# Patient Record
Sex: FEMALE | Race: WHITE | Employment: OTHER | URBAN - METROPOLITAN AREA
[De-identification: names, ages, dates, MRNs, and addresses within clinical notes are randomized per-mention and may not be internally consistent; named-entity substitution may affect disease eponyms.]

---

## 2017-01-01 DIAGNOSIS — K50.919 CROHN'S DISEASE WITH COMPLICATION, UNSPECIFIED GASTROINTESTINAL TRACT LOCATION (HCC): ICD-10-CM

## 2017-01-03 RX ORDER — HYOSCYAMINE SULFATE 0.125 MG
TABLET ORAL
Qty: 180 TABLET | Refills: 0 | Status: SHIPPED | OUTPATIENT
Start: 2017-01-03 | End: 2017-02-20 | Stop reason: SDUPTHER

## 2017-01-04 ENCOUNTER — HOSPITAL ENCOUNTER (OUTPATIENT)
Dept: PHYSICAL THERAPY | Age: 52
Discharge: HOME | End: 2017-01-04

## 2017-01-10 ENCOUNTER — OFFICE VISIT (OUTPATIENT)
Dept: FAMILY MEDICINE CLINIC | Age: 52
End: 2017-01-10

## 2017-01-10 VITALS
TEMPERATURE: 97.8 F | RESPIRATION RATE: 16 BRPM | HEIGHT: 63 IN | HEART RATE: 78 BPM | BODY MASS INDEX: 51.91 KG/M2 | SYSTOLIC BLOOD PRESSURE: 114 MMHG | WEIGHT: 293 LBS | DIASTOLIC BLOOD PRESSURE: 74 MMHG

## 2017-01-10 DIAGNOSIS — F60.3 BORDERLINE PERSONALITY DISORDER (HCC): ICD-10-CM

## 2017-01-10 DIAGNOSIS — E83.42 HYPOMAGNESEMIA: ICD-10-CM

## 2017-01-10 DIAGNOSIS — F33.2 SEVERE EPISODE OF RECURRENT MAJOR DEPRESSIVE DISORDER, WITHOUT PSYCHOTIC FEATURES (HCC): ICD-10-CM

## 2017-01-10 DIAGNOSIS — I10 BENIGN ESSENTIAL HTN: ICD-10-CM

## 2017-01-10 DIAGNOSIS — E11.42 TYPE 2 DIABETES MELLITUS WITH DIABETIC POLYNEUROPATHY, WITHOUT LONG-TERM CURRENT USE OF INSULIN (HCC): Primary | ICD-10-CM

## 2017-01-10 DIAGNOSIS — K50.919 CROHN'S DISEASE WITH COMPLICATION, UNSPECIFIED GASTROINTESTINAL TRACT LOCATION (HCC): ICD-10-CM

## 2017-01-10 DIAGNOSIS — E11.42 TYPE 2 DIABETES MELLITUS WITH DIABETIC POLYNEUROPATHY, WITHOUT LONG-TERM CURRENT USE OF INSULIN (HCC): ICD-10-CM

## 2017-01-10 LAB
ALBUMIN SERPL-MCNC: 4.1 G/DL (ref 3.9–4.9)
ALP BLD-CCNC: 92 U/L (ref 40–130)
ALT SERPL-CCNC: 8 U/L (ref 0–33)
ANION GAP SERPL CALCULATED.3IONS-SCNC: 14 MEQ/L (ref 7–13)
AST SERPL-CCNC: 13 U/L (ref 0–35)
BILIRUB SERPL-MCNC: 0.3 MG/DL (ref 0–1.2)
BUN BLDV-MCNC: 21 MG/DL (ref 6–20)
CALCIUM SERPL-MCNC: 8.7 MG/DL (ref 8.6–10.2)
CHLORIDE BLD-SCNC: 96 MEQ/L (ref 98–107)
CHOLESTEROL, TOTAL: 238 MG/DL (ref 0–199)
CO2: 29 MEQ/L (ref 22–29)
CREAT SERPL-MCNC: 0.72 MG/DL (ref 0.5–0.9)
GFR AFRICAN AMERICAN: >60
GFR NON-AFRICAN AMERICAN: >60
GLOBULIN: 2.7 G/DL (ref 2.3–3.5)
GLUCOSE BLD-MCNC: 107 MG/DL (ref 74–109)
HBA1C MFR BLD: 6.4 % (ref 4.8–5.9)
HCT VFR BLD CALC: 35.4 % (ref 37–47)
HDLC SERPL-MCNC: 73 MG/DL (ref 40–59)
HEMOGLOBIN: 11.4 G/DL (ref 12–16)
LDL CHOLESTEROL CALCULATED: 136 MG/DL (ref 0–129)
MAGNESIUM: 1.4 MG/DL (ref 1.7–2.3)
MCH RBC QN AUTO: 25.6 PG (ref 27–31.3)
MCHC RBC AUTO-ENTMCNC: 32.2 % (ref 33–37)
MCV RBC AUTO: 79.4 FL (ref 82–100)
PDW BLD-RTO: 16.1 % (ref 11.5–14.5)
PLATELET # BLD: 312 K/UL (ref 130–400)
POTASSIUM SERPL-SCNC: 4.3 MEQ/L (ref 3.5–5.1)
RBC # BLD: 4.46 M/UL (ref 4.2–5.4)
SODIUM BLD-SCNC: 139 MEQ/L (ref 132–144)
T4 FREE: 1.13 NG/DL (ref 0.93–1.7)
TOTAL PROTEIN: 6.8 G/DL (ref 6.4–8.1)
TRIGL SERPL-MCNC: 143 MG/DL (ref 0–200)
TSH SERPL DL<=0.05 MIU/L-ACNC: 2.57 UIU/ML (ref 0.27–4.2)
WBC # BLD: 9 K/UL (ref 4.8–10.8)

## 2017-01-10 PROCEDURE — 99214 OFFICE O/P EST MOD 30 MIN: CPT | Performed by: FAMILY MEDICINE

## 2017-01-10 RX ORDER — AMITRIPTYLINE HYDROCHLORIDE 25 MG/1
TABLET, FILM COATED ORAL
Status: ON HOLD | COMMUNITY
Start: 2016-10-17 | End: 2017-04-01 | Stop reason: HOSPADM

## 2017-01-10 RX ORDER — TRAZODONE HYDROCHLORIDE 150 MG/1
TABLET ORAL
Status: ON HOLD | COMMUNITY
Start: 2016-12-13 | End: 2017-04-01 | Stop reason: HOSPADM

## 2017-01-10 RX ORDER — TIZANIDINE 4 MG/1
TABLET ORAL
COMMUNITY
Start: 2016-12-14 | End: 2017-03-28

## 2017-01-10 RX ORDER — DULOXETIN HYDROCHLORIDE 30 MG/1
90 CAPSULE, DELAYED RELEASE ORAL DAILY
Status: ON HOLD | COMMUNITY
Start: 2016-12-13 | End: 2017-04-01 | Stop reason: HOSPADM

## 2017-01-11 ENCOUNTER — HOSPITAL ENCOUNTER (OUTPATIENT)
Dept: PHYSICAL THERAPY | Age: 52
Setting detail: THERAPIES SERIES
Discharge: HOME | End: 2017-01-11
Payer: COMMERCIAL

## 2017-01-11 PROCEDURE — 97113 AQUATIC THERAPY/EXERCISES: CPT

## 2017-01-13 ENCOUNTER — HOSPITAL ENCOUNTER (OUTPATIENT)
Dept: PHYSICAL THERAPY | Age: 52
Setting detail: THERAPIES SERIES
Discharge: HOME | End: 2017-01-13
Payer: COMMERCIAL

## 2017-01-13 PROCEDURE — 97113 AQUATIC THERAPY/EXERCISES: CPT

## 2017-01-13 ASSESSMENT — PAIN SCALES - GENERAL: PAINLEVEL_OUTOF10: 6

## 2017-01-17 ENCOUNTER — HOSPITAL ENCOUNTER (OUTPATIENT)
Dept: GENERAL RADIOLOGY | Age: 52
Discharge: HOME | End: 2017-01-17
Payer: COMMERCIAL

## 2017-01-17 DIAGNOSIS — M25.261: ICD-10-CM

## 2017-01-17 PROCEDURE — 73562 X-RAY EXAM OF KNEE 3: CPT

## 2017-01-18 ENCOUNTER — HOSPITAL ENCOUNTER (OUTPATIENT)
Dept: PHYSICAL THERAPY | Age: 52
Setting detail: THERAPIES SERIES
Discharge: HOME | End: 2017-01-18
Payer: COMMERCIAL

## 2017-01-18 DIAGNOSIS — J45.30 MILD PERSISTENT ASTHMA WITHOUT COMPLICATION: Primary | ICD-10-CM

## 2017-01-18 PROCEDURE — 97113 AQUATIC THERAPY/EXERCISES: CPT

## 2017-01-18 RX ORDER — FLUTICASONE FUROATE AND VILANTEROL 100; 25 UG/1; UG/1
1 POWDER RESPIRATORY (INHALATION) DAILY
Qty: 1 EACH | Refills: 3 | Status: SHIPPED | OUTPATIENT
Start: 2017-01-18 | End: 2017-05-12 | Stop reason: SDUPTHER

## 2017-01-18 RX ORDER — MOMETASONE FUROATE 1 MG/G
CREAM TOPICAL
Qty: 50 G | Refills: 3 | Status: SHIPPED | OUTPATIENT
Start: 2017-01-18 | End: 2017-08-24 | Stop reason: SDUPTHER

## 2017-01-18 ASSESSMENT — PAIN SCALES - GENERAL: PAINLEVEL_OUTOF10: 6

## 2017-01-20 ENCOUNTER — HOSPITAL ENCOUNTER (OUTPATIENT)
Dept: PHYSICAL THERAPY | Age: 52
Setting detail: THERAPIES SERIES
Discharge: HOME | End: 2017-01-20
Payer: COMMERCIAL

## 2017-01-20 PROCEDURE — 97113 AQUATIC THERAPY/EXERCISES: CPT

## 2017-01-25 ENCOUNTER — HOSPITAL ENCOUNTER (OUTPATIENT)
Dept: PHYSICAL THERAPY | Age: 52
Setting detail: THERAPIES SERIES
Discharge: HOME | End: 2017-01-25
Payer: COMMERCIAL

## 2017-01-25 PROCEDURE — 97113 AQUATIC THERAPY/EXERCISES: CPT

## 2017-01-25 ASSESSMENT — PAIN DESCRIPTION - LOCATION: LOCATION: BACK;KNEE;LEG

## 2017-01-25 ASSESSMENT — PAIN DESCRIPTION - DESCRIPTORS: DESCRIPTORS: ACHING

## 2017-01-25 ASSESSMENT — PAIN SCALES - GENERAL: PAINLEVEL_OUTOF10: 9

## 2017-01-25 ASSESSMENT — PAIN DESCRIPTION - ORIENTATION: ORIENTATION: RIGHT

## 2017-01-27 ENCOUNTER — HOSPITAL ENCOUNTER (OUTPATIENT)
Dept: PHYSICAL THERAPY | Age: 52
Setting detail: THERAPIES SERIES
Discharge: HOME | End: 2017-01-27
Payer: COMMERCIAL

## 2017-01-30 ENCOUNTER — HOSPITAL ENCOUNTER (OUTPATIENT)
Dept: PHYSICAL THERAPY | Age: 52
Setting detail: THERAPIES SERIES
Discharge: HOME | End: 2017-01-30
Payer: COMMERCIAL

## 2017-01-30 PROCEDURE — 97113 AQUATIC THERAPY/EXERCISES: CPT

## 2017-01-30 ASSESSMENT — PAIN DESCRIPTION - ORIENTATION: ORIENTATION: RIGHT;LEFT

## 2017-01-30 ASSESSMENT — PAIN DESCRIPTION - DESCRIPTORS: DESCRIPTORS: ACHING

## 2017-01-30 ASSESSMENT — PAIN DESCRIPTION - LOCATION: LOCATION: BACK;KNEE;FOOT

## 2017-01-30 ASSESSMENT — PAIN DESCRIPTION - PAIN TYPE: TYPE: CHRONIC PAIN

## 2017-01-30 ASSESSMENT — PAIN SCALES - GENERAL: PAINLEVEL_OUTOF10: 5

## 2017-02-01 ENCOUNTER — HOSPITAL ENCOUNTER (OUTPATIENT)
Dept: PHYSICAL THERAPY | Age: 52
Setting detail: THERAPIES SERIES
Discharge: HOME | End: 2017-02-01
Payer: COMMERCIAL

## 2017-02-01 PROCEDURE — 97113 AQUATIC THERAPY/EXERCISES: CPT

## 2017-02-01 ASSESSMENT — PAIN DESCRIPTION - DESCRIPTORS: DESCRIPTORS: ACHING

## 2017-02-01 ASSESSMENT — PAIN DESCRIPTION - LOCATION: LOCATION: KNEE;BACK

## 2017-02-01 ASSESSMENT — PAIN SCALES - GENERAL: PAINLEVEL_OUTOF10: 4

## 2017-02-01 ASSESSMENT — PAIN DESCRIPTION - PAIN TYPE: TYPE: CHRONIC PAIN

## 2017-02-01 ASSESSMENT — PAIN DESCRIPTION - ORIENTATION: ORIENTATION: RIGHT;LEFT;LOWER

## 2017-02-08 ENCOUNTER — HOSPITAL ENCOUNTER (OUTPATIENT)
Dept: PHYSICAL THERAPY | Age: 52
Setting detail: THERAPIES SERIES
Discharge: HOME | End: 2017-02-08
Payer: COMMERCIAL

## 2017-02-13 RX ORDER — PIOGLITAZONEHYDROCHLORIDE 30 MG/1
TABLET ORAL
Qty: 30 TABLET | Refills: 5 | Status: SHIPPED | OUTPATIENT
Start: 2017-02-13 | End: 2017-08-10 | Stop reason: SDUPTHER

## 2017-02-16 ENCOUNTER — HOSPITAL ENCOUNTER (OUTPATIENT)
Dept: PHYSICAL THERAPY | Age: 52
Setting detail: THERAPIES SERIES
Discharge: HOME | End: 2017-02-16
Payer: COMMERCIAL

## 2017-02-20 DIAGNOSIS — K50.919 CROHN'S DISEASE WITH COMPLICATION, UNSPECIFIED GASTROINTESTINAL TRACT LOCATION (HCC): ICD-10-CM

## 2017-02-20 RX ORDER — ONDANSETRON HYDROCHLORIDE 8 MG/1
TABLET, FILM COATED ORAL
Qty: 40 TABLET | Refills: 2 | Status: ON HOLD | OUTPATIENT
Start: 2017-02-20 | End: 2017-04-01 | Stop reason: HOSPADM

## 2017-02-20 RX ORDER — HYOSCYAMINE SULFATE 0.125 MG
TABLET ORAL
Qty: 180 TABLET | Refills: 0 | Status: ON HOLD | OUTPATIENT
Start: 2017-02-20 | End: 2017-04-01 | Stop reason: HOSPADM

## 2017-02-23 ENCOUNTER — APPOINTMENT (OUTPATIENT)
Dept: PHYSICAL THERAPY | Age: 52
End: 2017-02-23
Payer: COMMERCIAL

## 2017-03-28 ENCOUNTER — HOSPITAL ENCOUNTER (INPATIENT)
Age: 52
LOS: 4 days | Discharge: HOME | DRG: 751 | End: 2017-04-01
Attending: PSYCHIATRY & NEUROLOGY | Admitting: PSYCHIATRY & NEUROLOGY
Payer: COMMERCIAL

## 2017-03-28 DIAGNOSIS — F32.2 MAJOR DEPRESS DIS, SEVERE: Primary | ICD-10-CM

## 2017-03-28 LAB
ALBUMIN SERPL-MCNC: 4.2 G/DL (ref 3.9–4.9)
ALP BLD-CCNC: 114 U/L (ref 40–130)
ALT SERPL-CCNC: 11 U/L (ref 0–33)
AMPHETAMINE SCREEN, URINE: NORMAL
ANION GAP SERPL CALCULATED.3IONS-SCNC: 12 MEQ/L (ref 7–13)
AST SERPL-CCNC: 16 U/L (ref 0–35)
BACTERIA: ABNORMAL /HPF
BARBITURATE SCREEN URINE: NORMAL
BASOPHILS ABSOLUTE: 0.1 K/UL (ref 0–0.2)
BASOPHILS RELATIVE PERCENT: 1.1 %
BENZODIAZEPINE SCREEN, URINE: NORMAL
BILIRUB SERPL-MCNC: 0.4 MG/DL (ref 0–1.2)
BILIRUBIN URINE: NEGATIVE
BLOOD, URINE: NEGATIVE
BUN BLDV-MCNC: 13 MG/DL (ref 6–20)
CALCIUM SERPL-MCNC: 9.9 MG/DL (ref 8.6–10.2)
CANNABINOID SCREEN URINE: NORMAL
CHLORIDE BLD-SCNC: 97 MEQ/L (ref 98–107)
CLARITY: CLEAR
CO2: 27 MEQ/L (ref 22–29)
COCAINE METABOLITE SCREEN URINE: NORMAL
COLOR: YELLOW
CREAT SERPL-MCNC: 0.7 MG/DL (ref 0.5–0.9)
EOSINOPHILS ABSOLUTE: 0.1 K/UL (ref 0–0.7)
EOSINOPHILS RELATIVE PERCENT: 1.3 %
EPITHELIAL CELLS, UA: ABNORMAL /HPF
ETHANOL PERCENT: NORMAL G/DL
ETHANOL: <10 MG/DL (ref 0–0.08)
GFR AFRICAN AMERICAN: >60
GFR NON-AFRICAN AMERICAN: >60
GLOBULIN: 3.1 G/DL (ref 2.3–3.5)
GLUCOSE BLD-MCNC: 115 MG/DL (ref 74–109)
GLUCOSE URINE: NEGATIVE MG/DL
HCT VFR BLD CALC: 37 % (ref 37–47)
HEMOGLOBIN: 12 G/DL (ref 12–16)
KETONES, URINE: NEGATIVE MG/DL
LEUKOCYTE ESTERASE, URINE: ABNORMAL
LYMPHOCYTES ABSOLUTE: 1.7 K/UL (ref 1–4.8)
LYMPHOCYTES RELATIVE PERCENT: 15.3 %
Lab: NORMAL
MCH RBC QN AUTO: 24.8 PG (ref 27–31.3)
MCHC RBC AUTO-ENTMCNC: 32.4 % (ref 33–37)
MCV RBC AUTO: 76.5 FL (ref 82–100)
MONOCYTES ABSOLUTE: 1.1 K/UL (ref 0.2–0.8)
MONOCYTES RELATIVE PERCENT: 9.9 %
NEUTROPHILS ABSOLUTE: 8 K/UL (ref 1.4–6.5)
NEUTROPHILS RELATIVE PERCENT: 72.4 %
NITRITE, URINE: NEGATIVE
OPIATE SCREEN URINE: NORMAL
PDW BLD-RTO: 16.5 % (ref 11.5–14.5)
PH UA: 6 (ref 5–9)
PHENCYCLIDINE SCREEN URINE: NORMAL
PLATELET # BLD: 393 K/UL (ref 130–400)
POTASSIUM SERPL-SCNC: 4.2 MEQ/L (ref 3.5–5.1)
PROTEIN UA: NEGATIVE MG/DL
RBC # BLD: 4.84 M/UL (ref 4.2–5.4)
RBC UA: ABNORMAL /HPF (ref 0–2)
SODIUM BLD-SCNC: 136 MEQ/L (ref 132–144)
SPECIFIC GRAVITY UA: 1.01 (ref 1–1.03)
TOTAL CK: 85 U/L (ref 0–170)
TOTAL PROTEIN: 7.3 G/DL (ref 6.4–8.1)
TSH SERPL DL<=0.05 MIU/L-ACNC: 2.09 UIU/ML (ref 0.27–4.2)
UROBILINOGEN, URINE: 0.2 E.U./DL
WBC # BLD: 11.1 K/UL (ref 4.8–10.8)
WBC UA: ABNORMAL /HPF (ref 0–5)

## 2017-03-28 PROCEDURE — 80307 DRUG TEST PRSMV CHEM ANLYZR: CPT

## 2017-03-28 PROCEDURE — 81001 URINALYSIS AUTO W/SCOPE: CPT

## 2017-03-28 PROCEDURE — 99285 EMERGENCY DEPT VISIT HI MDM: CPT

## 2017-03-28 PROCEDURE — 80053 COMPREHEN METABOLIC PANEL: CPT

## 2017-03-28 PROCEDURE — 85025 COMPLETE CBC W/AUTO DIFF WBC: CPT

## 2017-03-28 PROCEDURE — G0480 DRUG TEST DEF 1-7 CLASSES: HCPCS

## 2017-03-28 PROCEDURE — 6370000000 HC RX 637 (ALT 250 FOR IP): Performed by: EMERGENCY MEDICINE

## 2017-03-28 PROCEDURE — 96372 THER/PROPH/DIAG INJ SC/IM: CPT

## 2017-03-28 PROCEDURE — 1240000000 HC EMOTIONAL WELLNESS R&B

## 2017-03-28 PROCEDURE — 82550 ASSAY OF CK (CPK): CPT

## 2017-03-28 PROCEDURE — 6360000002 HC RX W HCPCS: Performed by: EMERGENCY MEDICINE

## 2017-03-28 PROCEDURE — 84443 ASSAY THYROID STIM HORMONE: CPT

## 2017-03-28 PROCEDURE — 36415 COLL VENOUS BLD VENIPUNCTURE: CPT

## 2017-03-28 RX ORDER — DULOXETIN HYDROCHLORIDE 60 MG/1
60 CAPSULE, DELAYED RELEASE ORAL DAILY
Status: DISCONTINUED | OUTPATIENT
Start: 2017-03-29 | End: 2017-03-31

## 2017-03-28 RX ORDER — FLUTICASONE FUROATE AND VILANTEROL 100; 25 UG/1; UG/1
1 POWDER RESPIRATORY (INHALATION) DAILY
Status: DISCONTINUED | OUTPATIENT
Start: 2017-03-29 | End: 2017-03-29 | Stop reason: CLARIF

## 2017-03-28 RX ORDER — TRIAMTERENE AND HYDROCHLOROTHIAZIDE 75; 50 MG/1; MG/1
1 TABLET ORAL DAILY
Status: DISCONTINUED | OUTPATIENT
Start: 2017-03-29 | End: 2017-04-01 | Stop reason: HOSPADM

## 2017-03-28 RX ORDER — ALBUTEROL SULFATE 90 UG/1
2 AEROSOL, METERED RESPIRATORY (INHALATION) EVERY 6 HOURS PRN
Status: DISCONTINUED | OUTPATIENT
Start: 2017-03-28 | End: 2017-04-01 | Stop reason: HOSPADM

## 2017-03-28 RX ORDER — MONTELUKAST SODIUM 10 MG/1
10 TABLET ORAL NIGHTLY
Status: DISCONTINUED | OUTPATIENT
Start: 2017-03-28 | End: 2017-04-01 | Stop reason: HOSPADM

## 2017-03-28 RX ORDER — ACETAMINOPHEN 325 MG/1
650 TABLET ORAL EVERY 4 HOURS PRN
Status: DISCONTINUED | OUTPATIENT
Start: 2017-03-28 | End: 2017-04-01 | Stop reason: HOSPADM

## 2017-03-28 RX ORDER — SUMATRIPTAN 50 MG/1
50 TABLET, FILM COATED ORAL ONCE
Status: DISCONTINUED | OUTPATIENT
Start: 2017-03-28 | End: 2017-04-01 | Stop reason: HOSPADM

## 2017-03-28 RX ORDER — IPRATROPIUM BROMIDE AND ALBUTEROL SULFATE 2.5; .5 MG/3ML; MG/3ML
1 SOLUTION RESPIRATORY (INHALATION) EVERY 4 HOURS PRN
Status: DISCONTINUED | OUTPATIENT
Start: 2017-03-28 | End: 2017-04-01 | Stop reason: HOSPADM

## 2017-03-28 RX ORDER — HALOPERIDOL 5 MG/ML
10 INJECTION INTRAMUSCULAR ONCE
Status: COMPLETED | OUTPATIENT
Start: 2017-03-28 | End: 2017-03-28

## 2017-03-28 RX ORDER — PIOGLITAZONEHYDROCHLORIDE 30 MG/1
30 TABLET ORAL DAILY
Status: DISCONTINUED | OUTPATIENT
Start: 2017-03-29 | End: 2017-04-01 | Stop reason: HOSPADM

## 2017-03-28 RX ORDER — DICYCLOMINE HYDROCHLORIDE 10 MG/1
10 CAPSULE ORAL 4 TIMES DAILY
Status: DISCONTINUED | OUTPATIENT
Start: 2017-03-28 | End: 2017-04-01 | Stop reason: HOSPADM

## 2017-03-28 RX ORDER — HALOPERIDOL 5 MG
5 TABLET ORAL EVERY 6 HOURS PRN
Status: DISCONTINUED | OUTPATIENT
Start: 2017-03-28 | End: 2017-04-01 | Stop reason: HOSPADM

## 2017-03-28 RX ORDER — ONDANSETRON 4 MG/1
8 TABLET, FILM COATED ORAL EVERY 8 HOURS PRN
Status: DISCONTINUED | OUTPATIENT
Start: 2017-03-28 | End: 2017-04-01 | Stop reason: HOSPADM

## 2017-03-28 RX ORDER — HYDROXYZINE HYDROCHLORIDE 50 MG/ML
50 INJECTION, SOLUTION INTRAMUSCULAR EVERY 6 HOURS PRN
Status: DISCONTINUED | OUTPATIENT
Start: 2017-03-28 | End: 2017-04-01 | Stop reason: HOSPADM

## 2017-03-28 RX ORDER — HYDROXYZINE PAMOATE 50 MG/1
50 CAPSULE ORAL EVERY 6 HOURS PRN
Status: DISCONTINUED | OUTPATIENT
Start: 2017-03-28 | End: 2017-04-01 | Stop reason: HOSPADM

## 2017-03-28 RX ORDER — SIMETHICONE 80 MG
80 TABLET,CHEWABLE ORAL 4 TIMES DAILY PRN
Status: DISCONTINUED | OUTPATIENT
Start: 2017-03-28 | End: 2017-04-01 | Stop reason: HOSPADM

## 2017-03-28 RX ORDER — ALBUTEROL SULFATE 90 UG/1
2 AEROSOL, METERED RESPIRATORY (INHALATION) EVERY 6 HOURS PRN
COMMUNITY
End: 2017-05-10

## 2017-03-28 RX ORDER — HALOPERIDOL 5 MG/ML
5 INJECTION INTRAMUSCULAR EVERY 6 HOURS PRN
Status: DISCONTINUED | OUTPATIENT
Start: 2017-03-28 | End: 2017-04-01 | Stop reason: HOSPADM

## 2017-03-28 RX ORDER — HYOSCYAMINE SULFATE 0.125 MG
125 TABLET ORAL EVERY 4 HOURS PRN
Status: DISCONTINUED | OUTPATIENT
Start: 2017-03-28 | End: 2017-03-29

## 2017-03-28 RX ORDER — MELOXICAM 7.5 MG/1
7.5 TABLET ORAL 2 TIMES DAILY PRN
Status: ON HOLD | COMMUNITY
End: 2017-04-01 | Stop reason: HOSPADM

## 2017-03-28 RX ORDER — POTASSIUM CHLORIDE 750 MG/1
10 CAPSULE, EXTENDED RELEASE ORAL DAILY
Status: DISCONTINUED | OUTPATIENT
Start: 2017-03-29 | End: 2017-04-01 | Stop reason: HOSPADM

## 2017-03-28 RX ORDER — PANTOPRAZOLE SODIUM 40 MG/1
40 TABLET, DELAYED RELEASE ORAL
Status: DISCONTINUED | OUTPATIENT
Start: 2017-03-29 | End: 2017-04-01 | Stop reason: HOSPADM

## 2017-03-28 RX ORDER — ACETAMINOPHEN 500 MG
1000 TABLET ORAL ONCE
Status: COMPLETED | OUTPATIENT
Start: 2017-03-28 | End: 2017-04-01

## 2017-03-28 RX ORDER — SUMATRIPTAN 50 MG/1
50 TABLET, FILM COATED ORAL ONCE
Status: COMPLETED | OUTPATIENT
Start: 2017-03-28 | End: 2017-03-28

## 2017-03-28 RX ORDER — BACLOFEN 20 MG/1
20 TABLET ORAL 3 TIMES DAILY
Status: ON HOLD | COMMUNITY
End: 2017-04-01 | Stop reason: HOSPADM

## 2017-03-28 RX ADMIN — HALOPERIDOL 10 MG: 5 INJECTION INTRAMUSCULAR at 19:59

## 2017-03-28 RX ADMIN — SUMATRIPTAN SUCCINATE 50 MG: 50 TABLET ORAL at 19:59

## 2017-03-28 ASSESSMENT — PAIN DESCRIPTION - PROGRESSION: CLINICAL_PROGRESSION: GRADUALLY WORSENING

## 2017-03-28 ASSESSMENT — ENCOUNTER SYMPTOMS
COUGH: 0
SORE THROAT: 0
COLOR CHANGE: 0
EYE DISCHARGE: 0
ABDOMINAL DISTENTION: 0
CHOKING: 0
RHINORRHEA: 0
CONSTIPATION: 0
SHORTNESS OF BREATH: 0
ABDOMINAL PAIN: 0

## 2017-03-28 ASSESSMENT — SLEEP AND FATIGUE QUESTIONNAIRES
DIFFICULTY STAYING ASLEEP: YES
RESTFUL SLEEP: NO
DIFFICULTY FALLING ASLEEP: YES
DO YOU HAVE DIFFICULTY SLEEPING: YES
DIFFICULTY ARISING: NO
DO YOU USE A SLEEP AID: YES

## 2017-03-28 ASSESSMENT — PAIN DESCRIPTION - FREQUENCY: FREQUENCY: CONTINUOUS

## 2017-03-28 ASSESSMENT — PAIN DESCRIPTION - LOCATION: LOCATION: HIP

## 2017-03-28 ASSESSMENT — PAIN DESCRIPTION - ORIENTATION: ORIENTATION: RIGHT

## 2017-03-28 ASSESSMENT — PAIN DESCRIPTION - ONSET: ONSET: AWAKENED FROM SLEEP

## 2017-03-28 ASSESSMENT — PAIN SCALES - GENERAL
PAINLEVEL_OUTOF10: 10
PAINLEVEL_OUTOF10: 10

## 2017-03-28 ASSESSMENT — PAIN DESCRIPTION - DESCRIPTORS: DESCRIPTORS: RADIATING;CONSTANT

## 2017-03-28 ASSESSMENT — PATIENT HEALTH QUESTIONNAIRE - PHQ9: SUM OF ALL RESPONSES TO PHQ QUESTIONS 1-9: 27

## 2017-03-29 LAB
GLUCOSE BLD-MCNC: 133 MG/DL (ref 60–115)
PERFORMED ON: ABNORMAL

## 2017-03-29 PROCEDURE — 6370000000 HC RX 637 (ALT 250 FOR IP): Performed by: NURSE PRACTITIONER

## 2017-03-29 PROCEDURE — 6370000000 HC RX 637 (ALT 250 FOR IP): Performed by: PSYCHIATRY & NEUROLOGY

## 2017-03-29 PROCEDURE — 87086 URINE CULTURE/COLONY COUNT: CPT

## 2017-03-29 PROCEDURE — 1240000000 HC EMOTIONAL WELLNESS R&B

## 2017-03-29 PROCEDURE — 99222 1ST HOSP IP/OBS MODERATE 55: CPT | Performed by: PSYCHIATRY & NEUROLOGY

## 2017-03-29 PROCEDURE — 94664 DEMO&/EVAL PT USE INHALER: CPT

## 2017-03-29 PROCEDURE — 6360000002 HC RX W HCPCS: Performed by: PSYCHIATRY & NEUROLOGY

## 2017-03-29 RX ORDER — LOPERAMIDE HYDROCHLORIDE 2 MG/1
2 CAPSULE ORAL 4 TIMES DAILY PRN
Status: DISCONTINUED | OUTPATIENT
Start: 2017-03-29 | End: 2017-04-01 | Stop reason: HOSPADM

## 2017-03-29 RX ORDER — NITROFURANTOIN 25; 75 MG/1; MG/1
100 CAPSULE ORAL EVERY 12 HOURS SCHEDULED
Status: DISCONTINUED | OUTPATIENT
Start: 2017-03-29 | End: 2017-04-01 | Stop reason: HOSPADM

## 2017-03-29 RX ADMIN — DICYCLOMINE HYDROCHLORIDE 10 MG: 10 CAPSULE ORAL at 16:51

## 2017-03-29 RX ADMIN — DICYCLOMINE HYDROCHLORIDE 10 MG: 10 CAPSULE ORAL at 21:13

## 2017-03-29 RX ADMIN — NITROFURANTOIN (MONOHYDRATE/MACROCRYSTALS) 100 MG: 75; 25 CAPSULE ORAL at 13:35

## 2017-03-29 RX ADMIN — DICYCLOMINE HYDROCHLORIDE 10 MG: 10 CAPSULE ORAL at 09:19

## 2017-03-29 RX ADMIN — MONTELUKAST SODIUM 10 MG: 10 TABLET, FILM COATED ORAL at 21:13

## 2017-03-29 RX ADMIN — TRIAMTERENE AND HYDROCHLOROTHIAZIDE 1 TABLET: 75; 50 TABLET ORAL at 09:19

## 2017-03-29 RX ADMIN — DICYCLOMINE HYDROCHLORIDE 10 MG: 10 CAPSULE ORAL at 13:17

## 2017-03-29 RX ADMIN — POTASSIUM CHLORIDE 10 MEQ: 750 CAPSULE, EXTENDED RELEASE ORAL at 09:19

## 2017-03-29 RX ADMIN — ACETAMINOPHEN 650 MG: 325 TABLET ORAL at 06:12

## 2017-03-29 RX ADMIN — HYOSCYAMINE SULFATE 125 MCG: 0.12 TABLET ORAL; SUBLINGUAL at 13:35

## 2017-03-29 RX ADMIN — PANTOPRAZOLE SODIUM 40 MG: 40 TABLET, DELAYED RELEASE ORAL at 16:51

## 2017-03-29 RX ADMIN — ONDANSETRON HYDROCHLORIDE 8 MG: 4 TABLET, FILM COATED ORAL at 18:05

## 2017-03-29 RX ADMIN — LOPERAMIDE HYDROCHLORIDE 2 MG: 2 CAPSULE ORAL at 15:02

## 2017-03-29 RX ADMIN — METFORMIN HYDROCHLORIDE 500 MG: 500 TABLET ORAL at 09:19

## 2017-03-29 RX ADMIN — PIOGLITAZONE 30 MG: 30 TABLET ORAL at 09:19

## 2017-03-29 RX ADMIN — DULOXETINE HYDROCHLORIDE 60 MG: 60 CAPSULE, DELAYED RELEASE ORAL at 09:19

## 2017-03-29 RX ADMIN — PANTOPRAZOLE SODIUM 40 MG: 40 TABLET, DELAYED RELEASE ORAL at 06:08

## 2017-03-29 ASSESSMENT — LIFESTYLE VARIABLES: HISTORY_ALCOHOL_USE: NO

## 2017-03-29 ASSESSMENT — PAIN SCALES - GENERAL: PAINLEVEL_OUTOF10: 5

## 2017-03-30 ENCOUNTER — APPOINTMENT (OUTPATIENT)
Dept: GENERAL RADIOLOGY | Age: 52
DRG: 751 | End: 2017-03-30
Payer: COMMERCIAL

## 2017-03-30 ENCOUNTER — APPOINTMENT (OUTPATIENT)
Dept: CT IMAGING | Age: 52
DRG: 751 | End: 2017-03-30
Payer: COMMERCIAL

## 2017-03-30 PROBLEM — E66.9 OBESITY (BMI 35.0-39.9 WITHOUT COMORBIDITY): Chronic | Status: ACTIVE | Noted: 2017-03-30

## 2017-03-30 PROBLEM — M79.10 MUSCULAR PAIN: Status: ACTIVE | Noted: 2017-03-30

## 2017-03-30 PROBLEM — M25.551 HIP PAIN, RIGHT: Status: ACTIVE | Noted: 2017-03-30

## 2017-03-30 PROBLEM — M25.561 PAIN IN RIGHT KNEE: Chronic | Status: ACTIVE | Noted: 2017-03-30

## 2017-03-30 PROCEDURE — 6370000000 HC RX 637 (ALT 250 FOR IP): Performed by: PSYCHIATRY & NEUROLOGY

## 2017-03-30 PROCEDURE — 73502 X-RAY EXAM HIP UNI 2-3 VIEWS: CPT

## 2017-03-30 PROCEDURE — 73700 CT LOWER EXTREMITY W/O DYE: CPT

## 2017-03-30 PROCEDURE — 6370000000 HC RX 637 (ALT 250 FOR IP): Performed by: ANESTHESIOLOGY

## 2017-03-30 PROCEDURE — 99232 SBSQ HOSP IP/OBS MODERATE 35: CPT | Performed by: PSYCHIATRY & NEUROLOGY

## 2017-03-30 PROCEDURE — 6370000000 HC RX 637 (ALT 250 FOR IP): Performed by: NURSE PRACTITIONER

## 2017-03-30 PROCEDURE — 6360000002 HC RX W HCPCS: Performed by: PSYCHIATRY & NEUROLOGY

## 2017-03-30 PROCEDURE — 1240000000 HC EMOTIONAL WELLNESS R&B

## 2017-03-30 PROCEDURE — 94640 AIRWAY INHALATION TREATMENT: CPT

## 2017-03-30 RX ORDER — PREGABALIN 75 MG/1
150 CAPSULE ORAL 2 TIMES DAILY
Status: DISCONTINUED | OUTPATIENT
Start: 2017-03-30 | End: 2017-04-01 | Stop reason: HOSPADM

## 2017-03-30 RX ORDER — LANOLIN ALCOHOL/MO/W.PET/CERES
3 CREAM (GRAM) TOPICAL NIGHTLY
Status: DISCONTINUED | OUTPATIENT
Start: 2017-03-30 | End: 2017-04-01 | Stop reason: HOSPADM

## 2017-03-30 RX ORDER — NAPROXEN 500 MG/1
500 TABLET ORAL 2 TIMES DAILY WITH MEALS
Status: DISCONTINUED | OUTPATIENT
Start: 2017-03-30 | End: 2017-04-01 | Stop reason: HOSPADM

## 2017-03-30 RX ORDER — METHOCARBAMOL 500 MG/1
500 TABLET, FILM COATED ORAL 3 TIMES DAILY PRN
Status: DISCONTINUED | OUTPATIENT
Start: 2017-03-30 | End: 2017-04-01 | Stop reason: HOSPADM

## 2017-03-30 RX ORDER — LIDOCAINE 50 MG/G
2 PATCH TOPICAL DAILY
Status: DISCONTINUED | OUTPATIENT
Start: 2017-03-30 | End: 2017-04-01 | Stop reason: HOSPADM

## 2017-03-30 RX ADMIN — PREGABALIN 150 MG: 75 CAPSULE ORAL at 20:45

## 2017-03-30 RX ADMIN — METFORMIN HYDROCHLORIDE 500 MG: 500 TABLET ORAL at 16:31

## 2017-03-30 RX ADMIN — PANTOPRAZOLE SODIUM 40 MG: 40 TABLET, DELAYED RELEASE ORAL at 16:31

## 2017-03-30 RX ADMIN — TRIAMTERENE AND HYDROCHLOROTHIAZIDE 1 TABLET: 75; 50 TABLET ORAL at 08:27

## 2017-03-30 RX ADMIN — NITROFURANTOIN (MONOHYDRATE/MACROCRYSTALS) 100 MG: 75; 25 CAPSULE ORAL at 08:27

## 2017-03-30 RX ADMIN — NITROFURANTOIN (MONOHYDRATE/MACROCRYSTALS) 100 MG: 75; 25 CAPSULE ORAL at 20:45

## 2017-03-30 RX ADMIN — NAPROXEN 500 MG: 500 TABLET ORAL at 16:31

## 2017-03-30 RX ADMIN — ONDANSETRON HYDROCHLORIDE 8 MG: 4 TABLET, FILM COATED ORAL at 02:45

## 2017-03-30 RX ADMIN — DULOXETINE HYDROCHLORIDE 60 MG: 60 CAPSULE, DELAYED RELEASE ORAL at 08:27

## 2017-03-30 RX ADMIN — MOMETASONE FUROATE AND FORMOTEROL FUMARATE DIHYDRATE 2 PUFF: 200; 5 AEROSOL RESPIRATORY (INHALATION) at 19:32

## 2017-03-30 RX ADMIN — DICYCLOMINE HYDROCHLORIDE 10 MG: 10 CAPSULE ORAL at 08:27

## 2017-03-30 RX ADMIN — METFORMIN HYDROCHLORIDE 500 MG: 500 TABLET ORAL at 08:27

## 2017-03-30 RX ADMIN — ONDANSETRON HYDROCHLORIDE 8 MG: 4 TABLET, FILM COATED ORAL at 13:15

## 2017-03-30 RX ADMIN — DICYCLOMINE HYDROCHLORIDE 10 MG: 10 CAPSULE ORAL at 20:46

## 2017-03-30 RX ADMIN — MELATONIN TAB 3 MG 3 MG: 3 TAB at 20:46

## 2017-03-30 RX ADMIN — PIOGLITAZONE 30 MG: 30 TABLET ORAL at 08:27

## 2017-03-30 RX ADMIN — DICYCLOMINE HYDROCHLORIDE 10 MG: 10 CAPSULE ORAL at 16:31

## 2017-03-30 RX ADMIN — DICYCLOMINE HYDROCHLORIDE 10 MG: 10 CAPSULE ORAL at 13:15

## 2017-03-30 RX ADMIN — PREGABALIN 150 MG: 75 CAPSULE ORAL at 15:53

## 2017-03-30 RX ADMIN — LOPERAMIDE HYDROCHLORIDE 2 MG: 2 CAPSULE ORAL at 16:31

## 2017-03-30 RX ADMIN — POTASSIUM CHLORIDE 10 MEQ: 750 CAPSULE, EXTENDED RELEASE ORAL at 08:27

## 2017-03-30 RX ADMIN — ACETAMINOPHEN 650 MG: 325 TABLET ORAL at 02:45

## 2017-03-30 RX ADMIN — PANTOPRAZOLE SODIUM 40 MG: 40 TABLET, DELAYED RELEASE ORAL at 06:33

## 2017-03-30 RX ADMIN — MONTELUKAST SODIUM 10 MG: 10 TABLET, FILM COATED ORAL at 20:45

## 2017-03-30 ASSESSMENT — PAIN SCALES - GENERAL
PAINLEVEL_OUTOF10: 8
PAINLEVEL_OUTOF10: 6

## 2017-03-31 LAB — URINE CULTURE, ROUTINE: NORMAL

## 2017-03-31 PROCEDURE — 6370000000 HC RX 637 (ALT 250 FOR IP): Performed by: NURSE PRACTITIONER

## 2017-03-31 PROCEDURE — 6370000000 HC RX 637 (ALT 250 FOR IP): Performed by: PSYCHIATRY & NEUROLOGY

## 2017-03-31 PROCEDURE — 94640 AIRWAY INHALATION TREATMENT: CPT

## 2017-03-31 PROCEDURE — 90833 PSYTX W PT W E/M 30 MIN: CPT | Performed by: PSYCHIATRY & NEUROLOGY

## 2017-03-31 PROCEDURE — 1240000000 HC EMOTIONAL WELLNESS R&B

## 2017-03-31 PROCEDURE — 99232 SBSQ HOSP IP/OBS MODERATE 35: CPT | Performed by: PSYCHIATRY & NEUROLOGY

## 2017-03-31 PROCEDURE — 6370000000 HC RX 637 (ALT 250 FOR IP): Performed by: ANESTHESIOLOGY

## 2017-03-31 RX ADMIN — MOMETASONE FUROATE AND FORMOTEROL FUMARATE DIHYDRATE 2 PUFF: 200; 5 AEROSOL RESPIRATORY (INHALATION) at 20:52

## 2017-03-31 RX ADMIN — MONTELUKAST SODIUM 10 MG: 10 TABLET, FILM COATED ORAL at 20:29

## 2017-03-31 RX ADMIN — METFORMIN HYDROCHLORIDE 500 MG: 500 TABLET ORAL at 08:27

## 2017-03-31 RX ADMIN — TRIAMTERENE AND HYDROCHLOROTHIAZIDE 1 TABLET: 75; 50 TABLET ORAL at 08:28

## 2017-03-31 RX ADMIN — DICYCLOMINE HYDROCHLORIDE 10 MG: 10 CAPSULE ORAL at 16:00

## 2017-03-31 RX ADMIN — NITROFURANTOIN (MONOHYDRATE/MACROCRYSTALS) 100 MG: 75; 25 CAPSULE ORAL at 20:29

## 2017-03-31 RX ADMIN — MELATONIN TAB 3 MG 3 MG: 3 TAB at 20:29

## 2017-03-31 RX ADMIN — PREGABALIN 150 MG: 75 CAPSULE ORAL at 20:28

## 2017-03-31 RX ADMIN — DULOXETINE HYDROCHLORIDE 60 MG: 60 CAPSULE, DELAYED RELEASE ORAL at 08:27

## 2017-03-31 RX ADMIN — MOMETASONE FUROATE AND FORMOTEROL FUMARATE DIHYDRATE 2 PUFF: 200; 5 AEROSOL RESPIRATORY (INHALATION) at 08:09

## 2017-03-31 RX ADMIN — NAPROXEN 500 MG: 500 TABLET ORAL at 08:27

## 2017-03-31 RX ADMIN — DICYCLOMINE HYDROCHLORIDE 10 MG: 10 CAPSULE ORAL at 08:28

## 2017-03-31 RX ADMIN — DICYCLOMINE HYDROCHLORIDE 10 MG: 10 CAPSULE ORAL at 20:29

## 2017-03-31 RX ADMIN — PREGABALIN 150 MG: 75 CAPSULE ORAL at 08:27

## 2017-03-31 RX ADMIN — NITROFURANTOIN (MONOHYDRATE/MACROCRYSTALS) 100 MG: 75; 25 CAPSULE ORAL at 08:27

## 2017-03-31 RX ADMIN — NAPROXEN 500 MG: 500 TABLET ORAL at 17:20

## 2017-03-31 RX ADMIN — METFORMIN HYDROCHLORIDE 500 MG: 500 TABLET ORAL at 17:20

## 2017-03-31 RX ADMIN — PIOGLITAZONE 30 MG: 30 TABLET ORAL at 08:27

## 2017-03-31 RX ADMIN — POTASSIUM CHLORIDE 10 MEQ: 750 CAPSULE, EXTENDED RELEASE ORAL at 08:27

## 2017-03-31 RX ADMIN — PANTOPRAZOLE SODIUM 40 MG: 40 TABLET, DELAYED RELEASE ORAL at 16:00

## 2017-03-31 RX ADMIN — PANTOPRAZOLE SODIUM 40 MG: 40 TABLET, DELAYED RELEASE ORAL at 06:35

## 2017-03-31 RX ADMIN — DICYCLOMINE HYDROCHLORIDE 10 MG: 10 CAPSULE ORAL at 13:04

## 2017-03-31 ASSESSMENT — PAIN SCALES - GENERAL
PAINLEVEL_OUTOF10: 2
PAINLEVEL_OUTOF10: 4

## 2017-04-01 VITALS
HEIGHT: 62 IN | RESPIRATION RATE: 20 BRPM | SYSTOLIC BLOOD PRESSURE: 131 MMHG | WEIGHT: 293 LBS | OXYGEN SATURATION: 95 % | TEMPERATURE: 98 F | HEART RATE: 80 BPM | BODY MASS INDEX: 53.92 KG/M2 | DIASTOLIC BLOOD PRESSURE: 81 MMHG

## 2017-04-01 PROCEDURE — 94640 AIRWAY INHALATION TREATMENT: CPT

## 2017-04-01 PROCEDURE — 6370000000 HC RX 637 (ALT 250 FOR IP): Performed by: PSYCHIATRY & NEUROLOGY

## 2017-04-01 PROCEDURE — 6370000000 HC RX 637 (ALT 250 FOR IP): Performed by: NURSE PRACTITIONER

## 2017-04-01 PROCEDURE — 6370000000 HC RX 637 (ALT 250 FOR IP): Performed by: ANESTHESIOLOGY

## 2017-04-01 PROCEDURE — 6370000000 HC RX 637 (ALT 250 FOR IP): Performed by: EMERGENCY MEDICINE

## 2017-04-01 RX ORDER — NAPROXEN 500 MG/1
500 TABLET ORAL 2 TIMES DAILY WITH MEALS
Qty: 14 TABLET | Refills: 0 | Status: SHIPPED | OUTPATIENT
Start: 2017-04-01 | End: 2017-05-10

## 2017-04-01 RX ORDER — LANOLIN ALCOHOL/MO/W.PET/CERES
3 CREAM (GRAM) TOPICAL NIGHTLY
Qty: 14 TABLET | Refills: 0 | Status: SHIPPED | OUTPATIENT
Start: 2017-04-01 | End: 2017-05-10

## 2017-04-01 RX ORDER — NITROFURANTOIN 25; 75 MG/1; MG/1
100 CAPSULE ORAL EVERY 12 HOURS SCHEDULED
Qty: 10 CAPSULE | Refills: 0 | Status: SHIPPED | OUTPATIENT
Start: 2017-04-01 | End: 2017-04-06

## 2017-04-01 RX ORDER — ALBUTEROL SULFATE 90 UG/1
2 AEROSOL, METERED RESPIRATORY (INHALATION) EVERY 6 HOURS PRN
Qty: 1 INHALER | Refills: 3 | Status: SHIPPED | OUTPATIENT
Start: 2017-04-01 | End: 2017-08-25 | Stop reason: SDUPTHER

## 2017-04-01 RX ORDER — DULOXETIN HYDROCHLORIDE 30 MG/1
90 CAPSULE, DELAYED RELEASE ORAL DAILY
Qty: 42 CAPSULE | Refills: 0 | Status: SHIPPED | OUTPATIENT
Start: 2017-04-01 | End: 2017-05-10 | Stop reason: DRUGHIGH

## 2017-04-01 RX ADMIN — NAPROXEN 500 MG: 500 TABLET ORAL at 08:18

## 2017-04-01 RX ADMIN — ACETAMINOPHEN 1000 MG: 500 TABLET ORAL at 06:57

## 2017-04-01 RX ADMIN — PREGABALIN 150 MG: 75 CAPSULE ORAL at 08:18

## 2017-04-01 RX ADMIN — TRIAMTERENE AND HYDROCHLOROTHIAZIDE 1 TABLET: 75; 50 TABLET ORAL at 08:18

## 2017-04-01 RX ADMIN — METFORMIN HYDROCHLORIDE 500 MG: 500 TABLET ORAL at 08:17

## 2017-04-01 RX ADMIN — PIOGLITAZONE 30 MG: 30 TABLET ORAL at 08:18

## 2017-04-01 RX ADMIN — DULOXETINE HYDROCHLORIDE 90 MG: 60 CAPSULE, DELAYED RELEASE ORAL at 08:17

## 2017-04-01 RX ADMIN — DICYCLOMINE HYDROCHLORIDE 10 MG: 10 CAPSULE ORAL at 08:18

## 2017-04-01 RX ADMIN — PANTOPRAZOLE SODIUM 40 MG: 40 TABLET, DELAYED RELEASE ORAL at 06:33

## 2017-04-01 RX ADMIN — POTASSIUM CHLORIDE 10 MEQ: 750 CAPSULE, EXTENDED RELEASE ORAL at 08:17

## 2017-04-01 RX ADMIN — MOMETASONE FUROATE AND FORMOTEROL FUMARATE DIHYDRATE 2 PUFF: 200; 5 AEROSOL RESPIRATORY (INHALATION) at 08:22

## 2017-04-01 RX ADMIN — NITROFURANTOIN (MONOHYDRATE/MACROCRYSTALS) 100 MG: 75; 25 CAPSULE ORAL at 08:17

## 2017-04-01 ASSESSMENT — PAIN SCALES - GENERAL
PAINLEVEL_OUTOF10: 2
PAINLEVEL_OUTOF10: 6

## 2017-04-03 DIAGNOSIS — I10 BENIGN ESSENTIAL HTN: ICD-10-CM

## 2017-04-03 DIAGNOSIS — K50.919 CROHN'S DISEASE WITH COMPLICATION, UNSPECIFIED GASTROINTESTINAL TRACT LOCATION (HCC): ICD-10-CM

## 2017-04-03 DIAGNOSIS — E11.42 TYPE 2 DIABETES MELLITUS WITH DIABETIC POLYNEUROPATHY, WITHOUT LONG-TERM CURRENT USE OF INSULIN (HCC): ICD-10-CM

## 2017-04-03 DIAGNOSIS — J45.20 MILD INTERMITTENT ASTHMA WITHOUT COMPLICATION: ICD-10-CM

## 2017-04-04 RX ORDER — ZOLMITRIPTAN 2.5 MG/1
TABLET, FILM COATED ORAL
Qty: 9 TABLET | Refills: 2 | Status: SHIPPED | OUTPATIENT
Start: 2017-04-04 | End: 2017-05-10 | Stop reason: SDUPTHER

## 2017-04-04 RX ORDER — POTASSIUM CHLORIDE 750 MG/1
CAPSULE, EXTENDED RELEASE ORAL
Qty: 30 CAPSULE | Refills: 5 | Status: SHIPPED | OUTPATIENT
Start: 2017-04-04 | End: 2017-10-09 | Stop reason: SDUPTHER

## 2017-04-04 RX ORDER — HYOSCYAMINE SULFATE 0.125 MG
TABLET ORAL
Qty: 180 TABLET | Refills: 1 | Status: SHIPPED | OUTPATIENT
Start: 2017-04-04 | End: 2017-08-24 | Stop reason: SDUPTHER

## 2017-04-04 RX ORDER — IPRATROPIUM BROMIDE AND ALBUTEROL SULFATE 2.5; .5 MG/3ML; MG/3ML
SOLUTION RESPIRATORY (INHALATION)
Qty: 360 ML | Refills: 1 | Status: SHIPPED | OUTPATIENT
Start: 2017-04-04 | End: 2017-12-26 | Stop reason: SDUPTHER

## 2017-04-04 RX ORDER — TRIAMTERENE AND HYDROCHLOROTHIAZIDE 75; 50 MG/1; MG/1
TABLET ORAL
Qty: 30 TABLET | Refills: 5 | Status: SHIPPED | OUTPATIENT
Start: 2017-04-04 | End: 2017-10-09 | Stop reason: SDUPTHER

## 2017-04-04 RX ORDER — MONTELUKAST SODIUM 10 MG/1
TABLET ORAL
Qty: 30 TABLET | Refills: 5 | Status: SHIPPED | OUTPATIENT
Start: 2017-04-04 | End: 2017-10-09 | Stop reason: SDUPTHER

## 2017-04-11 ENCOUNTER — CLINICAL DOCUMENTATION (OUTPATIENT)
Dept: PHYSICAL THERAPY | Age: 52
End: 2017-04-11

## 2017-04-17 ENCOUNTER — TELEPHONE (OUTPATIENT)
Dept: FAMILY MEDICINE CLINIC | Age: 52
End: 2017-04-17

## 2017-04-17 DIAGNOSIS — R20.0 HAND NUMBNESS: Primary | ICD-10-CM

## 2017-04-17 DIAGNOSIS — R20.0 NUMBNESS IN FEET: ICD-10-CM

## 2017-05-02 ENCOUNTER — CLINICAL DOCUMENTATION (OUTPATIENT)
Dept: PHYSICAL THERAPY | Age: 52
End: 2017-05-02

## 2017-05-03 ENCOUNTER — HOSPITAL ENCOUNTER (OUTPATIENT)
Dept: NEUROLOGY | Age: 52
Discharge: HOME | End: 2017-05-03
Payer: COMMERCIAL

## 2017-05-03 DIAGNOSIS — E78.2 MIXED HYPERLIPIDEMIA: ICD-10-CM

## 2017-05-03 DIAGNOSIS — I10 BENIGN ESSENTIAL HTN: ICD-10-CM

## 2017-05-03 DIAGNOSIS — D64.9 ANEMIA, UNSPECIFIED TYPE: ICD-10-CM

## 2017-05-03 DIAGNOSIS — E11.42 TYPE 2 DIABETES MELLITUS WITH DIABETIC POLYNEUROPATHY, WITHOUT LONG-TERM CURRENT USE OF INSULIN (HCC): Primary | ICD-10-CM

## 2017-05-03 DIAGNOSIS — E11.42 TYPE 2 DIABETES MELLITUS WITH DIABETIC POLYNEUROPATHY, WITHOUT LONG-TERM CURRENT USE OF INSULIN (HCC): ICD-10-CM

## 2017-05-03 LAB
ALBUMIN SERPL-MCNC: 4 G/DL (ref 3.9–4.9)
ALP BLD-CCNC: 110 U/L (ref 40–130)
ALT SERPL-CCNC: 11 U/L (ref 0–33)
ANION GAP SERPL CALCULATED.3IONS-SCNC: 13 MEQ/L (ref 7–13)
AST SERPL-CCNC: 12 U/L (ref 0–35)
BASOPHILS ABSOLUTE: 0.1 K/UL (ref 0–0.2)
BASOPHILS RELATIVE PERCENT: 0.5 %
BILIRUB SERPL-MCNC: 0.3 MG/DL (ref 0–1.2)
BUN BLDV-MCNC: 16 MG/DL (ref 6–20)
CALCIUM SERPL-MCNC: 8.8 MG/DL (ref 8.6–10.2)
CHLORIDE BLD-SCNC: 96 MEQ/L (ref 98–107)
CHOLESTEROL, TOTAL: 229 MG/DL (ref 0–199)
CO2: 28 MEQ/L (ref 22–29)
CREAT SERPL-MCNC: 0.89 MG/DL (ref 0.5–0.9)
EOSINOPHILS ABSOLUTE: 0.1 K/UL (ref 0–0.7)
EOSINOPHILS RELATIVE PERCENT: 0.8 %
GFR AFRICAN AMERICAN: >60
GFR NON-AFRICAN AMERICAN: >60
GLOBULIN: 2.8 G/DL (ref 2.3–3.5)
GLUCOSE BLD-MCNC: 118 MG/DL (ref 74–109)
HBA1C MFR BLD: 5.9 % (ref 4.8–5.9)
HCT VFR BLD CALC: 37.9 % (ref 37–47)
HDLC SERPL-MCNC: 69 MG/DL (ref 40–59)
HEMOGLOBIN: 12.2 G/DL (ref 12–16)
LDL CHOLESTEROL CALCULATED: 125 MG/DL (ref 0–129)
LYMPHOCYTES ABSOLUTE: 2.2 K/UL (ref 1–4.8)
LYMPHOCYTES RELATIVE PERCENT: 15.8 %
MCH RBC QN AUTO: 25.3 PG (ref 27–31.3)
MCHC RBC AUTO-ENTMCNC: 32.1 % (ref 33–37)
MCV RBC AUTO: 78.8 FL (ref 82–100)
MONOCYTES ABSOLUTE: 1.2 K/UL (ref 0.2–0.8)
MONOCYTES RELATIVE PERCENT: 8.7 %
NEUTROPHILS ABSOLUTE: 10.3 K/UL (ref 1.4–6.5)
NEUTROPHILS RELATIVE PERCENT: 74.2 %
PDW BLD-RTO: 16.7 % (ref 11.5–14.5)
PLATELET # BLD: 381 K/UL (ref 130–400)
PLATELET SLIDE REVIEW: ABNORMAL
POTASSIUM SERPL-SCNC: 4.6 MEQ/L (ref 3.5–5.1)
RBC # BLD: 4.8 M/UL (ref 4.2–5.4)
RBC # BLD: NORMAL 10*6/UL
SODIUM BLD-SCNC: 137 MEQ/L (ref 132–144)
TOTAL PROTEIN: 6.8 G/DL (ref 6.4–8.1)
TOXIC GRANULATION: ABNORMAL
TRIGL SERPL-MCNC: 173 MG/DL (ref 0–200)
WBC # BLD: 13.9 K/UL (ref 4.8–10.8)

## 2017-05-03 PROCEDURE — 95886 MUSC TEST DONE W/N TEST COMP: CPT

## 2017-05-03 PROCEDURE — 95912 NRV CNDJ TEST 11-12 STUDIES: CPT

## 2017-05-10 ENCOUNTER — OFFICE VISIT (OUTPATIENT)
Dept: FAMILY MEDICINE CLINIC | Age: 52
End: 2017-05-10

## 2017-05-10 VITALS — HEIGHT: 63 IN | HEART RATE: 78 BPM | SYSTOLIC BLOOD PRESSURE: 136 MMHG | DIASTOLIC BLOOD PRESSURE: 64 MMHG

## 2017-05-10 DIAGNOSIS — I10 BENIGN ESSENTIAL HTN: ICD-10-CM

## 2017-05-10 DIAGNOSIS — K50.919 CROHN'S DISEASE WITH COMPLICATION, UNSPECIFIED GASTROINTESTINAL TRACT LOCATION (HCC): ICD-10-CM

## 2017-05-10 DIAGNOSIS — E78.2 MIXED HYPERLIPIDEMIA: ICD-10-CM

## 2017-05-10 DIAGNOSIS — E11.42 TYPE 2 DIABETES MELLITUS WITH DIABETIC POLYNEUROPATHY, WITHOUT LONG-TERM CURRENT USE OF INSULIN (HCC): ICD-10-CM

## 2017-05-10 DIAGNOSIS — F60.3 BORDERLINE PERSONALITY DISORDER (HCC): ICD-10-CM

## 2017-05-10 DIAGNOSIS — E11.42 TYPE 2 DIABETES MELLITUS WITH DIABETIC POLYNEUROPATHY, WITHOUT LONG-TERM CURRENT USE OF INSULIN (HCC): Primary | ICD-10-CM

## 2017-05-10 DIAGNOSIS — R30.0 DYSURIA: ICD-10-CM

## 2017-05-10 DIAGNOSIS — F33.2 SEVERE EPISODE OF RECURRENT MAJOR DEPRESSIVE DISORDER, WITHOUT PSYCHOTIC FEATURES (HCC): ICD-10-CM

## 2017-05-10 DIAGNOSIS — Z12.31 VISIT FOR SCREENING MAMMOGRAM: ICD-10-CM

## 2017-05-10 LAB
BILIRUBIN, POC: NORMAL
BLOOD URINE, POC: NORMAL
CLARITY, POC: NORMAL
COLOR, POC: NORMAL
CREATININE URINE: 85.7 MG/DL
GLUCOSE URINE, POC: NORMAL
KETONES, POC: NORMAL
LEUKOCYTE EST, POC: NORMAL
MICROALBUMIN UR-MCNC: 3.7 MG/DL
MICROALBUMIN/CREAT UR-RTO: 43.2 MG/G (ref 0–30)
NITRITE, POC: NORMAL
PH, POC: 6
PROTEIN, POC: NORMAL
SPECIFIC GRAVITY, POC: 1.02
UROBILINOGEN, POC: NORMAL

## 2017-05-10 PROCEDURE — 99214 OFFICE O/P EST MOD 30 MIN: CPT | Performed by: FAMILY MEDICINE

## 2017-05-10 PROCEDURE — 81003 URINALYSIS AUTO W/O SCOPE: CPT | Performed by: FAMILY MEDICINE

## 2017-05-10 RX ORDER — ONDANSETRON HYDROCHLORIDE 8 MG/1
TABLET, FILM COATED ORAL
COMMUNITY
Start: 2017-04-03 | End: 2017-06-27 | Stop reason: SDUPTHER

## 2017-05-10 RX ORDER — DULOXETIN HYDROCHLORIDE 30 MG/1
90 CAPSULE, DELAYED RELEASE ORAL DAILY
Status: ON HOLD | COMMUNITY
End: 2018-03-15

## 2017-05-10 RX ORDER — BACLOFEN 20 MG/1
20 TABLET ORAL 4 TIMES DAILY
COMMUNITY
Start: 2017-04-01 | End: 2021-12-15

## 2017-05-10 RX ORDER — ZOLMITRIPTAN 5 MG/1
TABLET, FILM COATED ORAL
Qty: 9 TABLET | Refills: 3 | Status: SHIPPED | OUTPATIENT
Start: 2017-05-10 | End: 2017-09-28 | Stop reason: SDUPTHER

## 2017-05-12 DIAGNOSIS — J45.30 MILD PERSISTENT ASTHMA WITHOUT COMPLICATION: ICD-10-CM

## 2017-05-12 RX ORDER — FLUTICASONE FUROATE AND VILANTEROL TRIFENATATE 100; 25 UG/1; UG/1
POWDER RESPIRATORY (INHALATION)
Qty: 1 EACH | Refills: 3 | Status: SHIPPED | OUTPATIENT
Start: 2017-05-12 | End: 2017-09-09 | Stop reason: SDUPTHER

## 2017-06-06 ENCOUNTER — OFFICE VISIT (OUTPATIENT)
Dept: PULMONOLOGY | Age: 52
End: 2017-06-06

## 2017-06-06 VITALS
BODY MASS INDEX: 51.91 KG/M2 | WEIGHT: 293 LBS | RESPIRATION RATE: 16 BRPM | SYSTOLIC BLOOD PRESSURE: 134 MMHG | TEMPERATURE: 96.5 F | HEART RATE: 84 BPM | HEIGHT: 63 IN | OXYGEN SATURATION: 99 % | DIASTOLIC BLOOD PRESSURE: 82 MMHG

## 2017-06-06 DIAGNOSIS — J45.40 MODERATE PERSISTENT ASTHMA WITHOUT COMPLICATION: Primary | ICD-10-CM

## 2017-06-06 DIAGNOSIS — K21.9 GASTROESOPHAGEAL REFLUX DISEASE, ESOPHAGITIS PRESENCE NOT SPECIFIED: ICD-10-CM

## 2017-06-06 DIAGNOSIS — G47.33 OBSTRUCTIVE SLEEP APNEA SYNDROME: ICD-10-CM

## 2017-06-06 PROCEDURE — 99214 OFFICE O/P EST MOD 30 MIN: CPT | Performed by: INTERNAL MEDICINE

## 2017-06-06 ASSESSMENT — ENCOUNTER SYMPTOMS
DIARRHEA: 0
SORE THROAT: 0
CHEST TIGHTNESS: 0
WHEEZING: 0
SHORTNESS OF BREATH: 0
ABDOMINAL PAIN: 0
NAUSEA: 0
RHINORRHEA: 0
COUGH: 0
SINUS PRESSURE: 0
VOMITING: 0

## 2017-06-18 DIAGNOSIS — K50.919 CROHN'S DISEASE WITH COMPLICATION, UNSPECIFIED GASTROINTESTINAL TRACT LOCATION (HCC): ICD-10-CM

## 2017-06-19 RX ORDER — ONDANSETRON HYDROCHLORIDE 8 MG/1
TABLET, FILM COATED ORAL
Qty: 40 TABLET | Refills: 1 | Status: SHIPPED | OUTPATIENT
Start: 2017-06-19 | End: 2017-08-24 | Stop reason: SDUPTHER

## 2017-06-19 RX ORDER — CLOBETASOL PROPIONATE 0.5 MG/G
CREAM TOPICAL
Qty: 60 G | Refills: 2 | Status: ON HOLD | OUTPATIENT
Start: 2017-06-19 | End: 2017-11-07

## 2017-06-22 ENCOUNTER — HOSPITAL ENCOUNTER (OUTPATIENT)
Dept: SLEEP CENTER | Age: 52
Discharge: HOME | End: 2017-06-22
Payer: COMMERCIAL

## 2017-06-22 PROCEDURE — 95810 POLYSOM 6/> YRS 4/> PARAM: CPT

## 2017-06-27 ENCOUNTER — OFFICE VISIT (OUTPATIENT)
Dept: FAMILY MEDICINE CLINIC | Age: 52
End: 2017-06-27

## 2017-06-27 VITALS
WEIGHT: 293 LBS | RESPIRATION RATE: 14 BRPM | DIASTOLIC BLOOD PRESSURE: 74 MMHG | HEART RATE: 74 BPM | SYSTOLIC BLOOD PRESSURE: 122 MMHG | BODY MASS INDEX: 51.91 KG/M2 | TEMPERATURE: 98.4 F | HEIGHT: 63 IN

## 2017-06-27 DIAGNOSIS — E11.42 TYPE 2 DIABETES MELLITUS WITH DIABETIC POLYNEUROPATHY, WITHOUT LONG-TERM CURRENT USE OF INSULIN (HCC): Primary | ICD-10-CM

## 2017-06-27 DIAGNOSIS — J45.30 MILD PERSISTENT ASTHMA WITHOUT COMPLICATION: ICD-10-CM

## 2017-06-27 DIAGNOSIS — F33.2 SEVERE EPISODE OF RECURRENT MAJOR DEPRESSIVE DISORDER, WITHOUT PSYCHOTIC FEATURES (HCC): ICD-10-CM

## 2017-06-27 DIAGNOSIS — F60.3 BORDERLINE PERSONALITY DISORDER (HCC): ICD-10-CM

## 2017-06-27 DIAGNOSIS — B37.0 THRUSH: ICD-10-CM

## 2017-06-27 DIAGNOSIS — I10 BENIGN ESSENTIAL HTN: ICD-10-CM

## 2017-06-27 DIAGNOSIS — E78.2 MIXED HYPERLIPIDEMIA: ICD-10-CM

## 2017-06-27 DIAGNOSIS — K50.919 CROHN'S DISEASE WITH COMPLICATION, UNSPECIFIED GASTROINTESTINAL TRACT LOCATION (HCC): ICD-10-CM

## 2017-06-27 PROCEDURE — 99214 OFFICE O/P EST MOD 30 MIN: CPT | Performed by: FAMILY MEDICINE

## 2017-06-27 RX ORDER — BLOOD-GLUCOSE METER
KIT MISCELLANEOUS
Refills: 1 | COMMUNITY
Start: 2017-06-19 | End: 2018-05-24 | Stop reason: SDUPTHER

## 2017-06-27 RX ORDER — PENTAZOCINE HYDROCHLORIDE AND NALOXONE HYDROCHLORIDE 50; .5 MG/1; MG/1
TABLET ORAL
Refills: 0 | COMMUNITY
Start: 2017-06-08 | End: 2018-05-03

## 2017-06-27 RX ORDER — LANOLIN ALCOHOL/MO/W.PET/CERES
CREAM (GRAM) TOPICAL
Refills: 1 | COMMUNITY
Start: 2017-06-19 | End: 2018-09-05 | Stop reason: ALTCHOICE

## 2017-06-27 ASSESSMENT — PATIENT HEALTH QUESTIONNAIRE - PHQ9
2. FEELING DOWN, DEPRESSED OR HOPELESS: 0
SUM OF ALL RESPONSES TO PHQ QUESTIONS 1-9: 0
1. LITTLE INTEREST OR PLEASURE IN DOING THINGS: 0
SUM OF ALL RESPONSES TO PHQ9 QUESTIONS 1 & 2: 0

## 2017-06-29 LAB
ORGANISM: ABNORMAL
WOUND/ABSCESS: ABNORMAL
WOUND/ABSCESS: ABNORMAL

## 2017-07-11 DIAGNOSIS — K50.919 CROHN'S DISEASE WITH COMPLICATION, UNSPECIFIED GASTROINTESTINAL TRACT LOCATION (HCC): ICD-10-CM

## 2017-07-11 RX ORDER — OMEPRAZOLE 40 MG/1
CAPSULE, DELAYED RELEASE ORAL
Qty: 60 CAPSULE | Refills: 4 | Status: SHIPPED | OUTPATIENT
Start: 2017-07-11 | End: 2018-01-19 | Stop reason: SDUPTHER

## 2017-07-27 ENCOUNTER — HOSPITAL ENCOUNTER (OUTPATIENT)
Dept: PREADMISSION TESTING | Age: 52
Discharge: HOME | End: 2017-07-27
Payer: COMMERCIAL

## 2017-07-27 VITALS
BODY MASS INDEX: 53.92 KG/M2 | DIASTOLIC BLOOD PRESSURE: 61 MMHG | TEMPERATURE: 96 F | WEIGHT: 293 LBS | SYSTOLIC BLOOD PRESSURE: 121 MMHG | HEIGHT: 62 IN | HEART RATE: 76 BPM | OXYGEN SATURATION: 94 % | RESPIRATION RATE: 16 BRPM

## 2017-07-27 DIAGNOSIS — E11.42 TYPE 2 DIABETES MELLITUS WITH DIABETIC POLYNEUROPATHY, WITHOUT LONG-TERM CURRENT USE OF INSULIN (HCC): Primary | ICD-10-CM

## 2017-07-27 DIAGNOSIS — S83.209A MENISCUS TEAR: ICD-10-CM

## 2017-07-27 DIAGNOSIS — J45.30 MILD PERSISTENT ASTHMA WITHOUT COMPLICATION: ICD-10-CM

## 2017-07-27 LAB
ANION GAP SERPL CALCULATED.3IONS-SCNC: 15 MEQ/L (ref 7–13)
BUN BLDV-MCNC: 14 MG/DL (ref 6–20)
CALCIUM SERPL-MCNC: 8.7 MG/DL (ref 8.6–10.2)
CHLORIDE BLD-SCNC: 97 MEQ/L (ref 98–107)
CO2: 28 MEQ/L (ref 22–29)
CREAT SERPL-MCNC: 0.68 MG/DL (ref 0.5–0.9)
GFR AFRICAN AMERICAN: >60
GFR NON-AFRICAN AMERICAN: >60
GLUCOSE BLD-MCNC: 101 MG/DL (ref 74–109)
HCT VFR BLD CALC: 34.8 % (ref 37–47)
HEMOGLOBIN: 10.9 G/DL (ref 12–16)
MCH RBC QN AUTO: 24.6 PG (ref 27–31.3)
MCHC RBC AUTO-ENTMCNC: 31.5 % (ref 33–37)
MCV RBC AUTO: 78.1 FL (ref 82–100)
PDW BLD-RTO: 16.8 % (ref 11.5–14.5)
PLATELET # BLD: 306 K/UL (ref 130–400)
POTASSIUM SERPL-SCNC: 4.2 MEQ/L (ref 3.5–5.1)
RBC # BLD: 4.45 M/UL (ref 4.2–5.4)
SODIUM BLD-SCNC: 140 MEQ/L (ref 132–144)
WBC # BLD: 9.5 K/UL (ref 4.8–10.8)

## 2017-07-27 PROCEDURE — 80048 BASIC METABOLIC PNL TOTAL CA: CPT

## 2017-07-27 PROCEDURE — 85027 COMPLETE CBC AUTOMATED: CPT

## 2017-07-27 PROCEDURE — 93005 ELECTROCARDIOGRAM TRACING: CPT

## 2017-07-27 RX ORDER — SODIUM CHLORIDE 0.9 % (FLUSH) 0.9 %
10 SYRINGE (ML) INJECTION EVERY 12 HOURS SCHEDULED
Status: CANCELLED | OUTPATIENT
Start: 2017-07-27

## 2017-07-27 RX ORDER — LIDOCAINE HYDROCHLORIDE 10 MG/ML
1 INJECTION, SOLUTION EPIDURAL; INFILTRATION; INTRACAUDAL; PERINEURAL
Status: CANCELLED | OUTPATIENT
Start: 2017-07-27 | End: 2017-07-27

## 2017-07-27 RX ORDER — SODIUM CHLORIDE 0.9 % (FLUSH) 0.9 %
10 SYRINGE (ML) INJECTION PRN
Status: CANCELLED | OUTPATIENT
Start: 2017-07-27

## 2017-07-27 RX ORDER — SODIUM CHLORIDE, SODIUM LACTATE, POTASSIUM CHLORIDE, CALCIUM CHLORIDE 600; 310; 30; 20 MG/100ML; MG/100ML; MG/100ML; MG/100ML
INJECTION, SOLUTION INTRAVENOUS CONTINUOUS
Status: CANCELLED | OUTPATIENT
Start: 2017-07-27

## 2017-07-27 ASSESSMENT — ENCOUNTER SYMPTOMS
RESPIRATORY NEGATIVE: 1
STRIDOR: 0
DIARRHEA: 0
BACK PAIN: 0
CONSTIPATION: 0
EYES NEGATIVE: 1
HEARTBURN: 0
GASTROINTESTINAL NEGATIVE: 1
COUGH: 0
NAUSEA: 0
SORE THROAT: 0
SHORTNESS OF BREATH: 0
WHEEZING: 0

## 2017-07-28 LAB
EKG ATRIAL RATE: 72 BPM
EKG P AXIS: 35 DEGREES
EKG P-R INTERVAL: 146 MS
EKG Q-T INTERVAL: 412 MS
EKG QRS DURATION: 82 MS
EKG QTC CALCULATION (BAZETT): 451 MS
EKG R AXIS: 44 DEGREES
EKG T AXIS: 30 DEGREES
EKG VENTRICULAR RATE: 72 BPM

## 2017-07-28 RX ORDER — ALBUTEROL SULFATE 90 UG/1
2 AEROSOL, METERED RESPIRATORY (INHALATION) EVERY 6 HOURS PRN
Qty: 1 INHALER | Refills: 3 | Status: SHIPPED | OUTPATIENT
Start: 2017-07-28 | End: 2017-08-25 | Stop reason: SDUPTHER

## 2017-07-28 RX ORDER — BLOOD-GLUCOSE METER
KIT MISCELLANEOUS
Qty: 50 EACH | Refills: 3 | Status: SHIPPED | OUTPATIENT
Start: 2017-07-28 | End: 2018-02-22 | Stop reason: SDUPTHER

## 2017-07-31 ENCOUNTER — OFFICE VISIT (OUTPATIENT)
Dept: INTERVENTIONAL RADIOLOGY/VASCULAR | Age: 52
End: 2017-07-31

## 2017-07-31 DIAGNOSIS — I83.90 VARICOSE VEIN OF LEG: ICD-10-CM

## 2017-07-31 DIAGNOSIS — R60.0 BILATERAL EDEMA OF LOWER EXTREMITY: ICD-10-CM

## 2017-07-31 PROCEDURE — 99204 OFFICE O/P NEW MOD 45 MIN: CPT | Performed by: PHYSICIAN ASSISTANT

## 2017-07-31 ASSESSMENT — ENCOUNTER SYMPTOMS
BACK PAIN: 0
DOUBLE VISION: 0
DIARRHEA: 0
COUGH: 0
ABDOMINAL PAIN: 0
SHORTNESS OF BREATH: 0
BLURRED VISION: 0
VOMITING: 0
NAUSEA: 0

## 2017-08-02 ENCOUNTER — ANESTHESIA EVENT (OUTPATIENT)
Dept: OPERATING ROOM | Age: 52
End: 2017-08-02
Payer: COMMERCIAL

## 2017-08-03 ENCOUNTER — HOSPITAL ENCOUNTER (OUTPATIENT)
Age: 52
Setting detail: OUTPATIENT SURGERY
Discharge: HOME | End: 2017-08-03
Attending: ORTHOPAEDIC SURGERY | Admitting: ORTHOPAEDIC SURGERY
Payer: COMMERCIAL

## 2017-08-03 ENCOUNTER — PREP FOR PROCEDURE (OUTPATIENT)
Dept: ORTHOPEDIC SURGERY | Age: 52
End: 2017-08-03

## 2017-08-03 ENCOUNTER — ANESTHESIA (OUTPATIENT)
Dept: OPERATING ROOM | Age: 52
End: 2017-08-03
Payer: COMMERCIAL

## 2017-08-03 VITALS — OXYGEN SATURATION: 100 % | SYSTOLIC BLOOD PRESSURE: 112 MMHG | DIASTOLIC BLOOD PRESSURE: 66 MMHG | TEMPERATURE: 95.5 F

## 2017-08-03 VITALS
RESPIRATION RATE: 18 BRPM | WEIGHT: 293 LBS | SYSTOLIC BLOOD PRESSURE: 139 MMHG | HEART RATE: 71 BPM | TEMPERATURE: 97.3 F | OXYGEN SATURATION: 99 % | HEIGHT: 62 IN | DIASTOLIC BLOOD PRESSURE: 76 MMHG | BODY MASS INDEX: 53.92 KG/M2

## 2017-08-03 LAB
GLUCOSE BLD-MCNC: 128 MG/DL (ref 60–115)
GLUCOSE BLD-MCNC: 135 MG/DL (ref 60–115)
PERFORMED ON: ABNORMAL
PERFORMED ON: ABNORMAL

## 2017-08-03 PROCEDURE — 7100000001 HC PACU RECOVERY - ADDTL 15 MIN: Performed by: ORTHOPAEDIC SURGERY

## 2017-08-03 PROCEDURE — 6360000002 HC RX W HCPCS: Performed by: NURSE ANESTHETIST, CERTIFIED REGISTERED

## 2017-08-03 PROCEDURE — 3600000004 HC SURGERY LEVEL 4 BASE: Performed by: ORTHOPAEDIC SURGERY

## 2017-08-03 PROCEDURE — 2500000003 HC RX 250 WO HCPCS: Performed by: NURSE ANESTHETIST, CERTIFIED REGISTERED

## 2017-08-03 PROCEDURE — 7100000000 HC PACU RECOVERY - FIRST 15 MIN: Performed by: ORTHOPAEDIC SURGERY

## 2017-08-03 PROCEDURE — 7100000011 HC PHASE II RECOVERY - ADDTL 15 MIN: Performed by: ORTHOPAEDIC SURGERY

## 2017-08-03 PROCEDURE — 2580000003 HC RX 258: Performed by: NURSE ANESTHETIST, CERTIFIED REGISTERED

## 2017-08-03 PROCEDURE — 2580000003 HC RX 258: Performed by: STUDENT IN AN ORGANIZED HEALTH CARE EDUCATION/TRAINING PROGRAM

## 2017-08-03 PROCEDURE — 2500000003 HC RX 250 WO HCPCS: Performed by: STUDENT IN AN ORGANIZED HEALTH CARE EDUCATION/TRAINING PROGRAM

## 2017-08-03 PROCEDURE — 3700000000 HC ANESTHESIA ATTENDED CARE: Performed by: ORTHOPAEDIC SURGERY

## 2017-08-03 PROCEDURE — 2580000003 HC RX 258: Performed by: ORTHOPAEDIC SURGERY

## 2017-08-03 PROCEDURE — 6360000002 HC RX W HCPCS: Performed by: ORTHOPAEDIC SURGERY

## 2017-08-03 PROCEDURE — 7100000010 HC PHASE II RECOVERY - FIRST 15 MIN: Performed by: ORTHOPAEDIC SURGERY

## 2017-08-03 PROCEDURE — 6370000000 HC RX 637 (ALT 250 FOR IP): Performed by: STUDENT IN AN ORGANIZED HEALTH CARE EDUCATION/TRAINING PROGRAM

## 2017-08-03 PROCEDURE — 3600000014 HC SURGERY LEVEL 4 ADDTL 15MIN: Performed by: ORTHOPAEDIC SURGERY

## 2017-08-03 PROCEDURE — 2500000003 HC RX 250 WO HCPCS: Performed by: ORTHOPAEDIC SURGERY

## 2017-08-03 PROCEDURE — 2720000010 HC SURG SUPPLY STERILE: Performed by: ORTHOPAEDIC SURGERY

## 2017-08-03 PROCEDURE — 3700000001 HC ADD 15 MINUTES (ANESTHESIA): Performed by: ORTHOPAEDIC SURGERY

## 2017-08-03 PROCEDURE — 6370000000 HC RX 637 (ALT 250 FOR IP): Performed by: ORTHOPAEDIC SURGERY

## 2017-08-03 RX ORDER — SODIUM CHLORIDE 0.9 % (FLUSH) 0.9 %
10 SYRINGE (ML) INJECTION EVERY 12 HOURS SCHEDULED
Status: DISCONTINUED | OUTPATIENT
Start: 2017-08-03 | End: 2017-08-03 | Stop reason: HOSPADM

## 2017-08-03 RX ORDER — SODIUM CHLORIDE 0.9 % (FLUSH) 0.9 %
10 SYRINGE (ML) INJECTION EVERY 12 HOURS SCHEDULED
Status: CANCELLED | OUTPATIENT
Start: 2017-08-03

## 2017-08-03 RX ORDER — LABETALOL HYDROCHLORIDE 5 MG/ML
INJECTION, SOLUTION INTRAVENOUS PRN
Status: DISCONTINUED | OUTPATIENT
Start: 2017-08-03 | End: 2017-08-03 | Stop reason: SDUPTHER

## 2017-08-03 RX ORDER — ONDANSETRON 2 MG/ML
4 INJECTION INTRAMUSCULAR; INTRAVENOUS
Status: DISCONTINUED | OUTPATIENT
Start: 2017-08-03 | End: 2017-08-03 | Stop reason: HOSPADM

## 2017-08-03 RX ORDER — SODIUM CHLORIDE 0.9 % (FLUSH) 0.9 %
10 SYRINGE (ML) INJECTION PRN
Status: DISCONTINUED | OUTPATIENT
Start: 2017-08-03 | End: 2017-08-03 | Stop reason: HOSPADM

## 2017-08-03 RX ORDER — GINSENG 100 MG
CAPSULE ORAL PRN
Status: DISCONTINUED | OUTPATIENT
Start: 2017-08-03 | End: 2017-08-03 | Stop reason: HOSPADM

## 2017-08-03 RX ORDER — ONDANSETRON 2 MG/ML
INJECTION INTRAMUSCULAR; INTRAVENOUS PRN
Status: DISCONTINUED | OUTPATIENT
Start: 2017-08-03 | End: 2017-08-03 | Stop reason: SDUPTHER

## 2017-08-03 RX ORDER — ONDANSETRON 2 MG/ML
4 INJECTION INTRAMUSCULAR; INTRAVENOUS EVERY 6 HOURS PRN
Status: DISCONTINUED | OUTPATIENT
Start: 2017-08-03 | End: 2017-08-03 | Stop reason: HOSPADM

## 2017-08-03 RX ORDER — LIDOCAINE HYDROCHLORIDE 20 MG/ML
INJECTION, SOLUTION INFILTRATION; PERINEURAL PRN
Status: DISCONTINUED | OUTPATIENT
Start: 2017-08-03 | End: 2017-08-03 | Stop reason: SDUPTHER

## 2017-08-03 RX ORDER — MIDAZOLAM HYDROCHLORIDE 1 MG/ML
INJECTION INTRAMUSCULAR; INTRAVENOUS PRN
Status: DISCONTINUED | OUTPATIENT
Start: 2017-08-03 | End: 2017-08-03 | Stop reason: SDUPTHER

## 2017-08-03 RX ORDER — GLYCOPYRROLATE 0.2 MG/ML
INJECTION INTRAMUSCULAR; INTRAVENOUS PRN
Status: DISCONTINUED | OUTPATIENT
Start: 2017-08-03 | End: 2017-08-03 | Stop reason: SDUPTHER

## 2017-08-03 RX ORDER — MORPHINE SULFATE 2 MG/ML
2 INJECTION, SOLUTION INTRAMUSCULAR; INTRAVENOUS
Status: DISCONTINUED | OUTPATIENT
Start: 2017-08-03 | End: 2017-08-03 | Stop reason: HOSPADM

## 2017-08-03 RX ORDER — SODIUM CHLORIDE 0.9 % (FLUSH) 0.9 %
10 SYRINGE (ML) INJECTION PRN
Status: CANCELLED | OUTPATIENT
Start: 2017-08-03

## 2017-08-03 RX ORDER — KETOROLAC TROMETHAMINE 30 MG/ML
INJECTION, SOLUTION INTRAMUSCULAR; INTRAVENOUS PRN
Status: DISCONTINUED | OUTPATIENT
Start: 2017-08-03 | End: 2017-08-03 | Stop reason: SDUPTHER

## 2017-08-03 RX ORDER — MORPHINE SULFATE 2 MG/ML
2 INJECTION, SOLUTION INTRAMUSCULAR; INTRAVENOUS
Status: CANCELLED | OUTPATIENT
Start: 2017-08-03

## 2017-08-03 RX ORDER — BUPIVACAINE HYDROCHLORIDE 5 MG/ML
INJECTION, SOLUTION EPIDURAL; INTRACAUDAL PRN
Status: DISCONTINUED | OUTPATIENT
Start: 2017-08-03 | End: 2017-08-03 | Stop reason: HOSPADM

## 2017-08-03 RX ORDER — ACETAMINOPHEN 325 MG/1
650 TABLET ORAL EVERY 4 HOURS PRN
Status: DISCONTINUED | OUTPATIENT
Start: 2017-08-03 | End: 2017-08-03 | Stop reason: HOSPADM

## 2017-08-03 RX ORDER — OXYCODONE HYDROCHLORIDE AND ACETAMINOPHEN 5; 325 MG/1; MG/1
1 TABLET ORAL EVERY 4 HOURS PRN
Status: CANCELLED | OUTPATIENT
Start: 2017-08-03

## 2017-08-03 RX ORDER — ONDANSETRON 2 MG/ML
4 INJECTION INTRAMUSCULAR; INTRAVENOUS EVERY 6 HOURS PRN
Status: CANCELLED | OUTPATIENT
Start: 2017-08-03

## 2017-08-03 RX ORDER — HYDROCODONE BITARTRATE AND ACETAMINOPHEN 5; 325 MG/1; MG/1
1 TABLET ORAL PRN
Status: COMPLETED | OUTPATIENT
Start: 2017-08-03 | End: 2017-08-03

## 2017-08-03 RX ORDER — LIDOCAINE HYDROCHLORIDE 10 MG/ML
1 INJECTION, SOLUTION EPIDURAL; INFILTRATION; INTRACAUDAL; PERINEURAL
Status: COMPLETED | OUTPATIENT
Start: 2017-08-03 | End: 2017-08-03

## 2017-08-03 RX ORDER — DEXAMETHASONE SODIUM PHOSPHATE 10 MG/ML
INJECTION INTRAMUSCULAR; INTRAVENOUS PRN
Status: DISCONTINUED | OUTPATIENT
Start: 2017-08-03 | End: 2017-08-03 | Stop reason: SDUPTHER

## 2017-08-03 RX ORDER — OXYCODONE HYDROCHLORIDE AND ACETAMINOPHEN 5; 325 MG/1; MG/1
2 TABLET ORAL EVERY 4 HOURS PRN
Status: CANCELLED | OUTPATIENT
Start: 2017-08-03

## 2017-08-03 RX ORDER — SODIUM CHLORIDE, SODIUM LACTATE, POTASSIUM CHLORIDE, CALCIUM CHLORIDE 600; 310; 30; 20 MG/100ML; MG/100ML; MG/100ML; MG/100ML
INJECTION, SOLUTION INTRAVENOUS CONTINUOUS PRN
Status: DISCONTINUED | OUTPATIENT
Start: 2017-08-03 | End: 2017-08-03 | Stop reason: SDUPTHER

## 2017-08-03 RX ORDER — OXYCODONE HYDROCHLORIDE AND ACETAMINOPHEN 5; 325 MG/1; MG/1
2 TABLET ORAL EVERY 4 HOURS PRN
Status: DISCONTINUED | OUTPATIENT
Start: 2017-08-03 | End: 2017-08-03 | Stop reason: HOSPADM

## 2017-08-03 RX ORDER — MAGNESIUM HYDROXIDE 1200 MG/15ML
LIQUID ORAL CONTINUOUS PRN
Status: DISCONTINUED | OUTPATIENT
Start: 2017-08-03 | End: 2017-08-03 | Stop reason: HOSPADM

## 2017-08-03 RX ORDER — PROPOFOL 10 MG/ML
INJECTION, EMULSION INTRAVENOUS PRN
Status: DISCONTINUED | OUTPATIENT
Start: 2017-08-03 | End: 2017-08-03 | Stop reason: SDUPTHER

## 2017-08-03 RX ORDER — FENTANYL CITRATE 50 UG/ML
50 INJECTION, SOLUTION INTRAMUSCULAR; INTRAVENOUS EVERY 10 MIN PRN
Status: DISCONTINUED | OUTPATIENT
Start: 2017-08-03 | End: 2017-08-03 | Stop reason: HOSPADM

## 2017-08-03 RX ORDER — SODIUM CHLORIDE, SODIUM LACTATE, POTASSIUM CHLORIDE, CALCIUM CHLORIDE 600; 310; 30; 20 MG/100ML; MG/100ML; MG/100ML; MG/100ML
INJECTION, SOLUTION INTRAVENOUS CONTINUOUS
Status: DISCONTINUED | OUTPATIENT
Start: 2017-08-03 | End: 2017-08-03 | Stop reason: HOSPADM

## 2017-08-03 RX ORDER — HYDROCODONE BITARTRATE AND ACETAMINOPHEN 5; 325 MG/1; MG/1
2 TABLET ORAL PRN
Status: COMPLETED | OUTPATIENT
Start: 2017-08-03 | End: 2017-08-03

## 2017-08-03 RX ORDER — MORPHINE SULFATE 4 MG/ML
4 INJECTION, SOLUTION INTRAMUSCULAR; INTRAVENOUS
Status: DISCONTINUED | OUTPATIENT
Start: 2017-08-03 | End: 2017-08-03 | Stop reason: HOSPADM

## 2017-08-03 RX ORDER — FENTANYL CITRATE 50 UG/ML
INJECTION, SOLUTION INTRAMUSCULAR; INTRAVENOUS PRN
Status: DISCONTINUED | OUTPATIENT
Start: 2017-08-03 | End: 2017-08-03 | Stop reason: SDUPTHER

## 2017-08-03 RX ORDER — MORPHINE SULFATE 2 MG/ML
4 INJECTION, SOLUTION INTRAMUSCULAR; INTRAVENOUS
Status: CANCELLED | OUTPATIENT
Start: 2017-08-03

## 2017-08-03 RX ORDER — MEPERIDINE HYDROCHLORIDE 25 MG/ML
12.5 INJECTION INTRAMUSCULAR; INTRAVENOUS; SUBCUTANEOUS EVERY 5 MIN PRN
Status: DISCONTINUED | OUTPATIENT
Start: 2017-08-03 | End: 2017-08-03 | Stop reason: HOSPADM

## 2017-08-03 RX ORDER — OXYCODONE HYDROCHLORIDE AND ACETAMINOPHEN 5; 325 MG/1; MG/1
1 TABLET ORAL EVERY 4 HOURS PRN
Status: DISCONTINUED | OUTPATIENT
Start: 2017-08-03 | End: 2017-08-03 | Stop reason: HOSPADM

## 2017-08-03 RX ORDER — METOCLOPRAMIDE HYDROCHLORIDE 5 MG/ML
10 INJECTION INTRAMUSCULAR; INTRAVENOUS
Status: DISCONTINUED | OUTPATIENT
Start: 2017-08-03 | End: 2017-08-03 | Stop reason: HOSPADM

## 2017-08-03 RX ORDER — DIPHENHYDRAMINE HYDROCHLORIDE 50 MG/ML
12.5 INJECTION INTRAMUSCULAR; INTRAVENOUS
Status: DISCONTINUED | OUTPATIENT
Start: 2017-08-03 | End: 2017-08-03 | Stop reason: HOSPADM

## 2017-08-03 RX ORDER — ACETAMINOPHEN 325 MG/1
650 TABLET ORAL EVERY 4 HOURS PRN
Status: CANCELLED | OUTPATIENT
Start: 2017-08-03

## 2017-08-03 RX ADMIN — SODIUM CHLORIDE, POTASSIUM CHLORIDE, SODIUM LACTATE AND CALCIUM CHLORIDE: 600; 310; 30; 20 INJECTION, SOLUTION INTRAVENOUS at 11:36

## 2017-08-03 RX ADMIN — LABETALOL HYDROCHLORIDE 5 MG: 5 INJECTION, SOLUTION INTRAVENOUS at 11:26

## 2017-08-03 RX ADMIN — ONDANSETRON 4 MG: 2 INJECTION, SOLUTION INTRAMUSCULAR; INTRAVENOUS at 11:27

## 2017-08-03 RX ADMIN — LIDOCAINE HYDROCHLORIDE 50 MG: 20 INJECTION, SOLUTION INFILTRATION; PERINEURAL at 11:40

## 2017-08-03 RX ADMIN — KETOROLAC TROMETHAMINE 15 MG: 30 INJECTION, SOLUTION INTRAMUSCULAR; INTRAVENOUS at 11:06

## 2017-08-03 RX ADMIN — MIDAZOLAM HYDROCHLORIDE 2 MG: 1 INJECTION, SOLUTION INTRAMUSCULAR; INTRAVENOUS at 10:50

## 2017-08-03 RX ADMIN — HYDROCODONE BITARTRATE AND ACETAMINOPHEN 1 TABLET: 5; 325 TABLET ORAL at 12:55

## 2017-08-03 RX ADMIN — DEXAMETHASONE SODIUM PHOSPHATE 4 MG: 10 INJECTION INTRAMUSCULAR; INTRAVENOUS at 11:06

## 2017-08-03 RX ADMIN — PROPOFOL 200 MG: 10 INJECTION, EMULSION INTRAVENOUS at 10:59

## 2017-08-03 RX ADMIN — GLYCOPYRROLATE 0.1 MG: 0.2 INJECTION INTRAMUSCULAR; INTRAVENOUS at 11:06

## 2017-08-03 RX ADMIN — SODIUM CHLORIDE, POTASSIUM CHLORIDE, SODIUM LACTATE AND CALCIUM CHLORIDE 1000 ML: 600; 310; 30; 20 INJECTION, SOLUTION INTRAVENOUS at 10:00

## 2017-08-03 RX ADMIN — LIDOCAINE HYDROCHLORIDE 1 ML: 10 INJECTION, SOLUTION EPIDURAL; INFILTRATION; INTRACAUDAL; PERINEURAL at 10:00

## 2017-08-03 RX ADMIN — VANCOMYCIN HYDROCHLORIDE 1000 MG: 1 INJECTION, POWDER, LYOPHILIZED, FOR SOLUTION INTRAVENOUS at 10:09

## 2017-08-03 RX ADMIN — LABETALOL HYDROCHLORIDE 5 MG: 5 INJECTION, SOLUTION INTRAVENOUS at 11:44

## 2017-08-03 RX ADMIN — LIDOCAINE HYDROCHLORIDE 100 MG: 20 INJECTION, SOLUTION INFILTRATION; PERINEURAL at 10:59

## 2017-08-03 RX ADMIN — DEXAMETHASONE SODIUM PHOSPHATE 4 MG: 10 INJECTION INTRAMUSCULAR; INTRAVENOUS at 10:59

## 2017-08-03 RX ADMIN — FENTANYL CITRATE 50 MCG: 50 INJECTION, SOLUTION INTRAMUSCULAR; INTRAVENOUS at 10:59

## 2017-08-03 RX ADMIN — SODIUM CHLORIDE, POTASSIUM CHLORIDE, SODIUM LACTATE AND CALCIUM CHLORIDE: 600; 310; 30; 20 INJECTION, SOLUTION INTRAVENOUS at 10:12

## 2017-08-03 ASSESSMENT — PAIN - FUNCTIONAL ASSESSMENT: PAIN_FUNCTIONAL_ASSESSMENT: 0-10

## 2017-08-03 ASSESSMENT — PAIN SCALES - GENERAL
PAINLEVEL_OUTOF10: 5
PAINLEVEL_OUTOF10: 2
PAINLEVEL_OUTOF10: 0
PAINLEVEL_OUTOF10: 5
PAINLEVEL_OUTOF10: 5

## 2017-08-03 ASSESSMENT — PAIN DESCRIPTION - PAIN TYPE: TYPE: SURGICAL PAIN

## 2017-08-03 ASSESSMENT — PAIN DESCRIPTION - DESCRIPTORS: DESCRIPTORS: ACHING

## 2017-08-11 ENCOUNTER — PROCEDURE VISIT (OUTPATIENT)
Dept: FAMILY MEDICINE CLINIC | Age: 52
End: 2017-08-11

## 2017-08-11 VITALS
DIASTOLIC BLOOD PRESSURE: 74 MMHG | RESPIRATION RATE: 14 BRPM | WEIGHT: 293 LBS | BODY MASS INDEX: 51.91 KG/M2 | HEART RATE: 78 BPM | HEIGHT: 63 IN | TEMPERATURE: 98.2 F | SYSTOLIC BLOOD PRESSURE: 124 MMHG

## 2017-08-11 DIAGNOSIS — Z12.11 SCREENING FOR COLON CANCER: ICD-10-CM

## 2017-08-11 DIAGNOSIS — R20.8 OTHER DISTURBANCES OF SKIN SENSATION: ICD-10-CM

## 2017-08-11 DIAGNOSIS — F60.3 BORDERLINE PERSONALITY DISORDER (HCC): ICD-10-CM

## 2017-08-11 DIAGNOSIS — F33.2 SEVERE EPISODE OF RECURRENT MAJOR DEPRESSIVE DISORDER, WITHOUT PSYCHOTIC FEATURES (HCC): ICD-10-CM

## 2017-08-11 DIAGNOSIS — I10 BENIGN ESSENTIAL HTN: ICD-10-CM

## 2017-08-11 DIAGNOSIS — E11.42 TYPE 2 DIABETES MELLITUS WITH DIABETIC POLYNEUROPATHY, WITHOUT LONG-TERM CURRENT USE OF INSULIN (HCC): ICD-10-CM

## 2017-08-11 DIAGNOSIS — E78.2 MIXED HYPERLIPIDEMIA: ICD-10-CM

## 2017-08-11 DIAGNOSIS — D22.9 ATYPICAL MOLE: Primary | ICD-10-CM

## 2017-08-11 DIAGNOSIS — D22.9 ATYPICAL MOLE: ICD-10-CM

## 2017-08-11 DIAGNOSIS — K21.9 GASTROESOPHAGEAL REFLUX DISEASE, ESOPHAGITIS PRESENCE NOT SPECIFIED: ICD-10-CM

## 2017-08-11 RX ORDER — BUSPIRONE HYDROCHLORIDE 15 MG/1
TABLET ORAL
Refills: 0 | Status: ON HOLD | COMMUNITY
Start: 2017-07-21 | End: 2017-11-07

## 2017-08-11 RX ORDER — ARIPIPRAZOLE 10 MG/1
TABLET ORAL
Refills: 0 | Status: ON HOLD | COMMUNITY
Start: 2017-07-21 | End: 2017-11-07

## 2017-08-11 RX ORDER — ERGOCALCIFEROL 1.25 MG/1
CAPSULE ORAL
Refills: 3 | COMMUNITY
Start: 2017-07-22 | End: 2019-02-11 | Stop reason: ALTCHOICE

## 2017-08-11 RX ORDER — OXYCODONE HYDROCHLORIDE AND ACETAMINOPHEN 5; 325 MG/1; MG/1
TABLET ORAL
Refills: 0 | COMMUNITY
Start: 2017-08-03 | End: 2017-08-25 | Stop reason: CLARIF

## 2017-08-11 RX ORDER — PIOGLITAZONEHYDROCHLORIDE 30 MG/1
TABLET ORAL
Qty: 30 TABLET | Refills: 4 | Status: SHIPPED | OUTPATIENT
Start: 2017-08-11 | End: 2018-01-31 | Stop reason: SDUPTHER

## 2017-08-24 DIAGNOSIS — K50.919 CROHN'S DISEASE WITH COMPLICATION, UNSPECIFIED GASTROINTESTINAL TRACT LOCATION (HCC): ICD-10-CM

## 2017-08-25 ENCOUNTER — OFFICE VISIT (OUTPATIENT)
Dept: FAMILY MEDICINE CLINIC | Age: 52
End: 2017-08-25

## 2017-08-25 VITALS
DIASTOLIC BLOOD PRESSURE: 62 MMHG | TEMPERATURE: 97.2 F | SYSTOLIC BLOOD PRESSURE: 106 MMHG | WEIGHT: 293 LBS | HEIGHT: 63 IN | HEART RATE: 80 BPM | RESPIRATION RATE: 16 BRPM | BODY MASS INDEX: 51.91 KG/M2

## 2017-08-25 DIAGNOSIS — D22.9 ATYPICAL MOLE: Primary | ICD-10-CM

## 2017-08-25 DIAGNOSIS — Z48.02 VISIT FOR SUTURE REMOVAL: ICD-10-CM

## 2017-08-25 PROCEDURE — 99213 OFFICE O/P EST LOW 20 MIN: CPT | Performed by: FAMILY MEDICINE

## 2017-08-25 RX ORDER — ONDANSETRON HYDROCHLORIDE 8 MG/1
TABLET, FILM COATED ORAL
Qty: 40 TABLET | Refills: 0 | Status: SHIPPED | OUTPATIENT
Start: 2017-08-25 | End: 2017-09-28 | Stop reason: SDUPTHER

## 2017-08-25 RX ORDER — MOMETASONE FUROATE 1 MG/G
CREAM TOPICAL
Qty: 45 G | Refills: 2 | Status: SHIPPED | OUTPATIENT
Start: 2017-08-25 | End: 2017-12-26 | Stop reason: SDUPTHER

## 2017-08-25 RX ORDER — PRAZOSIN HYDROCHLORIDE 1 MG/1
CAPSULE ORAL
Refills: 1 | COMMUNITY
Start: 2017-08-21 | End: 2019-02-11

## 2017-08-25 RX ORDER — HYOSCYAMINE SULFATE 0.125 MG
TABLET ORAL
Qty: 180 TABLET | Refills: 0 | Status: SHIPPED | OUTPATIENT
Start: 2017-08-25 | End: 2017-12-19 | Stop reason: SDUPTHER

## 2017-08-29 ENCOUNTER — HOSPITAL ENCOUNTER (OUTPATIENT)
Dept: NEUROLOGY | Age: 52
Discharge: HOME | End: 2017-08-29
Payer: COMMERCIAL

## 2017-08-29 PROCEDURE — 95886 MUSC TEST DONE W/N TEST COMP: CPT

## 2017-08-29 PROCEDURE — 95911 NRV CNDJ TEST 9-10 STUDIES: CPT

## 2017-09-09 DIAGNOSIS — J45.30 MILD PERSISTENT ASTHMA WITHOUT COMPLICATION: ICD-10-CM

## 2017-09-11 RX ORDER — FLUTICASONE FUROATE AND VILANTEROL TRIFENATATE 100; 25 UG/1; UG/1
POWDER RESPIRATORY (INHALATION)
Qty: 60 EACH | Refills: 2 | Status: SHIPPED | OUTPATIENT
Start: 2017-09-11 | End: 2017-12-08 | Stop reason: SDUPTHER

## 2017-09-20 LAB — PATHOLOGY REPORT: NORMAL

## 2017-09-28 DIAGNOSIS — K50.919 CROHN'S DISEASE WITH COMPLICATION, UNSPECIFIED GASTROINTESTINAL TRACT LOCATION (HCC): ICD-10-CM

## 2017-09-29 RX ORDER — ONDANSETRON HYDROCHLORIDE 8 MG/1
TABLET, FILM COATED ORAL
Qty: 40 TABLET | Refills: 0 | Status: SHIPPED | OUTPATIENT
Start: 2017-09-29 | End: 2017-11-26 | Stop reason: SDUPTHER

## 2017-09-29 RX ORDER — ZOLMITRIPTAN 5 MG/1
TABLET, FILM COATED ORAL
Qty: 9 TABLET | Refills: 2 | Status: SHIPPED | OUTPATIENT
Start: 2017-09-29 | End: 2018-01-31 | Stop reason: SDUPTHER

## 2017-10-09 ENCOUNTER — OFFICE VISIT (OUTPATIENT)
Dept: PULMONOLOGY | Age: 52
End: 2017-10-09

## 2017-10-09 VITALS
WEIGHT: 293 LBS | TEMPERATURE: 98.6 F | HEART RATE: 62 BPM | SYSTOLIC BLOOD PRESSURE: 108 MMHG | HEIGHT: 63 IN | OXYGEN SATURATION: 98 % | RESPIRATION RATE: 20 BRPM | BODY MASS INDEX: 51.91 KG/M2 | DIASTOLIC BLOOD PRESSURE: 64 MMHG

## 2017-10-09 DIAGNOSIS — I10 BENIGN ESSENTIAL HTN: ICD-10-CM

## 2017-10-09 DIAGNOSIS — K21.9 GASTROESOPHAGEAL REFLUX DISEASE, ESOPHAGITIS PRESENCE NOT SPECIFIED: ICD-10-CM

## 2017-10-09 DIAGNOSIS — J45.40 MODERATE PERSISTENT ASTHMA WITHOUT COMPLICATION: Primary | ICD-10-CM

## 2017-10-09 DIAGNOSIS — E11.42 TYPE 2 DIABETES MELLITUS WITH DIABETIC POLYNEUROPATHY, WITHOUT LONG-TERM CURRENT USE OF INSULIN (HCC): ICD-10-CM

## 2017-10-09 DIAGNOSIS — J45.20 MILD INTERMITTENT ASTHMA WITHOUT COMPLICATION: ICD-10-CM

## 2017-10-09 PROCEDURE — 99214 OFFICE O/P EST MOD 30 MIN: CPT | Performed by: INTERNAL MEDICINE

## 2017-10-09 RX ORDER — POTASSIUM CHLORIDE 750 MG/1
CAPSULE, EXTENDED RELEASE ORAL
Qty: 30 CAPSULE | Refills: 4 | Status: SHIPPED | OUTPATIENT
Start: 2017-10-09 | End: 2018-03-23 | Stop reason: SDUPTHER

## 2017-10-09 RX ORDER — TRIAMTERENE AND HYDROCHLOROTHIAZIDE 75; 50 MG/1; MG/1
TABLET ORAL
Qty: 30 TABLET | Refills: 4 | Status: SHIPPED | OUTPATIENT
Start: 2017-10-09 | End: 2018-03-23 | Stop reason: SDUPTHER

## 2017-10-09 RX ORDER — MONTELUKAST SODIUM 10 MG/1
TABLET ORAL
Qty: 30 TABLET | Refills: 4 | Status: SHIPPED | OUTPATIENT
Start: 2017-10-09 | End: 2018-03-23 | Stop reason: SDUPTHER

## 2017-10-09 ASSESSMENT — ENCOUNTER SYMPTOMS
RHINORRHEA: 0
SHORTNESS OF BREATH: 0
SINUS PRESSURE: 0
NAUSEA: 0
ABDOMINAL PAIN: 0
WHEEZING: 0
CHEST TIGHTNESS: 0
DIARRHEA: 0
VOMITING: 0
SORE THROAT: 0
COUGH: 0

## 2017-10-09 NOTE — PROGRESS NOTES
Subjective:     Marcel Ocampo is a 47 y.o. female who complains today of:     Chief Complaint   Patient presents with    Asthma    Sleep Apnea       HPI  Patient is here for a follow-up visit regarding asthma and suspected sleep apnea. Since the last visit patient reports no significant respiratory problems or changes. She had a sleep study done which showed minimal obstructive disease with no significant apneic events. Patient states that recently she developed a reaction to BuSpar which is causing tremors, difficulty concentrating, and some breathing difficulties but it seems to be improving after she quit taking it 4 days ago. Allergies:  Latex; Penicillins; Shellfish-derived products; Adhesive tape; Statins; Buspirone; and Sulfa antibiotics  Past Medical History:   Diagnosis Date    Asthma     Benign essential HTN     meds > 5 yrs / denies TIA or stroke    Borderline personality disorder     2016 ?suggested by me-meets many criteria    Crohn's disease ([address]     Depression     GERD (gastroesophageal reflux disease)     Gout     Headache     migraines    Irritable bowel syndrome     Mixed hyperlipidemia 5/10/2017    Neuropathy ([address]     Obesity (BMI 35.0-39.9 without comorbidity) 3/30/2017    Type 2 diabetes mellitus ([address]     meds > 5yrs     Vaginitis      Past Surgical History:   Procedure Laterality Date    BREAST CYST EXCISION Right 1994    exc mass benign    CHOLECYSTECTOMY  2009    COLONOSCOPY  2015    negative    HERNIA REPAIR  2015    ventral / mesh    LEG TENDON SURGERY Right 2007    leg.  ankle and foot    OVARIAN CYST REMOVAL Left 1994    with mass and both fallopian tube    OVARY REMOVAL  12/2015    [facility] / mass left oary & tube    IN KNEE SCOPE,DIAGNOSTIC Right 8/3/2017    RIGHT KNEE ARTHROSCOPIC PARTIAL MEDIAL MENISCECTOMY KNEE performed by Ruthie Olson MD at [address] [facility]     Family History   Problem Relation Age of Onset  Emphysema Mother     Cancer Maternal Grandfather     Diabetes Paternal Grandmother     Emphysema Paternal Grandfather     Heart Disease Sister     Stroke Sister     Diabetes Sister      Social History     Social History    Marital status: Single     Spouse name: N/A    Number of children: N/A    Years of education: N/A     Occupational History    Not on file. Social History Main Topics    Smoking status: Never Smoker    Smokeless tobacco: Never Used    Alcohol use No    Drug use: No    Sexual activity: Not Currently     Other Topics Concern    Not on file     Social History Narrative    No narrative on file         Review of Systems   Constitutional: Negative for appetite change, chills, diaphoresis, fatigue and fever. HENT: Negative for congestion, nosebleeds, postnasal drip, rhinorrhea, sinus pressure, sneezing and sore throat. Eyes: Negative for visual disturbance. Respiratory: Negative for cough, chest tightness, shortness of breath and wheezing. Cardiovascular: Negative for chest pain, palpitations and leg swelling. Gastrointestinal: Negative for abdominal pain, diarrhea, nausea and vomiting. Genitourinary: Negative for dysuria and urgency. Musculoskeletal: Negative for arthralgias, joint swelling and myalgias. Skin: Negative for rash. Allergic/Immunologic: Positive for environmental allergies. Neurological: Negative for tremors, weakness, light-headedness and headaches. Psychiatric/Behavioral: Negative for behavioral problems and sleep disturbance. Objective:     Vitals:    10/09/17 1548   BP: 108/64   Site: Right Arm   Position: Sitting   Cuff Size: Medium Adult   Pulse: 62   Resp: 20   Temp: 98.6 °F (37 °C)   TempSrc: Tympanic   SpO2: 98%   Weight: 294 lb (133.4 kg)   Height: 5' 2.5\" (1.588 m)         Physical Exam   Constitutional: She is oriented to person, place, and time. She appears well-developed and well-nourished.    HENT:   Head: Normocephalic

## 2017-11-02 DIAGNOSIS — E78.2 MIXED HYPERLIPIDEMIA: ICD-10-CM

## 2017-11-02 DIAGNOSIS — E11.42 TYPE 2 DIABETES MELLITUS WITH DIABETIC POLYNEUROPATHY, WITHOUT LONG-TERM CURRENT USE OF INSULIN (HCC): Primary | ICD-10-CM

## 2017-11-02 DIAGNOSIS — I10 BENIGN ESSENTIAL HTN: ICD-10-CM

## 2017-11-03 DIAGNOSIS — E11.42 TYPE 2 DIABETES MELLITUS WITH DIABETIC POLYNEUROPATHY, WITHOUT LONG-TERM CURRENT USE OF INSULIN (HCC): ICD-10-CM

## 2017-11-03 DIAGNOSIS — I10 BENIGN ESSENTIAL HTN: ICD-10-CM

## 2017-11-03 DIAGNOSIS — E78.2 MIXED HYPERLIPIDEMIA: ICD-10-CM

## 2017-11-03 LAB
ALBUMIN SERPL-MCNC: 4.2 G/DL (ref 3.9–4.9)
ALP BLD-CCNC: 107 U/L (ref 40–130)
ALT SERPL-CCNC: 6 U/L (ref 0–33)
ANION GAP SERPL CALCULATED.3IONS-SCNC: 14 MEQ/L (ref 7–13)
AST SERPL-CCNC: 12 U/L (ref 0–35)
BILIRUB SERPL-MCNC: 0.3 MG/DL (ref 0–1.2)
BUN BLDV-MCNC: 18 MG/DL (ref 6–20)
CALCIUM SERPL-MCNC: 9.1 MG/DL (ref 8.6–10.2)
CHLORIDE BLD-SCNC: 99 MEQ/L (ref 98–107)
CHOLESTEROL, TOTAL: 218 MG/DL (ref 0–199)
CO2: 28 MEQ/L (ref 22–29)
CREAT SERPL-MCNC: 0.98 MG/DL (ref 0.5–0.9)
GFR AFRICAN AMERICAN: >60
GFR NON-AFRICAN AMERICAN: 59.2
GLOBULIN: 2.7 G/DL (ref 2.3–3.5)
GLUCOSE BLD-MCNC: 138 MG/DL (ref 74–109)
HBA1C MFR BLD: 6.1 % (ref 4.8–5.9)
HDLC SERPL-MCNC: 66 MG/DL (ref 40–59)
LDL CHOLESTEROL CALCULATED: 124 MG/DL (ref 0–129)
POTASSIUM SERPL-SCNC: 4.4 MEQ/L (ref 3.5–5.1)
SODIUM BLD-SCNC: 141 MEQ/L (ref 132–144)
TOTAL PROTEIN: 6.9 G/DL (ref 6.4–8.1)
TRIGL SERPL-MCNC: 138 MG/DL (ref 0–200)

## 2017-11-06 ENCOUNTER — ANESTHESIA EVENT (OUTPATIENT)
Dept: ENDOSCOPY | Age: 52
End: 2017-11-06
Payer: COMMERCIAL

## 2017-11-07 ENCOUNTER — HOSPITAL ENCOUNTER (OUTPATIENT)
Age: 52
Setting detail: OUTPATIENT SURGERY
Discharge: HOME | End: 2017-11-07
Attending: INTERNAL MEDICINE | Admitting: INTERNAL MEDICINE
Payer: COMMERCIAL

## 2017-11-07 ENCOUNTER — ANESTHESIA (OUTPATIENT)
Dept: ENDOSCOPY | Age: 52
End: 2017-11-07
Payer: COMMERCIAL

## 2017-11-07 VITALS
TEMPERATURE: 97.9 F | HEART RATE: 78 BPM | DIASTOLIC BLOOD PRESSURE: 75 MMHG | RESPIRATION RATE: 18 BRPM | WEIGHT: 293 LBS | SYSTOLIC BLOOD PRESSURE: 138 MMHG | BODY MASS INDEX: 53.92 KG/M2 | OXYGEN SATURATION: 99 % | HEIGHT: 62 IN

## 2017-11-07 VITALS
RESPIRATION RATE: 11 BRPM | DIASTOLIC BLOOD PRESSURE: 59 MMHG | SYSTOLIC BLOOD PRESSURE: 132 MMHG | OXYGEN SATURATION: 100 %

## 2017-11-07 PROCEDURE — 88305 TISSUE EXAM BY PATHOLOGIST: CPT

## 2017-11-07 PROCEDURE — 3700000000 HC ANESTHESIA ATTENDED CARE: Performed by: INTERNAL MEDICINE

## 2017-11-07 PROCEDURE — 2580000003 HC RX 258: Performed by: INTERNAL MEDICINE

## 2017-11-07 PROCEDURE — 3700000001 HC ADD 15 MINUTES (ANESTHESIA): Performed by: INTERNAL MEDICINE

## 2017-11-07 PROCEDURE — 6360000002 HC RX W HCPCS: Performed by: NURSE ANESTHETIST, CERTIFIED REGISTERED

## 2017-11-07 PROCEDURE — 45380 COLONOSCOPY AND BIOPSY: CPT | Performed by: INTERNAL MEDICINE

## 2017-11-07 PROCEDURE — 7100000010 HC PHASE II RECOVERY - FIRST 15 MIN: Performed by: INTERNAL MEDICINE

## 2017-11-07 PROCEDURE — 3609027000 HC COLONOSCOPY: Performed by: INTERNAL MEDICINE

## 2017-11-07 RX ORDER — ONDANSETRON 2 MG/ML
4 INJECTION INTRAMUSCULAR; INTRAVENOUS
Status: DISCONTINUED | OUTPATIENT
Start: 2017-11-07 | End: 2017-11-07 | Stop reason: HOSPADM

## 2017-11-07 RX ORDER — SODIUM CHLORIDE 9 MG/ML
INJECTION, SOLUTION INTRAVENOUS CONTINUOUS
Status: DISCONTINUED | OUTPATIENT
Start: 2017-11-07 | End: 2017-11-07 | Stop reason: HOSPADM

## 2017-11-07 RX ORDER — PROPOFOL 10 MG/ML
INJECTION, EMULSION INTRAVENOUS PRN
Status: DISCONTINUED | OUTPATIENT
Start: 2017-11-07 | End: 2017-11-07 | Stop reason: SDUPTHER

## 2017-11-07 RX ADMIN — PROPOFOL 20 MG: 10 INJECTION, EMULSION INTRAVENOUS at 11:31

## 2017-11-07 RX ADMIN — PROPOFOL 20 MG: 10 INJECTION, EMULSION INTRAVENOUS at 11:26

## 2017-11-07 RX ADMIN — SODIUM CHLORIDE: 900 INJECTION, SOLUTION INTRAVENOUS at 11:03

## 2017-11-07 RX ADMIN — PROPOFOL 20 MG: 10 INJECTION, EMULSION INTRAVENOUS at 11:18

## 2017-11-07 RX ADMIN — PROPOFOL 20 MG: 10 INJECTION, EMULSION INTRAVENOUS at 11:20

## 2017-11-07 RX ADMIN — PROPOFOL 10 MG: 10 INJECTION, EMULSION INTRAVENOUS at 11:28

## 2017-11-07 RX ADMIN — PROPOFOL 20 MG: 10 INJECTION, EMULSION INTRAVENOUS at 11:24

## 2017-11-07 RX ADMIN — PROPOFOL 20 MG: 10 INJECTION, EMULSION INTRAVENOUS at 11:22

## 2017-11-07 RX ADMIN — PROPOFOL 50 MG: 10 INJECTION, EMULSION INTRAVENOUS at 11:14

## 2017-11-07 RX ADMIN — PROPOFOL 20 MG: 10 INJECTION, EMULSION INTRAVENOUS at 11:29

## 2017-11-07 RX ADMIN — PROPOFOL 30 MG: 10 INJECTION, EMULSION INTRAVENOUS at 11:15

## 2017-11-07 RX ADMIN — PROPOFOL 50 MG: 10 INJECTION, EMULSION INTRAVENOUS at 11:16

## 2017-11-07 RX ADMIN — PROPOFOL 20 MG: 10 INJECTION, EMULSION INTRAVENOUS at 11:17

## 2017-11-07 ASSESSMENT — PAIN - FUNCTIONAL ASSESSMENT: PAIN_FUNCTIONAL_ASSESSMENT: 0-10

## 2017-11-07 NOTE — ANESTHESIA PRE PROCEDURE
Department of Anesthesiology  Preprocedure Note       Name:  Guy Coleman   Age:  47 y.o.  :  1965                                          MRN:  [id number]         Date:  2017      Surgeon: Rustam Syed):  Kristina Ramos MD    Procedure: Procedure(s):  COLONOSCOPY    Medications prior to admission:   Prior to Admission medications    Medication Sig Start Date End Date Taking? Authorizing Provider   triamterene-hydrochlorothiazide (MAXZIDE) 75-50 MG per tablet TAKE ONE TABLET BY MOUTH EVERY DAY 10/9/17   John Lockhart MD   potassium chloride (MICRO-K) 10 MEQ extended release capsule TAKE ONE CAPSULE BY MOUTH EVERY DAY 10/9/17   John Lockhart MD   montelukast (SINGULAIR) 10 MG tablet take one tablet by mouth nightly 10/9/17   John Lockhart MD   metFORMIN (GLUCOPHAGE) 500 MG tablet take one tablet by mouth twice daily with meals 10/9/17   John Lockhart MD   ondansetron (ZOFRAN) 8 MG tablet TAKE ONE TABLET BY MOUTH EVERY 8 HOURS AS NEEDED for nausea or vomiting 17   John Lockhart MD   ZOLMitriptan (ZOMIG) 5 MG tablet take 1 tablet by mouth as needed for migraine 17   John Lockhart MD   BREO ELLIPTA 100-25 MCG/INH AEPB inhaler INHALE 1 PUFF INTO THE LUNGS DAILY.  17   John Lockhart MD   mometasone (ELOCON) 0.1 % cream APPLY TOPICALLY DAILY  17   John Lockhart MD   hyoscyamine (ANASPAZ;LEVSIN) 125 MCG tablet take one tablet by mouth every 4 hours as needed for cramping 17   John Lockhart MD   prazosin (MINIPRESS) 1 MG capsule TAKE ONE CAPSULE BY MOUTH AT BEDTIME 17   Historical Provider, MD   pioglitazone (ACTOS) 30 MG tablet TAKE ONE TABLET BY MOUTH EVERY DAY 17   John Lockhart MD   ARIPiprazole (ABILIFY) 10 MG tablet TAKE ONE TABLET BY MOUTH IN THE MORNING 17   Historical Provider, MD   busPIRone (BUSPAR) 15 MG tablet TAKE ONE TABLET BY MOUTH TWO TIMES A DAY AS DIRECTED 17   Historical Provider, MD   vitamin tablet 4    metFORMIN (GLUCOPHAGE) 500 MG tablet take one tablet by mouth twice daily with meals 60 tablet 4    ondansetron (ZOFRAN) 8 MG tablet TAKE ONE TABLET BY MOUTH EVERY 8 HOURS AS NEEDED for nausea or vomiting 40 tablet 0    ZOLMitriptan (ZOMIG) 5 MG tablet take 1 tablet by mouth as needed for migraine 9 tablet 2    BREO ELLIPTA 100-25 MCG/INH AEPB inhaler INHALE 1 PUFF INTO THE LUNGS DAILY. 60 each 2    mometasone (ELOCON) 0.1 % cream APPLY TOPICALLY DAILY  45 g 2    hyoscyamine (ANASPAZ;LEVSIN) 125 MCG tablet take one tablet by mouth every 4 hours as needed for cramping 180 tablet 0    prazosin (MINIPRESS) 1 MG capsule TAKE ONE CAPSULE BY MOUTH AT BEDTIME  1    pioglitazone (ACTOS) 30 MG tablet TAKE ONE TABLET BY MOUTH EVERY DAY 30 tablet 4    ARIPiprazole (ABILIFY) 10 MG tablet TAKE ONE TABLET BY MOUTH IN THE MORNING  0    busPIRone (BUSPAR) 15 MG tablet TAKE ONE TABLET BY MOUTH TWO TIMES A DAY AS DIRECTED  0    vitamin D (ERGOCALCIFEROL) 38108 units CAPS capsule take 1 capsule by mouth once a week  3    FREESTYLE LITE strip TEST THREE TIMES DAILY  50 each 3    PROAIR  (90 Base) MCG/ACT inhaler INHALE 2 PUFFS BY MOUTH INTO THE LUNGS 4 TIMES DAILY 8.5 g 2    omeprazole (PRILOSEC) 40 MG delayed release capsule TAKE ONE CAPSULE BY MOUTH TWO TIMES A DAY 60 capsule 4    FREESTYLE LITE strip TEST THREE TIMES DAILY  1    melatonin 3 MG TABS tablet TAKE ONE TABLET BY MOUTH AT BEDTIME  1    pentazocine-naloxone (TALWIN NX) 50-0.5 MG per tablet TAKE ONE TABLET BY MOUTH EVERY DAY AS NEEDED FOR PAIN  0    clobetasol (TEMOVATE) 0.05 % cream apply topically twice daily.  60 g 2    baclofen (LIORESAL) 20 MG tablet       DULoxetine (CYMBALTA) 30 MG extended release capsule Take 90 mg by mouth daily      ipratropium-albuterol (DUONEB) 0.5-2.5 (3) MG/3ML SOLN nebulizer solution INHALE THE CONTENTS OF 1 VIAL VIA NEBULIZER EVERY 4 HOURS  360 mL 1    LYRICA 150 MG capsule 150 mg 3 times daily Indications: last filled 3/5/17 no refills       dicyclomine (BENTYL) 10 MG capsule TAKE ONE CAPSULE BY MOUTH FOUR TIMES A DAY BEFORE MEALS AND NIGHTLY 120 capsule 5       Allergies:     Allergies   Allergen Reactions    Latex Hives    Penicillins Swelling and Rash    Shellfish-Derived Products Anaphylaxis    Adhesive Tape Swelling     If left on too long    Statins      Swelling      Buspirone     Sulfa Antibiotics Nausea And Vomiting       Problem List:    Patient Active Problem List   Diagnosis Code    Benign essential HTN I10    Asthma J45.909    Type 2 diabetes mellitus with diabetic polyneuropathy ([facility] E11.42    Crohn's disease ([facility] K50.90    GERD (gastroesophageal reflux disease) K21.9    Headache R51    Severe episode of recurrent major depressive disorder, without psychotic features ([address] F33.2    Borderline personality disorder F60.3    Pain in right knee M25.561    Hip pain, right M25.551    Obesity (BMI 35.0-39.9 without comorbidity) E66.9    Muscular pain M79.1    Mixed hyperlipidemia E78.2    Tear of meniscus of knee S83.209A    Bilateral edema of lower extremity R60.0    Varicose vein of leg I83.90       Past Medical History:        Diagnosis Date    Asthma     Benign essential HTN     meds > 5 yrs / denies TIA or stroke    Borderline personality disorder     2016 ?suggested by me-meets many criteria    Crohn's disease ([facility]     Depression     GERD (gastroesophageal reflux disease)     Gout     Headache     migraines    Irritable bowel syndrome     Mixed hyperlipidemia 5/10/2017    Neuropathy (HCC)     Obesity (BMI 35.0-39.9 without comorbidity) 3/30/2017    Type 2 diabetes mellitus (HCC)     meds > 5yrs     Vaginitis        Past Surgical History:        Procedure Laterality Date    BREAST CYST EXCISION Right 1994    exc mass benign    CHOLECYSTECTOMY  2009    COLONOSCOPY  2015    negative    HERNIA REPAIR  2015    ventral / mesh    LEG TENDON SURGERY Right 2007    leg. ankle and foot    OVARIAN CYST REMOVAL Left 1994    with mass and both fallopian tube    OVARY REMOVAL  12/2015    [facility] / mass left oary & tube    MI KNEE SCOPE,DIAGNOSTIC Right 8/3/2017    RIGHT KNEE ARTHROSCOPIC PARTIAL MEDIAL MENISCECTOMY KNEE performed by Jorge Alberto Mg MD at [address] [facility]       Social History:    Social History   Substance Use Topics    Smoking status: Never Smoker    Smokeless tobacco: Never Used    Alcohol use No                                Counseling given: Not Answered      Vital Signs (Current): There were no vitals filed for this visit. BP Readings from Last 3 Encounters:   10/09/17 108/64   08/25/17 106/62   08/11/17 124/74       NPO Status:                                                                                 BMI:   Wt Readings from Last 3 Encounters:   10/09/17 294 lb (133.4 kg)   08/25/17 293 lb (132.9 kg)   08/11/17 293 lb (132.9 kg)     There is no height or weight on file to calculate BMI.    CBC:   Lab Results   Component Value Date    WBC 9.5 07/27/2017    RBC 4.45 07/27/2017    HGB 10.9 07/27/2017    HCT 34.8 07/27/2017    MCV 78.1 07/27/2017    RDW 16.8 07/27/2017     07/27/2017       CMP:   Lab Results   Component Value Date     11/03/2017    K 4.4 11/03/2017    CL 99 11/03/2017    CO2 28 11/03/2017    BUN 18 11/03/2017    CREATININE 0.98 11/03/2017    GFRAA >60.0 11/03/2017    LABGLOM 59.2 11/03/2017    GLUCOSE 138 11/03/2017    PROT 6.9 11/03/2017    CALCIUM 9.1 11/03/2017    BILITOT 0.3 11/03/2017    ALKPHOS 107 11/03/2017    AST 12 11/03/2017    ALT 6 11/03/2017       POC Tests: No results for input(s): POCGLU, POCNA, POCK, POCCL, POCBUN, POCHEMO, POCHCT in the last 72 hours.     Coags: No results found for: PROTIME, INR, APTT    HCG (If Applicable):   Lab Results   Component Value Date    PREGTESTUR Negative 10/24/2016        ABGs: No results found for: PHART, PO2ART, SJS8XNI, TJH3GFY, BEART, W5GFIDPM     Type & Screen (If Applicable):  No results found for: LABABO, [address]    Anesthesia Evaluation  Patient summary reviewed and Nursing notes reviewed  Airway: Mallampati: III  TM distance: >3 FB   Neck ROM: full  Mouth opening: > = 3 FB Dental: normal exam         Pulmonary:normal exam    (+) asthma:                            Cardiovascular:    (+) hypertension: no interval change,                   Neuro/Psych:   (+) headaches:, psychiatric history:            GI/Hepatic/Renal:   (+) GERD: well controlled,           Endo/Other: negative ROS   (+) Type II DM, no interval change, ,                  Abdominal:   (+) obese,         Vascular:                                    Anesthesia Plan      MAC     ASA 3       Induction: intravenous. Anesthetic plan and risks discussed with patient. Plan discussed with CRNA.                   Bhavna Sahni CRNA   11/7/2017

## 2017-11-07 NOTE — ANESTHESIA POSTPROCEDURE EVALUATION
Department of Anesthesiology  Postprocedure Note    Patient: Ole Velasco  MRN: [id number]  YOB: 1965  Date of evaluation: 11/7/2017  Time:  11:35 AM     Procedure Summary     Date:  11/07/17 Room / Location:  [place] [address] 01 / [facility]    Anesthesia Start:  1103 Anesthesia Stop:      Procedure:  COLONOSCOPY (N/A ) Diagnosis:  ( - Screening )    Surgeon:  Cori Whatley MD Responsible Provider:  Herve Perry CRNA    Anesthesia Type:  MAC ASA Status:  3          Anesthesia Type: MAC    Tricia Phase I:      Tricia Phase II:      Last vitals: Reviewed and per EMR flowsheets.        Anesthesia Post Evaluation    Patient location during evaluation: PACU  Patient participation: complete - patient cannot participate  Level of consciousness: sleepy but conscious  Pain score: 0  Airway patency: patent  Nausea & Vomiting: no nausea and no vomiting  Complications: no  Cardiovascular status: hemodynamically stable  Respiratory status: acceptable  Hydration status: euvolemic

## 2017-11-10 ENCOUNTER — HOSPITAL ENCOUNTER (OUTPATIENT)
Dept: WOMENS IMAGING | Age: 52
Discharge: HOME | End: 2017-11-10
Payer: COMMERCIAL

## 2017-11-10 ENCOUNTER — OFFICE VISIT (OUTPATIENT)
Dept: FAMILY MEDICINE CLINIC | Age: 52
End: 2017-11-10

## 2017-11-10 ENCOUNTER — APPOINTMENT (OUTPATIENT)
Dept: CT IMAGING | Age: 52
End: 2017-11-10
Payer: COMMERCIAL

## 2017-11-10 VITALS
TEMPERATURE: 97.4 F | HEIGHT: 63 IN | DIASTOLIC BLOOD PRESSURE: 68 MMHG | RESPIRATION RATE: 18 BRPM | BODY MASS INDEX: 51.91 KG/M2 | WEIGHT: 293 LBS | SYSTOLIC BLOOD PRESSURE: 116 MMHG | OXYGEN SATURATION: 97 % | HEART RATE: 94 BPM

## 2017-11-10 DIAGNOSIS — G25.0 BENIGN ESSENTIAL TREMOR: ICD-10-CM

## 2017-11-10 DIAGNOSIS — E78.2 MIXED HYPERLIPIDEMIA: ICD-10-CM

## 2017-11-10 DIAGNOSIS — Z12.31 VISIT FOR SCREENING MAMMOGRAM: ICD-10-CM

## 2017-11-10 DIAGNOSIS — F60.3 BORDERLINE PERSONALITY DISORDER (HCC): ICD-10-CM

## 2017-11-10 DIAGNOSIS — M25.473 ANKLE EDEMA: ICD-10-CM

## 2017-11-10 DIAGNOSIS — Z23 NEED FOR INFLUENZA VACCINATION: ICD-10-CM

## 2017-11-10 DIAGNOSIS — F33.2 SEVERE EPISODE OF RECURRENT MAJOR DEPRESSIVE DISORDER, WITHOUT PSYCHOTIC FEATURES (HCC): ICD-10-CM

## 2017-11-10 DIAGNOSIS — M10.9 ACUTE GOUT, UNSPECIFIED CAUSE, UNSPECIFIED SITE: ICD-10-CM

## 2017-11-10 DIAGNOSIS — K50.919 CROHN'S DISEASE WITH COMPLICATION, UNSPECIFIED GASTROINTESTINAL TRACT LOCATION (HCC): ICD-10-CM

## 2017-11-10 DIAGNOSIS — I10 BENIGN ESSENTIAL HTN: ICD-10-CM

## 2017-11-10 DIAGNOSIS — E11.42 TYPE 2 DIABETES MELLITUS WITH DIABETIC POLYNEUROPATHY, WITHOUT LONG-TERM CURRENT USE OF INSULIN (HCC): Primary | ICD-10-CM

## 2017-11-10 LAB — URIC ACID, SERUM: 9.4 MG/DL (ref 2.4–5.7)

## 2017-11-10 PROCEDURE — G0202 SCR MAMMO BI INCL CAD: HCPCS

## 2017-11-10 PROCEDURE — 3044F HG A1C LEVEL LT 7.0%: CPT | Performed by: FAMILY MEDICINE

## 2017-11-10 PROCEDURE — G8484 FLU IMMUNIZE NO ADMIN: HCPCS | Performed by: FAMILY MEDICINE

## 2017-11-10 PROCEDURE — 90688 IIV4 VACCINE SPLT 0.5 ML IM: CPT | Performed by: FAMILY MEDICINE

## 2017-11-10 PROCEDURE — 3014F SCREEN MAMMO DOC REV: CPT | Performed by: FAMILY MEDICINE

## 2017-11-10 PROCEDURE — G8427 DOCREV CUR MEDS BY ELIG CLIN: HCPCS | Performed by: FAMILY MEDICINE

## 2017-11-10 PROCEDURE — G8417 CALC BMI ABV UP PARAM F/U: HCPCS | Performed by: FAMILY MEDICINE

## 2017-11-10 PROCEDURE — 90471 IMMUNIZATION ADMIN: CPT | Performed by: FAMILY MEDICINE

## 2017-11-10 PROCEDURE — 3017F COLORECTAL CA SCREEN DOC REV: CPT | Performed by: FAMILY MEDICINE

## 2017-11-10 PROCEDURE — 1036F TOBACCO NON-USER: CPT | Performed by: FAMILY MEDICINE

## 2017-11-10 PROCEDURE — 99214 OFFICE O/P EST MOD 30 MIN: CPT | Performed by: FAMILY MEDICINE

## 2017-11-10 RX ORDER — CARBIDOPA AND LEVODOPA 25; 100 MG/1; MG/1
TABLET, EXTENDED RELEASE ORAL
Status: ON HOLD | COMMUNITY
Start: 2017-10-24 | End: 2018-03-15

## 2017-11-10 RX ORDER — DOXYCYCLINE HYCLATE 100 MG
100 TABLET ORAL 2 TIMES DAILY
Qty: 20 TABLET | Refills: 0 | Status: SHIPPED | OUTPATIENT
Start: 2017-11-10 | End: 2017-11-20

## 2017-11-10 RX ORDER — COLCHICINE 0.6 MG/1
0.6 TABLET ORAL 3 TIMES DAILY PRN
Qty: 30 TABLET | Refills: 1 | Status: SHIPPED | OUTPATIENT
Start: 2017-11-10 | End: 2018-01-19 | Stop reason: SDUPTHER

## 2017-11-10 RX ORDER — MELOXICAM 7.5 MG/1
TABLET ORAL
Refills: 3 | COMMUNITY
Start: 2017-10-18 | End: 2021-12-15

## 2017-11-10 RX ORDER — DULOXETIN HYDROCHLORIDE 60 MG/1
CAPSULE, DELAYED RELEASE ORAL
Refills: 1 | COMMUNITY
Start: 2017-10-23

## 2017-11-10 RX ORDER — HYDROXYZINE PAMOATE 50 MG/1
CAPSULE ORAL
Refills: 1 | Status: ON HOLD | COMMUNITY
Start: 2017-10-23 | End: 2018-03-15

## 2017-11-10 NOTE — PROGRESS NOTES
Topics    Smoking status: Never Smoker    Smokeless tobacco: Never Used    Alcohol use No    Drug use: No    Sexual activity: Not Currently     Other Topics Concern    Not on file     Social History Narrative    No narrative on file     Family History   Problem Relation Age of Onset    Emphysema Mother     Cancer Maternal Grandfather     Diabetes Paternal Grandmother     Emphysema Paternal Grandfather     Heart Disease Sister     Stroke Sister     Diabetes Sister      Allergies   Allergen Reactions    Latex Hives    Penicillins Swelling and Rash    Shellfish-Derived Products Anaphylaxis    Abilify [Aripiprazole]     Adhesive Tape Swelling     If left on too long    Statins      Swelling      Buspirone     Sulfa Antibiotics Nausea And Vomiting     Current Outpatient Prescriptions   Medication Sig Dispense Refill    carbidopa-levodopa (SINEMET CR)  MG per extended release tablet       DULoxetine (CYMBALTA) 60 MG extended release capsule TAKE ONE CAPSULE BY MOUTH TWICE DAILY AS DIRECTED IN THE MORNING AND AFTERNOON  1    hydrOXYzine (VISTARIL) 50 MG capsule TAKE ONE CAPSULE BY MOUTH TWO TIMES A DAY AS DIRECTED AS NEEDED FOR ANXIETY AND PANIC ATTACKS  1    meloxicam (MOBIC) 7.5 MG tablet TAKE ONE TABLET BY MOUTH TWO TIMES A DAY WITH MEALS TAKE ONLY WITH FOOD AS NEEDED  3    triamterene-hydrochlorothiazide (MAXZIDE) 75-50 MG per tablet TAKE ONE TABLET BY MOUTH EVERY DAY 30 tablet 4    potassium chloride (MICRO-K) 10 MEQ extended release capsule TAKE ONE CAPSULE BY MOUTH EVERY DAY 30 capsule 4    montelukast (SINGULAIR) 10 MG tablet take one tablet by mouth nightly 30 tablet 4    metFORMIN (GLUCOPHAGE) 500 MG tablet take one tablet by mouth twice daily with meals 60 tablet 4    ondansetron (ZOFRAN) 8 MG tablet TAKE ONE TABLET BY MOUTH EVERY 8 HOURS AS NEEDED for nausea or vomiting 40 tablet 0    ZOLMitriptan (ZOMIG) 5 MG tablet take 1 tablet by mouth as needed for migraine 9 tablet 2 Cholesterol, Total 11/03/2017 218* 0 - 199 mg/dL Final    Triglycerides 11/03/2017 138  0 - 200 mg/dL Final    HDL 11/03/2017 66* 40 - 59 mg/dL Final    Comment: ATP III HDL Cholesterol Classification is high. Expected Values:    Males:    >55 = No Risk            35-55 = Moderate Risk            <35 = High Risk    Females:  >65 = No Risk            45-65 = Moderate Risk            <45 = High Risk    NCEP Guidelines: Third Report May 2001  >59 = negative risk factor for CHD  <40 = major risk factor for CHD      LDL Calculated 11/03/2017 124  0 - 129 mg/dL Final    Sodium 11/03/2017 141  132 - 144 mEq/L Final    Potassium 11/03/2017 4.4  3.5 - 5.1 mEq/L Final    Chloride 11/03/2017 99  98 - 107 mEq/L Final    CO2 11/03/2017 28  22 - 29 mEq/L Final    Anion Gap 11/03/2017 14* 7 - 13 mEq/L Final    Glucose 11/03/2017 138* 74 - 109 mg/dL Final    BUN 11/03/2017 18  6 - 20 mg/dL Final    CREATININE 11/03/2017 0.98* 0.50 - 0.90 mg/dL Final    GFR Non- 11/03/2017 59.2* >60 Final    Comment: >60 mL/min/1.73m2 EGFR, calc. for ages 25 and older using the  MDRD formula (not corrected for weight), is valid for stable  renal function.  GFR  11/03/2017 >60.0  >60 Final    Comment: >60 mL/min/1.73m2 EGFR, calc. for ages 25 and older using the  MDRD formula (not corrected for weight), is valid for stable  renal function.       Calcium 11/03/2017 9.1  8.6 - 10.2 mg/dL Final    Total Protein 11/03/2017 6.9  6.4 - 8.1 g/dL Final    Alb 11/03/2017 4.2  3.9 - 4.9 g/dL Final    Total Bilirubin 11/03/2017 0.3  0.0 - 1.2 mg/dL Final    Alkaline Phosphatase 11/03/2017 107  40 - 130 U/L Final    ALT 11/03/2017 6  0 - 33 U/L Final    AST 11/03/2017 12  0 - 35 U/L Final    Globulin 11/03/2017 2.7  2.3 - 3.5 g/dL Final   Procedure visit on 08/11/2017   Component Date Value Ref Range Status    Pathology Report 09/20/2017 See Path   Final     Health Maintenance   Topic Date Due    Hepatitis C screen  1965    HIV screen  03/17/1979    Diabetic retinal exam  02/01/2017    DTaP/Tdap/Td vaccine (1 - Tdap) 11/10/2017 (Originally 3/17/1983)    Pneumococcal med risk (1 of 1 - PPSV23) 08/11/2018 (Originally 3/17/1983)    Cervical cancer screen  03/01/2018    Breast cancer screen  03/02/2018    Diabetic microalbuminuria test  05/10/2018    Diabetic foot exam  08/11/2018    Diabetic hemoglobin A1C test  11/03/2018    Lipid screen  11/03/2018    Colon cancer screen colonoscopy  11/07/2027    Flu vaccine  Completed       No results found for this visit on 11/10/17. Objective    Vitals:    11/10/17 1240   BP: 116/68   Pulse: 94   Resp: 18   Temp: 97.4 °F (36.3 °C)   TempSrc: Tympanic   SpO2: 97%   Weight: 297 lb (134.7 kg)   Height: 5' 2.5\" (1.588 m)       PHYSICAL EXAMINATION:        GENERAL:    The patient appears well nourished and well-developed,     Normal affect. Not appearing significantly anxious or depressed. No acute respiratory distress. Alert and oriented times 3. Skin:     No skin rashes. No concerning moles observed. Gait:    Normal gait. No ataxia. HEENT:  Normocephalic, atraumatic. Throat:  Pharynx is clear, no erythema/ edema or exudates   Ears:    TMs normal bilaterally. Canals and ears normal   Eyes:  Extraocular eye motions intact and pain free. Pupils reactive/equal    Sclerae and conjunctivae clear    NECK: No masses or adenopathy palpable. No carotid bruits heard. No asymmetry visible. No thyromegaly. RESPIRATORY:   Clear/ Equal breath sounds /No acute respiratory distress. Sl exp wheezes,NO rales, or rhonchi. No percussive abnormalities    HEART: Regular rhythm without murmur, rub or gallop. ABDOMEN:  Obese Soft, non tender. No masses, guarding or rebound. Normo active bowel sounds. EXTREMITIES:     No cyanosis or clubbing.     2+ dorsalis pedis pulses bilaterally    Pedal edema 2+ptiiting to lower ankle and 5x3cm ankle erythema    Mild head tremor noted      Assessment & Plan   1. Type 2 diabetes mellitus with diabetic polyneuropathy, without long-term current use of insulin ([address]     2. Benign essential HTN     3. Mixed hyperlipidemia     4. Severe episode of recurrent major depressive disorder, without psychotic features ([address]     5. Borderline personality disorder     6. Crohn's disease with complication, unspecified gastrointestinal tract location ([address]     7. Need for influenza vaccination  INFLUENZA, QUADV, 3 YRS AND OLDER, IM, MDV, 0.5ML (Anselmo Coffin)   8. Ankle edema  Uric Acid    US DUP LOWER EXTREMITY RIGHT STEPHAN    XR ANKLE RIGHT (MIN 3 VIEWS)    Procalcitonin   9. Acute gout, unspecified cause, unspecified site  Procalcitonin   10. Benign essential tremor       Orders Placed This Encounter   Procedures    INFLUENZA, QUADV, 3 YRS AND OLDER, IM, MDV, 0.5ML (FLUZONE QUADV)     No orders of the defined types were placed in this encounter. There are no discontinued medications. No Follow-up on file. Add colchicine tid x1-2 d  Add doxy The patient was reminded that an antibiotic has been prescribed the predicted course is improvement to cure with no persistent issues. Take antibiotics as directed. If any problems occur, an appointment should be made or ER visit if severe. Because of the risk with ANY antibiotic of C. Difficile colitis if persistent diarrhea or abdominal pain or any concerning symptoms, we should be notified. To reduce this risk, a probiotic pill, yogurt or other preparations containing active cultures should be ingested daily -particularly while on the antibiotic. If any persistent symptoms of illness, follow up appointment should be made in a timely fashion with a physician.   Needs ortho f/u for hardware in ankle    Panda Mills MD

## 2017-11-13 LAB — PROCALCITONIN: 0.15 NG/ML

## 2017-11-16 LAB
PROLACTIN: 6.6 NG/ML
T4 TOTAL: 9.6 UG/DL (ref 4.5–11.7)
TOTAL CK: 56 U/L (ref 0–170)
TSH SERPL DL<=0.05 MIU/L-ACNC: 3.4 UIU/ML (ref 0.27–4.2)

## 2017-11-26 DIAGNOSIS — K50.919 CROHN'S DISEASE WITH COMPLICATION, UNSPECIFIED GASTROINTESTINAL TRACT LOCATION (HCC): ICD-10-CM

## 2017-11-27 ENCOUNTER — OFFICE VISIT (OUTPATIENT)
Dept: FAMILY MEDICINE CLINIC | Age: 52
End: 2017-11-27

## 2017-11-27 ENCOUNTER — HOSPITAL ENCOUNTER (OUTPATIENT)
Dept: PREADMISSION TESTING | Age: 52
Discharge: HOME | End: 2017-11-27
Payer: COMMERCIAL

## 2017-11-27 VITALS
RESPIRATION RATE: 16 BRPM | DIASTOLIC BLOOD PRESSURE: 64 MMHG | TEMPERATURE: 97.9 F | HEIGHT: 63 IN | BODY MASS INDEX: 52.45 KG/M2 | SYSTOLIC BLOOD PRESSURE: 118 MMHG | HEART RATE: 76 BPM

## 2017-11-27 VITALS
DIASTOLIC BLOOD PRESSURE: 63 MMHG | OXYGEN SATURATION: 98 % | RESPIRATION RATE: 16 BRPM | SYSTOLIC BLOOD PRESSURE: 115 MMHG | WEIGHT: 291.4 LBS | TEMPERATURE: 96.6 F | HEART RATE: 91 BPM | HEIGHT: 62 IN | BODY MASS INDEX: 53.62 KG/M2

## 2017-11-27 DIAGNOSIS — F33.2 SEVERE EPISODE OF RECURRENT MAJOR DEPRESSIVE DISORDER, WITHOUT PSYCHOTIC FEATURES (HCC): ICD-10-CM

## 2017-11-27 DIAGNOSIS — I10 BENIGN ESSENTIAL HTN: ICD-10-CM

## 2017-11-27 DIAGNOSIS — G56.01 CARPAL TUNNEL SYNDROME OF RIGHT WRIST: ICD-10-CM

## 2017-11-27 DIAGNOSIS — E11.42 TYPE 2 DIABETES MELLITUS WITH DIABETIC POLYNEUROPATHY, WITHOUT LONG-TERM CURRENT USE OF INSULIN (HCC): Primary | ICD-10-CM

## 2017-11-27 DIAGNOSIS — N28.9 RENAL INSUFFICIENCY: ICD-10-CM

## 2017-11-27 DIAGNOSIS — K50.919 CROHN'S DISEASE WITH COMPLICATION, UNSPECIFIED GASTROINTESTINAL TRACT LOCATION (HCC): ICD-10-CM

## 2017-11-27 DIAGNOSIS — F60.3 BORDERLINE PERSONALITY DISORDER (HCC): ICD-10-CM

## 2017-11-27 DIAGNOSIS — E78.2 MIXED HYPERLIPIDEMIA: ICD-10-CM

## 2017-11-27 DIAGNOSIS — D64.9 ANEMIA, UNSPECIFIED TYPE: ICD-10-CM

## 2017-11-27 LAB
ANION GAP SERPL CALCULATED.3IONS-SCNC: 15 MEQ/L (ref 7–13)
BUN BLDV-MCNC: 21 MG/DL (ref 6–20)
CALCIUM SERPL-MCNC: 9.2 MG/DL (ref 8.6–10.2)
CHLORIDE BLD-SCNC: 100 MEQ/L (ref 98–107)
CO2: 27 MEQ/L (ref 22–29)
CREAT SERPL-MCNC: 0.96 MG/DL (ref 0.5–0.9)
GFR AFRICAN AMERICAN: >60
GFR NON-AFRICAN AMERICAN: >60
GLUCOSE BLD-MCNC: 142 MG/DL (ref 74–109)
HCT VFR BLD CALC: 35.6 % (ref 37–47)
HEMOGLOBIN: 11.5 G/DL (ref 12–16)
MCH RBC QN AUTO: 26.3 PG (ref 27–31.3)
MCHC RBC AUTO-ENTMCNC: 32.3 % (ref 33–37)
MCV RBC AUTO: 81.6 FL (ref 82–100)
PDW BLD-RTO: 17.4 % (ref 11.5–14.5)
PLATELET # BLD: 349 K/UL (ref 130–400)
POTASSIUM SERPL-SCNC: 4.2 MEQ/L (ref 3.5–5.1)
RBC # BLD: 4.36 M/UL (ref 4.2–5.4)
SODIUM BLD-SCNC: 142 MEQ/L (ref 132–144)
WBC # BLD: 8.9 K/UL (ref 4.8–10.8)

## 2017-11-27 PROCEDURE — 1036F TOBACCO NON-USER: CPT | Performed by: FAMILY MEDICINE

## 2017-11-27 PROCEDURE — 3014F SCREEN MAMMO DOC REV: CPT | Performed by: FAMILY MEDICINE

## 2017-11-27 PROCEDURE — G8427 DOCREV CUR MEDS BY ELIG CLIN: HCPCS | Performed by: FAMILY MEDICINE

## 2017-11-27 PROCEDURE — 3017F COLORECTAL CA SCREEN DOC REV: CPT | Performed by: FAMILY MEDICINE

## 2017-11-27 PROCEDURE — G8417 CALC BMI ABV UP PARAM F/U: HCPCS | Performed by: FAMILY MEDICINE

## 2017-11-27 PROCEDURE — 3044F HG A1C LEVEL LT 7.0%: CPT | Performed by: FAMILY MEDICINE

## 2017-11-27 PROCEDURE — 99213 OFFICE O/P EST LOW 20 MIN: CPT | Performed by: FAMILY MEDICINE

## 2017-11-27 PROCEDURE — G8484 FLU IMMUNIZE NO ADMIN: HCPCS | Performed by: FAMILY MEDICINE

## 2017-11-27 PROCEDURE — 85027 COMPLETE CBC AUTOMATED: CPT

## 2017-11-27 PROCEDURE — 80048 BASIC METABOLIC PNL TOTAL CA: CPT

## 2017-11-27 RX ORDER — VANCOMYCIN HYDROCHLORIDE 1 G/200ML
1000 INJECTION, SOLUTION INTRAVENOUS ONCE
Status: CANCELLED | OUTPATIENT
Start: 2017-11-30

## 2017-11-27 RX ORDER — ALLOPURINOL 100 MG/1
100 TABLET ORAL DAILY
Qty: 30 TABLET | Refills: 5 | Status: SHIPPED | OUTPATIENT
Start: 2017-11-27 | End: 2018-06-09 | Stop reason: SDUPTHER

## 2017-11-27 RX ORDER — LIDOCAINE HYDROCHLORIDE 10 MG/ML
1 INJECTION, SOLUTION EPIDURAL; INFILTRATION; INTRACAUDAL; PERINEURAL
Status: CANCELLED | OUTPATIENT
Start: 2017-11-27 | End: 2017-11-27

## 2017-11-27 RX ORDER — SODIUM CHLORIDE, SODIUM LACTATE, POTASSIUM CHLORIDE, CALCIUM CHLORIDE 600; 310; 30; 20 MG/100ML; MG/100ML; MG/100ML; MG/100ML
INJECTION, SOLUTION INTRAVENOUS CONTINUOUS
Status: CANCELLED | OUTPATIENT
Start: 2017-11-27

## 2017-11-27 RX ORDER — ONDANSETRON HYDROCHLORIDE 8 MG/1
TABLET, FILM COATED ORAL
Qty: 40 TABLET | Refills: 3 | Status: SHIPPED | OUTPATIENT
Start: 2017-11-27 | End: 2018-04-06 | Stop reason: SDUPTHER

## 2017-11-27 RX ORDER — SODIUM CHLORIDE 0.9 % (FLUSH) 0.9 %
10 SYRINGE (ML) INJECTION EVERY 12 HOURS SCHEDULED
Status: CANCELLED | OUTPATIENT
Start: 2017-11-27

## 2017-11-27 RX ORDER — SODIUM CHLORIDE 0.9 % (FLUSH) 0.9 %
10 SYRINGE (ML) INJECTION PRN
Status: CANCELLED | OUTPATIENT
Start: 2017-11-27

## 2017-11-27 ASSESSMENT — ENCOUNTER SYMPTOMS
SORE THROAT: 0
ABDOMINAL PAIN: 0
VOMITING: 0
DIARRHEA: 0
RESPIRATORY NEGATIVE: 1
CONSTIPATION: 0
EYE PAIN: 0
DOUBLE VISION: 0
HEARTBURN: 0
COUGH: 0
GASTROINTESTINAL NEGATIVE: 1
NAUSEA: 0
BLURRED VISION: 0
BACK PAIN: 0
SHORTNESS OF BREATH: 0
WHEEZING: 0

## 2017-11-27 NOTE — H&P
Jaun Chin is an 47 y.o.  female. Patient c/o right hand pain, weakness, numbness and tingling for a few years. She states she can not hold her toothbrush any longer and decided to seek treatment. An EMG  showed R wrist CTS and was told she has some nerve damage. Dr. Bravo Stinson recommended surgery. Past Medical History:   Diagnosis Date    Asthma     Benign essential HTN     meds > 5 yrs / denies TIA or stroke    Borderline personality disorder     2016 ?suggested by me-meets many criteria    Carpal tunnel syndrome of right wrist     Crohn's disease ([address]     Depression     GERD (gastroesophageal reflux disease)     Gout     Headache     migraines    Irritable bowel syndrome     Mixed hyperlipidemia 5/10/2017    Neuropathy ([address]     Obesity (BMI 35.0-39.9 without comorbidity) 3/30/2017    PONV (postoperative nausea and vomiting)     nausea    Tremor of unknown origin     Type 2 diabetes mellitus ([address]     meds > 5yrs     Ulcerative colitis ([address] 11/2017    Vaginitis        Allergies: Allergies   Allergen Reactions    Latex Hives    Penicillins Swelling and Rash    Shellfish-Derived Products Anaphylaxis    Abilify [Aripiprazole]      shaking    Adhesive Tape Swelling     If left on too long    Statins      Swelling      Buspirone      shaking    Sulfa Antibiotics Nausea And Vomiting       Active Problems:    * No active hospital problems. *    Blood pressure 115/63, pulse 91, temperature 96.6 °F (35.9 °C), temperature source Temporal, resp. rate 16, height 5' 2\" (1.575 m), weight 291 lb 6.4 oz (132.2 kg), SpO2 98 %, not currently breastfeeding. Review of Systems   Constitutional: Negative. Negative for chills and fever. HENT: Negative. Negative for congestion, ear pain, hearing loss and sore throat. Eyes: Negative for blurred vision, double vision and pain. Reading glasses   Respiratory: Negative. Negative for cough, shortness of breath and wheezing. Cardiovascular: Negative. Negative for chest pain, palpitations and leg swelling. Gastrointestinal: Negative. Negative for abdominal pain, constipation, diarrhea, heartburn, nausea and vomiting. Genitourinary: Negative. Negative for dysuria, frequency and urgency. Musculoskeletal: Negative. Negative for back pain, joint pain and neck pain. Skin: Negative for itching and rash. Resolved R ankle cellulitis    Neurological: Positive for tremors (head tremor, upper extremities bilaterally x few months, possible SE of Buspar) and sensory change (right hand numbness, dropping things, pain). Negative for dizziness, tingling, speech change and focal weakness. Endo/Heme/Allergies: Positive for environmental allergies. Does not bruise/bleed easily. Psychiatric/Behavioral: Positive for depression. Negative for suicidal ideas. The patient is nervous/anxious and has insomnia. On medication        Physical Exam   Constitutional: She is oriented to person, place, and time. She appears well-developed and well-nourished. No distress. HENT:   Head: Normocephalic. Right Ear: Tympanic membrane and ear canal normal.   Left Ear: Tympanic membrane and ear canal normal.   Nose: Nose normal.   Mouth/Throat: Uvula is midline, oropharynx is clear and moist and mucous membranes are normal.   Eyes: Conjunctivae and EOM are normal. Pupils are equal, round, and reactive to light. Right eye exhibits no discharge. Left eye exhibits no discharge. Neck: Normal range of motion. Neck supple. Carotid bruit is not present. Cardiovascular: Normal rate, regular rhythm, normal heart sounds and intact distal pulses. No murmur heard. Pulmonary/Chest: Effort normal and breath sounds normal. No respiratory distress. She has no wheezes. She has no rales. Abdominal: Soft. Bowel sounds are normal. She exhibits no distension. There is no tenderness.    obese   Genitourinary:   Genitourinary Comments: Deferred Musculoskeletal: Normal range of motion. She exhibits no edema. Right wrist: She exhibits tenderness. Anterior Medial wrist tenderness   Neurological: She is alert and oriented to person, place, and time. She has normal strength. She displays tremor (fine head tremor). No cranial nerve deficit or sensory deficit. GCS eye subscore is 4. GCS verbal subscore is 5. GCS motor subscore is 6. Slight decrease in sensation right finger tips   Skin: Skin is warm and dry. No rash noted. She is not diaphoretic. No erythema. Psychiatric: Judgment and thought content normal. Her affect is blunt.  Cognition and memory are normal.       Assessment:  H/O R CTS    Plan:  Scheduled for R CTR    Berta Harris CNP  11/27/2017

## 2017-11-27 NOTE — PROGRESS NOTES
Subjective  Felisha Godwin, 47 y.o. female presents today with:  Chief Complaint   Patient presents with    Follow-up     with right ankle pain/swelling       So pt -based on labs likely had gout AND cellulitis      Past Medical History:   Diagnosis Date    Asthma     Benign essential HTN     meds > 5 yrs / denies TIA or stroke    Borderline personality disorder     2016 ?suggested by me-meets many criteria    Carpal tunnel syndrome of right wrist     Crohn's disease ([address]     Depression     GERD (gastroesophageal reflux disease)     Gout     Headache     migraines    Irritable bowel syndrome     Mixed hyperlipidemia 5/10/2017    Neuropathy ([address]     Obesity (BMI 35.0-39.9 without comorbidity) 3/30/2017    PONV (postoperative nausea and vomiting)     nausea    Tremor of unknown origin     Type 2 diabetes mellitus ([address]     meds > 5yrs     Ulcerative colitis ([address] 11/2017    Vaginitis      Past Surgical History:   Procedure Laterality Date    BREAST CYST EXCISION Right 1994    exc mass benign    CHOLECYSTECTOMY  2009    COLONOSCOPY  2015    negative    ENDOSCOPY, COLON, DIAGNOSTIC      HERNIA REPAIR  2015    ventral / mesh    LEG TENDON SURGERY Right 2007    leg. ankle and foot    OVARIAN CYST REMOVAL Left 1994    with mass and both fallopian tube    OVARY REMOVAL  12/2015    [facility] / mass left oary & tube    HI COLON CA SCRN NOT HI RSK IND N/A 11/7/2017    COLONOSCOPY performed by MANOJ Dan on bx    HI KNEE SCOPE,DIAGNOSTIC Right 8/3/2017    RIGHT KNEE ARTHROSCOPIC PARTIAL MEDIAL MENISCECTOMY KNEE performed by Josué Jha MD at [address] [facility]     Social History     Social History    Marital status: Single     Spouse name: N/A    Number of children: N/A    Years of education: N/A     Occupational History    Not on file.      Social History Main Topics    Smoking status: Never Smoker    Smokeless tobacco: Never Used    Alcohol use No    Drug use: No    Sexual activity: Not Currently     Other Topics Concern    Not on file     Social History Narrative    No narrative on file     Family History   Problem Relation Age of Onset    Emphysema Mother     Cancer Maternal Grandfather     Diabetes Paternal Grandmother     Emphysema Paternal Grandfather     Heart Disease Sister     Stroke Sister     Diabetes Sister      Allergies   Allergen Reactions    Latex Hives    Penicillins Swelling and Rash    Shellfish-Derived Products Anaphylaxis    Abilify [Aripiprazole]      shaking    Adhesive Tape Swelling     If left on too long    Statins      Swelling      Buspirone      shaking    Sulfa Antibiotics Nausea And Vomiting     Current Outpatient Prescriptions   Medication Sig Dispense Refill    ondansetron (ZOFRAN) 8 MG tablet TAKE ONE TABLET BY MOUTH EVERY 8 HOURS AS NEEDED nausea or vomiting 40 tablet 3    allopurinol (ZYLOPRIM) 100 MG tablet Take 1 tablet by mouth daily 30 tablet 5    carbidopa-levodopa (SINEMET CR)  MG per extended release tablet       DULoxetine (CYMBALTA) 60 MG extended release capsule TAKE ONE CAPSULE BY MOUTH TWICE DAILY AS DIRECTED IN THE MORNING AND AFTERNOON  1    hydrOXYzine (VISTARIL) 50 MG capsule TAKE ONE CAPSULE BY MOUTH TWO TIMES A DAY AS DIRECTED AS NEEDED FOR ANXIETY AND PANIC ATTACKS  1    meloxicam (MOBIC) 7.5 MG tablet TAKE ONE TABLET BY MOUTH TWO TIMES A DAY WITH MEALS TAKE ONLY WITH FOOD AS NEEDED  3    colchicine (COLCRYS) 0.6 MG tablet Take 1 tablet by mouth 3 times daily as needed for Pain 30 tablet 1    triamterene-hydrochlorothiazide (MAXZIDE) 75-50 MG per tablet TAKE ONE TABLET BY MOUTH EVERY DAY 30 tablet 4    potassium chloride (MICRO-K) 10 MEQ extended release capsule TAKE ONE CAPSULE BY MOUTH EVERY DAY 30 capsule 4    montelukast (SINGULAIR) 10 MG tablet take one tablet by mouth nightly 30 tablet 4    metFORMIN (GLUCOPHAGE) 500 MG tablet take one tablet by mouth twice daily with meals 60 tablet 4    ZOLMitriptan (ZOMIG) 5 MG tablet take 1 tablet by mouth as needed for migraine 9 tablet 2    BREO ELLIPTA 100-25 MCG/INH AEPB inhaler INHALE 1 PUFF INTO THE LUNGS DAILY. 60 each 2    mometasone (ELOCON) 0.1 % cream APPLY TOPICALLY DAILY  45 g 2    hyoscyamine (ANASPAZ;LEVSIN) 125 MCG tablet take one tablet by mouth every 4 hours as needed for cramping 180 tablet 0    prazosin (MINIPRESS) 1 MG capsule TAKE ONE CAPSULE BY MOUTH AT BEDTIME  1    pioglitazone (ACTOS) 30 MG tablet TAKE ONE TABLET BY MOUTH EVERY DAY 30 tablet 4    vitamin D (ERGOCALCIFEROL) 84503 units CAPS capsule take 1 capsule by mouth once a week  3    FREESTYLE LITE strip TEST THREE TIMES DAILY  50 each 3    omeprazole (PRILOSEC) 40 MG delayed release capsule TAKE ONE CAPSULE BY MOUTH TWO TIMES A DAY 60 capsule 4    FREESTYLE LITE strip TEST THREE TIMES DAILY  1    melatonin 3 MG TABS tablet TAKE ONE TABLET BY MOUTH AT BEDTIME  1    pentazocine-naloxone (TALWIN NX) 50-0.5 MG per tablet TAKE ONE TABLET BY MOUTH EVERY DAY AS NEEDED FOR PAIN  0    baclofen (LIORESAL) 20 MG tablet       DULoxetine (CYMBALTA) 30 MG extended release capsule Take 90 mg by mouth daily      ipratropium-albuterol (DUONEB) 0.5-2.5 (3) MG/3ML SOLN nebulizer solution INHALE THE CONTENTS OF 1 VIAL VIA NEBULIZER EVERY 4 HOURS  360 mL 1    LYRICA 150 MG capsule 150 mg 3 times daily Indications: last filled 3/5/17 no refills       dicyclomine (BENTYL) 10 MG capsule TAKE ONE CAPSULE BY MOUTH FOUR TIMES A DAY BEFORE MEALS AND NIGHTLY 120 capsule 5     No current facility-administered medications for this visit. The patient denies any history of      seizures,             heart attack or KNOWN CAD        or stroke. No chest pain, shortness of breath, paroxysmal nocturnal dyspnea. No nausea, vomiting, diarrhea, hematochezia or melena. No paresthesias or headaches. No dysuria, frequency or hematuria.       Last negative risk factor for CHD  <40 = major risk factor for CHD      LDL Calculated 11/03/2017 124  0 - 129 mg/dL Final    Sodium 11/03/2017 141  132 - 144 mEq/L Final    Potassium 11/03/2017 4.4  3.5 - 5.1 mEq/L Final    Chloride 11/03/2017 99  98 - 107 mEq/L Final    CO2 11/03/2017 28  22 - 29 mEq/L Final    Anion Gap 11/03/2017 14* 7 - 13 mEq/L Final    Glucose 11/03/2017 138* 74 - 109 mg/dL Final    BUN 11/03/2017 18  6 - 20 mg/dL Final    CREATININE 11/03/2017 0.98* 0.50 - 0.90 mg/dL Final    GFR Non- 11/03/2017 59.2* >60 Final    Comment: >60 mL/min/1.73m2 EGFR, calc. for ages 25 and older using the  MDRD formula (not corrected for weight), is valid for stable  renal function.  GFR  11/03/2017 >60.0  >60 Final    Comment: >60 mL/min/1.73m2 EGFR, calc. for ages 25 and older using the  MDRD formula (not corrected for weight), is valid for stable  renal function.       Calcium 11/03/2017 9.1  8.6 - 10.2 mg/dL Final    Total Protein 11/03/2017 6.9  6.4 - 8.1 g/dL Final    Alb 11/03/2017 4.2  3.9 - 4.9 g/dL Final    Total Bilirubin 11/03/2017 0.3  0.0 - 1.2 mg/dL Final    Alkaline Phosphatase 11/03/2017 107  40 - 130 U/L Final    ALT 11/03/2017 6  0 - 33 U/L Final    AST 11/03/2017 12  0 - 35 U/L Final    Globulin 11/03/2017 2.7  2.3 - 3.5 g/dL Final     Health Maintenance   Topic Date Due    Hepatitis C screen  1965    HIV screen  03/17/1979    Diabetic retinal exam  02/01/2017    DTaP/Tdap/Td vaccine (1 - Tdap) 05/27/2018 (Originally 3/17/1983)    Pneumococcal med risk (1 of 1 - PPSV23) 08/11/2018 (Originally 3/17/1983)    Cervical cancer screen  03/01/2018    Diabetic microalbuminuria test  05/10/2018    Diabetic foot exam  08/11/2018    Diabetic hemoglobin A1C test  11/03/2018    Lipid screen  11/03/2018    Breast cancer screen  11/10/2019    Colon cancer screen colonoscopy  11/07/2027    Flu vaccine  Completed       No results found for allopurinol (ZYLOPRIM) 100 MG tablet     Sig: Take 1 tablet by mouth daily     Dispense:  30 tablet     Refill:  5     There are no discontinued medications. No Follow-up on file. Add allopurinol Discussed risks, benefits, and alternatives of this medicine and advised to call and discontinue if concerning side effects. Labs cmp cbc and uriic acid along w hba1c in 3mo  [facility] is advised to follow up ASAP if condition deteriorates or problems arise and if no information on test results to patient in the next 1 month they are advised to call us.      Morris Sumner MD

## 2017-11-28 ENCOUNTER — OFFICE VISIT (OUTPATIENT)
Dept: GASTROENTEROLOGY | Age: 52
End: 2017-11-28

## 2017-11-28 VITALS
HEART RATE: 86 BPM | OXYGEN SATURATION: 98 % | HEIGHT: 62 IN | BODY MASS INDEX: 53.92 KG/M2 | SYSTOLIC BLOOD PRESSURE: 126 MMHG | WEIGHT: 293 LBS | DIASTOLIC BLOOD PRESSURE: 66 MMHG

## 2017-11-28 DIAGNOSIS — K51.90 ULCERATIVE COLITIS WITHOUT COMPLICATIONS, UNSPECIFIED LOCATION (HCC): Primary | ICD-10-CM

## 2017-11-28 PROCEDURE — 3017F COLORECTAL CA SCREEN DOC REV: CPT | Performed by: PHYSICIAN ASSISTANT

## 2017-11-28 PROCEDURE — 1036F TOBACCO NON-USER: CPT | Performed by: PHYSICIAN ASSISTANT

## 2017-11-28 PROCEDURE — 99213 OFFICE O/P EST LOW 20 MIN: CPT | Performed by: PHYSICIAN ASSISTANT

## 2017-11-28 PROCEDURE — G8417 CALC BMI ABV UP PARAM F/U: HCPCS | Performed by: PHYSICIAN ASSISTANT

## 2017-11-28 PROCEDURE — 3014F SCREEN MAMMO DOC REV: CPT | Performed by: PHYSICIAN ASSISTANT

## 2017-11-28 PROCEDURE — G8484 FLU IMMUNIZE NO ADMIN: HCPCS | Performed by: PHYSICIAN ASSISTANT

## 2017-11-28 PROCEDURE — G8427 DOCREV CUR MEDS BY ELIG CLIN: HCPCS | Performed by: PHYSICIAN ASSISTANT

## 2017-11-28 ASSESSMENT — ENCOUNTER SYMPTOMS
VOMITING: 0
WHEEZING: 0
RECTAL PAIN: 0
NAUSEA: 0
COLOR CHANGE: 0
CHEST TIGHTNESS: 0
CONSTIPATION: 0
COUGH: 0
ABDOMINAL PAIN: 0
SHORTNESS OF BREATH: 0
ANAL BLEEDING: 0
DIARRHEA: 1
ABDOMINAL DISTENTION: 0
APNEA: 0
BLOOD IN STOOL: 0

## 2017-11-28 NOTE — PROGRESS NOTES
Subjective:     Jcarlos Guzman is a 47 y.o. female who presents 11/28/2017 with:    Chief Complaint   Patient presents with    Results     bioposy results       Patient presents for follow-up and discussion regarding colon biopsy results. Recently underwent colonoscopy with Dr. Tyler Julio as she has documented history of Crohn's disease which revealed friability and contact bleeding in area of cecum which was biopsied as well as sigmoid diverticulosis. Pathology report displayed FOCAL CRYPTITIS AND ACTIVE CHRONIC INFLAMMATION OF LAMINA PROPRIA WITH ULCERATION, LYMPHOID CELL AGGREGATE, CONSISTENT WITH INFLAMMATORY BOWEL DISEASE, MOST LIKELY CHRONIC ULCERATIVE COLITIS. Patient was not previously treated for UC as this was not previously diagnosed, instead treated for Crohn's IBD. Discussed at length medical therapy with mesalamine to treat current symptoms and prevent future flairs but patient wishes not to start new med regimen at this time. Does admit to some abd pain and cramping with consistent diarrhea but no fecal mucous or hematochezia/clots noted.          Patient Active Problem List   Diagnosis    Benign essential HTN    Asthma    Type 2 diabetes mellitus with diabetic polyneuropathy ([address]    Crohn's disease ([address]    GERD (gastroesophageal reflux disease)    Headache    Severe episode of recurrent major depressive disorder, without psychotic features ([address]    Borderline personality disorder    Pain in right knee    Hip pain, right    Obesity (BMI 35.0-39.9 without comorbidity)    Muscular pain    Mixed hyperlipidemia    Tear of meniscus of knee    Bilateral edema of lower extremity    Varicose vein of leg    History of Crohn's disease    Carpal tunnel syndrome of right wrist     Past Medical History:   Diagnosis Date    Asthma     Benign essential HTN     meds > 5 yrs / denies TIA or stroke    Borderline personality disorder     2016 ?suggested by me-meets many criteria    Carpal tunnel Shellfish-derived products; Abilify [aripiprazole]; Adhesive tape; Statins; Buspirone; and Sulfa antibiotics  Current Outpatient Prescriptions   Medication Sig Dispense Refill    ondansetron (ZOFRAN) 8 MG tablet TAKE ONE TABLET BY MOUTH EVERY 8 HOURS AS NEEDED nausea or vomiting 40 tablet 3    allopurinol (ZYLOPRIM) 100 MG tablet Take 1 tablet by mouth daily 30 tablet 5    carbidopa-levodopa (SINEMET CR)  MG per extended release tablet       DULoxetine (CYMBALTA) 60 MG extended release capsule TAKE ONE CAPSULE BY MOUTH TWICE DAILY AS DIRECTED IN THE MORNING AND AFTERNOON  1    hydrOXYzine (VISTARIL) 50 MG capsule TAKE ONE CAPSULE BY MOUTH TWO TIMES A DAY AS DIRECTED AS NEEDED FOR ANXIETY AND PANIC ATTACKS  1    meloxicam (MOBIC) 7.5 MG tablet TAKE ONE TABLET BY MOUTH TWO TIMES A DAY WITH MEALS TAKE ONLY WITH FOOD AS NEEDED  3    colchicine (COLCRYS) 0.6 MG tablet Take 1 tablet by mouth 3 times daily as needed for Pain 30 tablet 1    triamterene-hydrochlorothiazide (MAXZIDE) 75-50 MG per tablet TAKE ONE TABLET BY MOUTH EVERY DAY 30 tablet 4    potassium chloride (MICRO-K) 10 MEQ extended release capsule TAKE ONE CAPSULE BY MOUTH EVERY DAY 30 capsule 4    montelukast (SINGULAIR) 10 MG tablet take one tablet by mouth nightly 30 tablet 4    metFORMIN (GLUCOPHAGE) 500 MG tablet take one tablet by mouth twice daily with meals 60 tablet 4    ZOLMitriptan (ZOMIG) 5 MG tablet take 1 tablet by mouth as needed for migraine 9 tablet 2    BREO ELLIPTA 100-25 MCG/INH AEPB inhaler INHALE 1 PUFF INTO THE LUNGS DAILY.  60 each 2    mometasone (ELOCON) 0.1 % cream APPLY TOPICALLY DAILY  45 g 2    hyoscyamine (ANASPAZ;LEVSIN) 125 MCG tablet take one tablet by mouth every 4 hours as needed for cramping 180 tablet 0    prazosin (MINIPRESS) 1 MG capsule TAKE ONE CAPSULE BY MOUTH AT BEDTIME  1    pioglitazone (ACTOS) 30 MG tablet TAKE ONE TABLET BY MOUTH EVERY DAY 30 tablet 4    vitamin D (ERGOCALCIFEROL) 40962 units CAPS capsule take 1 capsule by mouth once a week  3    FREESTYLE LITE strip TEST THREE TIMES DAILY  50 each 3    omeprazole (PRILOSEC) 40 MG delayed release capsule TAKE ONE CAPSULE BY MOUTH TWO TIMES A DAY 60 capsule 4    FREESTYLE LITE strip TEST THREE TIMES DAILY  1    melatonin 3 MG TABS tablet TAKE ONE TABLET BY MOUTH AT BEDTIME  1    pentazocine-naloxone (TALWIN NX) 50-0.5 MG per tablet TAKE ONE TABLET BY MOUTH EVERY DAY AS NEEDED FOR PAIN  0    baclofen (LIORESAL) 20 MG tablet       DULoxetine (CYMBALTA) 30 MG extended release capsule Take 90 mg by mouth daily      ipratropium-albuterol (DUONEB) 0.5-2.5 (3) MG/3ML SOLN nebulizer solution INHALE THE CONTENTS OF 1 VIAL VIA NEBULIZER EVERY 4 HOURS  360 mL 1    LYRICA 150 MG capsule 150 mg 3 times daily Indications: last filled 3/5/17 no refills       dicyclomine (BENTYL) 10 MG capsule TAKE ONE CAPSULE BY MOUTH FOUR TIMES A DAY BEFORE MEALS AND NIGHTLY 120 capsule 5     No current facility-administered medications for this visit. Review of Systems   Constitutional: Negative for chills, diaphoresis, fatigue and unexpected weight change. Respiratory: Negative for apnea, cough, chest tightness, shortness of breath and wheezing. Cardiovascular: Negative for chest pain, palpitations and leg swelling. Gastrointestinal: Positive for diarrhea. Negative for abdominal distention, abdominal pain, anal bleeding, blood in stool, constipation, nausea, rectal pain and vomiting. Skin: Negative for color change, pallor, rash and wound. Neurological: Positive for tremors. Negative for dizziness, seizures, syncope, weakness, light-headedness, numbness and headaches. Hematological: Negative for adenopathy. Does not bruise/bleed easily. Psychiatric/Behavioral: Negative for agitation, confusion and dysphoric mood. The patient is not nervous/anxious. All other systems reviewed and are negative.       Objective:     Vitals:  /66   Pulse

## 2017-11-28 NOTE — PROGRESS NOTES
Jaden Keita is a 47 y.o. female patient. No diagnosis found. Past Medical History:   Diagnosis Date    Asthma     Benign essential HTN     meds > 5 yrs / denies TIA or stroke    Borderline personality disorder      ?suggested by me-meets many criteria    Carpal tunnel syndrome of right wrist     Crohn's disease ([address]     Depression     GERD (gastroesophageal reflux disease)     Gout     Headache     migraines    Irritable bowel syndrome     Mixed hyperlipidemia 5/10/2017    Neuropathy (HCC)     Obesity (BMI 35.0-39.9 without comorbidity) 3/30/2017    PONV (postoperative nausea and vomiting)     nausea    Tremor of unknown origin     Type 2 diabetes mellitus ([address]     meds > 5yrs     Ulcerative colitis ([address] 2017    Vaginitis      Past Surgical History Pertinent Negatives:   Procedure Date Noted    APPENDECTOMY 2016    BRAIN SURGERY 2016    CARDIAC VALVE REPLACEMENT 2016     SECTION 2016    COLON SURGERY 2016    CORONARY ARTERY BYPASS GRAFT 2016    COSMETIC SURGERY 2016    EYE SURGERY 2016    FRACTURE SURGERY 2016    HYSTERECTOMY 2016    JOINT REPLACEMENT 2016    SMALL INTESTINE SURGERY 2016    SPINE SURGERY 2016    TUBAL LIGATION 2016    UPPER GASTROINTESTINAL ENDOSCOPY 2016     Scheduled Meds:  Continuous Infusions:  PRN Meds:    Allergies   Allergen Reactions    Latex Hives    Penicillins Swelling and Rash    Shellfish-Derived Products Anaphylaxis    Abilify [Aripiprazole]      shaking    Adhesive Tape Swelling     If left on too long    Statins      Swelling      Buspirone      shaking    Sulfa Antibiotics Nausea And Vomiting     Active Problems:    * No active hospital problems.  *    Blood pressure 126/66, pulse 86, height 5' 2\" (1.575 m), weight 294 lb (133.4 kg), last menstrual period 1993, SpO2 98 %, not currently breastfeeding.     Subjective  Objective  Assessment & Plan    Conor Almarazma  11/28/2017

## 2017-11-30 ENCOUNTER — HOSPITAL ENCOUNTER (OUTPATIENT)
Age: 52
Setting detail: OUTPATIENT SURGERY
Discharge: HOME | End: 2017-11-30
Attending: ORTHOPAEDIC SURGERY | Admitting: ORTHOPAEDIC SURGERY
Payer: COMMERCIAL

## 2017-11-30 ENCOUNTER — ANESTHESIA (OUTPATIENT)
Dept: OPERATING ROOM | Age: 52
End: 2017-11-30
Payer: COMMERCIAL

## 2017-11-30 ENCOUNTER — ANESTHESIA EVENT (OUTPATIENT)
Dept: OPERATING ROOM | Age: 52
End: 2017-11-30
Payer: COMMERCIAL

## 2017-11-30 VITALS
DIASTOLIC BLOOD PRESSURE: 72 MMHG | SYSTOLIC BLOOD PRESSURE: 141 MMHG | HEART RATE: 65 BPM | RESPIRATION RATE: 16 BRPM | OXYGEN SATURATION: 96 % | TEMPERATURE: 97.5 F

## 2017-11-30 VITALS — DIASTOLIC BLOOD PRESSURE: 73 MMHG | SYSTOLIC BLOOD PRESSURE: 135 MMHG | OXYGEN SATURATION: 100 %

## 2017-11-30 LAB
GLUCOSE BLD-MCNC: 128 MG/DL (ref 60–115)
PERFORMED ON: ABNORMAL

## 2017-11-30 PROCEDURE — 6370000000 HC RX 637 (ALT 250 FOR IP): Performed by: ORTHOPAEDIC SURGERY

## 2017-11-30 PROCEDURE — 2580000003 HC RX 258: Performed by: ORTHOPAEDIC SURGERY

## 2017-11-30 PROCEDURE — 2500000003 HC RX 250 WO HCPCS: Performed by: ANESTHESIOLOGY

## 2017-11-30 PROCEDURE — 7100000011 HC PHASE II RECOVERY - ADDTL 15 MIN: Performed by: ORTHOPAEDIC SURGERY

## 2017-11-30 PROCEDURE — 6360000002 HC RX W HCPCS: Performed by: NURSE ANESTHETIST, CERTIFIED REGISTERED

## 2017-11-30 PROCEDURE — 2580000003 HC RX 258: Performed by: NURSE ANESTHETIST, CERTIFIED REGISTERED

## 2017-11-30 PROCEDURE — 2500000003 HC RX 250 WO HCPCS: Performed by: ORTHOPAEDIC SURGERY

## 2017-11-30 PROCEDURE — 3600000013 HC SURGERY LEVEL 3 ADDTL 15MIN: Performed by: ORTHOPAEDIC SURGERY

## 2017-11-30 PROCEDURE — 6360000002 HC RX W HCPCS: Performed by: ORTHOPAEDIC SURGERY

## 2017-11-30 PROCEDURE — 7100000000 HC PACU RECOVERY - FIRST 15 MIN: Performed by: ORTHOPAEDIC SURGERY

## 2017-11-30 PROCEDURE — 7100000010 HC PHASE II RECOVERY - FIRST 15 MIN: Performed by: ORTHOPAEDIC SURGERY

## 2017-11-30 PROCEDURE — 3700000000 HC ANESTHESIA ATTENDED CARE: Performed by: ORTHOPAEDIC SURGERY

## 2017-11-30 PROCEDURE — 2580000003 HC RX 258: Performed by: ANESTHESIOLOGY

## 2017-11-30 PROCEDURE — 3700000001 HC ADD 15 MINUTES (ANESTHESIA): Performed by: ORTHOPAEDIC SURGERY

## 2017-11-30 PROCEDURE — 7100000001 HC PACU RECOVERY - ADDTL 15 MIN: Performed by: ORTHOPAEDIC SURGERY

## 2017-11-30 PROCEDURE — 3600000003 HC SURGERY LEVEL 3 BASE: Performed by: ORTHOPAEDIC SURGERY

## 2017-11-30 PROCEDURE — 2500000003 HC RX 250 WO HCPCS: Performed by: NURSE ANESTHETIST, CERTIFIED REGISTERED

## 2017-11-30 RX ORDER — OXYCODONE HYDROCHLORIDE AND ACETAMINOPHEN 5; 325 MG/1; MG/1
1 TABLET ORAL EVERY 4 HOURS PRN
Status: DISCONTINUED | OUTPATIENT
Start: 2017-11-30 | End: 2017-11-30 | Stop reason: HOSPADM

## 2017-11-30 RX ORDER — SODIUM CHLORIDE 0.9 % (FLUSH) 0.9 %
10 SYRINGE (ML) INJECTION EVERY 12 HOURS SCHEDULED
Status: DISCONTINUED | OUTPATIENT
Start: 2017-11-30 | End: 2017-11-30 | Stop reason: HOSPADM

## 2017-11-30 RX ORDER — SODIUM CHLORIDE 9 MG/ML
50 INJECTION, SOLUTION INTRAVENOUS CONTINUOUS
Status: DISCONTINUED | OUTPATIENT
Start: 2017-11-30 | End: 2017-11-30 | Stop reason: HOSPADM

## 2017-11-30 RX ORDER — SODIUM CHLORIDE, SODIUM LACTATE, POTASSIUM CHLORIDE, CALCIUM CHLORIDE 600; 310; 30; 20 MG/100ML; MG/100ML; MG/100ML; MG/100ML
INJECTION, SOLUTION INTRAVENOUS CONTINUOUS
Status: DISCONTINUED | OUTPATIENT
Start: 2017-11-30 | End: 2017-11-30 | Stop reason: HOSPADM

## 2017-11-30 RX ORDER — ONDANSETRON 2 MG/ML
4 INJECTION INTRAMUSCULAR; INTRAVENOUS EVERY 6 HOURS PRN
Status: DISCONTINUED | OUTPATIENT
Start: 2017-11-30 | End: 2017-11-30 | Stop reason: HOSPADM

## 2017-11-30 RX ORDER — DIPHENHYDRAMINE HYDROCHLORIDE 50 MG/ML
12.5 INJECTION INTRAMUSCULAR; INTRAVENOUS
Status: DISCONTINUED | OUTPATIENT
Start: 2017-11-30 | End: 2017-11-30 | Stop reason: HOSPADM

## 2017-11-30 RX ORDER — HYDROCODONE BITARTRATE AND ACETAMINOPHEN 5; 325 MG/1; MG/1
2 TABLET ORAL PRN
Status: DISCONTINUED | OUTPATIENT
Start: 2017-11-30 | End: 2017-11-30 | Stop reason: HOSPADM

## 2017-11-30 RX ORDER — BUPIVACAINE HYDROCHLORIDE 5 MG/ML
INJECTION, SOLUTION EPIDURAL; INTRACAUDAL PRN
Status: DISCONTINUED | OUTPATIENT
Start: 2017-11-30 | End: 2017-11-30 | Stop reason: HOSPADM

## 2017-11-30 RX ORDER — LIDOCAINE HYDROCHLORIDE 10 MG/ML
1 INJECTION, SOLUTION EPIDURAL; INFILTRATION; INTRACAUDAL; PERINEURAL
Status: COMPLETED | OUTPATIENT
Start: 2017-11-30 | End: 2017-11-30

## 2017-11-30 RX ORDER — MORPHINE SULFATE 4 MG/ML
4 INJECTION, SOLUTION INTRAMUSCULAR; INTRAVENOUS
Status: DISCONTINUED | OUTPATIENT
Start: 2017-11-30 | End: 2017-11-30 | Stop reason: HOSPADM

## 2017-11-30 RX ORDER — ONDANSETRON 2 MG/ML
4 INJECTION INTRAMUSCULAR; INTRAVENOUS
Status: DISCONTINUED | OUTPATIENT
Start: 2017-11-30 | End: 2017-11-30 | Stop reason: HOSPADM

## 2017-11-30 RX ORDER — MORPHINE SULFATE 2 MG/ML
2 INJECTION, SOLUTION INTRAMUSCULAR; INTRAVENOUS
Status: DISCONTINUED | OUTPATIENT
Start: 2017-11-30 | End: 2017-11-30 | Stop reason: HOSPADM

## 2017-11-30 RX ORDER — MAGNESIUM HYDROXIDE 1200 MG/15ML
LIQUID ORAL CONTINUOUS PRN
Status: DISCONTINUED | OUTPATIENT
Start: 2017-11-30 | End: 2017-11-30 | Stop reason: HOSPADM

## 2017-11-30 RX ORDER — OXYCODONE HYDROCHLORIDE AND ACETAMINOPHEN 5; 325 MG/1; MG/1
2 TABLET ORAL EVERY 4 HOURS PRN
Status: DISCONTINUED | OUTPATIENT
Start: 2017-11-30 | End: 2017-11-30 | Stop reason: HOSPADM

## 2017-11-30 RX ORDER — SODIUM CHLORIDE, SODIUM LACTATE, POTASSIUM CHLORIDE, CALCIUM CHLORIDE 600; 310; 30; 20 MG/100ML; MG/100ML; MG/100ML; MG/100ML
INJECTION, SOLUTION INTRAVENOUS CONTINUOUS PRN
Status: DISCONTINUED | OUTPATIENT
Start: 2017-11-30 | End: 2017-11-30 | Stop reason: SDUPTHER

## 2017-11-30 RX ORDER — ACETAMINOPHEN 325 MG/1
650 TABLET ORAL EVERY 4 HOURS PRN
Status: DISCONTINUED | OUTPATIENT
Start: 2017-11-30 | End: 2017-11-30 | Stop reason: HOSPADM

## 2017-11-30 RX ORDER — SODIUM CHLORIDE 0.9 % (FLUSH) 0.9 %
10 SYRINGE (ML) INJECTION PRN
Status: DISCONTINUED | OUTPATIENT
Start: 2017-11-30 | End: 2017-11-30 | Stop reason: HOSPADM

## 2017-11-30 RX ORDER — VANCOMYCIN HYDROCHLORIDE 1 G/200ML
1000 INJECTION, SOLUTION INTRAVENOUS ONCE
Status: COMPLETED | OUTPATIENT
Start: 2017-11-30 | End: 2017-11-30

## 2017-11-30 RX ORDER — LIDOCAINE HYDROCHLORIDE 20 MG/ML
INJECTION, SOLUTION EPIDURAL; INFILTRATION; INTRACAUDAL; PERINEURAL PRN
Status: DISCONTINUED | OUTPATIENT
Start: 2017-11-30 | End: 2017-11-30 | Stop reason: SDUPTHER

## 2017-11-30 RX ORDER — GINSENG 100 MG
CAPSULE ORAL PRN
Status: DISCONTINUED | OUTPATIENT
Start: 2017-11-30 | End: 2017-11-30 | Stop reason: HOSPADM

## 2017-11-30 RX ORDER — LIDOCAINE HYDROCHLORIDE 10 MG/ML
1 INJECTION, SOLUTION EPIDURAL; INFILTRATION; INTRACAUDAL; PERINEURAL
Status: DISCONTINUED | OUTPATIENT
Start: 2017-11-30 | End: 2017-11-30 | Stop reason: HOSPADM

## 2017-11-30 RX ORDER — MEPERIDINE HYDROCHLORIDE 25 MG/ML
12.5 INJECTION INTRAMUSCULAR; INTRAVENOUS; SUBCUTANEOUS EVERY 5 MIN PRN
Status: DISCONTINUED | OUTPATIENT
Start: 2017-11-30 | End: 2017-11-30 | Stop reason: HOSPADM

## 2017-11-30 RX ORDER — SODIUM CHLORIDE 0.9 % (FLUSH) 0.9 %
SYRINGE (ML) INJECTION
Status: DISCONTINUED
Start: 2017-11-30 | End: 2017-11-30 | Stop reason: HOSPADM

## 2017-11-30 RX ORDER — MIDAZOLAM HYDROCHLORIDE 1 MG/ML
INJECTION INTRAMUSCULAR; INTRAVENOUS PRN
Status: DISCONTINUED | OUTPATIENT
Start: 2017-11-30 | End: 2017-11-30 | Stop reason: SDUPTHER

## 2017-11-30 RX ORDER — METOCLOPRAMIDE HYDROCHLORIDE 5 MG/ML
10 INJECTION INTRAMUSCULAR; INTRAVENOUS
Status: DISCONTINUED | OUTPATIENT
Start: 2017-11-30 | End: 2017-11-30 | Stop reason: HOSPADM

## 2017-11-30 RX ORDER — HYDROCODONE BITARTRATE AND ACETAMINOPHEN 5; 325 MG/1; MG/1
1 TABLET ORAL PRN
Status: DISCONTINUED | OUTPATIENT
Start: 2017-11-30 | End: 2017-11-30 | Stop reason: HOSPADM

## 2017-11-30 RX ORDER — FENTANYL CITRATE 50 UG/ML
50 INJECTION, SOLUTION INTRAMUSCULAR; INTRAVENOUS EVERY 10 MIN PRN
Status: DISCONTINUED | OUTPATIENT
Start: 2017-11-30 | End: 2017-11-30 | Stop reason: HOSPADM

## 2017-11-30 RX ORDER — FENTANYL CITRATE 50 UG/ML
INJECTION, SOLUTION INTRAMUSCULAR; INTRAVENOUS PRN
Status: DISCONTINUED | OUTPATIENT
Start: 2017-11-30 | End: 2017-11-30 | Stop reason: SDUPTHER

## 2017-11-30 RX ORDER — PROPOFOL 10 MG/ML
INJECTION, EMULSION INTRAVENOUS CONTINUOUS PRN
Status: DISCONTINUED | OUTPATIENT
Start: 2017-11-30 | End: 2017-11-30 | Stop reason: SDUPTHER

## 2017-11-30 RX ADMIN — LIDOCAINE HYDROCHLORIDE 50 ML: 20 INJECTION, SOLUTION EPIDURAL; INFILTRATION; INTRACAUDAL; PERINEURAL at 07:31

## 2017-11-30 RX ADMIN — SODIUM CHLORIDE, POTASSIUM CHLORIDE, SODIUM LACTATE AND CALCIUM CHLORIDE: 600; 310; 30; 20 INJECTION, SOLUTION INTRAVENOUS at 07:13

## 2017-11-30 RX ADMIN — VANCOMYCIN HYDROCHLORIDE 1000 MG: 1 INJECTION, SOLUTION INTRAVENOUS at 06:55

## 2017-11-30 RX ADMIN — SODIUM CHLORIDE, POTASSIUM CHLORIDE, SODIUM LACTATE AND CALCIUM CHLORIDE: 600; 310; 30; 20 INJECTION, SOLUTION INTRAVENOUS at 06:55

## 2017-11-30 RX ADMIN — MIDAZOLAM HYDROCHLORIDE 2 MG: 1 INJECTION, SOLUTION INTRAMUSCULAR; INTRAVENOUS at 07:25

## 2017-11-30 RX ADMIN — FENTANYL CITRATE 50 MCG: 50 INJECTION, SOLUTION INTRAMUSCULAR; INTRAVENOUS at 07:31

## 2017-11-30 RX ADMIN — PROPOFOL 100 MCG/KG/MIN: 10 INJECTION, EMULSION INTRAVENOUS at 07:31

## 2017-11-30 RX ADMIN — FENTANYL CITRATE 50 MCG: 50 INJECTION, SOLUTION INTRAMUSCULAR; INTRAVENOUS at 07:42

## 2017-11-30 RX ADMIN — LIDOCAINE HYDROCHLORIDE 0.1 ML: 10 INJECTION, SOLUTION EPIDURAL; INFILTRATION; INTRACAUDAL; PERINEURAL at 06:54

## 2017-11-30 ASSESSMENT — PULMONARY FUNCTION TESTS
PIF_VALUE: 1

## 2017-11-30 ASSESSMENT — PAIN SCALES - GENERAL
PAINLEVEL_OUTOF10: 0
PAINLEVEL_OUTOF10: 0

## 2017-11-30 ASSESSMENT — PAIN - FUNCTIONAL ASSESSMENT: PAIN_FUNCTIONAL_ASSESSMENT: 0-10

## 2017-11-30 NOTE — ANESTHESIA PRE PROCEDURE
Department of Anesthesiology  Preprocedure Note       Name:  Dain Gonzalez   Age:  47 y.o.  :  1965                                          MRN:  [id number]         Date:  2017      Surgeon: Sunita Eid): Youlanda Dakin, MD    Procedure: Procedure(s):  RIGHT WRIST  CARPAL TUNNEL RELEASE, LATEX ALLERGY    Medications prior to admission:   Prior to Admission medications    Medication Sig Start Date End Date Taking? Authorizing Provider   ondansetron (ZOFRAN) 8 MG tablet TAKE ONE TABLET BY MOUTH EVERY 8 HOURS AS NEEDED nausea or vomiting 17  Yes Sd Sanchez MD   carbidopa-levodopa (SINEMET CR)  MG per extended release tablet  10/24/17  Yes Historical Provider, MD   DULoxetine (CYMBALTA) 60 MG extended release capsule TAKE ONE CAPSULE BY MOUTH TWICE DAILY AS DIRECTED IN THE MORNING AND AFTERNOON 10/23/17  Yes Historical Provider, MD   hydrOXYzine (VISTARIL) 50 MG capsule TAKE ONE CAPSULE BY MOUTH TWO TIMES A DAY AS DIRECTED AS NEEDED FOR ANXIETY AND PANIC ATTACKS 10/23/17  Yes Historical Provider, MD   colchicine (COLCRYS) 0.6 MG tablet Take 1 tablet by mouth 3 times daily as needed for Pain 11/10/17  Yes Sd Sanchez MD   triamterene-hydrochlorothiazide (MAXZIDE) 75-50 MG per tablet TAKE ONE TABLET BY MOUTH EVERY DAY 10/9/17  Yes Sd Sanchez MD   potassium chloride (MICRO-K) 10 MEQ extended release capsule TAKE ONE CAPSULE BY MOUTH EVERY DAY 10/9/17  Yes Sd Sanchez MD   montelukast (SINGULAIR) 10 MG tablet take one tablet by mouth nightly 10/9/17  Yes Sd Sanchez MD   metFORMIN (GLUCOPHAGE) 500 MG tablet take one tablet by mouth twice daily with meals 10/9/17  Yes Sd Sanchez MD   BREO ELLIPTA 100-25 MCG/INH AEPB inhaler INHALE 1 PUFF INTO THE LUNGS DAILY.  17  Yes Sd Sanchez MD   mometasone (ELOCON) 0.1 % cream APPLY TOPICALLY DAILY  17  Yes Sd Sanchez MD   hyoscyamine (ANASPAZ;LEVSIN) 125 MCG tablet take one MD Arun       Current medications:    Current Facility-Administered Medications   Medication Dose Route Frequency Provider Last Rate Last Dose    lactated ringers infusion   Intravenous Continuous Shabana Alexandra MD        lidocaine PF 1 % injection 1 mL  1 mL Intradermal Once PRN Shabana Alexandra MD        sodium chloride flush 0.9 % injection 10 mL  10 mL Intravenous 2 times per day Shabana Alexandra MD        sodium chloride flush 0.9 % injection 10 mL  10 mL Intravenous PRN Shabana Alexandra MD        vancomycin (VANCOCIN) 1000 mg in dextrose 5% 200 mL IVPB  1,000 mg Intravenous Once Shabana Alexandra MD        sodium chloride flush 0.9 % injection             fentaNYL (SUBLIMAZE) injection 50 mcg  50 mcg Intravenous Q10 Min PRN Keisha Jorge MD        HYDROmorphone (DILAUDID) injection 0.5 mg  0.5 mg Intravenous Q10 Min PRN Keisha Jorge MD        HYDROcodone-acetaminophen [facility]) 5-325 MG per tablet 1 tablet  1 tablet Oral PRN Keisha Jorge MD        Or    HYDROcodone-acetaminophen [facility]) 5-325 MG per tablet 2 tablet  2 tablet Oral PRN Keisha Jorge MD        diphenhydrAMINE (BENADRYL) injection 12.5 mg  12.5 mg Intravenous Once PRN Keisha Jorge MD        ondansetron [facility]) injection 4 mg  4 mg Intravenous Once PRN Keisha Jorge MD        metoclopramide [facility]) injection 10 mg  10 mg Intravenous Once PRN Keisha Jorge MD        meperidine (DEMEROL) injection 12.5 mg  12.5 mg Intravenous Q5 Min PRN Keisha Jorge MD        lactated ringers infusion   Intravenous Continuous Keisha Jorge MD        sodium chloride flush 0.9 % injection 10 mL  10 mL Intravenous 2 times per day Keisha Jorge MD        sodium chloride flush 0.9 % injection 10 mL  10 mL Intravenous PRN Keisha Jorge MD        lidocaine PF 1 % injection 1 mL  1 mL Intradermal Once PRN Keisha Jorge MD mass benign    CHOLECYSTECTOMY  2009    COLONOSCOPY  2015    negative    ENDOSCOPY, COLON, DIAGNOSTIC      HERNIA REPAIR  2015    ventral / mesh    LEG TENDON SURGERY Right 2007    leg.  ankle and foot    OVARIAN CYST REMOVAL Left 1994    with mass and both fallopian tube    OVARY REMOVAL  12/2015    [facility] / mass left oary & tube    OH COLON CA SCRN NOT HI RSK IND N/A 11/7/2017    COLONOSCOPY performed by MANOJ Cerda on bx    OH KNEE SCOPE,DIAGNOSTIC Right 8/3/2017    RIGHT KNEE ARTHROSCOPIC PARTIAL MEDIAL MENISCECTOMY KNEE performed by Ronnell Sanchez MD at [address]       Social History:    Social History   Substance Use Topics    Smoking status: Never Smoker    Smokeless tobacco: Never Used    Alcohol use No                                Counseling given: Not Answered      Vital Signs (Current):   Vitals:    11/30/17 0631   BP: (!) 144/85   Pulse: 75   Resp: 16   Temp: 97.8 °F (36.6 °C)   TempSrc: Temporal   SpO2: 97%                                              BP Readings from Last 3 Encounters:   11/30/17 (!) 144/85   11/28/17 126/66   11/27/17 118/64       NPO Status: Time of last liquid consumption: 2100                        Time of last solid consumption: 2100                        Date of last liquid consumption: 11/29/17                        Date of last solid food consumption: 11/29/17    BMI:   Wt Readings from Last 3 Encounters:   11/28/17 294 lb (133.4 kg)   11/27/17 291 lb 6.4 oz (132.2 kg)   11/10/17 297 lb (134.7 kg)     There is no height or weight on file to calculate BMI.    CBC:   Lab Results   Component Value Date    WBC 8.9 11/27/2017    RBC 4.36 11/27/2017    HGB 11.5 11/27/2017    HCT 35.6 11/27/2017    MCV 81.6 11/27/2017    RDW 17.4 11/27/2017     11/27/2017       CMP:   Lab Results   Component Value Date     11/27/2017    K 4.2 11/27/2017     11/27/2017    CO2 27 11/27/2017    BUN 21 11/27/2017

## 2017-12-01 ENCOUNTER — HOSPITAL ENCOUNTER (OUTPATIENT)
Dept: ULTRASOUND IMAGING | Age: 52
Discharge: HOME | End: 2017-12-01
Payer: COMMERCIAL

## 2017-12-01 DIAGNOSIS — M25.473 ANKLE EDEMA: ICD-10-CM

## 2017-12-01 PROCEDURE — 93971 EXTREMITY STUDY: CPT

## 2017-12-08 DIAGNOSIS — J45.30 MILD PERSISTENT ASTHMA WITHOUT COMPLICATION: ICD-10-CM

## 2017-12-08 RX ORDER — FLUTICASONE FUROATE AND VILANTEROL TRIFENATATE 100; 25 UG/1; UG/1
POWDER RESPIRATORY (INHALATION)
Qty: 1 EACH | Refills: 3 | Status: SHIPPED | OUTPATIENT
Start: 2017-12-08 | End: 2018-04-11 | Stop reason: ALTCHOICE

## 2017-12-19 DIAGNOSIS — K50.919 CROHN'S DISEASE WITH COMPLICATION, UNSPECIFIED GASTROINTESTINAL TRACT LOCATION (HCC): ICD-10-CM

## 2017-12-19 RX ORDER — HYOSCYAMINE SULFATE 0.125 MG
TABLET ORAL
Qty: 180 TABLET | Refills: 0 | Status: SHIPPED | OUTPATIENT
Start: 2017-12-19 | End: 2018-01-19 | Stop reason: SDUPTHER

## 2017-12-26 RX ORDER — IPRATROPIUM BROMIDE AND ALBUTEROL SULFATE 2.5; .5 MG/3ML; MG/3ML
SOLUTION RESPIRATORY (INHALATION)
Qty: 360 ML | Refills: 1 | Status: SHIPPED | OUTPATIENT
Start: 2017-12-26 | End: 2018-04-26 | Stop reason: SDUPTHER

## 2017-12-26 RX ORDER — MOMETASONE FUROATE 1 MG/G
CREAM TOPICAL
Qty: 45 G | Refills: 1 | Status: SHIPPED | OUTPATIENT
Start: 2017-12-26 | End: 2018-02-11 | Stop reason: SDUPTHER

## 2018-01-19 DIAGNOSIS — K50.919 CROHN'S DISEASE WITH COMPLICATION, UNSPECIFIED GASTROINTESTINAL TRACT LOCATION (HCC): ICD-10-CM

## 2018-01-19 RX ORDER — COLCHICINE 0.6 MG/1
TABLET ORAL
Qty: 30 TABLET | Refills: 0 | Status: SHIPPED | OUTPATIENT
Start: 2018-01-19 | End: 2018-02-22 | Stop reason: SDUPTHER

## 2018-01-19 RX ORDER — OMEPRAZOLE 40 MG/1
CAPSULE, DELAYED RELEASE ORAL
Qty: 60 CAPSULE | Refills: 3 | Status: SHIPPED | OUTPATIENT
Start: 2018-01-19 | End: 2018-05-18 | Stop reason: SDUPTHER

## 2018-01-19 RX ORDER — HYOSCYAMINE SULFATE 0.125 MG
TABLET ORAL
Qty: 180 TABLET | Refills: 0 | Status: SHIPPED | OUTPATIENT
Start: 2018-01-19 | End: 2018-03-02 | Stop reason: SDUPTHER

## 2018-01-25 ENCOUNTER — HOSPITAL ENCOUNTER (OUTPATIENT)
Dept: GENERAL RADIOLOGY | Age: 53
Discharge: HOME | End: 2018-01-25
Payer: COMMERCIAL

## 2018-01-25 ENCOUNTER — TELEPHONE (OUTPATIENT)
Dept: FAMILY MEDICINE CLINIC | Age: 53
End: 2018-01-25

## 2018-01-25 DIAGNOSIS — M50.30 DDD (DEGENERATIVE DISC DISEASE), CERVICAL: ICD-10-CM

## 2018-01-25 PROCEDURE — 72114 X-RAY EXAM L-S SPINE BENDING: CPT

## 2018-01-31 RX ORDER — ZOLMITRIPTAN 5 MG/1
TABLET, FILM COATED ORAL
Qty: 9 TABLET | Refills: 1 | Status: SHIPPED | OUTPATIENT
Start: 2018-01-31 | End: 2018-04-06 | Stop reason: SDUPTHER

## 2018-01-31 RX ORDER — PIOGLITAZONEHYDROCHLORIDE 30 MG/1
TABLET ORAL
Qty: 30 TABLET | Refills: 3 | Status: SHIPPED | OUTPATIENT
Start: 2018-01-31 | End: 2018-02-13 | Stop reason: SDUPTHER

## 2018-02-01 ENCOUNTER — HOSPITAL ENCOUNTER (OUTPATIENT)
Dept: PHYSICAL THERAPY | Age: 53
Setting detail: THERAPIES SERIES
Discharge: HOME | End: 2018-02-01
Payer: COMMERCIAL

## 2018-02-01 PROCEDURE — 97163 PT EVAL HIGH COMPLEX 45 MIN: CPT

## 2018-02-01 PROCEDURE — 97110 THERAPEUTIC EXERCISES: CPT

## 2018-02-01 ASSESSMENT — PAIN DESCRIPTION - ORIENTATION: ORIENTATION: LOWER

## 2018-02-01 ASSESSMENT — PAIN SCALES - GENERAL: PAINLEVEL_OUTOF10: 8

## 2018-02-01 ASSESSMENT — PAIN DESCRIPTION - FREQUENCY: FREQUENCY: CONTINUOUS

## 2018-02-01 ASSESSMENT — PAIN DESCRIPTION - PAIN TYPE: TYPE: CHRONIC PAIN

## 2018-02-01 ASSESSMENT — PAIN DESCRIPTION - DESCRIPTORS: DESCRIPTORS: NUMBNESS;TINGLING;THROBBING

## 2018-02-01 ASSESSMENT — PAIN DESCRIPTION - LOCATION: LOCATION: BACK;BUTTOCKS

## 2018-02-01 NOTE — PROGRESS NOTES
[address]  PHYSICAL THERAPY EVALUATION    Date: 2018  Patient Name: Chloe Alvarez       MRN: [id number]   Account: [de-identified]   : 1965  (47 y.o.)   Gender: female   Referring Practitioner: Gabe Padron                  Diagnosis: Lx DDD ; Treatment Diagnosis: LBP; B LE Radicular Sxs; Impaired strength                Past Medical History:  has a past medical history of Asthma; Benign essential HTN; Borderline personality disorder; Carpal tunnel syndrome of right wrist; Crohn's disease ([address] Depression; GERD (gastroesophageal reflux disease); Gout; Headache; Irritable bowel syndrome; Mixed hyperlipidemia; Neuropathy ([address] Obesity (BMI 35.0-39.9 without comorbidity); PONV (postoperative nausea and vomiting); Tremor of unknown origin; Type 2 diabetes mellitus ([address] Ulcerative colitis ([address] and Vaginitis. Past Surgical History:   has a past surgical history that includes Cholecystectomy (); Leg Tendon Surgery (Right, ); Tonsillectomy and adenoidectomy (); ovarian cyst removal (Left, ); Colonoscopy (); Ovary removal (2015); Breast cyst excision (Right, ); hernia repair (); knee scope,diagnostic (Right, 8/3/2017); colon ca scrn not hi rsk ind (N/A, 2017); Endoscopy, colon, diagnostic; and revise median n/carpal tunnel surg (Right, 2017). Vital Signs  Patient Currently in Pain: Yes   Pain Screening  Patient Currently in Pain: Yes  Pain Assessment  Pain Assessment: 0-10  Pain Level: 8  Pain Type: Chronic pain  Pain Location: Back;Buttocks  Pain Orientation: Lower  Pain Radiating Towards: currently into R buttocks to  post mid thigh  Pain Descriptors: Numbness;Tingling; Throbbing  Pain Frequency: Continuous                Lives With: Family (sister)  Type of Home: House  Home Layout: One level  Home Access: Stairs to enter with rails  Entrance Stairs - Number of Steps: 5  Entrance Stairs - Rails: Left  Bathroom Shower/Tub: Explain:       [place] Fall Risk Assessment  Risk Factor Scale  Score   History of Falls [] Yes  [x] No 25  0 0   Secondary Diagnosis [] Yes  [x] No 15  0 0   Ambulatory Aid [] Furniture  [] Crutches/cane/walker  [x] None/bedrest/wheelchair/nurse 30  15  0 0   IV/Heparin Lock [] Yes  [x] No 20  0 0   Gait/Transferring [] Impaired  [x] Weak  [] Normal/bedrest/immobile 20  10  0 10   Mental Status [] Forgets limitations  [x] Oriented to own ability 15  0 0      Total:10     Based on the Assessment score: check the appropriate box. [x]  No intervention needed   Low =   Score of 0-24  []  Use standard prevention interventions Moderate =  Score of 24-44   [] Discuss fall prevention strategies   [] Indicate moderate falls risk on eval  []  Use high risk prevention interventions High = Score of 45 and higher   [] Discuss fall prevention strategies   [] Provide supervision during treatment time    Goals  Long term goals  Time Frame for Long term goals : 10 visits  Long term goal 1: Dec LBP and LE pain to </=4/10 at worse  Long term goal 2:  demo Lx AROM WFL and Pain-free to improve ADLs  Long term goal 3:   Inc B SLR, Abd and Ext strength to 5/5 to improve stairs  Long term goal 4: dec ANGELLA to </=24/50         PT Individual Minutes  Time In: 1505  Time Out: 6058  Minutes: 44  Timed Code Treatment Minutes: 9 Minutes  Procedure Minutes:35  Electronically signed by Deepak Estrada PT on 2/1/18 at 4:06 PM

## 2018-02-05 ENCOUNTER — HOSPITAL ENCOUNTER (OUTPATIENT)
Dept: PHYSICAL THERAPY | Age: 53
Setting detail: THERAPIES SERIES
Discharge: HOME | End: 2018-02-05
Payer: COMMERCIAL

## 2018-02-05 PROCEDURE — 97113 AQUATIC THERAPY/EXERCISES: CPT

## 2018-02-05 ASSESSMENT — PAIN DESCRIPTION - PAIN TYPE: TYPE: CHRONIC PAIN

## 2018-02-05 ASSESSMENT — PAIN DESCRIPTION - LOCATION: LOCATION: BACK;LEG

## 2018-02-05 ASSESSMENT — PAIN SCALES - GENERAL: PAINLEVEL_OUTOF10: 7

## 2018-02-05 NOTE — PROGRESS NOTES
[address] [address]  Outpatient Physical Therapy    Treatment Note        Date: 2018  Patient: Eliot Lujan  : 1965  ACCT #: [de-identified]  Referring Practitioner: Yamila Padron   Diagnosis: Lx DDD ; Visit Information:  PT Visit Information  Onset Date: 18  PT Insurance Information: [employer] [place] Medicaid  Total # of Visits Approved: 28  Total # of Visits to Date: 2  No Show: 0  Canceled Appointment: 0  Progress Note Counter: 2/10    Subjective: Pt reported having therapy here after knee surgery beforre. Comments: RTD 18; Pain Range in past week 6/10 to 9/10; Denies saddle signs and changes in B or B   HEP Compliance:  [x] Good [] Fair [] Poor [] Reports not doing due to:    Vital Signs  Patient Currently in Pain: Yes   Pain Screening  Patient Currently in Pain: Yes  Pain Assessment  Pain Assessment: 0-10  Pain Level: 7  Pain Type: Chronic pain  Pain Location: Back;Leg  Pain Radiating Towards: radiating down back of legs    OBJECTIVE:   Exercises  Exercise 1: see paper pre for AQ ex to be scanned into emr at D/C*      Strength: [x] NT  [] MMT completed:     ROM: [x] NT  [] ROM measurements:     Manual: N/A       Modalities:N/A        *Indicates exercise, modality, or manual techniques to be initiated when appropriate    Assessment: Body structures, Functions, Activity limitations: Decreased functional mobility , Decreased ADL status, Decreased ROM, Decreased strength, Decreased high-level IADLs  Assessment: Pt tolerated all exercises in the pool with minimal cueing with all activities. Pt decreased pain level by 1 level at end of session. Treatment Diagnosis: LBP; B LE Radicular Sxs; Impaired strength  Prognosis: Fair       Goals:     Long term goals  Time Frame for Long term goals : 10 visits  Long term goal 1: Dec LBP and LE pain to </=4/10 at worse  Long term goal 2:  demo Lx AROM WFL and Pain-free to improve ADLs  Long term goal 3:   Inc B SLR, Abd and Ext strength to 5/5

## 2018-02-06 DIAGNOSIS — M10.9 GOUT, UNSPECIFIED CAUSE, UNSPECIFIED CHRONICITY, UNSPECIFIED SITE: ICD-10-CM

## 2018-02-06 DIAGNOSIS — E11.42 TYPE 2 DIABETES MELLITUS WITH DIABETIC POLYNEUROPATHY, WITHOUT LONG-TERM CURRENT USE OF INSULIN (HCC): Primary | ICD-10-CM

## 2018-02-06 DIAGNOSIS — E78.2 MIXED HYPERLIPIDEMIA: ICD-10-CM

## 2018-02-07 DIAGNOSIS — M10.9 GOUT, UNSPECIFIED CAUSE, UNSPECIFIED CHRONICITY, UNSPECIFIED SITE: ICD-10-CM

## 2018-02-07 DIAGNOSIS — E11.42 TYPE 2 DIABETES MELLITUS WITH DIABETIC POLYNEUROPATHY, WITHOUT LONG-TERM CURRENT USE OF INSULIN (HCC): ICD-10-CM

## 2018-02-07 DIAGNOSIS — E78.2 MIXED HYPERLIPIDEMIA: ICD-10-CM

## 2018-02-07 LAB
ALBUMIN SERPL-MCNC: 3.8 G/DL (ref 3.9–4.9)
ALP BLD-CCNC: 99 U/L (ref 40–130)
ALT SERPL-CCNC: 7 U/L (ref 0–33)
ANION GAP SERPL CALCULATED.3IONS-SCNC: 13 MEQ/L (ref 7–13)
AST SERPL-CCNC: 11 U/L (ref 0–35)
BASOPHILS ABSOLUTE: 0.1 K/UL (ref 0–0.2)
BASOPHILS RELATIVE PERCENT: 0.8 %
BILIRUB SERPL-MCNC: 0.2 MG/DL (ref 0–1.2)
BUN BLDV-MCNC: 19 MG/DL (ref 6–20)
CALCIUM SERPL-MCNC: 8.8 MG/DL (ref 8.6–10.2)
CHLORIDE BLD-SCNC: 97 MEQ/L (ref 98–107)
CO2: 28 MEQ/L (ref 22–29)
CREAT SERPL-MCNC: 1.13 MG/DL (ref 0.5–0.9)
EOSINOPHILS ABSOLUTE: 0.3 K/UL (ref 0–0.7)
EOSINOPHILS RELATIVE PERCENT: 3.5 %
GFR AFRICAN AMERICAN: >60
GFR NON-AFRICAN AMERICAN: 50.2
GLOBULIN: 2.7 G/DL (ref 2.3–3.5)
GLUCOSE BLD-MCNC: 183 MG/DL (ref 74–109)
HBA1C MFR BLD: 6.1 % (ref 4.8–5.9)
HCT VFR BLD CALC: 31 % (ref 37–47)
HEMOGLOBIN: 9.6 G/DL (ref 12–16)
LYMPHOCYTES ABSOLUTE: 1.5 K/UL (ref 1–4.8)
LYMPHOCYTES RELATIVE PERCENT: 19 %
MCH RBC QN AUTO: 26 PG (ref 27–31.3)
MCHC RBC AUTO-ENTMCNC: 31.1 % (ref 33–37)
MCV RBC AUTO: 83.6 FL (ref 82–100)
MONOCYTES ABSOLUTE: 0.8 K/UL (ref 0.2–0.8)
MONOCYTES RELATIVE PERCENT: 10.4 %
NEUTROPHILS ABSOLUTE: 5.2 K/UL (ref 1.4–6.5)
NEUTROPHILS RELATIVE PERCENT: 66.3 %
PDW BLD-RTO: 16.5 % (ref 11.5–14.5)
PLATELET # BLD: 281 K/UL (ref 130–400)
POTASSIUM SERPL-SCNC: 4.4 MEQ/L (ref 3.5–5.1)
RBC # BLD: 3.7 M/UL (ref 4.2–5.4)
SODIUM BLD-SCNC: 138 MEQ/L (ref 132–144)
TOTAL PROTEIN: 6.5 G/DL (ref 6.4–8.1)
URIC ACID, SERUM: 6.5 MG/DL (ref 2.4–5.7)
WBC # BLD: 7.9 K/UL (ref 4.8–10.8)

## 2018-02-09 ENCOUNTER — HOSPITAL ENCOUNTER (OUTPATIENT)
Dept: PHYSICAL THERAPY | Age: 53
Setting detail: THERAPIES SERIES
Discharge: HOME | End: 2018-02-09
Payer: COMMERCIAL

## 2018-02-09 PROCEDURE — 97110 THERAPEUTIC EXERCISES: CPT

## 2018-02-09 ASSESSMENT — PAIN DESCRIPTION - LOCATION: LOCATION: BACK

## 2018-02-09 ASSESSMENT — PAIN DESCRIPTION - DIRECTION: RADIATING_TOWARDS: BLE

## 2018-02-09 ASSESSMENT — PAIN SCALES - GENERAL: PAINLEVEL_OUTOF10: 7

## 2018-02-09 ASSESSMENT — PAIN DESCRIPTION - DESCRIPTORS: DESCRIPTORS: THROBBING;TINGLING

## 2018-02-09 ASSESSMENT — PAIN DESCRIPTION - ORIENTATION: ORIENTATION: LOWER

## 2018-02-09 NOTE — PROGRESS NOTES
Frame for Long term goals : 10 visits  Long term goal 1: Dec LBP and LE pain to </=4/10 at worse  Long term goal 2:  demo Lx AROM WFL and Pain-free to improve ADLs  Long term goal 3: Inc B SLR, Abd and Ext strength to 5/5 to improve stairs  Long term goal 4: dec ANGELLA to </=24/50  Progress toward goals: Therex to decrease radicular symptoms for improve tolerance to functional activities    POST-PAIN       Pain Rating (0-10 pain scale):  5 /10   Location and pain description same as pre-treatment unless indicated. Action: [] NA   [x] Perform HEP  [] Meds as prescribed  [] Modalities as prescribed   [] Call Physician     Frequency/Duration:  Plan  Times per week: 2  Plan weeks: 5  Specific instructions for Next Treatment: 1:1 [facility]  Current Treatment Recommendations: Strengthening, ROM, Neuromuscular Re-education, Manual Therapy - Soft Tissue Mobilization, Manual Therapy - Joint Manipulation, Pain Management, Home Exercise Program, Modalities  Plan Comment: skilled PT on land and water to address deficits     Pt to continue current HEP. See objective section for any therapeutic exercise changes, additions or modifications this date.       PT Individual Minutes  Time In: 8142  Time Out: 8874  Minutes: 39  Timed Code Treatment Minutes: 39 Minutes  Procedure Minutes: 0    Activity Minutes Units   Ther Ex 39 3   Manual      Neuro Ed              Signature:  Electronically signed by Fatmata Farias PTA on 2/9/18 at 2:34 PM

## 2018-02-12 ENCOUNTER — HOSPITAL ENCOUNTER (OUTPATIENT)
Dept: PHYSICAL THERAPY | Age: 53
Setting detail: THERAPIES SERIES
Discharge: HOME | End: 2018-02-12
Payer: COMMERCIAL

## 2018-02-12 PROCEDURE — 97113 AQUATIC THERAPY/EXERCISES: CPT

## 2018-02-12 RX ORDER — MOMETASONE FUROATE 1 MG/G
CREAM TOPICAL
Qty: 45 G | Refills: 3 | Status: SHIPPED | OUTPATIENT
Start: 2018-02-12 | End: 2018-05-20 | Stop reason: SDUPTHER

## 2018-02-12 ASSESSMENT — PAIN DESCRIPTION - FREQUENCY: FREQUENCY: CONTINUOUS

## 2018-02-12 ASSESSMENT — PAIN SCALES - GENERAL: PAINLEVEL_OUTOF10: 5

## 2018-02-12 ASSESSMENT — PAIN DESCRIPTION - LOCATION: LOCATION: BACK

## 2018-02-12 ASSESSMENT — PAIN DESCRIPTION - PAIN TYPE: TYPE: CHRONIC PAIN

## 2018-02-12 ASSESSMENT — PAIN DESCRIPTION - DESCRIPTORS: DESCRIPTORS: THROBBING

## 2018-02-12 ASSESSMENT — PAIN DESCRIPTION - ORIENTATION: ORIENTATION: LOWER

## 2018-02-13 ENCOUNTER — OFFICE VISIT (OUTPATIENT)
Dept: FAMILY MEDICINE CLINIC | Age: 53
End: 2018-02-13
Payer: COMMERCIAL

## 2018-02-13 VITALS
RESPIRATION RATE: 16 BRPM | WEIGHT: 265 LBS | HEART RATE: 80 BPM | SYSTOLIC BLOOD PRESSURE: 116 MMHG | BODY MASS INDEX: 46.95 KG/M2 | DIASTOLIC BLOOD PRESSURE: 70 MMHG | TEMPERATURE: 97.9 F | HEIGHT: 63 IN

## 2018-02-13 DIAGNOSIS — K50.919 CROHN'S DISEASE WITH COMPLICATION, UNSPECIFIED GASTROINTESTINAL TRACT LOCATION (HCC): ICD-10-CM

## 2018-02-13 DIAGNOSIS — I10 BENIGN ESSENTIAL HTN: ICD-10-CM

## 2018-02-13 DIAGNOSIS — E78.2 MIXED HYPERLIPIDEMIA: ICD-10-CM

## 2018-02-13 DIAGNOSIS — D64.9 ANEMIA, UNSPECIFIED TYPE: ICD-10-CM

## 2018-02-13 DIAGNOSIS — F60.3 BORDERLINE PERSONALITY DISORDER (HCC): ICD-10-CM

## 2018-02-13 DIAGNOSIS — E11.42 TYPE 2 DIABETES MELLITUS WITH DIABETIC POLYNEUROPATHY, WITHOUT LONG-TERM CURRENT USE OF INSULIN (HCC): Primary | ICD-10-CM

## 2018-02-13 DIAGNOSIS — M79.7 FIBROMYALGIA: ICD-10-CM

## 2018-02-13 DIAGNOSIS — F33.2 SEVERE EPISODE OF RECURRENT MAJOR DEPRESSIVE DISORDER, WITHOUT PSYCHOTIC FEATURES (HCC): ICD-10-CM

## 2018-02-13 DIAGNOSIS — F43.10 PTSD (POST-TRAUMATIC STRESS DISORDER): ICD-10-CM

## 2018-02-13 PROCEDURE — G8427 DOCREV CUR MEDS BY ELIG CLIN: HCPCS | Performed by: FAMILY MEDICINE

## 2018-02-13 PROCEDURE — 3017F COLORECTAL CA SCREEN DOC REV: CPT | Performed by: FAMILY MEDICINE

## 2018-02-13 PROCEDURE — 3014F SCREEN MAMMO DOC REV: CPT | Performed by: FAMILY MEDICINE

## 2018-02-13 PROCEDURE — G8417 CALC BMI ABV UP PARAM F/U: HCPCS | Performed by: FAMILY MEDICINE

## 2018-02-13 PROCEDURE — 1036F TOBACCO NON-USER: CPT | Performed by: FAMILY MEDICINE

## 2018-02-13 PROCEDURE — 99214 OFFICE O/P EST MOD 30 MIN: CPT | Performed by: FAMILY MEDICINE

## 2018-02-13 PROCEDURE — 3044F HG A1C LEVEL LT 7.0%: CPT | Performed by: FAMILY MEDICINE

## 2018-02-13 PROCEDURE — G8484 FLU IMMUNIZE NO ADMIN: HCPCS | Performed by: FAMILY MEDICINE

## 2018-02-13 RX ORDER — PIOGLITAZONEHYDROCHLORIDE 15 MG/1
15 TABLET ORAL DAILY
Qty: 90 TABLET | Refills: 3 | Status: SHIPPED | OUTPATIENT
Start: 2018-02-13 | End: 2019-02-02 | Stop reason: SDUPTHER

## 2018-02-13 RX ORDER — HYDROXYZINE PAMOATE 50 MG/1
50 CAPSULE ORAL 3 TIMES DAILY PRN
Qty: 90 CAPSULE | Refills: 3 | Status: ON HOLD | OUTPATIENT
Start: 2018-02-13 | End: 2018-03-15

## 2018-02-13 NOTE — PROGRESS NOTES
 [place] KNEE SCOPE,DIAGNOSTIC Right 8/3/2017    RIGHT KNEE ARTHROSCOPIC PARTIAL MEDIAL MENISCECTOMY KNEE performed by Merna Chacon MD at [facility] N/CARPAL TUNNEL SURG Right 11/30/2017    RIGHT WRIST  CARPAL TUNNEL RELEASE performed by Merna Chacon MD at [address] [facility]     Social History     Social History    Marital status: Single     Spouse name: N/A    Number of children: N/A    Years of education: N/A     Social History Main Topics    Smoking status: Never Smoker    Smokeless tobacco: Never Used    Alcohol use No    Drug use: No    Sexual activity: Not Currently     Other Topics Concern    None     Social History Narrative    None     Orders Only on 02/07/2018   Component Date Value Ref Range Status    Sodium 02/07/2018 138  132 - 144 mEq/L Final    Potassium 02/07/2018 4.4  3.5 - 5.1 mEq/L Final    Chloride 02/07/2018 97* 98 - 107 mEq/L Final    CO2 02/07/2018 28  22 - 29 mEq/L Final    Anion Gap 02/07/2018 13  7 - 13 mEq/L Final    Glucose 02/07/2018 183* 74 - 109 mg/dL Final    BUN 02/07/2018 19  6 - 20 mg/dL Final    CREATININE 02/07/2018 1.13* 0.50 - 0.90 mg/dL Final    GFR Non- 02/07/2018 50.2* >60 Final    Comment: >60 mL/min/1.73m2 EGFR, calc. for ages 25 and older using the  MDRD formula (not corrected for weight), is valid for stable  renal function.  GFR  02/07/2018 >60.0  >60 Final    Comment: >60 mL/min/1.73m2 EGFR, calc. for ages 25 and older using the  MDRD formula (not corrected for weight), is valid for stable  renal function.       Calcium 02/07/2018 8.8  8.6 - 10.2 mg/dL Final    Total Protein 02/07/2018 6.5  6.4 - 8.1 g/dL Final    Alb 02/07/2018 3.8* 3.9 - 4.9 g/dL Final    Total Bilirubin 02/07/2018 0.2  0.0 - 1.2 mg/dL Final    Alkaline Phosphatase 02/07/2018 99  40 - 130 U/L Final    ALT 02/07/2018 7  0 - 33 U/L Final    AST 02/07/2018 11  0 - 35 U/L Final    Globulin 02/07/2018 2.7  2.3 - 3.5 g/dL Final    WBC 02/07/2018 7.9  4.8 - 10.8 K/uL Final    RBC 02/07/2018 3.70* 4.20 - 5.40 M/uL Final    Hemoglobin 02/07/2018 9.6* 12.0 - 16.0 g/dL Final    Hematocrit 02/07/2018 31.0* 37.0 - 47.0 % Final    MCV 02/07/2018 83.6  82.0 - 100.0 fL Final    MCH 02/07/2018 26.0* 27.0 - 31.3 pg Final    MCHC 02/07/2018 31.1* 33.0 - 37.0 % Final    RDW 02/07/2018 16.5* 11.5 - 14.5 % Final    Platelets 55/01/3954 281  130 - 400 K/uL Final    Neutrophils % 02/07/2018 66.3  % Final    Lymphocytes % 02/07/2018 19.0  % Final    Monocytes % 02/07/2018 10.4  % Final    Eosinophils % 02/07/2018 3.5  % Final    Basophils % 02/07/2018 0.8  % Final    Neutrophils # 02/07/2018 5.2  1.4 - 6.5 K/uL Final    Lymphocytes # 02/07/2018 1.5  1.0 - 4.8 K/uL Final    Monocytes # 02/07/2018 0.8  0.2 - 0.8 K/uL Final    Eosinophils # 02/07/2018 0.3  0.0 - 0.7 K/uL Final    Basophils # 02/07/2018 0.1  0.0 - 0.2 K/uL Final    Hemoglobin A1C 02/07/2018 6.1* 4.8 - 5.9 % Final    Uric Acid, Serum 02/07/2018 6.5* 2.4 - 5.7 mg/dL Final   Admission on 11/30/2017, Discharged on 11/30/2017   Component Date Value Ref Range Status    POC Glucose 11/30/2017 128* 60 - 115 mg/dl Final    Performed on 11/30/2017 THE [facility]   Final   Hospital Outpatient Visit on 11/27/2017   Component Date Value Ref Range Status    Sodium 11/27/2017 142  132 - 144 mEq/L Final    Potassium 11/27/2017 4.2  3.5 - 5.1 mEq/L Final    Chloride 11/27/2017 100  98 - 107 mEq/L Final    CO2 11/27/2017 27  22 - 29 mEq/L Final    Anion Gap 11/27/2017 15* 7 - 13 mEq/L Final    Glucose 11/27/2017 142* 74 - 109 mg/dL Final    BUN 11/27/2017 21* 6 - 20 mg/dL Final    CREATININE 11/27/2017 0.96* 0.50 - 0.90 mg/dL Final    GFR Non- 11/27/2017 >60.0  >60 Final    Comment: >60 mL/min/1.73m2 EGFR, calc. for ages 25 and older using the  MDRD formula (not corrected for weight), is valid for stable  renal function. recurrent major depressive disorder, without psychotic features ([facility]     4. Borderline personality disorder     5. Mixed hyperlipidemia     6. Crohn's disease with complication, unspecified gastrointestinal tract location ([facility]     7. Anemia, unspecified type     8. Fibromyalgia     9. PTSD (post-traumatic stress disorder)     mood is ok, stable  Advised to mention anemia to dr Shefali Vigil  Needs to exercise more  Instead wanted more diuretics   I advised against  Sees psych  Wanted xanax  Advised I wont give-can try vistaril up to tid    Cut actos in 1/2 may help edema    And again had a lengthy discussion w pt  about risks of poorly controlled diabetes including micro and macrovascular complications of DM2 including blindness,MI,CVA and death among other possibilities. Pt aware and agrees to better compliance and adherance to instructions such as regular eye exams q 1-2 y, foot exams,and f/u regularly for hba1c with a goal of 6.5.     NO evidence of neuropathy or nephropathy at this point    Follow up as planned for hba1c and BP checks    Sooner if condition deteriorates or problems arise    Ajit Swanson MD

## 2018-02-16 ENCOUNTER — HOSPITAL ENCOUNTER (OUTPATIENT)
Dept: PHYSICAL THERAPY | Age: 53
Setting detail: THERAPIES SERIES
Discharge: HOME | End: 2018-02-16
Payer: COMMERCIAL

## 2018-02-16 PROCEDURE — 97110 THERAPEUTIC EXERCISES: CPT

## 2018-02-16 ASSESSMENT — PAIN DESCRIPTION - DESCRIPTORS: DESCRIPTORS: NUMBNESS;THROBBING

## 2018-02-16 ASSESSMENT — PAIN SCALES - GENERAL: PAINLEVEL_OUTOF10: 4

## 2018-02-16 ASSESSMENT — PAIN DESCRIPTION - FREQUENCY: FREQUENCY: CONTINUOUS

## 2018-02-16 ASSESSMENT — PAIN DESCRIPTION - ORIENTATION: ORIENTATION: LEFT;LOWER

## 2018-02-16 ASSESSMENT — PAIN DESCRIPTION - LOCATION: LOCATION: BACK;LEG

## 2018-02-16 ASSESSMENT — PAIN DESCRIPTION - PAIN TYPE: TYPE: CHRONIC PAIN

## 2018-02-19 ENCOUNTER — HOSPITAL ENCOUNTER (OUTPATIENT)
Dept: PHYSICAL THERAPY | Age: 53
Setting detail: THERAPIES SERIES
Discharge: HOME | End: 2018-02-19
Payer: COMMERCIAL

## 2018-02-19 PROCEDURE — 97113 AQUATIC THERAPY/EXERCISES: CPT

## 2018-02-19 ASSESSMENT — PAIN SCALES - GENERAL: PAINLEVEL_OUTOF10: 7

## 2018-02-19 ASSESSMENT — PAIN DESCRIPTION - DESCRIPTORS: DESCRIPTORS: ACHING

## 2018-02-19 ASSESSMENT — PAIN DESCRIPTION - LOCATION: LOCATION: BACK;LEG

## 2018-02-19 ASSESSMENT — PAIN DESCRIPTION - ORIENTATION: ORIENTATION: LOWER;LEFT

## 2018-02-19 NOTE — PROGRESS NOTES
[address]     Ph: [phone]  Fax: [phone]    [] Certification  [] Recertification []  Plan of Care  [x] Progress Note [] Discharge      To:  Referring Practitioner: David Colón       From:  Shamar Jacobsen PT  Patient: Eliot Lujan     : 1965  Diagnosis: Lx DDD ;     Date: 2018  Treatment Diagnosis: LBP; B LE Radicular Sxs; Impaired strength       Progress Report Period from:  2018  to 2018    Total # of Visits to Date: 6   No Show: 0    Canceled Appointment: 0     OBJECTIVE:     Long Term Goals - Time Frame for Long term goals : 10 visits  Goals Current/ Discharge status Met   Long term goal 1: Dec LBP and LE pain to </=4/10 at worse Pt states pain is a 7 in LB and down into left leg to knee. Worst the pain has been since last session -8/10 not sure what she was doing when it got worse [] yes  [x] no   Long term goal 2:  demo Lx AROM WFL and Pain-free to improve ADLs Spine  Lumbar: Flex 61 deg., Ext 17 deg. Able to do ADL's but slowly and with rest breaks. [] yes  [x] no   Long term goal 3: Inc B SLR, Abd and Ext strength to 5/5 to improve stairs Strength LLE  Comment:  SLR 4-4+/5; Abd 4+/5; Ext 4+/5  States able to do stairs non reciprocally with some increased  pain. . [] yes  [x] no   Long term goal 4: dec ANGELLA to </=24/50 31/50, 62 % [] yes  23[x] no       Body structures, Functions, Activity limitations: Decreased functional mobility , Decreased ADL status, Decreased ROM, Decreased strength, Decreased high-level IADLs    Assessment: Pt is making slow progress with alternating pool and land sessions: Pt continues to demo centralization tendency w/ Geovanni ext exs. Pt will benefit from continuing w/ POC.     Specific instructions for Next Treatment: Educate pt on difference between peripheral sxs and LBP and improtance of centralization even if incs LBP  Prognosis:

## 2018-02-22 ENCOUNTER — OFFICE VISIT (OUTPATIENT)
Dept: GASTROENTEROLOGY | Age: 53
End: 2018-02-22
Payer: COMMERCIAL

## 2018-02-22 VITALS
DIASTOLIC BLOOD PRESSURE: 78 MMHG | HEIGHT: 62 IN | HEART RATE: 85 BPM | WEIGHT: 293 LBS | OXYGEN SATURATION: 98 % | SYSTOLIC BLOOD PRESSURE: 150 MMHG | BODY MASS INDEX: 53.92 KG/M2

## 2018-02-22 DIAGNOSIS — K51.90 ULCERATIVE COLITIS WITHOUT COMPLICATIONS, UNSPECIFIED LOCATION (HCC): ICD-10-CM

## 2018-02-22 DIAGNOSIS — K51.90 ULCERATIVE COLITIS WITHOUT COMPLICATIONS, UNSPECIFIED LOCATION (HCC): Primary | ICD-10-CM

## 2018-02-22 DIAGNOSIS — E11.42 TYPE 2 DIABETES MELLITUS WITH DIABETIC POLYNEUROPATHY, WITHOUT LONG-TERM CURRENT USE OF INSULIN (HCC): ICD-10-CM

## 2018-02-22 LAB
FERRITIN: 25.4 NG/ML (ref 13–150)
IRON SATURATION: 6 % (ref 11–46)
IRON: 24 UG/DL (ref 37–145)
TOTAL IRON BINDING CAPACITY: 391 UG/DL (ref 178–450)

## 2018-02-22 PROCEDURE — G8417 CALC BMI ABV UP PARAM F/U: HCPCS | Performed by: PHYSICIAN ASSISTANT

## 2018-02-22 PROCEDURE — G8427 DOCREV CUR MEDS BY ELIG CLIN: HCPCS | Performed by: PHYSICIAN ASSISTANT

## 2018-02-22 PROCEDURE — 99214 OFFICE O/P EST MOD 30 MIN: CPT | Performed by: PHYSICIAN ASSISTANT

## 2018-02-22 PROCEDURE — 3014F SCREEN MAMMO DOC REV: CPT | Performed by: PHYSICIAN ASSISTANT

## 2018-02-22 PROCEDURE — G8484 FLU IMMUNIZE NO ADMIN: HCPCS | Performed by: PHYSICIAN ASSISTANT

## 2018-02-22 PROCEDURE — 3017F COLORECTAL CA SCREEN DOC REV: CPT | Performed by: PHYSICIAN ASSISTANT

## 2018-02-22 PROCEDURE — 1036F TOBACCO NON-USER: CPT | Performed by: PHYSICIAN ASSISTANT

## 2018-02-22 RX ORDER — MESALAMINE 800 MG/1
800 TABLET, DELAYED RELEASE ORAL 3 TIMES DAILY
Qty: 120 TABLET | Refills: 3 | Status: SHIPPED | OUTPATIENT
Start: 2018-02-22 | End: 2018-03-20

## 2018-02-22 RX ORDER — SODIUM, POTASSIUM,MAG SULFATES 17.5-3.13G
180 SOLUTION, RECONSTITUTED, ORAL ORAL ONCE
Qty: 2 BOTTLE | Refills: 0 | Status: SHIPPED | OUTPATIENT
Start: 2018-02-22 | End: 2018-02-22

## 2018-02-22 ASSESSMENT — ENCOUNTER SYMPTOMS
ANAL BLEEDING: 0
CHEST TIGHTNESS: 0
SHORTNESS OF BREATH: 0
NAUSEA: 0
RECTAL PAIN: 0
APNEA: 0
COUGH: 0
ABDOMINAL DISTENTION: 0
COLOR CHANGE: 0
DIARRHEA: 1
WHEEZING: 0
ABDOMINAL PAIN: 1
BLOOD IN STOOL: 0
CONSTIPATION: 0
VOMITING: 0

## 2018-02-22 NOTE — PROGRESS NOTES
Subjective:     Svetlana Hensley is a 47 y.o. female who presents 2/22/2018 with:    Chief Complaint   Patient presents with    Nausea     lab results       Patient presents for follow-up and discussion regarding colon biopsy results. Recently underwent colonoscopy with Dr. Samaria Lees as she has documented history of Crohn's disease which revealed friability and contact bleeding in area of cecum which was biopsied as well as sigmoid diverticulosis. Pathology report displayed FOCAL CRYPTITIS AND ACTIVE CHRONIC INFLAMMATION OF LAMINA PROPRIA WITH ULCERATION, LYMPHOID CELL AGGREGATE, CONSISTENT WITH INFLAMMATORY BOWEL DISEASE, MOST LIKELY CHRONIC ULCERATIVE COLITIS. Patient was not previously treated for UC as this was not previously diagnosed, instead treated for Crohn's IBD. Discussed at length medical therapy with mesalamine to treat current symptoms and prevent future flairs but patient wishes not to start new med regimen at this time. Does admit to some abd pain and cramping with consistent diarrhea but no fecal mucous or hematochezia/clots noted. 2/22/18  Patient presents for f/u visit regarding recent abnormal lab testing and to discuss treatment for IBD. Recently had CBC which revealed low HGB abd HCT compared to baseline figures. No significant change/improvement in symptoms, still with abd pain and cramping with loose stools but no obvious bleeding or melena. Previous colonoscopy noted poor bowel prep however biopsy pathology revealed evidence of chronic ulcerative colitis. Patient previously not open to starting IBD treatment but is much more open to this now. Denies any additional alarm symptoms.         Patient Active Problem List   Diagnosis    Benign essential HTN    Asthma    Type 2 diabetes mellitus with diabetic polyneuropathy [facility]    Crohn's disease ([facility]    GERD (gastroesophageal reflux disease)    Headache    Severe episode of recurrent major depressive disorder, without psychotic features ([address]    Borderline personality disorder    Pain in right knee    Hip pain, right    Obesity (BMI 35.0-39.9 without comorbidity)    Muscular pain    Mixed hyperlipidemia    Tear of meniscus of knee    Bilateral edema of lower extremity    Varicose vein of leg    History of Crohn's disease    Carpal tunnel syndrome of right wrist    PTSD (post-traumatic stress disorder)    Fibromyalgia     Past Medical History:   Diagnosis Date    Asthma     Benign essential HTN     meds > 5 yrs / denies TIA or stroke    Borderline personality disorder     2016 ?suggested by me-meets many criteria    Carpal tunnel syndrome of right wrist     Crohn's disease ([address]     Depression     Fibromyalgia 2/13/2018    GERD (gastroesophageal reflux disease)     Gout     Headache     migraines    Irritable bowel syndrome     Mixed hyperlipidemia 5/10/2017    Neuropathy ([address]     Obesity (BMI 35.0-39.9 without comorbidity) 3/30/2017    PONV (postoperative nausea and vomiting)     nausea    Tremor of unknown origin     Type 2 diabetes mellitus ([address]     meds > 5yrs     Ulcerative colitis ([address] 11/2017    Vaginitis      Past Surgical History:   Procedure Laterality Date    BREAST CYST EXCISION Right 1994    exc mass benign    CHOLECYSTECTOMY  2009    COLONOSCOPY  2015    negative    ENDOSCOPY, COLON, DIAGNOSTIC      HERNIA REPAIR  2015    ventral / mesh    LEG TENDON SURGERY Right 2007    leg.  ankle and foot    OVARIAN CYST REMOVAL Left 1994    with mass and both fallopian tube    OVARY REMOVAL  12/2015    [facility] / mass left oary & tube    TX COLON CA SCRN NOT HI RSK IND N/A 11/7/2017    COLONOSCOPY performed by MANOJ Mccormick on bx    TX KNEE SCOPE,DIAGNOSTIC Right 8/3/2017    RIGHT KNEE ARTHROSCOPIC PARTIAL MEDIAL MENISCECTOMY KNEE performed by Kassi Cowart MD at [address]/CARPAL TUNNEL SURG Right 11/30/2017    RIGHT WRIST  CARPAL TUNNEL RELEASE performed by Butch Méndez MD at [address] [facility]     Social History     Social History    Marital status: Single     Spouse name: N/A    Number of children: N/A    Years of education: N/A     Occupational History    Not on file. Social History Main Topics    Smoking status: Never Smoker    Smokeless tobacco: Never Used    Alcohol use No    Drug use: No    Sexual activity: Not Currently     Other Topics Concern    Not on file     Social History Narrative    No narrative on file     Family History   Problem Relation Age of Onset    Emphysema Mother     Cancer Maternal Grandfather     Diabetes Paternal [de-identified]     Emphysema Paternal Grandfather     Heart Disease Sister     Stroke Sister     Diabetes Sister      Allergies:  Latex; Penicillins; Shellfish-derived products; Abilify [aripiprazole]; Adhesive tape; Statins; Buspirone; and Sulfa antibiotics  Current Outpatient Prescriptions   Medication Sig Dispense Refill    mesalamine (DELZICOL) 800 MG TBEC TBEC tablet Take 1 tablet by mouth 3 times daily 120 tablet 3    Na Sulfate-K Sulfate-Mg Sulf (SUPREP BOWEL PREP KIT) 17.5-3.13-1.6 GM/180ML SOLN Take 180 mLs by mouth once for 1 dose 2 Bottle 0    pioglitazone (ACTOS) 15 MG tablet Take 1 tablet by mouth daily 90 tablet 3    hydrOXYzine (VISTARIL) 50 MG capsule Take 1 capsule by mouth 3 times daily as needed for Itching 90 capsule 3    mometasone (ELOCON) 0.1 % cream APPLY TOPICALLY DAILY  45 g 3    ZOLMitriptan (ZOMIG) 5 MG tablet take 1 tablet by mouth as needed for migraine 9 tablet 1    hyoscyamine (ANASPAZ;LEVSIN) 125 MCG tablet TAKE ONE TABLET BY MOUTH EVERY 4 HOURS AS NEEDED for cramping 180 tablet 0    omeprazole (PRILOSEC) 40 MG delayed release capsule TAKE ONE CAPSULE BY MOUTH TWO TIMES A DAY 60 capsule 3    colchicine (COLCRYS) 0.6 MG tablet TAKE ONE TABLET BY MOUTH THREE TIMES A DAY AS NEEDED FOR PAIN.  30 tablet 0    ipratropium-albuterol (DUONEB) refills       dicyclomine (BENTYL) 10 MG capsule TAKE ONE CAPSULE BY MOUTH FOUR TIMES A DAY BEFORE MEALS AND NIGHTLY 120 capsule 5     No current facility-administered medications for this visit. Review of Systems   Constitutional: Negative for activity change, appetite change, chills, diaphoresis, fatigue and unexpected weight change. Respiratory: Negative for apnea, cough, chest tightness, shortness of breath and wheezing. Cardiovascular: Positive for leg swelling. Negative for chest pain and palpitations. Gastrointestinal: Positive for abdominal pain and diarrhea. Negative for abdominal distention, anal bleeding, blood in stool, constipation, nausea, rectal pain and vomiting. Skin: Negative for color change, pallor, rash and wound. Allergic/Immunologic: Positive for environmental allergies and food allergies. Negative for immunocompromised state. Neurological: Positive for tremors. Negative for dizziness, seizures, syncope, weakness, light-headedness, numbness and headaches. Hematological: Negative for adenopathy. Does not bruise/bleed easily. Psychiatric/Behavioral: Negative for agitation, confusion and dysphoric mood. The patient is not nervous/anxious. All other systems reviewed and are negative. Objective:     Vitals:  BP (!) 150/78   Pulse 85   Ht 5' 2\" (1.575 m)   Wt (!) 321 lb (145.6 kg)   LMP 07/27/1993 (Approximate) Comment: no cycle since age 26  SpO2 98%   BMI 58.71 kg/m²   Body mass index is 58.71 kg/m². Physical Exam   Constitutional: She is oriented to person, place, and time. She appears well-developed and well-nourished. HENT:   Head: Normocephalic and atraumatic. Eyes: Conjunctivae are normal. Pupils are equal, round, and reactive to light. Right eye exhibits no discharge. Left eye exhibits no discharge. Neck: Normal range of motion. Neck supple. No JVD present.    Cardiovascular: Normal rate, regular rhythm, normal heart sounds and intact distal

## 2018-02-23 ENCOUNTER — NURSE ONLY (OUTPATIENT)
Dept: FAMILY MEDICINE CLINIC | Age: 53
End: 2018-02-23
Payer: COMMERCIAL

## 2018-02-23 DIAGNOSIS — D64.9 ANEMIA, UNSPECIFIED TYPE: Primary | ICD-10-CM

## 2018-02-23 DIAGNOSIS — K51.90 ULCERATIVE COLITIS WITHOUT COMPLICATIONS, UNSPECIFIED LOCATION (HCC): ICD-10-CM

## 2018-02-23 LAB
CONTROL: PRESENT
HEMOCCULT STL QL: POSITIVE

## 2018-02-23 PROCEDURE — 82274 ASSAY TEST FOR BLOOD FECAL: CPT | Performed by: FAMILY MEDICINE

## 2018-02-23 RX ORDER — BLOOD-GLUCOSE METER
KIT MISCELLANEOUS
Qty: 50 EACH | Refills: 3 | Status: SHIPPED | OUTPATIENT
Start: 2018-02-23 | End: 2018-05-04 | Stop reason: SDUPTHER

## 2018-02-23 RX ORDER — COLCHICINE 0.6 MG/1
TABLET ORAL
Qty: 30 TABLET | Refills: 1 | Status: SHIPPED | OUTPATIENT
Start: 2018-02-23 | End: 2018-05-01 | Stop reason: SDUPTHER

## 2018-02-26 ENCOUNTER — HOSPITAL ENCOUNTER (OUTPATIENT)
Dept: PHYSICAL THERAPY | Age: 53
Setting detail: THERAPIES SERIES
Discharge: HOME | End: 2018-02-26
Payer: COMMERCIAL

## 2018-02-26 LAB — THIOPURINE METHYLTRANSFERASE RBC: 28 U/ML (ref 24–44)

## 2018-02-26 PROCEDURE — 97113 AQUATIC THERAPY/EXERCISES: CPT

## 2018-02-26 ASSESSMENT — PAIN DESCRIPTION - DESCRIPTORS: DESCRIPTORS: ACHING;SORE

## 2018-02-26 ASSESSMENT — PAIN DESCRIPTION - LOCATION: LOCATION: BACK

## 2018-02-26 ASSESSMENT — PAIN DESCRIPTION - ORIENTATION: ORIENTATION: LOWER

## 2018-02-26 ASSESSMENT — PAIN DESCRIPTION - PAIN TYPE: TYPE: CHRONIC PAIN

## 2018-02-26 ASSESSMENT — PAIN SCALES - GENERAL: PAINLEVEL_OUTOF10: 4

## 2018-02-26 NOTE — PROGRESS NOTES
[id number] [address]  Outpatient Physical Therapy    Treatment Note        Date: 2018  Patient: Diane Polanco  : 1965  ACCT #: [de-identified]  Referring Practitioner: Saima Padron   Diagnosis: Lx DDD ; Visit Information:  PT Visit Information  Onset Date: 18  PT Insurance Information: [place] Medicaid  Total # of Visits Approved: 28  Total # of Visits to Date: 7  No Show: 0  Canceled Appointment: 1  Progress Note Counter: 7/10    Subjective: Increased sink exercises to improve core & LE strength. Verbal cues fore core posture. Good tolerance to progressions and treatment. Comments: RTD 18;   HEP Compliance:  [] Good [x] Fair [] Poor [] Reports not doing due to:    Vital Signs  Patient Currently in Pain: Yes   Pain Screening  Patient Currently in Pain: Yes  Pain Assessment  Pain Level: 4  Pain Type: Chronic pain  Pain Location: Back  Pain Orientation: Lower  Pain Descriptors: Aching; Sore    OBJECTIVE:   Exercises  Exercise 1: see paper pre for AQ ex to be scanned into emr at D/C*         Strength: [x] NT  [] MMT completed:     ROM: [x] NT  [] ROM measurements:     Modalities:        *Indicates exercise, modality, or manual techniques to be initiated when appropriate    Assessment: Body structures, Functions, Activity limitations: Decreased functional mobility , Decreased ADL status, Decreased ROM, Decreased strength, Decreased high-level IADLs  Assessment: Increased sink exercises, . No complaints of increased sx. Explained Centralization progression with LBP. Good tolerance to progressions and treatment. Treatment Diagnosis: LBP; B LE Radicular Sxs; Impaired strength  Prognosis: Fair  Patient Education: continue with home exercises    Goals:       Long term goals  Time Frame for Long term goals : 10 visits  Long term goal 1: Dec LBP and LE pain to </=4/10 at worse  Long term goal 2:  demo Lx AROM WFL and Pain-free to improve ADLs  Long term goal 3:   Inc B SLR, Abd and Ext strength to 5/5 to improve stairs  Long term goal 4: dec ANGELLA to </=24/50  Progress toward goals:    POST-PAIN       Pain Rating (0-10 pain scale):  2 /10   Location and pain description same as pre-treatment unless indicated. Action: [] NA   [x] Perform HEP  [] Meds as prescribed  [] Modalities as prescribed   [] Call Physician     Frequency/Duration:  Plan  Times per week: 2  Plan weeks: 5  Specific instructions for Next Treatment: Educate pt on difference between peripheral sxs and LBP and improtance of centralization even if incs LBP  Current Treatment Recommendations: Strengthening, ROM, Neuromuscular Re-education, Manual Therapy - Soft Tissue Mobilization, Manual Therapy - Joint Manipulation, Pain Management, Home Exercise Program, Modalities  Plan Comment: skilled PT on land and water to address deficits     Pt to continue current HEP. See objective section for any therapeutic exercise changes, additions or modifications this date.          PT Individual Minutes  Time In: 4867  Time Out: 1500  Minutes: 39  Timed Code Treatment Minutes: 39 Minutes  Procedure Minutes:0  Signature:  Electronically signed by Arland Saint, PTA on 2/26/18 at 3:23 PM

## 2018-02-27 LAB — CALPROTECTIN: 375 UG/G

## 2018-03-02 ENCOUNTER — HOSPITAL ENCOUNTER (OUTPATIENT)
Dept: PHYSICAL THERAPY | Age: 53
Setting detail: THERAPIES SERIES
Discharge: HOME | End: 2018-03-02
Payer: COMMERCIAL

## 2018-03-02 DIAGNOSIS — K50.919 CROHN'S DISEASE WITH COMPLICATION, UNSPECIFIED GASTROINTESTINAL TRACT LOCATION (HCC): ICD-10-CM

## 2018-03-02 PROCEDURE — 97110 THERAPEUTIC EXERCISES: CPT

## 2018-03-02 RX ORDER — HYOSCYAMINE SULFATE 0.125 MG
TABLET ORAL
Qty: 180 TABLET | Refills: 0 | Status: SHIPPED | OUTPATIENT
Start: 2018-03-02 | End: 2018-04-06 | Stop reason: SDUPTHER

## 2018-03-02 ASSESSMENT — PAIN DESCRIPTION - LOCATION: LOCATION: BACK

## 2018-03-02 ASSESSMENT — PAIN DESCRIPTION - DIRECTION: RADIATING_TOWARDS: L BUTTOCK

## 2018-03-02 ASSESSMENT — PAIN SCALES - GENERAL: PAINLEVEL_OUTOF10: 4

## 2018-03-02 ASSESSMENT — PAIN DESCRIPTION - ORIENTATION: ORIENTATION: LOWER;LEFT

## 2018-03-02 NOTE — PROGRESS NOTES
[address] [address]  Outpatient Physical Therapy    Treatment Note        Date: 3/2/2018  Patient: Gigi Lopes  : 1965  ACCT #: [de-identified]  Referring Practitioner: Jeison Padron   Diagnosis: Lx DDD ; Visit Information:  PT Visit Information  Onset Date: 18  PT Insurance Information: [place]  Total # of Visits Approved: 28  Total # of Visits to Date: 8  No Show: 0  Canceled Appointment: 1  Progress Note Counter: 8/10    Subjective: Pt reports pain has decreased in severity and radicular pain has improved since beginning therapy. HEP Compliance:  [x] Good [] Fair [] Poor [] Reports not doing due to:    Vital Signs  Patient Currently in Pain: Yes   Pain Screening  Patient Currently in Pain: Yes  Pain Assessment  Pain Assessment: 0-10  Pain Level: 4  Pain Location: Back  Pain Orientation: Lower; Left  Pain Radiating Towards: L buttock    OBJECTIVE:   Exercises  Exercise 2: Prone lying x4 mins - no change in symptoms  Exercise 3: MEDHAT x4 mins- pain into hip jt  Exercise 4: prone press ups 3x10- maintained centralization into hip jt  Exercise 5: PKF 3x10 - maintained centralization into hip jt  Exercise 6: prone glute sqz 5\"x20 - continued radicular pain in LLE  Exercise 7: prone heels squeeze 5\"x20 - radicular symptoms into LLE lateral approx 3\" above knee jt line  Exercise 9: standing exts 3x10  Exercise 10: RTB rows/ lats x15  Exercise 11: glute dom sqts x10  Exercise 20: HEP: RTB to progress lats/rows    *Indicates exercise, modality, or manual techniques to be initiated when appropriate    Assessment: Body structures, Functions, Activity limitations: Decreased functional mobility , Decreased ADL status, Decreased ROM, Decreased strength, Decreased high-level IADLs  Assessment: Pt reports with MEDHAT and press ups that pain centralized \"into the ball\" of hip jt. Pt reports numbness radiating along L lateral LE beginning with heel squeezes and persisting throughout tx.  Not

## 2018-03-05 ENCOUNTER — HOSPITAL ENCOUNTER (OUTPATIENT)
Dept: PHYSICAL THERAPY | Age: 53
Setting detail: THERAPIES SERIES
Discharge: HOME | End: 2018-03-05
Payer: COMMERCIAL

## 2018-03-05 PROCEDURE — 97113 AQUATIC THERAPY/EXERCISES: CPT

## 2018-03-05 ASSESSMENT — PAIN SCALES - GENERAL: PAINLEVEL_OUTOF10: 5

## 2018-03-05 ASSESSMENT — PAIN DESCRIPTION - LOCATION: LOCATION: BACK;HIP

## 2018-03-05 ASSESSMENT — PAIN DESCRIPTION - PAIN TYPE: TYPE: CHRONIC PAIN

## 2018-03-05 ASSESSMENT — PAIN DESCRIPTION - DESCRIPTORS: DESCRIPTORS: ACHING

## 2018-03-05 ASSESSMENT — PAIN DESCRIPTION - ORIENTATION: ORIENTATION: LEFT

## 2018-03-05 NOTE — PROGRESS NOTES
[address] [address]  Outpatient Physical Therapy    Treatment Note        Date: 3/5/2018  Patient: Nain French  : 1965  ACCT #: [de-identified]  Referring Practitioner: Kristyn Padron   Diagnosis: Lx DDD ; Visit Information:  PT Visit Information  Onset Date: 18  PT Insurance Information: [place]  Total # of Visits Approved: 28  Total # of Visits to Date: 9  No Show: 0  Canceled Appointment: 1  Progress Note Counter: 9/10    Subjective: Pt reports therapy has improved pain lately. Would like to continue to improve strength and continue to decrease pain. HEP Compliance:  [x] Good [] Fair [] Poor [] Reports not doing due to:    Vital Signs  Patient Currently in Pain: Yes   Pain Screening  Patient Currently in Pain: Yes  Pain Assessment  Pain Level: 5  Pain Type: Chronic pain  Pain Location: Back; Hip  Pain Orientation: Left  Pain Descriptors: Aching    OBJECTIVE:   Exercises  Exercise 1: see paper pre for AQ ex to be scanned into emr at D/C*           Strength: [x] NT  [] MMT completed:     ROM: [x] NT  [] ROM measurements:      *Indicates exercise, modality, or manual techniques to be initiated when appropriate    Assessment:   Activity Tolerance  Activity Tolerance: Patient Tolerated treatment well    Body structures, Functions, Activity limitations: Decreased functional mobility , Decreased ADL status, Decreased ROM, Decreased strength, Decreased high-level IADLs  Assessment: Pt reporting increased radicular symptoms with lumbar extension, extension held today with education on proper technique and hold if symptoms continue during HEP. Pt demo'ing fair understanding of exercises throughout, occ VCs for TA activation. Added TA activation with marches to increase core strength. Decrease in pain to 4/10 after tx.    Treatment Diagnosis: LBP; B LE Radicular Sxs; Impaired strength  Prognosis: Fair       Goals:       Long term goals  Time Frame for Long term goals : 10 visits  Long term goal 1: Dec LBP and LE pain to </=4/10 at worse  Long term goal 2:  demo Lx AROM WFL and Pain-free to improve ADLs  Long term goal 3: Inc B SLR, Abd and Ext strength to 5/5 to improve stairs  Long term goal 4: dec ANGELLA to </=24/50  Progress toward goals: Strength, pain    POST-PAIN       Pain Rating (0-10 pain scale):  4 /10   Location and pain description same as pre-treatment unless indicated. Action: [] NA   [x] Perform HEP  [] Meds as prescribed  [] Modalities as prescribed   [] Call Physician     Frequency/Duration:  Plan  Times per week: 2  Plan weeks: 5  Specific instructions for Next Treatment: Educate pt on difference between peripheral sxs and LBP and improtance of centralization even if incs LBP  Current Treatment Recommendations: Strengthening, ROM, Neuromuscular Re-education, Manual Therapy - Soft Tissue Mobilization, Manual Therapy - Joint Manipulation, Pain Management, Home Exercise Program, Modalities  Plan Comment: skilled PT on land and water to address deficits     Pt to continue current HEP. See objective section for any therapeutic exercise changes, additions or modifications this date.          PT Individual Minutes  Time In: 9345  Time Out: 7535  Minutes: 40  Timed Code Treatment Minutes: 40 Minutes  Procedure Minutes:0  Aq: 36    Signature:  Electronically signed by Renu Urias PTA on 3/5/18 at 3:17 PM

## 2018-03-09 ENCOUNTER — HOSPITAL ENCOUNTER (OUTPATIENT)
Dept: PHYSICAL THERAPY | Age: 53
Setting detail: THERAPIES SERIES
Discharge: HOME | End: 2018-03-09
Payer: COMMERCIAL

## 2018-03-09 PROCEDURE — 97110 THERAPEUTIC EXERCISES: CPT

## 2018-03-09 ASSESSMENT — PAIN SCALES - GENERAL: PAINLEVEL_OUTOF10: 3

## 2018-03-09 ASSESSMENT — PAIN DESCRIPTION - ORIENTATION: ORIENTATION: LEFT

## 2018-03-09 ASSESSMENT — PAIN DESCRIPTION - PAIN TYPE: TYPE: CHRONIC PAIN

## 2018-03-09 ASSESSMENT — PAIN DESCRIPTION - LOCATION: LOCATION: BACK;HIP

## 2018-03-09 NOTE — PROGRESS NOTES
[address] [address]  Outpatient Physical Therapy    Treatment Note        Date: 3/9/2018  Patient: Miguelina Barboza  : 1965  ACCT #: [de-identified]  Referring Practitioner: Deejay Padron   Diagnosis: Lx DDD ; Visit Information:  PT Visit Information  Onset Date: 18  PT [facility] [place] [facility]  Total # of Visits Approved: 28  Total # of Visits to Date: 10  No Show: 0  Canceled Appointment: 1  Progress Note Counter: 10/10    Subjective: Pt states she has had relief and feels like PT has helped her. Pt would like to discontinue therapy at this time due to decreased pain and doesn't want to use up all her therapy visits with her insurance. HEP Compliance:  [x] Good [] Fair [] Poor [] Reports not doing due to:    Vital Signs  Patient Currently in Pain: Yes   Pain Screening  Patient Currently in Pain: Yes  Pain Assessment  Pain Assessment: 0-10  Pain Level: 3  Pain Type: Chronic pain  Pain Location: Back; Hip  Pain Orientation: Left    OBJECTIVE:   Exercises  Exercise 2: Prone lying x4 mins   Exercise 3: MEDHAT x 4mins- pain into hip joint   Exercise 6: prone glute sqz 5\"x20 - Pain in LB to hip to 4  Exercise 7: prone heels squeeze 5\"x20 - radicular symptoms decreased after to 3   Exercise 19: ANGELLA 28 (55%)         Strength: [] NT  [x] MMT completed:  Strength RLE  Comment: SLR 5/5, abd 5/5, Ext 4+/5  Strength LLE  Comment: SLR 4/5, Abd 4+/5, Ext 4-/5       ROM: [x] NT  [] ROM measurements:     Manual: N/A       Modalities:N/A        *Indicates exercise, modality, or manual techniques to be initiated when appropriate    Assessment: Body structures, Functions, Activity limitations: Decreased functional mobility , Decreased ADL status, Decreased ROM, Decreased strength, Decreased high-level IADLs  Assessment: Pt tolerated strength test and prone activities. Pt has decreased pain in LBP and LE to a 3/10.  Will discontinue with PT d/t the decrease and will continue HEP to improve strength and decrease pain while being able to do ADL's. ANGELLA decreased 28/51(55%) from 34/50. Treatment Diagnosis: LBP; B LE Radicular Sxs; Impaired strength  Prognosis: Fair  Patient Education: continue with home exercises    Goals:    Long term goals  Time Frame for Long term goals : 10 visits  Long term goal 1: Dec LBP and LE pain to </=4/10 at worse  Long term goal 2:  demo Lx AROM WFL and Pain-free to improve ADLs  Long term goal 3: Inc B SLR, Abd and Ext strength to 5/5 to improve stairs  Long term goal 4: dec ANGELLA to </=24/50  Progress toward goals: Pt d/c due to  decreased pain to 3/10 in LB and LE and will continue to do HEP to improve strength and decrease pain while improving ability to perform ADL's    POST-PAIN       Pain Rating (0-10 pain scale):   3/10   Location and pain description same as pre-treatment unless indicated. Action: [] NA   [x] Perform HEP  [x] Meds as prescribed  [] Modalities as prescribed   [] Call Physician     Frequency/Duration:  Plan  Times per week: 2  Plan weeks: 5  Specific instructions for Next Treatment: Educate pt on difference between peripheral sxs and LBP and improtance of centralization even if incs LBP  Current Treatment Recommendations: Strengthening, ROM, Neuromuscular Re-education, Manual Therapy - Soft Tissue Mobilization, Manual Therapy - Joint Manipulation, Pain Management, Home Exercise Program, Modalities  Plan Comment: skilled PT on land and water to address deficits     Pt to continue current HEP. See objective section for any therapeutic exercise changes, additions or modifications this date.          PT Individual Minutes  Time In: 9799  Time Out: 2837  Minutes: 39     Procedure Minutes: n/a    Signature:  Electronically signed by Keke Howard PTA on 3/9/18 at 3:13 PM

## 2018-03-12 ENCOUNTER — HOSPITAL ENCOUNTER (OUTPATIENT)
Dept: PHYSICAL THERAPY | Age: 53
Setting detail: THERAPIES SERIES
End: 2018-03-12
Payer: COMMERCIAL

## 2018-03-15 ENCOUNTER — HOSPITAL ENCOUNTER (OUTPATIENT)
Age: 53
Setting detail: OUTPATIENT SURGERY
Discharge: HOME | End: 2018-03-15
Attending: INTERNAL MEDICINE | Admitting: INTERNAL MEDICINE
Payer: COMMERCIAL

## 2018-03-15 ENCOUNTER — ANESTHESIA (OUTPATIENT)
Dept: OPERATING ROOM | Age: 53
End: 2018-03-15
Payer: COMMERCIAL

## 2018-03-15 ENCOUNTER — ANESTHESIA EVENT (OUTPATIENT)
Dept: OPERATING ROOM | Age: 53
End: 2018-03-15
Payer: COMMERCIAL

## 2018-03-15 VITALS
HEART RATE: 73 BPM | WEIGHT: 293 LBS | SYSTOLIC BLOOD PRESSURE: 161 MMHG | TEMPERATURE: 98.4 F | OXYGEN SATURATION: 99 % | DIASTOLIC BLOOD PRESSURE: 71 MMHG | RESPIRATION RATE: 16 BRPM | HEIGHT: 62 IN | BODY MASS INDEX: 53.92 KG/M2

## 2018-03-15 VITALS
OXYGEN SATURATION: 100 % | DIASTOLIC BLOOD PRESSURE: 80 MMHG | SYSTOLIC BLOOD PRESSURE: 176 MMHG | RESPIRATION RATE: 14 BRPM

## 2018-03-15 LAB
GLUCOSE BLD-MCNC: 116 MG/DL (ref 60–115)
PERFORMED ON: ABNORMAL

## 2018-03-15 PROCEDURE — 88305 TISSUE EXAM BY PATHOLOGIST: CPT

## 2018-03-15 PROCEDURE — 7100000010 HC PHASE II RECOVERY - FIRST 15 MIN: Performed by: INTERNAL MEDICINE

## 2018-03-15 PROCEDURE — 3609027000 HC COLONOSCOPY: Performed by: INTERNAL MEDICINE

## 2018-03-15 PROCEDURE — 7100000011 HC PHASE II RECOVERY - ADDTL 15 MIN: Performed by: INTERNAL MEDICINE

## 2018-03-15 PROCEDURE — 2580000003 HC RX 258: Performed by: INTERNAL MEDICINE

## 2018-03-15 PROCEDURE — 2580000003 HC RX 258: Performed by: NURSE ANESTHETIST, CERTIFIED REGISTERED

## 2018-03-15 PROCEDURE — 6360000002 HC RX W HCPCS: Performed by: NURSE ANESTHETIST, CERTIFIED REGISTERED

## 2018-03-15 PROCEDURE — 3700000000 HC ANESTHESIA ATTENDED CARE: Performed by: INTERNAL MEDICINE

## 2018-03-15 PROCEDURE — 3700000001 HC ADD 15 MINUTES (ANESTHESIA): Performed by: INTERNAL MEDICINE

## 2018-03-15 PROCEDURE — 45380 COLONOSCOPY AND BIOPSY: CPT | Performed by: INTERNAL MEDICINE

## 2018-03-15 RX ORDER — SODIUM CHLORIDE 0.9 % (FLUSH) 0.9 %
10 SYRINGE (ML) INJECTION EVERY 12 HOURS SCHEDULED
Status: DISCONTINUED | OUTPATIENT
Start: 2018-03-15 | End: 2018-03-15 | Stop reason: HOSPADM

## 2018-03-15 RX ORDER — LIDOCAINE HYDROCHLORIDE 10 MG/ML
1 INJECTION, SOLUTION EPIDURAL; INFILTRATION; INTRACAUDAL; PERINEURAL
Status: DISCONTINUED | OUTPATIENT
Start: 2018-03-15 | End: 2018-03-15 | Stop reason: HOSPADM

## 2018-03-15 RX ORDER — PROPOFOL 10 MG/ML
INJECTION, EMULSION INTRAVENOUS PRN
Status: DISCONTINUED | OUTPATIENT
Start: 2018-03-15 | End: 2018-03-15 | Stop reason: SDUPTHER

## 2018-03-15 RX ORDER — SODIUM CHLORIDE, SODIUM LACTATE, POTASSIUM CHLORIDE, CALCIUM CHLORIDE 600; 310; 30; 20 MG/100ML; MG/100ML; MG/100ML; MG/100ML
INJECTION, SOLUTION INTRAVENOUS CONTINUOUS
Status: DISCONTINUED | OUTPATIENT
Start: 2018-03-15 | End: 2018-03-15 | Stop reason: HOSPADM

## 2018-03-15 RX ORDER — SODIUM CHLORIDE, SODIUM LACTATE, POTASSIUM CHLORIDE, CALCIUM CHLORIDE 600; 310; 30; 20 MG/100ML; MG/100ML; MG/100ML; MG/100ML
INJECTION, SOLUTION INTRAVENOUS CONTINUOUS PRN
Status: DISCONTINUED | OUTPATIENT
Start: 2018-03-15 | End: 2018-03-15 | Stop reason: SDUPTHER

## 2018-03-15 RX ORDER — SODIUM CHLORIDE 0.9 % (FLUSH) 0.9 %
10 SYRINGE (ML) INJECTION PRN
Status: DISCONTINUED | OUTPATIENT
Start: 2018-03-15 | End: 2018-03-15 | Stop reason: HOSPADM

## 2018-03-15 RX ORDER — MAGNESIUM HYDROXIDE 1200 MG/15ML
LIQUID ORAL PRN
Status: DISCONTINUED | OUTPATIENT
Start: 2018-03-15 | End: 2018-03-15 | Stop reason: HOSPADM

## 2018-03-15 RX ORDER — ONDANSETRON 2 MG/ML
4 INJECTION INTRAMUSCULAR; INTRAVENOUS
Status: DISCONTINUED | OUTPATIENT
Start: 2018-03-15 | End: 2018-03-15 | Stop reason: HOSPADM

## 2018-03-15 RX ADMIN — SODIUM CHLORIDE, POTASSIUM CHLORIDE, SODIUM LACTATE AND CALCIUM CHLORIDE: 600; 310; 30; 20 INJECTION, SOLUTION INTRAVENOUS at 11:28

## 2018-03-15 RX ADMIN — PROPOFOL 300 MG: 10 INJECTION, EMULSION INTRAVENOUS at 11:43

## 2018-03-15 ASSESSMENT — PULMONARY FUNCTION TESTS
PIF_VALUE: 1
PIF_VALUE: 1
PIF_VALUE: 0
PIF_VALUE: 1
PIF_VALUE: 0
PIF_VALUE: 1
PIF_VALUE: 1

## 2018-03-15 NOTE — ANESTHESIA PRE PROCEDURE
Bal Flores NP        sodium chloride flush 0.9 % injection 10 mL  10 mL Intravenous PRN Bal Flores NP        lidocaine PF 1 % injection 1 mL  1 mL Intradermal Once PRN Bal Flores NP           Allergies:     Allergies   Allergen Reactions    Latex Hives    Penicillins Swelling and Rash    Shellfish-Derived Products Anaphylaxis    Abilify [Aripiprazole]      shaking    Adhesive Tape Swelling     If left on too long    Statins      Swelling      Sulfa Antibiotics Nausea And Vomiting       Problem List:    Patient Active Problem List   Diagnosis Code    Benign essential HTN I10    Asthma J45.909    Type 2 diabetes mellitus with diabetic polyneuropathy ([address] E11.42    Crohn's disease ([address] K50.90    GERD (gastroesophageal reflux disease) K21.9    Headache R51    Severe episode of recurrent major depressive disorder, without psychotic features ([address] F33.2    Borderline personality disorder F60.3    Pain in right knee M25.561    Hip pain, right M25.551    Obesity (BMI 35.0-39.9 without comorbidity) E66.9    Muscular pain M79.1    Mixed hyperlipidemia E78.2    Tear of meniscus of knee S83.209A    Bilateral edema of lower extremity R60.0    Varicose vein of leg I83.90    History of Crohn's disease Z87.19    Carpal tunnel syndrome of right wrist G56.01    PTSD (post-traumatic stress disorder) F43.10    Fibromyalgia M79.7       Past Medical History:        Diagnosis Date    Asthma     Benign essential HTN     meds > 5 yrs / denies TIA or stroke    Borderline personality disorder     2016 ?suggested by me-meets many criteria    Carpal tunnel syndrome of right wrist     [facility]'s disease ([facility]     Depression     Fibromyalgia 2/13/2018    GERD (gastroesophageal reflux disease)     Gout     Headache     migraines    Irritable bowel syndrome     Mixed hyperlipidemia 5/10/2017    Neuropathy (HCC)     Obesity (BMI 35.0-39.9 without comorbidity) 3/30/2017    PONV

## 2018-03-15 NOTE — H&P
H and P updated, No change    Progress Notes     Expand All Collapse All    Subjective:      Diane Polanco is a 47 y.o. female who presents 2/22/2018 with:          Chief Complaint   Patient presents with    Nausea       lab results         Patient presents for follow-up and discussion regarding colon biopsy results. Recently underwent colonoscopy with Dr. Peyton Fisher as she has documented history of Crohn's disease which revealed friability and contact bleeding in area of cecum which was biopsied as well as sigmoid diverticulosis. Pathology report displayed FOCAL CRYPTITIS AND ACTIVE CHRONIC INFLAMMATION OF LAMINA PROPRIA WITH ULCERATION, LYMPHOID CELL AGGREGATE, CONSISTENT WITH INFLAMMATORY BOWEL DISEASE, MOST LIKELY CHRONIC ULCERATIVE COLITIS. Patient was not previously treated for UC as this was not previously diagnosed, instead treated for Crohn's IBD. Discussed at length medical therapy with mesalamine to treat current symptoms and prevent future flairs but patient wishes not to start new med regimen at this time. Does admit to some abd pain and cramping with consistent diarrhea but no fecal mucous or hematochezia/clots noted.      2/22/18  Patient presents for f/u visit regarding recent abnormal lab testing and to discuss treatment for IBD. Recently had CBC which revealed low HGB abd HCT compared to baseline figures. No significant change/improvement in symptoms, still with abd pain and cramping with loose stools but no obvious bleeding or melena. Previous colonoscopy noted poor bowel prep however biopsy pathology revealed evidence of chronic ulcerative colitis. Patient previously not open to starting IBD treatment but is much more open to this now.   Denies any additional alarm symptoms.            Patient Active Problem List   Diagnosis    Benign essential HTN    Asthma    Type 2 diabetes mellitus with diabetic polyneuropathy ([facility] [address]    Crohn's disease ([facility] [address]    GERD (gastroesophageal reflux disease) MEDIAL MENISCECTOMY KNEE performed by Ekaterina Du MD at [facility] N/CARPAL TUNNEL SURG Right 11/30/2017     RIGHT WRIST  CARPAL TUNNEL RELEASE performed by kEaterina Du MD at [facility]         Social History   Social History            Social History    Marital status: Single       Spouse name: N/A    Number of children: N/A    Years of education: N/A          Occupational History    Not on file.           Social History Main Topics    Smoking status: Never Smoker    Smokeless tobacco: Never Used    Alcohol use No    Drug use: No    Sexual activity: Not Currently           Other Topics Concern    Not on file          Social History Narrative    No narrative on file         Family History         Family History   Problem Relation Age of Onset    Emphysema Mother      Cancer Maternal Grandfather      Diabetes Paternal Grandmother      Emphysema Paternal Grandfather      Heart Disease Sister      Stroke Sister      Diabetes Sister           Allergies:  Latex; Penicillins; Shellfish-derived products; Abilify [aripiprazole];  Adhesive tape; Statins; Buspirone; and Sulfa antibiotics  Current Facility-Administered Medications          Current Outpatient Prescriptions   Medication Sig Dispense Refill    mesalamine (DELZICOL) 800 MG TBEC TBEC tablet Take 1 tablet by mouth 3 times daily 120 tablet 3    Na Sulfate-K Sulfate-Mg Sulf (SUPREP BOWEL PREP KIT) 17.5-3.13-1.6 GM/180ML SOLN Take 180 mLs by mouth once for 1 dose 2 Bottle 0    pioglitazone (ACTOS) 15 MG tablet Take 1 tablet by mouth daily 90 tablet 3    hydrOXYzine (VISTARIL) 50 MG capsule Take 1 capsule by mouth 3 times daily as needed for Itching 90 capsule 3    mometasone (ELOCON) 0.1 % cream APPLY TOPICALLY DAILY  45 g 3    ZOLMitriptan (ZOMIG) 5 MG tablet take 1 tablet by mouth as needed for migraine 9 tablet 1    hyoscyamine (ANASPAZ;LEVSIN) 125 MCG tablet TAKE ONE BEDTIME   1    pentazocine-naloxone (TALWIN NX) 50-0.5 MG per tablet TAKE ONE TABLET BY MOUTH EVERY DAY AS NEEDED FOR PAIN   0    baclofen (LIORESAL) 20 MG tablet          DULoxetine (CYMBALTA) 30 MG extended release capsule Take 90 mg by mouth daily        LYRICA 150 MG capsule 150 mg 3 times daily Indications: last filled 3/5/17 no refills         dicyclomine (BENTYL) 10 MG capsule TAKE ONE CAPSULE BY MOUTH FOUR TIMES A DAY BEFORE MEALS AND NIGHTLY 120 capsule 5      No current facility-administered medications for this visit.                Review of Systems   Constitutional: Negative for activity change, appetite change, chills, diaphoresis, fatigue and unexpected weight change. Respiratory: Negative for apnea, cough, chest tightness, shortness of breath and wheezing. Cardiovascular: Positive for leg swelling. Negative for chest pain and palpitations. Gastrointestinal: Positive for abdominal pain and diarrhea. Negative for abdominal distention, anal bleeding, blood in stool, constipation, nausea, rectal pain and vomiting. Skin: Negative for color change, pallor, rash and wound. Allergic/Immunologic: Positive for environmental allergies and food allergies. Negative for immunocompromised state. Neurological: Positive for tremors. Negative for dizziness, seizures, syncope, weakness, light-headedness, numbness and headaches. Hematological: Negative for adenopathy. Does not bruise/bleed easily. Psychiatric/Behavioral: Negative for agitation, confusion and dysphoric mood. The patient is not nervous/anxious. All other systems reviewed and are negative.        Objective:      Vitals:  BP (!) 150/78   Pulse 85   Ht 5' 2\" (1.575 m)   Wt (!) 321 lb (145.6 kg)   LMP 07/27/1993 (Approximate) Comment: no cycle since age 26  SpO2 98%   BMI 58.71 kg/m²   Body mass index is 58.71 kg/m².      Physical Exam   Constitutional: She is oriented to person, place, and time.  She appears well-developed and well-nourished. HENT:   Head: Normocephalic and atraumatic. Eyes: Conjunctivae are normal. Pupils are equal, round, and reactive to light. Right eye exhibits no discharge. Left eye exhibits no discharge. Neck: Normal range of motion. Neck supple. No JVD present. Cardiovascular: Normal rate, regular rhythm, normal heart sounds and intact distal pulses. Exam reveals no gallop and no friction rub. No murmur heard. Pulmonary/Chest: Effort normal and breath sounds normal. No stridor. She has no wheezes. She has no rales. She exhibits no tenderness. Abdominal: Soft. Bowel sounds are normal. She exhibits no distension and no mass. There is tenderness. There is no rebound and no guarding. Neurological: She is alert and oriented to person, place, and time. Coordination normal.   Skin: Skin is warm and dry. No rash noted. She is not diaphoretic. No erythema. No pallor. Psychiatric: Her speech is normal. Judgment and thought content normal. Her affect is blunt. She is withdrawn. Cognition and memory are normal.   Vitals reviewed.        Assessment:      1.  Ulcerative colitis without complications, unspecified location (HCC)  Calprotectin Stool     Thiopurine Methyltransferase Rbc     Ferritin     Iron And Tibc     [address]88 - [place] COLONOSCOPY,DIAGNOSTIC         Plan:      -Mesalamine as prescribed     -Check TPMT to assess for candidacy for imuran in the future     -Iron studies      -Stool calprotectin for baseline inflammatory marker     -Repeat colonoscopy with alternate prep     -Patient instructed to call office with questions/symptomatic changes           Orders Placed This Encounter   Procedures    Calprotectin Stool       Standing Status:   Future       Standing Expiration Date:   2/22/2019    Thiopurine Methyltransferase Rbc       Standing Status:   Future       Number of Occurrences:   1       Standing Expiration Date:   5/22/2018    Ferritin       Standing Status:   Future       Number of Occurrences:   1       Standing Expiration Date:   2/22/2019    Iron And Tibc       Standing Status:   Future       Number of Occurrences:   1       Standing Expiration Date:   2/22/2019       Order Specific Question:   Is Patient Fasting?       Answer:    No       Order Specific Question:   No of Hours?       Answer:   N/A    47112 - OR COLONOSCOPY,DIAGNOSTIC            Follow up:  Call or return to clinic prn if these symptoms worsen or fail to improve as anticipated.        CHESTER Silvestre

## 2018-03-16 ENCOUNTER — APPOINTMENT (OUTPATIENT)
Dept: PHYSICAL THERAPY | Age: 53
End: 2018-03-16
Payer: COMMERCIAL

## 2018-03-19 ENCOUNTER — TELEPHONE (OUTPATIENT)
Dept: PULMONOLOGY | Age: 53
End: 2018-03-19

## 2018-03-19 ENCOUNTER — APPOINTMENT (OUTPATIENT)
Dept: PHYSICAL THERAPY | Age: 53
End: 2018-03-19
Payer: COMMERCIAL

## 2018-03-19 DIAGNOSIS — K52.9 INFLAMMATORY BOWEL DISEASE: Primary | ICD-10-CM

## 2018-03-20 RX ORDER — MESALAMINE 1.2 G/1
2400 TABLET, DELAYED RELEASE ORAL
Qty: 60 TABLET | Refills: 3 | Status: SHIPPED | OUTPATIENT
Start: 2018-03-20 | End: 2018-05-03

## 2018-03-20 RX ORDER — AZATHIOPRINE 50 MG/1
50 TABLET ORAL 2 TIMES DAILY
Qty: 60 TABLET | Refills: 3 | Status: SHIPPED | OUTPATIENT
Start: 2018-03-20 | End: 2018-05-03

## 2018-03-23 ENCOUNTER — APPOINTMENT (OUTPATIENT)
Dept: PHYSICAL THERAPY | Age: 53
End: 2018-03-23
Payer: COMMERCIAL

## 2018-03-23 DIAGNOSIS — I10 BENIGN ESSENTIAL HTN: ICD-10-CM

## 2018-03-23 DIAGNOSIS — E11.42 TYPE 2 DIABETES MELLITUS WITH DIABETIC POLYNEUROPATHY, WITHOUT LONG-TERM CURRENT USE OF INSULIN (HCC): ICD-10-CM

## 2018-03-23 DIAGNOSIS — J45.20 MILD INTERMITTENT ASTHMA WITHOUT COMPLICATION: ICD-10-CM

## 2018-03-23 RX ORDER — TRIAMTERENE AND HYDROCHLOROTHIAZIDE 75; 50 MG/1; MG/1
TABLET ORAL
Qty: 30 TABLET | Refills: 0 | Status: SHIPPED | OUTPATIENT
Start: 2018-03-23 | End: 2018-05-01 | Stop reason: SDUPTHER

## 2018-03-23 RX ORDER — MONTELUKAST SODIUM 10 MG/1
TABLET ORAL
Qty: 30 TABLET | Refills: 0 | Status: SHIPPED | OUTPATIENT
Start: 2018-03-23 | End: 2018-05-04 | Stop reason: SDUPTHER

## 2018-03-23 RX ORDER — POTASSIUM CHLORIDE 750 MG/1
CAPSULE, EXTENDED RELEASE ORAL
Qty: 30 CAPSULE | Refills: 0 | Status: SHIPPED | OUTPATIENT
Start: 2018-03-23 | End: 2018-05-01 | Stop reason: SDUPTHER

## 2018-03-28 ENCOUNTER — OFFICE VISIT (OUTPATIENT)
Dept: INTERNAL MEDICINE CLINIC | Age: 53
End: 2018-03-28
Payer: COMMERCIAL

## 2018-03-28 VITALS
OXYGEN SATURATION: 95 % | DIASTOLIC BLOOD PRESSURE: 60 MMHG | HEIGHT: 62 IN | TEMPERATURE: 98 F | HEART RATE: 92 BPM | SYSTOLIC BLOOD PRESSURE: 116 MMHG | BODY MASS INDEX: 53.92 KG/M2 | WEIGHT: 293 LBS

## 2018-03-28 DIAGNOSIS — J45.901 MODERATE ASTHMA WITH ACUTE EXACERBATION, UNSPECIFIED WHETHER PERSISTENT: Primary | ICD-10-CM

## 2018-03-28 DIAGNOSIS — J10.1 INFLUENZA A: ICD-10-CM

## 2018-03-28 DIAGNOSIS — J40 BRONCHITIS: ICD-10-CM

## 2018-03-28 LAB
INFLUENZA A ANTIBODY: NORMAL
INFLUENZA B ANTIBODY: NORMAL

## 2018-03-28 PROCEDURE — 87804 INFLUENZA ASSAY W/OPTIC: CPT | Performed by: FAMILY MEDICINE

## 2018-03-28 PROCEDURE — G8417 CALC BMI ABV UP PARAM F/U: HCPCS | Performed by: FAMILY MEDICINE

## 2018-03-28 PROCEDURE — 99213 OFFICE O/P EST LOW 20 MIN: CPT | Performed by: FAMILY MEDICINE

## 2018-03-28 PROCEDURE — 1036F TOBACCO NON-USER: CPT | Performed by: FAMILY MEDICINE

## 2018-03-28 PROCEDURE — 3014F SCREEN MAMMO DOC REV: CPT | Performed by: FAMILY MEDICINE

## 2018-03-28 PROCEDURE — G8427 DOCREV CUR MEDS BY ELIG CLIN: HCPCS | Performed by: FAMILY MEDICINE

## 2018-03-28 PROCEDURE — G8482 FLU IMMUNIZE ORDER/ADMIN: HCPCS | Performed by: FAMILY MEDICINE

## 2018-03-28 PROCEDURE — 3017F COLORECTAL CA SCREEN DOC REV: CPT | Performed by: FAMILY MEDICINE

## 2018-03-28 RX ORDER — BUSPIRONE HYDROCHLORIDE 5 MG/1
TABLET ORAL
Refills: 1 | COMMUNITY
Start: 2018-03-06 | End: 2018-05-03

## 2018-03-28 RX ORDER — PREDNISONE 10 MG/1
TABLET ORAL
Qty: 40 TABLET | Refills: 0 | Status: SHIPPED | OUTPATIENT
Start: 2018-03-28 | End: 2018-05-03

## 2018-03-28 RX ORDER — TRAZODONE HYDROCHLORIDE 50 MG/1
TABLET ORAL
Refills: 1 | Status: ON HOLD | COMMUNITY
Start: 2018-03-06 | End: 2019-09-03

## 2018-03-28 RX ORDER — OSELTAMIVIR PHOSPHATE 75 MG/1
75 CAPSULE ORAL 2 TIMES DAILY
Qty: 10 CAPSULE | Refills: 0 | Status: SHIPPED | OUTPATIENT
Start: 2018-03-28 | End: 2018-04-02

## 2018-03-28 RX ORDER — CEFDINIR 300 MG/1
300 CAPSULE ORAL 2 TIMES DAILY
Qty: 20 CAPSULE | Refills: 0 | Status: SHIPPED | OUTPATIENT
Start: 2018-03-28 | End: 2018-04-07

## 2018-03-28 ASSESSMENT — ENCOUNTER SYMPTOMS
CHEST TIGHTNESS: 0
GASTROINTESTINAL NEGATIVE: 1
COUGH: 1
RHINORRHEA: 1
WHEEZING: 1
EYES NEGATIVE: 1
SHORTNESS OF BREATH: 1

## 2018-03-28 NOTE — PROGRESS NOTES
MOUTH TWO TIMES A DAY 60 capsule 3    ipratropium-albuterol (DUONEB) 0.5-2.5 (3) MG/3ML SOLN nebulizer solution inhale one vial via nebulizer every 4 hours 360 mL 1    BREO ELLIPTA 100-25 MCG/INH AEPB inhaler INHALE ONE PUFF BY MOUTH EVERY DAY 1 each 3    ondansetron (ZOFRAN) 8 MG tablet TAKE ONE TABLET BY MOUTH EVERY 8 HOURS AS NEEDED nausea or vomiting 40 tablet 3    allopurinol (ZYLOPRIM) 100 MG tablet Take 1 tablet by mouth daily 30 tablet 5    DULoxetine (CYMBALTA) 60 MG extended release capsule TAKE ONE CAPSULE BY MOUTH TWICE DAILY AS DIRECTED IN THE MORNING AND AFTERNOON  1    meloxicam (MOBIC) 7.5 MG tablet TAKE ONE TABLET BY MOUTH TWO TIMES A DAY WITH MEALS TAKE ONLY WITH FOOD AS NEEDED  3    prazosin (MINIPRESS) 1 MG capsule TAKE ONE CAPSULE BY MOUTH AT BEDTIME  1    vitamin D (ERGOCALCIFEROL) 44138 units CAPS capsule take 1 capsule by mouth once a week  3    FREESTYLE LITE strip TEST THREE TIMES DAILY  1    melatonin 3 MG TABS tablet TAKE ONE TABLET BY MOUTH AT BEDTIME  1    pentazocine-naloxone (TALWIN NX) 50-0.5 MG per tablet TAKE ONE TABLET BY MOUTH EVERY DAY AS NEEDED FOR PAIN  0    baclofen (LIORESAL) 20 MG tablet       LYRICA 150 MG capsule 150 mg 3 times daily Indications: last filled 3/5/17 no refills       dicyclomine (BENTYL) 10 MG capsule TAKE ONE CAPSULE BY MOUTH FOUR TIMES A DAY BEFORE MEALS AND NIGHTLY 120 capsule 5     No current facility-administered medications for this visit.       Current Outpatient Prescriptions on File Prior to Visit   Medication Sig Dispense Refill    metFORMIN (GLUCOPHAGE) 500 MG tablet TAKE ONE TABLET BY MOUTH TWICE A DAY WITH MEALS 60 tablet 0    montelukast (SINGULAIR) 10 MG tablet take 1 tablet by mouth nightly 30 tablet 0    potassium chloride (MICRO-K) 10 MEQ extended release capsule TAKE ONE CAPSULE BY MOUTH EVERY DAY 30 capsule 0    triamterene-hydrochlorothiazide (MAXZIDE) 75-50 MG per tablet TAKE ONE TABLET BY MOUTH EVERY DAY 30 tablet 0 Psychiatric/Behavioral: Negative. Physical Exam  Vitals:    03/28/18 1128   BP: 116/60   Pulse: 92   Temp: 98 °F (36.7 °C)   TempSrc: Oral   SpO2: 95%   Weight: (!) 305 lb (138.3 kg)   Height: 5' 2\" (1.575 m)       Physical Exam   Constitutional: She appears well-developed and well-nourished. HENT:   Right Ear: External ear normal.   Left Ear: External ear normal.   Eyes: Conjunctivae are normal. Pupils are equal, round, and reactive to light. Neck: Normal range of motion. Neck supple. No thyromegaly present. Cardiovascular: Normal rate, regular rhythm and normal heart sounds. No murmur heard. Pulmonary/Chest: Effort normal. She has wheezes. Abdominal: Soft. Bowel sounds are normal. She exhibits no distension. There is no tenderness. Musculoskeletal: Normal range of motion. She exhibits no edema or tenderness. Lymphadenopathy:     She has no cervical adenopathy. Neurological: She is alert. No cranial nerve deficit. Coordination normal.       Assessment  1. Moderate asthma with acute exacerbation, unspecified whether persistent  POCT Influenza A/B    XR CHEST STANDARD (2 VW)   2. Bronchitis  POCT Influenza A/B    XR CHEST STANDARD (2 VW)   3.  Influenza A       Problem List     Asthma - Primary    Relevant Medications    albuterol (PROVENTIL) nebulizer solution 2.5 mg (Completed)    BREO ELLIPTA 100-25 MCG/INH AEPB inhaler    ipratropium-albuterol (DUONEB) 0.5-2.5 (3) MG/3ML SOLN nebulizer solution    montelukast (SINGULAIR) 10 MG tablet    VENTOLIN  (90 Base) MCG/ACT inhaler    Other Relevant Orders    POCT Influenza A/B (Completed)    XR CHEST STANDARD (2 VW)          Plan  Orders Placed This Encounter   Procedures    XR CHEST STANDARD (2 VW)     Standing Status:   Future     Standing Expiration Date:   3/28/2019    POCT Influenza A/B     Orders Placed This Encounter   Medications    cefdinir (OMNICEF) 300 MG capsule     Sig: Take 1 capsule by mouth 2 times daily for 10 days

## 2018-03-29 ENCOUNTER — TELEPHONE (OUTPATIENT)
Dept: PULMONOLOGY | Age: 53
End: 2018-03-29

## 2018-04-06 DIAGNOSIS — K50.919 CROHN'S DISEASE WITH COMPLICATION, UNSPECIFIED GASTROINTESTINAL TRACT LOCATION (HCC): ICD-10-CM

## 2018-04-06 RX ORDER — ZOLMITRIPTAN 5 MG/1
TABLET, FILM COATED ORAL
Qty: 9 TABLET | Refills: 3 | Status: SHIPPED | OUTPATIENT
Start: 2018-04-06 | End: 2018-07-09 | Stop reason: SDUPTHER

## 2018-04-06 RX ORDER — ONDANSETRON HYDROCHLORIDE 8 MG/1
TABLET, FILM COATED ORAL
Qty: 40 TABLET | Refills: 2 | Status: SHIPPED | OUTPATIENT
Start: 2018-04-06 | End: 2018-05-27 | Stop reason: SDUPTHER

## 2018-04-06 RX ORDER — HYOSCYAMINE SULFATE 0.125 MG
TABLET ORAL
Qty: 180 TABLET | Refills: 1 | Status: SHIPPED | OUTPATIENT
Start: 2018-04-06 | End: 2018-05-28 | Stop reason: SDUPTHER

## 2018-04-11 ENCOUNTER — OFFICE VISIT (OUTPATIENT)
Dept: PULMONOLOGY | Age: 53
End: 2018-04-11
Payer: COMMERCIAL

## 2018-04-11 VITALS
RESPIRATION RATE: 18 BRPM | SYSTOLIC BLOOD PRESSURE: 138 MMHG | TEMPERATURE: 97.2 F | HEIGHT: 62 IN | OXYGEN SATURATION: 97 % | HEART RATE: 92 BPM | DIASTOLIC BLOOD PRESSURE: 82 MMHG | WEIGHT: 293 LBS | BODY MASS INDEX: 53.92 KG/M2

## 2018-04-11 DIAGNOSIS — K21.9 GASTROESOPHAGEAL REFLUX DISEASE, ESOPHAGITIS PRESENCE NOT SPECIFIED: ICD-10-CM

## 2018-04-11 DIAGNOSIS — J45.40 MODERATE PERSISTENT ASTHMA WITHOUT COMPLICATION: Primary | ICD-10-CM

## 2018-04-11 PROCEDURE — 1036F TOBACCO NON-USER: CPT | Performed by: INTERNAL MEDICINE

## 2018-04-11 PROCEDURE — 3017F COLORECTAL CA SCREEN DOC REV: CPT | Performed by: INTERNAL MEDICINE

## 2018-04-11 PROCEDURE — G8417 CALC BMI ABV UP PARAM F/U: HCPCS | Performed by: INTERNAL MEDICINE

## 2018-04-11 PROCEDURE — 99214 OFFICE O/P EST MOD 30 MIN: CPT | Performed by: INTERNAL MEDICINE

## 2018-04-11 PROCEDURE — 3014F SCREEN MAMMO DOC REV: CPT | Performed by: INTERNAL MEDICINE

## 2018-04-11 PROCEDURE — G8427 DOCREV CUR MEDS BY ELIG CLIN: HCPCS | Performed by: INTERNAL MEDICINE

## 2018-04-11 ASSESSMENT — ENCOUNTER SYMPTOMS
NAUSEA: 0
SINUS PRESSURE: 0
WHEEZING: 0
DIARRHEA: 0
CHEST TIGHTNESS: 0
COUGH: 0
SHORTNESS OF BREATH: 0
VOMITING: 0
RHINORRHEA: 0
SORE THROAT: 0
ABDOMINAL PAIN: 0

## 2018-04-26 ENCOUNTER — HOSPITAL ENCOUNTER (OUTPATIENT)
Dept: CT IMAGING | Age: 53
Discharge: HOME | End: 2018-04-28
Payer: COMMERCIAL

## 2018-04-26 VITALS
BODY MASS INDEX: 53.92 KG/M2 | HEIGHT: 62 IN | HEART RATE: 84 BPM | RESPIRATION RATE: 16 BRPM | DIASTOLIC BLOOD PRESSURE: 79 MMHG | SYSTOLIC BLOOD PRESSURE: 124 MMHG | WEIGHT: 293 LBS

## 2018-04-26 DIAGNOSIS — K52.9 INFLAMMATORY BOWEL DISEASE: ICD-10-CM

## 2018-04-26 PROCEDURE — 74176 CT ABD & PELVIS W/O CONTRAST: CPT

## 2018-04-26 PROCEDURE — 2500000003 HC RX 250 WO HCPCS: Performed by: INTERNAL MEDICINE

## 2018-04-26 RX ADMIN — BARIUM SULFATE 450 ML: 1 SUSPENSION ORAL at 12:28

## 2018-04-26 RX ADMIN — BARIUM SULFATE 450 ML: 1 SUSPENSION ORAL at 12:27

## 2018-04-26 RX ADMIN — BARIUM SULFATE 450 ML: 1 SUSPENSION ORAL at 12:29

## 2018-04-27 RX ORDER — IPRATROPIUM BROMIDE AND ALBUTEROL SULFATE 2.5; .5 MG/3ML; MG/3ML
SOLUTION RESPIRATORY (INHALATION)
Qty: 360 ML | Refills: 1 | Status: SHIPPED | OUTPATIENT
Start: 2018-04-27 | End: 2018-06-08 | Stop reason: SDUPTHER

## 2018-05-01 DIAGNOSIS — E11.42 TYPE 2 DIABETES MELLITUS WITH DIABETIC POLYNEUROPATHY, WITHOUT LONG-TERM CURRENT USE OF INSULIN (HCC): ICD-10-CM

## 2018-05-01 DIAGNOSIS — I10 BENIGN ESSENTIAL HTN: ICD-10-CM

## 2018-05-01 RX ORDER — COLCHICINE 0.6 MG/1
TABLET ORAL
Qty: 30 TABLET | Refills: 3 | Status: SHIPPED | OUTPATIENT
Start: 2018-05-01 | End: 2018-08-08 | Stop reason: SDUPTHER

## 2018-05-01 RX ORDER — TRIAMTERENE AND HYDROCHLOROTHIAZIDE 75; 50 MG/1; MG/1
TABLET ORAL
Qty: 30 TABLET | Refills: 5 | Status: SHIPPED | OUTPATIENT
Start: 2018-05-01 | End: 2018-09-05 | Stop reason: ALTCHOICE

## 2018-05-01 RX ORDER — POTASSIUM CHLORIDE 750 MG/1
CAPSULE, EXTENDED RELEASE ORAL
Qty: 30 CAPSULE | Refills: 5 | Status: SHIPPED | OUTPATIENT
Start: 2018-05-01 | End: 2018-09-05 | Stop reason: ALTCHOICE

## 2018-05-03 ENCOUNTER — HOSPITAL ENCOUNTER (OUTPATIENT)
Dept: PREADMISSION TESTING | Age: 53
Discharge: HOME | End: 2018-05-07
Payer: COMMERCIAL

## 2018-05-03 VITALS
HEART RATE: 95 BPM | WEIGHT: 293 LBS | OXYGEN SATURATION: 96 % | HEIGHT: 62 IN | SYSTOLIC BLOOD PRESSURE: 136 MMHG | TEMPERATURE: 97.2 F | DIASTOLIC BLOOD PRESSURE: 71 MMHG | BODY MASS INDEX: 53.92 KG/M2 | RESPIRATION RATE: 16 BRPM

## 2018-05-03 DIAGNOSIS — R25.1 OCCASIONAL TREMORS: Chronic | ICD-10-CM

## 2018-05-03 DIAGNOSIS — G56.02 CARPAL TUNNEL SYNDROME, LEFT: ICD-10-CM

## 2018-05-03 DIAGNOSIS — J10.1 INFLUENZA A: Chronic | ICD-10-CM

## 2018-05-03 LAB
ANION GAP SERPL CALCULATED.3IONS-SCNC: 16 MEQ/L (ref 7–13)
BUN BLDV-MCNC: 21 MG/DL (ref 6–20)
CALCIUM SERPL-MCNC: 8.3 MG/DL (ref 8.6–10.2)
CHLORIDE BLD-SCNC: 101 MEQ/L (ref 98–107)
CO2: 25 MEQ/L (ref 22–29)
CREAT SERPL-MCNC: 1.29 MG/DL (ref 0.5–0.9)
EKG ATRIAL RATE: 89 BPM
EKG P AXIS: 36 DEGREES
EKG P-R INTERVAL: 152 MS
EKG Q-T INTERVAL: 376 MS
EKG QRS DURATION: 78 MS
EKG QTC CALCULATION (BAZETT): 457 MS
EKG R AXIS: 55 DEGREES
EKG T AXIS: 48 DEGREES
EKG VENTRICULAR RATE: 89 BPM
GFR AFRICAN AMERICAN: 52.1
GFR NON-AFRICAN AMERICAN: 43
GLUCOSE BLD-MCNC: 145 MG/DL (ref 74–109)
HCT VFR BLD CALC: 29.8 % (ref 37–47)
HEMOGLOBIN: 9.8 G/DL (ref 12–16)
MCH RBC QN AUTO: 26.3 PG (ref 27–31.3)
MCHC RBC AUTO-ENTMCNC: 32.8 % (ref 33–37)
MCV RBC AUTO: 80.2 FL (ref 82–100)
PDW BLD-RTO: 18.4 % (ref 11.5–14.5)
PLATELET # BLD: 299 K/UL (ref 130–400)
POTASSIUM SERPL-SCNC: 4.2 MEQ/L (ref 3.5–5.1)
RBC # BLD: 3.72 M/UL (ref 4.2–5.4)
SODIUM BLD-SCNC: 142 MEQ/L (ref 132–144)
WBC # BLD: 6.9 K/UL (ref 4.8–10.8)

## 2018-05-03 PROCEDURE — 93005 ELECTROCARDIOGRAM TRACING: CPT

## 2018-05-03 PROCEDURE — 80048 BASIC METABOLIC PNL TOTAL CA: CPT

## 2018-05-03 PROCEDURE — 85027 COMPLETE CBC AUTOMATED: CPT

## 2018-05-03 RX ORDER — SODIUM CHLORIDE 0.9 % (FLUSH) 0.9 %
10 SYRINGE (ML) INJECTION PRN
Status: CANCELLED | OUTPATIENT
Start: 2018-05-03

## 2018-05-03 RX ORDER — LIDOCAINE HYDROCHLORIDE 10 MG/ML
1 INJECTION, SOLUTION EPIDURAL; INFILTRATION; INTRACAUDAL; PERINEURAL
Status: CANCELLED | OUTPATIENT
Start: 2018-05-03 | End: 2018-05-03

## 2018-05-03 RX ORDER — SODIUM CHLORIDE 0.9 % (FLUSH) 0.9 %
10 SYRINGE (ML) INJECTION EVERY 12 HOURS SCHEDULED
Status: CANCELLED | OUTPATIENT
Start: 2018-05-03

## 2018-05-03 RX ORDER — SODIUM CHLORIDE, SODIUM LACTATE, POTASSIUM CHLORIDE, CALCIUM CHLORIDE 600; 310; 30; 20 MG/100ML; MG/100ML; MG/100ML; MG/100ML
INJECTION, SOLUTION INTRAVENOUS CONTINUOUS
Status: CANCELLED | OUTPATIENT
Start: 2018-05-03

## 2018-05-03 ASSESSMENT — ENCOUNTER SYMPTOMS
SHORTNESS OF BREATH: 0
BLURRED VISION: 0
ABDOMINAL PAIN: 1
WHEEZING: 0
DOUBLE VISION: 0
CONSTIPATION: 0
NAUSEA: 0
DIARRHEA: 0
COUGH: 0
SORE THROAT: 0
EYES NEGATIVE: 1
STRIDOR: 0
BACK PAIN: 0
HEARTBURN: 0

## 2018-05-04 DIAGNOSIS — J45.20 MILD INTERMITTENT ASTHMA WITHOUT COMPLICATION: ICD-10-CM

## 2018-05-04 DIAGNOSIS — E11.42 TYPE 2 DIABETES MELLITUS WITH DIABETIC POLYNEUROPATHY, WITHOUT LONG-TERM CURRENT USE OF INSULIN (HCC): ICD-10-CM

## 2018-05-04 PROCEDURE — 93010 ELECTROCARDIOGRAM REPORT: CPT | Performed by: INTERNAL MEDICINE

## 2018-05-04 RX ORDER — BLOOD-GLUCOSE METER
KIT MISCELLANEOUS
Qty: 50 EACH | Refills: 5 | Status: SHIPPED | OUTPATIENT
Start: 2018-05-04 | End: 2018-08-14 | Stop reason: SDUPTHER

## 2018-05-05 RX ORDER — MONTELUKAST SODIUM 10 MG/1
TABLET ORAL
Qty: 30 TABLET | Refills: 5 | Status: SHIPPED | OUTPATIENT
Start: 2018-05-05 | End: 2018-10-26 | Stop reason: SDUPTHER

## 2018-05-08 DIAGNOSIS — I10 BENIGN ESSENTIAL HTN: Primary | ICD-10-CM

## 2018-05-08 DIAGNOSIS — E11.42 TYPE 2 DIABETES MELLITUS WITH DIABETIC POLYNEUROPATHY, WITHOUT LONG-TERM CURRENT USE OF INSULIN (HCC): ICD-10-CM

## 2018-05-08 DIAGNOSIS — D64.9 ANEMIA, UNSPECIFIED TYPE: ICD-10-CM

## 2018-05-09 DIAGNOSIS — E11.42 TYPE 2 DIABETES MELLITUS WITH DIABETIC POLYNEUROPATHY, WITHOUT LONG-TERM CURRENT USE OF INSULIN (HCC): ICD-10-CM

## 2018-05-09 DIAGNOSIS — I10 BENIGN ESSENTIAL HTN: ICD-10-CM

## 2018-05-09 DIAGNOSIS — D64.9 ANEMIA, UNSPECIFIED TYPE: ICD-10-CM

## 2018-05-09 LAB
ALBUMIN SERPL-MCNC: 4.2 G/DL (ref 3.9–4.9)
ALP BLD-CCNC: 107 U/L (ref 40–130)
ALT SERPL-CCNC: 11 U/L (ref 0–33)
ANION GAP SERPL CALCULATED.3IONS-SCNC: 17 MEQ/L (ref 7–13)
AST SERPL-CCNC: 17 U/L (ref 0–35)
BASOPHILS ABSOLUTE: 0.1 K/UL (ref 0–0.2)
BASOPHILS RELATIVE PERCENT: 0.9 %
BILIRUB SERPL-MCNC: <0.2 MG/DL (ref 0–1.2)
BUN BLDV-MCNC: 17 MG/DL (ref 6–20)
CALCIUM SERPL-MCNC: 8.9 MG/DL (ref 8.6–10.2)
CHLORIDE BLD-SCNC: 101 MEQ/L (ref 98–107)
CO2: 26 MEQ/L (ref 22–29)
CREAT SERPL-MCNC: 0.95 MG/DL (ref 0.5–0.9)
EOSINOPHILS ABSOLUTE: 0.1 K/UL (ref 0–0.7)
EOSINOPHILS RELATIVE PERCENT: 1.2 %
GFR AFRICAN AMERICAN: >60
GFR NON-AFRICAN AMERICAN: >60
GLOBULIN: 2.3 G/DL (ref 2.3–3.5)
GLUCOSE BLD-MCNC: 135 MG/DL (ref 74–109)
HBA1C MFR BLD: 6.7 % (ref 4.8–5.9)
HCT VFR BLD CALC: 29.5 % (ref 37–47)
HEMOGLOBIN: 9.6 G/DL (ref 12–16)
LYMPHOCYTES ABSOLUTE: 1.3 K/UL (ref 1–4.8)
LYMPHOCYTES RELATIVE PERCENT: 16.2 %
MCH RBC QN AUTO: 26.3 PG (ref 27–31.3)
MCHC RBC AUTO-ENTMCNC: 32.6 % (ref 33–37)
MCV RBC AUTO: 80.8 FL (ref 82–100)
MONOCYTES ABSOLUTE: 1 K/UL (ref 0.2–0.8)
MONOCYTES RELATIVE PERCENT: 12.6 %
NEUTROPHILS ABSOLUTE: 5.5 K/UL (ref 1.4–6.5)
NEUTROPHILS RELATIVE PERCENT: 69.1 %
PDW BLD-RTO: 18.5 % (ref 11.5–14.5)
PLATELET # BLD: 293 K/UL (ref 130–400)
POTASSIUM SERPL-SCNC: 4.1 MEQ/L (ref 3.5–5.1)
RBC # BLD: 3.65 M/UL (ref 4.2–5.4)
SODIUM BLD-SCNC: 144 MEQ/L (ref 132–144)
TOTAL PROTEIN: 6.5 G/DL (ref 6.4–8.1)
WBC # BLD: 8 K/UL (ref 4.8–10.8)

## 2018-05-10 ENCOUNTER — HOSPITAL ENCOUNTER (OUTPATIENT)
Age: 53
Setting detail: OUTPATIENT SURGERY
Discharge: HOME | End: 2018-05-10
Attending: ORTHOPAEDIC SURGERY | Admitting: ORTHOPAEDIC SURGERY
Payer: COMMERCIAL

## 2018-05-10 ENCOUNTER — ANESTHESIA (OUTPATIENT)
Dept: OPERATING ROOM | Age: 53
End: 2018-05-10
Payer: COMMERCIAL

## 2018-05-10 ENCOUNTER — ANESTHESIA EVENT (OUTPATIENT)
Dept: OPERATING ROOM | Age: 53
End: 2018-05-10
Payer: COMMERCIAL

## 2018-05-10 VITALS
OXYGEN SATURATION: 94 % | RESPIRATION RATE: 16 BRPM | WEIGHT: 293 LBS | HEIGHT: 62 IN | DIASTOLIC BLOOD PRESSURE: 77 MMHG | BODY MASS INDEX: 53.92 KG/M2 | SYSTOLIC BLOOD PRESSURE: 151 MMHG | TEMPERATURE: 97.2 F | HEART RATE: 77 BPM

## 2018-05-10 VITALS — OXYGEN SATURATION: 99 % | DIASTOLIC BLOOD PRESSURE: 81 MMHG | TEMPERATURE: 97.7 F | SYSTOLIC BLOOD PRESSURE: 151 MMHG

## 2018-05-10 DIAGNOSIS — G56.01 CARPAL TUNNEL SYNDROME OF RIGHT WRIST: Primary | ICD-10-CM

## 2018-05-10 LAB
GLUCOSE BLD-MCNC: 137 MG/DL (ref 60–115)
GLUCOSE BLD-MCNC: 153 MG/DL (ref 60–115)
PERFORMED ON: ABNORMAL
PERFORMED ON: ABNORMAL

## 2018-05-10 PROCEDURE — 3700000001 HC ADD 15 MINUTES (ANESTHESIA): Performed by: ORTHOPAEDIC SURGERY

## 2018-05-10 PROCEDURE — 3700000000 HC ANESTHESIA ATTENDED CARE: Performed by: ORTHOPAEDIC SURGERY

## 2018-05-10 PROCEDURE — 3600000003 HC SURGERY LEVEL 3 BASE: Performed by: ORTHOPAEDIC SURGERY

## 2018-05-10 PROCEDURE — 2580000003 HC RX 258: Performed by: ORTHOPAEDIC SURGERY

## 2018-05-10 PROCEDURE — 7100000000 HC PACU RECOVERY - FIRST 15 MIN: Performed by: ORTHOPAEDIC SURGERY

## 2018-05-10 PROCEDURE — 6360000002 HC RX W HCPCS: Performed by: ORTHOPAEDIC SURGERY

## 2018-05-10 PROCEDURE — 3600000013 HC SURGERY LEVEL 3 ADDTL 15MIN: Performed by: ORTHOPAEDIC SURGERY

## 2018-05-10 PROCEDURE — 2500000003 HC RX 250 WO HCPCS: Performed by: ORTHOPAEDIC SURGERY

## 2018-05-10 PROCEDURE — 7100000001 HC PACU RECOVERY - ADDTL 15 MIN: Performed by: ORTHOPAEDIC SURGERY

## 2018-05-10 PROCEDURE — 2500000003 HC RX 250 WO HCPCS: Performed by: NURSE ANESTHETIST, CERTIFIED REGISTERED

## 2018-05-10 PROCEDURE — 7100000010 HC PHASE II RECOVERY - FIRST 15 MIN: Performed by: ORTHOPAEDIC SURGERY

## 2018-05-10 PROCEDURE — 2580000003 HC RX 258: Performed by: NURSE PRACTITIONER

## 2018-05-10 PROCEDURE — 6360000002 HC RX W HCPCS: Performed by: NURSE ANESTHETIST, CERTIFIED REGISTERED

## 2018-05-10 PROCEDURE — 7100000011 HC PHASE II RECOVERY - ADDTL 15 MIN: Performed by: ORTHOPAEDIC SURGERY

## 2018-05-10 PROCEDURE — 6370000000 HC RX 637 (ALT 250 FOR IP)

## 2018-05-10 RX ORDER — OXYCODONE HYDROCHLORIDE AND ACETAMINOPHEN 5; 325 MG/1; MG/1
1 TABLET ORAL EVERY 4 HOURS PRN
Status: DISCONTINUED | OUTPATIENT
Start: 2018-05-10 | End: 2018-05-10 | Stop reason: HOSPADM

## 2018-05-10 RX ORDER — DIPHENHYDRAMINE HYDROCHLORIDE 50 MG/ML
12.5 INJECTION INTRAMUSCULAR; INTRAVENOUS
Status: DISCONTINUED | OUTPATIENT
Start: 2018-05-10 | End: 2018-05-10 | Stop reason: HOSPADM

## 2018-05-10 RX ORDER — FENTANYL CITRATE 50 UG/ML
50 INJECTION, SOLUTION INTRAMUSCULAR; INTRAVENOUS EVERY 10 MIN PRN
Status: DISCONTINUED | OUTPATIENT
Start: 2018-05-10 | End: 2018-05-10 | Stop reason: HOSPADM

## 2018-05-10 RX ORDER — SODIUM CHLORIDE 9 MG/ML
50 INJECTION, SOLUTION INTRAVENOUS CONTINUOUS
Status: DISCONTINUED | OUTPATIENT
Start: 2018-05-10 | End: 2018-05-10 | Stop reason: HOSPADM

## 2018-05-10 RX ORDER — HYDROCODONE BITARTRATE AND ACETAMINOPHEN 5; 325 MG/1; MG/1
1 TABLET ORAL PRN
Status: DISCONTINUED | OUTPATIENT
Start: 2018-05-10 | End: 2018-05-10 | Stop reason: HOSPADM

## 2018-05-10 RX ORDER — LIDOCAINE HYDROCHLORIDE 10 MG/ML
1 INJECTION, SOLUTION EPIDURAL; INFILTRATION; INTRACAUDAL; PERINEURAL
Status: DISCONTINUED | OUTPATIENT
Start: 2018-05-10 | End: 2018-05-10 | Stop reason: HOSPADM

## 2018-05-10 RX ORDER — METOCLOPRAMIDE HYDROCHLORIDE 5 MG/ML
10 INJECTION INTRAMUSCULAR; INTRAVENOUS
Status: DISCONTINUED | OUTPATIENT
Start: 2018-05-10 | End: 2018-05-10 | Stop reason: HOSPADM

## 2018-05-10 RX ORDER — PENTAZOCINE HYDROCHLORIDE AND NALOXONE HYDROCHLORIDE 50; .5 MG/1; MG/1
1 TABLET ORAL EVERY 4 HOURS PRN
Qty: 30 TABLET | Refills: 0 | Status: SHIPPED | OUTPATIENT
Start: 2018-05-10 | End: 2018-05-15

## 2018-05-10 RX ORDER — MEPERIDINE HYDROCHLORIDE 25 MG/ML
12.5 INJECTION INTRAMUSCULAR; INTRAVENOUS; SUBCUTANEOUS EVERY 5 MIN PRN
Status: DISCONTINUED | OUTPATIENT
Start: 2018-05-10 | End: 2018-05-10 | Stop reason: HOSPADM

## 2018-05-10 RX ORDER — SODIUM CHLORIDE 0.9 % (FLUSH) 0.9 %
10 SYRINGE (ML) INJECTION EVERY 12 HOURS SCHEDULED
Status: DISCONTINUED | OUTPATIENT
Start: 2018-05-10 | End: 2018-05-10 | Stop reason: HOSPADM

## 2018-05-10 RX ORDER — SODIUM CHLORIDE 0.9 % (FLUSH) 0.9 %
10 SYRINGE (ML) INJECTION PRN
Status: DISCONTINUED | OUTPATIENT
Start: 2018-05-10 | End: 2018-05-10 | Stop reason: HOSPADM

## 2018-05-10 RX ORDER — GINSENG 100 MG
CAPSULE ORAL PRN
Status: DISCONTINUED | OUTPATIENT
Start: 2018-05-10 | End: 2018-05-10 | Stop reason: HOSPADM

## 2018-05-10 RX ORDER — ONDANSETRON 2 MG/ML
4 INJECTION INTRAMUSCULAR; INTRAVENOUS EVERY 6 HOURS PRN
Status: DISCONTINUED | OUTPATIENT
Start: 2018-05-10 | End: 2018-05-10 | Stop reason: HOSPADM

## 2018-05-10 RX ORDER — LIDOCAINE HYDROCHLORIDE 5 MG/ML
INJECTION, SOLUTION INFILTRATION; INTRAVENOUS PRN
Status: DISCONTINUED | OUTPATIENT
Start: 2018-05-10 | End: 2018-05-10 | Stop reason: SDUPTHER

## 2018-05-10 RX ORDER — SODIUM CHLORIDE, SODIUM LACTATE, POTASSIUM CHLORIDE, CALCIUM CHLORIDE 600; 310; 30; 20 MG/100ML; MG/100ML; MG/100ML; MG/100ML
INJECTION, SOLUTION INTRAVENOUS CONTINUOUS
Status: DISCONTINUED | OUTPATIENT
Start: 2018-05-10 | End: 2018-05-10 | Stop reason: HOSPADM

## 2018-05-10 RX ORDER — PROPOFOL 10 MG/ML
INJECTION, EMULSION INTRAVENOUS CONTINUOUS PRN
Status: DISCONTINUED | OUTPATIENT
Start: 2018-05-10 | End: 2018-05-10 | Stop reason: SDUPTHER

## 2018-05-10 RX ORDER — ONDANSETRON 2 MG/ML
4 INJECTION INTRAMUSCULAR; INTRAVENOUS
Status: DISCONTINUED | OUTPATIENT
Start: 2018-05-10 | End: 2018-05-10 | Stop reason: HOSPADM

## 2018-05-10 RX ORDER — MORPHINE SULFATE 4 MG/ML
4 INJECTION, SOLUTION INTRAMUSCULAR; INTRAVENOUS
Status: DISCONTINUED | OUTPATIENT
Start: 2018-05-10 | End: 2018-05-10 | Stop reason: HOSPADM

## 2018-05-10 RX ORDER — SODIUM CHLORIDE, SODIUM LACTATE, POTASSIUM CHLORIDE, CALCIUM CHLORIDE 600; 310; 30; 20 MG/100ML; MG/100ML; MG/100ML; MG/100ML
INJECTION, SOLUTION INTRAVENOUS CONTINUOUS
Status: DISCONTINUED | OUTPATIENT
Start: 2018-05-10 | End: 2018-05-10 | Stop reason: SDUPTHER

## 2018-05-10 RX ORDER — BUPIVACAINE HYDROCHLORIDE 5 MG/ML
INJECTION, SOLUTION EPIDURAL; INTRACAUDAL PRN
Status: DISCONTINUED | OUTPATIENT
Start: 2018-05-10 | End: 2018-05-10 | Stop reason: HOSPADM

## 2018-05-10 RX ORDER — ACETAMINOPHEN 325 MG/1
650 TABLET ORAL EVERY 4 HOURS PRN
Status: DISCONTINUED | OUTPATIENT
Start: 2018-05-10 | End: 2018-05-10 | Stop reason: HOSPADM

## 2018-05-10 RX ORDER — MORPHINE SULFATE 2 MG/ML
2 INJECTION, SOLUTION INTRAMUSCULAR; INTRAVENOUS
Status: DISCONTINUED | OUTPATIENT
Start: 2018-05-10 | End: 2018-05-10 | Stop reason: HOSPADM

## 2018-05-10 RX ORDER — HYDROCODONE BITARTRATE AND ACETAMINOPHEN 5; 325 MG/1; MG/1
2 TABLET ORAL PRN
Status: DISCONTINUED | OUTPATIENT
Start: 2018-05-10 | End: 2018-05-10 | Stop reason: HOSPADM

## 2018-05-10 RX ORDER — OXYCODONE HYDROCHLORIDE AND ACETAMINOPHEN 5; 325 MG/1; MG/1
2 TABLET ORAL EVERY 4 HOURS PRN
Status: DISCONTINUED | OUTPATIENT
Start: 2018-05-10 | End: 2018-05-10 | Stop reason: HOSPADM

## 2018-05-10 RX ORDER — LIDOCAINE HYDROCHLORIDE 10 MG/ML
1 INJECTION, SOLUTION EPIDURAL; INFILTRATION; INTRACAUDAL; PERINEURAL
Status: DISCONTINUED | OUTPATIENT
Start: 2018-05-10 | End: 2018-05-10 | Stop reason: SDUPTHER

## 2018-05-10 RX ORDER — MAGNESIUM HYDROXIDE 1200 MG/15ML
LIQUID ORAL CONTINUOUS PRN
Status: DISCONTINUED | OUTPATIENT
Start: 2018-05-10 | End: 2018-05-10 | Stop reason: HOSPADM

## 2018-05-10 RX ADMIN — LIDOCAINE HYDROCHLORIDE 30 ML: 5 INJECTION, SOLUTION INFILTRATION at 09:39

## 2018-05-10 RX ADMIN — SODIUM CHLORIDE, POTASSIUM CHLORIDE, SODIUM LACTATE AND CALCIUM CHLORIDE: 600; 310; 30; 20 INJECTION, SOLUTION INTRAVENOUS at 09:11

## 2018-05-10 RX ADMIN — PROPOFOL 50 MCG/KG/MIN: 10 INJECTION, EMULSION INTRAVENOUS at 09:29

## 2018-05-10 RX ADMIN — VANCOMYCIN HYDROCHLORIDE 1 G: 1 INJECTION, POWDER, LYOPHILIZED, FOR SOLUTION INTRAVENOUS at 09:27

## 2018-05-10 ASSESSMENT — PULMONARY FUNCTION TESTS
PIF_VALUE: 0
PIF_VALUE: 1
PIF_VALUE: 0
PIF_VALUE: 1
PIF_VALUE: 0

## 2018-05-16 ENCOUNTER — OFFICE VISIT (OUTPATIENT)
Dept: FAMILY MEDICINE CLINIC | Age: 53
End: 2018-05-16
Payer: COMMERCIAL

## 2018-05-16 VITALS
BODY MASS INDEX: 51.91 KG/M2 | RESPIRATION RATE: 18 BRPM | TEMPERATURE: 98.1 F | WEIGHT: 293 LBS | DIASTOLIC BLOOD PRESSURE: 62 MMHG | HEART RATE: 96 BPM | SYSTOLIC BLOOD PRESSURE: 118 MMHG | HEIGHT: 63 IN | OXYGEN SATURATION: 96 %

## 2018-05-16 DIAGNOSIS — F60.3 BORDERLINE PERSONALITY DISORDER (HCC): ICD-10-CM

## 2018-05-16 DIAGNOSIS — D64.9 ANEMIA, UNSPECIFIED TYPE: ICD-10-CM

## 2018-05-16 DIAGNOSIS — I10 BENIGN ESSENTIAL HTN: ICD-10-CM

## 2018-05-16 DIAGNOSIS — E11.42 TYPE 2 DIABETES MELLITUS WITH DIABETIC POLYNEUROPATHY, WITHOUT LONG-TERM CURRENT USE OF INSULIN (HCC): Primary | ICD-10-CM

## 2018-05-16 DIAGNOSIS — R60.0 LEG EDEMA: ICD-10-CM

## 2018-05-16 DIAGNOSIS — F33.2 SEVERE EPISODE OF RECURRENT MAJOR DEPRESSIVE DISORDER, WITHOUT PSYCHOTIC FEATURES (HCC): ICD-10-CM

## 2018-05-16 DIAGNOSIS — K50.919 CROHN'S DISEASE WITH COMPLICATION, UNSPECIFIED GASTROINTESTINAL TRACT LOCATION (HCC): ICD-10-CM

## 2018-05-16 DIAGNOSIS — M79.7 FIBROMYALGIA: ICD-10-CM

## 2018-05-16 DIAGNOSIS — E78.2 MIXED HYPERLIPIDEMIA: ICD-10-CM

## 2018-05-16 PROBLEM — J10.1 INFLUENZA A: Chronic | Status: RESOLVED | Noted: 2018-05-03 | Resolved: 2018-05-16

## 2018-05-16 PROCEDURE — G8427 DOCREV CUR MEDS BY ELIG CLIN: HCPCS | Performed by: FAMILY MEDICINE

## 2018-05-16 PROCEDURE — G8417 CALC BMI ABV UP PARAM F/U: HCPCS | Performed by: FAMILY MEDICINE

## 2018-05-16 PROCEDURE — 3044F HG A1C LEVEL LT 7.0%: CPT | Performed by: FAMILY MEDICINE

## 2018-05-16 PROCEDURE — 3017F COLORECTAL CA SCREEN DOC REV: CPT | Performed by: FAMILY MEDICINE

## 2018-05-16 PROCEDURE — 99214 OFFICE O/P EST MOD 30 MIN: CPT | Performed by: FAMILY MEDICINE

## 2018-05-16 PROCEDURE — 1036F TOBACCO NON-USER: CPT | Performed by: FAMILY MEDICINE

## 2018-05-16 PROCEDURE — 2022F DILAT RTA XM EVC RTNOPTHY: CPT | Performed by: FAMILY MEDICINE

## 2018-05-16 RX ORDER — HYDROXYZINE PAMOATE 50 MG/1
CAPSULE ORAL
Refills: 3 | COMMUNITY
Start: 2018-05-04 | End: 2019-02-11 | Stop reason: ALTCHOICE

## 2018-05-16 ASSESSMENT — PATIENT HEALTH QUESTIONNAIRE - PHQ9
1. LITTLE INTEREST OR PLEASURE IN DOING THINGS: 0
SUM OF ALL RESPONSES TO PHQ QUESTIONS 1-9: 1
2. FEELING DOWN, DEPRESSED OR HOPELESS: 1
SUM OF ALL RESPONSES TO PHQ9 QUESTIONS 1 & 2: 1

## 2018-05-18 DIAGNOSIS — K50.919 CROHN'S DISEASE WITH COMPLICATION, UNSPECIFIED GASTROINTESTINAL TRACT LOCATION (HCC): ICD-10-CM

## 2018-05-21 RX ORDER — MOMETASONE FUROATE 1 MG/G
CREAM TOPICAL
Qty: 45 G | Refills: 2 | Status: SHIPPED | OUTPATIENT
Start: 2018-05-21 | End: 2018-11-06 | Stop reason: SDUPTHER

## 2018-05-21 RX ORDER — OMEPRAZOLE 40 MG/1
CAPSULE, DELAYED RELEASE ORAL
Qty: 60 CAPSULE | Refills: 2 | Status: SHIPPED | OUTPATIENT
Start: 2018-05-21 | End: 2018-08-13 | Stop reason: SDUPTHER

## 2018-05-22 ENCOUNTER — OFFICE VISIT (OUTPATIENT)
Dept: INTERVENTIONAL RADIOLOGY/VASCULAR | Age: 53
End: 2018-05-22
Payer: COMMERCIAL

## 2018-05-22 VITALS
HEART RATE: 82 BPM | SYSTOLIC BLOOD PRESSURE: 130 MMHG | WEIGHT: 293 LBS | BODY MASS INDEX: 58.89 KG/M2 | OXYGEN SATURATION: 96 % | DIASTOLIC BLOOD PRESSURE: 72 MMHG | RESPIRATION RATE: 14 BRPM

## 2018-05-22 DIAGNOSIS — R63.5 RAPID WEIGHT GAIN: ICD-10-CM

## 2018-05-22 DIAGNOSIS — R60.1 GENERALIZED EDEMA: ICD-10-CM

## 2018-05-22 DIAGNOSIS — R60.0 BILATERAL EDEMA OF LOWER EXTREMITY: Primary | ICD-10-CM

## 2018-05-22 PROCEDURE — 3017F COLORECTAL CA SCREEN DOC REV: CPT | Performed by: NURSE PRACTITIONER

## 2018-05-22 PROCEDURE — 99244 OFF/OP CNSLTJ NEW/EST MOD 40: CPT | Performed by: NURSE PRACTITIONER

## 2018-05-22 PROCEDURE — G8427 DOCREV CUR MEDS BY ELIG CLIN: HCPCS | Performed by: NURSE PRACTITIONER

## 2018-05-22 PROCEDURE — G8417 CALC BMI ABV UP PARAM F/U: HCPCS | Performed by: NURSE PRACTITIONER

## 2018-05-22 ASSESSMENT — ENCOUNTER SYMPTOMS
DOUBLE VISION: 0
VOMITING: 0
ABDOMINAL PAIN: 0
COUGH: 0
WHEEZING: 1
CONSTIPATION: 0
SHORTNESS OF BREATH: 0
EYE PAIN: 0
SORE THROAT: 0
BLURRED VISION: 0
DIARRHEA: 1
SINUS PAIN: 0
BACK PAIN: 0
HEARTBURN: 0
NAUSEA: 0

## 2018-05-24 ENCOUNTER — OFFICE VISIT (OUTPATIENT)
Dept: CARDIOLOGY CLINIC | Age: 53
End: 2018-05-24
Payer: COMMERCIAL

## 2018-05-24 VITALS
SYSTOLIC BLOOD PRESSURE: 130 MMHG | OXYGEN SATURATION: 98 % | DIASTOLIC BLOOD PRESSURE: 84 MMHG | TEMPERATURE: 97.2 F | BODY MASS INDEX: 51.91 KG/M2 | HEART RATE: 79 BPM | WEIGHT: 293 LBS | HEIGHT: 63 IN | RESPIRATION RATE: 22 BRPM

## 2018-05-24 DIAGNOSIS — E78.2 MIXED HYPERLIPIDEMIA: ICD-10-CM

## 2018-05-24 DIAGNOSIS — R60.0 BILATERAL LEG EDEMA: ICD-10-CM

## 2018-05-24 DIAGNOSIS — Z76.89 ENCOUNTER TO ESTABLISH CARE: Primary | ICD-10-CM

## 2018-05-24 DIAGNOSIS — E66.9 OBESITY (BMI 35.0-39.9 WITHOUT COMORBIDITY): Chronic | ICD-10-CM

## 2018-05-24 DIAGNOSIS — I10 BENIGN ESSENTIAL HTN: ICD-10-CM

## 2018-05-24 PROCEDURE — 99204 OFFICE O/P NEW MOD 45 MIN: CPT | Performed by: INTERNAL MEDICINE

## 2018-05-24 PROCEDURE — G8417 CALC BMI ABV UP PARAM F/U: HCPCS | Performed by: INTERNAL MEDICINE

## 2018-05-24 PROCEDURE — 93000 ELECTROCARDIOGRAM COMPLETE: CPT | Performed by: INTERNAL MEDICINE

## 2018-05-24 PROCEDURE — G8427 DOCREV CUR MEDS BY ELIG CLIN: HCPCS | Performed by: INTERNAL MEDICINE

## 2018-05-24 PROCEDURE — 1036F TOBACCO NON-USER: CPT | Performed by: INTERNAL MEDICINE

## 2018-05-24 PROCEDURE — 3017F COLORECTAL CA SCREEN DOC REV: CPT | Performed by: INTERNAL MEDICINE

## 2018-05-24 RX ORDER — FUROSEMIDE 40 MG/1
40 TABLET ORAL 2 TIMES DAILY
Qty: 60 TABLET | Refills: 3 | Status: SHIPPED | OUTPATIENT
Start: 2018-05-24 | End: 2018-06-28 | Stop reason: SDUPTHER

## 2018-05-27 DIAGNOSIS — K50.919 CROHN'S DISEASE WITH COMPLICATION, UNSPECIFIED GASTROINTESTINAL TRACT LOCATION (HCC): ICD-10-CM

## 2018-05-28 DIAGNOSIS — K50.919 CROHN'S DISEASE WITH COMPLICATION, UNSPECIFIED GASTROINTESTINAL TRACT LOCATION (HCC): ICD-10-CM

## 2018-05-29 RX ORDER — HYOSCYAMINE SULFATE 0.125 MG
TABLET ORAL
Qty: 180 TABLET | Refills: 0 | Status: SHIPPED | OUTPATIENT
Start: 2018-05-29 | End: 2018-11-06 | Stop reason: SDUPTHER

## 2018-05-29 RX ORDER — ONDANSETRON HYDROCHLORIDE 8 MG/1
TABLET, FILM COATED ORAL
Qty: 40 TABLET | Refills: 1 | Status: SHIPPED | OUTPATIENT
Start: 2018-05-29 | End: 2018-09-07 | Stop reason: SDUPTHER

## 2018-06-04 ENCOUNTER — HOSPITAL ENCOUNTER (EMERGENCY)
Age: 53
Discharge: HOME | End: 2018-06-04
Attending: EMERGENCY MEDICINE
Payer: COMMERCIAL

## 2018-06-04 ENCOUNTER — TELEPHONE (OUTPATIENT)
Dept: CARDIOLOGY CLINIC | Age: 53
End: 2018-06-04

## 2018-06-04 VITALS
OXYGEN SATURATION: 98 % | RESPIRATION RATE: 18 BRPM | HEIGHT: 62 IN | BODY MASS INDEX: 53.92 KG/M2 | TEMPERATURE: 98.6 F | SYSTOLIC BLOOD PRESSURE: 160 MMHG | HEART RATE: 78 BPM | DIASTOLIC BLOOD PRESSURE: 74 MMHG | WEIGHT: 293 LBS

## 2018-06-04 DIAGNOSIS — E83.42 HYPOMAGNESEMIA: Primary | ICD-10-CM

## 2018-06-04 LAB
ALBUMIN SERPL-MCNC: 4.2 G/DL (ref 3.9–4.9)
ALP BLD-CCNC: 107 U/L (ref 40–130)
ALT SERPL-CCNC: 6 U/L (ref 0–33)
ANION GAP SERPL CALCULATED.3IONS-SCNC: 14 MEQ/L (ref 7–13)
AST SERPL-CCNC: 19 U/L (ref 0–35)
BASOPHILS ABSOLUTE: 0.1 K/UL (ref 0–0.2)
BASOPHILS RELATIVE PERCENT: 1.2 %
BILIRUB SERPL-MCNC: 0.3 MG/DL (ref 0–1.2)
BUN BLDV-MCNC: 16 MG/DL (ref 6–20)
CALCIUM SERPL-MCNC: 6.9 MG/DL (ref 8.6–10.2)
CHLORIDE BLD-SCNC: 96 MEQ/L (ref 98–107)
CO2: 30 MEQ/L (ref 22–29)
CREAT SERPL-MCNC: 1.09 MG/DL (ref 0.5–0.9)
EKG ATRIAL RATE: 78 BPM
EKG P AXIS: 30 DEGREES
EKG P-R INTERVAL: 146 MS
EKG Q-T INTERVAL: 406 MS
EKG QRS DURATION: 80 MS
EKG QTC CALCULATION (BAZETT): 462 MS
EKG R AXIS: 49 DEGREES
EKG T AXIS: 28 DEGREES
EKG VENTRICULAR RATE: 78 BPM
EOSINOPHILS ABSOLUTE: 0.1 K/UL (ref 0–0.7)
EOSINOPHILS RELATIVE PERCENT: 1.6 %
GFR AFRICAN AMERICAN: >60
GFR NON-AFRICAN AMERICAN: 52.3
GLOBULIN: 2.8 G/DL (ref 2.3–3.5)
GLUCOSE BLD-MCNC: 162 MG/DL (ref 74–109)
HCT VFR BLD CALC: 31.5 % (ref 37–47)
HEMOGLOBIN: 10.2 G/DL (ref 12–16)
LYMPHOCYTES ABSOLUTE: 1.3 K/UL (ref 1–4.8)
LYMPHOCYTES RELATIVE PERCENT: 14.6 %
MAGNESIUM: 0.7 MG/DL (ref 1.7–2.3)
MCH RBC QN AUTO: 25.8 PG (ref 27–31.3)
MCHC RBC AUTO-ENTMCNC: 32.3 % (ref 33–37)
MCV RBC AUTO: 79.6 FL (ref 82–100)
MONOCYTES ABSOLUTE: 0.9 K/UL (ref 0.2–0.8)
MONOCYTES RELATIVE PERCENT: 10.4 %
NEUTROPHILS ABSOLUTE: 6.4 K/UL (ref 1.4–6.5)
NEUTROPHILS RELATIVE PERCENT: 72.2 %
PDW BLD-RTO: 18.3 % (ref 11.5–14.5)
PLATELET # BLD: 250 K/UL (ref 130–400)
POTASSIUM SERPL-SCNC: 4 MEQ/L (ref 3.5–5.1)
RBC # BLD: 3.95 M/UL (ref 4.2–5.4)
SODIUM BLD-SCNC: 140 MEQ/L (ref 132–144)
TOTAL PROTEIN: 7 G/DL (ref 6.4–8.1)
WBC # BLD: 8.8 K/UL (ref 4.8–10.8)

## 2018-06-04 PROCEDURE — 85025 COMPLETE CBC W/AUTO DIFF WBC: CPT

## 2018-06-04 PROCEDURE — 96365 THER/PROPH/DIAG IV INF INIT: CPT

## 2018-06-04 PROCEDURE — 36415 COLL VENOUS BLD VENIPUNCTURE: CPT

## 2018-06-04 PROCEDURE — 96366 THER/PROPH/DIAG IV INF ADDON: CPT

## 2018-06-04 PROCEDURE — 93005 ELECTROCARDIOGRAM TRACING: CPT

## 2018-06-04 PROCEDURE — 80053 COMPREHEN METABOLIC PANEL: CPT

## 2018-06-04 PROCEDURE — 6360000002 HC RX W HCPCS: Performed by: EMERGENCY MEDICINE

## 2018-06-04 PROCEDURE — 83735 ASSAY OF MAGNESIUM: CPT

## 2018-06-04 PROCEDURE — 2580000003 HC RX 258: Performed by: EMERGENCY MEDICINE

## 2018-06-04 PROCEDURE — 99284 EMERGENCY DEPT VISIT MOD MDM: CPT

## 2018-06-04 RX ORDER — MAGNESIUM SULFATE IN WATER 40 MG/ML
2 INJECTION, SOLUTION INTRAVENOUS ONCE
Status: DISCONTINUED | OUTPATIENT
Start: 2018-06-04 | End: 2018-06-04

## 2018-06-04 RX ORDER — POTASSIUM CHLORIDE 20 MEQ/1
20 TABLET, EXTENDED RELEASE ORAL 2 TIMES DAILY
Qty: 60 TABLET | Refills: 2 | Status: SHIPPED | OUTPATIENT
Start: 2018-06-04 | End: 2018-08-16 | Stop reason: SDUPTHER

## 2018-06-04 RX ADMIN — MAGNESIUM SULFATE HEPTAHYDRATE 6 G: 500 INJECTION, SOLUTION INTRAMUSCULAR; INTRAVENOUS at 15:56

## 2018-06-04 ASSESSMENT — PAIN DESCRIPTION - LOCATION: LOCATION: HAND;LEG

## 2018-06-04 ASSESSMENT — ENCOUNTER SYMPTOMS
ABDOMINAL PAIN: 0
VOMITING: 0
SHORTNESS OF BREATH: 0
NAUSEA: 0
SORE THROAT: 0
EYE PAIN: 0
CHEST TIGHTNESS: 0

## 2018-06-04 ASSESSMENT — PAIN DESCRIPTION - DESCRIPTORS: DESCRIPTORS: CRAMPING

## 2018-06-04 ASSESSMENT — PAIN DESCRIPTION - PROGRESSION: CLINICAL_PROGRESSION: GRADUALLY WORSENING

## 2018-06-04 ASSESSMENT — PAIN DESCRIPTION - FREQUENCY: FREQUENCY: CONTINUOUS

## 2018-06-04 ASSESSMENT — PAIN DESCRIPTION - ONSET: ONSET: ON-GOING

## 2018-06-04 ASSESSMENT — PAIN DESCRIPTION - ORIENTATION: ORIENTATION: RIGHT;LEFT

## 2018-06-04 ASSESSMENT — PAIN SCALES - GENERAL: PAINLEVEL_OUTOF10: 10

## 2018-06-04 ASSESSMENT — PAIN DESCRIPTION - PAIN TYPE: TYPE: ACUTE PAIN

## 2018-06-06 ENCOUNTER — HOSPITAL ENCOUNTER (OUTPATIENT)
Dept: INFUSION THERAPY | Age: 53
Setting detail: INFUSION SERIES
Discharge: HOME | End: 2018-06-06
Payer: COMMERCIAL

## 2018-06-06 DIAGNOSIS — D64.9 ANEMIA, UNSPECIFIED TYPE: ICD-10-CM

## 2018-06-06 PROCEDURE — 6360000002 HC RX W HCPCS: Performed by: INTERNAL MEDICINE

## 2018-06-06 PROCEDURE — 2580000003 HC RX 258

## 2018-06-06 PROCEDURE — 96365 THER/PROPH/DIAG IV INF INIT: CPT

## 2018-06-06 PROCEDURE — 2580000003 HC RX 258: Performed by: INTERNAL MEDICINE

## 2018-06-06 RX ORDER — SODIUM CHLORIDE 0.9 % (FLUSH) 0.9 %
10 SYRINGE (ML) INJECTION PRN
Status: DISCONTINUED | OUTPATIENT
Start: 2018-06-06 | End: 2018-06-07 | Stop reason: HOSPADM

## 2018-06-06 RX ORDER — SODIUM CHLORIDE 0.9 % (FLUSH) 0.9 %
10 SYRINGE (ML) INJECTION PRN
Status: CANCELLED
Start: 2018-06-06

## 2018-06-06 RX ADMIN — IRON SUCROSE 200 MG: 20 INJECTION, SOLUTION INTRAVENOUS at 12:35

## 2018-06-06 RX ADMIN — Medication 10 ML: at 13:40

## 2018-06-06 RX ADMIN — Medication 10 ML: at 12:26

## 2018-06-06 NOTE — PROGRESS NOTES
Pt to [facility] for Venofer. Series of 5 doses, today first lifetime dose. Explained to pt the need to wait one hour after infusion for observation. lexicomp info given. Consents signed.

## 2018-06-06 NOTE — PROGRESS NOTES
Infusion completed an hour ago   No signs of reaction  Left unit walking   All equipment used in the care for this patient has been cleaned.

## 2018-06-07 ENCOUNTER — HOSPITAL ENCOUNTER (OUTPATIENT)
Dept: NON INVASIVE DIAGNOSTICS | Age: 53
Discharge: HOME | End: 2018-06-07
Payer: COMMERCIAL

## 2018-06-07 DIAGNOSIS — I10 BENIGN ESSENTIAL HTN: ICD-10-CM

## 2018-06-07 DIAGNOSIS — R60.0 BILATERAL LEG EDEMA: ICD-10-CM

## 2018-06-07 LAB
LV EF: 65 %
LVEF MODALITY: NORMAL

## 2018-06-07 PROCEDURE — 93306 TTE W/DOPPLER COMPLETE: CPT

## 2018-06-08 PROCEDURE — 93010 ELECTROCARDIOGRAM REPORT: CPT | Performed by: INTERNAL MEDICINE

## 2018-06-08 RX ORDER — IPRATROPIUM BROMIDE AND ALBUTEROL SULFATE 2.5; .5 MG/3ML; MG/3ML
SOLUTION RESPIRATORY (INHALATION)
Qty: 360 ML | Refills: 0 | Status: SHIPPED | OUTPATIENT
Start: 2018-06-08 | End: 2019-12-05 | Stop reason: SDUPTHER

## 2018-06-11 RX ORDER — ALLOPURINOL 100 MG/1
TABLET ORAL
Qty: 30 TABLET | Refills: 0 | Status: SHIPPED | OUTPATIENT
Start: 2018-06-11 | End: 2018-07-07 | Stop reason: SDUPTHER

## 2018-06-13 ENCOUNTER — HOSPITAL ENCOUNTER (OUTPATIENT)
Dept: INFUSION THERAPY | Age: 53
Setting detail: INFUSION SERIES
Discharge: HOME | End: 2018-06-13
Payer: COMMERCIAL

## 2018-06-13 VITALS
TEMPERATURE: 98.2 F | HEART RATE: 95 BPM | DIASTOLIC BLOOD PRESSURE: 56 MMHG | SYSTOLIC BLOOD PRESSURE: 147 MMHG | RESPIRATION RATE: 18 BRPM

## 2018-06-13 DIAGNOSIS — D64.9 ANEMIA, UNSPECIFIED TYPE: ICD-10-CM

## 2018-06-13 PROCEDURE — 96365 THER/PROPH/DIAG IV INF INIT: CPT

## 2018-06-13 PROCEDURE — 2580000003 HC RX 258

## 2018-06-13 PROCEDURE — 6360000002 HC RX W HCPCS: Performed by: INTERNAL MEDICINE

## 2018-06-13 PROCEDURE — 2580000003 HC RX 258: Performed by: INTERNAL MEDICINE

## 2018-06-13 RX ORDER — SODIUM CHLORIDE 0.9 % (FLUSH) 0.9 %
10 SYRINGE (ML) INJECTION PRN
Status: CANCELLED
Start: 2018-06-13

## 2018-06-13 RX ORDER — SODIUM CHLORIDE 0.9 % (FLUSH) 0.9 %
10 SYRINGE (ML) INJECTION PRN
Status: DISCONTINUED | OUTPATIENT
Start: 2018-06-13 | End: 2018-06-14 | Stop reason: HOSPADM

## 2018-06-13 RX ADMIN — IRON SUCROSE 200 MG: 20 INJECTION, SOLUTION INTRAVENOUS at 10:50

## 2018-06-13 RX ADMIN — Medication 10 ML: at 12:01

## 2018-06-13 RX ADMIN — Medication 10 ML: at 10:25

## 2018-06-13 NOTE — FLOWSHEET NOTE
Infusion complete. Tolerated well. Patient ambulated off of the unit. All equipment used in the care for this patient has been cleaned.

## 2018-06-20 ENCOUNTER — HOSPITAL ENCOUNTER (OUTPATIENT)
Dept: INFUSION THERAPY | Age: 53
Setting detail: INFUSION SERIES
Discharge: HOME | End: 2018-06-20
Payer: COMMERCIAL

## 2018-06-20 VITALS
DIASTOLIC BLOOD PRESSURE: 59 MMHG | HEART RATE: 84 BPM | SYSTOLIC BLOOD PRESSURE: 131 MMHG | RESPIRATION RATE: 18 BRPM | TEMPERATURE: 98.2 F

## 2018-06-20 DIAGNOSIS — D64.9 ANEMIA, UNSPECIFIED TYPE: ICD-10-CM

## 2018-06-20 PROCEDURE — 6360000002 HC RX W HCPCS: Performed by: INTERNAL MEDICINE

## 2018-06-20 PROCEDURE — 2580000003 HC RX 258

## 2018-06-20 PROCEDURE — 96365 THER/PROPH/DIAG IV INF INIT: CPT

## 2018-06-20 PROCEDURE — 2580000003 HC RX 258: Performed by: INTERNAL MEDICINE

## 2018-06-20 RX ORDER — SODIUM CHLORIDE 0.9 % (FLUSH) 0.9 %
10 SYRINGE (ML) INJECTION PRN
Status: CANCELLED
Start: 2018-06-20

## 2018-06-20 RX ORDER — SODIUM CHLORIDE 0.9 % (FLUSH) 0.9 %
10 SYRINGE (ML) INJECTION PRN
Status: DISCONTINUED | OUTPATIENT
Start: 2018-06-20 | End: 2018-06-21 | Stop reason: HOSPADM

## 2018-06-20 RX ADMIN — Medication 10 ML: at 10:26

## 2018-06-20 RX ADMIN — IRON SUCROSE 200 MG: 20 INJECTION, SOLUTION INTRAVENOUS at 10:27

## 2018-06-20 RX ADMIN — Medication 10 ML: at 11:34

## 2018-06-20 NOTE — PROGRESS NOTES
Pt to [facility] for venofer. Stated had side effects after went home last week. ..had headache and \"didn't feel right\".

## 2018-06-20 NOTE — PROGRESS NOTES
Infusion complete and tolerated well  Left unit walking  All equipment used in the care for this patient has been cleaned.

## 2018-06-22 ENCOUNTER — OFFICE VISIT (OUTPATIENT)
Dept: GASTROENTEROLOGY | Age: 53
End: 2018-06-22
Payer: COMMERCIAL

## 2018-06-22 VITALS
BODY MASS INDEX: 53.92 KG/M2 | HEIGHT: 62 IN | HEART RATE: 81 BPM | TEMPERATURE: 98.7 F | WEIGHT: 293 LBS | OXYGEN SATURATION: 96 %

## 2018-06-22 DIAGNOSIS — K52.9 INFLAMMATORY BOWEL DISEASE: ICD-10-CM

## 2018-06-22 PROCEDURE — 3017F COLORECTAL CA SCREEN DOC REV: CPT | Performed by: INTERNAL MEDICINE

## 2018-06-22 PROCEDURE — G8417 CALC BMI ABV UP PARAM F/U: HCPCS | Performed by: INTERNAL MEDICINE

## 2018-06-22 PROCEDURE — 99214 OFFICE O/P EST MOD 30 MIN: CPT | Performed by: INTERNAL MEDICINE

## 2018-06-22 PROCEDURE — 1036F TOBACCO NON-USER: CPT | Performed by: INTERNAL MEDICINE

## 2018-06-22 PROCEDURE — G8427 DOCREV CUR MEDS BY ELIG CLIN: HCPCS | Performed by: INTERNAL MEDICINE

## 2018-06-22 RX ORDER — AZATHIOPRINE 50 MG/1
50 TABLET ORAL 2 TIMES DAILY
Qty: 60 TABLET | Refills: 3 | Status: SHIPPED | OUTPATIENT
Start: 2018-06-22 | End: 2019-02-11

## 2018-06-22 RX ORDER — MESALAMINE 1.2 G/1
2400 TABLET, DELAYED RELEASE ORAL
Qty: 60 TABLET | Refills: 3 | Status: SHIPPED | OUTPATIENT
Start: 2018-06-22 | End: 2019-02-11 | Stop reason: ALTCHOICE

## 2018-06-27 ENCOUNTER — HOSPITAL ENCOUNTER (OUTPATIENT)
Dept: INFUSION THERAPY | Age: 53
Setting detail: INFUSION SERIES
Discharge: HOME | End: 2018-06-27
Payer: COMMERCIAL

## 2018-06-27 VITALS
HEART RATE: 79 BPM | DIASTOLIC BLOOD PRESSURE: 64 MMHG | TEMPERATURE: 98.6 F | SYSTOLIC BLOOD PRESSURE: 127 MMHG | RESPIRATION RATE: 18 BRPM

## 2018-06-27 DIAGNOSIS — D64.9 ANEMIA, UNSPECIFIED TYPE: ICD-10-CM

## 2018-06-27 PROCEDURE — 96365 THER/PROPH/DIAG IV INF INIT: CPT

## 2018-06-27 PROCEDURE — 6360000002 HC RX W HCPCS: Performed by: INTERNAL MEDICINE

## 2018-06-27 PROCEDURE — 2580000003 HC RX 258: Performed by: INTERNAL MEDICINE

## 2018-06-27 PROCEDURE — 2580000003 HC RX 258

## 2018-06-27 RX ORDER — SODIUM CHLORIDE 0.9 % (FLUSH) 0.9 %
10 SYRINGE (ML) INJECTION PRN
Status: CANCELLED
Start: 2018-06-27

## 2018-06-27 RX ORDER — SODIUM CHLORIDE 0.9 % (FLUSH) 0.9 %
10 SYRINGE (ML) INJECTION PRN
Status: DISCONTINUED | OUTPATIENT
Start: 2018-06-27 | End: 2018-06-28 | Stop reason: HOSPADM

## 2018-06-27 RX ADMIN — Medication 10 ML: at 11:07

## 2018-06-27 RX ADMIN — IRON SUCROSE 200 MG: 20 INJECTION, SOLUTION INTRAVENOUS at 11:06

## 2018-06-27 RX ADMIN — Medication 10 ML: at 12:06

## 2018-06-28 ENCOUNTER — OFFICE VISIT (OUTPATIENT)
Dept: CARDIOLOGY CLINIC | Age: 53
End: 2018-06-28
Payer: COMMERCIAL

## 2018-06-28 VITALS
SYSTOLIC BLOOD PRESSURE: 130 MMHG | OXYGEN SATURATION: 96 % | DIASTOLIC BLOOD PRESSURE: 60 MMHG | HEART RATE: 91 BPM | WEIGHT: 293 LBS | BODY MASS INDEX: 53.92 KG/M2 | HEIGHT: 62 IN

## 2018-06-28 DIAGNOSIS — I10 BENIGN ESSENTIAL HTN: ICD-10-CM

## 2018-06-28 DIAGNOSIS — E78.2 MIXED HYPERLIPIDEMIA: Primary | ICD-10-CM

## 2018-06-28 PROCEDURE — 3017F COLORECTAL CA SCREEN DOC REV: CPT | Performed by: INTERNAL MEDICINE

## 2018-06-28 PROCEDURE — 99213 OFFICE O/P EST LOW 20 MIN: CPT | Performed by: INTERNAL MEDICINE

## 2018-06-28 PROCEDURE — G8417 CALC BMI ABV UP PARAM F/U: HCPCS | Performed by: INTERNAL MEDICINE

## 2018-06-28 PROCEDURE — G8427 DOCREV CUR MEDS BY ELIG CLIN: HCPCS | Performed by: INTERNAL MEDICINE

## 2018-06-28 PROCEDURE — 1036F TOBACCO NON-USER: CPT | Performed by: INTERNAL MEDICINE

## 2018-06-28 RX ORDER — FUROSEMIDE 40 MG/1
40 TABLET ORAL 2 TIMES DAILY
Qty: 60 TABLET | Refills: 3 | Status: SHIPPED | OUTPATIENT
Start: 2018-06-28 | End: 2018-12-27 | Stop reason: SDUPTHER

## 2018-07-05 ENCOUNTER — HOSPITAL ENCOUNTER (OUTPATIENT)
Dept: INFUSION THERAPY | Age: 53
Setting detail: INFUSION SERIES
Discharge: HOME | End: 2018-07-05
Payer: COMMERCIAL

## 2018-07-05 VITALS
TEMPERATURE: 97.3 F | HEART RATE: 73 BPM | DIASTOLIC BLOOD PRESSURE: 72 MMHG | RESPIRATION RATE: 18 BRPM | SYSTOLIC BLOOD PRESSURE: 119 MMHG

## 2018-07-05 DIAGNOSIS — D64.9 ANEMIA, UNSPECIFIED TYPE: ICD-10-CM

## 2018-07-05 PROCEDURE — 2580000003 HC RX 258

## 2018-07-05 PROCEDURE — 96365 THER/PROPH/DIAG IV INF INIT: CPT

## 2018-07-05 PROCEDURE — 6360000002 HC RX W HCPCS: Performed by: INTERNAL MEDICINE

## 2018-07-05 PROCEDURE — 2580000003 HC RX 258: Performed by: INTERNAL MEDICINE

## 2018-07-05 RX ORDER — SODIUM CHLORIDE 0.9 % (FLUSH) 0.9 %
10 SYRINGE (ML) INJECTION PRN
Status: DISCONTINUED | OUTPATIENT
Start: 2018-07-05 | End: 2018-07-06 | Stop reason: HOSPADM

## 2018-07-05 RX ORDER — SODIUM CHLORIDE 0.9 % (FLUSH) 0.9 %
10 SYRINGE (ML) INJECTION PRN
Status: CANCELLED
Start: 2018-07-05

## 2018-07-05 RX ADMIN — IRON SUCROSE 200 MG: 20 INJECTION, SOLUTION INTRAVENOUS at 13:24

## 2018-07-05 RX ADMIN — Medication 10 ML: at 12:48

## 2018-07-05 RX ADMIN — Medication 10 ML: at 14:33

## 2018-07-05 NOTE — PROGRESS NOTES
Pt to [facility] for iron. Didn't call. Had meeting and didn't have number with her.

## 2018-07-07 DIAGNOSIS — J45.30 MILD PERSISTENT ASTHMA WITHOUT COMPLICATION: ICD-10-CM

## 2018-07-09 RX ORDER — ALLOPURINOL 100 MG/1
TABLET ORAL
Qty: 30 TABLET | Refills: 5 | Status: SHIPPED | OUTPATIENT
Start: 2018-07-09 | End: 2019-01-09 | Stop reason: SDUPTHER

## 2018-07-09 RX ORDER — ZOLMITRIPTAN 5 MG/1
TABLET, FILM COATED ORAL
Qty: 9 TABLET | Refills: 5 | Status: SHIPPED | OUTPATIENT
Start: 2018-07-09 | End: 2019-02-11 | Stop reason: ALTCHOICE

## 2018-07-12 ENCOUNTER — TELEPHONE (OUTPATIENT)
Dept: FAMILY MEDICINE CLINIC | Age: 53
End: 2018-07-12

## 2018-07-12 NOTE — TELEPHONE ENCOUNTER
PA for Jess Arcos has been DENIED with the insurance message - 2 of the 4 following drugs would of have been tried in the last 120 days sumatrptan tablets,injectionor nasal spray,naratriptan,almotriptan. NOTE: This patience meets the requirements for the Aimovig injection and has caresource so theres NO PA needed. Please Advise if you want a different script or try Aimovig? If Aimovig all I need is  Signed script printed for it and I can process through [facility].

## 2018-08-08 RX ORDER — COLCHICINE 0.6 MG/1
TABLET ORAL
Qty: 30 TABLET | Refills: 2 | Status: SHIPPED | OUTPATIENT
Start: 2018-08-08 | End: 2019-08-12 | Stop reason: SDUPTHER

## 2018-08-09 DIAGNOSIS — D64.9 ANEMIA, UNSPECIFIED TYPE: ICD-10-CM

## 2018-08-09 DIAGNOSIS — E11.42 TYPE 2 DIABETES MELLITUS WITH DIABETIC POLYNEUROPATHY, WITHOUT LONG-TERM CURRENT USE OF INSULIN (HCC): ICD-10-CM

## 2018-08-09 DIAGNOSIS — E11.42 TYPE 2 DIABETES MELLITUS WITH DIABETIC POLYNEUROPATHY, WITHOUT LONG-TERM CURRENT USE OF INSULIN (HCC): Primary | ICD-10-CM

## 2018-08-09 DIAGNOSIS — E78.2 MIXED HYPERLIPIDEMIA: ICD-10-CM

## 2018-08-09 LAB
ALBUMIN SERPL-MCNC: 4.2 G/DL (ref 3.9–4.9)
ALP BLD-CCNC: 115 U/L (ref 40–130)
ALT SERPL-CCNC: 9 U/L (ref 0–33)
ANION GAP SERPL CALCULATED.3IONS-SCNC: 13 MEQ/L (ref 7–13)
AST SERPL-CCNC: 15 U/L (ref 0–35)
BASOPHILS ABSOLUTE: 0.1 K/UL (ref 0–0.2)
BASOPHILS RELATIVE PERCENT: 0.6 %
BILIRUB SERPL-MCNC: <0.2 MG/DL (ref 0–1.2)
BUN BLDV-MCNC: 18 MG/DL (ref 6–20)
CALCIUM SERPL-MCNC: 9.1 MG/DL (ref 8.6–10.2)
CHLORIDE BLD-SCNC: 97 MEQ/L (ref 98–107)
CHOLESTEROL, TOTAL: 231 MG/DL (ref 0–199)
CO2: 28 MEQ/L (ref 22–29)
CREAT SERPL-MCNC: 0.97 MG/DL (ref 0.5–0.9)
CREATININE URINE: 31.8 MG/DL
EOSINOPHILS ABSOLUTE: 0.1 K/UL (ref 0–0.7)
EOSINOPHILS RELATIVE PERCENT: 1.3 %
GFR AFRICAN AMERICAN: >60
GFR NON-AFRICAN AMERICAN: 59.8
GLOBULIN: 3 G/DL (ref 2.3–3.5)
GLUCOSE BLD-MCNC: 237 MG/DL (ref 74–109)
HBA1C MFR BLD: 6.5 % (ref 4.8–5.9)
HCT VFR BLD CALC: 36.4 % (ref 37–47)
HDLC SERPL-MCNC: 58 MG/DL (ref 40–59)
HEMOGLOBIN: 11.8 G/DL (ref 12–16)
LDL CHOLESTEROL CALCULATED: 139 MG/DL (ref 0–129)
LYMPHOCYTES ABSOLUTE: 1.2 K/UL (ref 1–4.8)
LYMPHOCYTES RELATIVE PERCENT: 12.4 %
MCH RBC QN AUTO: 28.3 PG (ref 27–31.3)
MCHC RBC AUTO-ENTMCNC: 32.4 % (ref 33–37)
MCV RBC AUTO: 87.1 FL (ref 82–100)
MICROALBUMIN UR-MCNC: <1.2 MG/DL
MICROALBUMIN/CREAT UR-RTO: NORMAL MG/G (ref 0–30)
MONOCYTES ABSOLUTE: 1 K/UL (ref 0.2–0.8)
MONOCYTES RELATIVE PERCENT: 9.8 %
NEUTROPHILS ABSOLUTE: 7.5 K/UL (ref 1.4–6.5)
NEUTROPHILS RELATIVE PERCENT: 75.9 %
PDW BLD-RTO: 20.1 % (ref 11.5–14.5)
PLATELET # BLD: 260 K/UL (ref 130–400)
POTASSIUM SERPL-SCNC: 4.1 MEQ/L (ref 3.5–5.1)
RBC # BLD: 4.17 M/UL (ref 4.2–5.4)
SODIUM BLD-SCNC: 138 MEQ/L (ref 132–144)
TOTAL PROTEIN: 7.2 G/DL (ref 6.4–8.1)
TRIGL SERPL-MCNC: 169 MG/DL (ref 0–200)
WBC # BLD: 9.9 K/UL (ref 4.8–10.8)

## 2018-08-13 DIAGNOSIS — K50.919 CROHN'S DISEASE WITH COMPLICATION, UNSPECIFIED GASTROINTESTINAL TRACT LOCATION (HCC): ICD-10-CM

## 2018-08-13 RX ORDER — OMEPRAZOLE 40 MG/1
CAPSULE, DELAYED RELEASE ORAL
Qty: 60 CAPSULE | Refills: 5 | Status: SHIPPED | OUTPATIENT
Start: 2018-08-13 | End: 2019-03-04 | Stop reason: SDUPTHER

## 2018-08-14 DIAGNOSIS — E11.42 TYPE 2 DIABETES MELLITUS WITH DIABETIC POLYNEUROPATHY, WITHOUT LONG-TERM CURRENT USE OF INSULIN (HCC): ICD-10-CM

## 2018-08-14 RX ORDER — BLOOD-GLUCOSE METER
KIT MISCELLANEOUS
Qty: 50 EACH | Refills: 5 | Status: SHIPPED | OUTPATIENT
Start: 2018-08-14

## 2018-08-16 RX ORDER — POTASSIUM CHLORIDE 20 MEQ/1
20 TABLET, EXTENDED RELEASE ORAL 2 TIMES DAILY
Qty: 180 TABLET | Refills: 3 | Status: SHIPPED | OUTPATIENT
Start: 2018-08-16 | End: 2019-08-12 | Stop reason: SDUPTHER

## 2018-08-19 ENCOUNTER — HOSPITAL ENCOUNTER (EMERGENCY)
Age: 53
Discharge: HOME | End: 2018-08-19
Payer: MEDICARE

## 2018-08-19 ENCOUNTER — APPOINTMENT (OUTPATIENT)
Dept: GENERAL RADIOLOGY | Age: 53
End: 2018-08-19
Payer: MEDICARE

## 2018-08-19 VITALS
BODY MASS INDEX: 53.92 KG/M2 | DIASTOLIC BLOOD PRESSURE: 66 MMHG | HEIGHT: 62 IN | TEMPERATURE: 98.5 F | OXYGEN SATURATION: 96 % | HEART RATE: 88 BPM | SYSTOLIC BLOOD PRESSURE: 125 MMHG | WEIGHT: 293 LBS | RESPIRATION RATE: 16 BRPM

## 2018-08-19 DIAGNOSIS — M25.562 ACUTE PAIN OF LEFT KNEE: Primary | ICD-10-CM

## 2018-08-19 PROCEDURE — 99283 EMERGENCY DEPT VISIT LOW MDM: CPT

## 2018-08-19 PROCEDURE — 6370000000 HC RX 637 (ALT 250 FOR IP): Performed by: EMERGENCY MEDICINE

## 2018-08-19 PROCEDURE — 73562 X-RAY EXAM OF KNEE 3: CPT

## 2018-08-19 RX ORDER — OXYCODONE HYDROCHLORIDE AND ACETAMINOPHEN 5; 325 MG/1; MG/1
1-2 TABLET ORAL EVERY 6 HOURS PRN
Qty: 20 TABLET | Refills: 0 | Status: SHIPPED | OUTPATIENT
Start: 2018-08-19 | End: 2018-08-26

## 2018-08-19 RX ORDER — OXYCODONE HYDROCHLORIDE AND ACETAMINOPHEN 5; 325 MG/1; MG/1
1 TABLET ORAL ONCE
Status: COMPLETED | OUTPATIENT
Start: 2018-08-19 | End: 2018-08-19

## 2018-08-19 RX ADMIN — OXYCODONE HYDROCHLORIDE AND ACETAMINOPHEN 1 TABLET: 5; 325 TABLET ORAL at 15:52

## 2018-08-19 ASSESSMENT — ENCOUNTER SYMPTOMS
NAUSEA: 0
EYE PAIN: 0
CHEST TIGHTNESS: 0
VOMITING: 0
SHORTNESS OF BREATH: 0
ABDOMINAL PAIN: 0
SORE THROAT: 0

## 2018-08-19 ASSESSMENT — PAIN DESCRIPTION - ORIENTATION: ORIENTATION: LEFT

## 2018-08-19 ASSESSMENT — PAIN SCALES - GENERAL
PAINLEVEL_OUTOF10: 8
PAINLEVEL_OUTOF10: 8

## 2018-08-19 ASSESSMENT — PAIN DESCRIPTION - FREQUENCY: FREQUENCY: CONTINUOUS

## 2018-08-19 ASSESSMENT — PAIN DESCRIPTION - LOCATION: LOCATION: KNEE

## 2018-08-19 ASSESSMENT — PAIN DESCRIPTION - DESCRIPTORS: DESCRIPTORS: PRESSURE;SHARP

## 2018-08-19 ASSESSMENT — PAIN DESCRIPTION - PAIN TYPE: TYPE: ACUTE PAIN

## 2018-08-19 NOTE — ED PROVIDER NOTES
or stroke    Borderline personality disorder     2016 ?suggested by me-meets many criteria    Carpal tunnel syndrome of right wrist     Cro[facility]'s disease ([facility] [address]     Depression     Fibromyalgia 2/13/2018    GERD (gastroesophageal reflux disease)     Gout     Headache     migraines    Irritable bowel syndrome     Mixed hyperlipidemia 5/10/2017    Neuropathy     Obesity (BMI 35.0-39.9 without comorbidity) 3/30/2017    PONV (postoperative nausea and vomiting)     nausea    Tremor of unknown origin     Type 2 diabetes mellitus ([address]     meds > 5yrs     Ulcerative colitis ([facility] [address] 11/2017    Vaginitis          SURGICAL HISTORY       Past Surgical History:   Procedure Laterality Date    BREAST CYST EXCISION Right 1994    exc mass benign    CHOLECYSTECTOMY  2009    COLONOSCOPY  2015    negative    ENDOSCOPY, COLON, DIAGNOSTIC      HERNIA REPAIR  2015    ventral / mesh    LEG TENDON SURGERY Right 2007    leg.  ankle and foot    OVARIAN CYST REMOVAL Left 1994    with mass and both fallopian tube    OVARY REMOVAL  12/2015    [facility] / mass left ovary & bilateral  tubes    KY COLON CA SCRN NOT HI RSK IND N/A 11/7/2017    COLONOSCOPY performed by Van Stuart, MANOJ on bx    [place] COLONOSCOPY W/BIOPSY SINGLE/MULTIPLE N/A 3/15/2018    COLONOSCOPY performed by Van Stuart MD at [facility] Right 8/3/2017    RIGHT KNEE ARTHROSCOPIC PARTIAL MEDIAL MENISCECTOMY KNEE performed by Jose A Pride MD at [address] [place] [address] N/CARPAL TUNNEL SURG Right 11/30/2017    RIGHT WRIST  CARPAL TUNNEL RELEASE performed by Jose A Pride MD at [facility] N/CARPAL TUNNEL SURG Left 5/10/2018    LEFT WRIST CARPAL TUNNEL RELEASE performed by Jose A Pride MD at [facility]       Previous Medications    ALLOPURINOL (ZYLOPRIM) 100 MG TABLET    TAKE ONE TABLET BY MOUTH EVERY DAY    AZATHIOPRINE (IMURAN) 48 MG TABLET    Take 1 tablet by mouth 2 times daily    BACLOFEN (LIORESAL) 20 MG TABLET    20 mg 3 times daily     COLCHICINE (COLCRYS) 0.6 MG TABLET    TAKE ONE TABLET BY MOUTH THREE TIMES A DAY AS NEEDED FOR PAIN    DICYCLOMINE (BENTYL) 10 MG CAPSULE    TAKE ONE CAPSULE BY MOUTH FOUR TIMES A DAY BEFORE MEALS AND NIGHTLY    DULOXETINE (CYMBALTA) 60 MG EXTENDED RELEASE CAPSULE    TAKE ONE CAPSULE BY MOUTH TWICE DAILY AS DIRECTED IN THE MORNING AND AFTERNOON    FREESTYLE LITE STRIP    use three times daily     FUROSEMIDE (LASIX) 40 MG TABLET    Take 1 tablet by mouth 2 times daily    HYDROXYZINE (VISTARIL) 50 MG CAPSULE    TAKE ONE CAPSULE BY MOUTH THREE TIMES A DAY AS NEEDED FOR ITCHING    HYOSCYAMINE (ANASPAZ;LEVSIN) 125 MCG TABLET    TAKE ONE TABLET BY MOUTH EVERY 4 HOURS AS NEEDED FOR CRAMPING    IPRATROPIUM-ALBUTEROL (DUONEB) 0.5-2.5 (3) MG/3ML SOLN NEBULIZER SOLUTION    INHALE 1 VIAL VIA NEBULIZER EVERY 4 HOURS     LYRICA 150 MG CAPSULE    150 mg 3 times daily Indications: last filled 3/5/17 no refills     MAGNESIUM OXIDE (MAGOX 400) 400 (241.3 MG) MG TABS TABLET    Take 1 tablet by mouth 3 times daily    MELATONIN 3 MG TABS TABLET    TAKE ONE TABLET BY MOUTH AT BEDTIME    MELOXICAM (MOBIC) 7.5 MG TABLET    TAKE ONE TABLET BY MOUTH TWO TIMES A DAY WITH MEALS TAKE ONLY WITH FOOD AS NEEDED    MESALAMINE (LIALDA) 1.2 G EC TABLET    Take 2 tablets by mouth daily (with breakfast)    METFORMIN (GLUCOPHAGE) 500 MG TABLET    TAKE ONE TABLET BY MOUTH TWICE A DAY WITH MEALS    MOMETASONE (ELOCON) 0.1 % CREAM    apply topically daily    MOMETASONE-FORMOTEROL (DULERA) 100-5 MCG/ACT INHALER    Inhale 2 puffs into the lungs 2 times daily    MONTELUKAST (SINGULAIR) 10 MG TABLET    TAKE ONE TABLET BY MOUTH NIGHTLY    NYSTATIN-TRIAMCINOLONE (MYCOLOG II) 926725-6.1 UNIT/GM-% CREAM    Apply topically 2 times daily.     OMEPRAZOLE (PRILOSEC) 40 MG DELAYED RELEASE CAPSULE    TAKE ONE CAPSULE BY MOUTH TWO TIMES A DAY    ONDANSETRON (ZOFRAN) 8 MG TABLET    TAKE ONE TABLET BY MOUTH EVERY 8 HOURS IF NEEDED FOR NAUSEA AND VOMITING    PIOGLITAZONE (ACTOS) 15 MG TABLET    Take 1 tablet by mouth daily    POTASSIUM CHLORIDE (KLOR-CON M) 20 MEQ EXTENDED RELEASE TABLET    Take 1 tablet by mouth 2 times daily    POTASSIUM CHLORIDE (MICRO-K) 10 MEQ EXTENDED RELEASE CAPSULE    TAKE ONE CAPSULE BY MOUTH EVERY DAY    PRAZOSIN (MINIPRESS) 1 MG CAPSULE    TAKE ONE CAPSULE BY MOUTH AT BEDTIME    PROAIR  (90 BASE) MCG/ACT INHALER    INHALE TWO PUFFS INTO THE LUNGS FOUR TIMES A DAY    TRAZODONE (DESYREL) 50 MG TABLET    TAKE 1/2 TO 1 TABLET BY MOUTH AT BEDTIME    TRIAMTERENE-HYDROCHLOROTHIAZIDE (MAXZIDE) 75-50 MG PER TABLET    TAKE ONE TABLET BY MOUTH EVERY DAY    VENTOLIN  (90 BASE) MCG/ACT INHALER    INHALE 2 PUFFS INTO THE LUNGS EVERY 6 HOURS AS NEEDED FOR WHEEZING    VITAMIN D (ERGOCALCIFEROL) 23498 UNITS CAPS CAPSULE    take 1 capsule by mouth once a week    ZOLMITRIPTAN (ZOMIG) 5 MG TABLET    TAKE ONE TABLET BY MOUTH AS NEEDED for migraine       ALLERGIES     Latex; Penicillins; Shellfish-derived products; Abilify [aripiprazole];  Adhesive tape; Statins; Buspar [buspirone]; and Sulfa antibiotics    FAMILY HISTORY       Family History   Problem Relation Age of Onset    Emphysema Mother     Cancer Maternal Grandfather     Diabetes Paternal Grandmother     Emphysema Paternal Grandfather     Heart Disease Sister     Stroke Sister     Diabetes Sister           SOCIAL HISTORY       Social History     Social History    Marital status: Single     Spouse name: N/A    Number of children: N/A    Years of education: N/A     Social History Main Topics    Smoking status: Never Smoker    Smokeless tobacco: Never Used    Alcohol use No    Drug use: No    Sexual activity: Not Currently     Other Topics Concern    None     Social History Narrative    None       SCREENINGS             PHYSICAL EXAM    (up to 7 for level 4, 8 or more for level 5) ED Triage Vitals [08/19/18 1509]   BP Temp Temp Source Pulse Resp SpO2 Height Weight   125/66 98.5 °F (36.9 °C) Oral 88 16 96 % 5' 2\" (1.575 m) (!) 304 lb (137.9 kg)       Physical Exam   Constitutional: She is oriented to person, place, and time. She appears well-developed and well-nourished. No distress. HENT:   Head: Normocephalic and atraumatic. Right Ear: External ear normal.   Left Ear: External ear normal.   Mouth/Throat: Oropharynx is clear and moist. No oropharyngeal exudate. Eyes: Pupils are equal, round, and reactive to light. Conjunctivae are normal.   Neck: Normal range of motion. Neck supple. No JVD present. No tracheal deviation present. No thyromegaly present. Cardiovascular: Normal rate and normal heart sounds. No murmur heard. Pulmonary/Chest: Effort normal and breath sounds normal. No respiratory distress. She has no wheezes. Abdominal: Soft. Bowel sounds are normal. There is no tenderness. There is no guarding. Musculoskeletal: She exhibits edema and tenderness. Left knee is swollen and extremely painful to any movement or palpation to the knee joint itself. It is difficult to tell if there is a large effusion secondary to morbid obesity. Neurological: She is alert and oriented to person, place, and time. No cranial nerve deficit. Skin: Skin is warm and dry. No rash noted. She is not diaphoretic. Psychiatric: She has a normal mood and affect.  Her behavior is normal.       DIAGNOSTIC RESULTS     EKG: All EKG's are interpreted by the Emergency Department Physician who either signs or Co-signs this chart in the absence of a cardiologist.        RADIOLOGY:   Non-plain film images such as CT, Ultrasound and MRI are read by the radiologist. Plain radiographic images are visualized and preliminarily interpreted by the emergency physician with the below findings:    Left knee x-ray negative for fracture    Interpretation per the Radiologist below, if available at the time of

## 2018-08-19 NOTE — ED TRIAGE NOTES
A & Ox4. Skin pink warm and dry. Pt points to entire anterior knee with pressure feeling under her kneecap. Pt morbidly obese. Unable to determine if edema.

## 2018-08-26 DIAGNOSIS — J45.30 MILD PERSISTENT ASTHMA WITHOUT COMPLICATION: ICD-10-CM

## 2018-09-05 ENCOUNTER — OFFICE VISIT (OUTPATIENT)
Dept: FAMILY MEDICINE CLINIC | Age: 53
End: 2018-09-05
Payer: MEDICARE

## 2018-09-05 VITALS
WEIGHT: 293 LBS | HEIGHT: 62 IN | BODY MASS INDEX: 53.92 KG/M2 | DIASTOLIC BLOOD PRESSURE: 72 MMHG | OXYGEN SATURATION: 98 % | SYSTOLIC BLOOD PRESSURE: 132 MMHG | RESPIRATION RATE: 13 BRPM | TEMPERATURE: 96.4 F | HEART RATE: 81 BPM

## 2018-09-05 DIAGNOSIS — E11.42 TYPE 2 DIABETES MELLITUS WITH DIABETIC POLYNEUROPATHY, WITHOUT LONG-TERM CURRENT USE OF INSULIN (HCC): Primary | ICD-10-CM

## 2018-09-05 DIAGNOSIS — J45.901 MODERATE ASTHMA WITH ACUTE EXACERBATION, UNSPECIFIED WHETHER PERSISTENT: ICD-10-CM

## 2018-09-05 DIAGNOSIS — Z23 NEED FOR INFLUENZA VACCINATION: ICD-10-CM

## 2018-09-05 DIAGNOSIS — D64.9 ANEMIA, UNSPECIFIED TYPE: ICD-10-CM

## 2018-09-05 DIAGNOSIS — M23.301 DERANGEMENT OF LATERAL MENISCUS OF LEFT KNEE: ICD-10-CM

## 2018-09-05 DIAGNOSIS — E78.2 MIXED HYPERLIPIDEMIA: ICD-10-CM

## 2018-09-05 DIAGNOSIS — M25.562 PAIN IN LATERAL PORTION OF LEFT KNEE: ICD-10-CM

## 2018-09-05 DIAGNOSIS — M79.7 FIBROMYALGIA: ICD-10-CM

## 2018-09-05 PROCEDURE — G0008 ADMIN INFLUENZA VIRUS VAC: HCPCS | Performed by: FAMILY MEDICINE

## 2018-09-05 PROCEDURE — G8427 DOCREV CUR MEDS BY ELIG CLIN: HCPCS | Performed by: FAMILY MEDICINE

## 2018-09-05 PROCEDURE — 1036F TOBACCO NON-USER: CPT | Performed by: FAMILY MEDICINE

## 2018-09-05 PROCEDURE — 90686 IIV4 VACC NO PRSV 0.5 ML IM: CPT | Performed by: FAMILY MEDICINE

## 2018-09-05 PROCEDURE — 3017F COLORECTAL CA SCREEN DOC REV: CPT | Performed by: FAMILY MEDICINE

## 2018-09-05 PROCEDURE — 3044F HG A1C LEVEL LT 7.0%: CPT | Performed by: FAMILY MEDICINE

## 2018-09-05 PROCEDURE — 2022F DILAT RTA XM EVC RTNOPTHY: CPT | Performed by: FAMILY MEDICINE

## 2018-09-05 PROCEDURE — 99214 OFFICE O/P EST MOD 30 MIN: CPT | Performed by: FAMILY MEDICINE

## 2018-09-05 PROCEDURE — G8417 CALC BMI ABV UP PARAM F/U: HCPCS | Performed by: FAMILY MEDICINE

## 2018-09-05 RX ORDER — ACETAMINOPHEN AND CODEINE PHOSPHATE 300; 30 MG/1; MG/1
1 TABLET ORAL EVERY 4 HOURS PRN
Qty: 18 TABLET | Refills: 0 | Status: SHIPPED | OUTPATIENT
Start: 2018-09-05 | End: 2018-09-08

## 2018-09-05 RX ORDER — LANOLIN ALCOHOL/MO/W.PET/CERES
400 CREAM (GRAM) TOPICAL 3 TIMES DAILY PRN
Refills: 0 | Status: ON HOLD | COMMUNITY
Start: 2018-06-04 | End: 2019-09-04 | Stop reason: HOSPADM

## 2018-09-05 RX ORDER — PRAZOSIN HYDROCHLORIDE 2 MG/1
2 CAPSULE ORAL DAILY
Refills: 2 | COMMUNITY
Start: 2018-06-13 | End: 2018-09-05 | Stop reason: ALTCHOICE

## 2018-09-05 RX ORDER — FOLIC ACID 1 MG/1
1 TABLET ORAL DAILY
Refills: 3 | COMMUNITY
Start: 2018-05-31 | End: 2019-02-11 | Stop reason: ALTCHOICE

## 2018-09-05 NOTE — PROGRESS NOTES
3/30/2017    PONV (postoperative nausea and vomiting)     nausea    Tremor of unknown origin     Type 2 diabetes mellitus ([facility]     meds > 5yrs     Ulcerative colitis ([facility] 11/2017    Vaginitis      Past Surgical History:   Procedure Laterality Date    BREAST CYST EXCISION Right 1994    exc mass benign    CHOLECYSTECTOMY  2009    COLONOSCOPY  2015    negative    ENDOSCOPY, COLON, DIAGNOSTIC      HERNIA REPAIR  2015    ventral / mesh    LEG TENDON SURGERY Right 2007    leg.  ankle and foot    OVARIAN CYST REMOVAL Left 1994    with mass and both fallopian tube    OVARY REMOVAL  12/2015    [facility] / mass left ovary & bilateral  tubes    WA COLON CA SCRN NOT HI RSK IND N/A 11/7/2017    COLONOSCOPY performed by MANOJ Amaya on bx    [place] COLONOSCOPY W/BIOPSY SINGLE/MULTIPLE N/A 3/15/2018    COLONOSCOPY performed by Kayley Zepeda MD at [facility] Right 8/3/2017    RIGHT KNEE ARTHROSCOPIC PARTIAL MEDIAL MENISCECTOMY KNEE performed by Maddison Keane MD at [facility] N/CARPAL TUNNEL SURG Right 11/30/2017    RIGHT WRIST  CARPAL TUNNEL RELEASE performed by Maddison Keane MD at [facility] N/CARPAL TUNNEL SURG Left 5/10/2018    LEFT WRIST CARPAL TUNNEL RELEASE performed by Maddison Keane MD at [address] [facility]     Social History     Social History    Marital status: Single     Spouse name: N/A    Number of children: N/A    Years of education: N/A     Social History Main Topics    Smoking status: Never Smoker    Smokeless tobacco: Never Used    Alcohol use No    Drug use: No    Sexual activity: Not Currently     Other Topics Concern    None     Social History Narrative    None     Orders Only on 08/30/2018   Component Date Value Ref Range Status    Sodium 08/30/2018 141  132 - 144 mEq/L Final    Potassium 08/30/2018 4.5  3.5 - 5.1 mEq/L Final    Chloride 08/30/2018 99  98 - 107 mEq/L Final ages 25 and older using the  MDRD formula (not corrected for weight), is valid for stable  renal function.  GFR  08/09/2018 >60.0  >60 Final    Comment: >60 mL/min/1.73m2 EGFR, calc. for ages 25 and older using the  MDRD formula (not corrected for weight), is valid for stable  renal function.  Calcium 08/09/2018 9.1  8.6 - 10.2 mg/dL Final    Total Protein 08/09/2018 7.2  6.4 - 8.1 g/dL Final    Alb 08/09/2018 4.2  3.9 - 4.9 g/dL Final    Total Bilirubin 08/09/2018 <0.2  0.0 - 1.2 mg/dL Final    Alkaline Phosphatase 08/09/2018 115  40 - 130 U/L Final    ALT 08/09/2018 9  0 - 33 U/L Final    AST 08/09/2018 15  0 - 35 U/L Final    Globulin 08/09/2018 3.0  2.3 - 3.5 g/dL Final    Cholesterol, Total 08/09/2018 231* 0 - 199 mg/dL Final    ATP III Cholesterol Classification is Borderline High.  Triglycerides 08/09/2018 169  0 - 200 mg/dL Final    ATP III Triglycerides Classification is Borderline High.  HDL 08/09/2018 58  40 - 59 mg/dL Final    Comment: ATP III HDL Cholesterol Classification is Desirable. Expected Values:    Males:    >55 = No Risk            35-55 = Moderate Risk            <35 = High Risk    Females:  >65 = No Risk            45-65 = Moderate Risk            <45 = High Risk    NCEP Guidelines: Third Report May 2001  >59 = negative risk factor for CHD  <40 = major risk factor for CHD      LDL Calculated 08/09/2018 139* 0 - 129 mg/dL Final    ATT III Classification is Borderline High.     WBC 08/09/2018 9.9  4.8 - 10.8 K/uL Final    RBC 08/09/2018 4.17* 4.20 - 5.40 M/uL Final    Hemoglobin 08/09/2018 11.8* 12.0 - 16.0 g/dL Final    Hematocrit 08/09/2018 36.4* 37.0 - 47.0 % Final    MCV 08/09/2018 87.1  82.0 - 100.0 fL Final    MCH 08/09/2018 28.3  27.0 - 31.3 pg Final    MCHC 08/09/2018 32.4* 33.0 - 37.0 % Final    RDW 08/09/2018 20.1* 11.5 - 14.5 % Final    Platelets 15/22/0347 260  130 - 400 K/uL Final    Neutrophils % 08/09/2018 75.9  % Final    Lymphocytes % 08/09/2018 12.4  % Final    Monocytes % 08/09/2018 9.8  % Final    Eosinophils % 08/09/2018 1.3  % Final    Basophils % 08/09/2018 0.6  % Final    Neutrophils # 08/09/2018 7.5* 1.4 - 6.5 K/uL Final    Lymphocytes # 08/09/2018 1.2  1.0 - 4.8 K/uL Final    Monocytes # 08/09/2018 1.0* 0.2 - 0.8 K/uL Final    Eosinophils # 08/09/2018 0.1  0.0 - 0.7 K/uL Final    Basophils # 08/09/2018 0.1  0.0 - 0.2 K/uL Final    Hemoglobin A1C 08/09/2018 6.5* 4.8 - 5.9 % Final    Microalbumin, Random Urine 08/09/2018 <1.20  Not Established mg/dL Final    Creatinine, Ur 08/09/2018 31.8  Not Established mg/dL Final    Microalbumin Creatinine Ratio 08/09/2018 see below  0.0 - 30.0 mg/G Final    Comment: - UR Microalbumin concentration is less than 1.2 mg/dL. - Unable to calculate Microalbumin/Creatinine Ratio without    a Microalbumin concentration.        Health Maintenance   Topic Date Due    Hepatitis C screen  1965    HIV screen  03/17/1979    Pneumococcal med risk (1 of 1 - PPSV23) 03/17/1983    Diabetic retinal exam  02/01/2017    Cervical cancer screen  03/01/2018    Diabetic foot exam  08/11/2018    DTaP/Tdap/Td vaccine (1 - Tdap) 03/05/2019 (Originally 3/17/1983)    Shingles Vaccine (1 of 2 - 2 Dose Series) 03/05/2019 (Originally 3/17/2014)    A1C test (Diabetic or Prediabetic)  08/09/2019    Diabetic microalbuminuria test  08/09/2019    Lipid screen  08/09/2019    Potassium monitoring  08/30/2019    Creatinine monitoring  08/30/2019    Breast cancer screen  11/10/2019    Colon cancer screen colonoscopy  03/15/2028    Flu vaccine  Completed         Wt Readings from Last 3 Encounters:   09/05/18 300 lb (136.1 kg)   08/19/18 (!) 304 lb (137.9 kg)   06/28/18 (!) 306 lb 12.8 oz (139.2 kg)     Temp Readings from Last 3 Encounters:   09/05/18 96.4 °F (35.8 °C) (Tympanic)   08/19/18 98.5 °F (36.9 °C) (Oral)   07/05/18 97.3 °F (36.3 °C) (Oral)     BP Readings from Last 3 Encounters: 09/05/18 132/72   08/19/18 125/66   07/05/18 119/72     Pulse Readings from Last 3 Encounters:   09/05/18 81   08/19/18 88   07/05/18 73     PHYSICAL EXAMINATION:      -----------------------------------------------------------------------------------------------------  GENERAL:  The patient appears nourished     &  Normal baseline a bit unusual affect. No acute distress. Alert and oriented times 3. No obvious skin rashes or lesions. No gait disturbance. HEENT:   Normocephalic, atraumatic. Normal speech    Extraocular eye motions intact and pain free. Pupils reactive and equally round. Conjunctivae clear    TMs normal    No erythema pharynx      NECK:  No masses or adenopathy palpable. No asymmetry visible. No thyromegaly. No bruits. RESPIRATORY:  Equal breath sounds / no acute respiratory distress. No wheezes,rales, or rhonchi        HEART:  Regular rhythm without murmur, rub or gallop. ABDOMEN:  Obese Soft, nontender. No masses, guarding or rebound. Normoactive bowel sounds. EXTREMITIES:   No edema in any extremity. No cyanosis or clubbing. 2+ dorsalis pedis pulses bilaterally    Pain w lateral pressure L knee but fairly good ROM pain and occ paresthesias lateral l thigh      FOOT EXAM:    A comprehensive foot exam was performed including monofilament testing or equivalent for sensation. Normal foot, ankle, and digit range of motion and strength. Patient was found to have intact sensation, 2+ dorsalis pedis pulses and no concerning calluses, rashes, sores or foot lesions of any kind. No obvious neuropathy. Diagnosis Orders   1. Type 2 diabetes mellitus with diabetic polyneuropathy, without long-term current use of insulin ([address]     2. Moderate asthma with acute exacerbation, unspecified whether persistent     3. Mixed hyperlipidemia     4. Fibromyalgia     5. Anemia, unspecified type     6.  Need for influenza

## 2018-09-07 DIAGNOSIS — K50.919 CROHN'S DISEASE WITH COMPLICATION, UNSPECIFIED GASTROINTESTINAL TRACT LOCATION (HCC): ICD-10-CM

## 2018-09-07 RX ORDER — ONDANSETRON HYDROCHLORIDE 8 MG/1
TABLET, FILM COATED ORAL
Qty: 40 TABLET | Refills: 3 | Status: SHIPPED | OUTPATIENT
Start: 2018-09-07 | End: 2020-11-17

## 2018-09-23 ENCOUNTER — HOSPITAL ENCOUNTER (OUTPATIENT)
Dept: MRI IMAGING | Age: 53
Discharge: HOME | End: 2018-09-25
Payer: MEDICARE

## 2018-09-23 DIAGNOSIS — M17.12 PRIMARY LOCALIZED OSTEOARTHROSIS OF LEFT LOWER LEG: ICD-10-CM

## 2018-09-23 PROCEDURE — 73721 MRI JNT OF LWR EXTRE W/O DYE: CPT

## 2018-10-26 DIAGNOSIS — E11.42 TYPE 2 DIABETES MELLITUS WITH DIABETIC POLYNEUROPATHY, WITHOUT LONG-TERM CURRENT USE OF INSULIN (HCC): ICD-10-CM

## 2018-10-26 DIAGNOSIS — J45.20 MILD INTERMITTENT ASTHMA WITHOUT COMPLICATION: ICD-10-CM

## 2018-10-26 RX ORDER — MONTELUKAST SODIUM 10 MG/1
TABLET ORAL
Qty: 30 TABLET | Refills: 5 | Status: SHIPPED | OUTPATIENT
Start: 2018-10-26 | End: 2019-08-12 | Stop reason: SDUPTHER

## 2018-11-06 DIAGNOSIS — K50.919 CROHN'S DISEASE WITH COMPLICATION, UNSPECIFIED GASTROINTESTINAL TRACT LOCATION (HCC): ICD-10-CM

## 2018-11-07 RX ORDER — MOMETASONE FUROATE 1 MG/G
CREAM TOPICAL
Qty: 45 G | Refills: 1 | Status: SHIPPED | OUTPATIENT
Start: 2018-11-07 | End: 2019-04-05 | Stop reason: SDUPTHER

## 2018-11-07 RX ORDER — HYOSCYAMINE SULFATE 0.125 MG
TABLET ORAL
Qty: 164 TABLET | Refills: 0 | Status: SHIPPED | OUTPATIENT
Start: 2018-11-07 | End: 2019-12-10 | Stop reason: SDUPTHER

## 2018-11-13 PROBLEM — D50.8 OTHER IRON DEFICIENCY ANEMIAS: Status: ACTIVE | Noted: 2018-11-13

## 2018-11-14 ENCOUNTER — OFFICE VISIT (OUTPATIENT)
Dept: PULMONOLOGY | Age: 53
End: 2018-11-14
Payer: MEDICARE

## 2018-11-14 VITALS
OXYGEN SATURATION: 99 % | HEIGHT: 62 IN | DIASTOLIC BLOOD PRESSURE: 80 MMHG | HEART RATE: 83 BPM | TEMPERATURE: 97.3 F | SYSTOLIC BLOOD PRESSURE: 126 MMHG | BODY MASS INDEX: 53.92 KG/M2 | WEIGHT: 293 LBS

## 2018-11-14 DIAGNOSIS — J45.40 MODERATE PERSISTENT ASTHMA WITHOUT COMPLICATION: Primary | ICD-10-CM

## 2018-11-14 DIAGNOSIS — K21.9 GASTROESOPHAGEAL REFLUX DISEASE, ESOPHAGITIS PRESENCE NOT SPECIFIED: ICD-10-CM

## 2018-11-14 PROCEDURE — G8427 DOCREV CUR MEDS BY ELIG CLIN: HCPCS | Performed by: INTERNAL MEDICINE

## 2018-11-14 PROCEDURE — 3017F COLORECTAL CA SCREEN DOC REV: CPT | Performed by: INTERNAL MEDICINE

## 2018-11-14 PROCEDURE — 1036F TOBACCO NON-USER: CPT | Performed by: INTERNAL MEDICINE

## 2018-11-14 PROCEDURE — G8482 FLU IMMUNIZE ORDER/ADMIN: HCPCS | Performed by: INTERNAL MEDICINE

## 2018-11-14 PROCEDURE — 99213 OFFICE O/P EST LOW 20 MIN: CPT | Performed by: INTERNAL MEDICINE

## 2018-11-14 PROCEDURE — G8417 CALC BMI ABV UP PARAM F/U: HCPCS | Performed by: INTERNAL MEDICINE

## 2018-11-14 ASSESSMENT — ENCOUNTER SYMPTOMS
ABDOMINAL PAIN: 0
VOMITING: 0
CHEST TIGHTNESS: 0
SORE THROAT: 0
DIARRHEA: 0
WHEEZING: 0
COUGH: 0
RHINORRHEA: 0
NAUSEA: 0
SINUS PRESSURE: 0
SHORTNESS OF BREATH: 0

## 2018-11-14 NOTE — PROGRESS NOTES
Subjective:     Janessa Alejandra is a 46 y.o. female who complains today of:     Chief Complaint   Patient presents with    Follow-up       HPI  Patient is here for a follow-up visit regarding moderate persistent asthma. Since the last visit patient has been very well controlled with use of Dulera regularly. She has not had to use her nebulized DuoNeb since her last visit in the spring of this year. Occasionally she has been using rescue inhaler specially the last few days as she had to be outside raking leaves which triggered some asthmatic symptoms but she only used Proventil inhaler at night once and that helped control her symptoms in addition to her maintenance treatment. Patient seemed to be doing very well since her last visit in April    Allergies:  Latex; Penicillins; Shellfish-derived products; Abilify [aripiprazole];  Adhesive tape; Statins; Buspar [buspirone]; and Sulfa antibiotics  Past Medical History:   Diagnosis Date    Asthma     Benign essential HTN     meds > 5 yrs / denies TIA or stroke    Borderline personality disorder ([address]     2016 ?suggested by me-meets many criteria    Carpal tunnel syndrome of right wrist     Crohn's disease ([address]     Depression     Fibromyalgia 2/13/2018    Fibromyalgia     GERD (gastroesophageal reflux disease)     Gout     Headache     migraines    Irritable bowel syndrome     Mixed hyperlipidemia 5/10/2017    Neuropathy     Obesity (BMI 35.0-39.9 without comorbidity) 3/30/2017    PONV (postoperative nausea and vomiting)     nausea    PTSD (post-traumatic stress disorder)     Tremor of unknown origin     Type 2 diabetes mellitus ([address]     meds > 5yrs     Ulcerative colitis ([address] 11/2017    Vaginitis      Past Surgical History:   Procedure Laterality Date    BREAST CYST EXCISION Right 1994    exc mass benign    CHOLECYSTECTOMY  2009    COLONOSCOPY  2015    negative    ENDOSCOPY, COLON, DIAGNOSTIC      HERNIA REPAIR  2015    ventral / mesh

## 2018-11-19 DIAGNOSIS — D64.9 ANEMIA, UNSPECIFIED TYPE: ICD-10-CM

## 2018-11-19 DIAGNOSIS — D50.8 OTHER IRON DEFICIENCY ANEMIAS: ICD-10-CM

## 2018-11-19 LAB
FERRITIN: 207 NG/ML (ref 13–150)
IRON SATURATION: 13 % (ref 11–46)
IRON: 33 UG/DL (ref 37–145)
TOTAL IRON BINDING CAPACITY: 255 UG/DL (ref 178–450)

## 2018-11-20 ENCOUNTER — TELEPHONE (OUTPATIENT)
Dept: FAMILY MEDICINE CLINIC | Age: 53
End: 2018-11-20

## 2018-11-20 DIAGNOSIS — B37.9 YEAST INFECTION: Primary | ICD-10-CM

## 2018-11-20 RX ORDER — NYSTATIN 100000 U/G
CREAM TOPICAL
Qty: 30 G | Refills: 1 | Status: SHIPPED | OUTPATIENT
Start: 2018-11-20 | End: 2018-11-26

## 2018-11-30 ENCOUNTER — TELEPHONE (OUTPATIENT)
Dept: FAMILY MEDICINE CLINIC | Age: 53
End: 2018-11-30

## 2018-12-27 ENCOUNTER — OFFICE VISIT (OUTPATIENT)
Dept: CARDIOLOGY CLINIC | Age: 53
End: 2018-12-27
Payer: MEDICARE

## 2018-12-27 VITALS
SYSTOLIC BLOOD PRESSURE: 122 MMHG | WEIGHT: 293 LBS | HEIGHT: 62 IN | BODY MASS INDEX: 53.92 KG/M2 | DIASTOLIC BLOOD PRESSURE: 64 MMHG | OXYGEN SATURATION: 98 % | RESPIRATION RATE: 18 BRPM | HEART RATE: 92 BPM

## 2018-12-27 DIAGNOSIS — R06.02 SOB (SHORTNESS OF BREATH): ICD-10-CM

## 2018-12-27 DIAGNOSIS — E66.01 MORBID OBESITY WITH BMI OF 50.0-59.9, ADULT (HCC): ICD-10-CM

## 2018-12-27 DIAGNOSIS — R60.0 BILATERAL EDEMA OF LOWER EXTREMITY: ICD-10-CM

## 2018-12-27 DIAGNOSIS — E78.2 MIXED HYPERLIPIDEMIA: ICD-10-CM

## 2018-12-27 DIAGNOSIS — I10 BENIGN ESSENTIAL HTN: Primary | ICD-10-CM

## 2018-12-27 DIAGNOSIS — R94.31 ABNORMAL EKG: ICD-10-CM

## 2018-12-27 PROCEDURE — G8417 CALC BMI ABV UP PARAM F/U: HCPCS | Performed by: INTERNAL MEDICINE

## 2018-12-27 PROCEDURE — 3017F COLORECTAL CA SCREEN DOC REV: CPT | Performed by: INTERNAL MEDICINE

## 2018-12-27 PROCEDURE — 99214 OFFICE O/P EST MOD 30 MIN: CPT | Performed by: INTERNAL MEDICINE

## 2018-12-27 PROCEDURE — G8427 DOCREV CUR MEDS BY ELIG CLIN: HCPCS | Performed by: INTERNAL MEDICINE

## 2018-12-27 PROCEDURE — 1036F TOBACCO NON-USER: CPT | Performed by: INTERNAL MEDICINE

## 2018-12-27 PROCEDURE — 93000 ELECTROCARDIOGRAM COMPLETE: CPT | Performed by: INTERNAL MEDICINE

## 2018-12-27 PROCEDURE — G8482 FLU IMMUNIZE ORDER/ADMIN: HCPCS | Performed by: INTERNAL MEDICINE

## 2018-12-27 RX ORDER — FUROSEMIDE 40 MG/1
40 TABLET ORAL 2 TIMES DAILY
Qty: 60 TABLET | Refills: 6 | Status: SHIPPED | OUTPATIENT
Start: 2018-12-27 | End: 2019-08-12 | Stop reason: SDUPTHER

## 2018-12-27 NOTE — PROGRESS NOTES
ONE CAPSULE BY MOUTH FOUR TIMES A DAY BEFORE MEALS AND NIGHTLY 120 capsule 5     No current facility-administered medications for this visit. Latex; Penicillins; Shellfish-derived products; Abilify [aripiprazole]; Adhesive tape; Statins; Buspar [buspirone]; and Sulfa antibiotics    Review of Systems:  General ROS: negative  Psychological ROS: negative  Hematological and Lymphatic ROS: No history of blood clots or bleeding disorder. Respiratory ROS: no cough, shortness of breath, or wheezing  Cardiovascular ROS: positive for - edema  Gastrointestinal ROS: negative  Genito-Urinary ROS: no dysuria, trouble voiding, or hematuria  Musculoskeletal ROS: negative  Neurological ROS: no TIA or stroke symptoms  Dermatological ROS: negative    VITALS:  Blood pressure 122/64, pulse 92, resp. rate 18, height 5' 2\" (1.575 m), weight (!) 301 lb (136.5 kg), last menstrual period 07/27/1993, SpO2 98 %, not currently breastfeeding. Body mass index is 55.05 kg/m². Physical Examination:  General appearance - alert, well appearing, and in no distress  Mental status - alert, oriented to person, place, and time  Neck - Neck is supple, no JVD or carotid bruits. No thyromegaly or adenopathy. Chest - clear to auscultation, no wheezes, rales or rhonchi, symmetric air entry  Heart - normal rate, regular rhythm, normal S1, S2, no murmurs, rubs, clicks or gallops  Abdomen - soft, nontender, nondistended, no masses or organomegaly  Neurological - alert, oriented, normal speech, no focal findings or movement disorder noted  Extremities - peripheral pulses normal, + 2b/l  pedal edema, no clubbing or cyanosis  Skin - normal coloration and turgor, no rashes, no suspicious skin lesions noted      EKG: normal sinus rhythm, nonspecific ST and T waves changes. Low voltage. Orders Placed This Encounter   Procedures    EKG 12 lead       ASSESSMENT:     Diagnosis Orders   1. Benign essential HTN  EKG 12 lead   2.  Bilateral edema of lower extremity     3. Mixed hyperlipidemia     4. Leg edema. 5.      Morbid obesity. PLAN:     Patient will need to continue to follow up with you for their general medical care     As always, aggressive risk factor modification is strongly recommended. We should adhere to the JNC VIII guidelines for HTN management and the NCEP ATP III guidelines for LDL-C management. Cardiac diet is always recommended with low fat, cholesterol, calories and sodium. Continue medications at current doses. Consider holter monitor if palpitations get more frequent. Lower lasix to 40mg daily and takes BID if needed. Plan for stress test given risk factors. Check EKG    Patient was advised and encouraged to check blood pressure at home or at a pharmacy, maintain a logbook, and also call us back if blood pressure are above the target ranges or if it is low. Patient clearly understands and agrees to the instructions. We will need to continue to monitor muscle and liver enzymes, BUN, CR, and electrolytes. Details of medical condition explained and patient was warned about adverse consequences of uncontrolled medical conditions and possible side effects of prescribed medications.

## 2019-01-03 ENCOUNTER — TELEPHONE (OUTPATIENT)
Dept: FAMILY MEDICINE CLINIC | Age: 54
End: 2019-01-03

## 2019-01-03 DIAGNOSIS — E11.42 TYPE 2 DIABETES MELLITUS WITH DIABETIC POLYNEUROPATHY, WITHOUT LONG-TERM CURRENT USE OF INSULIN (HCC): Primary | ICD-10-CM

## 2019-01-04 ENCOUNTER — HOSPITAL ENCOUNTER (OUTPATIENT)
Dept: NUCLEAR MEDICINE | Age: 54
Discharge: HOME | End: 2019-01-06
Payer: MEDICARE

## 2019-01-04 ENCOUNTER — HOSPITAL ENCOUNTER (OUTPATIENT)
Dept: NON INVASIVE DIAGNOSTICS | Age: 54
Discharge: HOME | End: 2019-01-04

## 2019-01-04 VITALS — HEART RATE: 91 BPM | SYSTOLIC BLOOD PRESSURE: 128 MMHG | DIASTOLIC BLOOD PRESSURE: 78 MMHG

## 2019-01-04 DIAGNOSIS — R94.31 ABNORMAL EKG: ICD-10-CM

## 2019-01-04 DIAGNOSIS — R06.02 SOB (SHORTNESS OF BREATH): ICD-10-CM

## 2019-01-04 DIAGNOSIS — I10 BENIGN ESSENTIAL HTN: ICD-10-CM

## 2019-01-04 PROCEDURE — A9502 TC99M TETROFOSMIN: HCPCS | Performed by: FAMILY MEDICINE

## 2019-01-04 PROCEDURE — 93017 CV STRESS TEST TRACING ONLY: CPT

## 2019-01-04 PROCEDURE — 2580000003 HC RX 258: Performed by: FAMILY MEDICINE

## 2019-01-04 PROCEDURE — 3430000000 HC RX DIAGNOSTIC RADIOPHARMACEUTICAL: Performed by: FAMILY MEDICINE

## 2019-01-04 PROCEDURE — 6360000004 HC RX CONTRAST MEDICATION: Performed by: FAMILY MEDICINE

## 2019-01-04 PROCEDURE — 78452 HT MUSCLE IMAGE SPECT MULT: CPT

## 2019-01-04 RX ORDER — LANCETS 30 GAUGE
EACH MISCELLANEOUS
Qty: 100 EACH | Refills: 3 | Status: SHIPPED | OUTPATIENT
Start: 2019-01-04 | End: 2019-08-12 | Stop reason: SDUPTHER

## 2019-01-04 RX ORDER — SODIUM CHLORIDE 0.9 % (FLUSH) 0.9 %
10 SYRINGE (ML) INJECTION PRN
Status: DISCONTINUED | OUTPATIENT
Start: 2019-01-04 | End: 2019-01-07 | Stop reason: HOSPADM

## 2019-01-04 RX ORDER — GLUCOSAMINE HCL/CHONDROITIN SU 500-400 MG
CAPSULE ORAL
Qty: 100 STRIP | Refills: 3 | Status: SHIPPED | OUTPATIENT
Start: 2019-01-04

## 2019-01-04 RX ADMIN — Medication 10 ML: at 09:32

## 2019-01-04 RX ADMIN — TETROFOSMIN 33.7 MILLICURIE: 0.23 INJECTION, POWDER, LYOPHILIZED, FOR SOLUTION INTRAVENOUS at 09:33

## 2019-01-04 RX ADMIN — Medication 10 ML: at 09:34

## 2019-01-04 RX ADMIN — REGADENOSON 0.4 MG: 0.08 INJECTION, SOLUTION INTRAVENOUS at 09:31

## 2019-01-07 PROCEDURE — A9502 TC99M TETROFOSMIN: HCPCS | Performed by: INTERNAL MEDICINE

## 2019-01-07 PROCEDURE — 3430000000 HC RX DIAGNOSTIC RADIOPHARMACEUTICAL: Performed by: INTERNAL MEDICINE

## 2019-01-07 RX ADMIN — TETROFOSMIN 31.6 MILLICURIE: 0.23 INJECTION, POWDER, LYOPHILIZED, FOR SOLUTION INTRAVENOUS at 12:09

## 2019-01-11 RX ORDER — ALLOPURINOL 100 MG/1
TABLET ORAL
Qty: 30 TABLET | Refills: 5 | Status: SHIPPED | OUTPATIENT
Start: 2019-01-11 | End: 2019-08-12 | Stop reason: SDUPTHER

## 2019-01-14 ENCOUNTER — TELEPHONE (OUTPATIENT)
Dept: FAMILY MEDICINE CLINIC | Age: 54
End: 2019-01-14

## 2019-01-16 ENCOUNTER — TELEPHONE (OUTPATIENT)
Dept: CARDIOLOGY CLINIC | Age: 54
End: 2019-01-16

## 2019-02-04 RX ORDER — PIOGLITAZONEHYDROCHLORIDE 15 MG/1
TABLET ORAL
Qty: 30 TABLET | Refills: 5 | Status: SHIPPED | OUTPATIENT
Start: 2019-02-04 | End: 2019-08-12 | Stop reason: SDUPTHER

## 2019-02-11 ENCOUNTER — OFFICE VISIT (OUTPATIENT)
Dept: INTERNAL MEDICINE CLINIC | Age: 54
End: 2019-02-11
Payer: MEDICARE

## 2019-02-11 VITALS
DIASTOLIC BLOOD PRESSURE: 76 MMHG | HEART RATE: 91 BPM | BODY MASS INDEX: 53.92 KG/M2 | WEIGHT: 293 LBS | HEIGHT: 62 IN | OXYGEN SATURATION: 98 % | SYSTOLIC BLOOD PRESSURE: 132 MMHG | TEMPERATURE: 98.1 F | RESPIRATION RATE: 14 BRPM

## 2019-02-11 DIAGNOSIS — F60.3 BORDERLINE PERSONALITY DISORDER (HCC): ICD-10-CM

## 2019-02-11 DIAGNOSIS — G89.29 CHRONIC NONINTRACTABLE HEADACHE, UNSPECIFIED HEADACHE TYPE: ICD-10-CM

## 2019-02-11 DIAGNOSIS — I10 ESSENTIAL HYPERTENSION: ICD-10-CM

## 2019-02-11 DIAGNOSIS — Z11.59 NEED FOR HEPATITIS C SCREENING TEST: ICD-10-CM

## 2019-02-11 DIAGNOSIS — E66.01 MORBID OBESITY WITH BMI OF 50.0-59.9, ADULT (HCC): ICD-10-CM

## 2019-02-11 DIAGNOSIS — E11.42 TYPE 2 DIABETES MELLITUS WITH DIABETIC POLYNEUROPATHY, WITHOUT LONG-TERM CURRENT USE OF INSULIN (HCC): Primary | ICD-10-CM

## 2019-02-11 DIAGNOSIS — K50.919 CROHN'S DISEASE WITH COMPLICATION, UNSPECIFIED GASTROINTESTINAL TRACT LOCATION (HCC): ICD-10-CM

## 2019-02-11 DIAGNOSIS — E66.01 MORBID OBESITY (HCC): ICD-10-CM

## 2019-02-11 DIAGNOSIS — F33.2 SEVERE EPISODE OF RECURRENT MAJOR DEPRESSIVE DISORDER, WITHOUT PSYCHOTIC FEATURES (HCC): ICD-10-CM

## 2019-02-11 DIAGNOSIS — Z11.4 ENCOUNTER FOR SCREENING FOR HIV: ICD-10-CM

## 2019-02-11 DIAGNOSIS — R51.9 CHRONIC NONINTRACTABLE HEADACHE, UNSPECIFIED HEADACHE TYPE: ICD-10-CM

## 2019-02-11 PROCEDURE — 2022F DILAT RTA XM EVC RTNOPTHY: CPT | Performed by: FAMILY MEDICINE

## 2019-02-11 PROCEDURE — 3017F COLORECTAL CA SCREEN DOC REV: CPT | Performed by: FAMILY MEDICINE

## 2019-02-11 PROCEDURE — 1036F TOBACCO NON-USER: CPT | Performed by: FAMILY MEDICINE

## 2019-02-11 PROCEDURE — 99214 OFFICE O/P EST MOD 30 MIN: CPT | Performed by: FAMILY MEDICINE

## 2019-02-11 PROCEDURE — G8417 CALC BMI ABV UP PARAM F/U: HCPCS | Performed by: FAMILY MEDICINE

## 2019-02-11 PROCEDURE — G8427 DOCREV CUR MEDS BY ELIG CLIN: HCPCS | Performed by: FAMILY MEDICINE

## 2019-02-11 PROCEDURE — G8482 FLU IMMUNIZE ORDER/ADMIN: HCPCS | Performed by: FAMILY MEDICINE

## 2019-02-11 PROCEDURE — 3046F HEMOGLOBIN A1C LEVEL >9.0%: CPT | Performed by: FAMILY MEDICINE

## 2019-02-11 RX ORDER — BLOOD-GLUCOSE METER
EACH MISCELLANEOUS
Refills: 0 | COMMUNITY
Start: 2019-01-04

## 2019-02-11 RX ORDER — TOPIRAMATE 100 MG/1
100 TABLET, FILM COATED ORAL
COMMUNITY
Start: 2019-02-08 | End: 2019-02-11 | Stop reason: SINTOL

## 2019-02-11 RX ORDER — PRAZOSIN HYDROCHLORIDE 2 MG/1
CAPSULE ORAL
Refills: 3 | COMMUNITY
Start: 2019-02-01

## 2019-02-11 ASSESSMENT — ENCOUNTER SYMPTOMS
RESPIRATORY NEGATIVE: 1
EYES NEGATIVE: 1
RHINORRHEA: 0
GASTROINTESTINAL NEGATIVE: 1
CHEST TIGHTNESS: 0
COUGH: 0

## 2019-03-04 DIAGNOSIS — K50.919 CROHN'S DISEASE WITH COMPLICATION, UNSPECIFIED GASTROINTESTINAL TRACT LOCATION (HCC): ICD-10-CM

## 2019-03-04 RX ORDER — OMEPRAZOLE 40 MG/1
CAPSULE, DELAYED RELEASE ORAL
Qty: 60 CAPSULE | Refills: 4 | Status: SHIPPED | OUTPATIENT
Start: 2019-03-04 | End: 2019-08-12 | Stop reason: SDUPTHER

## 2019-03-20 DIAGNOSIS — Z11.4 ENCOUNTER FOR SCREENING FOR HIV: ICD-10-CM

## 2019-03-20 DIAGNOSIS — Z11.59 NEED FOR HEPATITIS C SCREENING TEST: ICD-10-CM

## 2019-03-20 DIAGNOSIS — I10 ESSENTIAL HYPERTENSION: ICD-10-CM

## 2019-03-20 DIAGNOSIS — E11.42 TYPE 2 DIABETES MELLITUS WITH DIABETIC POLYNEUROPATHY, WITHOUT LONG-TERM CURRENT USE OF INSULIN (HCC): ICD-10-CM

## 2019-03-20 LAB
ALBUMIN SERPL-MCNC: 3.9 G/DL (ref 3.5–4.6)
ALP BLD-CCNC: 115 U/L (ref 40–130)
ALT SERPL-CCNC: 8 U/L (ref 0–33)
ANION GAP SERPL CALCULATED.3IONS-SCNC: 14 MEQ/L (ref 9–15)
AST SERPL-CCNC: 12 U/L (ref 0–35)
BASOPHILS ABSOLUTE: 0 K/UL (ref 0–0.2)
BASOPHILS RELATIVE PERCENT: 0.5 %
BILIRUB SERPL-MCNC: <0.2 MG/DL (ref 0.2–0.7)
BUN BLDV-MCNC: 16 MG/DL (ref 6–20)
CALCIUM SERPL-MCNC: 9.2 MG/DL (ref 8.5–9.9)
CHLORIDE BLD-SCNC: 99 MEQ/L (ref 95–107)
CHOLESTEROL, TOTAL: 192 MG/DL (ref 0–199)
CO2: 28 MEQ/L (ref 20–31)
CREAT SERPL-MCNC: 0.93 MG/DL (ref 0.5–0.9)
CREATININE URINE: 196.1 MG/DL
EOSINOPHILS ABSOLUTE: 0.1 K/UL (ref 0–0.7)
EOSINOPHILS RELATIVE PERCENT: 1.9 %
GFR AFRICAN AMERICAN: >60
GFR NON-AFRICAN AMERICAN: >60
GLOBULIN: 2.9 G/DL (ref 2.3–3.5)
GLUCOSE BLD-MCNC: 120 MG/DL (ref 70–99)
HBA1C MFR BLD: 6.4 % (ref 4.8–5.9)
HCT VFR BLD CALC: 34.4 % (ref 37–47)
HDLC SERPL-MCNC: 60 MG/DL (ref 40–59)
HEMOGLOBIN: 11.3 G/DL (ref 12–16)
HEPATITIS C ANTIBODY INTERPRETATION: NORMAL
LDL CHOLESTEROL CALCULATED: 101 MG/DL (ref 0–129)
LYMPHOCYTES ABSOLUTE: 1 K/UL (ref 1–4.8)
LYMPHOCYTES RELATIVE PERCENT: 13 %
MCH RBC QN AUTO: 28 PG (ref 27–31.3)
MCHC RBC AUTO-ENTMCNC: 32.9 % (ref 33–37)
MCV RBC AUTO: 85.2 FL (ref 82–100)
MICROALBUMIN UR-MCNC: <1.2 MG/DL
MICROALBUMIN/CREAT UR-RTO: NORMAL MG/G (ref 0–30)
MONOCYTES ABSOLUTE: 0.7 K/UL (ref 0.2–0.8)
MONOCYTES RELATIVE PERCENT: 8.9 %
NEUTROPHILS ABSOLUTE: 5.8 K/UL (ref 1.4–6.5)
NEUTROPHILS RELATIVE PERCENT: 75.7 %
PDW BLD-RTO: 16.1 % (ref 11.5–14.5)
PLATELET # BLD: 241 K/UL (ref 130–400)
POTASSIUM SERPL-SCNC: 4.2 MEQ/L (ref 3.4–4.9)
RBC # BLD: 4.04 M/UL (ref 4.2–5.4)
SODIUM BLD-SCNC: 141 MEQ/L (ref 135–144)
TOTAL PROTEIN: 6.8 G/DL (ref 6.3–8)
TRIGL SERPL-MCNC: 156 MG/DL (ref 0–150)
WBC # BLD: 7.6 K/UL (ref 4.8–10.8)

## 2019-03-22 LAB
HIV 1,2 COMBO ANTIGEN/ANTIBODY: NEGATIVE
HIV-1 WESTERN BLOT: ABNORMAL

## 2019-03-29 ENCOUNTER — HOSPITAL ENCOUNTER (OUTPATIENT)
Dept: NEUROLOGY | Age: 54
Discharge: HOME | End: 2019-03-29
Payer: MEDICARE

## 2019-03-29 PROCEDURE — 95813 EEG EXTND MNTR 61-119 MIN: CPT

## 2019-04-05 ENCOUNTER — OFFICE VISIT (OUTPATIENT)
Dept: INTERNAL MEDICINE CLINIC | Age: 54
End: 2019-04-05
Payer: MEDICARE

## 2019-04-05 VITALS
HEART RATE: 107 BPM | SYSTOLIC BLOOD PRESSURE: 102 MMHG | BODY MASS INDEX: 53.92 KG/M2 | DIASTOLIC BLOOD PRESSURE: 68 MMHG | WEIGHT: 293 LBS | RESPIRATION RATE: 16 BRPM | OXYGEN SATURATION: 97 % | TEMPERATURE: 97.9 F | HEIGHT: 62 IN

## 2019-04-05 DIAGNOSIS — E11.42 TYPE 2 DIABETES MELLITUS WITH DIABETIC POLYNEUROPATHY, WITHOUT LONG-TERM CURRENT USE OF INSULIN (HCC): Primary | ICD-10-CM

## 2019-04-05 DIAGNOSIS — Z23 NEED FOR VACCINATION WITH 13-POLYVALENT PNEUMOCOCCAL CONJUGATE VACCINE: ICD-10-CM

## 2019-04-05 DIAGNOSIS — R75 HIV-1 ANTIBODY ASSAY INDETERMINATE: ICD-10-CM

## 2019-04-05 PROCEDURE — 99213 OFFICE O/P EST LOW 20 MIN: CPT | Performed by: FAMILY MEDICINE

## 2019-04-05 PROCEDURE — G0009 ADMIN PNEUMOCOCCAL VACCINE: HCPCS | Performed by: FAMILY MEDICINE

## 2019-04-05 PROCEDURE — 90670 PCV13 VACCINE IM: CPT | Performed by: FAMILY MEDICINE

## 2019-04-05 PROCEDURE — 3017F COLORECTAL CA SCREEN DOC REV: CPT | Performed by: FAMILY MEDICINE

## 2019-04-05 PROCEDURE — 2022F DILAT RTA XM EVC RTNOPTHY: CPT | Performed by: FAMILY MEDICINE

## 2019-04-05 PROCEDURE — 1036F TOBACCO NON-USER: CPT | Performed by: FAMILY MEDICINE

## 2019-04-05 PROCEDURE — G8427 DOCREV CUR MEDS BY ELIG CLIN: HCPCS | Performed by: FAMILY MEDICINE

## 2019-04-05 PROCEDURE — 3044F HG A1C LEVEL LT 7.0%: CPT | Performed by: FAMILY MEDICINE

## 2019-04-05 PROCEDURE — G8417 CALC BMI ABV UP PARAM F/U: HCPCS | Performed by: FAMILY MEDICINE

## 2019-04-05 RX ORDER — MOMETASONE FUROATE 1 MG/G
CREAM TOPICAL
Qty: 45 G | Refills: 2 | Status: SHIPPED | OUTPATIENT
Start: 2019-04-05 | End: 2019-08-12 | Stop reason: SDUPTHER

## 2019-04-05 ASSESSMENT — ENCOUNTER SYMPTOMS
GASTROINTESTINAL NEGATIVE: 1
CHEST TIGHTNESS: 0
RHINORRHEA: 0
COUGH: 0
RESPIRATORY NEGATIVE: 1
EYES NEGATIVE: 1

## 2019-04-05 NOTE — PROGRESS NOTES
07/31/2017    Varicose vein of leg 07/31/2017    Tear of meniscus of knee 07/27/2017    Mixed hyperlipidemia 05/10/2017    Pain in right knee 03/30/2017    Hip pain, right 03/30/2017    Obesity (BMI 35.0-39.9 without comorbidity) 03/30/2017    Muscular pain 03/30/2017    Severe episode of recurrent major depressive disorder, without psychotic features ([facility] 10/25/2016    Borderline personality disorder ([facility] 10/25/2016    Type 2 diabetes mellitus with diabetic polyneuropathy ([facility] 02/05/2016    Benign essential HTN     Asthma     Crohn's disease ([facility]     GERD (gastroesophageal reflux disease)     Headache      Past Surgical History:   Procedure Laterality Date    BREAST CYST EXCISION Right 1994    exc mass benign    CHOLECYSTECTOMY  2009    COLONOSCOPY  2015    negative    ENDOSCOPY, COLON, DIAGNOSTIC      HERNIA REPAIR  2015    ventral / mesh    LEG TENDON SURGERY Right 2007    leg.  ankle and foot    OVARIAN CYST REMOVAL Left 1994    with mass and both fallopian tube    OVARY REMOVAL  12/2015    [facility] / mass left ovary & bilateral  tubes    PARTIAL HYSTERECTOMY      VT COLON CA SCRN NOT HI RSK IND N/A 11/7/2017    COLONOSCOPY performed by MANOJ Multani on bx    VT COLONOSCOPY W/BIOPSY SINGLE/MULTIPLE N/A 3/15/2018    COLONOSCOPY performed by Agapito Townsend MD at [facility] Right 8/3/2017    RIGHT KNEE ARTHROSCOPIC PARTIAL MEDIAL MENISCECTOMY KNEE performed by Amadou Wall MD at [facility] N/CARPAL TUNNEL SURG Right 11/30/2017    RIGHT WRIST  CARPAL TUNNEL RELEASE performed by Amadou Wall MD at [facility] N/CARPAL TUNNEL SURG Left 5/10/2018    LEFT WRIST CARPAL TUNNEL RELEASE performed by Amadou Wall MD at [address] [facility]     Family History   Problem Relation Age of Onset    Emphysema Mother     Cancer Maternal Grandfather     Diabetes Paternal Grandmother     Emphysema Paternal Grandfather     Heart Disease Sister     Stroke Sister     Diabetes Sister      Social History     Socioeconomic History    Marital status: Single     Spouse name: None    Number of children: None    Years of education: None    Highest education level: None   Occupational History    None   Social Needs    Financial resource strain: None    Food insecurity:     Worry: None     Inability: None    Transportation needs:     Medical: None     Non-medical: None   Tobacco Use    Smoking status: Never Smoker    Smokeless tobacco: Never Used   Substance and Sexual Activity    Alcohol use: No     Alcohol/week: 0.0 oz    Drug use: No    Sexual activity: Not Currently   Lifestyle    Physical activity:     Days per week: None     Minutes per session: None    Stress: None   Relationships    Social connections:     Talks on phone: None     Gets together: None     Attends Worship service: None     Active member of club or organization: None     Attends meetings of clubs or organizations: None     Relationship status: None    Intimate partner violence:     Fear of current or ex partner: None     Emotionally abused: None     Physically abused: None     Forced sexual activity: None   Other Topics Concern    None   Social History Narrative    None     Current Outpatient Medications   Medication Sig Dispense Refill    mometasone (ELOCON) 0.1 % cream APPLY TOPICALLY DAILY 45 g 2    omeprazole (PRILOSEC) 40 MG delayed release capsule TAKE ONE CAPSULE BY MOUTH TWO TIMES A DAY 60 capsule 4    Blood Glucose Monitoring Suppl (ONE TOUCH ULTRA 2) w/Device KIT TEST TWICE DAILY  0    prazosin (MINIPRESS) 2 MG capsule TAKE 1 CAPSULE BY MOUTH EVERY NIGHT FOR NIGHTMARES  3    pioglitazone (ACTOS) 15 MG tablet TAKE ONE TABLET BY MOUTH DAILY 30 tablet 5    allopurinol (ZYLOPRIM) 100 MG tablet TAKE ONE TABLET BY MOUTH DAILY 30 tablet 5    blood glucose monitor strips Test 2 times a day & as needed for symptoms of irregular blood glucose. 100 strip 3    Lancets MISC Test bid 100 each 3    furosemide (LASIX) 40 MG tablet Take 1 tablet by mouth 2 times daily 60 tablet 6    mometasone-formoterol (DULERA) 100-5 MCG/ACT inhaler Inhale 2 puffs into the lungs 2 times daily 1 Inhaler 5    hyoscyamine (ANASPAZ;LEVSIN) 125 MCG tablet TAKE ONE TABLET BY MOUTH EVERY 4 HOURS AS NEEDED for cramping 164 tablet 0    metFORMIN (GLUCOPHAGE) 500 MG tablet TAKE ONE TABLET BY MOUTH TWICE A DAY WITH MEALS 60 tablet 5    montelukast (SINGULAIR) 10 MG tablet TAKE 1 TABLET BY MOUTH NIGHTLY 30 tablet 5    ondansetron (ZOFRAN) 8 MG tablet TAKE ONE TABLET BY MOUTH EVERY 8 HOURS AS NEEDED FOR NAUSEA AND VOMITING.  40 tablet 3    nystatin-triamcinolone (MYCOLOG II) 960784-9.1 UNIT/GM-% cream APPLY TOPICALLY TWICE DAILY 45 g 2    magnesium oxide (MAG-OX) 400 (240 Mg) MG tablet Take 400 mg by mouth 3 times daily as needed  0    Melatonin 5 MG CAPS Take 5 mg by mouth daily  2    potassium chloride (KLOR-CON M) 20 MEQ extended release tablet Take 1 tablet by mouth 2 times daily 180 tablet 3    FREESTYLE LITE strip use three times daily  50 each 5    colchicine (COLCRYS) 0.6 MG tablet TAKE ONE TABLET BY MOUTH THREE TIMES A DAY AS NEEDED FOR PAIN 30 tablet 2    ipratropium-albuterol (DUONEB) 0.5-2.5 (3) MG/3ML SOLN nebulizer solution INHALE 1 VIAL VIA NEBULIZER EVERY 4 HOURS  360 mL 0    traZODone (DESYREL) 50 MG tablet TAKE 1/2 TO 1 TABLET BY MOUTH AT BEDTIME  1    VENTOLIN  (90 Base) MCG/ACT inhaler INHALE 2 PUFFS INTO THE LUNGS EVERY 6 HOURS AS NEEDED FOR WHEEZING  3    DULoxetine (CYMBALTA) 60 MG extended release capsule TAKE ONE CAPSULE BY MOUTH TWICE DAILY AS DIRECTED IN THE MORNING AND AFTERNOON  1    meloxicam (MOBIC) 7.5 MG tablet TAKE ONE TABLET BY MOUTH TWO TIMES A DAY WITH MEALS TAKE ONLY WITH FOOD AS NEEDED  3    baclofen (LIORESAL) 20 MG tablet 20 mg 4 times daily       LYRICA 150 MG capsule 150 mg 3 times daily Indications: last filled 3/5/17 no refills       dicyclomine (BENTYL) 10 MG capsule TAKE ONE CAPSULE BY MOUTH FOUR TIMES A DAY BEFORE MEALS AND NIGHTLY 120 capsule 5     No current facility-administered medications for this visit. Current Outpatient Medications on File Prior to Visit   Medication Sig Dispense Refill    omeprazole (PRILOSEC) 40 MG delayed release capsule TAKE ONE CAPSULE BY MOUTH TWO TIMES A DAY 60 capsule 4    Blood Glucose Monitoring Suppl (ONE TOUCH ULTRA 2) w/Device KIT TEST TWICE DAILY  0    prazosin (MINIPRESS) 2 MG capsule TAKE 1 CAPSULE BY MOUTH EVERY NIGHT FOR NIGHTMARES  3    pioglitazone (ACTOS) 15 MG tablet TAKE ONE TABLET BY MOUTH DAILY 30 tablet 5    allopurinol (ZYLOPRIM) 100 MG tablet TAKE ONE TABLET BY MOUTH DAILY 30 tablet 5    blood glucose monitor strips Test 2 times a day & as needed for symptoms of irregular blood glucose. 100 strip 3    Lancets MISC Test bid 100 each 3    furosemide (LASIX) 40 MG tablet Take 1 tablet by mouth 2 times daily 60 tablet 6    mometasone-formoterol (DULERA) 100-5 MCG/ACT inhaler Inhale 2 puffs into the lungs 2 times daily 1 Inhaler 5    hyoscyamine (ANASPAZ;LEVSIN) 125 MCG tablet TAKE ONE TABLET BY MOUTH EVERY 4 HOURS AS NEEDED for cramping 164 tablet 0    metFORMIN (GLUCOPHAGE) 500 MG tablet TAKE ONE TABLET BY MOUTH TWICE A DAY WITH MEALS 60 tablet 5    montelukast (SINGULAIR) 10 MG tablet TAKE 1 TABLET BY MOUTH NIGHTLY 30 tablet 5    ondansetron (ZOFRAN) 8 MG tablet TAKE ONE TABLET BY MOUTH EVERY 8 HOURS AS NEEDED FOR NAUSEA AND VOMITING.  40 tablet 3    nystatin-triamcinolone (MYCOLOG II) 725595-4.1 UNIT/GM-% cream APPLY TOPICALLY TWICE DAILY 45 g 2    magnesium oxide (MAG-OX) 400 (240 Mg) MG tablet Take 400 mg by mouth 3 times daily as needed  0    Melatonin 5 MG CAPS Take 5 mg by mouth daily  2    potassium chloride (KLOR-CON M) 20 MEQ extended release tablet Take 1 tablet by mouth 2 times daily 180 tablet 3    FREESTYLE LITE strip use three times daily  50 each 5    colchicine (COLCRYS) 0.6 MG tablet TAKE ONE TABLET BY MOUTH THREE TIMES A DAY AS NEEDED FOR PAIN 30 tablet 2    ipratropium-albuterol (DUONEB) 0.5-2.5 (3) MG/3ML SOLN nebulizer solution INHALE 1 VIAL VIA NEBULIZER EVERY 4 HOURS  360 mL 0    traZODone (DESYREL) 50 MG tablet TAKE 1/2 TO 1 TABLET BY MOUTH AT BEDTIME  1    VENTOLIN  (90 Base) MCG/ACT inhaler INHALE 2 PUFFS INTO THE LUNGS EVERY 6 HOURS AS NEEDED FOR WHEEZING  3    DULoxetine (CYMBALTA) 60 MG extended release capsule TAKE ONE CAPSULE BY MOUTH TWICE DAILY AS DIRECTED IN THE MORNING AND AFTERNOON  1    meloxicam (MOBIC) 7.5 MG tablet TAKE ONE TABLET BY MOUTH TWO TIMES A DAY WITH MEALS TAKE ONLY WITH FOOD AS NEEDED  3    baclofen (LIORESAL) 20 MG tablet 20 mg 4 times daily       LYRICA 150 MG capsule 150 mg 3 times daily Indications: last filled 3/5/17 no refills       dicyclomine (BENTYL) 10 MG capsule TAKE ONE CAPSULE BY MOUTH FOUR TIMES A DAY BEFORE MEALS AND NIGHTLY 120 capsule 5     No current facility-administered medications on file prior to visit.       Allergies   Allergen Reactions    Latex Hives    Penicillins Swelling and Rash    Shellfish-Derived Products Anaphylaxis    Abilify [Aripiprazole]      shaking    Adhesive Tape Swelling     If left on too long    Statins      Swelling      Topamax [Topiramate]     Buspar [Buspirone]      shaking    Other Nausea And Vomiting    Sulfa Antibiotics Nausea And Vomiting     Health Maintenance   Topic Date Due    Hepatitis B Vaccine (1 of 3 - Risk 3-dose series) [birth year]/1983    Shingles Vaccine (1 of 2) 03/17/2014    Diabetic retinal exam  02/01/2017    Cervical cancer screen  03/01/2018    DTaP/Tdap/Td vaccine (1 - Tdap) 04/05/2020 (Originally [birth year]/17[birth year]1983)    Pneumococcal 0-64 years Vaccine (1 of 1 - PPSV23) 05/31/2019    Breast cancer screen  11/10/2019    A1C test (Diabetic or Prediabetic)  03/20/2020    Diabetic microalbuminuria test  03/20/2020    Lipid screen  03/20/2020    Potassium monitoring  03/20/2020    Creatinine monitoring  03/20/2020    Diabetic foot exam  04/05/2020    Colon cancer screen colonoscopy  03/15/2028    Flu vaccine  Completed    Hepatitis C screen  Completed    HIV screen  Completed       Review of Systems     Review of Systems   Constitutional: Negative for activity change, appetite change, fatigue and fever. HENT: Negative for congestion and rhinorrhea. Eyes: Negative. Respiratory: Negative. Negative for cough and chest tightness. Cardiovascular: Negative. Gastrointestinal: Negative. Endocrine: Negative. Genitourinary: Negative. Musculoskeletal: Negative. Skin: Negative. Neurological: Negative for dizziness, light-headedness and numbness. Hematological: Negative. Psychiatric/Behavioral: Negative. Physical Exam  Vitals:    04/05/19 1204   BP: 102/68   Site: Right Upper Arm   Position: Sitting   Cuff Size: Large Adult   Pulse: 107   Resp: 16   Temp: 97.9 °F (36.6 °C)   TempSrc: Oral   SpO2: 97%   Weight: 295 lb 9.6 oz (134.1 kg)   Height: 5' 2\" (1.575 m)       Physical Exam   Constitutional: She is oriented to person, place, and time. She appears well-developed and well-nourished. HENT:   Right Ear: External ear normal.   Left Ear: External ear normal.   Mouth/Throat: Oropharynx is clear and moist.   Eyes: Pupils are equal, round, and reactive to light. EOM are normal.   Neck: Normal range of motion. Neck supple. No thyromegaly present. Cardiovascular: Normal rate, regular rhythm and normal heart sounds. Exam reveals no gallop and no friction rub. No murmur heard. Pulmonary/Chest: Effort normal. No respiratory distress. She has no wheezes. She has no rales. She exhibits no tenderness. Abdominal: Soft. Bowel sounds are normal. She exhibits no distension and no mass. There is no tenderness. There is no rebound and no guarding. Musculoskeletal: Normal range of motion. Lymphadenopathy:     She has no cervical adenopathy. Neurological: She is alert and oriented to person, place, and time. No cranial nerve deficit. Coordination normal.   Skin: Skin is warm and dry. Psychiatric: She has a normal mood and affect. Assessment   Diagnosis Orders   1. Type 2 diabetes mellitus with diabetic polyneuropathy, without long-term current use of insulin ([facility])   DIABETES FOOT EXAM   2. Need for vaccination with 13-polyvalent pneumococcal conjugate vaccine  Pneumococcal conjugate vaccine 13-valent   3. HIV-1 antibody assay indeterminate  HIV-1 western blot     Problem List     Type 2 diabetes mellitus with diabetic polyneuropathy ([facility]) - Primary    Relevant Medications    FREESTYLE LITE strip    metFORMIN (GLUCOPHAGE) 500 MG tablet    blood glucose monitor strips    Lancets MISC    pioglitazone (ACTOS) 15 MG tablet    Other Relevant Orders     DIABETES FOOT EXAM (Completed)          Plan  Orders Placed This Encounter   Procedures    Pneumococcal conjugate vaccine 13-valent    HIV-1 western blot     Standing Status:   Future     Standing Expiration Date:   4/4/2020     DIABETES FOOT EXAM     Orders Placed This Encounter   Medications    mometasone (ELOCON) 0.1 % cream     Sig: APPLY TOPICALLY DAILY     Dispense:  45 g     Refill:  2     No follow-ups on file.   Linda Bertrand MD

## 2019-05-08 ENCOUNTER — TELEPHONE (OUTPATIENT)
Dept: PHARMACY | Facility: CLINIC | Age: 54
End: 2019-05-08

## 2019-05-08 NOTE — LETTER
[address]  [address], [place]  Phone: [phone] [phone]           05/16/19     Dear Beulah Kirby,      We tried to reach you recently regarding your PIOGLITAZONE TAB 15MG, but were unable to reach you on the telephone. We were attempting to contact you to see if you would like to have this prescription changed from a 30-day supply to a 90-day supply to save you time and trips to the pharmacy. We would like to assist in switching your prescription to a 3-month supply, please contact us at [phone] Option #7 if you are intersted.         Sincerely,     [address]  Phone: [phone], option 7

## 2019-05-08 NOTE — TELEPHONE ENCOUNTER
CLINICAL PHARMACY: ADHERENCE REVIEW    Identified care gap per [employer]: Pioglitazone 15 mg adherence  Per records, appears 30-day supply last filled 4/14/19    [facility] : [facility]: [phone]:  Pioglitazone 15 mg last filled on 4/14/19 for a 30-day supply. Attempting to reach patient to review changing prescription from a 30-day supply to a 90-day supply. Left message asking for return call to toll free [phone] option 7.

## 2019-05-16 NOTE — TELEPHONE ENCOUNTER
**2nd attempt to contact patient**    Attempting to reach patient to review changing prescription from a 30-day supply to a 90-day supply. Left message asking for return call to toll free [phone] option 7. Letter mailed to patient.

## 2019-06-27 ENCOUNTER — OFFICE VISIT (OUTPATIENT)
Dept: CARDIOLOGY CLINIC | Age: 54
End: 2019-06-27
Payer: MEDICARE

## 2019-06-27 VITALS
WEIGHT: 289 LBS | RESPIRATION RATE: 22 BRPM | SYSTOLIC BLOOD PRESSURE: 124 MMHG | OXYGEN SATURATION: 99 % | BODY MASS INDEX: 53.18 KG/M2 | HEIGHT: 62 IN | DIASTOLIC BLOOD PRESSURE: 82 MMHG | HEART RATE: 75 BPM

## 2019-06-27 DIAGNOSIS — R60.0 BILATERAL EDEMA OF LOWER EXTREMITY: ICD-10-CM

## 2019-06-27 DIAGNOSIS — E66.9 OBESITY (BMI 35.0-39.9 WITHOUT COMORBIDITY): Chronic | ICD-10-CM

## 2019-06-27 DIAGNOSIS — E78.2 MIXED HYPERLIPIDEMIA: Primary | ICD-10-CM

## 2019-06-27 DIAGNOSIS — I10 BENIGN ESSENTIAL HTN: ICD-10-CM

## 2019-06-27 PROCEDURE — 99213 OFFICE O/P EST LOW 20 MIN: CPT | Performed by: INTERNAL MEDICINE

## 2019-06-27 PROCEDURE — 1036F TOBACCO NON-USER: CPT | Performed by: INTERNAL MEDICINE

## 2019-06-27 PROCEDURE — G8427 DOCREV CUR MEDS BY ELIG CLIN: HCPCS | Performed by: INTERNAL MEDICINE

## 2019-06-27 PROCEDURE — G8417 CALC BMI ABV UP PARAM F/U: HCPCS | Performed by: INTERNAL MEDICINE

## 2019-06-27 PROCEDURE — 3017F COLORECTAL CA SCREEN DOC REV: CPT | Performed by: INTERNAL MEDICINE

## 2019-06-27 RX ORDER — METOPROLOL SUCCINATE 50 MG/1
TABLET, EXTENDED RELEASE ORAL
Refills: 3 | COMMUNITY
Start: 2019-06-13 | End: 2019-08-12 | Stop reason: SDUPTHER

## 2019-06-27 NOTE — PROGRESS NOTES
Chief Complaint   Patient presents with    6 Month Follow-Up    Hypertension       5-24-18:Patient presents for initial medical evaluation. Patient is followed on a regular basis by Dr. Charly Cheek MD. Having b/l LE edema and was seen by dr. Jan Kaufman and states her veins were ok. No hx of cardiac disease. No hx of DVT/PE. Worse over the past few months. Does have SOB on exertion. Has been gaining weight rapidly due to LE edema. Not on any CCB or steroids. Pt denies chest pain, dyspnea,  change in exercise capacity, fatigue,  nausea, vomiting, diarrhea, constipation, motor weakness, insomnia, weight loss, syncope, dizziness, lightheadedness, palpitations, PND, orthopnea, or claudication. BP and hr are good. No LE discoloration or ulcers. No LE edema. Lipid profile is normal. No recent hospitalization. No change in meds. Hx of DM, HTN, HLP. No smoking. 6-28-18: LE edema improved with lasix 40mg BID. Has lost 23 pounds. S/p EHCO with EF of 65%, no valve abn, RVSP of 58QUXD, normal diastolic function. Pt denies chest pain, dyspnea, dyspnea on exertion, change in exercise capacity, fatigue,  nausea, vomiting, diarrhea, constipation, motor weakness, insomnia, weight loss, syncope, dizziness, lightheadedness,  PND, orthopnea, or claudication. No nitro use. BP and hr are good. CAD is stable. No LE discoloration or ulcers. +  LE edema. No CHF type symptoms. Lipid profile is normal. No recent hospitalization. No change in meds. Does have palpitations at times. BMP with CR of 1.09, GFR of 52.       12-27-18: + dyspnea, dyspnea on exertion, change in exercise capacity, fatigue. Pt denies chest pain,  nausea, vomiting, diarrhea, constipation, motor weakness, insomnia, weight loss, syncope, dizziness, lightheadedness, palpitations, PND, orthopnea, or claudication. No nitro use. BP and hr are good. CAD is stable. No LE discoloration or ulcers. No CHF type symptoms. Lipid profile is normal. No recent hospitalization. No change in meds. LE edema has improved. On Lasix. She has CKD stage III, GFR 59, cr is 0.98. No hx of stress test.     6-27-19: doing ok overall. S/p normal nuclear stress test in 1/19. LE edema is under control. Pt denies chest pain, dyspnea, dyspnea on exertion, change in exercise capacity, fatigue,  nausea, vomiting, diarrhea, constipation, motor weakness, insomnia, weight loss, syncope, dizziness, lightheadedness,  PND, orthopnea, or claudication. No nitro use. BP and hr are good. CAD is stable. No LE discoloration or ulcers. No LE edema. No CHF type symptoms. Lipid profile is normal. Occasional palpitations. Renal function is wnl now. Patient Active Problem List   Diagnosis    Benign essential HTN    Asthma    Type 2 diabetes mellitus with diabetic polyneuropathy ([address]    Crohn's disease ([address]    GERD (gastroesophageal reflux disease)    Headache    Severe episode of recurrent major depressive disorder, without psychotic features ([address]    Borderline personality disorder ([address]    Pain in right knee    Hip pain, right    Obesity (BMI 35.0-39.9 without comorbidity)    Muscular pain    Mixed hyperlipidemia    Tear of meniscus of knee    Bilateral edema of lower extremity    Varicose vein of leg    History of Crohn's disease    Carpal tunnel syndrome of right wrist    PTSD (post-traumatic stress disorder)    Fibromyalgia    Carpal tunnel syndrome, left    Occasional tremors    Absolute anemia    Other iron deficiency anemias       Past Surgical History:   Procedure Laterality Date    BREAST CYST EXCISION Right 1994    exc mass benign    CHOLECYSTECTOMY  2009    COLONOSCOPY  2015    negative    ENDOSCOPY, COLON, DIAGNOSTIC      HERNIA REPAIR  2015    ventral / mesh    LEG TENDON SURGERY Right 2007    leg.  ankle and foot    OVARIAN CYST REMOVAL Left 1994    with mass and both fallopian tube    OVARY REMOVAL  12/2015    [facility] / mass left ovary & bilateral  tubes    PARTIAL HYSTERECTOMY      NE COLON CA SCRN NOT  [address] IND N/A 11/7/2017    COLONOSCOPY performed by MANOJ Rayo on bx    NE COLONOSCOPY W/BIOPSY SINGLE/MULTIPLE N/A 3/15/2018    COLONOSCOPY performed by Lavinia Middleton [facility] Right 8/3/2017    RIGHT KNEE ARTHROSCOPIC PARTIAL MEDIAL MENISCECTOMY KNEE performed by Bertin Mauro MD at [facility] N/CARPAL TUNNEL SURG Right 11/30/2017    RIGHT WRIST  CARPAL TUNNEL RELEASE performed by Bertin Mauro MD at [facility] N/CARPAL TUNNEL SURG Left 5/10/2018    LEFT WRIST CARPAL TUNNEL RELEASE performed by Bertin Mauro MD at [address] History     Socioeconomic History    Marital status: Single     Spouse name: None    Number of children: None    Years of education: None    Highest education level: None   Occupational History    None   Social Needs    Financial resource strain: None    Food insecurity:     Worry: None     Inability: None    Transportation needs:     Medical: None     Non-medical: None   Tobacco Use    Smoking status: Never Smoker    Smokeless tobacco: Never Used   Substance and Sexual Activity    Alcohol use: No     Alcohol/week: 0.0 oz    Drug use: No    Sexual activity: Not Currently   Lifestyle    Physical activity:     Days per week: None     Minutes per session: None    Stress: None   Relationships    Social connections:     Talks on phone: None     Gets together: None     Attends [employer] service: None     Active member of club or organization: None     Attends meetings of clubs or organizations: None     Relationship status: None    Intimate partner violence:     Fear of current or ex partner: None     Emotionally abused: None     Physically abused: None     Forced sexual activity: None   Other Topics Concern    None   Social History Narrative    None       Family History   Problem Relation Age of Onset    Emphysema Mother     Cancer Maternal Grandfather     Diabetes Paternal Grandmother     Emphysema Paternal Grandfather     Heart Disease Sister     Stroke Sister     Diabetes Sister        Current Outpatient Medications   Medication Sig Dispense Refill    metoprolol succinate (TOPROL XL) 50 MG extended release tablet TAKE 1 AND 1/2 TABLETS BY MOUTH EVERY MORNING FOR 30 DAYS  3    Erenumab-aooe 140 MG/ML SOAJ Inject into the skin      mometasone (ELOCON) 0.1 % cream APPLY TOPICALLY DAILY 45 g 2    omeprazole (PRILOSEC) 40 MG delayed release capsule TAKE ONE CAPSULE BY MOUTH TWO TIMES A DAY 60 capsule 4    Blood Glucose Monitoring Suppl (ONE TOUCH ULTRA 2) w/Device KIT TEST TWICE DAILY  0    prazosin (MINIPRESS) 2 MG capsule TAKE 1 CAPSULE BY MOUTH EVERY NIGHT FOR NIGHTMARES  3    pioglitazone (ACTOS) 15 MG tablet TAKE ONE TABLET BY MOUTH DAILY 30 tablet 5    allopurinol (ZYLOPRIM) 100 MG tablet TAKE ONE TABLET BY MOUTH DAILY 30 tablet 5    blood glucose monitor strips Test 2 times a day & as needed for symptoms of irregular blood glucose. 100 strip 3    Lancets MISC Test bid 100 each 3    furosemide (LASIX) 40 MG tablet Take 1 tablet by mouth 2 times daily 60 tablet 6    mometasone-formoterol (DULERA) 100-5 MCG/ACT inhaler Inhale 2 puffs into the lungs 2 times daily 1 Inhaler 5    hyoscyamine (ANASPAZ;LEVSIN) 125 MCG tablet TAKE ONE TABLET BY MOUTH EVERY 4 HOURS AS NEEDED for cramping 164 tablet 0    metFORMIN (GLUCOPHAGE) 500 MG tablet TAKE ONE TABLET BY MOUTH TWICE A DAY WITH MEALS 60 tablet 5    montelukast (SINGULAIR) 10 MG tablet TAKE 1 TABLET BY MOUTH NIGHTLY 30 tablet 5    ondansetron (ZOFRAN) 8 MG tablet TAKE ONE TABLET BY MOUTH EVERY 8 HOURS AS NEEDED FOR NAUSEA AND VOMITING.  40 tablet 3    nystatin-triamcinolone (MYCOLOG II) 774256-8.1 UNIT/GM-% cream APPLY TOPICALLY TWICE DAILY 45 g 2    magnesium oxide (MAG-OX) 400 (240 Mg) MG tablet Take 400 mg by mouth 3 times daily as logbook, and also call us back if blood pressure are above the target ranges or if it is low. Patient clearly understands and agrees to the instructions. We will need to continue to monitor muscle and liver enzymes, BUN, CR, and electrolytes. Details of medical condition explained and patient was warned about adverse consequences of uncontrolled medical conditions and possible side effects of prescribed medications.

## 2019-08-12 ENCOUNTER — OFFICE VISIT (OUTPATIENT)
Dept: FAMILY MEDICINE CLINIC | Age: 54
End: 2019-08-12
Payer: MEDICARE

## 2019-08-12 ENCOUNTER — TELEPHONE (OUTPATIENT)
Dept: FAMILY MEDICINE CLINIC | Age: 54
End: 2019-08-12

## 2019-08-12 VITALS
SYSTOLIC BLOOD PRESSURE: 120 MMHG | DIASTOLIC BLOOD PRESSURE: 80 MMHG | OXYGEN SATURATION: 96 % | BODY MASS INDEX: 51.34 KG/M2 | WEIGHT: 279 LBS | HEART RATE: 72 BPM | HEIGHT: 62 IN | TEMPERATURE: 96.8 F

## 2019-08-12 DIAGNOSIS — K50.919 CROHN'S DISEASE WITH COMPLICATION, UNSPECIFIED GASTROINTESTINAL TRACT LOCATION (HCC): ICD-10-CM

## 2019-08-12 DIAGNOSIS — R30.0 DYSURIA: Primary | ICD-10-CM

## 2019-08-12 DIAGNOSIS — E11.42 TYPE 2 DIABETES MELLITUS WITH DIABETIC POLYNEUROPATHY, WITHOUT LONG-TERM CURRENT USE OF INSULIN (HCC): ICD-10-CM

## 2019-08-12 DIAGNOSIS — I10 BENIGN ESSENTIAL HTN: Primary | ICD-10-CM

## 2019-08-12 DIAGNOSIS — J45.20 MILD INTERMITTENT ASTHMA WITHOUT COMPLICATION: ICD-10-CM

## 2019-08-12 LAB
BILIRUBIN, POC: 0
BLOOD URINE, POC: 0
CLARITY, POC: CLEAR
COLOR, POC: YELLOW
GLUCOSE URINE, POC: 0
HBA1C MFR BLD: 6.8 %
KETONES, POC: 0
LEUKOCYTE EST, POC: 0
NITRITE, POC: 0
PH, POC: 5.5
PROTEIN, POC: 0
SPECIFIC GRAVITY, POC: 1.01
UROBILINOGEN, POC: 0

## 2019-08-12 PROCEDURE — 1036F TOBACCO NON-USER: CPT | Performed by: FAMILY MEDICINE

## 2019-08-12 PROCEDURE — 81002 URINALYSIS NONAUTO W/O SCOPE: CPT | Performed by: FAMILY MEDICINE

## 2019-08-12 PROCEDURE — 3017F COLORECTAL CA SCREEN DOC REV: CPT | Performed by: FAMILY MEDICINE

## 2019-08-12 PROCEDURE — 99213 OFFICE O/P EST LOW 20 MIN: CPT | Performed by: FAMILY MEDICINE

## 2019-08-12 PROCEDURE — 2022F DILAT RTA XM EVC RTNOPTHY: CPT | Performed by: FAMILY MEDICINE

## 2019-08-12 PROCEDURE — G8417 CALC BMI ABV UP PARAM F/U: HCPCS | Performed by: FAMILY MEDICINE

## 2019-08-12 PROCEDURE — 83036 HEMOGLOBIN GLYCOSYLATED A1C: CPT | Performed by: FAMILY MEDICINE

## 2019-08-12 PROCEDURE — G8427 DOCREV CUR MEDS BY ELIG CLIN: HCPCS | Performed by: FAMILY MEDICINE

## 2019-08-12 PROCEDURE — 3044F HG A1C LEVEL LT 7.0%: CPT | Performed by: FAMILY MEDICINE

## 2019-08-12 RX ORDER — CLINDAMYCIN HYDROCHLORIDE 300 MG/1
300 CAPSULE ORAL 3 TIMES DAILY
Qty: 30 CAPSULE | Refills: 0 | Status: SHIPPED | OUTPATIENT
Start: 2019-08-12 | End: 2019-08-22

## 2019-08-12 RX ORDER — MONTELUKAST SODIUM 10 MG/1
TABLET ORAL
Qty: 30 TABLET | Refills: 5 | Status: SHIPPED | OUTPATIENT
Start: 2019-08-12 | End: 2020-02-29

## 2019-08-12 RX ORDER — PIOGLITAZONEHYDROCHLORIDE 15 MG/1
TABLET ORAL
Qty: 30 TABLET | Refills: 5 | Status: SHIPPED | OUTPATIENT
Start: 2019-08-12 | End: 2019-11-13 | Stop reason: SDUPTHER

## 2019-08-12 RX ORDER — MOMETASONE FUROATE 1 MG/G
CREAM TOPICAL
Qty: 45 G | Refills: 2 | Status: SHIPPED | OUTPATIENT
Start: 2019-08-12 | End: 2020-01-16

## 2019-08-12 RX ORDER — METOPROLOL SUCCINATE 50 MG/1
TABLET, EXTENDED RELEASE ORAL
Qty: 135 TABLET | Refills: 3 | Status: ON HOLD | OUTPATIENT
Start: 2019-08-12 | End: 2019-09-04 | Stop reason: SDUPTHER

## 2019-08-12 RX ORDER — OMEPRAZOLE 40 MG/1
CAPSULE, DELAYED RELEASE ORAL
Qty: 60 CAPSULE | Refills: 3 | Status: SHIPPED | OUTPATIENT
Start: 2019-08-12 | End: 2019-12-17 | Stop reason: SDUPTHER

## 2019-08-12 RX ORDER — LANCETS 30 GAUGE
EACH MISCELLANEOUS
Qty: 100 EACH | Refills: 3 | Status: SHIPPED | OUTPATIENT
Start: 2019-08-12 | End: 2020-08-27 | Stop reason: SDUPTHER

## 2019-08-12 RX ORDER — RIZATRIPTAN BENZOATE 10 MG/1
10 TABLET ORAL
COMMUNITY
End: 2019-08-12 | Stop reason: SDUPTHER

## 2019-08-12 RX ORDER — RIZATRIPTAN BENZOATE 10 MG/1
10 TABLET ORAL
Qty: 30 TABLET | Refills: 0 | Status: SHIPPED | OUTPATIENT
Start: 2019-08-12 | End: 2020-04-06

## 2019-08-12 RX ORDER — FUROSEMIDE 40 MG/1
TABLET ORAL
Qty: 60 TABLET | Refills: 5 | Status: SHIPPED | OUTPATIENT
Start: 2019-08-12 | End: 2020-03-02

## 2019-08-12 RX ORDER — COLCHICINE 0.6 MG/1
TABLET ORAL
Qty: 30 TABLET | Refills: 2 | Status: SHIPPED | OUTPATIENT
Start: 2019-08-12 | End: 2021-03-15

## 2019-08-12 RX ORDER — TRAZODONE HYDROCHLORIDE 100 MG/1
100 TABLET ORAL NIGHTLY
Qty: 90 TABLET | Refills: 1 | Status: SHIPPED | OUTPATIENT
Start: 2019-08-12 | End: 2020-02-07

## 2019-08-12 RX ORDER — POTASSIUM CHLORIDE 20 MEQ/1
20 TABLET, EXTENDED RELEASE ORAL 2 TIMES DAILY
Qty: 180 TABLET | Refills: 3 | Status: SHIPPED | OUTPATIENT
Start: 2019-08-12 | End: 2020-09-08

## 2019-08-12 RX ORDER — ALLOPURINOL 100 MG/1
TABLET ORAL
Qty: 30 TABLET | Refills: 5 | Status: SHIPPED | OUTPATIENT
Start: 2019-08-12 | End: 2020-02-29

## 2019-08-12 ASSESSMENT — ENCOUNTER SYMPTOMS
CHEST TIGHTNESS: 0
EYES NEGATIVE: 1
RESPIRATORY NEGATIVE: 1
COUGH: 0
GASTROINTESTINAL NEGATIVE: 1
RHINORRHEA: 0

## 2019-08-12 NOTE — PROGRESS NOTES
tenderness. Abdominal: Soft. Bowel sounds are normal. She exhibits no distension and no mass. There is no tenderness. There is no rebound and no guarding. Musculoskeletal: Normal range of motion. Lymphadenopathy:     She has no cervical adenopathy. Neurological: She is alert and oriented to person, place, and time. No cranial nerve deficit. Coordination normal.   Skin: Skin is warm and dry. Psychiatric: She has a normal mood and affect. Assessment   Diagnosis Orders   1. Dysuria  POCT Urinalysis no Micro   2. Type 2 diabetes mellitus with diabetic polyneuropathy, without long-term current use of insulin ([facility])  metFORMIN (GLUCOPHAGE) 500 MG tablet    Lancets MISC    POCT glycosylated hemoglobin (Hb A1C)   3.  Mild intermittent asthma without complication  montelukast (SINGULAIR) 10 MG tablet     Problem List     Asthma    Relevant Medications    albuterol (PROVENTIL) nebulizer solution 2.5 mg (Completed)    VENTOLIN  (90 Base) MCG/ACT inhaler    ipratropium-albuterol (DUONEB) 0.5-2.5 (3) MG/3ML SOLN nebulizer solution    mometasone-formoterol (DULERA) 100-5 MCG/ACT inhaler    montelukast (SINGULAIR) 10 MG tablet    Type 2 diabetes mellitus with diabetic polyneuropathy ([facility])    Relevant Medications    FREESTYLE LITE strip    blood glucose monitor strips    metFORMIN (GLUCOPHAGE) 500 MG tablet    pioglitazone (ACTOS) 15 MG tablet    Lancets MISC    Other Relevant Orders    POCT glycosylated hemoglobin (Hb A1C) (Completed)          Plan  Orders Placed This Encounter   Procedures    POCT glycosylated hemoglobin (Hb A1C)    POCT Urinalysis no Micro     Orders Placed This Encounter   Medications    potassium chloride (KLOR-CON M) 20 MEQ extended release tablet     Sig: Take 1 tablet by mouth 2 times daily     Dispense:  180 tablet     Refill:  3    mometasone (ELOCON) 0.1 % cream     Sig: APPLY TOPICALLY DAILY     Dispense:  45 g     Refill:  2    colchicine (COLCRYS) 0.6 MG tablet     Sig: TAKE

## 2019-08-28 ENCOUNTER — OFFICE VISIT (OUTPATIENT)
Dept: PULMONOLOGY | Age: 54
End: 2019-08-28
Payer: MEDICARE

## 2019-08-28 VITALS
WEIGHT: 282 LBS | HEART RATE: 75 BPM | RESPIRATION RATE: 16 BRPM | HEIGHT: 62 IN | OXYGEN SATURATION: 91 % | DIASTOLIC BLOOD PRESSURE: 74 MMHG | TEMPERATURE: 96.1 F | BODY MASS INDEX: 51.89 KG/M2 | SYSTOLIC BLOOD PRESSURE: 120 MMHG

## 2019-08-28 DIAGNOSIS — K21.9 GASTROESOPHAGEAL REFLUX DISEASE, ESOPHAGITIS PRESENCE NOT SPECIFIED: ICD-10-CM

## 2019-08-28 DIAGNOSIS — D50.8 OTHER IRON DEFICIENCY ANEMIAS: ICD-10-CM

## 2019-08-28 DIAGNOSIS — J45.40 MODERATE PERSISTENT ASTHMA WITHOUT COMPLICATION: Primary | ICD-10-CM

## 2019-08-28 DIAGNOSIS — E66.9 OBESITY (BMI 35.0-39.9 WITHOUT COMORBIDITY): ICD-10-CM

## 2019-08-28 PROCEDURE — G8417 CALC BMI ABV UP PARAM F/U: HCPCS | Performed by: INTERNAL MEDICINE

## 2019-08-28 PROCEDURE — 99214 OFFICE O/P EST MOD 30 MIN: CPT | Performed by: INTERNAL MEDICINE

## 2019-08-28 PROCEDURE — 1036F TOBACCO NON-USER: CPT | Performed by: INTERNAL MEDICINE

## 2019-08-28 PROCEDURE — 3017F COLORECTAL CA SCREEN DOC REV: CPT | Performed by: INTERNAL MEDICINE

## 2019-08-28 PROCEDURE — G8427 DOCREV CUR MEDS BY ELIG CLIN: HCPCS | Performed by: INTERNAL MEDICINE

## 2019-08-28 RX ORDER — PENTAZOCINE HYDROCHLORIDE AND NALOXONE HYDROCHLORIDE 50; .5 MG/1; MG/1
TABLET ORAL
Refills: 0 | COMMUNITY
Start: 2019-08-16 | End: 2021-03-15

## 2019-08-28 ASSESSMENT — ENCOUNTER SYMPTOMS
SORE THROAT: 0
COUGH: 1
RHINORRHEA: 0
SHORTNESS OF BREATH: 0
CHEST TIGHTNESS: 0
DIARRHEA: 0
ABDOMINAL PAIN: 0
VOMITING: 0
NAUSEA: 0
WHEEZING: 0
SINUS PRESSURE: 0

## 2019-08-28 NOTE — PROGRESS NOTES
Eyes: Pupils are equal, round, and reactive to light. EOM are normal.   Neck: No JVD present. No tracheal deviation present. No thyromegaly present. Cardiovascular: Normal rate and regular rhythm. Exam reveals no gallop. No murmur heard. Pulmonary/Chest: She has no wheezes. She has no rales. She exhibits no tenderness. Abdominal: She exhibits no distension. Musculoskeletal: Normal range of motion. She exhibits no edema. Neurological: She is alert and oriented to person, place, and time. Coordination normal.   Skin: Skin is warm and dry. No rash noted. Psychiatric: She has a normal mood and affect. Assessment:      Diagnosis Orders   1. Moderate persistent asthma without complication     2. Gastroesophageal reflux disease, esophagitis presence not specified     3. Other iron deficiency anemias     4. Obesity (BMI 35.0-39.9 without comorbidity)       Discussed the use of rescue inhaler with her when she is having difficulty expectorating, also to resume maintenance treatment with increasing respiratory symptoms which usually occurs in the fall for her. Otherwise asked her to report worsening of her symptoms to me if not we will see her for follow-up in 6 months      Plan:     No orders of the defined types were placed in this encounter. Orders Placed This Encounter   Medications    mometasone-formoterol (DULERA) 100-5 MCG/ACT inhaler     Sig: Inhale 2 puffs into the lungs 2 times daily     Dispense:  1 Inhaler     Refill:  5           Return in about 6 months (around 2/28/2020) for re-evaluation.        Kim Bond MD

## 2019-09-03 ENCOUNTER — APPOINTMENT (OUTPATIENT)
Dept: GENERAL RADIOLOGY | Age: 54
End: 2019-09-03
Payer: MEDICARE

## 2019-09-03 ENCOUNTER — HOSPITAL ENCOUNTER (OUTPATIENT)
Age: 54
Setting detail: OBSERVATION
Discharge: HOME | End: 2019-09-04
Attending: INTERNAL MEDICINE | Admitting: INTERNAL MEDICINE
Payer: MEDICARE

## 2019-09-03 DIAGNOSIS — R11.0 NAUSEA: ICD-10-CM

## 2019-09-03 DIAGNOSIS — R07.9 CHEST PAIN, UNSPECIFIED TYPE: Primary | ICD-10-CM

## 2019-09-03 DIAGNOSIS — D72.829 LEUKOCYTOSIS, UNSPECIFIED TYPE: ICD-10-CM

## 2019-09-03 DIAGNOSIS — D64.9 ANEMIA, UNSPECIFIED TYPE: ICD-10-CM

## 2019-09-03 LAB
ALBUMIN SERPL-MCNC: 4 G/DL (ref 3.5–4.6)
ALP BLD-CCNC: 120 U/L (ref 40–130)
ALT SERPL-CCNC: 10 U/L (ref 0–33)
AMPHETAMINE SCREEN, URINE: NORMAL
ANION GAP SERPL CALCULATED.3IONS-SCNC: 13 MEQ/L (ref 9–15)
AST SERPL-CCNC: 14 U/L (ref 0–35)
BARBITURATE SCREEN URINE: NORMAL
BASOPHILS ABSOLUTE: 0.1 K/UL (ref 0–0.2)
BASOPHILS RELATIVE PERCENT: 0.5 %
BENZODIAZEPINE SCREEN, URINE: NORMAL
BILIRUB SERPL-MCNC: 0.3 MG/DL (ref 0.2–0.7)
BILIRUBIN URINE: NEGATIVE
BLOOD, URINE: NEGATIVE
BUN BLDV-MCNC: 19 MG/DL (ref 6–20)
CALCIUM SERPL-MCNC: 8.9 MG/DL (ref 8.5–9.9)
CANNABINOID SCREEN URINE: NORMAL
CHLORIDE BLD-SCNC: 99 MEQ/L (ref 95–107)
CLARITY: CLEAR
CO2: 27 MEQ/L (ref 20–31)
COCAINE METABOLITE SCREEN URINE: NORMAL
COLOR: YELLOW
CREAT SERPL-MCNC: 1.04 MG/DL (ref 0.5–0.9)
EOSINOPHILS ABSOLUTE: 0.1 K/UL (ref 0–0.7)
EOSINOPHILS RELATIVE PERCENT: 1.4 %
GFR AFRICAN AMERICAN: >60
GFR NON-AFRICAN AMERICAN: 54.9
GLOBULIN: 3.8 G/DL (ref 2.3–3.5)
GLUCOSE BLD-MCNC: 102 MG/DL (ref 70–99)
GLUCOSE BLD-MCNC: 271 MG/DL (ref 60–115)
GLUCOSE URINE: NEGATIVE MG/DL
HCT VFR BLD CALC: 35.5 % (ref 37–47)
HEMOGLOBIN: 11.3 G/DL (ref 12–16)
KETONES, URINE: NEGATIVE MG/DL
LEUKOCYTE ESTERASE, URINE: NEGATIVE
LYMPHOCYTES ABSOLUTE: 1 K/UL (ref 1–4.8)
LYMPHOCYTES RELATIVE PERCENT: 10.2 %
Lab: NORMAL
MAGNESIUM: 1.2 MG/DL (ref 1.7–2.4)
MCH RBC QN AUTO: 27.4 PG (ref 27–31.3)
MCHC RBC AUTO-ENTMCNC: 31.9 % (ref 33–37)
MCV RBC AUTO: 85.8 FL (ref 82–100)
MONOCYTES ABSOLUTE: 0.4 K/UL (ref 0.2–0.8)
MONOCYTES RELATIVE PERCENT: 4.5 %
NEUTROPHILS ABSOLUTE: 7.8 K/UL (ref 1.4–6.5)
NEUTROPHILS RELATIVE PERCENT: 83.4 %
NITRITE, URINE: NEGATIVE
OPIATE SCREEN URINE: NORMAL
PDW BLD-RTO: 17.7 % (ref 11.5–14.5)
PERFORMED ON: ABNORMAL
PH UA: 5.5 (ref 5–9)
PHENCYCLIDINE SCREEN URINE: NORMAL
PLATELET # BLD: 292 K/UL (ref 130–400)
POTASSIUM SERPL-SCNC: 4.5 MEQ/L (ref 3.4–4.9)
PRO-BNP: 292 PG/ML
PROTEIN UA: NEGATIVE MG/DL
RBC # BLD: 4.13 M/UL (ref 4.2–5.4)
SODIUM BLD-SCNC: 139 MEQ/L (ref 135–144)
SPECIFIC GRAVITY UA: 1.01 (ref 1–1.03)
TOTAL CK: 45 U/L (ref 0–170)
TOTAL PROTEIN: 7.8 G/DL (ref 6.3–8)
TROPONIN: <0.01 NG/ML (ref 0–0.01)
URINE REFLEX TO CULTURE: NORMAL
UROBILINOGEN, URINE: 0.2 E.U./DL
WBC # BLD: 9.4 K/UL (ref 4.8–10.8)

## 2019-09-03 PROCEDURE — G0378 HOSPITAL OBSERVATION PER HR: HCPCS

## 2019-09-03 PROCEDURE — 96375 TX/PRO/DX INJ NEW DRUG ADDON: CPT

## 2019-09-03 PROCEDURE — 6370000000 HC RX 637 (ALT 250 FOR IP): Performed by: PHYSICIAN ASSISTANT

## 2019-09-03 PROCEDURE — 85025 COMPLETE CBC W/AUTO DIFF WBC: CPT

## 2019-09-03 PROCEDURE — 6370000000 HC RX 637 (ALT 250 FOR IP): Performed by: INTERNAL MEDICINE

## 2019-09-03 PROCEDURE — 94664 DEMO&/EVAL PT USE INHALER: CPT

## 2019-09-03 PROCEDURE — 81003 URINALYSIS AUTO W/O SCOPE: CPT

## 2019-09-03 PROCEDURE — 93005 ELECTROCARDIOGRAM TRACING: CPT | Performed by: PHYSICIAN ASSISTANT

## 2019-09-03 PROCEDURE — 99285 EMERGENCY DEPT VISIT HI MDM: CPT

## 2019-09-03 PROCEDURE — 96372 THER/PROPH/DIAG INJ SC/IM: CPT

## 2019-09-03 PROCEDURE — 6360000002 HC RX W HCPCS: Performed by: PHYSICIAN ASSISTANT

## 2019-09-03 PROCEDURE — 82550 ASSAY OF CK (CPK): CPT

## 2019-09-03 PROCEDURE — 84484 ASSAY OF TROPONIN QUANT: CPT

## 2019-09-03 PROCEDURE — 6360000002 HC RX W HCPCS: Performed by: INTERNAL MEDICINE

## 2019-09-03 PROCEDURE — 80307 DRUG TEST PRSMV CHEM ANLYZR: CPT

## 2019-09-03 PROCEDURE — 83880 ASSAY OF NATRIURETIC PEPTIDE: CPT

## 2019-09-03 PROCEDURE — 80053 COMPREHEN METABOLIC PANEL: CPT

## 2019-09-03 PROCEDURE — 36415 COLL VENOUS BLD VENIPUNCTURE: CPT

## 2019-09-03 PROCEDURE — 2580000003 HC RX 258: Performed by: INTERNAL MEDICINE

## 2019-09-03 PROCEDURE — 83735 ASSAY OF MAGNESIUM: CPT

## 2019-09-03 PROCEDURE — 71045 X-RAY EXAM CHEST 1 VIEW: CPT

## 2019-09-03 PROCEDURE — 96374 THER/PROPH/DIAG INJ IV PUSH: CPT

## 2019-09-03 RX ORDER — ALLOPURINOL 100 MG/1
100 TABLET ORAL DAILY
Status: DISCONTINUED | OUTPATIENT
Start: 2019-09-03 | End: 2019-09-04 | Stop reason: HOSPADM

## 2019-09-03 RX ORDER — DICYCLOMINE HYDROCHLORIDE 10 MG/1
10 CAPSULE ORAL 4 TIMES DAILY
Status: DISCONTINUED | OUTPATIENT
Start: 2019-09-03 | End: 2019-09-04 | Stop reason: HOSPADM

## 2019-09-03 RX ORDER — PIOGLITAZONEHYDROCHLORIDE 15 MG/1
15 TABLET ORAL DAILY
Status: DISCONTINUED | OUTPATIENT
Start: 2019-09-03 | End: 2019-09-04 | Stop reason: HOSPADM

## 2019-09-03 RX ORDER — PRAZOSIN HYDROCHLORIDE 1 MG/1
2 CAPSULE ORAL NIGHTLY
Status: DISCONTINUED | OUTPATIENT
Start: 2019-09-03 | End: 2019-09-04 | Stop reason: HOSPADM

## 2019-09-03 RX ORDER — SODIUM CHLORIDE 0.9 % (FLUSH) 0.9 %
10 SYRINGE (ML) INJECTION EVERY 12 HOURS SCHEDULED
Status: DISCONTINUED | OUTPATIENT
Start: 2019-09-03 | End: 2019-09-04 | Stop reason: HOSPADM

## 2019-09-03 RX ORDER — FUROSEMIDE 40 MG/1
40 TABLET ORAL 2 TIMES DAILY
Status: DISCONTINUED | OUTPATIENT
Start: 2019-09-03 | End: 2019-09-04 | Stop reason: HOSPADM

## 2019-09-03 RX ORDER — ASPIRIN 81 MG/1
81 TABLET, CHEWABLE ORAL DAILY
Status: DISCONTINUED | OUTPATIENT
Start: 2019-09-04 | End: 2019-09-04 | Stop reason: HOSPADM

## 2019-09-03 RX ORDER — ONDANSETRON 2 MG/ML
4 INJECTION INTRAMUSCULAR; INTRAVENOUS ONCE
Status: COMPLETED | OUTPATIENT
Start: 2019-09-03 | End: 2019-09-03

## 2019-09-03 RX ORDER — ASPIRIN 81 MG/1
324 TABLET, CHEWABLE ORAL ONCE
Status: COMPLETED | OUTPATIENT
Start: 2019-09-03 | End: 2019-09-03

## 2019-09-03 RX ORDER — ONDANSETRON 2 MG/ML
4 INJECTION INTRAMUSCULAR; INTRAVENOUS EVERY 6 HOURS PRN
Status: DISCONTINUED | OUTPATIENT
Start: 2019-09-03 | End: 2019-09-04 | Stop reason: HOSPADM

## 2019-09-03 RX ORDER — SODIUM CHLORIDE 0.9 % (FLUSH) 0.9 %
10 SYRINGE (ML) INJECTION PRN
Status: DISCONTINUED | OUTPATIENT
Start: 2019-09-03 | End: 2019-09-04 | Stop reason: HOSPADM

## 2019-09-03 RX ORDER — IPRATROPIUM BROMIDE AND ALBUTEROL SULFATE 2.5; .5 MG/3ML; MG/3ML
1 SOLUTION RESPIRATORY (INHALATION) 4 TIMES DAILY
Status: DISCONTINUED | OUTPATIENT
Start: 2019-09-03 | End: 2019-09-03

## 2019-09-03 RX ORDER — PREGABALIN 150 MG/1
150 CAPSULE ORAL 3 TIMES DAILY
Status: DISCONTINUED | OUTPATIENT
Start: 2019-09-03 | End: 2019-09-04 | Stop reason: HOSPADM

## 2019-09-03 RX ORDER — ONDANSETRON 4 MG/1
4 TABLET, FILM COATED ORAL EVERY 8 HOURS PRN
Status: DISCONTINUED | OUTPATIENT
Start: 2019-09-03 | End: 2019-09-04 | Stop reason: HOSPADM

## 2019-09-03 RX ORDER — NITROGLYCERIN 0.4 MG/1
0.4 TABLET SUBLINGUAL EVERY 5 MIN PRN
Status: DISCONTINUED | OUTPATIENT
Start: 2019-09-03 | End: 2019-09-04 | Stop reason: HOSPADM

## 2019-09-03 RX ORDER — PANTOPRAZOLE SODIUM 40 MG/1
40 TABLET, DELAYED RELEASE ORAL
Status: DISCONTINUED | OUTPATIENT
Start: 2019-09-04 | End: 2019-09-04 | Stop reason: HOSPADM

## 2019-09-03 RX ORDER — IPRATROPIUM BROMIDE AND ALBUTEROL SULFATE 2.5; .5 MG/3ML; MG/3ML
1 SOLUTION RESPIRATORY (INHALATION) EVERY 4 HOURS PRN
Status: DISCONTINUED | OUTPATIENT
Start: 2019-09-03 | End: 2019-09-04 | Stop reason: HOSPADM

## 2019-09-03 RX ORDER — MELOXICAM 7.5 MG/1
7.5 TABLET ORAL DAILY
Status: DISCONTINUED | OUTPATIENT
Start: 2019-09-03 | End: 2019-09-04 | Stop reason: HOSPADM

## 2019-09-03 RX ORDER — POTASSIUM CHLORIDE 20 MEQ/1
20 TABLET, EXTENDED RELEASE ORAL 2 TIMES DAILY
Status: DISCONTINUED | OUTPATIENT
Start: 2019-09-03 | End: 2019-09-04 | Stop reason: HOSPADM

## 2019-09-03 RX ORDER — DULOXETIN HYDROCHLORIDE 60 MG/1
60 CAPSULE, DELAYED RELEASE ORAL DAILY
Status: DISCONTINUED | OUTPATIENT
Start: 2019-09-03 | End: 2019-09-04 | Stop reason: HOSPADM

## 2019-09-03 RX ORDER — COLCHICINE 0.6 MG/1
0.6 TABLET ORAL 3 TIMES DAILY PRN
Status: DISCONTINUED | OUTPATIENT
Start: 2019-09-03 | End: 2019-09-04 | Stop reason: HOSPADM

## 2019-09-03 RX ORDER — ALBUTEROL SULFATE 90 UG/1
1 AEROSOL, METERED RESPIRATORY (INHALATION) EVERY 6 HOURS PRN
Status: DISCONTINUED | OUTPATIENT
Start: 2019-09-03 | End: 2019-09-04 | Stop reason: HOSPADM

## 2019-09-03 RX ORDER — METOPROLOL SUCCINATE 50 MG/1
50 TABLET, EXTENDED RELEASE ORAL DAILY
Status: DISCONTINUED | OUTPATIENT
Start: 2019-09-03 | End: 2019-09-04 | Stop reason: HOSPADM

## 2019-09-03 RX ORDER — TRAZODONE HYDROCHLORIDE 100 MG/1
100 TABLET ORAL NIGHTLY
Status: DISCONTINUED | OUTPATIENT
Start: 2019-09-03 | End: 2019-09-04 | Stop reason: HOSPADM

## 2019-09-03 RX ADMIN — TRAZODONE HYDROCHLORIDE 100 MG: 100 TABLET ORAL at 21:14

## 2019-09-03 RX ADMIN — METFORMIN HYDROCHLORIDE 500 MG: 500 TABLET ORAL at 21:13

## 2019-09-03 RX ADMIN — ONDANSETRON 4 MG: 2 INJECTION INTRAMUSCULAR; INTRAVENOUS at 17:17

## 2019-09-03 RX ADMIN — PRAZOSIN HYDROCHLORIDE 2 MG: 1 CAPSULE ORAL at 21:13

## 2019-09-03 RX ADMIN — MAGNESIUM OXIDE TAB 400 MG (241.3 MG ELEMENTAL MG) 400 MG: 400 (241.3 MG) TAB at 21:13

## 2019-09-03 RX ADMIN — FUROSEMIDE 40 MG: 40 TABLET ORAL at 21:14

## 2019-09-03 RX ADMIN — Medication 10 ML: at 21:00

## 2019-09-03 RX ADMIN — MELOXICAM 7.5 MG: 7.5 TABLET ORAL at 21:13

## 2019-09-03 RX ADMIN — POTASSIUM CHLORIDE 20 MEQ: 20 TABLET, EXTENDED RELEASE ORAL at 21:13

## 2019-09-03 RX ADMIN — ENOXAPARIN SODIUM 40 MG: 40 INJECTION SUBCUTANEOUS at 21:16

## 2019-09-03 RX ADMIN — ASPIRIN 81 MG 324 MG: 81 TABLET ORAL at 17:17

## 2019-09-03 RX ADMIN — PREGABALIN 150 MG: 150 CAPSULE ORAL at 21:13

## 2019-09-03 RX ADMIN — Medication 10 MG: at 21:52

## 2019-09-03 RX ADMIN — DULOXETINE HYDROCHLORIDE 60 MG: 60 CAPSULE, DELAYED RELEASE ORAL at 21:14

## 2019-09-03 RX ADMIN — NYSTATIN AND TRIAMCINOLONE ACETONIDE: 100000; 1 CREAM TOPICAL at 21:15

## 2019-09-03 ASSESSMENT — ENCOUNTER SYMPTOMS
VOICE CHANGE: 0
EYE DISCHARGE: 0
APNEA: 0
PHOTOPHOBIA: 0
COLOR CHANGE: 0
ANAL BLEEDING: 0
ABDOMINAL DISTENTION: 0
VOMITING: 0
NAUSEA: 1

## 2019-09-03 ASSESSMENT — PAIN DESCRIPTION - DESCRIPTORS: DESCRIPTORS: DISCOMFORT

## 2019-09-03 ASSESSMENT — PAIN SCALES - GENERAL
PAINLEVEL_OUTOF10: 0
PAINLEVEL_OUTOF10: 6
PAINLEVEL_OUTOF10: 4

## 2019-09-03 ASSESSMENT — PAIN DESCRIPTION - PAIN TYPE: TYPE: ACUTE PAIN

## 2019-09-03 ASSESSMENT — PAIN DESCRIPTION - LOCATION: LOCATION: CHEST

## 2019-09-03 ASSESSMENT — PAIN DESCRIPTION - FREQUENCY: FREQUENCY: CONTINUOUS

## 2019-09-03 NOTE — ED PROVIDER NOTES
[facility] ED  eMERGENCY dEPARTMENT eNCOUnter      Pt Name: Viry Yan  MRN: [id number]  [place] 1965  Date of evaluation: 9/3/2019  Provider: Ulysses Cardenas Dr       Chief Complaint   Patient presents with    Chest Pain     x1.5 hours- started during iron infusion         HISTORY OF PRESENT ILLNESS   (Location/Symptom, Timing/Onset,Context/Setting, Quality, Duration, Modifying Factors, Severity)  Note limiting factors. Viry Yan is a 53 y.o. female who presents to the emergency department presents with 1-1/2 hours of intermittent chest pain left arm pain neck pain jaw pain. She states it started during an iron infusion she has had similar pains in the past but never to this intensity nor this persistent. She also notes nauseousness. She denies fever chills cough abdominal pain. Denies rectal bleeding dark tarry stools. Symptoms moderate severity nothing improves or worsens her symptoms. HPI    NursingNotes were reviewed. REVIEW OF SYSTEMS    (2-9 systems for level 4, 10 or more for level 5)     Review of Systems   Constitutional: Negative for activity change, appetite change and unexpected weight change. HENT: Negative for ear discharge, nosebleeds and voice change. Eyes: Negative for photophobia and discharge. Respiratory: Negative for apnea. Cardiovascular: Positive for chest pain. Gastrointestinal: Positive for nausea. Negative for abdominal distention, anal bleeding and vomiting. Endocrine: Negative for cold intolerance, heat intolerance and polyphagia. Genitourinary: Negative for dysuria and hematuria. Musculoskeletal: Positive for neck pain. Negative for joint swelling and neck stiffness. Skin: Negative for color change and pallor. Allergic/Immunologic: Negative for immunocompromised state. Neurological: Negative for seizures, facial asymmetry, weakness and headaches. Hematological: Does not bruise/bleed easily. Psychiatric/Behavioral: Negative for behavioral problems, self-injury and sleep disturbance. All other systems reviewed and are negative. Except as noted above the remainder of the review of systems was reviewed and negative. PAST MEDICAL HISTORY     Past Medical History:   Diagnosis Date    Anemia     Asthma     Benign essential HTN     meds > 5 yrs / denies TIA or stroke    Borderline personality disorder ([facility]     2016 ?suggested by me-meets many criteria    Carpal tunnel syndrome of right wrist     Crohn's disease ([address]     Depression     Fibromyalgia 2/13/2018    Fibromyalgia     GERD (gastroesophageal reflux disease)     Gout     Headache     migraines    Irritable bowel syndrome     Mixed hyperlipidemia 5/10/2017    Neuropathy     Obesity (BMI 35.0-39.9 without comorbidity) 3/30/2017    PONV (postoperative nausea and vomiting)     nausea    PTSD (post-traumatic stress disorder)     Tremor of unknown origin     Type 2 diabetes mellitus ([address]     meds > 5yrs     Ulcerative colitis ([facility] 11/2017    Vaginitis          SURGICALHISTORY       Past Surgical History:   Procedure Laterality Date    BREAST CYST EXCISION Right 1994    exc mass benign    CHOLECYSTECTOMY  2009    COLONOSCOPY  2015    negative    ENDOSCOPY, COLON, DIAGNOSTIC      HERNIA REPAIR  2015    ventral / mesh    LEG TENDON SURGERY Right 2007    leg.  ankle and foot    OVARIAN CYST REMOVAL Left 1994    with mass and both fallopian tube    OVARY REMOVAL  12/2015    [facility] / mass left ovary & bilateral  tubes    PARTIAL HYSTERECTOMY      [place] COLON CA SCRN NOT HI RSK IND N/A 11/7/2017    COLONOSCOPY performed by MANOJ Dominguez on bx    [place] COLONOSCOPY W/BIOPSY SINGLE/MULTIPLE N/A 3/15/2018    COLONOSCOPY performed by Shey Spence MD at [facility] Right 8/3/2017    RIGHT KNEE ARTHROSCOPIC PARTIAL MEDIAL MENISCECTOMY KNEE performed by Saurav Bauer MD at [address] [place] [address] REVISE MEDIAN N/CARPAL TUNNEL SURG Right 11/30/2017    RIGHT WRIST  CARPAL TUNNEL RELEASE performed by Darren Oviedo MD at [facility] N/CARPAL TUNNEL SURG Left 5/10/2018    LEFT WRIST CARPAL TUNNEL RELEASE performed by Darren Oviedo MD at [facility]       Previous Medications    ALLOPURINOL (ZYLOPRIM) 100 MG TABLET    TAKE ONE TABLET BY MOUTH DAILY    BACLOFEN (LIORESAL) 20 MG TABLET    20 mg 4 times daily     BLOOD GLUCOSE MONITOR STRIPS    Test 2 times a day & as needed for symptoms of irregular blood glucose.     BLOOD GLUCOSE MONITORING SUPPL (ONE TOUCH ULTRA 2) W/DEVICE KIT    TEST TWICE DAILY    COLCHICINE (COLCRYS) 0.6 MG TABLET    TAKE ONE TABLET BY MOUTH THREE TIMES A DAY AS NEEDED FOR PAIN    DICYCLOMINE (BENTYL) 10 MG CAPSULE    TAKE ONE CAPSULE BY MOUTH FOUR TIMES A DAY BEFORE MEALS AND NIGHTLY    DULOXETINE (CYMBALTA) 60 MG EXTENDED RELEASE CAPSULE    TAKE ONE CAPSULE BY MOUTH TWICE DAILY AS DIRECTED IN THE MORNING AND AFTERNOON    ERENUMAB-AOOE 140 MG/ML SOAJ    Inject into the skin    FREESTYLE LITE STRIP    use three times daily     FUROSEMIDE (LASIX) 40 MG TABLET    TAKE ONE TABLET BY MOUTH TWO TIMES A DAY    HYOSCYAMINE (ANASPAZ;LEVSIN) 125 MCG TABLET    TAKE ONE TABLET BY MOUTH EVERY 4 HOURS AS NEEDED for cramping    IPRATROPIUM-ALBUTEROL (DUONEB) 0.5-2.5 (3) MG/3ML SOLN NEBULIZER SOLUTION    INHALE 1 VIAL VIA NEBULIZER EVERY 4 HOURS     LANCETS MISC    Test bid    LYRICA 150 MG CAPSULE    150 mg 3 times daily Indications: last filled 3/5/17 no refills     MAGNESIUM OXIDE (MAG-OX) 400 (240 MG) MG TABLET    Take 400 mg by mouth 3 times daily as needed    MELATONIN 5 MG CAPS    Take 5 mg by mouth daily    MELOXICAM (MOBIC) 7.5 MG TABLET    TAKE ONE TABLET BY MOUTH TWO TIMES A DAY WITH MEALS TAKE ONLY WITH FOOD AS NEEDED    METFORMIN (GLUCOPHAGE) 500 MG TABLET    TAKE ONE TABLET BY MOUTH TWICE A DAY WITH Socioeconomic History    Marital status: Single     Spouse name: None    Number of children: None    Years of education: None    Highest education level: None   Occupational History    None   Social Needs    Financial resource strain: None    Food insecurity:     Worry: None     Inability: None    Transportation needs:     Medical: None     Non-medical: None   Tobacco Use    Smoking status: Never Smoker    Smokeless tobacco: Never Used   Substance and Sexual Activity    Alcohol use: No     Alcohol/week: 0.0 standard drinks    Drug use: No    Sexual activity: Not Currently   Lifestyle    Physical activity:     Days per week: None     Minutes per session: None    Stress: None   Relationships    Social connections:     Talks on phone: None     Gets together: None     Attends Taoism service: None     Active member of club or organization: None     Attends meetings of clubs or organizations: None     Relationship status: None    Intimate partner violence:     Fear of current or ex partner: None     Emotionally abused: None     Physically abused: None     Forced sexual activity: None   Other Topics Concern    None   Social History Narrative    None       SCREENINGS      @FLOW([id number])@      PHYSICAL EXAM    (up to 7 for level 4, 8 or more for level 5)     ED Triage Vitals [09/03/19 1602]   BP Temp Temp Source Pulse Resp SpO2 Height Weight   130/79 98.8 °F (37.1 °C) Oral 59 14 95 % 5' 2\" (1.575 m) 279 lb (126.6 kg)       Physical Exam   Constitutional: She is oriented to person, place, and time. She appears well-developed and well-nourished. No distress. HENT:   Head: Normocephalic and atraumatic. Eyes: Pupils are equal, round, and reactive to light. EOM are normal. Right eye exhibits no discharge. Left eye exhibits no discharge. Neck: Normal range of motion. Neck supple. Cardiovascular: Normal rate, regular rhythm, normal heart sounds and intact distal pulses.    Pulmonary/Chest: Effort

## 2019-09-04 VITALS
DIASTOLIC BLOOD PRESSURE: 55 MMHG | RESPIRATION RATE: 20 BRPM | SYSTOLIC BLOOD PRESSURE: 113 MMHG | OXYGEN SATURATION: 94 % | BODY MASS INDEX: 51.34 KG/M2 | HEART RATE: 70 BPM | WEIGHT: 279 LBS | TEMPERATURE: 97.5 F | HEIGHT: 62 IN

## 2019-09-04 LAB
CHOLESTEROL, TOTAL: 264 MG/DL (ref 0–199)
EKG ATRIAL RATE: 57 BPM
EKG ATRIAL RATE: 68 BPM
EKG P AXIS: 24 DEGREES
EKG P AXIS: 25 DEGREES
EKG P-R INTERVAL: 150 MS
EKG P-R INTERVAL: 160 MS
EKG Q-T INTERVAL: 420 MS
EKG Q-T INTERVAL: 436 MS
EKG QRS DURATION: 74 MS
EKG QRS DURATION: 76 MS
EKG QTC CALCULATION (BAZETT): 424 MS
EKG QTC CALCULATION (BAZETT): 446 MS
EKG R AXIS: 21 DEGREES
EKG R AXIS: 30 DEGREES
EKG T AXIS: 32 DEGREES
EKG T AXIS: 38 DEGREES
EKG VENTRICULAR RATE: 57 BPM
EKG VENTRICULAR RATE: 68 BPM
HCT VFR BLD CALC: 35.2 % (ref 37–47)
HDLC SERPL-MCNC: 65 MG/DL (ref 40–59)
HEMOGLOBIN: 11 G/DL (ref 12–16)
LDL CHOLESTEROL CALCULATED: 165 MG/DL (ref 0–129)
MAGNESIUM: 1.2 MG/DL (ref 1.7–2.4)
MCH RBC QN AUTO: 26.9 PG (ref 27–31.3)
MCHC RBC AUTO-ENTMCNC: 31.4 % (ref 33–37)
MCV RBC AUTO: 85.8 FL (ref 82–100)
PDW BLD-RTO: 17.5 % (ref 11.5–14.5)
PLATELET # BLD: 321 K/UL (ref 130–400)
RBC # BLD: 4.1 M/UL (ref 4.2–5.4)
TRIGL SERPL-MCNC: 169 MG/DL (ref 0–150)
TROPONIN: <0.01 NG/ML (ref 0–0.01)
WBC # BLD: 11.7 K/UL (ref 4.8–10.8)

## 2019-09-04 PROCEDURE — 80061 LIPID PANEL: CPT

## 2019-09-04 PROCEDURE — 93010 ELECTROCARDIOGRAM REPORT: CPT | Performed by: INTERNAL MEDICINE

## 2019-09-04 PROCEDURE — G0378 HOSPITAL OBSERVATION PER HR: HCPCS

## 2019-09-04 PROCEDURE — 83735 ASSAY OF MAGNESIUM: CPT

## 2019-09-04 PROCEDURE — 6360000002 HC RX W HCPCS: Performed by: PHYSICIAN ASSISTANT

## 2019-09-04 PROCEDURE — 6370000000 HC RX 637 (ALT 250 FOR IP): Performed by: INTERNAL MEDICINE

## 2019-09-04 PROCEDURE — 84484 ASSAY OF TROPONIN QUANT: CPT

## 2019-09-04 PROCEDURE — 93005 ELECTROCARDIOGRAM TRACING: CPT | Performed by: INTERNAL MEDICINE

## 2019-09-04 PROCEDURE — 2580000003 HC RX 258: Performed by: INTERNAL MEDICINE

## 2019-09-04 PROCEDURE — 85027 COMPLETE CBC AUTOMATED: CPT

## 2019-09-04 PROCEDURE — 96376 TX/PRO/DX INJ SAME DRUG ADON: CPT

## 2019-09-04 PROCEDURE — 96372 THER/PROPH/DIAG INJ SC/IM: CPT

## 2019-09-04 PROCEDURE — 96366 THER/PROPH/DIAG IV INF ADDON: CPT

## 2019-09-04 PROCEDURE — 96365 THER/PROPH/DIAG IV INF INIT: CPT

## 2019-09-04 PROCEDURE — 36415 COLL VENOUS BLD VENIPUNCTURE: CPT

## 2019-09-04 PROCEDURE — 6360000002 HC RX W HCPCS: Performed by: INTERNAL MEDICINE

## 2019-09-04 RX ORDER — NITROGLYCERIN 0.4 MG/1
TABLET SUBLINGUAL
Qty: 25 TABLET | Refills: 3 | Status: SHIPPED | OUTPATIENT
Start: 2019-09-04 | End: 2020-11-17

## 2019-09-04 RX ORDER — MAGNESIUM SULFATE IN WATER 40 MG/ML
2 INJECTION, SOLUTION INTRAVENOUS ONCE
Status: COMPLETED | OUTPATIENT
Start: 2019-09-04 | End: 2019-09-04

## 2019-09-04 RX ORDER — MAGNESIUM SULFATE HEPTAHYDRATE 500 MG/ML
2 INJECTION, SOLUTION INTRAMUSCULAR; INTRAVENOUS ONCE
Status: DISCONTINUED | OUTPATIENT
Start: 2019-09-04 | End: 2019-09-04

## 2019-09-04 RX ORDER — METOPROLOL SUCCINATE 50 MG/1
75 TABLET, EXTENDED RELEASE ORAL DAILY
Qty: 135 TABLET | Refills: 3 | Status: SHIPPED
Start: 2019-09-04 | End: 2019-09-13 | Stop reason: DRUGHIGH

## 2019-09-04 RX ADMIN — POTASSIUM CHLORIDE 20 MEQ: 20 TABLET, EXTENDED RELEASE ORAL at 08:34

## 2019-09-04 RX ADMIN — Medication 10 ML: at 08:35

## 2019-09-04 RX ADMIN — DICYCLOMINE HYDROCHLORIDE 10 MG: 10 CAPSULE ORAL at 08:33

## 2019-09-04 RX ADMIN — ALLOPURINOL 100 MG: 100 TABLET ORAL at 08:33

## 2019-09-04 RX ADMIN — PREGABALIN 150 MG: 150 CAPSULE ORAL at 08:34

## 2019-09-04 RX ADMIN — ENOXAPARIN SODIUM 40 MG: 40 INJECTION SUBCUTANEOUS at 08:34

## 2019-09-04 RX ADMIN — ASPIRIN 81 MG 81 MG: 81 TABLET ORAL at 08:34

## 2019-09-04 RX ADMIN — MAGNESIUM SULFATE HEPTAHYDRATE 2 G: 40 INJECTION, SOLUTION INTRAVENOUS at 09:22

## 2019-09-04 RX ADMIN — MELOXICAM 7.5 MG: 7.5 TABLET ORAL at 08:34

## 2019-09-04 RX ADMIN — PANTOPRAZOLE SODIUM 40 MG: 40 TABLET, DELAYED RELEASE ORAL at 05:52

## 2019-09-04 RX ADMIN — ONDANSETRON 4 MG: 2 INJECTION INTRAMUSCULAR; INTRAVENOUS at 04:26

## 2019-09-04 RX ADMIN — FUROSEMIDE 40 MG: 40 TABLET ORAL at 08:33

## 2019-09-04 RX ADMIN — PIOGLITAZONE 15 MG: 15 TABLET ORAL at 08:33

## 2019-09-04 RX ADMIN — METFORMIN HYDROCHLORIDE 500 MG: 500 TABLET ORAL at 08:34

## 2019-09-04 RX ADMIN — MAGNESIUM OXIDE TAB 400 MG (241.3 MG ELEMENTAL MG) 400 MG: 400 (241.3 MG) TAB at 08:34

## 2019-09-04 RX ADMIN — Medication 10 ML: at 04:26

## 2019-09-04 RX ADMIN — DULOXETINE HYDROCHLORIDE 60 MG: 60 CAPSULE, DELAYED RELEASE ORAL at 08:33

## 2019-09-04 RX ADMIN — METOPROLOL SUCCINATE 50 MG: 50 TABLET, EXTENDED RELEASE ORAL at 08:34

## 2019-09-04 ASSESSMENT — PAIN DESCRIPTION - FREQUENCY: FREQUENCY: INTERMITTENT

## 2019-09-04 ASSESSMENT — ENCOUNTER SYMPTOMS
VOICE CHANGE: 0
ANAL BLEEDING: 0
FACIAL SWELLING: 0
VOMITING: 0
CHEST TIGHTNESS: 0
WHEEZING: 0
APNEA: 0
BLOOD IN STOOL: 0
NAUSEA: 0
COLOR CHANGE: 0
ABDOMINAL DISTENTION: 0
SHORTNESS OF BREATH: 0
TROUBLE SWALLOWING: 0

## 2019-09-04 ASSESSMENT — PAIN SCALES - GENERAL
PAINLEVEL_OUTOF10: 3
PAINLEVEL_OUTOF10: 3

## 2019-09-04 ASSESSMENT — PAIN DESCRIPTION - LOCATION: LOCATION: CHEST

## 2019-09-04 ASSESSMENT — PAIN DESCRIPTION - PAIN TYPE: TYPE: ACUTE PAIN

## 2019-09-04 ASSESSMENT — PAIN DESCRIPTION - ORIENTATION: ORIENTATION: MID

## 2019-09-04 NOTE — H&P
History and Physical  Patient: Clifton Tellez  Unit/Bed:W171/W171-01  YOB: 1965  MRN: [id number]  Acct: [de-identified]   Admitting Diagnosis: Chest pain [R07.9]  Chest pain [R07.9]  Admit Date:  9/3/2019  Hospital Day: 0      Chief Complaint:   Chest pain    History of Present Illness: There is no history of disability. Was in her usual state of health until admitted with chest pain which has been going on for few days. He was he was admitted to my service.   EKG showed no acute changes     Allergies   Allergen Reactions    Latex Hives    Penicillins Swelling and Rash    Shellfish-Derived Products Anaphylaxis    Abilify [Aripiprazole]      shaking    Adhesive Tape Swelling     If left on too long    Statins      Swelling      Topamax [Topiramate]     Buspar [Buspirone]      shaking    Other Nausea And Vomiting    Sulfa Antibiotics Nausea And Vomiting       Current Facility-Administered Medications   Medication Dose Route Frequency Provider Last Rate Last Dose    sodium chloride flush 0.9 % injection 10 mL  10 mL Intravenous 2 times per day Juan David Archer MD   10 mL at 09/03/19 2100    sodium chloride flush 0.9 % injection 10 mL  10 mL Intravenous PRN Juan David Archer MD   10 mL at 09/04/19 0426    magnesium hydroxide (MILK OF MAGNESIA) 400 MG/5ML suspension 30 mL  30 mL Oral Daily PRN Juan David Archer MD        ondansetron [facility]) injection 4 mg  4 mg Intravenous Q6H PRN Juan David Archer MD   4 mg at 09/04/19 0426    aspirin chewable tablet 81 mg  81 mg Oral Daily Juan David Archer MD        enoxaparin (LOVENOX) injection 40 mg  40 mg Subcutaneous Daily Juan David Archer MD   40 mg at 09/03/19 2116    nitroGLYCERIN (NITROSTAT) SL tablet 0.4 mg  0.4 mg Sublingual Q5 Min PRN Juan David Archer MD        magnesium oxide (MAG-OX) tablet 400 mg  400 mg Oral BID Juan David Archer MD   400 mg at 09/03/19 2113    insulin lispro (HUMALOG) injection vial 0-12 Units  0-12 Units Subcutaneous TID [facility] December HENT: Negative for facial swelling, nosebleeds, trouble swallowing and voice change. Respiratory: Negative for apnea, chest tightness, shortness of breath and wheezing. Cardiovascular: Negative for chest pain, palpitations and leg swelling. Gastrointestinal: Negative for abdominal distention, anal bleeding, blood in stool, nausea and vomiting. Genitourinary: Negative for decreased urine volume and dysuria. Musculoskeletal: Negative for gait problem and myalgias. Skin: Negative for color change, pallor, rash and wound. Neurological: Negative for dizziness, syncope, facial asymmetry, weakness, light-headedness, numbness and headaches. Hematological: Does not bruise/bleed easily. Psychiatric/Behavioral: Negative for agitation, behavioral problems, confusion, hallucinations and suicidal ideas. The patient is not nervous/anxious. All other systems reviewed and are negative. Physical Examination:    BP (!) 157/57   Pulse 78   Temp 97.9 °F (36.6 °C) (Oral)   Resp 16   Ht 5' 2\" (1.575 m)   Wt 279 lb (126.6 kg)   LMP 07/27/1993 (Approximate) Comment: no cycle since age 26  SpO2 98%   BMI 51.03 kg/m²    Physical Exam   Constitutional: She is oriented to person, place, and time. She appears well-developed and well-nourished. HENT:   Head: Normocephalic and atraumatic. Right Ear: External ear normal.   Left Ear: External ear normal.   Mouth/Throat: Oropharynx is clear and moist.   Eyes: Pupils are equal, round, and reactive to light. Conjunctivae and EOM are normal.   Neck: Normal range of motion. Neck supple. Cardiovascular: Normal rate, regular rhythm, normal heart sounds and intact distal pulses. Pulmonary/Chest: Effort normal and breath sounds normal.   Abdominal: Soft. Bowel sounds are normal.   Musculoskeletal: Normal range of motion. Neurological: She is alert and oriented to person, place, and time. She has normal reflexes. Skin: Skin is warm and dry.    Psychiatric: She has a normal mood and affect. Her behavior is normal. Judgment and thought content normal.        LABS:  CBC:   Lab Results   Component Value Date    WBC 11.7 09/04/2019    RBC 4.10 09/04/2019    HGB 11.0 09/04/2019    HCT 35.2 09/04/2019    MCV 85.8 09/04/2019    MCH 26.9 09/04/2019    MCHC 31.4 09/04/2019    RDW 17.5 09/04/2019     09/04/2019     CBC with Differential:   Lab Results   Component Value Date    WBC 11.7 09/04/2019    RBC 4.10 09/04/2019    HGB 11.0 09/04/2019    HCT 35.2 09/04/2019     09/04/2019    MCV 85.8 09/04/2019    MCH 26.9 09/04/2019    MCHC 31.4 09/04/2019    RDW 17.5 09/04/2019    LYMPHOPCT 10.2 09/03/2019    MONOPCT 4.5 09/03/2019    BASOPCT 0.5 09/03/2019    MONOSABS 0.4 09/03/2019    LYMPHSABS 1.0 09/03/2019    EOSABS 0.1 09/03/2019    BASOSABS 0.1 09/03/2019     CMP:    Lab Results   Component Value Date     09/03/2019    K 4.5 09/03/2019    CL 99 09/03/2019    CO2 27 09/03/2019    BUN 19 09/03/2019    CREATININE 1.04 09/03/2019    GFRAA >60.0 09/03/2019    LABGLOM 54.9 09/03/2019    GLUCOSE 102 09/03/2019    PROT 7.8 09/03/2019    LABALBU 4.0 09/03/2019    CALCIUM 8.9 09/03/2019    BILITOT 0.3 09/03/2019    ALKPHOS 120 09/03/2019    AST 14 09/03/2019    ALT 10 09/03/2019     BMP:    Lab Results   Component Value Date     09/03/2019    K 4.5 09/03/2019    CL 99 09/03/2019    CO2 27 09/03/2019    BUN 19 09/03/2019    LABALBU 4.0 09/03/2019    CREATININE 1.04 09/03/2019    CALCIUM 8.9 09/03/2019    GFRAA >60.0 09/03/2019    LABGLOM 54.9 09/03/2019    GLUCOSE 102 09/03/2019     Magnesium:    Lab Results   Component Value Date    MG 1.2 09/04/2019     Troponin:    Lab Results   Component Value Date    TROPONINI <0.010 09/04/2019     Recent Labs     09/03/19  1600   PROBNP 292     No results for input(s): INR in the last 72 hours.     RADIOLOGY:  Xr Chest Portable    Result Date: 9/3/2019  XR CHEST PORTABLE Exam Date/Time:  9/3/2019 4:15 PM Clinical History:

## 2019-09-05 ENCOUNTER — TELEPHONE (OUTPATIENT)
Dept: FAMILY MEDICINE CLINIC | Age: 54
End: 2019-09-05

## 2019-09-10 DIAGNOSIS — D72.829 LEUKOCYTOSIS, UNSPECIFIED TYPE: ICD-10-CM

## 2019-09-10 LAB
HCT VFR BLD CALC: 37.1 % (ref 37–47)
HEMOGLOBIN: 11.7 G/DL (ref 12–16)
MCH RBC QN AUTO: 27.5 PG (ref 27–31.3)
MCHC RBC AUTO-ENTMCNC: 31.5 % (ref 33–37)
MCV RBC AUTO: 87.3 FL (ref 82–100)
PDW BLD-RTO: 18.8 % (ref 11.5–14.5)
PLATELET # BLD: 221 K/UL (ref 130–400)
RBC # BLD: 4.25 M/UL (ref 4.2–5.4)
WBC # BLD: 8.6 K/UL (ref 4.8–10.8)

## 2019-09-13 ENCOUNTER — OFFICE VISIT (OUTPATIENT)
Dept: FAMILY MEDICINE CLINIC | Age: 54
End: 2019-09-13
Payer: MEDICARE

## 2019-09-13 ENCOUNTER — OFFICE VISIT (OUTPATIENT)
Dept: CARDIOLOGY CLINIC | Age: 54
End: 2019-09-13
Payer: MEDICARE

## 2019-09-13 VITALS
RESPIRATION RATE: 16 BRPM | BODY MASS INDEX: 50.3 KG/M2 | DIASTOLIC BLOOD PRESSURE: 66 MMHG | OXYGEN SATURATION: 95 % | HEART RATE: 95 BPM | TEMPERATURE: 98.4 F | SYSTOLIC BLOOD PRESSURE: 130 MMHG | WEIGHT: 275 LBS

## 2019-09-13 VITALS
WEIGHT: 275 LBS | BODY MASS INDEX: 50.61 KG/M2 | RESPIRATION RATE: 22 BRPM | HEIGHT: 62 IN | OXYGEN SATURATION: 98 % | HEART RATE: 77 BPM | DIASTOLIC BLOOD PRESSURE: 82 MMHG | SYSTOLIC BLOOD PRESSURE: 130 MMHG

## 2019-09-13 DIAGNOSIS — E78.2 MIXED HYPERLIPIDEMIA: ICD-10-CM

## 2019-09-13 DIAGNOSIS — T78.40XD ALLERGIC REACTION, SUBSEQUENT ENCOUNTER: ICD-10-CM

## 2019-09-13 DIAGNOSIS — I10 BENIGN ESSENTIAL HTN: Primary | ICD-10-CM

## 2019-09-13 DIAGNOSIS — F33.2 SEVERE EPISODE OF RECURRENT MAJOR DEPRESSIVE DISORDER, WITHOUT PSYCHOTIC FEATURES (HCC): Primary | ICD-10-CM

## 2019-09-13 DIAGNOSIS — Z87.19 HISTORY OF CROHN'S DISEASE: ICD-10-CM

## 2019-09-13 DIAGNOSIS — R60.0 BILATERAL EDEMA OF LOWER EXTREMITY: ICD-10-CM

## 2019-09-13 DIAGNOSIS — E11.42 TYPE 2 DIABETES MELLITUS WITH DIABETIC POLYNEUROPATHY, WITHOUT LONG-TERM CURRENT USE OF INSULIN (HCC): ICD-10-CM

## 2019-09-13 DIAGNOSIS — Z09 HOSPITAL DISCHARGE FOLLOW-UP: ICD-10-CM

## 2019-09-13 PROCEDURE — 99213 OFFICE O/P EST LOW 20 MIN: CPT | Performed by: INTERNAL MEDICINE

## 2019-09-13 PROCEDURE — 1036F TOBACCO NON-USER: CPT | Performed by: INTERNAL MEDICINE

## 2019-09-13 PROCEDURE — 99495 TRANSJ CARE MGMT MOD F2F 14D: CPT | Performed by: FAMILY MEDICINE

## 2019-09-13 PROCEDURE — G8417 CALC BMI ABV UP PARAM F/U: HCPCS | Performed by: INTERNAL MEDICINE

## 2019-09-13 PROCEDURE — 1111F DSCHRG MED/CURRENT MED MERGE: CPT | Performed by: FAMILY MEDICINE

## 2019-09-13 PROCEDURE — 3017F COLORECTAL CA SCREEN DOC REV: CPT | Performed by: INTERNAL MEDICINE

## 2019-09-13 PROCEDURE — G8427 DOCREV CUR MEDS BY ELIG CLIN: HCPCS | Performed by: INTERNAL MEDICINE

## 2019-09-13 RX ORDER — METOPROLOL SUCCINATE 50 MG/1
50 TABLET, EXTENDED RELEASE ORAL DAILY
Qty: 135 TABLET | Refills: 3 | Status: SHIPPED
Start: 2019-09-13 | End: 2019-09-25 | Stop reason: DRUGHIGH

## 2019-09-13 ASSESSMENT — PATIENT HEALTH QUESTIONNAIRE - PHQ9
SUM OF ALL RESPONSES TO PHQ QUESTIONS 1-9: 2
SUM OF ALL RESPONSES TO PHQ9 QUESTIONS 1 & 2: 2
2. FEELING DOWN, DEPRESSED OR HOPELESS: 2
1. LITTLE INTEREST OR PLEASURE IN DOING THINGS: 0
SUM OF ALL RESPONSES TO PHQ QUESTIONS 1-9: 2

## 2019-09-13 ASSESSMENT — ENCOUNTER SYMPTOMS
RHINORRHEA: 0
NAUSEA: 0
BLOOD IN STOOL: 0
DIARRHEA: 0
EYES NEGATIVE: 1
RESPIRATORY NEGATIVE: 1
SHORTNESS OF BREATH: 0
CHEST TIGHTNESS: 0
VOMITING: 0
COUGH: 0
APNEA: 0
GASTROINTESTINAL NEGATIVE: 1
CHEST TIGHTNESS: 0

## 2019-09-13 NOTE — PROGRESS NOTES
[facility] CARDIOLOGY OFFICE FOLLOW-UP      Patient: Wanda Shah  YOB: 1965  MRN: [id number]    Chief Complaint:  Chief Complaint   Patient presents with    Follow-Up from [facility] ER    Chest Pain         Subjective/HPI:  9/13/19: Patient presents today for follow-up of recent overnight stay for chest pain at [address] [facility]. She is a patient Dr. Joaquin Hawk. Cardiac markers are negative. She is feeling better. She will be seen by Dr. Joaquin Hawk as outpatient. Past Medical History:   Diagnosis Date    Anemia     Asthma     Benign essential HTN     meds > 5 yrs / denies TIA or stroke    Borderline personality disorder ([address]     2016 ?suggested by me-meets many criteria    Carpal tunnel syndrome of right wrist     Crohn's disease ([address]     Depression     Fibromyalgia 2/13/2018    Fibromyalgia     GERD (gastroesophageal reflux disease)     Gout     Headache     migraines    Irritable bowel syndrome     Mixed hyperlipidemia 5/10/2017    Neuropathy     Obesity (BMI 35.0-39.9 without comorbidity) 3/30/2017    PONV (postoperative nausea and vomiting)     nausea    PTSD (post-traumatic stress disorder)     Tremor of unknown origin     Type 2 diabetes mellitus ([address]     meds > 5yrs     Ulcerative colitis ([address] 11/2017    Vaginitis        Past Surgical History:   Procedure Laterality Date    BREAST CYST EXCISION Right 1994    exc mass benign    CHOLECYSTECTOMY  2009    COLONOSCOPY  2015    negative    ENDOSCOPY, COLON, DIAGNOSTIC      HERNIA REPAIR  2015    ventral / mesh    LEG TENDON SURGERY Right 2007    leg.  ankle and foot    OVARIAN CYST REMOVAL Left 1994    with mass and both fallopian tube    OVARY REMOVAL  12/2015    [facility] / mass left ovary & bilateral  tubes    PARTIAL HYSTERECTOMY      [place] COLON CA SCRN NOT HI RSK IND N/A 11/7/2017    COLONOSCOPY performed by MANOJ Ruiz on bx    [place] COLONOSCOPY W/BIOPSY SINGLE/MULTIPLE N/A 3/15/2018 place, and time. She has normal motor skills and normal reflexes. Gait normal.   Skin: Skin is warm and dry. Patient Active Problem List   Diagnosis    Benign essential HTN    Asthma    Type 2 diabetes mellitus with diabetic polyneuropathy ([address]    Crohn's disease ([address]    GERD (gastroesophageal reflux disease)    Headache    Severe episode of recurrent major depressive disorder, without psychotic features ([address]    Borderline personality disorder ([address]    Pain in right knee    Hip pain, right    Obesity (BMI 35.0-39.9 without comorbidity)    Muscular pain    Mixed hyperlipidemia    Tear of meniscus of knee    Bilateral edema of lower extremity    Varicose vein of leg    History of Crohn's disease    Carpal tunnel syndrome of right wrist    PTSD (post-traumatic stress disorder)    Fibromyalgia    Carpal tunnel syndrome, left    Occasional tremors    Absolute anemia    Other iron deficiency anemias    Chest pain           No orders of the defined types were placed in this encounter. Orders Placed This Encounter   Medications    DISCONTD: metoprolol succinate (TOPROL XL) 50 MG extended release tablet     Sig: Take 1 tablet by mouth daily HOLD for SBP<100 or HR<60     Dispense:  135 tablet     Refill:  3    metoprolol succinate (TOPROL XL) 50 MG extended release tablet     Sig: Take 1.5 tablets by mouth daily HOLD for SBP<100 or HR<60     Dispense:  135 tablet     Refill:  3         Assessment:    1. Benign essential HTN    2. Bilateral edema of lower extremity    3. Mixed hyperlipidemia    4. Hospital discharge follow-up       Plan:   Stay on same medications. See me in 6 months. This note was partially generated using Dragon voice recognition system, and there may be some incorrect words, spellings, punctuation that were not noticed in checking the note before saving.         Electronically signed by Ignacia Rodrigues MD on 9/25/2019 at 11:23 AM

## 2019-09-20 ASSESSMENT — ENCOUNTER SYMPTOMS: COLOR CHANGE: 0

## 2019-09-25 RX ORDER — METOPROLOL SUCCINATE 50 MG/1
75 TABLET, EXTENDED RELEASE ORAL DAILY
Qty: 135 TABLET | Refills: 3 | Status: SHIPPED
Start: 2019-09-25 | End: 2019-10-01 | Stop reason: SDUPTHER

## 2019-10-01 ENCOUNTER — TELEPHONE (OUTPATIENT)
Dept: CARDIOLOGY CLINIC | Age: 54
End: 2019-10-01

## 2019-10-01 DIAGNOSIS — I10 BENIGN ESSENTIAL HTN: Primary | ICD-10-CM

## 2019-10-01 RX ORDER — METOPROLOL SUCCINATE 50 MG/1
50 TABLET, EXTENDED RELEASE ORAL DAILY
Qty: 180 TABLET | Refills: 1 | Status: SHIPPED | OUTPATIENT
Start: 2019-10-01 | End: 2019-10-02 | Stop reason: SDUPTHER

## 2019-10-02 RX ORDER — METOPROLOL SUCCINATE 50 MG/1
50 TABLET, EXTENDED RELEASE ORAL 2 TIMES DAILY
Qty: 180 TABLET | Refills: 1 | Status: SHIPPED | OUTPATIENT
Start: 2019-10-02 | End: 2020-02-05 | Stop reason: SDUPTHER

## 2019-10-08 ENCOUNTER — HOSPITAL ENCOUNTER (EMERGENCY)
Age: 54
Discharge: HOME | End: 2019-10-08
Attending: EMERGENCY MEDICINE
Payer: MEDICARE

## 2019-10-08 ENCOUNTER — APPOINTMENT (OUTPATIENT)
Dept: GENERAL RADIOLOGY | Age: 54
End: 2019-10-08
Payer: MEDICARE

## 2019-10-08 VITALS
HEIGHT: 62 IN | TEMPERATURE: 97.6 F | RESPIRATION RATE: 18 BRPM | OXYGEN SATURATION: 100 % | BODY MASS INDEX: 51.34 KG/M2 | SYSTOLIC BLOOD PRESSURE: 161 MMHG | DIASTOLIC BLOOD PRESSURE: 70 MMHG | WEIGHT: 279 LBS | HEART RATE: 52 BPM

## 2019-10-08 DIAGNOSIS — E86.0 DEHYDRATION: ICD-10-CM

## 2019-10-08 DIAGNOSIS — K52.9 GASTROENTERITIS: Primary | ICD-10-CM

## 2019-10-08 LAB
ALBUMIN SERPL-MCNC: 4.1 G/DL (ref 3.5–4.6)
ALP BLD-CCNC: 116 U/L (ref 40–130)
ALT SERPL-CCNC: 9 U/L (ref 0–33)
ANION GAP SERPL CALCULATED.3IONS-SCNC: 12 MEQ/L (ref 9–15)
ANISOCYTOSIS: ABNORMAL
AST SERPL-CCNC: 21 U/L (ref 0–35)
BACTERIA: NEGATIVE /HPF
BANDED NEUTROPHILS RELATIVE PERCENT: 6 % (ref 5–11)
BASOPHILS ABSOLUTE: 0 K/UL (ref 0–0.2)
BASOPHILS RELATIVE PERCENT: 0.4 %
BILIRUB SERPL-MCNC: 0.3 MG/DL (ref 0.2–0.7)
BILIRUBIN DIRECT: <0.2 MG/DL (ref 0–0.4)
BILIRUBIN URINE: NEGATIVE
BILIRUBIN, INDIRECT: NORMAL MG/DL (ref 0–0.6)
BLOOD, URINE: NEGATIVE
BUN BLDV-MCNC: 11 MG/DL (ref 6–20)
CALCIUM SERPL-MCNC: 9 MG/DL (ref 8.5–9.9)
CHLORIDE BLD-SCNC: 99 MEQ/L (ref 95–107)
CLARITY: CLEAR
CO2: 29 MEQ/L (ref 20–31)
COLOR: YELLOW
CREAT SERPL-MCNC: 0.85 MG/DL (ref 0.5–0.9)
EOSINOPHILS ABSOLUTE: 0 K/UL (ref 0–0.7)
EOSINOPHILS RELATIVE PERCENT: 0.3 %
EPITHELIAL CELLS, UA: NORMAL /HPF (ref 0–5)
GFR AFRICAN AMERICAN: >60
GFR NON-AFRICAN AMERICAN: >60
GLUCOSE BLD-MCNC: 142 MG/DL (ref 70–99)
GLUCOSE URINE: NEGATIVE MG/DL
HCT VFR BLD CALC: 34.6 % (ref 37–47)
HEMATOLOGY PATH CONSULT: YES
HEMOGLOBIN: 11.2 G/DL (ref 12–16)
HYALINE CASTS: NORMAL /HPF (ref 0–5)
KETONES, URINE: ABNORMAL MG/DL
LACTIC ACID: 1.1 MMOL/L (ref 0.5–2.2)
LEUKOCYTE ESTERASE, URINE: NEGATIVE
LIPASE: 5 U/L (ref 12–95)
LYMPHOCYTES ABSOLUTE: 1.3 K/UL (ref 1–4.8)
LYMPHOCYTES RELATIVE PERCENT: 8 %
MCH RBC QN AUTO: 27.9 PG (ref 27–31.3)
MCHC RBC AUTO-ENTMCNC: 32.4 % (ref 33–37)
MCV RBC AUTO: 86.1 FL (ref 82–100)
METAMYELOCYTES RELATIVE PERCENT: 11 %
MONOCYTES ABSOLUTE: 1.1 K/UL (ref 0.2–0.8)
MONOCYTES RELATIVE PERCENT: 6.7 %
MYELOCYTE PERCENT: 4 %
NEUTROPHILS ABSOLUTE: 13.5 K/UL (ref 1.4–6.5)
NEUTROPHILS RELATIVE PERCENT: 65 %
NITRITE, URINE: NEGATIVE
PDW BLD-RTO: 18.5 % (ref 11.5–14.5)
PH UA: 5.5 (ref 5–9)
PLATELET # BLD: 284 K/UL (ref 130–400)
PLATELET SLIDE REVIEW: ADEQUATE
POIKILOCYTES: ABNORMAL
POTASSIUM SERPL-SCNC: 4.4 MEQ/L (ref 3.4–4.9)
PROTEIN UA: 30 MG/DL
RBC # BLD: 4.01 M/UL (ref 4.2–5.4)
RBC UA: NORMAL /HPF (ref 0–5)
SLIDE REVIEW: ABNORMAL
SODIUM BLD-SCNC: 140 MEQ/L (ref 135–144)
SPECIFIC GRAVITY UA: 1.02 (ref 1–1.03)
STOMATOCYTES: ABNORMAL
TOTAL PROTEIN: 7.8 G/DL (ref 6.3–8)
URINE REFLEX TO CULTURE: YES
UROBILINOGEN, URINE: 0.2 E.U./DL
WBC # BLD: 15.7 K/UL (ref 4.8–10.8)
WBC UA: NORMAL /HPF (ref 0–5)

## 2019-10-08 PROCEDURE — 80076 HEPATIC FUNCTION PANEL: CPT

## 2019-10-08 PROCEDURE — 80048 BASIC METABOLIC PNL TOTAL CA: CPT

## 2019-10-08 PROCEDURE — 36415 COLL VENOUS BLD VENIPUNCTURE: CPT

## 2019-10-08 PROCEDURE — 85025 COMPLETE CBC W/AUTO DIFF WBC: CPT

## 2019-10-08 PROCEDURE — 99283 EMERGENCY DEPT VISIT LOW MDM: CPT

## 2019-10-08 PROCEDURE — 6360000002 HC RX W HCPCS: Performed by: EMERGENCY MEDICINE

## 2019-10-08 PROCEDURE — 87086 URINE CULTURE/COLONY COUNT: CPT

## 2019-10-08 PROCEDURE — 96374 THER/PROPH/DIAG INJ IV PUSH: CPT

## 2019-10-08 PROCEDURE — 81001 URINALYSIS AUTO W/SCOPE: CPT

## 2019-10-08 PROCEDURE — 96361 HYDRATE IV INFUSION ADD-ON: CPT

## 2019-10-08 PROCEDURE — 96375 TX/PRO/DX INJ NEW DRUG ADDON: CPT

## 2019-10-08 PROCEDURE — 83605 ASSAY OF LACTIC ACID: CPT

## 2019-10-08 PROCEDURE — 83690 ASSAY OF LIPASE: CPT

## 2019-10-08 PROCEDURE — 2580000003 HC RX 258: Performed by: EMERGENCY MEDICINE

## 2019-10-08 PROCEDURE — 74022 RADEX COMPL AQT ABD SERIES: CPT

## 2019-10-08 RX ORDER — PROMETHAZINE HYDROCHLORIDE 25 MG/ML
6.25 INJECTION, SOLUTION INTRAMUSCULAR; INTRAVENOUS ONCE
Status: COMPLETED | OUTPATIENT
Start: 2019-10-08 | End: 2019-10-08

## 2019-10-08 RX ORDER — ONDANSETRON 2 MG/ML
4 INJECTION INTRAMUSCULAR; INTRAVENOUS ONCE
Status: DISCONTINUED | OUTPATIENT
Start: 2019-10-08 | End: 2019-10-08 | Stop reason: HOSPADM

## 2019-10-08 RX ORDER — ONDANSETRON 2 MG/ML
4 INJECTION INTRAMUSCULAR; INTRAVENOUS ONCE
Status: COMPLETED | OUTPATIENT
Start: 2019-10-08 | End: 2019-10-08

## 2019-10-08 RX ORDER — SODIUM CHLORIDE 9 MG/ML
INJECTION, SOLUTION INTRAVENOUS
Status: DISCONTINUED
Start: 2019-10-08 | End: 2019-10-08 | Stop reason: HOSPADM

## 2019-10-08 RX ORDER — ONDANSETRON 4 MG/1
4 TABLET, ORALLY DISINTEGRATING ORAL EVERY 8 HOURS PRN
Qty: 20 TABLET | Refills: 0 | Status: SHIPPED | OUTPATIENT
Start: 2019-10-08 | End: 2019-11-13 | Stop reason: SDUPTHER

## 2019-10-08 RX ORDER — 0.9 % SODIUM CHLORIDE 0.9 %
20 INTRAVENOUS SOLUTION INTRAVENOUS ONCE
Status: COMPLETED | OUTPATIENT
Start: 2019-10-08 | End: 2019-10-08

## 2019-10-08 RX ORDER — 0.9 % SODIUM CHLORIDE 0.9 %
1000 INTRAVENOUS SOLUTION INTRAVENOUS ONCE
Status: DISCONTINUED | OUTPATIENT
Start: 2019-10-08 | End: 2019-10-08 | Stop reason: HOSPADM

## 2019-10-08 RX ADMIN — SODIUM CHLORIDE 2532 ML: 9 INJECTION, SOLUTION INTRAVENOUS at 11:28

## 2019-10-08 RX ADMIN — PROMETHAZINE HYDROCHLORIDE 6.25 MG: 25 INJECTION, SOLUTION INTRAMUSCULAR; INTRAVENOUS at 12:41

## 2019-10-08 RX ADMIN — ONDANSETRON 4 MG: 2 INJECTION INTRAMUSCULAR; INTRAVENOUS at 12:31

## 2019-10-08 ASSESSMENT — PAIN DESCRIPTION - LOCATION: LOCATION: ABDOMEN

## 2019-10-08 ASSESSMENT — ENCOUNTER SYMPTOMS
EYE DISCHARGE: 0
RESPIRATORY NEGATIVE: 1
SINUS PAIN: 0
DIARRHEA: 1
NAUSEA: 1
CHEST TIGHTNESS: 0
BLOOD IN STOOL: 0
EYE PAIN: 0
COUGH: 0
SHORTNESS OF BREATH: 0
WHEEZING: 0
EYES NEGATIVE: 1
ABDOMINAL DISTENTION: 0
VOMITING: 1
BACK PAIN: 0
ABDOMINAL PAIN: 1
SORE THROAT: 0

## 2019-10-08 ASSESSMENT — PAIN SCALES - GENERAL: PAINLEVEL_OUTOF10: 6

## 2019-10-09 LAB — HEMATOLOGY PATH CONSULT: NORMAL

## 2019-10-10 LAB — URINE CULTURE, ROUTINE: NORMAL

## 2019-11-13 ENCOUNTER — OFFICE VISIT (OUTPATIENT)
Dept: FAMILY MEDICINE CLINIC | Age: 54
End: 2019-11-13
Payer: MEDICARE

## 2019-11-13 VITALS
DIASTOLIC BLOOD PRESSURE: 60 MMHG | HEART RATE: 70 BPM | BODY MASS INDEX: 51.71 KG/M2 | SYSTOLIC BLOOD PRESSURE: 100 MMHG | WEIGHT: 281 LBS | OXYGEN SATURATION: 99 % | WEIGHT: 281 LBS | DIASTOLIC BLOOD PRESSURE: 60 MMHG | HEIGHT: 62 IN | RESPIRATION RATE: 16 BRPM | TEMPERATURE: 97 F | TEMPERATURE: 97 F | HEART RATE: 70 BPM | SYSTOLIC BLOOD PRESSURE: 100 MMHG | OXYGEN SATURATION: 99 % | BODY MASS INDEX: 51.71 KG/M2 | RESPIRATION RATE: 16 BRPM | HEIGHT: 62 IN

## 2019-11-13 DIAGNOSIS — E11.42 TYPE 2 DIABETES MELLITUS WITH DIABETIC POLYNEUROPATHY, WITHOUT LONG-TERM CURRENT USE OF INSULIN (HCC): Primary | ICD-10-CM

## 2019-11-13 DIAGNOSIS — Z00.00 ROUTINE GENERAL MEDICAL EXAMINATION AT A HEALTH CARE FACILITY: Primary | ICD-10-CM

## 2019-11-13 DIAGNOSIS — K50.919 CROHN'S DISEASE WITH COMPLICATION, UNSPECIFIED GASTROINTESTINAL TRACT LOCATION (HCC): ICD-10-CM

## 2019-11-13 DIAGNOSIS — Z12.39 SCREENING FOR BREAST CANCER: ICD-10-CM

## 2019-11-13 LAB — HBA1C MFR BLD: 6 %

## 2019-11-13 PROCEDURE — G8417 CALC BMI ABV UP PARAM F/U: HCPCS | Performed by: FAMILY MEDICINE

## 2019-11-13 PROCEDURE — G0008 ADMIN INFLUENZA VIRUS VAC: HCPCS | Performed by: FAMILY MEDICINE

## 2019-11-13 PROCEDURE — 2022F DILAT RTA XM EVC RTNOPTHY: CPT | Performed by: FAMILY MEDICINE

## 2019-11-13 PROCEDURE — 83036 HEMOGLOBIN GLYCOSYLATED A1C: CPT | Performed by: FAMILY MEDICINE

## 2019-11-13 PROCEDURE — 3017F COLORECTAL CA SCREEN DOC REV: CPT | Performed by: FAMILY MEDICINE

## 2019-11-13 PROCEDURE — G0438 PPPS, INITIAL VISIT: HCPCS | Performed by: NURSE PRACTITIONER

## 2019-11-13 PROCEDURE — 3017F COLORECTAL CA SCREEN DOC REV: CPT | Performed by: NURSE PRACTITIONER

## 2019-11-13 PROCEDURE — 99214 OFFICE O/P EST MOD 30 MIN: CPT | Performed by: FAMILY MEDICINE

## 2019-11-13 PROCEDURE — 3044F HG A1C LEVEL LT 7.0%: CPT | Performed by: FAMILY MEDICINE

## 2019-11-13 PROCEDURE — G8482 FLU IMMUNIZE ORDER/ADMIN: HCPCS | Performed by: NURSE PRACTITIONER

## 2019-11-13 PROCEDURE — G8482 FLU IMMUNIZE ORDER/ADMIN: HCPCS | Performed by: FAMILY MEDICINE

## 2019-11-13 PROCEDURE — 90688 IIV4 VACCINE SPLT 0.5 ML IM: CPT | Performed by: FAMILY MEDICINE

## 2019-11-13 PROCEDURE — 1036F TOBACCO NON-USER: CPT | Performed by: FAMILY MEDICINE

## 2019-11-13 PROCEDURE — G8427 DOCREV CUR MEDS BY ELIG CLIN: HCPCS | Performed by: FAMILY MEDICINE

## 2019-11-13 RX ORDER — QUETIAPINE FUMARATE 25 MG/1
TABLET, FILM COATED ORAL
Refills: 2 | COMMUNITY
Start: 2019-10-31 | End: 2020-11-12

## 2019-11-13 RX ORDER — DICYCLOMINE HYDROCHLORIDE 10 MG/1
CAPSULE ORAL
Qty: 120 CAPSULE | Refills: 5 | Status: SHIPPED | OUTPATIENT
Start: 2019-11-13 | End: 2020-05-19

## 2019-11-13 RX ORDER — PIOGLITAZONEHYDROCHLORIDE 15 MG/1
TABLET ORAL
Qty: 30 TABLET | Refills: 5 | Status: SHIPPED | OUTPATIENT
Start: 2019-11-13 | End: 2020-04-23

## 2019-11-13 RX ORDER — ONDANSETRON 4 MG/1
4 TABLET, ORALLY DISINTEGRATING ORAL EVERY 8 HOURS PRN
Qty: 20 TABLET | Refills: 0 | Status: SHIPPED | OUTPATIENT
Start: 2019-11-13 | End: 2020-01-21

## 2019-11-13 ASSESSMENT — LIFESTYLE VARIABLES: HOW OFTEN DO YOU HAVE A DRINK CONTAINING ALCOHOL: 0

## 2019-11-13 ASSESSMENT — ENCOUNTER SYMPTOMS
CHEST TIGHTNESS: 0
GASTROINTESTINAL NEGATIVE: 1
EYES NEGATIVE: 1
RESPIRATORY NEGATIVE: 1
COUGH: 0
RHINORRHEA: 0

## 2019-11-13 ASSESSMENT — PATIENT HEALTH QUESTIONNAIRE - PHQ9
SUM OF ALL RESPONSES TO PHQ QUESTIONS 1-9: 0
SUM OF ALL RESPONSES TO PHQ QUESTIONS 1-9: 0

## 2019-12-05 ENCOUNTER — TELEPHONE (OUTPATIENT)
Dept: PULMONOLOGY | Age: 54
End: 2019-12-05

## 2019-12-05 DIAGNOSIS — J45.40 MODERATE PERSISTENT ASTHMA WITHOUT COMPLICATION: Primary | ICD-10-CM

## 2019-12-07 RX ORDER — IPRATROPIUM BROMIDE AND ALBUTEROL SULFATE 2.5; .5 MG/3ML; MG/3ML
SOLUTION RESPIRATORY (INHALATION)
Qty: 360 ML | Refills: 5 | Status: SHIPPED | OUTPATIENT
Start: 2019-12-07 | End: 2021-04-05 | Stop reason: SDUPTHER

## 2019-12-10 DIAGNOSIS — K50.919 CROHN'S DISEASE WITH COMPLICATION, UNSPECIFIED GASTROINTESTINAL TRACT LOCATION (HCC): ICD-10-CM

## 2019-12-10 RX ORDER — HYOSCYAMINE SULFATE 0.125 MG
TABLET ORAL
Qty: 164 TABLET | Refills: 0 | Status: SHIPPED | OUTPATIENT
Start: 2019-12-10

## 2019-12-17 DIAGNOSIS — K50.919 CROHN'S DISEASE WITH COMPLICATION, UNSPECIFIED GASTROINTESTINAL TRACT LOCATION (HCC): ICD-10-CM

## 2019-12-17 RX ORDER — OMEPRAZOLE 40 MG/1
CAPSULE, DELAYED RELEASE ORAL
Qty: 180 CAPSULE | Refills: 3 | Status: SHIPPED | OUTPATIENT
Start: 2019-12-17 | End: 2021-01-06

## 2019-12-19 ENCOUNTER — OFFICE VISIT (OUTPATIENT)
Dept: CARDIOLOGY CLINIC | Age: 54
End: 2019-12-19
Payer: MEDICARE

## 2019-12-19 VITALS
OXYGEN SATURATION: 97 % | SYSTOLIC BLOOD PRESSURE: 110 MMHG | DIASTOLIC BLOOD PRESSURE: 60 MMHG | HEART RATE: 65 BPM | BODY MASS INDEX: 52.49 KG/M2 | WEIGHT: 287 LBS

## 2019-12-19 DIAGNOSIS — E78.2 MIXED HYPERLIPIDEMIA: ICD-10-CM

## 2019-12-19 DIAGNOSIS — E66.9 OBESITY (BMI 35.0-39.9 WITHOUT COMORBIDITY): Chronic | ICD-10-CM

## 2019-12-19 DIAGNOSIS — R60.0 BILATERAL EDEMA OF LOWER EXTREMITY: ICD-10-CM

## 2019-12-19 DIAGNOSIS — I10 BENIGN ESSENTIAL HTN: Primary | ICD-10-CM

## 2019-12-19 PROCEDURE — G8417 CALC BMI ABV UP PARAM F/U: HCPCS | Performed by: INTERNAL MEDICINE

## 2019-12-19 PROCEDURE — 99213 OFFICE O/P EST LOW 20 MIN: CPT | Performed by: INTERNAL MEDICINE

## 2019-12-19 PROCEDURE — 1036F TOBACCO NON-USER: CPT | Performed by: INTERNAL MEDICINE

## 2019-12-19 PROCEDURE — G8427 DOCREV CUR MEDS BY ELIG CLIN: HCPCS | Performed by: INTERNAL MEDICINE

## 2019-12-19 PROCEDURE — G8482 FLU IMMUNIZE ORDER/ADMIN: HCPCS | Performed by: INTERNAL MEDICINE

## 2019-12-19 PROCEDURE — 3017F COLORECTAL CA SCREEN DOC REV: CPT | Performed by: INTERNAL MEDICINE

## 2019-12-30 ENCOUNTER — OFFICE VISIT (OUTPATIENT)
Dept: NEUROLOGY | Age: 54
End: 2019-12-30
Payer: MEDICARE

## 2019-12-30 VITALS
SYSTOLIC BLOOD PRESSURE: 125 MMHG | HEART RATE: 78 BPM | DIASTOLIC BLOOD PRESSURE: 62 MMHG | WEIGHT: 291.4 LBS | BODY MASS INDEX: 53.3 KG/M2

## 2019-12-30 DIAGNOSIS — R25.1 TREMORS OF NERVOUS SYSTEM: ICD-10-CM

## 2019-12-30 DIAGNOSIS — G43.019 INTRACTABLE MIGRAINE WITHOUT AURA AND WITHOUT STATUS MIGRAINOSUS: ICD-10-CM

## 2019-12-30 DIAGNOSIS — G56.01 CARPAL TUNNEL SYNDROME OF RIGHT WRIST: ICD-10-CM

## 2019-12-30 DIAGNOSIS — G57.12 MERALGIA PARAESTHETICA, LEFT: ICD-10-CM

## 2019-12-30 DIAGNOSIS — M79.7 FIBROMYALGIA: Primary | ICD-10-CM

## 2019-12-30 DIAGNOSIS — G56.02 CARPAL TUNNEL SYNDROME, LEFT: ICD-10-CM

## 2019-12-30 PROCEDURE — 99215 OFFICE O/P EST HI 40 MIN: CPT | Performed by: PSYCHIATRY & NEUROLOGY

## 2019-12-30 ASSESSMENT — ENCOUNTER SYMPTOMS
PHOTOPHOBIA: 0
TROUBLE SWALLOWING: 0
SHORTNESS OF BREATH: 0
NAUSEA: 0
VOMITING: 0
CHOKING: 0
BACK PAIN: 0

## 2020-01-16 RX ORDER — MOMETASONE FUROATE 1 MG/G
CREAM TOPICAL
Qty: 45 G | Refills: 0 | Status: SHIPPED | OUTPATIENT
Start: 2020-01-16 | End: 2020-03-23

## 2020-01-21 ENCOUNTER — TELEPHONE (OUTPATIENT)
Dept: FAMILY MEDICINE CLINIC | Age: 55
End: 2020-01-21

## 2020-01-21 ENCOUNTER — OFFICE VISIT (OUTPATIENT)
Dept: FAMILY MEDICINE CLINIC | Age: 55
End: 2020-01-21
Payer: MEDICARE

## 2020-01-21 VITALS
HEIGHT: 62 IN | WEIGHT: 293 LBS | HEART RATE: 62 BPM | RESPIRATION RATE: 15 BRPM | TEMPERATURE: 98.9 F | SYSTOLIC BLOOD PRESSURE: 138 MMHG | BODY MASS INDEX: 53.92 KG/M2 | OXYGEN SATURATION: 98 % | DIASTOLIC BLOOD PRESSURE: 54 MMHG

## 2020-01-21 LAB
INFLUENZA A ANTIBODY: NORMAL
INFLUENZA B ANTIBODY: NORMAL

## 2020-01-21 PROCEDURE — G8417 CALC BMI ABV UP PARAM F/U: HCPCS | Performed by: INTERNAL MEDICINE

## 2020-01-21 PROCEDURE — G8427 DOCREV CUR MEDS BY ELIG CLIN: HCPCS | Performed by: INTERNAL MEDICINE

## 2020-01-21 PROCEDURE — 87804 INFLUENZA ASSAY W/OPTIC: CPT | Performed by: INTERNAL MEDICINE

## 2020-01-21 PROCEDURE — 99213 OFFICE O/P EST LOW 20 MIN: CPT | Performed by: INTERNAL MEDICINE

## 2020-01-21 PROCEDURE — 3017F COLORECTAL CA SCREEN DOC REV: CPT | Performed by: INTERNAL MEDICINE

## 2020-01-21 PROCEDURE — G8482 FLU IMMUNIZE ORDER/ADMIN: HCPCS | Performed by: INTERNAL MEDICINE

## 2020-01-21 PROCEDURE — 1036F TOBACCO NON-USER: CPT | Performed by: INTERNAL MEDICINE

## 2020-01-21 RX ORDER — DOXYCYCLINE HYCLATE 100 MG
100 TABLET ORAL 2 TIMES DAILY
Qty: 14 TABLET | Refills: 0 | Status: SHIPPED | OUTPATIENT
Start: 2020-01-21 | End: 2020-01-24 | Stop reason: SDUPTHER

## 2020-01-21 RX ORDER — ECHINACEA PURPUREA EXTRACT 125 MG
1 TABLET ORAL PRN
Qty: 1 BOTTLE | Refills: 0 | Status: SHIPPED | OUTPATIENT
Start: 2020-01-21 | End: 2021-03-15

## 2020-01-21 RX ORDER — LEVOFLOXACIN 750 MG/1
750 TABLET ORAL DAILY
Qty: 7 TABLET | Refills: 0 | Status: SHIPPED | OUTPATIENT
Start: 2020-01-21 | End: 2020-01-28

## 2020-01-21 RX ORDER — FLUTICASONE PROPIONATE 50 MCG
1 SPRAY, SUSPENSION (ML) NASAL DAILY
Qty: 1 BOTTLE | Refills: 0 | Status: SHIPPED | OUTPATIENT
Start: 2020-01-21 | End: 2021-03-15

## 2020-01-21 ASSESSMENT — ENCOUNTER SYMPTOMS
SHORTNESS OF BREATH: 0
COUGH: 1
ABDOMINAL PAIN: 0
BACK PAIN: 0
SINUS PRESSURE: 1
SINUS PAIN: 1
EYE PAIN: 0

## 2020-01-21 NOTE — PATIENT INSTRUCTIONS
home?  · Take an over-the-counter pain medicine, such as acetaminophen (Tylenol), ibuprofen (Advil, Motrin), or naproxen (Aleve). Read and follow all instructions on the label. · If the doctor prescribed antibiotics, take them as directed. Do not stop taking them just because you feel better. You need to take the full course of antibiotics. · Be careful when taking over-the-counter cold or flu medicines and Tylenol at the same time. Many of these medicines have acetaminophen, which is Tylenol. Read the labels to make sure that you are not taking more than the recommended dose. Too much acetaminophen (Tylenol) can be harmful. · Breathe warm, moist air from a steamy shower, a hot bath, or a sink filled with hot water. Avoid cold, dry air. Using a humidifier in your home may help. Follow the directions for cleaning the machine. · Use saline (saltwater) nasal washes to help keep your nasal passages open and wash out mucus and bacteria. You can buy saline nose drops at a grocery store or drugstore. Or you can make your own at home by adding 1 teaspoon of salt and 1 teaspoon of baking soda to 2 cups of distilled water. If you make your own, fill a bulb syringe with the solution, insert the tip into your nostril, and squeeze gently. [place] your nose. · Put a hot, wet towel or a warm gel pack on your face 3 or 4 times a day for 5 to 10 minutes each time. · Try a decongestant nasal spray like oxymetazoline (Afrin). Do not use it for more than 3 days in a row. Using it for more than 3 days can make your congestion worse. When should you call for help?   Call your doctor now or seek immediate medical care if:    · You have new or worse swelling or redness in your face or around your eyes.     · You have a new or higher fever.    Watch closely for changes in your health, and be sure to contact your doctor if:    · You have new or worse facial pain.     · The mucus from your nose becomes thicker (like pus) or has new blood in it.     · You are not getting better as expected. Where can you learn more? Go to https://[employer]web[email or web address] org and sign in to your [employer] account. Enter S378 in the [facility] box to learn more about \"Sinusitis: Care Instructions. \"     If you do not have an account, please click on the \"Sign Up Now\" link. Current as of: July 28, 2019  Content Version: 12.3  © 4085-5409 [employer]. Care instructions adapted under license by [facility] ([facility]). If you have questions about a medical condition or this instruction, always ask your healthcare professional. [address] any warranty or liability for your use of this information.

## 2020-01-21 NOTE — TELEPHONE ENCOUNTER
[facility] radiologist just called and would like provider to view the results of patients xray.

## 2020-01-21 NOTE — PROGRESS NOTES
Subjective:      Patient ID: Misbah Rodriguez is a 53 y.o. female who presents today with:  Chief Complaint   Patient presents with    Cough     productive with brown mucous, has been taking OTC cold meds, sxs last ing longer than 5 days     Nasal Congestion     yellow and brown mucous     Chest Congestion    Shortness of Breath       HPI     Here for cough going on 2.5 weeks. Mentions mostly chest congestion, got worse over the last 5 days. Also with sinus congestion and drainage for at least 2 weeks that has gotten worse. Chills, but no fever. Ears are plugged. Past Medical History:   Diagnosis Date    Anemia     Asthma     Benign essential HTN     meds > 5 yrs / denies TIA or stroke    Borderline personality disorder ([address]     2016 ?suggested by me-meets many criteria    Carpal tunnel syndrome of right wrist     Crohn's disease ([address]     Depression     Fibromyalgia 2/13/2018    Fibromyalgia     GERD (gastroesophageal reflux disease)     Gout     Headache     migraines    Irritable bowel syndrome     Mixed hyperlipidemia 5/10/2017    Neuropathy     Obesity (BMI 35.0-39.9 without comorbidity) 3/30/2017    PONV (postoperative nausea and vomiting)     nausea    PTSD (post-traumatic stress disorder)     Tremor of unknown origin     Type 2 diabetes mellitus ([address]     meds > 5yrs     Ulcerative colitis ([address] 11/2017    Vaginitis      Past Surgical History:   Procedure Laterality Date    BREAST CYST EXCISION Right 1994    exc mass benign    CHOLECYSTECTOMY  2009    COLONOSCOPY  2015    negative    ENDOSCOPY, COLON, DIAGNOSTIC      HERNIA REPAIR  2015    ventral / mesh    LEG TENDON SURGERY Right 2007    leg.  ankle and foot    OVARIAN CYST REMOVAL Left 1994    with mass and both fallopian tube    OVARY REMOVAL  12/2015    [facility] / mass left ovary & bilateral  tubes    PARTIAL HYSTERECTOMY      AL COLON CA SCRN NOT HI RSK IND N/A 11/7/2017    COLONOSCOPY performed by Martinez Mack, [facility] History Narrative    Not on file     Allergies   Allergen Reactions    Latex Hives    Penicillins Swelling and Rash    Shellfish-Derived Products Anaphylaxis    Abilify [Aripiprazole]      shaking    Adhesive Tape Swelling     If left on too long    Statins      Swelling      Topamax [Topiramate]     Buspar [Buspirone]      shaking    Other Nausea And Vomiting    Sulfa Antibiotics Nausea And Vomiting     Current Outpatient Medications on File Prior to Visit   Medication Sig Dispense Refill    mometasone (ELOCON) 0.1 % cream APPLY TOPICALLY DAILY 45 g 0    omeprazole (PRILOSEC) 40 MG delayed release capsule TAKE ONE CAPSULE BY MOUTH TWO TIMES A  capsule 3    hyoscyamine (ANASPAZ;LEVSIN) 125 MCG tablet TAKE ONE TABLET BY MOUTH EVERY 4 HOURS AS NEEDED for cramping 164 tablet 0    ipratropium-albuterol (DUONEB) 0.5-2.5 (3) MG/3ML SOLN nebulizer solution INHALE 1 VIAL VIA NEBULIZER EVERY 4 HOURS 360 mL 5    mometasone-formoterol (DULERA) 100-5 MCG/ACT inhaler Inhale 2 puffs into the lungs 2 times daily 1 Inhaler 5    pioglitazone (ACTOS) 15 MG tablet TAKE ONE TABLET BY MOUTH DAILY 30 tablet 5    dicyclomine (BENTYL) 10 MG capsule TAKE ONE CAPSULE BY MOUTH FOUR TIMES A DAY BEFORE MEALS AND NIGHTLY 120 capsule 5    QUEtiapine (SEROQUEL) 25 MG tablet TAKE 1 BY MOUTH DAILY AS NEEDED FOR ANXIETY  2    metoprolol succinate (TOPROL XL) 50 MG extended release tablet Take 1 tablet by mouth 2 times daily HOLD for SBP<100 or HR<60 180 tablet 1    nitroGLYCERIN (NITROSTAT) 0.4 MG SL tablet up to max of 3 total doses.  If no relief after 1 dose, call 911. 25 tablet 3    magnesium oxide (MAG-OX) 400 (241.3 Mg) MG TABS tablet Take 1 tablet by mouth 2 times daily 60 tablet 2    pentazocine-naloxone (TALWIN NX) 50-0.5 MG per tablet take 1 tablet by mouth twice a day as needed for pain  0    furosemide (LASIX) 40 MG tablet TAKE ONE TABLET BY MOUTH TWO TIMES A DAY 60 tablet 5    potassium chloride (KLOR-CON

## 2020-01-24 ENCOUNTER — TELEPHONE (OUTPATIENT)
Dept: FAMILY MEDICINE CLINIC | Age: 55
End: 2020-01-24

## 2020-01-24 RX ORDER — DOXYCYCLINE HYCLATE 100 MG
100 TABLET ORAL 2 TIMES DAILY
Qty: 14 TABLET | Refills: 0 | Status: SHIPPED | OUTPATIENT
Start: 2020-01-24 | End: 2020-01-31

## 2020-01-24 NOTE — TELEPHONE ENCOUNTER
She took one dose of the levequin and she has had severe diarrhea. Is there a different antibiotic you can order for her?   She uses [facility]

## 2020-02-04 ENCOUNTER — OFFICE VISIT (OUTPATIENT)
Dept: FAMILY MEDICINE CLINIC | Age: 55
End: 2020-02-04
Payer: MEDICARE

## 2020-02-04 VITALS
TEMPERATURE: 98.2 F | WEIGHT: 293 LBS | RESPIRATION RATE: 15 BRPM | DIASTOLIC BLOOD PRESSURE: 58 MMHG | HEART RATE: 68 BPM | HEIGHT: 62 IN | OXYGEN SATURATION: 96 % | BODY MASS INDEX: 53.92 KG/M2 | SYSTOLIC BLOOD PRESSURE: 116 MMHG

## 2020-02-04 LAB
INFLUENZA A ANTIBODY: NEGATIVE
INFLUENZA B ANTIBODY: NEGATIVE

## 2020-02-04 PROCEDURE — 3017F COLORECTAL CA SCREEN DOC REV: CPT | Performed by: INTERNAL MEDICINE

## 2020-02-04 PROCEDURE — G8417 CALC BMI ABV UP PARAM F/U: HCPCS | Performed by: INTERNAL MEDICINE

## 2020-02-04 PROCEDURE — G8427 DOCREV CUR MEDS BY ELIG CLIN: HCPCS | Performed by: INTERNAL MEDICINE

## 2020-02-04 PROCEDURE — 87804 INFLUENZA ASSAY W/OPTIC: CPT | Performed by: INTERNAL MEDICINE

## 2020-02-04 PROCEDURE — 1036F TOBACCO NON-USER: CPT | Performed by: INTERNAL MEDICINE

## 2020-02-04 PROCEDURE — G8482 FLU IMMUNIZE ORDER/ADMIN: HCPCS | Performed by: INTERNAL MEDICINE

## 2020-02-04 PROCEDURE — 99214 OFFICE O/P EST MOD 30 MIN: CPT | Performed by: INTERNAL MEDICINE

## 2020-02-04 RX ORDER — MOXIFLOXACIN HYDROCHLORIDE 400 MG/1
400 TABLET ORAL DAILY
Qty: 7 TABLET | Refills: 0 | Status: SHIPPED | OUTPATIENT
Start: 2020-02-04 | End: 2020-02-04

## 2020-02-04 RX ORDER — MOXIFLOXACIN HYDROCHLORIDE 400 MG/1
400 TABLET ORAL DAILY
Qty: 5 TABLET | Refills: 0 | Status: SHIPPED | OUTPATIENT
Start: 2020-02-04 | End: 2020-02-09

## 2020-02-04 ASSESSMENT — ENCOUNTER SYMPTOMS
COUGH: 1
BACK PAIN: 1
ABDOMINAL PAIN: 1
SHORTNESS OF BREATH: 0
EYE PAIN: 0

## 2020-02-04 NOTE — PROGRESS NOTES
TENDON SURGERY Right 2007    leg.  ankle and foot    OVARIAN CYST REMOVAL Left 1994    with mass and both fallopian tube    OVARY REMOVAL  12/2015    [facility] / mass left ovary & bilateral  tubes    PARTIAL HYSTERECTOMY      MT COLON CA SCRN NOT HI RSK IND N/A 11/7/2017    COLONOSCOPY performed by MANOJ Wilkes on bx    MT COLONOSCOPY W/BIOPSY SINGLE/MULTIPLE N/A 3/15/2018    COLONOSCOPY performed by Brandy Yen MD at [facility] Right 8/3/2017    RIGHT KNEE ARTHROSCOPIC PARTIAL MEDIAL MENISCECTOMY KNEE performed by Kaila Ortega MD at [address] [facility] N/CARPAL TUNNEL SURG Right 11/30/2017    RIGHT WRIST  CARPAL TUNNEL RELEASE performed by Kaila Ortega MD at [facility] N/CARPAL TUNNEL SURG Left 5/10/2018    LEFT WRIST CARPAL TUNNEL RELEASE performed by Kaila Ortega MD at [address] [facility]     Social History     Socioeconomic History    Marital status: Single     Spouse name: Not on file    Number of children: Not on file    Years of education: Not on file    Highest education level: Not on file   Occupational History    Not on file   Social Needs    Financial resource strain: Not on file    Food insecurity:     Worry: Not on file     Inability: Not on file    Transportation needs:     Medical: Not on file     Non-medical: Not on file   Tobacco Use    Smoking status: Never Smoker    Smokeless tobacco: Never Used   Substance and Sexual Activity    Alcohol use: No     Alcohol/week: 0.0 standard drinks    Drug use: No    Sexual activity: Not Currently   Lifestyle    Physical activity:     Days per week: Not on file     Minutes per session: Not on file    Stress: Not on file   Relationships    Social connections:     Talks on phone: Not on file     Gets together: Not on file     Attends Faith service: Not on file     Active member of club or organization: Not on file     Attends DAILY AS DIRECTED IN THE MORNING AND AFTERNOON  1    meloxicam (MOBIC) 7.5 MG tablet TAKE ONE TABLET BY MOUTH TWO TIMES A DAY WITH MEALS TAKE ONLY WITH FOOD AS NEEDED  3    baclofen (LIORESAL) 20 MG tablet 20 mg 4 times daily       LYRICA 150 MG capsule 150 mg 3 times daily Indications: last filled 3/5/17 no refills       colchicine (COLCRYS) 0.6 MG tablet TAKE ONE TABLET BY MOUTH THREE TIMES A DAY AS NEEDED FOR PAIN (Patient not taking: Reported on 1/21/2020) 30 tablet 2    rizatriptan (MAXALT) 10 MG tablet Take 1 tablet by mouth once as needed for Migraine May repeat in 2 hours if needed 30 tablet 0     No current facility-administered medications on file prior to visit. I have personally reviewed the ROS, PMH, PFH, and social history     Review of Systems   Constitutional: Positive for fatigue. Negative for chills and fever. HENT: Negative for congestion. Eyes: Negative for pain. Respiratory: Positive for cough. Negative for shortness of breath. Cardiovascular: Positive for chest pain. Gastrointestinal: Positive for abdominal pain. Genitourinary: Negative for hematuria. Musculoskeletal: Positive for back pain. Allergic/Immunologic: Negative for immunocompromised state. Neurological: Negative for headaches. Psychiatric/Behavioral: Negative for hallucinations. Objective:   BP (!) 116/58 (Site: Left Upper Arm, Position: Sitting, Cuff Size: Large Adult)   Pulse 68   Temp 98.2 °F (36.8 °C) (Tympanic)   Resp 15   Ht 5' 2\" (1.575 m)   Wt (!) 304 lb (137.9 kg)   LMP 07/27/1993 (Approximate) Comment: no cycle since age 26  SpO2 96%   BMI 55.60 kg/m²     Physical Exam  Constitutional:       Appearance: She is well-developed. HENT:      Head: Normocephalic. Comments: Uvula is midline and mucous membranes are normal. Posterior oropharynx without erythema. No oropharyngeal exudate, posterior oropharyngeal edema or tonsillar abscesses. Floor of mouth soft.    Eyes: c-diff with any antibiotic, this will need to be treated and can have serious and even lethal consequences if not. Follow up with pcp after   Explained can have permanent neuropathy and tendon rupture with flouroquinolones. Orders Placed This Encounter   Procedures    XR CHEST STANDARD (2 VW)     Standing Status:   Future     Standing Expiration Date:   2/4/2021     Order Specific Question:   Reason for exam:     Answer:   follow up, mentions short of breath    POCT Influenza A/B     Orders Placed This Encounter   Medications    moxifloxacin (AVELOX) 400 MG tablet     Sig: Take 1 tablet by mouth daily for 7 days     Dispense:  7 tablet     Refill:  0       No follow-ups on file. Dann Sebastian MD    If anything should change or worsen call ASAP, don't wait for next scheduled appointment.

## 2020-02-05 DIAGNOSIS — J02.9 SORE THROAT: ICD-10-CM

## 2020-02-05 RX ORDER — METOPROLOL SUCCINATE 50 MG/1
50 TABLET, EXTENDED RELEASE ORAL 2 TIMES DAILY
Qty: 180 TABLET | Refills: 2 | Status: SHIPPED | OUTPATIENT
Start: 2020-02-05 | End: 2021-03-22

## 2020-02-05 NOTE — PATIENT INSTRUCTIONS

## 2020-02-07 LAB — THROAT CULTURE: NORMAL

## 2020-02-07 RX ORDER — TRAZODONE HYDROCHLORIDE 100 MG/1
TABLET ORAL
Qty: 90 TABLET | Refills: 1 | Status: SHIPPED | OUTPATIENT
Start: 2020-02-07 | End: 2020-11-12

## 2020-02-12 ENCOUNTER — HOSPITAL ENCOUNTER (OUTPATIENT)
Dept: GENERAL RADIOLOGY | Age: 55
Discharge: HOME | End: 2020-02-14
Payer: MEDICARE

## 2020-02-12 PROCEDURE — 73502 X-RAY EXAM HIP UNI 2-3 VIEWS: CPT

## 2020-02-12 PROCEDURE — 73030 X-RAY EXAM OF SHOULDER: CPT

## 2020-02-21 ENCOUNTER — CARE COORDINATION (OUTPATIENT)
Dept: CARE COORDINATION | Age: 55
End: 2020-02-21

## 2020-02-21 NOTE — LETTER
2/21/2020    [address] [facility] [address]      Dear [email or web address],    My name is Eli Britton and I am a registered nurse who partners with David May MD to improve patients' health. David May MD believes you would benefit from working with me. As a member of your health care team, I would work with other providers involved in your care, offer education for your specific health conditions, and connect you with additional resources as needed. I will collaborate with David May MD to support you in following your treatment plan. The additional support I provide is no additional cost to you. My primary focus is to help you achieve specific goals and improve your health. We are committed to walk with you on this journey and look forward to working with you. Please call me to further discuss your healthcare needs. I am available by phone or for appointments at the office. You can reach me at [phone].     In good health,     Eli Britton RN

## 2020-02-26 ENCOUNTER — OFFICE VISIT (OUTPATIENT)
Dept: PULMONOLOGY | Age: 55
End: 2020-02-26
Payer: MEDICARE

## 2020-02-26 VITALS
HEART RATE: 65 BPM | RESPIRATION RATE: 16 BRPM | BODY MASS INDEX: 53.92 KG/M2 | OXYGEN SATURATION: 96 % | SYSTOLIC BLOOD PRESSURE: 118 MMHG | WEIGHT: 293 LBS | TEMPERATURE: 96.5 F | DIASTOLIC BLOOD PRESSURE: 66 MMHG | HEIGHT: 62 IN

## 2020-02-26 PROCEDURE — 99214 OFFICE O/P EST MOD 30 MIN: CPT | Performed by: INTERNAL MEDICINE

## 2020-02-26 PROCEDURE — G8427 DOCREV CUR MEDS BY ELIG CLIN: HCPCS | Performed by: INTERNAL MEDICINE

## 2020-02-26 PROCEDURE — G8482 FLU IMMUNIZE ORDER/ADMIN: HCPCS | Performed by: INTERNAL MEDICINE

## 2020-02-26 PROCEDURE — G8417 CALC BMI ABV UP PARAM F/U: HCPCS | Performed by: INTERNAL MEDICINE

## 2020-02-26 PROCEDURE — 3017F COLORECTAL CA SCREEN DOC REV: CPT | Performed by: INTERNAL MEDICINE

## 2020-02-26 PROCEDURE — 1036F TOBACCO NON-USER: CPT | Performed by: INTERNAL MEDICINE

## 2020-02-26 ASSESSMENT — ENCOUNTER SYMPTOMS
SINUS PRESSURE: 0
NAUSEA: 0
WHEEZING: 0
VOMITING: 0
DIARRHEA: 0
SORE THROAT: 0
ABDOMINAL PAIN: 0
SHORTNESS OF BREATH: 1
COUGH: 1
RHINORRHEA: 1
CHEST TIGHTNESS: 0

## 2020-02-26 NOTE — PROGRESS NOTES
Subjective:     Erick Osuna is a 53 y.o. female who complains today of:     Chief Complaint   Patient presents with    Follow-up     6 Month F/U for Asthma and Pneumonia       HPI  Patient is here for a follow-up visit regarding asthma. Since the last visit she had an episode of increased cough, congestion, for which she seen her family physician and had an x-ray done last month the report suggested bibasilar groundglass infiltrates suggestive of \"pneumonia\". Patient had 2 courses of antibiotic therapy for that and eventually her cough improved after about 3 weeks of treatment. She states that she was exposed to someone else who had a cold and after that she started with the symptoms. She is much better now but having some postnasal drip and sinus congestion which triggers some cough at times. Allergies:  Latex; Penicillins; Shellfish-derived products; Abilify [aripiprazole]; Adhesive tape; Statins; Topamax [topiramate]; Buspar [buspirone];  Other; and Sulfa antibiotics  Past Medical History:   Diagnosis Date    Anemia     Asthma     Benign essential HTN     meds > 5 yrs / denies TIA or stroke    Borderline personality disorder ([address]     2016 ?suggested by me-meets many criteria    Carpal tunnel syndrome of right wrist     Crohn's disease ([address]     Depression     Fibromyalgia 2/13/2018    Fibromyalgia     GERD (gastroesophageal reflux disease)     Gout     Headache     migraines    Irritable bowel syndrome     Mixed hyperlipidemia 5/10/2017    Neuropathy     Obesity (BMI 35.0-39.9 without comorbidity) 3/30/2017    PONV (postoperative nausea and vomiting)     nausea    PTSD (post-traumatic stress disorder)     Tremor of unknown origin     Type 2 diabetes mellitus ([address]     meds > 5yrs     Ulcerative colitis ([address] 11/2017    Vaginitis      Past Surgical History:   Procedure Laterality Date    BREAST CYST EXCISION Right 1994    exc mass benign    CHOLECYSTECTOMY  2009    COLONOSCOPY 2015    negative    ENDOSCOPY, COLON, DIAGNOSTIC      HERNIA REPAIR  2015    ventral / mesh    LEG TENDON SURGERY Right 2007    leg.  ankle and foot    OVARIAN CYST REMOVAL Left 1994    with mass and both fallopian tube    OVARY REMOVAL  12/2015    [facility] / mass left ovary & bilateral  tubes    PARTIAL HYSTERECTOMY      ND COLON CA SCRN NOT HI RSK IND N/A 11/7/2017    COLONOSCOPY performed by MANOJ Rodríguez on bx    ND COLONOSCOPY W/BIOPSY SINGLE/MULTIPLE N/A 3/15/2018    COLONOSCOPY performed by Fifi Feranndes MD at [address] [place] [address] Right 8/3/2017    RIGHT KNEE ARTHROSCOPIC PARTIAL MEDIAL MENISCECTOMY KNEE performed by Alisson Rowland MD at [facility] N/CARPAL TUNNEL SURG Right 11/30/2017    RIGHT WRIST  CARPAL TUNNEL RELEASE performed by Alisson Rowland MD at [address] [facility] N/CARPAL TUNNEL SURG Left 5/10/2018    LEFT WRIST CARPAL TUNNEL RELEASE performed by Alisson Rowland MD at [address] [facility]     Family History   Problem Relation Age of Onset    Emphysema Mother     Cancer Maternal Grandfather     Diabetes Paternal Grandmother     Emphysema Paternal Grandfather     Heart Disease Sister     Stroke Sister     Diabetes Sister      Social History     Socioeconomic History    Marital status: Single     Spouse name: Not on file    Number of children: Not on file    Years of education: Not on file    Highest education level: Not on file   Occupational History    Not on file   Social Needs    Financial resource strain: Not on file    Food insecurity:     Worry: Not on file     Inability: Not on file    Transportation needs:     Medical: Not on file     Non-medical: Not on file   Tobacco Use    Smoking status: Never Smoker    Smokeless tobacco: Never Used   Substance and Sexual Activity    Alcohol use: No     Alcohol/week: 0.0 standard drinks    Drug use: No    Sexual activity: Not Currently 5' 2\" (1.575 m)         Physical Exam  Constitutional:       Appearance: She is well-developed. HENT:      Head: Normocephalic and atraumatic. Eyes:      Pupils: Pupils are equal, round, and reactive to light. Neck:      Thyroid: No thyromegaly. Vascular: No JVD. Trachea: No tracheal deviation. Cardiovascular:      Rate and Rhythm: Normal rate and regular rhythm. Heart sounds: No murmur. No gallop. Pulmonary:      Breath sounds: No wheezing or rales. Comments: Diminished breath sounds otherwise clear to auscultation bilaterally  Chest:      Chest wall: No tenderness. Abdominal:      General: There is no distension. Musculoskeletal: Normal range of motion. General: Swelling present. Skin:     General: Skin is warm and dry. Findings: No rash. Neurological:      Mental Status: She is alert and oriented to person, place, and time. Coordination: Coordination normal.             Assessment:      Diagnosis Orders   1. Mild intermittent asthma without complication     2. Gastroesophageal reflux disease, esophagitis presence not specified     3. Other iron deficiency anemias     4. Obesity (BMI 35.0-39.9 without comorbidity)       Patient has evidence of mild intermittent asthma her previous PFTs showed no significant obstructive impairment. She had a negative sleep study in the past.  She has chronic anemia as well. The importance of exercise, and weight loss was discussed today at length. I reviewed her x-ray myself and to my review I am not sure she did have any pulmonary infiltrates at all but clearly from the symptoms she did have lower respiratory tract infection and she finally improved      Plan:     No orders of the defined types were placed in this encounter. No orders of the defined types were placed in this encounter. Return in about 6 months (around 8/26/2020) for re-evaluation.        Nannette Weldon MD

## 2020-02-29 NOTE — TELEPHONE ENCOUNTER
Rx requested:  Requested Prescriptions     Pending Prescriptions Disp Refills    metFORMIN (GLUCOPHAGE) 500 MG tablet [Pharmacy Med Name: metFORMIN HCl Oral Tablet 500 MG] 60 tablet 5     Sig: TAKE ONE TABLET BY MOUTH TWICE A DAY WITH MEALS    allopurinol (ZYLOPRIM) 100 MG tablet [Pharmacy Med Name: Allopurinol Oral Tablet 100 MG] 30 tablet 5     Sig: TAKE ONE TABLET BY MOUTH EVERY DAY    montelukast (SINGULAIR) 10 MG tablet [Pharmacy Med Name: Montelukast Sodium Oral Tablet 10 MG] 30 tablet 5     Sig: TAKE ONE TABLET BY MOUTH NIGHTLY       Last Office Visit:   11/13/2019      Next Visit Date:  Future Appointments   Date Time Provider Abe Nissa   5/13/2020 11:00 AM Carolina Titus  [place] [address]   8/24/2020  1:00 PM Shante Mosley MD [address]   9/17/2020 11:15 AM Jai Rudolph Ra, DO [address]   12/28/2020  1:00 PM Tay Zhu MD [facility]

## 2020-03-02 RX ORDER — ALLOPURINOL 100 MG/1
TABLET ORAL
Qty: 30 TABLET | Refills: 5 | Status: SHIPPED | OUTPATIENT
Start: 2020-03-02 | End: 2020-10-14

## 2020-03-02 RX ORDER — MONTELUKAST SODIUM 10 MG/1
TABLET ORAL
Qty: 30 TABLET | Refills: 5 | Status: SHIPPED | OUTPATIENT
Start: 2020-03-02 | End: 2020-09-21

## 2020-03-23 RX ORDER — MOMETASONE FUROATE 1 MG/G
CREAM TOPICAL
Qty: 45 G | Refills: 1 | Status: SHIPPED | OUTPATIENT
Start: 2020-03-23 | End: 2020-07-20

## 2020-03-23 NOTE — TELEPHONE ENCOUNTER
Rx requested:  Requested Prescriptions     Pending Prescriptions Disp Refills    mometasone (ELOCON) 0.1 % cream [Pharmacy Med Name: Mometasone Furoate External Cream 0.1 %] 45 g 1     Sig: APPLY TOPICALLY DAILY       Last Office Visit:   11/13/2019      Next Visit Date:  Future Appointments   Date Time Provider Abe Nissa   5/13/2020 11:00 AM Claudean Hermanns, MD [address] [place] [address]   8/24/2020  1:00 PM Melissa Cowan MD [facility]   9/17/2020 11:15 AM DO NGUYEN Britton CARDIO [facility] EMERGENCY [facility] [place]   12/28/2020  1:00 PM Santa Nugent MD [facility]

## 2020-04-06 RX ORDER — RIZATRIPTAN BENZOATE 10 MG/1
TABLET ORAL
Qty: 30 TABLET | Refills: 0 | Status: SHIPPED | OUTPATIENT
Start: 2020-04-06 | End: 2021-06-30

## 2020-04-23 RX ORDER — PIOGLITAZONEHYDROCHLORIDE 15 MG/1
TABLET ORAL
Qty: 90 TABLET | Refills: 3 | Status: SHIPPED | OUTPATIENT
Start: 2020-04-23 | End: 2021-03-15 | Stop reason: SDUPTHER

## 2020-05-05 ENCOUNTER — HOSPITAL ENCOUNTER (OUTPATIENT)
Dept: GENERAL RADIOLOGY | Age: 55
Discharge: HOME | End: 2020-05-07
Payer: MEDICARE

## 2020-05-05 ENCOUNTER — OFFICE VISIT (OUTPATIENT)
Dept: FAMILY MEDICINE CLINIC | Age: 55
End: 2020-05-05
Payer: MEDICARE

## 2020-05-05 VITALS
SYSTOLIC BLOOD PRESSURE: 108 MMHG | HEIGHT: 62 IN | BODY MASS INDEX: 53.92 KG/M2 | HEART RATE: 79 BPM | DIASTOLIC BLOOD PRESSURE: 60 MMHG | WEIGHT: 293 LBS | TEMPERATURE: 97.9 F | RESPIRATION RATE: 16 BRPM | OXYGEN SATURATION: 96 %

## 2020-05-05 PROCEDURE — 99214 OFFICE O/P EST MOD 30 MIN: CPT | Performed by: NURSE PRACTITIONER

## 2020-05-05 PROCEDURE — 1036F TOBACCO NON-USER: CPT | Performed by: NURSE PRACTITIONER

## 2020-05-05 PROCEDURE — 73630 X-RAY EXAM OF FOOT: CPT

## 2020-05-05 PROCEDURE — G8427 DOCREV CUR MEDS BY ELIG CLIN: HCPCS | Performed by: NURSE PRACTITIONER

## 2020-05-05 PROCEDURE — 3017F COLORECTAL CA SCREEN DOC REV: CPT | Performed by: NURSE PRACTITIONER

## 2020-05-05 PROCEDURE — G8417 CALC BMI ABV UP PARAM F/U: HCPCS | Performed by: NURSE PRACTITIONER

## 2020-05-05 RX ORDER — DOXYCYCLINE HYCLATE 100 MG
100 TABLET ORAL 2 TIMES DAILY
Qty: 14 TABLET | Refills: 0 | Status: SHIPPED | OUTPATIENT
Start: 2020-05-05 | End: 2021-04-02 | Stop reason: SDUPTHER

## 2020-05-05 ASSESSMENT — ENCOUNTER SYMPTOMS
VOMITING: 0
NAUSEA: 0
COLOR CHANGE: 1

## 2020-05-05 NOTE — PROGRESS NOTES
Subjective:     Patient ID: Elder Waggoner is a 63 y.o. female who presentstoday for:  Chief Complaint   Patient presents with    Foot Pain     Pt. is here with c/o left foot pain X Friday. Pt. states she dropped a 2X4 on it that was pressurized. Pt. c/o swelling and bruising. Pt. has been taking Tylenol with no relief. Foot Pain    The pain is present in the left foot. This is a new problem. The current episode started in the past 7 days. There has been a history of trauma. The problem occurs daily. The problem has been unchanged. The quality of the pain is described as aching. The pain is moderate. Associated symptoms include joint swelling and a limited range of motion. Pertinent negatives include no fever or inability to bear weight. The symptoms are aggravated by contact and standing. She has tried acetaminophen and rest (elevation) for the symptoms. The treatment provided mild relief.        Past Medical History:   Diagnosis Date    Anemia     Asthma     Benign essential HTN     meds > 5 yrs / denies TIA or stroke    Borderline personality disorder ([address]     2016 ?suggested by me-meets many criteria    Carpal tunnel syndrome of right wrist     Crohn's disease ([address]     Depression     Fibromyalgia 2/13/2018    Fibromyalgia     GERD (gastroesophageal reflux disease)     Gout     Headache     migraines    Irritable bowel syndrome     Mixed hyperlipidemia 5/10/2017    Neuropathy     Obesity (BMI 35.0-39.9 without comorbidity) 3/30/2017    PONV (postoperative nausea and vomiting)     nausea    PTSD (post-traumatic stress disorder)     Tremor of unknown origin     Type 2 diabetes mellitus ([address]     meds > 5yrs     Ulcerative colitis ([address] 11/2017    Vaginitis      Current Outpatient Medications on File Prior to Visit   Medication Sig Dispense Refill    pioglitazone (ACTOS) 15 MG tablet TAKE ONE TABLET BY MOUTH EVERY DAY 90 tablet 3    rizatriptan (MAXALT) 10 MG tablet TAKE 1 TABLET BY connections     Talks on phone: Not on file     Gets together: Not on file     Attends Latter day service: Not on file     Active member of club or organization: Not on file     Attends meetings of clubs or organizations: Not on file     Relationship status: Not on file    Intimate partner violence     Fear of current or ex partner: Not on file     Emotionally abused: Not on file     Physically abused: Not on file     Forced sexual activity: Not on file   Other Topics Concern    Not on file   Social History Narrative    Not on file     Allergies:  Latex; Penicillins; Shellfish-derived products; Abilify [aripiprazole]; Adhesive tape; Statins; Topamax [topiramate]; Buspar [buspirone]; Other; and Sulfa antibiotics    Review of Systems   Constitutional: Negative for chills, diaphoresis and fever. Gastrointestinal: Negative for nausea and vomiting. Musculoskeletal: Positive for arthralgias and joint swelling. Skin: Positive for color change and wound. Neurological: Negative for dizziness and light-headedness. Objective:    /60 (Site: Right Upper Arm, Position: Sitting, Cuff Size: Large Adult)   Pulse 79   Temp 97.9 °F (36.6 °C) (Oral)   Resp 16   Ht 5' 2\" (1.575 m)   Wt 300 lb (136.1 kg)   LMP 07/27/1993 (Approximate)   SpO2 96%   Breastfeeding No   BMI 54.87 kg/m²     Physical Exam  Constitutional:       Appearance: She is not toxic-appearing. HENT:      Head: Normocephalic and atraumatic. Right Ear: External ear normal.      Left Ear: External ear normal.      Mouth/Throat:      Pharynx: Oropharynx is clear. Eyes:      Conjunctiva/sclera: Conjunctivae normal.   Neck:      Musculoskeletal: Neck supple. Cardiovascular:      Rate and Rhythm: Normal rate. Pulmonary:      Effort: Pulmonary effort is normal. No respiratory distress. Musculoskeletal:      Left foot: Decreased range of motion. Tenderness, bony tenderness and swelling present.         Feet:    Feet:      Left foot:

## 2020-05-19 RX ORDER — DICYCLOMINE HYDROCHLORIDE 10 MG/1
CAPSULE ORAL
Qty: 120 CAPSULE | Refills: 5 | Status: SHIPPED | OUTPATIENT
Start: 2020-05-19 | End: 2021-05-13

## 2020-05-26 ENCOUNTER — VIRTUAL VISIT (OUTPATIENT)
Dept: FAMILY MEDICINE CLINIC | Age: 55
End: 2020-05-26
Payer: MEDICARE

## 2020-05-26 PROCEDURE — 99442 PR PHYS/QHP TELEPHONE EVALUATION 11-20 MIN: CPT | Performed by: NURSE PRACTITIONER

## 2020-05-26 NOTE — PROGRESS NOTES
MOUTH TWO TIMES A  capsule 3    hyoscyamine (ANASPAZ;LEVSIN) 125 MCG tablet TAKE ONE TABLET BY MOUTH EVERY 4 HOURS AS NEEDED for cramping 164 tablet 0    ipratropium-albuterol (DUONEB) 0.5-2.5 (3) MG/3ML SOLN nebulizer solution INHALE 1 VIAL VIA NEBULIZER EVERY 4 HOURS 360 mL 5    mometasone-formoterol (DULERA) 100-5 MCG/ACT inhaler Inhale 2 puffs into the lungs 2 times daily 1 Inhaler 5    QUEtiapine (SEROQUEL) 25 MG tablet TAKE 1 BY MOUTH DAILY AS NEEDED FOR ANXIETY  2    nitroGLYCERIN (NITROSTAT) 0.4 MG SL tablet up to max of 3 total doses. If no relief after 1 dose, call 911. 25 tablet 3    magnesium oxide (MAG-OX) 400 (241.3 Mg) MG TABS tablet Take 1 tablet by mouth 2 times daily 60 tablet 2    pentazocine-naloxone (TALWIN NX) 50-0.5 MG per tablet take 1 tablet by mouth twice a day as needed for pain  0    potassium chloride (KLOR-CON M) 20 MEQ extended release tablet Take 1 tablet by mouth 2 times daily 180 tablet 3    colchicine (COLCRYS) 0.6 MG tablet TAKE ONE TABLET BY MOUTH THREE TIMES A DAY AS NEEDED FOR PAIN 30 tablet 2    Lancets MISC Test bid 100 each 3    Erenumab-aooe 140 MG/ML SOAJ Inject into the skin every 30 days Indications: on the 15th of every month       Blood Glucose Monitoring Suppl (ONE TOUCH ULTRA 2) w/Device KIT TEST TWICE DAILY  0    prazosin (MINIPRESS) 2 MG capsule TAKE 1 CAPSULE BY MOUTH EVERY NIGHT FOR NIGHTMARES  3    blood glucose monitor strips Test 2 times a day & as needed for symptoms of irregular blood glucose. 100 strip 3    ondansetron (ZOFRAN) 8 MG tablet TAKE ONE TABLET BY MOUTH EVERY 8 HOURS AS NEEDED FOR NAUSEA AND VOMITING.  40 tablet 3    nystatin-triamcinolone (MYCOLOG II) 896919-3.1 UNIT/GM-% cream APPLY TOPICALLY TWICE DAILY 45 g 2    Melatonin 5 MG CAPS Take 10 mg by mouth daily   2    FREESTYLE LITE strip use three times daily  50 each 5    VENTOLIN  (90 Base) MCG/ACT inhaler INHALE 2 PUFFS INTO THE LUNGS EVERY 6 HOURS AS NEEDED Socioeconomic History    Marital status: Single     Spouse name: Not on file    Number of children: Not on file    Years of education: Not on file    Highest education level: Not on file   Occupational History    Not on file   Social Needs    Financial resource strain: Not on file    Food insecurity     Worry: Not on file     Inability: Not on file    Transportation needs     Medical: Not on file     Non-medical: Not on file   Tobacco Use    Smoking status: Never Smoker    Smokeless tobacco: Never Used   Substance and Sexual Activity    Alcohol use: No     Alcohol/week: 0.0 standard drinks    Drug use: No    Sexual activity: Not Currently   Lifestyle    Physical activity     Days per week: Not on file     Minutes per session: Not on file    Stress: Not on file   Relationships    Social connections     Talks on phone: Not on file     Gets together: Not on file     Attends Christianity service: Not on file     Active member of club or organization: Not on file     Attends meetings of clubs or organizations: Not on file     Relationship status: Not on file    Intimate partner violence     Fear of current or ex partner: Not on file     Emotionally abused: Not on file     Physically abused: Not on file     Forced sexual activity: Not on file   Other Topics Concern    Not on file   Social History Narrative    Not on file     Allergies:  Latex; Penicillins; Shellfish-derived products; Abilify [aripiprazole]; Adhesive tape; Statins; Topamax [topiramate]; Buspar [buspirone]; Other; and Sulfa antibiotics    Review of Systems   Constitutional: Negative for fever. Musculoskeletal: Negative for stiffness. Neurological: Negative for tingling and numbness. Objective:    LMP 07/27/1993 (Approximate)     Physical Exam    Assessment & Plan:       Diagnosis Orders   1.  Injury of left foot, subsequent encounter  MRI FOOT LEFT WO CONTRAST     Orders Placed This Encounter   Procedures    MRI FOOT LEFT WO

## 2020-07-20 RX ORDER — MOMETASONE FUROATE 1 MG/G
CREAM TOPICAL
Qty: 45 G | Refills: 0 | Status: SHIPPED | OUTPATIENT
Start: 2020-07-20 | End: 2020-08-27

## 2020-07-20 NOTE — TELEPHONE ENCOUNTER
Pharmacy is requesting medication refill.  Please approve or deny this request.    Rx requested:  Requested Prescriptions     Pending Prescriptions Disp Refills    mometasone (ELOCON) 0.1 % cream [Pharmacy Med Name: Mometasone Furoate External Cream 0.1 %] 45 g 0     Sig: APPLY TOPICALLY DAILY         Last Office Visit:   11/13/2019      Next Visit Date:  Future Appointments   Date Time Provider [facility] [place]   8/24/2020  1:00 PM Faiza Galloway MD [address]   9/17/2020 11:15 AM DO Davey García [place]   12/28/2020  1:00 PM Sammi Lafleur MD [facility]

## 2020-08-27 RX ORDER — MOMETASONE FUROATE 1 MG/G
CREAM TOPICAL
Qty: 45 G | Refills: 0 | Status: SHIPPED | OUTPATIENT
Start: 2020-08-27 | End: 2020-09-21

## 2020-08-27 RX ORDER — LANCETS 30 GAUGE
EACH MISCELLANEOUS
Qty: 100 EACH | Refills: 3 | Status: SHIPPED | OUTPATIENT
Start: 2020-08-27 | End: 2020-09-08 | Stop reason: SDUPTHER

## 2020-09-08 RX ORDER — LANCETS 30 GAUGE
EACH MISCELLANEOUS
Qty: 100 EACH | Refills: 3 | Status: SHIPPED | OUTPATIENT
Start: 2020-09-08

## 2020-09-08 RX ORDER — POTASSIUM CHLORIDE 20 MEQ/1
TABLET, EXTENDED RELEASE ORAL
Qty: 180 TABLET | Refills: 0 | Status: SHIPPED | OUTPATIENT
Start: 2020-09-08 | End: 2020-12-10

## 2020-09-11 ENCOUNTER — TELEPHONE (OUTPATIENT)
Dept: FAMILY MEDICINE CLINIC | Age: 55
End: 2020-09-11

## 2020-09-11 ENCOUNTER — CARE COORDINATION (OUTPATIENT)
Dept: CARE COORDINATION | Age: 55
End: 2020-09-11

## 2020-09-11 NOTE — CARE COORDINATION
Called patient to discuss enrollment in [address] [place] [address]. I left a voice mail message introducing myself and a brief description of the Care Coordination Program.   Requested a call back to discuss the program.  Call back number provided. CC Introduction letter printed and mailed to home address.

## 2020-09-11 NOTE — TELEPHONE ENCOUNTER
Called patient lvm to let her know that she needs to get a new mammogram referral when she sees Dr Angelita Webb on 9/29/2020. cp

## 2020-09-11 NOTE — LETTER
9/11/2020    [address] [facility] [address]      Dear Dagoberto Gould,    My name is Linda Cotto and I am a registered nurse who partners with Lev Edmonds MD to improve patients' health. Lev Edmonds MD believes you would benefit from working with me. As a member of your health care team, I would work with other providers involved in your care, offer education for your specific health conditions, and connect you with additional resources as needed. I will collaborate with Lev Edmonds MD to support you in following your treatment plan. The additional support I provide is no additional cost to you. My primary focus is to help you achieve specific goals and improve your health. We are committed to walk with you on this journey and look forward to working with you. Please call me to further discuss your healthcare needs. I am available by phone or for appointments at the office. You can reach me at [phone].     In good health,     Linda Cotto RN

## 2020-09-18 ENCOUNTER — CARE COORDINATION (OUTPATIENT)
Dept: CARE COORDINATION | Age: 55
End: 2020-09-18

## 2020-09-18 NOTE — CARE COORDINATION
Called patient to discuss enrollment in [address] [place] [address]. I left a voice mail message introducing myself and a brief description of the Care Coordination Program.   Requested a call back to discuss the program.  Call back number provided.

## 2020-09-21 RX ORDER — MOMETASONE FUROATE 1 MG/G
CREAM TOPICAL
Qty: 45 G | Refills: 0 | Status: SHIPPED | OUTPATIENT
Start: 2020-09-21 | End: 2020-12-01

## 2020-09-21 RX ORDER — MONTELUKAST SODIUM 10 MG/1
TABLET ORAL
Qty: 30 TABLET | Refills: 0 | Status: SHIPPED | OUTPATIENT
Start: 2020-09-21 | End: 2020-11-04

## 2020-09-28 ENCOUNTER — TELEPHONE (OUTPATIENT)
Dept: FAMILY MEDICINE CLINIC | Age: 55
End: 2020-09-28

## 2020-09-29 ENCOUNTER — CARE COORDINATION (OUTPATIENT)
Dept: CARE COORDINATION | Age: 55
End: 2020-09-29

## 2020-09-29 ENCOUNTER — VIRTUAL VISIT (OUTPATIENT)
Dept: FAMILY MEDICINE CLINIC | Age: 55
End: 2020-09-29
Payer: MEDICARE

## 2020-09-29 PROBLEM — E66.01 MORBID OBESITY (HCC): Status: ACTIVE | Noted: 2020-09-29

## 2020-09-29 PROCEDURE — 99442 PR PHYS/QHP TELEPHONE EVALUATION 11-20 MIN: CPT | Performed by: FAMILY MEDICINE

## 2020-09-29 RX ORDER — FLUCONAZOLE 100 MG/1
100 TABLET ORAL DAILY
Qty: 7 TABLET | Refills: 1 | Status: SHIPPED | OUTPATIENT
Start: 2020-09-29 | End: 2020-10-06

## 2020-09-29 RX ORDER — PIOGLITAZONEHYDROCHLORIDE 15 MG/1
TABLET ORAL
Qty: 30 TABLET | Refills: 5 | Status: SHIPPED | OUTPATIENT
Start: 2020-09-29 | End: 2020-12-28

## 2020-09-29 NOTE — LETTER
9/29/2020     [address] [address]    Dear Penny Eubanks recently attempted to contact you to discuss your healthcare needs. My name is Dylon Perez and I am a registered nurse who partners with Samir Cool MD to improve patients health. As a member of your health care team, I would work with other providers involved in your care, offer education for your specific health conditions, and connect you with additional resources as needed. I will collaborate with Khadar Dillard MD to support you in following your treatment plan. The additional support I provide is no additional cost to you. My primary focus is to help you achieve specific goals and improve your health. I look forward to working with you. Please contact me at your earliest convenience at [phone].     In good health,    Dylon Perez RN

## 2020-09-29 NOTE — PROGRESS NOTES
 Tremors of nervous system 12/30/2019    Meralgia paraesthetica, left 12/30/2019    Intractable migraine without aura and without status migrainosus 12/30/2019    Chest pain 09/03/2019    Other iron deficiency anemias 11/13/2018    Absolute anemia 06/06/2018    Carpal tunnel syndrome, left 05/03/2018    Occasional tremors 05/03/2018    PTSD (post-traumatic stress disorder) 02/13/2018    Fibromyalgia 02/13/2018    Carpal tunnel syndrome of right wrist 11/27/2017    History of Crohn's disease     Bilateral edema of lower extremity 07/31/2017    Varicose vein of leg 07/31/2017    Tear of meniscus of knee 07/27/2017    Mixed hyperlipidemia 05/10/2017    Pain in right knee 03/30/2017    Hip pain, right 03/30/2017    Obesity (BMI 35.0-39.9 without comorbidity) 03/30/2017    Muscular pain 03/30/2017    Severe episode of recurrent major depressive disorder, without psychotic features ([address] 10/25/2016    Borderline personality disorder ([address] 10/25/2016    Type 2 diabetes mellitus with diabetic polyneuropathy ([address] 02/05/2016    Benign essential HTN     Asthma     Crohn's disease ([address]     GERD (gastroesophageal reflux disease)     Headache      Past Surgical History:   Procedure Laterality Date    BREAST CYST EXCISION Right 1994    exc mass benign    CHOLECYSTECTOMY  2009    COLONOSCOPY  2015    negative    ENDOSCOPY, COLON, DIAGNOSTIC      HERNIA REPAIR  2015    ventral / mesh    LEG TENDON SURGERY Right 2007    leg.  ankle and foot    OVARIAN CYST REMOVAL Left 1994    with mass and both fallopian tube    OVARY REMOVAL  12/2015    [facility] / mass left ovary & bilateral  tubes    PARTIAL HYSTERECTOMY      [place] COLON CA SCRN NOT HI RSK IND N/A 11/7/2017    COLONOSCOPY performed by MANOJ Magana on bx    [place] COLONOSCOPY W/BIOPSY SINGLE/MULTIPLE N/A 3/15/2018    COLONOSCOPY performed by Sadia Doherty MD at [facility] Right 8/3/2017    RIGHT KNEE ARTHROSCOPIC PARTIAL MEDIAL MENISCECTOMY KNEE performed by Norm Sicard, MD at [facility] N/CARPAL TUNNEL SURG Right 11/30/2017    RIGHT WRIST  CARPAL TUNNEL RELEASE performed by Norm Sicard, MD at [facility] N/CARPAL TUNNEL SURG Left 5/10/2018    LEFT WRIST CARPAL TUNNEL RELEASE performed by Norm Sicard, MD at [address]     Family History   Problem Relation Age of Onset    Emphysema Mother     Cancer Maternal Grandfather     Diabetes Paternal Grandmother     Emphysema Paternal Grandfather     Heart Disease Sister     Stroke Sister     Diabetes Sister      Social History     Socioeconomic History    Marital status: Single     Spouse name: Not on file    Number of children: Not on file    Years of education: Not on file    Highest education level: Not on file   Occupational History    Not on file   Social Needs    Financial resource strain: Not on file    Food insecurity     Worry: Not on file     Inability: Not on file    Transportation needs     Medical: Not on file     Non-medical: Not on file   Tobacco Use    Smoking status: Never Smoker    Smokeless tobacco: Never Used   Substance and Sexual Activity    Alcohol use: No     Alcohol/week: 0.0 standard drinks    Drug use: No    Sexual activity: Not Currently   Lifestyle    Physical activity     Days per week: Not on file     Minutes per session: Not on file    Stress: Not on file   Relationships    Social connections     Talks on phone: Not on file     Gets together: Not on file     Attends Zoroastrian service: Not on file     Active member of club or organization: Not on file     Attends meetings of clubs or organizations: Not on file     Relationship status: Not on file    Intimate partner violence     Fear of current or ex partner: Not on file     Emotionally abused: Not on file     Physically abused: Not on file     Forced sexual activity: Not on file Other Topics Concern    Not on file   Social History Narrative    Not on file     Current Outpatient Medications   Medication Sig Dispense Refill    fluconazole (DIFLUCAN) 100 MG tablet Take 1 tablet by mouth daily for 7 days 7 tablet 1    pioglitazone (ACTOS) 15 MG tablet TAKE ONE TABLET BY MOUTH DAILY 30 tablet 5    montelukast (SINGULAIR) 10 MG tablet TAKE 1 TABLET BY MOUTH NIGHTLY 30 tablet 0    mometasone (ELOCON) 0.1 % cream apply topically daily 45 g 0    metFORMIN (GLUCOPHAGE) 500 MG tablet TAKE ONE TABLET BY MOUTH TWICE A DAY WITH MEALS 180 tablet 1    potassium chloride (KLOR-CON M) 20 MEQ extended release tablet TAKE ONE TABLET BY MOUTH TWO TIMES A  tablet 0    Lancets MISC Test bid 100 each 3    oxyCODONE-acetaminophen (PERCOCET) 5-325 MG per tablet TAKE ONE TABLET BY MOUTH TWO TIMES A DAY      dicyclomine (BENTYL) 10 MG capsule TAKE ONE CAPSULE BY MOUTH FOUR TIMES A DAY BEFORE MEALS AND NIGHTLY 120 capsule 5    pioglitazone (ACTOS) 15 MG tablet TAKE ONE TABLET BY MOUTH EVERY DAY 90 tablet 3    rizatriptan (MAXALT) 10 MG tablet TAKE 1 TABLET BY MOUTH ONCE AS NEEDED FOR MIGRAINES.  MAY REPEAT IN 2 HOURS IF NEEDED 30 tablet 0    furosemide (LASIX) 40 MG tablet TAKE ONE TABLET BY MOUTH TWO TIMES A DAY 60 tablet 6    allopurinol (ZYLOPRIM) 100 MG tablet TAKE ONE TABLET BY MOUTH EVERY DAY 30 tablet 5    traZODone (DESYREL) 100 MG tablet TAKE ONE TABLET BY MOUTH EVERY NIGHT 90 tablet 1    metoprolol succinate (TOPROL XL) 50 MG extended release tablet Take 1 tablet by mouth 2 times daily HOLD for SBP<100 or HR<60 180 tablet 2    fluticasone (FLONASE) 50 MCG/ACT nasal spray 1 spray by Nasal route daily 1 Bottle 0    sodium chloride (ALTAMIST SPRAY) 0.65 % nasal spray 1 spray by Nasal route as needed for Congestion 1 Bottle 0    omeprazole (PRILOSEC) 40 MG delayed release capsule TAKE ONE CAPSULE BY MOUTH TWO TIMES A  capsule 3    hyoscyamine (ANASPAZ;LEVSIN) 125 MCG tablet TAKE ONE TABLET BY MOUTH EVERY 4 HOURS AS NEEDED for cramping 164 tablet 0    ipratropium-albuterol (DUONEB) 0.5-2.5 (3) MG/3ML SOLN nebulizer solution INHALE 1 VIAL VIA NEBULIZER EVERY 4 HOURS 360 mL 5    mometasone-formoterol (DULERA) 100-5 MCG/ACT inhaler Inhale 2 puffs into the lungs 2 times daily 1 Inhaler 5    QUEtiapine (SEROQUEL) 25 MG tablet TAKE 1 BY MOUTH DAILY AS NEEDED FOR ANXIETY  2    nitroGLYCERIN (NITROSTAT) 0.4 MG SL tablet up to max of 3 total doses. If no relief after 1 dose, call 911. 25 tablet 3    magnesium oxide (MAG-OX) 400 (241.3 Mg) MG TABS tablet Take 1 tablet by mouth 2 times daily 60 tablet 2    pentazocine-naloxone (TALWIN NX) 50-0.5 MG per tablet take 1 tablet by mouth twice a day as needed for pain  0    colchicine (COLCRYS) 0.6 MG tablet TAKE ONE TABLET BY MOUTH THREE TIMES A DAY AS NEEDED FOR PAIN 30 tablet 2    Erenumab-aooe 140 MG/ML SOAJ Inject into the skin every 30 days Indications: on the 15th of every month       Blood Glucose Monitoring Suppl (ONE TOUCH ULTRA 2) w/Device KIT TEST TWICE DAILY  0    prazosin (MINIPRESS) 2 MG capsule TAKE 1 CAPSULE BY MOUTH EVERY NIGHT FOR NIGHTMARES  3    blood glucose monitor strips Test 2 times a day & as needed for symptoms of irregular blood glucose. 100 strip 3    ondansetron (ZOFRAN) 8 MG tablet TAKE ONE TABLET BY MOUTH EVERY 8 HOURS AS NEEDED FOR NAUSEA AND VOMITING.  40 tablet 3    nystatin-triamcinolone (MYCOLOG II) 130260-4.1 UNIT/GM-% cream APPLY TOPICALLY TWICE DAILY 45 g 2    Melatonin 5 MG CAPS Take 10 mg by mouth daily   2    FREESTYLE LITE strip use three times daily  50 each 5    VENTOLIN  (90 Base) MCG/ACT inhaler INHALE 2 PUFFS INTO THE LUNGS EVERY 6 HOURS AS NEEDED FOR WHEEZING  3    DULoxetine (CYMBALTA) 60 MG extended release capsule TAKE ONE CAPSULE BY MOUTH TWICE DAILY AS DIRECTED IN THE MORNING AND AFTERNOON  1    meloxicam (MOBIC) 7.5 MG tablet TAKE ONE TABLET BY MOUTH TWO TIMES A DAY WITH MEALS TAKE ONLY WITH FOOD AS NEEDED  3    baclofen (LIORESAL) 20 MG tablet 20 mg 4 times daily       LYRICA 150 MG capsule 150 mg 3 times daily Indications: last filled 3/5/17 no refills        No current facility-administered medications for this visit. Current Outpatient Medications on File Prior to Visit   Medication Sig Dispense Refill    montelukast (SINGULAIR) 10 MG tablet TAKE 1 TABLET BY MOUTH NIGHTLY 30 tablet 0    mometasone (ELOCON) 0.1 % cream apply topically daily 45 g 0    metFORMIN (GLUCOPHAGE) 500 MG tablet TAKE ONE TABLET BY MOUTH TWICE A DAY WITH MEALS 180 tablet 1    potassium chloride (KLOR-CON M) 20 MEQ extended release tablet TAKE ONE TABLET BY MOUTH TWO TIMES A  tablet 0    Lancets MISC Test bid 100 each 3    oxyCODONE-acetaminophen (PERCOCET) 5-325 MG per tablet TAKE ONE TABLET BY MOUTH TWO TIMES A DAY      dicyclomine (BENTYL) 10 MG capsule TAKE ONE CAPSULE BY MOUTH FOUR TIMES A DAY BEFORE MEALS AND NIGHTLY 120 capsule 5    pioglitazone (ACTOS) 15 MG tablet TAKE ONE TABLET BY MOUTH EVERY DAY 90 tablet 3    rizatriptan (MAXALT) 10 MG tablet TAKE 1 TABLET BY MOUTH ONCE AS NEEDED FOR MIGRAINES.  MAY REPEAT IN 2 HOURS IF NEEDED 30 tablet 0    furosemide (LASIX) 40 MG tablet TAKE ONE TABLET BY MOUTH TWO TIMES A DAY 60 tablet 6    allopurinol (ZYLOPRIM) 100 MG tablet TAKE ONE TABLET BY MOUTH EVERY DAY 30 tablet 5    traZODone (DESYREL) 100 MG tablet TAKE ONE TABLET BY MOUTH EVERY NIGHT 90 tablet 1    metoprolol succinate (TOPROL XL) 50 MG extended release tablet Take 1 tablet by mouth 2 times daily HOLD for SBP<100 or HR<60 180 tablet 2    fluticasone (FLONASE) 50 MCG/ACT nasal spray 1 spray by Nasal route daily 1 Bottle 0    sodium chloride (ALTAMIST SPRAY) 0.65 % nasal spray 1 spray by Nasal route as needed for Congestion 1 Bottle 0    omeprazole (PRILOSEC) 40 MG delayed release capsule TAKE ONE CAPSULE BY MOUTH TWO TIMES A  capsule 3    hyoscyamine (ANASPAZ;LEVSIN) 125 MCG tablet TAKE ONE TABLET BY MOUTH EVERY 4 HOURS AS NEEDED for cramping 164 tablet 0    ipratropium-albuterol (DUONEB) 0.5-2.5 (3) MG/3ML SOLN nebulizer solution INHALE 1 VIAL VIA NEBULIZER EVERY 4 HOURS 360 mL 5    mometasone-formoterol (DULERA) 100-5 MCG/ACT inhaler Inhale 2 puffs into the lungs 2 times daily 1 Inhaler 5    QUEtiapine (SEROQUEL) 25 MG tablet TAKE 1 BY MOUTH DAILY AS NEEDED FOR ANXIETY  2    nitroGLYCERIN (NITROSTAT) 0.4 MG SL tablet up to max of 3 total doses. If no relief after 1 dose, call 911. 25 tablet 3    magnesium oxide (MAG-OX) 400 (241.3 Mg) MG TABS tablet Take 1 tablet by mouth 2 times daily 60 tablet 2    pentazocine-naloxone (TALWIN NX) 50-0.5 MG per tablet take 1 tablet by mouth twice a day as needed for pain  0    colchicine (COLCRYS) 0.6 MG tablet TAKE ONE TABLET BY MOUTH THREE TIMES A DAY AS NEEDED FOR PAIN 30 tablet 2    Erenumab-aooe 140 MG/ML SOAJ Inject into the skin every 30 days Indications: on the 15th of every month       Blood Glucose Monitoring Suppl (ONE TOUCH ULTRA 2) w/Device KIT TEST TWICE DAILY  0    prazosin (MINIPRESS) 2 MG capsule TAKE 1 CAPSULE BY MOUTH EVERY NIGHT FOR NIGHTMARES  3    blood glucose monitor strips Test 2 times a day & as needed for symptoms of irregular blood glucose. 100 strip 3    ondansetron (ZOFRAN) 8 MG tablet TAKE ONE TABLET BY MOUTH EVERY 8 HOURS AS NEEDED FOR NAUSEA AND VOMITING.  40 tablet 3    nystatin-triamcinolone (MYCOLOG II) 195838-8.1 UNIT/GM-% cream APPLY TOPICALLY TWICE DAILY 45 g 2    Melatonin 5 MG CAPS Take 10 mg by mouth daily   2    FREESTYLE LITE strip use three times daily  50 each 5    VENTOLIN  (90 Base) MCG/ACT inhaler INHALE 2 PUFFS INTO THE LUNGS EVERY 6 HOURS AS NEEDED FOR WHEEZING  3    DULoxetine (CYMBALTA) 60 MG extended release capsule TAKE ONE CAPSULE BY MOUTH TWICE DAILY AS DIRECTED IN THE MORNING AND AFTERNOON  1    meloxicam (MOBIC) 7.5 MG tablet TAKE ONE TABLET BY MOUTH TWO TIMES A DAY WITH MEALS TAKE ONLY WITH FOOD AS NEEDED  3    baclofen (LIORESAL) 20 MG tablet 20 mg 4 times daily       LYRICA 150 MG capsule 150 mg 3 times daily Indications: last filled 3/5/17 no refills        No current facility-administered medications on file prior to visit. Allergies   Allergen Reactions    Latex Hives    Penicillins Swelling and Rash    Shellfish-Derived Products Anaphylaxis    Abilify [Aripiprazole]      shaking    Adhesive Tape Swelling     If left on too long    Statins      Swelling      Topamax [Topiramate]     Buspar [Buspirone]      shaking    Other Nausea And Vomiting    Sulfa Antibiotics Nausea And Vomiting     Health Maintenance   Topic Date Due    Statin Therapy  1965    DTaP/Tdap/Td vaccine (1 - Tdap) 03/17/1983    Diabetic retinal exam  02/01/2017    Cervical cancer screen  03/01/2018    Pneumococcal 0-64 years Vaccine (1 of 1 - PPSV23) 05/31/2019    Breast cancer screen  11/10/2019    Diabetic microalbuminuria test  03/20/2020    Diabetic foot exam  04/05/2020    Flu vaccine (1) 09/01/2020    Lipid screen  09/04/2020    Potassium monitoring  10/08/2020    Creatinine monitoring  10/08/2020    Hepatitis B vaccine (1 of 3 - Risk 3-dose series) 11/15/2020 (Originally 3/17/1983)    Shingles Vaccine (1 of 2) 11/15/2020 (Originally 3/17/2014)    A1C test (Diabetic or Prediabetic)  11/13/2020    Annual Wellness Visit (AWV)  11/13/2020    Colon cancer screen colonoscopy  03/15/2028    Hepatitis C screen  Completed    HIV screen  Completed    Hepatitis A vaccine  Aged Out    Hib vaccine  Aged Out    Meningococcal (ACWY) vaccine  Aged Out       Review of Systems     Review of Systems    Physical Exam  There were no vitals filed for this visit. Physical Exam    Assessment   Diagnosis Orders   1.  Type 2 diabetes mellitus with diabetic polyneuropathy, without long-term current use of insulin (HCC)  Microalbumin / Creatinine Urine Ratio    Hemoglobin A1C    Comprehensive Metabolic Panel    Lipid Panel   2. Screening for breast cancer  ALEX DIGITAL SCREEN W OR WO CAD BILATERAL   3. Benign essential HTN  Comprehensive Metabolic Panel    Lipid Panel   4. Severe episode of recurrent major depressive disorder, without psychotic features ([facility]     5. Crohn's disease with complication, unspecified gastrointestinal tract location ([facility]     6. Morbid obesity ([facility]     7.  Encounter for screening mammogram for malignant neoplasm of breast   ALEX DIGITAL SCREEN W OR WO CAD BILATERAL     Problem List     Benign essential HTN    Relevant Medications    metoprolol succinate (TOPROL XL) 50 MG extended release tablet    furosemide (LASIX) 40 MG tablet    Other Relevant Orders    Comprehensive Metabolic Panel    Lipid Panel    Type 2 diabetes mellitus with diabetic polyneuropathy (HCC) - Primary    Relevant Medications    LYRICA 150 MG capsule    baclofen (LIORESAL) 20 MG tablet    DULoxetine (CYMBALTA) 60 MG extended release capsule    FREESTYLE LITE strip    blood glucose monitor strips    QUEtiapine (SEROQUEL) 25 MG tablet    traZODone (DESYREL) 100 MG tablet    pioglitazone (ACTOS) 15 MG tablet    Lancets MISC    metFORMIN (GLUCOPHAGE) 500 MG tablet    pioglitazone (ACTOS) 15 MG tablet    Other Relevant Orders    Microalbumin / Creatinine Urine Ratio    Hemoglobin A1C    Comprehensive Metabolic Panel    Lipid Panel    Crohn's disease (HCC)    Relevant Medications    ondansetron (ZOFRAN) 8 MG tablet    hyoscyamine (ANASPAZ;LEVSIN) 125 MCG tablet    omeprazole (PRILOSEC) 40 MG delayed release capsule    dicyclomine (BENTYL) 10 MG capsule    Severe episode of recurrent major depressive disorder, without psychotic features (HCC)    Relevant Medications    DULoxetine (CYMBALTA) 60 MG extended release capsule    traZODone (DESYREL) 100 MG tablet    Morbid obesity (HCC)    Relevant Medications    pioglitazone (ACTOS) 15 MG tablet    metFORMIN (GLUCOPHAGE) 500 MG tablet    pioglitazone (ACTOS) 15 MG tablet          Plan  Orders Placed This Encounter   Procedures    ALEX DIGITAL SCREEN W OR WO CAD BILATERAL    Microalbumin / Creatinine Urine Ratio     Standing Status:   Future     Standing Expiration Date:   9/28/2021    Hemoglobin A1C     Standing Status:   Future     Standing Expiration Date:   9/28/2021    Comprehensive Metabolic Panel     Standing Status:   Future     Standing Expiration Date:   9/29/2021    Lipid Panel     Standing Status:   Future     Standing Expiration Date:   9/29/2021     Order Specific Question:   Is Patient Fasting?/# of Hours     Answer:   8     Orders Placed This Encounter   Medications    fluconazole (DIFLUCAN) 100 MG tablet     Sig: Take 1 tablet by mouth daily for 7 days     Dispense:  7 tablet     Refill:  1    pioglitazone (ACTOS) 15 MG tablet     Sig: TAKE ONE TABLET BY MOUTH DAILY     Dispense:  30 tablet     Refill:  5     No follow-ups on file.   Ace Patton MD

## 2020-09-29 NOTE — CARE COORDINATION
Third and final call to patient to discuss enrollment in [address] [place] [address]. I left a voice mail message introducing myself and a brief description of the Care Coordination Program.   Requested a call back to discuss the program.  Call back number provided. Unable to contact patient to enroll. A Care Coordination introduction follow-up letter mailed to patient's last known address.

## 2020-10-01 ENCOUNTER — VIRTUAL VISIT (OUTPATIENT)
Dept: PULMONOLOGY | Age: 55
End: 2020-10-01
Payer: MEDICARE

## 2020-10-01 PROCEDURE — G8417 CALC BMI ABV UP PARAM F/U: HCPCS | Performed by: INTERNAL MEDICINE

## 2020-10-01 PROCEDURE — 99442 PR PHYS/QHP TELEPHONE EVALUATION 11-20 MIN: CPT | Performed by: INTERNAL MEDICINE

## 2020-10-01 PROCEDURE — G8484 FLU IMMUNIZE NO ADMIN: HCPCS | Performed by: INTERNAL MEDICINE

## 2020-10-01 PROCEDURE — 1036F TOBACCO NON-USER: CPT | Performed by: INTERNAL MEDICINE

## 2020-10-01 PROCEDURE — G8428 CUR MEDS NOT DOCUMENT: HCPCS | Performed by: INTERNAL MEDICINE

## 2020-10-01 PROCEDURE — 3017F COLORECTAL CA SCREEN DOC REV: CPT | Performed by: INTERNAL MEDICINE

## 2020-10-01 RX ORDER — ALBUTEROL SULFATE 90 UG/1
2 AEROSOL, METERED RESPIRATORY (INHALATION) EVERY 6 HOURS PRN
Qty: 1 INHALER | Refills: 3 | Status: SHIPPED | OUTPATIENT
Start: 2020-10-01

## 2020-10-01 ASSESSMENT — ENCOUNTER SYMPTOMS
SHORTNESS OF BREATH: 1
DIARRHEA: 0
ABDOMINAL PAIN: 0
SINUS PRESSURE: 0
NAUSEA: 0
RHINORRHEA: 0
COUGH: 0
CHEST TIGHTNESS: 0
WHEEZING: 0
VOMITING: 0
SORE THROAT: 0

## 2020-10-01 NOTE — PROGRESS NOTES
Subjective:     Randa Solitario is a 63 y.o. female who complains today of:     Chief Complaint   Patient presents with    Asthma       HPI  Patient and/or health care decision maker is aware that that he/she may receive a bill for this telephone service, depending on his insurance coverage, and has provided verbal consent to proceed. This visit was completed via telephone. Total time 14 minutes. This is a follow-up evaluation regarding mild intermittent asthma, dyspnea, and cough. Since the last visit patient has had no significant coughing but noticed some increased shortness of breath in the last 2 days, she states that she went camping and was outdoors more and felt that allergies might have caused some of this increase in her shortness of breath. Still not coughing or wheezing with it. States that her weight has not changed and has not been able to lose any. Allergies:  Latex; Penicillins; Shellfish-derived products; Abilify [aripiprazole]; Adhesive tape; Statins; Topamax [topiramate]; Buspar [buspirone];  Other; and Sulfa antibiotics  Past Medical History:   Diagnosis Date    Anemia     Asthma     Benign essential HTN     meds > 5 yrs / denies TIA or stroke    Borderline personality disorder ([address]     2016 ?suggested by me-meets many criteria    Carpal tunnel syndrome of right wrist     Crohn's disease ([address]     Depression     Fibromyalgia 2/13/2018    Fibromyalgia     GERD (gastroesophageal reflux disease)     Gout     Headache     migraines    Irritable bowel syndrome     Mixed hyperlipidemia 5/10/2017    Neuropathy     Obesity (BMI 35.0-39.9 without comorbidity) 3/30/2017    PONV (postoperative nausea and vomiting)     nausea    PTSD (post-traumatic stress disorder)     Tremor of unknown origin     Type 2 diabetes mellitus ([address]     meds > 5yrs     Ulcerative colitis ([address] 11/2017    Vaginitis      Past Surgical History:   Procedure Laterality Date    BREAST CYST EXCISION 0.0 standard drinks    Drug use: No    Sexual activity: Not Currently   Lifestyle    Physical activity     Days per week: Not on file     Minutes per session: Not on file    Stress: Not on file   Relationships    Social connections     Talks on phone: Not on file     Gets together: Not on file     Attends Zoroastrianism service: Not on file     Active member of club or organization: Not on file     Attends meetings of clubs or organizations: Not on file     Relationship status: Not on file    Intimate partner violence     Fear of current or ex partner: Not on file     Emotionally abused: Not on file     Physically abused: Not on file     Forced sexual activity: Not on file   Other Topics Concern    Not on file   Social History Narrative    Not on file         Review of Systems   Constitutional: Positive for fatigue. Negative for appetite change, chills, diaphoresis and fever. HENT: Negative for congestion, nosebleeds, postnasal drip, rhinorrhea, sinus pressure, sneezing and sore throat. Eyes: Negative for visual disturbance. Respiratory: Positive for shortness of breath. Negative for cough, chest tightness and wheezing. Cardiovascular: Positive for leg swelling. Negative for chest pain and palpitations. Gastrointestinal: Negative for abdominal pain, diarrhea, nausea and vomiting. Genitourinary: Negative for dysuria and urgency. Musculoskeletal: Negative for arthralgias, joint swelling and myalgias. Skin: Negative for rash. Allergic/Immunologic: Negative for environmental allergies. Neurological: Negative for tremors, weakness, light-headedness and headaches. Psychiatric/Behavioral: Positive for sleep disturbance. Negative for behavioral problems.         :   There were no vitals filed for this visit. Physical Exam        Assessment:      Diagnosis Orders   1. Mild intermittent asthma without complication     2. Obesity (BMI 35.0-39.9 without comorbidity)     3.  Gastroesophageal reflux disease, unspecified whether esophagitis present       Discussed the importance of weight loss today at length meanwhile patient was asked to continue current treatment, she has Dulera that she uses when needed I asked her to use it during allergy seasons with increased symptoms otherwise to mainly use PRN albuterol with shortness of breath, otherwise I will see her for follow-up in 6 months      Plan:     No orders of the defined types were placed in this encounter. Orders Placed This Encounter   Medications    albuterol sulfate HFA (PROVENTIL HFA) 108 (90 Base) MCG/ACT inhaler     Sig: Inhale 2 puffs into the lungs every 6 hours as needed for Wheezing     Dispense:  1 Inhaler     Refill:  3           Return in about 6 months (around 4/1/2021) for re-evaluation.       Clarence Li MD

## 2020-10-14 RX ORDER — ALLOPURINOL 100 MG/1
TABLET ORAL
Qty: 90 TABLET | Refills: 3 | Status: SHIPPED | OUTPATIENT
Start: 2020-10-14 | End: 2021-10-06

## 2020-11-04 NOTE — TELEPHONE ENCOUNTER
Recent Visits     10/01/2020  Mild intermittent asthma without complication    [address], [place]    09/29/2020  Type 2 diabetes mellitus with diabetic polyneuropathy, without long-term current use of insulin [facility])    [facility] and [address], [place]

## 2020-11-05 RX ORDER — MONTELUKAST SODIUM 10 MG/1
TABLET ORAL
Qty: 30 TABLET | Refills: 5 | Status: SHIPPED | OUTPATIENT
Start: 2020-11-05 | End: 2021-05-13

## 2020-11-07 ENCOUNTER — APPOINTMENT (OUTPATIENT)
Dept: GENERAL RADIOLOGY | Age: 55
End: 2020-11-07
Payer: MEDICARE

## 2020-11-07 ENCOUNTER — HOSPITAL ENCOUNTER (EMERGENCY)
Age: 55
Discharge: HOME | End: 2020-11-07
Attending: EMERGENCY MEDICINE
Payer: MEDICARE

## 2020-11-07 ENCOUNTER — APPOINTMENT (OUTPATIENT)
Dept: CT IMAGING | Age: 55
End: 2020-11-07
Payer: MEDICARE

## 2020-11-07 VITALS
SYSTOLIC BLOOD PRESSURE: 118 MMHG | OXYGEN SATURATION: 99 % | WEIGHT: 293 LBS | RESPIRATION RATE: 18 BRPM | BODY MASS INDEX: 53.92 KG/M2 | TEMPERATURE: 98.7 F | HEART RATE: 71 BPM | DIASTOLIC BLOOD PRESSURE: 68 MMHG | HEIGHT: 62 IN

## 2020-11-07 LAB
GFR AFRICAN AMERICAN: >60
GFR NON-AFRICAN AMERICAN: 51
PERFORMED ON: ABNORMAL
POC CREATININE WHOLE BLOOD: 1.1
POC CREATININE: 1.1 MG/DL (ref 0.6–1.1)
POC SAMPLE TYPE: ABNORMAL

## 2020-11-07 PROCEDURE — 71260 CT THORAX DX C+: CPT

## 2020-11-07 PROCEDURE — 73590 X-RAY EXAM OF LOWER LEG: CPT

## 2020-11-07 PROCEDURE — 74177 CT ABD & PELVIS W/CONTRAST: CPT

## 2020-11-07 PROCEDURE — 6360000002 HC RX W HCPCS: Performed by: EMERGENCY MEDICINE

## 2020-11-07 PROCEDURE — 70450 CT HEAD/BRAIN W/O DYE: CPT

## 2020-11-07 PROCEDURE — 6830039000 HC L3 TRAUMA ALERT

## 2020-11-07 PROCEDURE — 99285 EMERGENCY DEPT VISIT HI MDM: CPT

## 2020-11-07 PROCEDURE — 6360000004 HC RX CONTRAST MEDICATION: Performed by: EMERGENCY MEDICINE

## 2020-11-07 PROCEDURE — 73564 X-RAY EXAM KNEE 4 OR MORE: CPT

## 2020-11-07 PROCEDURE — 73562 X-RAY EXAM OF KNEE 3: CPT

## 2020-11-07 PROCEDURE — 72125 CT NECK SPINE W/O DYE: CPT

## 2020-11-07 PROCEDURE — 71045 X-RAY EXAM CHEST 1 VIEW: CPT

## 2020-11-07 PROCEDURE — 96374 THER/PROPH/DIAG INJ IV PUSH: CPT

## 2020-11-07 RX ORDER — OXYCODONE HYDROCHLORIDE AND ACETAMINOPHEN 5; 325 MG/1; MG/1
1 TABLET ORAL EVERY 6 HOURS PRN
Qty: 12 TABLET | Refills: 0 | Status: SHIPPED | OUTPATIENT
Start: 2020-11-07 | End: 2020-11-10

## 2020-11-07 RX ORDER — ONDANSETRON 2 MG/ML
4 INJECTION INTRAMUSCULAR; INTRAVENOUS ONCE
Status: COMPLETED | OUTPATIENT
Start: 2020-11-07 | End: 2020-11-07

## 2020-11-07 RX ADMIN — IOPAMIDOL 100 ML: 612 INJECTION, SOLUTION INTRAVENOUS at 16:52

## 2020-11-07 RX ADMIN — HYDROMORPHONE HYDROCHLORIDE 1 MG: 1 INJECTION, SOLUTION INTRAMUSCULAR; INTRAVENOUS; SUBCUTANEOUS at 18:30

## 2020-11-07 RX ADMIN — HYDROMORPHONE HYDROCHLORIDE 1 MG: 1 INJECTION, SOLUTION INTRAMUSCULAR; INTRAVENOUS; SUBCUTANEOUS at 16:22

## 2020-11-07 RX ADMIN — ONDANSETRON 4 MG: 2 INJECTION INTRAMUSCULAR; INTRAVENOUS at 16:30

## 2020-11-07 ASSESSMENT — PAIN DESCRIPTION - DESCRIPTORS
DESCRIPTORS: ACHING

## 2020-11-07 ASSESSMENT — PAIN SCALES - GENERAL
PAINLEVEL_OUTOF10: 8
PAINLEVEL_OUTOF10: 10
PAINLEVEL_OUTOF10: 10
PAINLEVEL_OUTOF10: 5

## 2020-11-07 ASSESSMENT — ENCOUNTER SYMPTOMS
WHEEZING: 0
SHORTNESS OF BREATH: 0
ALLERGIC/IMMUNOLOGIC NEGATIVE: 1
NAUSEA: 0
VOMITING: 0
TROUBLE SWALLOWING: 0
ABDOMINAL PAIN: 0
STRIDOR: 0
EYES NEGATIVE: 1
RHINORRHEA: 0

## 2020-11-07 ASSESSMENT — PAIN DESCRIPTION - FREQUENCY
FREQUENCY: CONTINUOUS

## 2020-11-07 ASSESSMENT — PAIN DESCRIPTION - LOCATION
LOCATION: LEG

## 2020-11-07 ASSESSMENT — PAIN DESCRIPTION - PROGRESSION
CLINICAL_PROGRESSION: GRADUALLY IMPROVING
CLINICAL_PROGRESSION: GRADUALLY WORSENING
CLINICAL_PROGRESSION: GRADUALLY WORSENING

## 2020-11-07 ASSESSMENT — PAIN SCALES - WONG BAKER: WONGBAKER_NUMERICALRESPONSE: 8

## 2020-11-07 ASSESSMENT — PAIN - FUNCTIONAL ASSESSMENT
PAIN_FUNCTIONAL_ASSESSMENT: PREVENTS OR INTERFERES WITH ALL ACTIVE AND SOME PASSIVE ACTIVITIES
PAIN_FUNCTIONAL_ASSESSMENT: PREVENTS OR INTERFERES WITH ALL ACTIVE AND SOME PASSIVE ACTIVITIES

## 2020-11-07 ASSESSMENT — PAIN DESCRIPTION - PAIN TYPE
TYPE: ACUTE PAIN

## 2020-11-07 ASSESSMENT — PAIN DESCRIPTION - ORIENTATION
ORIENTATION: LEFT
ORIENTATION: LEFT
ORIENTATION: RIGHT

## 2020-11-07 ASSESSMENT — PAIN DESCRIPTION - ONSET
ONSET: ON-GOING
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
## 2020-11-07 NOTE — ED NOTES
Patient complaint of increasing pain. Dr. Gen Hennessy made aware.       Niranjan Jacobsen RN  11/07/20 [id number]
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
## 2020-11-09 ENCOUNTER — CARE COORDINATION (OUTPATIENT)
Dept: CARE COORDINATION | Age: 55
End: 2020-11-09

## 2020-11-10 ENCOUNTER — CARE COORDINATION (OUTPATIENT)
Dept: CARE COORDINATION | Age: 55
End: 2020-11-10

## 2020-11-10 NOTE — CARE COORDINATION
of symptoms and risk factors. Received call back from reddy's sister. She reports reddy has pain in her lower legs and legs are \"hard\". She did obtain rx per er and reports compliance. Advised to follow up with pcp and appt scheduled for thursday.

## 2020-11-12 ENCOUNTER — OFFICE VISIT (OUTPATIENT)
Dept: FAMILY MEDICINE CLINIC | Age: 55
End: 2020-11-12
Payer: MEDICARE

## 2020-11-12 VITALS
BODY MASS INDEX: 53.92 KG/M2 | OXYGEN SATURATION: 98 % | HEART RATE: 75 BPM | TEMPERATURE: 97.5 F | HEIGHT: 62 IN | WEIGHT: 293 LBS | DIASTOLIC BLOOD PRESSURE: 62 MMHG | RESPIRATION RATE: 16 BRPM | SYSTOLIC BLOOD PRESSURE: 100 MMHG
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
## 2020-11-12 DIAGNOSIS — M62.830 BACK MUSCLE SPASM: ICD-10-CM

## 2020-11-12 DIAGNOSIS — M79.604 PAIN IN RIGHT LEG: ICD-10-CM

## 2020-11-12 LAB
AMPHETAMINE SCREEN, URINE: ABNORMAL
BARBITURATE SCREEN URINE: ABNORMAL
BENZODIAZEPINE SCREEN, URINE: ABNORMAL
CANNABINOID SCREEN URINE: ABNORMAL
COCAINE METABOLITE SCREEN URINE: ABNORMAL
Lab: ABNORMAL
METHADONE SCREEN, URINE: ABNORMAL
OPIATE SCREEN URINE: ABNORMAL
OXYCODONE URINE: POSITIVE
PHENCYCLIDINE SCREEN URINE: ABNORMAL
PROPOXYPHENE SCREEN: ABNORMAL

## 2020-11-12 PROCEDURE — G8417 CALC BMI ABV UP PARAM F/U: HCPCS | Performed by: PHYSICIAN ASSISTANT

## 2020-11-12 PROCEDURE — G8427 DOCREV CUR MEDS BY ELIG CLIN: HCPCS | Performed by: PHYSICIAN ASSISTANT

## 2020-11-12 PROCEDURE — 1036F TOBACCO NON-USER: CPT | Performed by: PHYSICIAN ASSISTANT

## 2020-11-12 PROCEDURE — 3017F COLORECTAL CA SCREEN DOC REV: CPT | Performed by: PHYSICIAN ASSISTANT

## 2020-11-12 PROCEDURE — G8482 FLU IMMUNIZE ORDER/ADMIN: HCPCS | Performed by: PHYSICIAN ASSISTANT

## 2020-11-12 PROCEDURE — 99214 OFFICE O/P EST MOD 30 MIN: CPT | Performed by: PHYSICIAN ASSISTANT

## 2020-11-12 RX ORDER — QUETIAPINE FUMARATE 100 MG/1
TABLET, FILM COATED ORAL
COMMUNITY
Start: 2020-10-26

## 2020-11-12 RX ORDER — OXYCODONE HYDROCHLORIDE AND ACETAMINOPHEN 5; 325 MG/1; MG/1
1 TABLET ORAL EVERY 6 HOURS PRN
Qty: 28 TABLET | Refills: 0 | Status: SHIPPED | OUTPATIENT
Start: 2020-11-12 | End: 2020-11-19

## 2020-11-12 ASSESSMENT — ENCOUNTER SYMPTOMS
FACIAL SWELLING: 0
COLOR CHANGE: 0
COUGH: 1
EYE PAIN: 0
WHEEZING: 0
STRIDOR: 0
PHOTOPHOBIA: 0
APNEA: 0
CHEST TIGHTNESS: 1
ABDOMINAL PAIN: 0
DIARRHEA: 0
ROS SKIN COMMENTS: BRUISING
CHOKING: 0
NAUSEA: 0
EYE DISCHARGE: 0
CONSTIPATION: 0
EYE REDNESS: 0
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
## 2020-11-16 RX ORDER — FUROSEMIDE 40 MG/1
TABLET ORAL
Qty: 60 TABLET | Refills: 5 | Status: SHIPPED | OUTPATIENT
Start: 2020-11-16 | End: 2021-05-17

## 2020-11-17 ENCOUNTER — APPOINTMENT (OUTPATIENT)
Dept: ULTRASOUND IMAGING | Age: 55
End: 2020-11-17
Payer: MEDICARE

## 2020-11-17 ENCOUNTER — HOSPITAL ENCOUNTER (EMERGENCY)
Age: 55
Discharge: HOME | End: 2020-11-17
Payer: MEDICARE

## 2020-11-17 ENCOUNTER — APPOINTMENT (OUTPATIENT)
Dept: GENERAL RADIOLOGY | Age: 55
End: 2020-11-17
Payer: MEDICARE

## 2020-11-17 ENCOUNTER — OFFICE VISIT (OUTPATIENT)
Dept: FAMILY MEDICINE CLINIC | Age: 55
End: 2020-11-17
Payer: MEDICARE

## 2020-11-17 VITALS
WEIGHT: 293 LBS | HEART RATE: 82 BPM | DIASTOLIC BLOOD PRESSURE: 68 MMHG | SYSTOLIC BLOOD PRESSURE: 111 MMHG | HEIGHT: 62 IN | OXYGEN SATURATION: 99 % | BODY MASS INDEX: 53.92 KG/M2 | RESPIRATION RATE: 18 BRPM | TEMPERATURE: 97.4 F

## 2020-11-17 VITALS
WEIGHT: 293 LBS | HEART RATE: 66 BPM | SYSTOLIC BLOOD PRESSURE: 130 MMHG | TEMPERATURE: 97.1 F | DIASTOLIC BLOOD PRESSURE: 78 MMHG | OXYGEN SATURATION: 99 % | HEIGHT: 62 IN | BODY MASS INDEX: 53.92 KG/M2

## 2020-11-17 LAB
ALBUMIN SERPL-MCNC: 4.1 G/DL (ref 3.5–4.6)
ALP BLD-CCNC: 104 U/L (ref 40–130)
ALT SERPL-CCNC: 9 U/L (ref 0–33)
ANION GAP SERPL CALCULATED.3IONS-SCNC: 11 MEQ/L (ref 9–15)
AST SERPL-CCNC: 13 U/L (ref 0–35)
BASOPHILS ABSOLUTE: 0.2 K/UL (ref 0–0.2)
BASOPHILS RELATIVE PERCENT: 1.2 %
BILIRUB SERPL-MCNC: <0.2 MG/DL (ref 0.2–0.7)
BUN BLDV-MCNC: 17 MG/DL (ref 6–20)
CALCIUM SERPL-MCNC: 8.2 MG/DL (ref 8.5–9.9)
CHLORIDE BLD-SCNC: 92 MEQ/L (ref 95–107)
CO2: 32 MEQ/L (ref 20–31)
CREAT SERPL-MCNC: 0.99 MG/DL (ref 0.5–0.9)
EOSINOPHILS ABSOLUTE: 0.3 K/UL (ref 0–0.7)
EOSINOPHILS RELATIVE PERCENT: 2.6 %
GFR AFRICAN AMERICAN: >60
GFR NON-AFRICAN AMERICAN: 57.9
GLOBULIN: 3.5 G/DL (ref 2.3–3.5)
GLUCOSE BLD-MCNC: 157 MG/DL (ref 70–99)
HCT VFR BLD CALC: 36.3 % (ref 37–47)
HEMOGLOBIN: 11.9 G/DL (ref 12–16)
LYMPHOCYTES ABSOLUTE: 2.5 K/UL (ref 1–4.8)
LYMPHOCYTES RELATIVE PERCENT: 19.3 %
MCH RBC QN AUTO: 29 PG (ref 27–31.3)
MCHC RBC AUTO-ENTMCNC: 32.7 % (ref 33–37)
MCV RBC AUTO: 88.7 FL (ref 82–100)
MONOCYTES ABSOLUTE: 1.3 K/UL (ref 0.2–0.8)
MONOCYTES RELATIVE PERCENT: 10.1 %
NEUTROPHILS ABSOLUTE: 8.5 K/UL (ref 1.4–6.5)
NEUTROPHILS RELATIVE PERCENT: 66.8 %
PDW BLD-RTO: 15.3 % (ref 11.5–14.5)
PLATELET # BLD: 356 K/UL (ref 130–400)
POTASSIUM SERPL-SCNC: 3.4 MEQ/L (ref 3.4–4.9)
RBC # BLD: 4.09 M/UL (ref 4.2–5.4)
SODIUM BLD-SCNC: 135 MEQ/L (ref 135–144)
TOTAL PROTEIN: 7.6 G/DL (ref 6.3–8)
WBC # BLD: 12.8 K/UL (ref 4.8–10.8)

## 2020-11-17 PROCEDURE — 85025 COMPLETE CBC W/AUTO DIFF WBC: CPT

## 2020-11-17 PROCEDURE — 2500000003 HC RX 250 WO HCPCS: Performed by: PHYSICIAN ASSISTANT

## 2020-11-17 PROCEDURE — 73590 X-RAY EXAM OF LOWER LEG: CPT

## 2020-11-17 PROCEDURE — 99215 OFFICE O/P EST HI 40 MIN: CPT | Performed by: PHYSICIAN ASSISTANT

## 2020-11-17 PROCEDURE — 93970 EXTREMITY STUDY: CPT

## 2020-11-17 PROCEDURE — 96366 THER/PROPH/DIAG IV INF ADDON: CPT

## 2020-11-17 PROCEDURE — 99284 EMERGENCY DEPT VISIT MOD MDM: CPT

## 2020-11-17 PROCEDURE — 96365 THER/PROPH/DIAG IV INF INIT: CPT

## 2020-11-17 PROCEDURE — 36415 COLL VENOUS BLD VENIPUNCTURE: CPT

## 2020-11-17 PROCEDURE — 87040 BLOOD CULTURE FOR BACTERIA: CPT

## 2020-11-17 PROCEDURE — 71046 X-RAY EXAM CHEST 2 VIEWS: CPT

## 2020-11-17 PROCEDURE — 80053 COMPREHEN METABOLIC PANEL: CPT

## 2020-11-17 PROCEDURE — 6370000000 HC RX 637 (ALT 250 FOR IP): Performed by: PHYSICIAN ASSISTANT

## 2020-11-17 RX ORDER — CLINDAMYCIN PHOSPHATE 600 MG/50ML
600 INJECTION INTRAVENOUS ONCE
Status: COMPLETED | OUTPATIENT
Start: 2020-11-17 | End: 2020-11-17

## 2020-11-17 RX ORDER — ONDANSETRON 4 MG/1
4 TABLET, FILM COATED ORAL EVERY 8 HOURS PRN
Qty: 20 TABLET | Refills: 0 | Status: SHIPPED | OUTPATIENT
Start: 2020-11-17 | End: 2021-03-15

## 2020-11-17 RX ORDER — CLINDAMYCIN HYDROCHLORIDE 300 MG/1
300 CAPSULE ORAL 4 TIMES DAILY
Qty: 40 CAPSULE | Refills: 0 | Status: SHIPPED | OUTPATIENT
Start: 2020-11-17 | End: 2020-11-27

## 2020-11-17 RX ORDER — ONDANSETRON 4 MG/1
4 TABLET, ORALLY DISINTEGRATING ORAL ONCE
Status: COMPLETED | OUTPATIENT
Start: 2020-11-17 | End: 2020-11-17

## 2020-11-17 RX ADMIN — CLINDAMYCIN PHOSPHATE 600 MG: 600 INJECTION, SOLUTION INTRAVENOUS at 18:04

## 2020-11-17 RX ADMIN — ONDANSETRON 4 MG: 4 TABLET, ORALLY DISINTEGRATING ORAL at 19:50

## 2020-11-17 ASSESSMENT — ENCOUNTER SYMPTOMS
VOICE CHANGE: 0
VOMITING: 0
PHOTOPHOBIA: 0
COUGH: 0
APNEA: 0
EYE DISCHARGE: 0
ABDOMINAL DISTENTION: 0
COLOR CHANGE: 1
ANAL BLEEDING: 0
NAUSEA: 0
SHORTNESS OF BREATH: 0

## 2020-11-17 ASSESSMENT — PAIN DESCRIPTION - PAIN TYPE: TYPE: ACUTE PAIN

## 2020-11-17 ASSESSMENT — PAIN DESCRIPTION - ORIENTATION: ORIENTATION: RIGHT;LEFT;LOWER

## 2020-11-17 ASSESSMENT — PAIN SCALES - GENERAL: PAINLEVEL_OUTOF10: 6

## 2020-11-17 ASSESSMENT — PAIN DESCRIPTION - LOCATION: LOCATION: LEG
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
## 2020-11-17 NOTE — PATIENT INSTRUCTIONS
Patient Education        Leg Pain: Care Instructions  Your Care Instructions  Many things can cause leg pain. Too much exercise or overuse can cause a muscle cramp (or charley horse). You can get leg cramps from not eating a balanced diet that has enough potassium, calcium, and other minerals. If you do not drink enough fluids or are taking certain medicines, you may develop leg cramps. Other causes of leg pain include injuries, blood flow problems, nerve damage, and twisted and enlarged veins (varicose veins). You can usually ease pain with self-care. Your doctor may recommend that you rest your leg and keep it elevated. Follow-up care is a key part of your treatment and safety. Be sure to make and go to all appointments, and call your doctor if you are having problems. It's also a good idea to know your test results and keep a list of the medicines you take. How can you care for yourself at home? · Take pain medicines exactly as directed. ? If the doctor gave you a prescription medicine for pain, take it as prescribed. ? If you are not taking a prescription pain medicine, ask your doctor if you can take an over-the-counter medicine. · Take any other medicines exactly as prescribed. Call your doctor if you think you are having a problem with your medicine. · Rest your leg while you have pain, and avoid standing for long periods of time. · Prop up your leg at or above the level of your heart when possible. · Make sure you are eating a balanced diet that is rich in calcium, potassium, and magnesium, especially if you are pregnant. · If directed by your doctor, put ice or a cold pack on the area for 10 to 20 minutes at a time. Put a thin cloth between the ice and your skin. · Your leg may be in a splint, a brace, or an elastic bandage, and you may have crutches to help you walk. Follow your doctor's directions about how long to wear supports and how to use the crutches. When should you call for help? Call 911 anytime you think you may need emergency care. For example, call if:    · You have sudden chest pain and shortness of breath, or you cough up blood.     · Your leg is cool or pale or changes color. Call your doctor now or seek immediate medical care if:    · You have increasing or severe pain.     · Your leg suddenly feels weak and you cannot move it.     · You have signs of a blood clot, such as:  ? Pain in your calf, back of the knee, thigh, or groin. ? Redness and swelling in your leg or groin.     · You have signs of infection, such as:  ? Increased pain, swelling, warmth, or redness. ? Red streaks leading from the sore area. ? Pus draining from a place on your leg. ? A fever.     · You cannot bear weight on your leg. Watch closely for changes in your health, and be sure to contact your doctor if:    · You do not get better as expected. Where can you learn more? Go to [email or web address]. org and sign in to your [employer] account. Enter Q165 in the [employer] box to learn more about \"Leg Pain: Care Instructions. \"     If you do not have an account, please click on the \"Sign Up Now\" link. Current as of: June 26, 2019               Content Version: 12.6  © 0685-6520 [employer]. Care instructions adapted under license by [facility] ([facility]). If you have questions about a medical condition or this instruction, always ask your healthcare professional. [place] [address] any warranty or liability for your use of this information.

## 2020-11-17 NOTE — ED NOTES
Pt has 2+ edema and redness to RLE and 1+ edema a approx 10cm area of redness on her LLE, pulses palpable, skin intact, Pt is ambulatory      Jade Nieto RN  11/17/20 [address] [place] [address] Vinay Braxton RN  11/17/20 [id number]

## 2020-11-18 ENCOUNTER — CARE COORDINATION (OUTPATIENT)
Dept: CARE COORDINATION | Age: 55
End: 2020-11-18

## 2020-11-22 LAB
BLOOD CULTURE, ROUTINE: NORMAL
CULTURE, BLOOD 2: NORMAL

## 2020-12-01 RX ORDER — MOMETASONE FUROATE 1 MG/G
CREAM TOPICAL
Qty: 45 G | Refills: 0 | Status: SHIPPED | OUTPATIENT
Start: 2020-12-01 | End: 2021-01-12

## 2020-12-10 RX ORDER — POTASSIUM CHLORIDE 20 MEQ/1
TABLET, EXTENDED RELEASE ORAL
Qty: 180 TABLET | Refills: 3 | Status: SHIPPED | OUTPATIENT
Start: 2020-12-10 | End: 2021-12-10

## 2020-12-21 PROBLEM — R51.9 HEADACHE: Status: ACTIVE | Noted: 2019-12-30

## 2020-12-21 PROBLEM — R25.1 INTERMITTENT TREMOR: Status: ACTIVE | Noted: 2018-05-03

## 2020-12-21 PROBLEM — M25.551 PAIN OF RIGHT HIP JOINT: Status: ACTIVE | Noted: 2017-03-30

## 2020-12-21 PROBLEM — E86.0 DEHYDRATION: Status: ACTIVE | Noted: 2020-12-21

## 2020-12-21 PROBLEM — E66.9 OBESITY WITH BODY MASS INDEX 30 OR GREATER: Status: ACTIVE | Noted: 2017-03-30

## 2020-12-21 PROBLEM — R11.0 NAUSEA: Status: ACTIVE | Noted: 2020-12-21

## 2020-12-21 PROBLEM — R60.0 EDEMA OF LOWER EXTREMITY: Status: ACTIVE | Noted: 2017-07-31

## 2020-12-21 PROBLEM — S99.922A INJURY OF LEFT FOOT: Status: ACTIVE | Noted: 2020-12-21

## 2020-12-21 PROBLEM — D50.9 IRON DEFICIENCY ANEMIA: Status: ACTIVE | Noted: 2018-11-13

## 2020-12-21 PROBLEM — D72.829 LEUKOCYTOSIS: Status: ACTIVE | Noted: 2020-12-21

## 2020-12-21 PROBLEM — M25.519 SHOULDER PAIN: Status: ACTIVE | Noted: 2020-12-21

## 2020-12-21 PROBLEM — K52.9 GASTROENTERITIS: Status: ACTIVE | Noted: 2020-12-21

## 2020-12-28 ENCOUNTER — OFFICE VISIT (OUTPATIENT)
Dept: NEUROLOGY | Age: 55
End: 2020-12-28
Payer: MEDICARE

## 2020-12-28 ENCOUNTER — TELEPHONE (OUTPATIENT)
Dept: NEUROLOGY | Age: 55
End: 2020-12-28

## 2020-12-28 VITALS
DIASTOLIC BLOOD PRESSURE: 75 MMHG | BODY MASS INDEX: 54.87 KG/M2 | TEMPERATURE: 96.6 F | HEART RATE: 90 BPM | SYSTOLIC BLOOD PRESSURE: 125 MMHG | WEIGHT: 293 LBS

## 2020-12-28 PROCEDURE — 3017F COLORECTAL CA SCREEN DOC REV: CPT | Performed by: PSYCHIATRY & NEUROLOGY

## 2020-12-28 PROCEDURE — 99214 OFFICE O/P EST MOD 30 MIN: CPT | Performed by: PSYCHIATRY & NEUROLOGY

## 2020-12-28 PROCEDURE — G8482 FLU IMMUNIZE ORDER/ADMIN: HCPCS | Performed by: PSYCHIATRY & NEUROLOGY

## 2020-12-28 PROCEDURE — 3046F HEMOGLOBIN A1C LEVEL >9.0%: CPT | Performed by: PSYCHIATRY & NEUROLOGY

## 2020-12-28 PROCEDURE — G8417 CALC BMI ABV UP PARAM F/U: HCPCS | Performed by: PSYCHIATRY & NEUROLOGY

## 2020-12-28 PROCEDURE — 1036F TOBACCO NON-USER: CPT | Performed by: PSYCHIATRY & NEUROLOGY

## 2020-12-28 PROCEDURE — 2022F DILAT RTA XM EVC RTNOPTHY: CPT | Performed by: PSYCHIATRY & NEUROLOGY

## 2020-12-28 PROCEDURE — G8427 DOCREV CUR MEDS BY ELIG CLIN: HCPCS | Performed by: PSYCHIATRY & NEUROLOGY

## 2020-12-28 RX ORDER — TOPIRAMATE 25 MG/1
TABLET ORAL
Qty: 90 TABLET | Refills: 3 | Status: SHIPPED | OUTPATIENT
Start: 2020-12-28 | End: 2021-01-18 | Stop reason: SINTOL

## 2020-12-28 RX ORDER — SUMATRIPTAN 100 MG/1
100 TABLET, FILM COATED ORAL
Qty: 9 TABLET | Refills: 3 | Status: SHIPPED | OUTPATIENT
Start: 2020-12-28 | End: 2021-02-08

## 2020-12-28 ASSESSMENT — ENCOUNTER SYMPTOMS
SHORTNESS OF BREATH: 0
CHOKING: 0
COLOR CHANGE: 0
BACK PAIN: 0
VOMITING: 0
TROUBLE SWALLOWING: 0
PHOTOPHOBIA: 0
NAUSEA: 0

## 2020-12-28 NOTE — TELEPHONE ENCOUNTER
Patient was seen in office today, script for Topamax sent to pharmacy. Pharmacist called and states that the patient has been adamant that she is allergic to this medication. LM w/ pt to get clarification.
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
## 2021-01-04 ENCOUNTER — TELEPHONE (OUTPATIENT)
Dept: FAMILY MEDICINE CLINIC | Age: 56
End: 2021-01-04

## 2021-01-04 NOTE — TELEPHONE ENCOUNTER
A prescription for Therapeutic shoes was given to the office for approval.( from [address] [facility]) Document was scanned to the patient's chart.

## 2021-01-06 DIAGNOSIS — K50.919 CROHN'S DISEASE WITH COMPLICATION, UNSPECIFIED GASTROINTESTINAL TRACT LOCATION (HCC): ICD-10-CM

## 2021-01-06 RX ORDER — OMEPRAZOLE 40 MG/1
CAPSULE, DELAYED RELEASE ORAL
Qty: 180 CAPSULE | Refills: 3 | Status: SHIPPED | OUTPATIENT
Start: 2021-01-06 | End: 2022-01-17

## 2021-01-11 ENCOUNTER — TELEPHONE (OUTPATIENT)
Dept: NEUROLOGY | Age: 56
End: 2021-01-11

## 2021-01-11 NOTE — TELEPHONE ENCOUNTER
Pt informed pharmacy that she is allergic to topamax would you like to prescribe her something different.

## 2021-01-12 RX ORDER — MOMETASONE FUROATE 1 MG/G
CREAM TOPICAL
Qty: 45 G | Refills: 0 | Status: SHIPPED | OUTPATIENT
Start: 2021-01-12 | End: 2021-02-16

## 2021-01-12 NOTE — TELEPHONE ENCOUNTER
09/29/2020 Type 2 diabetes mellitus with diabetic polyneuropathy, without long-term current use of insulin [facility])   [facility] AT [facility] and Zack Escamilla MD

## 2021-01-13 RX ORDER — VERAPAMIL HYDROCHLORIDE 40 MG/1
40 TABLET ORAL 2 TIMES DAILY
Qty: 60 TABLET | Refills: 3 | Status: SHIPPED | OUTPATIENT
Start: 2021-01-13 | End: 2021-03-15

## 2021-01-13 NOTE — TELEPHONE ENCOUNTER
Medication was pended and I tried to call patient to make her aware of change and there was a voicemail that came on and I left message asking for her to call the office back.

## 2021-01-20 PROBLEM — E86.0 DEHYDRATION: Status: RESOLVED | Noted: 2020-12-21 | Resolved: 2021-01-20

## 2021-02-04 ENCOUNTER — OFFICE VISIT (OUTPATIENT)
Dept: CARDIOLOGY CLINIC | Age: 56
End: 2021-02-04
Payer: MEDICARE

## 2021-02-04 VITALS
SYSTOLIC BLOOD PRESSURE: 110 MMHG | TEMPERATURE: 97.2 F | WEIGHT: 293 LBS | HEART RATE: 66 BPM | BODY MASS INDEX: 55.6 KG/M2 | DIASTOLIC BLOOD PRESSURE: 80 MMHG

## 2021-02-04 DIAGNOSIS — E66.9 OBESITY WITH BODY MASS INDEX 30 OR GREATER: ICD-10-CM

## 2021-02-04 DIAGNOSIS — R60.0 EDEMA OF LOWER EXTREMITY: ICD-10-CM

## 2021-02-04 DIAGNOSIS — I10 BENIGN ESSENTIAL HTN: Primary | ICD-10-CM

## 2021-02-04 DIAGNOSIS — E66.01 MORBID OBESITY (HCC): ICD-10-CM

## 2021-02-04 DIAGNOSIS — I10 BENIGN ESSENTIAL HYPERTENSION: ICD-10-CM

## 2021-02-04 DIAGNOSIS — E78.2 MIXED HYPERLIPIDEMIA: ICD-10-CM

## 2021-02-04 PROCEDURE — G8482 FLU IMMUNIZE ORDER/ADMIN: HCPCS | Performed by: INTERNAL MEDICINE

## 2021-02-04 PROCEDURE — 1036F TOBACCO NON-USER: CPT | Performed by: INTERNAL MEDICINE

## 2021-02-04 PROCEDURE — 93000 ELECTROCARDIOGRAM COMPLETE: CPT | Performed by: INTERNAL MEDICINE

## 2021-02-04 PROCEDURE — G8427 DOCREV CUR MEDS BY ELIG CLIN: HCPCS | Performed by: INTERNAL MEDICINE

## 2021-02-04 PROCEDURE — 99213 OFFICE O/P EST LOW 20 MIN: CPT | Performed by: INTERNAL MEDICINE

## 2021-02-04 PROCEDURE — G8417 CALC BMI ABV UP PARAM F/U: HCPCS | Performed by: INTERNAL MEDICINE

## 2021-02-04 PROCEDURE — 3017F COLORECTAL CA SCREEN DOC REV: CPT | Performed by: INTERNAL MEDICINE

## 2021-02-04 NOTE — PROGRESS NOTES
Chief Complaint   Patient presents with    Hypertension       5-24-18:Patient presents for initial medical evaluation. Patient is followed on a regular basis by Dr. Chris Westfall MD. Having b/l LE edema and was seen by dr. Niru Willett and states her veins were ok. No hx of cardiac disease. No hx of DVT/PE. Worse over the past few months. Does have SOB on exertion. Has been gaining weight rapidly due to LE edema. Not on any CCB or steroids. Pt denies chest pain, dyspnea,  change in exercise capacity, fatigue,  nausea, vomiting, diarrhea, constipation, motor weakness, insomnia, weight loss, syncope, dizziness, lightheadedness, palpitations, PND, orthopnea, or claudication. BP and hr are good. No LE discoloration or ulcers. No LE edema. Lipid profile is normal. No recent hospitalization. No change in meds. Hx of DM, HTN, HLP. No smoking. 6-28-18: LE edema improved with lasix 40mg BID. Has lost 23 pounds. S/p EHCO with EF of 65%, no valve abn, RVSP of [id number], normal diastolic function. Pt denies chest pain, dyspnea, dyspnea on exertion, change in exercise capacity, fatigue,  nausea, vomiting, diarrhea, constipation, motor weakness, insomnia, weight loss, syncope, dizziness, lightheadedness,  PND, orthopnea, or claudication. No nitro use. BP and hr are good. CAD is stable. No LE discoloration or ulcers. +  LE edema. No CHF type symptoms. Lipid profile is normal. No recent hospitalization. No change in meds. Does have palpitations at times. BMP with CR of 1.09, GFR of 52.       12-27-18: + dyspnea, dyspnea on exertion, change in exercise capacity, fatigue. Pt denies chest pain,  nausea, vomiting, diarrhea, constipation, motor weakness, insomnia, weight loss, syncope, dizziness, lightheadedness, palpitations, PND, orthopnea, or claudication. No nitro use. BP and hr are good. CAD is stable. No LE discoloration or ulcers. No CHF type symptoms. Lipid profile is normal. No recent hospitalization. No change in meds. LE edema has improved. On Lasix. She has CKD stage III, GFR 59, cr is 0.98. No hx of stress test.     6-27-19: doing ok overall. S/p normal nuclear stress test in 1/19. LE edema is under control. Pt denies chest pain, dyspnea, dyspnea on exertion, change in exercise capacity, fatigue,  nausea, vomiting, diarrhea, constipation, motor weakness, insomnia, weight loss, syncope, dizziness, lightheadedness,  PND, orthopnea, or claudication. No nitro use. BP and hr are good. CAD is stable. No LE discoloration or ulcers. No LE edema. No CHF type symptoms. Lipid profile is normal. Occasional palpitations. Renal function is wnl now. 12-19-19: had a rxn to iron transfusion. Had rapid heart beat, skipped beat. Workup was negative. No arrhythmia noted. Symptoms resolved. Pt denies chest pain, dyspnea, dyspnea on exertion, change in exercise capacity,ausea, vomiting, diarrhea, constipation, motor weakness, insomnia, weight loss, syncope, dizziness, lightheadedness, palpitations, PND, orthopnea, or claudication. Echo in 2018 was negative. 2-4-21: Pt denies chest pain,  fatigue,  nausea, vomiting, diarrhea, constipation, motor weakness, insomnia, weight loss, syncope, dizziness, lightheadedness, palpitations, PND, orthopnea, or claudication. No nitro use. BP and hr are good. CAD is stable. No LE discoloration or ulcers. No LE edema. No CHF type symptoms. Lipid profile is normal. No recent hospitalization. No change in meds. Dealing with a lot of depression and following up with the [facility]. + panic attacks, has tightness in her chest.  Positive history of diabetes, hemoglobin A1c is 6.0. EKG with normal sinus rhythm nonspecific ST changes.     Patient Active Problem List   Diagnosis    Benign essential hypertension    Asthma    Polyneuropathy due to type 2 diabetes mellitus ([facility]    Crohn's disease ([facility]    Gastroesophageal reflux disease    Headache    Severe episode of recurrent major depressive disorder, without psychotic features [facility]    Borderline personality disorder ([facility]    Pain in right knee    Hip pain, right    Obesity with body mass index 30 or greater    Muscle pain    Mixed hyperlipidemia    Tear of meniscus of knee    Bilateral edema of lower extremity    Varicose veins of lower extremity    History of Crohn's disease    Carpal tunnel syndrome of right wrist    Posttraumatic stress disorder    Fibromyalgia    Carpal tunnel syndrome of left wrist    Intermittent tremor    Anemia    Iron deficiency anemia    Chest pain    Tremor    Meralgia paresthetica of left side    Intractable migraine without aura and without status migrainosus    Morbid obesity ([facility]    Edema of lower extremity    Gastroenteritis    Injury of left foot    Leukocytosis    Nausea    Shoulder pain    Pain of right hip joint       Past Surgical History:   Procedure Laterality Date    BREAST CYST EXCISION Right 1994    exc mass benign    CHOLECYSTECTOMY  2009    COLONOSCOPY  2015    negative    ENDOSCOPY, COLON, DIAGNOSTIC      HERNIA REPAIR  2015    ventral / mesh    LEG TENDON SURGERY Right 2007    leg.  ankle and foot    OVARIAN CYST REMOVAL Left 1994    with mass and both fallopian tube    OVARY REMOVAL  12/2015    [facility] / mass left ovary & bilateral  tubes    PARTIAL HYSTERECTOMY      [place] COLON CA SCRN NOT HI RSK IND N/A 11/7/2017    COLONOSCOPY performed by MANOJ Houser on bx    [place] COLONOSCOPY W/BIOPSY SINGLE/MULTIPLE N/A 3/15/2018    COLONOSCOPY performed by Celestino Serrato MD at [facility] Right 8/3/2017    RIGHT KNEE ARTHROSCOPIC PARTIAL MEDIAL MENISCECTOMY KNEE performed by Lev Mckinley MD at [address]/CARPAL TUNNEL SURG Right 11/30/2017    RIGHT WRIST  CARPAL TUNNEL RELEASE performed by Lev Mckinley MD at [address]/CARPAL TUNNEL SURG Left 5/10/2018    LEFT WRIST CARPAL TUNNEL RELEASE performed by Santa Mg MD at [address] [facility]       Social History     Socioeconomic History    Marital status: Single     Spouse name: Not on file    Number of children: Not on file    Years of education: Not on file    Highest education level: Not on file   Occupational History    Not on file   Social Needs    Financial resource strain: Not on file    Food insecurity     Worry: Not on file     Inability: Not on file    Transportation needs     Medical: Not on file     Non-medical: Not on file   Tobacco Use    Smoking status: Never Smoker    Smokeless tobacco: Never Used   Substance and Sexual Activity    Alcohol use: No     Alcohol/week: 0.0 standard drinks    Drug use: No    Sexual activity: Not Currently   Lifestyle    Physical activity     Days per week: Not on file     Minutes per session: Not on file    Stress: Not on file   Relationships    Social connections     Talks on phone: Not on file     Gets together: Not on file     Attends Restorationist service: Not on file     Active member of club or organization: Not on file     Attends meetings of clubs or organizations: Not on file     Relationship status: Not on file    Intimate partner violence     Fear of current or ex partner: Not on file     Emotionally abused: Not on file     Physically abused: Not on file     Forced sexual activity: Not on file   Other Topics Concern    Not on file   Social History Narrative    Not on file       Family History   Problem Relation Age of Onset    Emphysema Mother     Cancer Maternal Grandfather     Diabetes Paternal Grandmother     Emphysema Paternal Grandfather     Heart Disease Sister     Stroke Sister     Diabetes Sister        Current Outpatient Medications   Medication Sig Dispense Refill    verapamil (CALAN) 40 MG tablet Take 1 tablet by mouth 2 times daily 60 tablet 3    mometasone (ELOCON) 0.1 % cream apply topically daily  45 g 0    omeprazole (PRILOSEC) 40 MG delayed release capsule TAKE ONE CAPSULE BY MOUTH TWO TIMES A  capsule 3    potassium chloride (KLOR-CON M) 20 MEQ extended release tablet TAKE ONE TABLET BY MOUTH TWO TIMES A  tablet 3    ondansetron (ZOFRAN) 4 MG tablet Take 1 tablet by mouth every 8 hours as needed for Nausea 20 tablet 0    furosemide (LASIX) 40 MG tablet TAKE ONE TABLET BY MOUTH TWO TIMES A DAY 60 tablet 5    QUEtiapine (SEROQUEL) 100 MG tablet TAKE ONE TABLET BYMOUTH EVERY EVENING AS NEEDED AT 8 PM      montelukast (SINGULAIR) 10 MG tablet TAKE ONE TABLET BY MOUTH NIGHTLY 30 tablet 5    allopurinol (ZYLOPRIM) 100 MG tablet TAKE ONE TABLET BY MOUTH EVERY DAY 90 tablet 3    albuterol sulfate HFA (PROVENTIL HFA) 108 (90 Base) MCG/ACT inhaler Inhale 2 puffs into the lungs every 6 hours as needed for Wheezing 1 Inhaler 3    metFORMIN (GLUCOPHAGE) 500 MG tablet TAKE ONE TABLET BY MOUTH TWICE A DAY WITH MEALS 180 tablet 1    Lancets MISC Test bid 100 each 3    dicyclomine (BENTYL) 10 MG capsule TAKE ONE CAPSULE BY MOUTH FOUR TIMES A DAY BEFORE MEALS AND NIGHTLY 120 capsule 5    pioglitazone (ACTOS) 15 MG tablet TAKE ONE TABLET BY MOUTH EVERY DAY 90 tablet 3    rizatriptan (MAXALT) 10 MG tablet TAKE 1 TABLET BY MOUTH ONCE AS NEEDED FOR MIGRAINES.  MAY REPEAT IN 2 HOURS IF NEEDED 30 tablet 0    metoprolol succinate (TOPROL XL) 50 MG extended release tablet Take 1 tablet by mouth 2 times daily HOLD for SBP<100 or HR<60 180 tablet 2    fluticasone (FLONASE) 50 MCG/ACT nasal spray 1 spray by Nasal route daily 1 Bottle 0    sodium chloride (ALTAMIST SPRAY) 0.65 % nasal spray 1 spray by Nasal route as needed for Congestion 1 Bottle 0    hyoscyamine (ANASPAZ;LEVSIN) 125 MCG tablet TAKE ONE TABLET BY MOUTH EVERY 4 HOURS AS NEEDED for cramping 164 tablet 0    ipratropium-albuterol (DUONEB) 0.5-2.5 (3) MG/3ML SOLN nebulizer solution INHALE 1 VIAL VIA NEBULIZER EVERY 4 HOURS 360 mL 5    magnesium oxide (MAG-OX) 400 (241.3 Mg) MG TABS tablet Take 1 tablet by mouth 2 times daily 60 tablet 2    pentazocine-naloxone (TALWIN NX) 50-0.5 MG per tablet take 1 tablet by mouth twice a day as needed for pain  0    colchicine (COLCRYS) 0.6 MG tablet TAKE ONE TABLET BY MOUTH THREE TIMES A DAY AS NEEDED FOR PAIN 30 tablet 2    Erenumab-aooe 140 MG/ML SOAJ Inject into the skin every 30 days Indications: on the 15th of every month       Blood Glucose Monitoring Suppl (ONE TOUCH ULTRA 2) w/Device KIT TEST TWICE DAILY  0    prazosin (MINIPRESS) 2 MG capsule TAKE 1 CAPSULE BY MOUTH EVERY NIGHT FOR NIGHTMARES  3    blood glucose monitor strips Test 2 times a day & as needed for symptoms of irregular blood glucose. 100 strip 3    nystatin-triamcinolone (MYCOLOG II) 397808-8.1 UNIT/GM-% cream APPLY TOPICALLY TWICE DAILY 45 g 2    Melatonin 5 MG CAPS Take 10 mg by mouth daily   2    FREESTYLE LITE strip use three times daily  50 each 5    VENTOLIN  (90 Base) MCG/ACT inhaler INHALE 2 PUFFS INTO THE LUNGS EVERY 6 HOURS AS NEEDED FOR WHEEZING  3    DULoxetine (CYMBALTA) 60 MG extended release capsule TAKE ONE CAPSULE BY MOUTH TWICE DAILY AS DIRECTED IN THE MORNING AND AFTERNOON  1    meloxicam (MOBIC) 7.5 MG tablet TAKE ONE TABLET BY MOUTH TWO TIMES A DAY WITH MEALS TAKE ONLY WITH FOOD AS NEEDED  3    baclofen (LIORESAL) 20 MG tablet 20 mg 4 times daily       LYRICA 150 MG capsule 150 mg 3 times daily Indications: last filled 3/5/17 no refills       SUMAtriptan (IMITREX) 100 MG tablet Take 1 tablet by mouth once as needed for Migraine 9 tablet 3     No current facility-administered medications for this visit.         Latex, Penicillins, Shellfish-derived products, Abilify [aripiprazole], Adhesive tape, Statins, Topamax [topiramate], Buspar [buspirone], Other, and Sulfa antibiotics    Review of Systems:  General ROS: negative  Psychological ROS: negative  Hematological and Lymphatic ROS: No history of blood clots or bleeding disorder. Respiratory ROS: no cough, shortness of breath, or wheezing  Cardiovascular ROS: positive for - edema  Gastrointestinal ROS: negative  Genito-Urinary ROS: no dysuria, trouble voiding, or hematuria  Musculoskeletal ROS: negative  Neurological ROS: no TIA or stroke symptoms  Dermatological ROS: negative    VITALS:  Blood pressure 110/80, pulse 66, temperature 97.2 °F (36.2 °C), temperature source Infrared, weight (!) 304 lb (137.9 kg), last menstrual period 07/27/1993, not currently breastfeeding. Body mass index is 55.6 kg/m². Physical Examination:  General appearance - alert, well appearing, and in no distress  Mental status - alert, oriented to person, place, and time  Neck - Neck is supple, no JVD or carotid bruits. No thyromegaly or adenopathy. Chest - clear to auscultation, no wheezes, rales or rhonchi, symmetric air entry  Heart - normal rate, regular rhythm, normal S1, S2, no murmurs, rubs, clicks or gallops  Abdomen - soft, nontender, nondistended, no masses or organomegaly  Neurological - alert, oriented, normal speech, no focal findings or movement disorder noted  Extremities - peripheral pulses normal, + 2b/l  pedal edema, no clubbing or cyanosis  Skin - normal coloration and turgor, no rashes, no suspicious skin lesions noted      EKG: normal sinus rhythm, nonspecific ST and T waves changes. Low voltage. Orders Placed This Encounter   Procedures    EKG 12 Lead       ASSESSMENT:     Diagnosis Orders   1. Benign essential HTN  EKG 12 Lead   2. Mixed hyperlipidemia     3. Benign essential hypertension     4. Morbid obesity ([address]     5. Edema of lower extremity     6. Obesity with body mass index 30 or greater     4. Leg edema. 5.      Morbid obesity. PLAN:     Patient will need to continue to follow up with you for their general medical care     As always, aggressive risk factor modification is strongly recommended.  We should adhere to the JNC VIII guidelines for HTN management and the NCEP ATP III guidelines for LDL-C management. Cardiac diet is always recommended with low fat, cholesterol, calories and sodium. Continue medications at current doses. Wants to wait on stress test for now. Consider holter monitor if palpitations get more frequent. Patient was advised and encouraged to check blood pressure at home or at a pharmacy, maintain a logbook, and also call us back if blood pressure are above the target ranges or if it is low. Patient clearly understands and agrees to the instructions. We will need to continue to monitor muscle and liver enzymes, BUN, CR, and electrolytes. Details of medical condition explained and patient was warned about adverse consequences of uncontrolled medical conditions and possible side effects of prescribed medications.

## 2021-02-08 RX ORDER — SUMATRIPTAN 100 MG/1
100 TABLET, FILM COATED ORAL
Qty: 9 TABLET | Refills: 0 | Status: SHIPPED | OUTPATIENT
Start: 2021-02-08 | End: 2021-06-30 | Stop reason: SINTOL

## 2021-02-15 ENCOUNTER — TELEPHONE (OUTPATIENT)
Dept: FAMILY MEDICINE CLINIC | Age: 56
End: 2021-02-15

## 2021-02-15 NOTE — TELEPHONE ENCOUNTER
Patient dropped out Statement of Certifying Physician for Therapeutic Shoes , requesting Dr. Julia Harry to sign and fax back to fax on form.  Form Put in Dr. Fawn Zambrano

## 2021-02-16 RX ORDER — MOMETASONE FUROATE 1 MG/G
CREAM TOPICAL
Qty: 45 G | Refills: 0 | Status: SHIPPED | OUTPATIENT
Start: 2021-02-16 | End: 2021-03-22

## 2021-03-15 ENCOUNTER — OFFICE VISIT (OUTPATIENT)
Dept: FAMILY MEDICINE CLINIC | Age: 56
End: 2021-03-15
Payer: MEDICARE

## 2021-03-15 VITALS
HEART RATE: 75 BPM | WEIGHT: 293 LBS | DIASTOLIC BLOOD PRESSURE: 68 MMHG | OXYGEN SATURATION: 93 % | BODY MASS INDEX: 53.92 KG/M2 | SYSTOLIC BLOOD PRESSURE: 108 MMHG | HEIGHT: 62 IN | TEMPERATURE: 98.8 F

## 2021-03-15 DIAGNOSIS — E11.42 TYPE 2 DIABETES MELLITUS WITH DIABETIC POLYNEUROPATHY, WITHOUT LONG-TERM CURRENT USE OF INSULIN (HCC): ICD-10-CM

## 2021-03-15 DIAGNOSIS — F33.2 SEVERE EPISODE OF RECURRENT MAJOR DEPRESSIVE DISORDER, WITHOUT PSYCHOTIC FEATURES (HCC): ICD-10-CM

## 2021-03-15 DIAGNOSIS — K50.919 CROHN'S DISEASE WITH COMPLICATION, UNSPECIFIED GASTROINTESTINAL TRACT LOCATION (HCC): ICD-10-CM

## 2021-03-15 DIAGNOSIS — M1A.00X0 IDIOPATHIC CHRONIC GOUT WITHOUT TOPHUS, UNSPECIFIED SITE: ICD-10-CM

## 2021-03-15 DIAGNOSIS — E11.42 TYPE 2 DIABETES MELLITUS WITH DIABETIC POLYNEUROPATHY, WITHOUT LONG-TERM CURRENT USE OF INSULIN (HCC): Primary | ICD-10-CM

## 2021-03-15 LAB
CREATININE URINE: 121.6 MG/DL
HBA1C MFR BLD: 6.5 %
MICROALBUMIN UR-MCNC: <1.2 MG/DL
MICROALBUMIN/CREAT UR-RTO: NORMAL MG/G (ref 0–30)

## 2021-03-15 PROCEDURE — 83036 HEMOGLOBIN GLYCOSYLATED A1C: CPT | Performed by: FAMILY MEDICINE

## 2021-03-15 PROCEDURE — 1036F TOBACCO NON-USER: CPT | Performed by: FAMILY MEDICINE

## 2021-03-15 PROCEDURE — 3044F HG A1C LEVEL LT 7.0%: CPT | Performed by: FAMILY MEDICINE

## 2021-03-15 PROCEDURE — G8417 CALC BMI ABV UP PARAM F/U: HCPCS | Performed by: FAMILY MEDICINE

## 2021-03-15 PROCEDURE — G8427 DOCREV CUR MEDS BY ELIG CLIN: HCPCS | Performed by: FAMILY MEDICINE

## 2021-03-15 PROCEDURE — 2022F DILAT RTA XM EVC RTNOPTHY: CPT | Performed by: FAMILY MEDICINE

## 2021-03-15 PROCEDURE — G8482 FLU IMMUNIZE ORDER/ADMIN: HCPCS | Performed by: FAMILY MEDICINE

## 2021-03-15 PROCEDURE — 3017F COLORECTAL CA SCREEN DOC REV: CPT | Performed by: FAMILY MEDICINE

## 2021-03-15 PROCEDURE — 99214 OFFICE O/P EST MOD 30 MIN: CPT | Performed by: FAMILY MEDICINE

## 2021-03-15 RX ORDER — PIOGLITAZONEHYDROCHLORIDE 15 MG/1
TABLET ORAL
Qty: 90 TABLET | Refills: 3 | Status: SHIPPED | OUTPATIENT
Start: 2021-03-15 | End: 2021-05-17 | Stop reason: SDUPTHER

## 2021-03-15 SDOH — ECONOMIC STABILITY: INCOME INSECURITY: HOW HARD IS IT FOR YOU TO PAY FOR THE VERY BASICS LIKE FOOD, HOUSING, MEDICAL CARE, AND HEATING?: NOT HARD AT ALL

## 2021-03-15 SDOH — ECONOMIC STABILITY: FOOD INSECURITY: WITHIN THE PAST 12 MONTHS, YOU WORRIED THAT YOUR FOOD WOULD RUN OUT BEFORE YOU GOT MONEY TO BUY MORE.: NEVER TRUE

## 2021-03-15 SDOH — ECONOMIC STABILITY: FOOD INSECURITY: WITHIN THE PAST 12 MONTHS, THE FOOD YOU BOUGHT JUST DIDN'T LAST AND YOU DIDN'T HAVE MONEY TO GET MORE.: NEVER TRUE

## 2021-03-15 SDOH — ECONOMIC STABILITY: TRANSPORTATION INSECURITY
IN THE PAST 12 MONTHS, HAS LACK OF TRANSPORTATION KEPT YOU FROM MEETINGS, WORK, OR FROM GETTING THINGS NEEDED FOR DAILY LIVING?: NO

## 2021-03-15 ASSESSMENT — ENCOUNTER SYMPTOMS
RESPIRATORY NEGATIVE: 1
EYES NEGATIVE: 1
CHEST TIGHTNESS: 0
GASTROINTESTINAL NEGATIVE: 1
COUGH: 0
RHINORRHEA: 0

## 2021-03-15 NOTE — PROGRESS NOTES
Patient is seen in follow up for   Chief Complaint   Patient presents with    Diabetes     Last A1C done 11/13/19, was 6.0    Health Maintenance     Possibly receiving her COVID vaccine at [facility]      Diabetes  She presents for her follow-up diabetic visit. She has type 2 diabetes mellitus. Her disease course has been stable. There are no hypoglycemic associated symptoms. Pertinent negatives for hypoglycemia include no dizziness. There are no diabetic associated symptoms. Pertinent negatives for diabetes include no fatigue. There are no hypoglycemic complications. Symptoms are stable. There are no diabetic complications. Risk factors for coronary artery disease include diabetes mellitus and hypertension. Current diabetic treatment includes oral agent (dual therapy). She is compliant with treatment most of the time. There is no change in her home blood glucose trend.        Past Medical History:   Diagnosis Date    Anemia     Asthma     Benign essential HTN     meds > 5 yrs / denies TIA or stroke    Borderline personality disorder ([address]     2016 ?suggested by me-meets many criteria    Carpal tunnel syndrome of right wrist     Crohn's disease ([address]     Depression     Fibromyalgia 2/13/2018    Fibromyalgia     GERD (gastroesophageal reflux disease)     Gout     Headache     migraines    Irritable bowel syndrome     Mixed hyperlipidemia 5/10/2017    Neuropathy     Obesity (BMI 35.0-39.9 without comorbidity) 3/30/2017    PONV (postoperative nausea and vomiting)     nausea    PTSD (post-traumatic stress disorder)     Tremor of unknown origin     Type 2 diabetes mellitus ([address]     meds > 5yrs     Ulcerative colitis ([address] 11/2017    Vaginitis      Patient Active Problem List    Diagnosis Date Noted    Gastroenteritis 12/21/2020    Injury of left foot 12/21/2020    Leukocytosis 12/21/2020    Nausea 12/21/2020    Shoulder pain 12/21/2020    Morbid obesity ([address] 09/29/2020    Headache 12/30/2019    Tremor 12/30/2019    Meralgia paresthetica of left side 12/30/2019    Intractable migraine without aura and without status migrainosus 12/30/2019    Chest pain 09/03/2019    Iron deficiency anemia 11/13/2018    Anemia 06/06/2018    Carpal tunnel syndrome of left wrist 05/03/2018    Intermittent tremor 05/03/2018    Posttraumatic stress disorder 02/13/2018    Fibromyalgia 02/13/2018    Carpal tunnel syndrome of right wrist 11/27/2017    History of Crohn's disease     Bilateral edema of lower extremity 07/31/2017    Varicose veins of lower extremity 07/31/2017    Edema of lower extremity 07/31/2017    Tear of meniscus of knee 07/27/2017    Mixed hyperlipidemia 05/10/2017    Pain in right knee 03/30/2017    Hip pain, right 03/30/2017    Obesity with body mass index 30 or greater 03/30/2017    Muscle pain 03/30/2017    Pain of right hip joint 03/30/2017    Severe episode of recurrent major depressive disorder, without psychotic features ([address] 10/25/2016    Borderline personality disorder ([address] 10/25/2016    Polyneuropathy due to type 2 diabetes mellitus ([address] 02/05/2016    Benign essential hypertension     Asthma     Crohn's disease ([address]     Gastroesophageal reflux disease      Past Surgical History:   Procedure Laterality Date    BREAST CYST EXCISION Right 1994    exc mass benign    CHOLECYSTECTOMY  2009    COLONOSCOPY  2015    negative    ENDOSCOPY, COLON, DIAGNOSTIC      HERNIA REPAIR  2015    ventral / mesh    LEG TENDON SURGERY Right 2007    leg.  ankle and foot    OVARIAN CYST REMOVAL Left 1994    with mass and both fallopian tube    OVARY REMOVAL  12/2015    [facility] / mass left ovary & bilateral  tubes    PARTIAL HYSTERECTOMY      KS COLON CA SCRN NOT HI RSK IND N/A 11/7/2017    COLONOSCOPY performed by MANOJ Cheung on bx    [place] COLONOSCOPY W/BIOPSY SINGLE/MULTIPLE N/A 3/15/2018    COLONOSCOPY performed by Toby Mccartney MD at [facility] [address] Right 8/3/2017    RIGHT KNEE ARTHROSCOPIC PARTIAL MEDIAL MENISCECTOMY KNEE performed by Magui Baker MD at [facility] N/CARPAL TUNNEL SURG Right 11/30/2017    RIGHT WRIST  CARPAL TUNNEL RELEASE performed by Magui Baker MD at [facility] N/CARPAL TUNNEL SURG Left 5/10/2018    LEFT WRIST CARPAL TUNNEL RELEASE performed by Magui Baker MD at [address] [facility]     Family History   Problem Relation Age of Onset    Emphysema Mother     Cancer Maternal Grandfather     Diabetes Paternal [de-identified]     Emphysema Paternal Grandfather     Heart Disease Sister     Stroke Sister     Diabetes Sister      Social History     Socioeconomic History    Marital status: Single     Spouse name: None    Number of children: None    Years of education: None    Highest education level: None   Occupational History    None   Social Needs    Financial resource strain: Not hard at all   [place] insecurity     Worry: Never true     Inability: Never true    Transportation needs     Medical: No     Non-medical: No   Tobacco Use    Smoking status: Never Smoker    Smokeless tobacco: Never Used   Substance and Sexual Activity    Alcohol use: No     Alcohol/week: 0.0 standard drinks    Drug use: No    Sexual activity: Not Currently   Lifestyle    Physical activity     Days per week: None     Minutes per session: None    Stress: None   Relationships    Social connections     Talks on phone: None     Gets together: None     Attends [employer] service: None     Active member of club or organization: None     Attends meetings of clubs or organizations: None     Relationship status: None    Intimate partner violence     Fear of current or ex partner: None     Emotionally abused: None     Physically abused: None     Forced sexual activity: None   Other Topics Concern    None   Social History Narrative    None     Current Outpatient Medications   Medication Sig Dispense Refill    pioglitazone (ACTOS) 15 MG tablet TAKE ONE TABLET BY MOUTH EVERY DAY 90 tablet 3    mometasone (ELOCON) 0.1 % cream APPLY TOPICALLY DAILY  45 g 0    omeprazole (PRILOSEC) 40 MG delayed release capsule TAKE ONE CAPSULE BY MOUTH TWO TIMES A  capsule 3    potassium chloride (KLOR-CON M) 20 MEQ extended release tablet TAKE ONE TABLET BY MOUTH TWO TIMES A  tablet 3    furosemide (LASIX) 40 MG tablet TAKE ONE TABLET BY MOUTH TWO TIMES A DAY 60 tablet 5    QUEtiapine (SEROQUEL) 100 MG tablet TAKE ONE TABLET BYMOUTH EVERY EVENING AS NEEDED AT 8 PM      montelukast (SINGULAIR) 10 MG tablet TAKE ONE TABLET BY MOUTH NIGHTLY 30 tablet 5    allopurinol (ZYLOPRIM) 100 MG tablet TAKE ONE TABLET BY MOUTH EVERY DAY 90 tablet 3    albuterol sulfate HFA (PROVENTIL HFA) 108 (90 Base) MCG/ACT inhaler Inhale 2 puffs into the lungs every 6 hours as needed for Wheezing 1 Inhaler 3    metFORMIN (GLUCOPHAGE) 500 MG tablet TAKE ONE TABLET BY MOUTH TWICE A DAY WITH MEALS 180 tablet 1    Lancets MISC Test bid 100 each 3    dicyclomine (BENTYL) 10 MG capsule TAKE ONE CAPSULE BY MOUTH FOUR TIMES A DAY BEFORE MEALS AND NIGHTLY 120 capsule 5    rizatriptan (MAXALT) 10 MG tablet TAKE 1 TABLET BY MOUTH ONCE AS NEEDED FOR MIGRAINES.  MAY REPEAT IN 2 HOURS IF NEEDED 30 tablet 0    metoprolol succinate (TOPROL XL) 50 MG extended release tablet Take 1 tablet by mouth 2 times daily HOLD for SBP<100 or HR<60 180 tablet 2    hyoscyamine (ANASPAZ;LEVSIN) 125 MCG tablet TAKE ONE TABLET BY MOUTH EVERY 4 HOURS AS NEEDED for cramping 164 tablet 0    ipratropium-albuterol (DUONEB) 0.5-2.5 (3) MG/3ML SOLN nebulizer solution INHALE 1 VIAL VIA NEBULIZER EVERY 4 HOURS 360 mL 5    magnesium oxide (MAG-OX) 400 (241.3 Mg) MG TABS tablet Take 1 tablet by mouth 2 times daily 60 tablet 2    Blood Glucose Monitoring Suppl (ONE TOUCH ULTRA 2) w/Device KIT TEST TWICE DAILY  0    prazosin (MINIPRESS) 2 MG capsule TAKE 1 CAPSULE BY MOUTH EVERY NIGHT FOR NIGHTMARES  3    blood glucose monitor strips Test 2 times a day & as needed for symptoms of irregular blood glucose. 100 strip 3    nystatin-triamcinolone (MYCOLOG II) 053199-5.1 UNIT/GM-% cream APPLY TOPICALLY TWICE DAILY 45 g 2    Melatonin 5 MG CAPS Take 10 mg by mouth daily   2    FREESTYLE LITE strip use three times daily  50 each 5    DULoxetine (CYMBALTA) 60 MG extended release capsule TAKE ONE CAPSULE BY MOUTH TWICE DAILY AS DIRECTED IN THE MORNING AND AFTERNOON  1    meloxicam (MOBIC) 7.5 MG tablet TAKE ONE TABLET BY MOUTH TWO TIMES A DAY WITH MEALS TAKE ONLY WITH FOOD AS NEEDED  3    baclofen (LIORESAL) 20 MG tablet 20 mg 4 times daily       LYRICA 150 MG capsule 150 mg 3 times daily Indications: last filled 3/5/17 no refills       SUMAtriptan (IMITREX) 100 MG tablet take 1 tablet by mouth once as needed for migraine 9 tablet 0     No current facility-administered medications for this visit.       Current Outpatient Medications on File Prior to Visit   Medication Sig Dispense Refill    mometasone (ELOCON) 0.1 % cream APPLY TOPICALLY DAILY  45 g 0    omeprazole (PRILOSEC) 40 MG delayed release capsule TAKE ONE CAPSULE BY MOUTH TWO TIMES A  capsule 3    potassium chloride (KLOR-CON M) 20 MEQ extended release tablet TAKE ONE TABLET BY MOUTH TWO TIMES A  tablet 3    furosemide (LASIX) 40 MG tablet TAKE ONE TABLET BY MOUTH TWO TIMES A DAY 60 tablet 5    QUEtiapine (SEROQUEL) 100 MG tablet TAKE ONE TABLET BYMOUTH EVERY EVENING AS NEEDED AT 8 PM      montelukast (SINGULAIR) 10 MG tablet TAKE ONE TABLET BY MOUTH NIGHTLY 30 tablet 5    allopurinol (ZYLOPRIM) 100 MG tablet TAKE ONE TABLET BY MOUTH EVERY DAY 90 tablet 3    albuterol sulfate HFA (PROVENTIL HFA) 108 (90 Base) MCG/ACT inhaler Inhale 2 puffs into the lungs every 6 hours as needed for Wheezing 1 Inhaler 3    metFORMIN (GLUCOPHAGE) 500 MG tablet TAKE visit. Allergies   Allergen Reactions    Latex Hives    Penicillins Swelling and Rash    Shellfish-Derived Products Anaphylaxis    Abilify [Aripiprazole]      shaking    Adhesive Tape Swelling     If left on too long    Statins      Swelling      Topamax [Topiramate]     Buspar [Buspirone]      shaking    Other Nausea And Vomiting    Sulfa Antibiotics Nausea And Vomiting     Health Maintenance   Topic Date Due    COVID-19 Vaccine (1) Never done    Hepatitis B vaccine (1 of 3 - Risk 3-dose series) Never done    DTaP/Tdap/Td vaccine (1 - Tdap) Never done    Shingles Vaccine (1 of 2) Never done    Diabetic retinal exam  02/01/2017    Cervical cancer screen  03/01/2018    Pneumococcal 0-64 years Vaccine (1 of 1 - PPSV23) 05/31/2019    Breast cancer screen  11/10/2019    Diabetic microalbuminuria test  03/20/2020    Diabetic foot exam  04/05/2020    Lipid screen  09/04/2020    Potassium monitoring  11/17/2021    Creatinine monitoring  11/17/2021    A1C test (Diabetic or Prediabetic)  03/15/2022    Colon cancer screen colonoscopy  03/15/2028    Flu vaccine  Completed    Hepatitis C screen  Completed    HIV screen  Completed    Hepatitis A vaccine  Aged Out    Hib vaccine  Aged Out    Meningococcal (ACWY) vaccine  Aged Out       Review of Systems     Review of Systems   Constitutional: Negative for activity change, appetite change, fatigue and fever. HENT: Negative for congestion and rhinorrhea. Eyes: Negative. Respiratory: Negative. Negative for cough and chest tightness. Cardiovascular: Negative. Gastrointestinal: Negative. Endocrine: Negative. Genitourinary: Negative. Musculoskeletal: Negative. Skin: Negative. Neurological: Negative for dizziness, light-headedness and numbness. Hematological: Negative. Psychiatric/Behavioral: Negative.         Physical Exam  Vitals:    03/15/21 1508   BP: 108/68   Site: Left Upper Arm   Position: Sitting   Cuff Size: Large Adult   Pulse: 75   Temp: 98.8 °F (37.1 °C)   TempSrc: Oral   SpO2: 93%   Weight: (!) 307 lb 6.4 oz (139.4 kg)   Height: 5' 2\" (1.575 m)       Physical Exam  Constitutional:       Appearance: She is well-developed. HENT:      Right Ear: External ear normal.      Left Ear: External ear normal.   Eyes:      Pupils: Pupils are equal, round, and reactive to light. Neck:      Musculoskeletal: Normal range of motion and neck supple. Thyroid: No thyromegaly. Cardiovascular:      Rate and Rhythm: Normal rate and regular rhythm. Heart sounds: Normal heart sounds. No murmur. No friction rub. No gallop. Pulmonary:      Effort: Pulmonary effort is normal. No respiratory distress. Breath sounds: No wheezing or rales. Chest:      Chest wall: No tenderness. Abdominal:      General: Bowel sounds are normal. There is no distension. Palpations: Abdomen is soft. There is no mass. Tenderness: There is no abdominal tenderness. There is no guarding or rebound. Musculoskeletal: Normal range of motion. Lymphadenopathy:      Cervical: No cervical adenopathy. Skin:     General: Skin is warm and dry. Neurological:      Mental Status: She is alert and oriented to person, place, and time. Cranial Nerves: No cranial nerve deficit. Coordination: Coordination normal.         Assessment   Diagnosis Orders   1. Type 2 diabetes mellitus with diabetic polyneuropathy, without long-term current use of insulin (HCC)  POCT glycosylated hemoglobin (Hb A1C)   2. Severe episode of recurrent major depressive disorder, without psychotic features ([address]  TSH with Reflex   3. Crohn's disease with complication, unspecified gastrointestinal tract location ([address]     4.  Idiopathic chronic gout without tophus, unspecified site  Uric Acid     Problem List     Crohn's disease ([address]    Relevant Medications    hyoscyamine (ANASPAZ;LEVSIN) 125 MCG tablet    dicyclomine (BENTYL) 10 MG capsule    omeprazole (PRILOSEC) 40 MG delayed release capsule    Severe episode of recurrent major depressive disorder, without psychotic features ([facility]    Relevant Medications    DULoxetine (CYMBALTA) 60 MG extended release capsule    Other Relevant Orders    TSH with Reflex          Plan  Orders Placed This Encounter   Procedures    TSH with Reflex     Standing Status:   Future     Standing Expiration Date:   3/15/2022    Uric Acid     Standing Status:   Future     Standing Expiration Date:   3/15/2022    POCT glycosylated hemoglobin (Hb A1C)     Orders Placed This Encounter   Medications    pioglitazone (ACTOS) 15 MG tablet     Sig: TAKE ONE TABLET BY MOUTH EVERY DAY     Dispense:  90 tablet     Refill:  3     No follow-ups on file.   Katharine Gonzales MD

## 2021-03-21 DIAGNOSIS — I10 BENIGN ESSENTIAL HTN: ICD-10-CM

## 2021-03-21 NOTE — TELEPHONE ENCOUNTER
Rx requested:  Requested Prescriptions     Pending Prescriptions Disp Refills    mometasone (ELOCON) 0.1 % cream [Pharmacy Med Name: Mometasone Furoate External Cream 0.1 %] 45 g 0     Sig: APPLY TOPICALLY DAILY       Last Office Visit:   5/26/2020        Next Visit Date:  Future Appointments   Date Time Provider Abe Azar   4/5/2021  3:15 PM Michelle Dumont MD [address]   9/2/2021  1:30 PM DO Davey Dumont [facility]   12/27/2021  1:00 PM Mitchell Becerra MD [facility]

## 2021-03-22 RX ORDER — MOMETASONE FUROATE 1 MG/G
CREAM TOPICAL
Qty: 45 G | Refills: 0 | Status: SHIPPED | OUTPATIENT
Start: 2021-03-22 | End: 2021-05-13

## 2021-03-23 ENCOUNTER — TELEPHONE (OUTPATIENT)
Dept: FAMILY MEDICINE CLINIC | Age: 56
End: 2021-03-23

## 2021-03-23 RX ORDER — METOPROLOL SUCCINATE 50 MG/1
TABLET, EXTENDED RELEASE ORAL
Qty: 180 TABLET | Refills: 3 | Status: SHIPPED | OUTPATIENT
Start: 2021-03-23 | End: 2022-02-16

## 2021-03-31 ENCOUNTER — NURSE TRIAGE (OUTPATIENT)
Dept: OTHER | Facility: CLINIC | Age: 56
End: 2021-03-31

## 2021-03-31 NOTE — TELEPHONE ENCOUNTER
Reason for Disposition   Cough has been present for > 3 weeks    Answer Assessment - Initial Assessment Questions  1. ONSET: \"When did the cough begin? \"       Ongoing for a month    2. SEVERITY: \"How bad is the cough today? \"       Same every day states frequent cough    3. RESPIRATORY DISTRESS: \"Describe your breathing. \"       Denies    4. FEVER: \"Do you have a fever? \" If so, ask: \"What is your temperature, how was it measured, and when did it start? \"      Denies    5. HEMOPTYSIS: \"Are you coughing up any blood? \" If so ask: \"How much? \" (flecks, streaks, tablespoons, etc.)      Denies    6. TREATMENT: \"What have you done so far to treat the cough? \" (e.g., meds, fluids, humidifier)      States humidifier    7. CARDIAC HISTORY: \"Do you have any history of heart disease? \" (e.g., heart attack, congestive heart failure)       Denies    8. LUNG HISTORY: \"Do you have any history of lung disease? \"  (e.g., pulmonary embolus, asthma, emphysema)      Asthma and sees specialist 4/5/2021    9. PE RISK FACTORS: \"Do you have a history of blood clots? \" (or: recent major surgery, recent prolonged travel, bedridden)      Denies    10. OTHER SYMPTOMS: \"Do you have any other symptoms? (e.g., runny nose, wheezing, chest pain)        Runny nose    11. PREGNANCY: \"Is there any chance you are pregnant? \" \"When was your last menstrual period? \"        N/a    12. TRAVEL: \"Have you traveled out of the country in the last month? \" (e.g., travel history, exposures)        [place]    Protocols used: COUGH - ACUTE NON-PRODUCTIVE-ADULT-AH    [facility] [employer] [facility]. Brief description of triage: see above    Triage indicates for patient to be seen in the next 3 days. Care advice provided, patient verbalizes understanding; denies any other questions or concerns; instructed to call back for any new or worsening symptoms.     Writer provided warm transfer to ruben at [facility] for appointment scheduling. Attention Provider: Thank you for allowing me to participate in the care of your patient. The patient was connected to triage in response to information provided to the E[facility]. Please do not respond through this encounter as the response is not directed to a shared pool.

## 2021-04-02 ENCOUNTER — VIRTUAL VISIT (OUTPATIENT)
Dept: FAMILY MEDICINE CLINIC | Age: 56
End: 2021-04-02
Payer: MEDICARE

## 2021-04-02 DIAGNOSIS — J06.9 UPPER RESPIRATORY TRACT INFECTION, UNSPECIFIED TYPE: Primary | ICD-10-CM

## 2021-04-02 PROCEDURE — 1036F TOBACCO NON-USER: CPT | Performed by: NURSE PRACTITIONER

## 2021-04-02 PROCEDURE — G8427 DOCREV CUR MEDS BY ELIG CLIN: HCPCS | Performed by: NURSE PRACTITIONER

## 2021-04-02 PROCEDURE — G8417 CALC BMI ABV UP PARAM F/U: HCPCS | Performed by: NURSE PRACTITIONER

## 2021-04-02 PROCEDURE — 99442 PR PHYS/QHP TELEPHONE EVALUATION 11-20 MIN: CPT | Performed by: NURSE PRACTITIONER

## 2021-04-02 PROCEDURE — 3017F COLORECTAL CA SCREEN DOC REV: CPT | Performed by: NURSE PRACTITIONER

## 2021-04-02 RX ORDER — DOXYCYCLINE HYCLATE 100 MG
100 TABLET ORAL 2 TIMES DAILY
Qty: 14 TABLET | Refills: 0 | Status: SHIPPED | OUTPATIENT
Start: 2021-04-02 | End: 2021-04-09

## 2021-04-02 RX ORDER — TRAZODONE HYDROCHLORIDE 100 MG/1
TABLET ORAL
COMMUNITY
Start: 2021-03-01

## 2021-04-02 ASSESSMENT — ENCOUNTER SYMPTOMS
TROUBLE SWALLOWING: 0
RHINORRHEA: 1
ABDOMINAL PAIN: 0
NAUSEA: 0
SORE THROAT: 0
COUGH: 1
SINUS PRESSURE: 0
HEARTBURN: 0
HEMOPTYSIS: 0
SHORTNESS OF BREATH: 0
WHEEZING: 0
VOMITING: 0

## 2021-04-05 ENCOUNTER — OFFICE VISIT (OUTPATIENT)
Dept: PULMONOLOGY | Age: 56
End: 2021-04-05
Payer: MEDICARE

## 2021-04-05 VITALS
TEMPERATURE: 96.2 F | OXYGEN SATURATION: 95 % | SYSTOLIC BLOOD PRESSURE: 115 MMHG | RESPIRATION RATE: 16 BRPM | DIASTOLIC BLOOD PRESSURE: 61 MMHG | HEIGHT: 62 IN | HEART RATE: 76 BPM | BODY MASS INDEX: 53.92 KG/M2 | WEIGHT: 293 LBS

## 2021-04-05 DIAGNOSIS — J45.20 MILD INTERMITTENT ASTHMA WITHOUT COMPLICATION: Primary | ICD-10-CM

## 2021-04-05 DIAGNOSIS — K21.9 GASTROESOPHAGEAL REFLUX DISEASE, UNSPECIFIED WHETHER ESOPHAGITIS PRESENT: ICD-10-CM

## 2021-04-05 DIAGNOSIS — R05.9 COUGH: ICD-10-CM

## 2021-04-05 DIAGNOSIS — E66.9 OBESITY (BMI 35.0-39.9 WITHOUT COMORBIDITY): ICD-10-CM

## 2021-04-05 DIAGNOSIS — R09.81 NASAL CONGESTION: ICD-10-CM

## 2021-04-05 PROCEDURE — G8427 DOCREV CUR MEDS BY ELIG CLIN: HCPCS | Performed by: INTERNAL MEDICINE

## 2021-04-05 PROCEDURE — G8417 CALC BMI ABV UP PARAM F/U: HCPCS | Performed by: INTERNAL MEDICINE

## 2021-04-05 PROCEDURE — 1036F TOBACCO NON-USER: CPT | Performed by: INTERNAL MEDICINE

## 2021-04-05 PROCEDURE — 99214 OFFICE O/P EST MOD 30 MIN: CPT | Performed by: INTERNAL MEDICINE

## 2021-04-05 PROCEDURE — 3017F COLORECTAL CA SCREEN DOC REV: CPT | Performed by: INTERNAL MEDICINE

## 2021-04-05 RX ORDER — IPRATROPIUM BROMIDE AND ALBUTEROL SULFATE 2.5; .5 MG/3ML; MG/3ML
SOLUTION RESPIRATORY (INHALATION)
Qty: 360 ML | Refills: 5 | Status: SHIPPED | OUTPATIENT
Start: 2021-04-05

## 2021-04-05 RX ORDER — AZELASTINE 1 MG/ML
1 SPRAY, METERED NASAL 2 TIMES DAILY
Qty: 2 BOTTLE | Refills: 1 | Status: SHIPPED | OUTPATIENT
Start: 2021-04-05 | End: 2022-05-27 | Stop reason: ALTCHOICE

## 2021-04-05 NOTE — PROGRESS NOTES
Subjective:     Shyanne Lawton is a 61 y.o. female who complains today of:     Chief Complaint   Patient presents with    Follow-up     6 Month F/U for Mild intermittent asthma     Cough     For about a week       HPI  Patient presents for asthma follow-up    She has not been feeling good over the last month, increased use of rescue inhaler, almost daily, no nighttime symptoms of coughing, no snoring, no fever or chills, denies lower extremity edema, she does have nasal congestion and postnasal drip, she complains of cough productive of clear phlegm this is her main concern actually more than shortness of breath, she has heartburn and she takes PPI twice a day, she is supposed to be on [facility] however she has not been using recently.             Allergies:  Latex, Penicillins, Shellfish-derived products, Abilify [aripiprazole], Adhesive tape, Statins, Topamax [topiramate], Buspar [buspirone], Other, and Sulfa antibiotics  Past Medical History:   Diagnosis Date    Anemia     Asthma     Benign essential HTN     meds > 5 yrs / denies TIA or stroke    Borderline personality disorder ([address]     2016 ?suggested by me-meets many criteria    Carpal tunnel syndrome of right wrist     Crohn's disease ([address]     Depression     Fibromyalgia 2/13/2018    Fibromyalgia     GERD (gastroesophageal reflux disease)     Gout     Headache     migraines    Irritable bowel syndrome     Mixed hyperlipidemia 5/10/2017    Neuropathy     Obesity (BMI 35.0-39.9 without comorbidity) 3/30/2017    PONV (postoperative nausea and vomiting)     nausea    PTSD (post-traumatic stress disorder)     Tremor of unknown origin     Type 2 diabetes mellitus ([address]     meds > 5yrs     Ulcerative colitis ([address] 11/2017    Vaginitis      Past Surgical History:   Procedure Laterality Date    BREAST CYST EXCISION Right 1994    exc mass benign    CHOLECYSTECTOMY  2009    COLONOSCOPY  2015    negative    ENDOSCOPY, COLON, DIAGNOSTIC      HERNIA REPAIR  2015    ventral / mesh    LEG TENDON SURGERY Right 2007    leg.  ankle and foot    OVARIAN CYST REMOVAL Left 1994    with mass and both fallopian tube    OVARY REMOVAL  12/2015    [facility] / mass left ovary & bilateral  tubes    PARTIAL HYSTERECTOMY      OR COLON CA SCRN NOT HI RSK IND N/A 11/7/2017    COLONOSCOPY performed by MANOJ Shanks on bx    OR COLONOSCOPY W/BIOPSY SINGLE/MULTIPLE N/A 3/15/2018    COLONOSCOPY performed by Kelly Ny MD at [facility] Right 8/3/2017    RIGHT KNEE ARTHROSCOPIC PARTIAL MEDIAL MENISCECTOMY KNEE performed by Pro Vaz MD at [address] [facility] N/CARPAL TUNNEL SURG Right 11/30/2017    RIGHT WRIST  CARPAL TUNNEL RELEASE performed by Pro Vaz MD at [address] [facility] N/CARPAL TUNNEL SURG Left 5/10/2018    LEFT WRIST CARPAL TUNNEL RELEASE performed by Pro Vaz MD at [address] [facility]     Family History   Problem Relation Age of Onset    Emphysema Mother     Cancer Maternal Grandfather     Diabetes Paternal Grandmother     Emphysema Paternal Grandfather     Heart Disease Sister     Stroke Sister     Diabetes Sister      Social History     Socioeconomic History    Marital status: Single     Spouse name: Not on file    Number of children: Not on file    Years of education: Not on file    Highest education level: Not on file   Occupational History    Not on file   Social Needs    Financial resource strain: Not hard at all   [employer] insecurity     Worry: Never true     Inability: Never true    Transportation needs     Medical: No     Non-medical: No   Tobacco Use    Smoking status: Never Smoker    Smokeless tobacco: Never Used   Substance and Sexual Activity    Alcohol use: No     Alcohol/week: 0.0 standard drinks    Drug use: No    Sexual activity: Not Currently   Lifestyle    Physical activity     Days per week: Not on file Minutes per session: Not on file    Stress: Not on file   Relationships    Social connections     Talks on phone: Not on file     Gets together: Not on file     Attends [employer] service: Not on file     Active member of club or organization: Not on file     Attends meetings of clubs or organizations: Not on file     Relationship status: Not on file    Intimate partner violence     Fear of current or ex partner: Not on file     Emotionally abused: Not on file     Physically abused: Not on file     Forced sexual activity: Not on file   Other Topics Concern    Not on file   Social History Narrative    Not on file         Review of Systems      ROS: 10 organs review of system is done including general, psychological, ENT, hematological, endocrine, respiratory, cardiovascular, gastrointestinal,musculoskeletal, neurological,  allergy and Immunology is done and is otherwise negative. Current Outpatient Medications   Medication Sig Dispense Refill    mometasone-formoterol (DULERA) 200-5 MCG/ACT inhaler Inhale 2 puffs into the lungs every 12 hours 1 Inhaler 3    azelastine (ASTELIN) 0.1 % nasal spray 1 spray by Nasal route 2 times daily Use in each nostril as directed 2 Bottle 1    ipratropium-albuterol (DUONEB) 0.5-2.5 (3) MG/3ML SOLN nebulizer solution INHALE 1 VIAL VIA NEBULIZER EVERY 4 HOURS 360 mL 5    traZODone (DESYREL) 100 MG tablet TAKE 1 TO 2 TABLETS BY MOUTH AT BEDTIME AS NEEDED FOR SLEEP      metoprolol succinate (TOPROL XL) 50 MG extended release tablet TAKE ONE TABLET BY MOUTH TWO TIMES A DAY.  HOLD FOR SYSTOLIC BLOOD PRESSURE LOWER THAN 100 OR HEART RATE LOWER THAN 60.  180 tablet 3    mometasone (ELOCON) 0.1 % cream APPLY TOPICALLY DAILY 45 g 0    pioglitazone (ACTOS) 15 MG tablet TAKE ONE TABLET BY MOUTH EVERY DAY 90 tablet 3    omeprazole (PRILOSEC) 40 MG delayed release capsule TAKE ONE CAPSULE BY MOUTH TWO TIMES A  capsule 3    potassium chloride (KLOR-CON M) 20 MEQ extended release tablet TAKE ONE TABLET BY MOUTH TWO TIMES A  tablet 3    furosemide (LASIX) 40 MG tablet TAKE ONE TABLET BY MOUTH TWO TIMES A DAY 60 tablet 5    QUEtiapine (SEROQUEL) 100 MG tablet TAKE ONE TABLET BYMOUTH EVERY EVENING AS NEEDED AT 8 PM      montelukast (SINGULAIR) 10 MG tablet TAKE ONE TABLET BY MOUTH NIGHTLY 30 tablet 5    allopurinol (ZYLOPRIM) 100 MG tablet TAKE ONE TABLET BY MOUTH EVERY DAY 90 tablet 3    albuterol sulfate HFA (PROVENTIL HFA) 108 (90 Base) MCG/ACT inhaler Inhale 2 puffs into the lungs every 6 hours as needed for Wheezing 1 Inhaler 3    metFORMIN (GLUCOPHAGE) 500 MG tablet TAKE ONE TABLET BY MOUTH TWICE A DAY WITH MEALS 180 tablet 1    Lancets MISC Test bid 100 each 3    dicyclomine (BENTYL) 10 MG capsule TAKE ONE CAPSULE BY MOUTH FOUR TIMES A DAY BEFORE MEALS AND NIGHTLY 120 capsule 5    rizatriptan (MAXALT) 10 MG tablet TAKE 1 TABLET BY MOUTH ONCE AS NEEDED FOR MIGRAINES. MAY REPEAT IN 2 HOURS IF NEEDED 30 tablet 0    hyoscyamine (ANASPAZ;LEVSIN) 125 MCG tablet TAKE ONE TABLET BY MOUTH EVERY 4 HOURS AS NEEDED for cramping 164 tablet 0    magnesium oxide (MAG-OX) 400 (241.3 Mg) MG TABS tablet Take 1 tablet by mouth 2 times daily 60 tablet 2    Blood Glucose Monitoring Suppl (ONE TOUCH ULTRA 2) w/Device KIT TEST TWICE DAILY  0    prazosin (MINIPRESS) 2 MG capsule TAKE 1 CAPSULE BY MOUTH EVERY NIGHT FOR NIGHTMARES  3    blood glucose monitor strips Test 2 times a day & as needed for symptoms of irregular blood glucose.  100 strip 3    nystatin-triamcinolone (MYCOLOG II) 427671-2.1 UNIT/GM-% cream APPLY TOPICALLY TWICE DAILY 45 g 2    Melatonin 5 MG CAPS Take 10 mg by mouth daily   2    FREESTYLE LITE strip use three times daily  50 each 5    DULoxetine (CYMBALTA) 60 MG extended release capsule TAKE ONE CAPSULE BY MOUTH TWICE DAILY AS DIRECTED IN THE MORNING AND AFTERNOON  1    meloxicam (MOBIC) 7.5 MG tablet TAKE ONE TABLET BY MOUTH TWO TIMES A DAY WITH MEALS TAKE ONLY WITH FOOD AS NEEDED  3    baclofen (LIORESAL) 20 MG tablet 20 mg 4 times daily       LYRICA 150 MG capsule 150 mg 3 times daily Indications: last filled 3/5/17 no refills       doxycycline hyclate (VIBRA-TABS) 100 MG tablet Take 1 tablet by mouth 2 times daily for 7 days Take with food (Patient not taking: Reported on 4/5/2021) 14 tablet 0    SUMAtriptan (IMITREX) 100 MG tablet take 1 tablet by mouth once as needed for migraine 9 tablet 0     No current facility-administered medications for this visit. Objective:     Vitals:    04/05/21 1526   BP: 115/61   Site: Right Upper Arm   Position: Sitting   Cuff Size: Large Adult   Pulse: 76   Resp: 16   Temp: 96.2 °F (35.7 °C)   TempSrc: Tympanic   SpO2: 95%   Weight: (!) 303 lb 9.6 oz (137.7 kg)   Height: 5' 2\" (1.575 m)         Physical Exam  Constitutional:       General: She is not in acute distress. Appearance: She is well-developed. She is not diaphoretic. HENT:      Head: Normocephalic and atraumatic. Eyes:      Conjunctiva/sclera: Conjunctivae normal.      Pupils: Pupils are equal, round, and reactive to light. Neck:      Musculoskeletal: Normal range of motion and neck supple. Cardiovascular:      Rate and Rhythm: Normal rate and regular rhythm. Heart sounds: No murmur. No friction rub. No gallop. Pulmonary:      Effort: Pulmonary effort is normal. No respiratory distress. Breath sounds: Normal breath sounds. No wheezing or rales. Chest:      Chest wall: No tenderness. Abdominal:      General: There is no distension. Palpations: Abdomen is soft. Tenderness: There is no abdominal tenderness. There is no rebound. Musculoskeletal:         General: No tenderness. Right lower leg: No edema. Left lower leg: No edema. Lymphadenopathy:      Cervical: No cervical adenopathy. Skin:     General: Skin is warm and dry. Findings: No erythema.    Neurological:      Mental Status: She is alert and oriented to person, place, and time. Psychiatric:         Judgment: Judgment normal.         Imaging studies reviewed by me CT chest 2020, no mass or infiltrate  Lab results reviewed in chart  PFT  FEV1 78%,       Assessment and Plan       Diagnosis Orders   1. Mild intermittent asthma without complication  mometasone-formoterol (DULERA) 200-5 MCG/ACT inhaler    azelastine (ASTELIN) 0.1 % nasal spray    ipratropium-albuterol (DUONEB) 0.5-2.5 (3) MG/3ML SOLN nebulizer solution   2. Cough     3. Gastroesophageal reflux disease, unspecified whether esophagitis present     4. Obesity (BMI 35.0-39.9 without comorbidity)     5. Nasal congestion       · Asthma symptoms currently not controlled, will restart patient on Dulera, this could be related to seasonal change and patient not taking maintenance inhalers, will reevaluate response on follow-up. Patient also reported coughing which could be cough variant asthma however she does have nasal congestion and cannot rule out postnasal drip, will start patient on azelastine. · GERD, continue PPI  · Weight loss is recommended         No orders of the defined types were placed in this encounter. Orders Placed This Encounter   Medications    mometasone-formoterol (DULERA) 200-5 MCG/ACT inhaler     Sig: Inhale 2 puffs into the lungs every 12 hours     Dispense:  1 Inhaler     Refill:  3    azelastine (ASTELIN) 0.1 % nasal spray     Si spray by Nasal route 2 times daily Use in each nostril as directed     Dispense:  2 Bottle     Refill:  1    ipratropium-albuterol (DUONEB) 0.5-2.5 (3) MG/3ML SOLN nebulizer solution     Sig: INHALE 1 VIAL VIA NEBULIZER EVERY 4 HOURS     Dispense:  360 mL     Refill:  5            Discussed with patient the importance of exercise and weight control and  overall health and well-being. Reviewed with the patient: current clinical status, medications, activities and diet.       Side effects, adverse effects of the medication prescribed today, as well as treatment plan and result expectations have been discussed with the patient who expresses understanding and desires to proceed. Return in about 6 weeks (around 5/17/2021).       Rene Lara MD

## 2021-04-07 ENCOUNTER — TELEPHONE (OUTPATIENT)
Dept: FAMILY MEDICINE CLINIC | Age: 56
End: 2021-04-07

## 2021-04-07 NOTE — TELEPHONE ENCOUNTER
LMOM informing patient that we've completed the paperwork that she dropped off yesterday. Dr. Jermaine Dorsey is wondering if she's got a podiatrist that will complete the prescription portion. Paperwork placed at  for pickup.

## 2021-04-07 NOTE — TELEPHONE ENCOUNTER
We tried to call the place that we've got numbers for and it's not in service. Anup Lopez will discuss with Guillermo Duron tomorrow.

## 2021-04-13 DIAGNOSIS — E11.42 TYPE 2 DIABETES MELLITUS WITH DIABETIC POLYNEUROPATHY, WITHOUT LONG-TERM CURRENT USE OF INSULIN (HCC): ICD-10-CM

## 2021-04-15 ENCOUNTER — TELEPHONE (OUTPATIENT)
Dept: CARDIOLOGY CLINIC | Age: 56
End: 2021-04-15

## 2021-04-15 NOTE — TELEPHONE ENCOUNTER
Patient calling states she has increased SOB,HR, and SOB. Has Iron infusion today and was unable to make appt for today. Please advise. Pt returned from receiving infusion and states her HR is 81 and Sp02 94% but feels shaky and feels occasional palpitations.  Advised ER

## 2021-04-19 DIAGNOSIS — I10 BENIGN ESSENTIAL HTN: ICD-10-CM

## 2021-04-19 DIAGNOSIS — F33.2 SEVERE EPISODE OF RECURRENT MAJOR DEPRESSIVE DISORDER, WITHOUT PSYCHOTIC FEATURES (HCC): ICD-10-CM

## 2021-04-19 DIAGNOSIS — E11.42 TYPE 2 DIABETES MELLITUS WITH DIABETIC POLYNEUROPATHY, WITHOUT LONG-TERM CURRENT USE OF INSULIN (HCC): ICD-10-CM

## 2021-04-19 DIAGNOSIS — M1A.00X0 IDIOPATHIC CHRONIC GOUT WITHOUT TOPHUS, UNSPECIFIED SITE: ICD-10-CM

## 2021-04-19 LAB
ALBUMIN SERPL-MCNC: 4 G/DL (ref 3.5–4.6)
ALP BLD-CCNC: 99 U/L (ref 40–130)
ALT SERPL-CCNC: 10 U/L (ref 0–33)
ANION GAP SERPL CALCULATED.3IONS-SCNC: 13 MEQ/L (ref 9–15)
AST SERPL-CCNC: 20 U/L (ref 0–35)
BILIRUB SERPL-MCNC: <0.2 MG/DL (ref 0.2–0.7)
BUN BLDV-MCNC: 15 MG/DL (ref 6–20)
CALCIUM SERPL-MCNC: 7.1 MG/DL (ref 8.5–9.9)
CHLORIDE BLD-SCNC: 98 MEQ/L (ref 95–107)
CHOLESTEROL, TOTAL: 204 MG/DL (ref 0–199)
CO2: 28 MEQ/L (ref 20–31)
CREAT SERPL-MCNC: 0.99 MG/DL (ref 0.5–0.9)
CREATININE URINE: 250.6 MG/DL
GFR AFRICAN AMERICAN: >60
GFR NON-AFRICAN AMERICAN: 57.8
GLOBULIN: 2.9 G/DL (ref 2.3–3.5)
GLUCOSE BLD-MCNC: 120 MG/DL (ref 70–99)
HBA1C MFR BLD: 6.2 % (ref 4.8–5.9)
HDLC SERPL-MCNC: 65 MG/DL (ref 40–59)
LDL CHOLESTEROL CALCULATED: 94 MG/DL (ref 0–129)
MICROALBUMIN UR-MCNC: <1.2 MG/DL
MICROALBUMIN/CREAT UR-RTO: NORMAL MG/G (ref 0–30)
POTASSIUM SERPL-SCNC: 4.5 MEQ/L (ref 3.4–4.9)
SODIUM BLD-SCNC: 139 MEQ/L (ref 135–144)
T4 FREE: 1.21 NG/DL (ref 0.84–1.68)
TOTAL PROTEIN: 6.9 G/DL (ref 6.3–8)
TRIGL SERPL-MCNC: 224 MG/DL (ref 0–150)
TSH REFLEX: 5.06 UIU/ML (ref 0.44–3.86)
URIC ACID, SERUM: 6.7 MG/DL (ref 2.4–5.7)

## 2021-05-10 ENCOUNTER — OFFICE VISIT (OUTPATIENT)
Dept: FAMILY MEDICINE CLINIC | Age: 56
End: 2021-05-10
Payer: MEDICARE

## 2021-05-10 VITALS
TEMPERATURE: 97.4 F | HEIGHT: 62 IN | HEART RATE: 90 BPM | OXYGEN SATURATION: 93 % | SYSTOLIC BLOOD PRESSURE: 130 MMHG | WEIGHT: 293 LBS | DIASTOLIC BLOOD PRESSURE: 80 MMHG | BODY MASS INDEX: 53.92 KG/M2

## 2021-05-10 DIAGNOSIS — R51.9 NONINTRACTABLE HEADACHE, UNSPECIFIED CHRONICITY PATTERN, UNSPECIFIED HEADACHE TYPE: ICD-10-CM

## 2021-05-10 DIAGNOSIS — R11.2 NAUSEA AND VOMITING, INTRACTABILITY OF VOMITING NOT SPECIFIED, UNSPECIFIED VOMITING TYPE: Primary | ICD-10-CM

## 2021-05-10 PROCEDURE — G8427 DOCREV CUR MEDS BY ELIG CLIN: HCPCS | Performed by: PHYSICIAN ASSISTANT

## 2021-05-10 PROCEDURE — 3017F COLORECTAL CA SCREEN DOC REV: CPT | Performed by: PHYSICIAN ASSISTANT

## 2021-05-10 PROCEDURE — 1036F TOBACCO NON-USER: CPT | Performed by: PHYSICIAN ASSISTANT

## 2021-05-10 PROCEDURE — 99213 OFFICE O/P EST LOW 20 MIN: CPT | Performed by: PHYSICIAN ASSISTANT

## 2021-05-10 PROCEDURE — G8417 CALC BMI ABV UP PARAM F/U: HCPCS | Performed by: PHYSICIAN ASSISTANT

## 2021-05-10 RX ORDER — BUTALBITAL, ACETAMINOPHEN AND CAFFEINE 300; 40; 50 MG/1; MG/1; MG/1
1 CAPSULE ORAL EVERY 6 HOURS PRN
Qty: 40 CAPSULE | Refills: 0 | Status: SHIPPED | OUTPATIENT
Start: 2021-05-10 | End: 2022-10-21

## 2021-05-10 RX ORDER — ONDANSETRON 4 MG/1
4 TABLET, ORALLY DISINTEGRATING ORAL EVERY 8 HOURS PRN
Qty: 30 TABLET | Refills: 0 | Status: SHIPPED | OUTPATIENT
Start: 2021-05-10 | End: 2021-05-20

## 2021-05-10 ASSESSMENT — ENCOUNTER SYMPTOMS
SHORTNESS OF BREATH: 0
TROUBLE SWALLOWING: 0
ABDOMINAL PAIN: 0
CHEST TIGHTNESS: 0
NAUSEA: 1
DIARRHEA: 0
VOMITING: 0
SINUS PRESSURE: 0
COUGH: 0
BACK PAIN: 0

## 2021-05-10 ASSESSMENT — PATIENT HEALTH QUESTIONNAIRE - PHQ9
SUM OF ALL RESPONSES TO PHQ9 QUESTIONS 1 & 2: 0
SUM OF ALL RESPONSES TO PHQ QUESTIONS 1-9: 0
SUM OF ALL RESPONSES TO PHQ QUESTIONS 1-9: 0

## 2021-05-10 NOTE — PROGRESS NOTES
Subjective:      Patient ID: Dena Singh is a 61 y.o. female who presents today for:  Chief Complaint   Patient presents with    Nausea & Vomiting     Pt is here c/o nausea. Pt states she had her 2nd COVID vaccine on thursday, and states she became nauseous and can't kep any food down. Pt states she takes a drink, and it comes back up almost instantly. Pt also states she gets migraines, which she was prescribed Imitrex with little relief. HPI  61year old female who presents complaining of nausea after having the COVID-19 vaccine 5 days ago. She states that she has a severe head ache and chills. Past Medical History:   Diagnosis Date    Anemia     Asthma     Benign essential HTN     meds > 5 yrs / denies TIA or stroke    Borderline personality disorder ([address]     2016 ?suggested by me-meets many criteria    Carpal tunnel syndrome of right wrist     Crohn's disease ([address]     Depression     Fibromyalgia 2/13/2018    Fibromyalgia     GERD (gastroesophageal reflux disease)     Gout     Headache     migraines    Irritable bowel syndrome     Mixed hyperlipidemia 5/10/2017    Neuropathy     Obesity (BMI 35.0-39.9 without comorbidity) 3/30/2017    PONV (postoperative nausea and vomiting)     nausea    PTSD (post-traumatic stress disorder)     Tremor of unknown origin     Type 2 diabetes mellitus ([address]     meds > 5yrs     Ulcerative colitis ([address] 11/2017    Vaginitis      Past Surgical History:   Procedure Laterality Date    BREAST CYST EXCISION Right 1994    exc mass benign    CHOLECYSTECTOMY  2009    COLONOSCOPY  2015    negative    ENDOSCOPY, COLON, DIAGNOSTIC      HERNIA REPAIR  2015    ventral / mesh    LEG TENDON SURGERY Right 2007    leg.  ankle and foot    OVARIAN CYST REMOVAL Left 1994    with mass and both fallopian tube    OVARY REMOVAL  12/2015    [facility] / mass left ovary & bilateral  tubes    PARTIAL HYSTERECTOMY      ND COLON CA SCRN NOT HI RSK IND N/A 11/7/2017 on file   Social History Narrative    Not on file     Family History   Problem Relation Age of Onset    Emphysema Mother     Cancer Maternal Grandfather     Diabetes Paternal Grandmother     Emphysema Paternal Grandfather     Heart Disease Sister     Stroke Sister     Diabetes Sister      Allergies   Allergen Reactions    Latex Hives    Penicillins Swelling and Rash    Shellfish-Derived Products Anaphylaxis    Abilify [Aripiprazole]      shaking    Adhesive Tape Swelling     If left on too long    Statins      Swelling      Topamax [Topiramate]     Buspar [Buspirone]      shaking    Other Nausea And Vomiting    Sulfa Antibiotics Nausea And Vomiting     Current Outpatient Medications   Medication Sig Dispense Refill    ondansetron (ZOFRAN ODT) 4 MG disintegrating tablet Take 1 tablet by mouth every 8 hours as needed for Nausea or Vomiting 30 tablet 0    butalbital-APAP-caffeine (FIORICET) -40 MG CAPS per capsule Take 1 capsule by mouth every 6 hours as needed for Headaches 40 capsule 0    metFORMIN (GLUCOPHAGE) 500 MG tablet TAKE ONE TABLET BY MOUTH TWICE A DAY WITH MEALS 180 tablet 3    mometasone-formoterol (DULERA) 200-5 MCG/ACT inhaler Inhale 2 puffs into the lungs every 12 hours 1 Inhaler 3    azelastine (ASTELIN) 0.1 % nasal spray 1 spray by Nasal route 2 times daily Use in each nostril as directed 2 Bottle 1    ipratropium-albuterol (DUONEB) 0.5-2.5 (3) MG/3ML SOLN nebulizer solution INHALE 1 VIAL VIA NEBULIZER EVERY 4 HOURS 360 mL 5    traZODone (DESYREL) 100 MG tablet TAKE 1 TO 2 TABLETS BY MOUTH AT BEDTIME AS NEEDED FOR SLEEP      metoprolol succinate (TOPROL XL) 50 MG extended release tablet TAKE ONE TABLET BY MOUTH TWO TIMES A DAY.  HOLD FOR SYSTOLIC BLOOD PRESSURE LOWER THAN 100 OR HEART RATE LOWER THAN 60.  180 tablet 3    mometasone (ELOCON) 0.1 % cream APPLY TOPICALLY DAILY 45 g 0    pioglitazone (ACTOS) 15 MG tablet TAKE ONE TABLET BY MOUTH EVERY DAY 90 tablet 3    omeprazole (PRILOSEC) 40 MG delayed release capsule TAKE ONE CAPSULE BY MOUTH TWO TIMES A  capsule 3    potassium chloride (KLOR-CON M) 20 MEQ extended release tablet TAKE ONE TABLET BY MOUTH TWO TIMES A  tablet 3    furosemide (LASIX) 40 MG tablet TAKE ONE TABLET BY MOUTH TWO TIMES A DAY 60 tablet 5    QUEtiapine (SEROQUEL) 100 MG tablet TAKE ONE TABLET BYMOUTH EVERY EVENING AS NEEDED AT 8 PM      montelukast (SINGULAIR) 10 MG tablet TAKE ONE TABLET BY MOUTH NIGHTLY 30 tablet 5    allopurinol (ZYLOPRIM) 100 MG tablet TAKE ONE TABLET BY MOUTH EVERY DAY 90 tablet 3    albuterol sulfate HFA (PROVENTIL HFA) 108 (90 Base) MCG/ACT inhaler Inhale 2 puffs into the lungs every 6 hours as needed for Wheezing 1 Inhaler 3    Lancets MISC Test bid 100 each 3    dicyclomine (BENTYL) 10 MG capsule TAKE ONE CAPSULE BY MOUTH FOUR TIMES A DAY BEFORE MEALS AND NIGHTLY 120 capsule 5    rizatriptan (MAXALT) 10 MG tablet TAKE 1 TABLET BY MOUTH ONCE AS NEEDED FOR MIGRAINES. MAY REPEAT IN 2 HOURS IF NEEDED 30 tablet 0    hyoscyamine (ANASPAZ;LEVSIN) 125 MCG tablet TAKE ONE TABLET BY MOUTH EVERY 4 HOURS AS NEEDED for cramping 164 tablet 0    magnesium oxide (MAG-OX) 400 (241.3 Mg) MG TABS tablet Take 1 tablet by mouth 2 times daily 60 tablet 2    Blood Glucose Monitoring Suppl (ONE TOUCH ULTRA 2) w/Device KIT TEST TWICE DAILY  0    prazosin (MINIPRESS) 2 MG capsule TAKE 1 CAPSULE BY MOUTH EVERY NIGHT FOR NIGHTMARES  3    blood glucose monitor strips Test 2 times a day & as needed for symptoms of irregular blood glucose.  100 strip 3    nystatin-triamcinolone (MYCOLOG II) 105861-6.1 UNIT/GM-% cream APPLY TOPICALLY TWICE DAILY 45 g 2    Melatonin 5 MG CAPS Take 10 mg by mouth daily   2    FREESTYLE LITE strip use three times daily  50 each 5    DULoxetine (CYMBALTA) 60 MG extended release capsule TAKE ONE CAPSULE BY MOUTH TWICE DAILY AS DIRECTED IN THE MORNING AND AFTERNOON  1    meloxicam (MOBIC) 7.5 MG and atraumatic. No Craft's sign. Right Ear: External ear normal.      Left Ear: External ear normal.      Nose: Nose normal. No congestion or rhinorrhea. Mouth/Throat:      Mouth: Mucous membranes are moist.      Pharynx: Oropharynx is clear. No oropharyngeal exudate or posterior oropharyngeal erythema. Eyes:      General: No scleral icterus. Right eye: No foreign body or discharge. Left eye: No discharge. Extraocular Movements: Extraocular movements intact. Conjunctiva/sclera: Conjunctivae normal.      Left eye: No exudate. Pupils: Pupils are equal, round, and reactive to light. Neck:      Musculoskeletal: Normal range of motion and neck supple. No neck rigidity. Vascular: No JVD. Trachea: No tracheal deviation. Comments: No meningismus. Cardiovascular:      Rate and Rhythm: Normal rate and regular rhythm. Heart sounds: Normal heart sounds. Heart sounds not distant. No murmur. No friction rub. No gallop. Pulmonary:      Effort: Pulmonary effort is normal. No respiratory distress. Breath sounds: Normal breath sounds. No stridor. No wheezing, rhonchi or rales. Chest:      Chest wall: No tenderness. Abdominal:      General: Abdomen is flat. Bowel sounds are normal. There is no distension. Palpations: Abdomen is soft. There is no mass. Tenderness: There is no abdominal tenderness. There is no right CVA tenderness, left CVA tenderness, guarding or rebound. Hernia: No hernia is present. Musculoskeletal: Normal range of motion. General: No swelling, tenderness, deformity or signs of injury. Lymphadenopathy:      Head:      Right side of head: No submental adenopathy. Left side of head: No submental adenopathy. Skin:     General: Skin is warm and dry. Capillary Refill: Capillary refill takes less than 2 seconds. Coloration: Skin is not jaundiced or pale.       Findings: No bruising, erythema, lesion or rash.   Neurological:      General: No focal deficit present. Mental Status: She is alert and oriented to person, place, and time. Mental status is at baseline. Cranial Nerves: No cranial nerve deficit. Sensory: No sensory deficit. Motor: No weakness. Coordination: Coordination normal.      Deep Tendon Reflexes: Reflexes are normal and symmetric. Psychiatric:         Mood and Affect: Mood normal.         Behavior: Behavior normal. Behavior is cooperative. Thought Content: Thought content normal.         Judgment: Judgment normal.         Assessment:       Diagnosis Orders   1. Nausea and vomiting, intractability of vomiting not specified, unspecified vomiting type  ondansetron (ZOFRAN ODT) 4 MG disintegrating tablet   2. Nonintractable headache, unspecified chronicity pattern, unspecified headache type  butalbital-APAP-caffeine (FIORICET) -40 MG CAPS per capsule     No results found for this visit on 05/10/21. Plan:     Assessment & Plan   Vanesa Perez was seen today for nausea & vomiting. Diagnoses and all orders for this visit:    Nausea and vomiting, intractability of vomiting not specified, unspecified vomiting type  -     ondansetron (ZOFRAN ODT) 4 MG disintegrating tablet; Take 1 tablet by mouth every 8 hours as needed for Nausea or Vomiting    Nonintractable headache, unspecified chronicity pattern, unspecified headache type  -     butalbital-APAP-caffeine (FIORICET) -40 MG CAPS per capsule; Take 1 capsule by mouth every 6 hours as needed for Headaches      No orders of the defined types were placed in this encounter.     Orders Placed This Encounter   Medications    ondansetron (ZOFRAN ODT) 4 MG disintegrating tablet     Sig: Take 1 tablet by mouth every 8 hours as needed for Nausea or Vomiting     Dispense:  30 tablet     Refill:  0    butalbital-APAP-caffeine (FIORICET) -40 MG CAPS per capsule     Sig: Take 1 capsule by mouth every 6 hours as needed for Headaches     Dispense:  40 capsule     Refill:  0     There are no discontinued medications. Return if symptoms worsen or fail to improve. Reviewed with the patient/family: current clinical status & medications. Side effects of the medication prescribed today, as well as treatment plan/rationale and result expectations have been discussed with the patient/family who expresses understanding. Patient will be discharged home in stable condition. Follow up with PCP to evaluate treatment results or return if symptoms worsen or fail to improve. Discussed signs and symptoms which require immediate follow-up in ED/call to 911. Understanding verbalized. I have reviewed the patient's medical history in detail and updated the computerized patient record.     CHESTER Granados

## 2021-05-13 DIAGNOSIS — K50.919 CROHN'S DISEASE WITH COMPLICATION, UNSPECIFIED GASTROINTESTINAL TRACT LOCATION (HCC): ICD-10-CM

## 2021-05-13 DIAGNOSIS — J45.20 MILD INTERMITTENT ASTHMA WITHOUT COMPLICATION: ICD-10-CM

## 2021-05-13 RX ORDER — MOMETASONE FUROATE 1 MG/G
CREAM TOPICAL
Qty: 45 G | Refills: 0 | Status: SHIPPED | OUTPATIENT
Start: 2021-05-13 | End: 2021-07-06

## 2021-05-13 RX ORDER — DICYCLOMINE HYDROCHLORIDE 10 MG/1
CAPSULE ORAL
Qty: 120 CAPSULE | Refills: 5 | Status: SHIPPED | OUTPATIENT
Start: 2021-05-13 | End: 2022-02-10 | Stop reason: ALTCHOICE

## 2021-05-13 RX ORDER — MONTELUKAST SODIUM 10 MG/1
TABLET ORAL
Qty: 30 TABLET | Refills: 5 | Status: SHIPPED | OUTPATIENT
Start: 2021-05-13 | End: 2021-11-18

## 2021-05-14 DIAGNOSIS — I10 BENIGN ESSENTIAL HTN: ICD-10-CM

## 2021-05-17 RX ORDER — PIOGLITAZONEHYDROCHLORIDE 15 MG/1
TABLET ORAL
Qty: 90 TABLET | Refills: 3 | Status: SHIPPED | OUTPATIENT
Start: 2021-05-17 | End: 2022-07-15

## 2021-05-20 RX ORDER — FUROSEMIDE 40 MG/1
TABLET ORAL
Qty: 60 TABLET | Refills: 5 | Status: SHIPPED | OUTPATIENT
Start: 2021-05-20 | End: 2022-05-12 | Stop reason: SDUPTHER

## 2021-05-29 ENCOUNTER — HOSPITAL ENCOUNTER (EMERGENCY)
Age: 56
Discharge: HOME | End: 2021-05-29
Attending: EMERGENCY MEDICINE
Payer: MEDICARE

## 2021-05-29 VITALS
DIASTOLIC BLOOD PRESSURE: 74 MMHG | HEIGHT: 62 IN | RESPIRATION RATE: 16 BRPM | OXYGEN SATURATION: 98 % | TEMPERATURE: 96.9 F | HEART RATE: 84 BPM | SYSTOLIC BLOOD PRESSURE: 133 MMHG | WEIGHT: 293 LBS | BODY MASS INDEX: 53.92 KG/M2

## 2021-05-29 DIAGNOSIS — M79.10 MYALGIA: Primary | ICD-10-CM

## 2021-05-29 DIAGNOSIS — E83.42 HYPOMAGNESEMIA: ICD-10-CM

## 2021-05-29 LAB
ALBUMIN SERPL-MCNC: 4.6 G/DL (ref 3.5–4.6)
ALP BLD-CCNC: 109 U/L (ref 40–130)
ALT SERPL-CCNC: 9 U/L (ref 0–33)
ANION GAP SERPL CALCULATED.3IONS-SCNC: 11 MEQ/L (ref 9–15)
AST SERPL-CCNC: 15 U/L (ref 0–35)
BASOPHILS ABSOLUTE: 0 K/UL (ref 0–0.2)
BASOPHILS RELATIVE PERCENT: 0.5 %
BILIRUB SERPL-MCNC: <0.2 MG/DL (ref 0.2–0.7)
BUN BLDV-MCNC: 24 MG/DL (ref 6–20)
CALCIUM SERPL-MCNC: 8.1 MG/DL (ref 8.5–9.9)
CHLORIDE BLD-SCNC: 102 MEQ/L (ref 95–107)
CO2: 27 MEQ/L (ref 20–31)
CREAT SERPL-MCNC: 1.18 MG/DL (ref 0.5–0.9)
EOSINOPHILS ABSOLUTE: 0.2 K/UL (ref 0–0.7)
EOSINOPHILS RELATIVE PERCENT: 1.7 %
GFR AFRICAN AMERICAN: 57.1
GFR NON-AFRICAN AMERICAN: 47.2
GLOBULIN: 3.3 G/DL (ref 2.3–3.5)
GLUCOSE BLD-MCNC: 108 MG/DL (ref 70–99)
HCT VFR BLD CALC: 36.7 % (ref 37–47)
HEMOGLOBIN: 12.1 G/DL (ref 12–16)
LYMPHOCYTES ABSOLUTE: 2.2 K/UL (ref 1–4.8)
LYMPHOCYTES RELATIVE PERCENT: 22.9 %
MAGNESIUM: 0.8 MG/DL (ref 1.7–2.4)
MCH RBC QN AUTO: 29 PG (ref 27–31.3)
MCHC RBC AUTO-ENTMCNC: 32.8 % (ref 33–37)
MCV RBC AUTO: 88.4 FL (ref 82–100)
MONOCYTES ABSOLUTE: 1.3 K/UL (ref 0.2–0.8)
MONOCYTES RELATIVE PERCENT: 13.3 %
NEUTROPHILS ABSOLUTE: 6 K/UL (ref 1.4–6.5)
NEUTROPHILS RELATIVE PERCENT: 61.6 %
PDW BLD-RTO: 16.6 % (ref 11.5–14.5)
PLATELET # BLD: 290 K/UL (ref 130–400)
POTASSIUM SERPL-SCNC: 4.6 MEQ/L (ref 3.4–4.9)
RBC # BLD: 4.16 M/UL (ref 4.2–5.4)
SODIUM BLD-SCNC: 140 MEQ/L (ref 135–144)
TOTAL PROTEIN: 7.9 G/DL (ref 6.3–8)
WBC # BLD: 9.8 K/UL (ref 4.8–10.8)

## 2021-05-29 PROCEDURE — 96366 THER/PROPH/DIAG IV INF ADDON: CPT

## 2021-05-29 PROCEDURE — 99283 EMERGENCY DEPT VISIT LOW MDM: CPT

## 2021-05-29 PROCEDURE — 83735 ASSAY OF MAGNESIUM: CPT

## 2021-05-29 PROCEDURE — 80053 COMPREHEN METABOLIC PANEL: CPT

## 2021-05-29 PROCEDURE — 6360000002 HC RX W HCPCS: Performed by: EMERGENCY MEDICINE

## 2021-05-29 PROCEDURE — 36415 COLL VENOUS BLD VENIPUNCTURE: CPT

## 2021-05-29 PROCEDURE — 96365 THER/PROPH/DIAG IV INF INIT: CPT

## 2021-05-29 PROCEDURE — 85025 COMPLETE CBC W/AUTO DIFF WBC: CPT

## 2021-05-29 PROCEDURE — 2580000003 HC RX 258: Performed by: EMERGENCY MEDICINE

## 2021-05-29 RX ORDER — 0.9 % SODIUM CHLORIDE 0.9 %
1000 INTRAVENOUS SOLUTION INTRAVENOUS ONCE
Status: COMPLETED | OUTPATIENT
Start: 2021-05-29 | End: 2021-05-29

## 2021-05-29 RX ORDER — MAGNESIUM SULFATE IN WATER 40 MG/ML
2000 INJECTION, SOLUTION INTRAVENOUS ONCE
Status: COMPLETED | OUTPATIENT
Start: 2021-05-29 | End: 2021-05-29

## 2021-05-29 RX ADMIN — MAGNESIUM SULFATE HEPTAHYDRATE 2000 MG: 40 INJECTION, SOLUTION INTRAVENOUS at 18:14

## 2021-05-29 RX ADMIN — MAGNESIUM SULFATE HEPTAHYDRATE 2000 MG: 40 INJECTION, SOLUTION INTRAVENOUS at 19:05

## 2021-05-29 RX ADMIN — SODIUM CHLORIDE 1000 ML: 9 INJECTION, SOLUTION INTRAVENOUS at 18:13

## 2021-05-29 ASSESSMENT — ENCOUNTER SYMPTOMS
ABDOMINAL PAIN: 0
VOMITING: 0
SORE THROAT: 0
COUGH: 0
DIARRHEA: 0
NAUSEA: 0
SHORTNESS OF BREATH: 0
BACK PAIN: 0

## 2021-05-29 ASSESSMENT — PAIN SCALES - GENERAL: PAINLEVEL_OUTOF10: 9

## 2021-05-29 ASSESSMENT — PAIN DESCRIPTION - PAIN TYPE: TYPE: ACUTE PAIN

## 2021-05-29 ASSESSMENT — PAIN DESCRIPTION - LOCATION: LOCATION: GENERALIZED

## 2021-05-29 NOTE — ED TRIAGE NOTES
Pt to ED with c/o generalized muscle and bone pain. States she was out of town for 2 weeks and did not take her magnesium. Pt reports hx hypomagnesemia and is concerned about it repeating.

## 2021-05-29 NOTE — ED PROVIDER NOTES
[facility] ED  eMERGENCYdEPARTMENT eNCOUnter      Pt Name: Ramone Carreon  MRN: [id number]  [place] 1965  Date of evaluation: 5/29/2021  Rene Quiles MD    CHIEF COMPLAINT           HPI  Ramone Carreon is a 61 y.o. female per chart review has a h/o asthma, borderline personality, fibromyalgia, depression/anxiety, HTN, Hpl, DM II presents to the ED with myalgias. Pt notes gradual onset, moderate, constant, diffuse myalgias x 1 week. Pt notes he has not taken her Mg in 2 weeks due to her forgetting it while on vacation. Pt denies fever, n/v, cp, sob, dysuria, diarrhea. Pt states that this is how she feels after not taking her Mg. ROS  Review of Systems   Constitutional: Negative for activity change, chills and fever. HENT: Negative for ear pain and sore throat. Eyes: Negative for visual disturbance. Respiratory: Negative for cough and shortness of breath. Cardiovascular: Negative for chest pain, palpitations and leg swelling. Gastrointestinal: Negative for abdominal pain, diarrhea, nausea and vomiting. Genitourinary: Negative for dysuria. Musculoskeletal: Positive for myalgias. Negative for back pain. Skin: Negative for rash. Neurological: Negative for dizziness and weakness. Except as noted above the remainder of the review of systems was reviewed and negative.        PAST MEDICAL HISTORY     Past Medical History:   Diagnosis Date    Anemia     Asthma     Benign essential HTN     meds > 5 yrs / denies TIA or stroke    Borderline personality disorder ([address]     2016 ?suggested by me-meets many criteria    Carpal tunnel syndrome of right wrist     Crohn's disease ([address]     Depression     Fibromyalgia 2/13/2018    Fibromyalgia     GERD (gastroesophageal reflux disease)     Gout     Headache     migraines    Irritable bowel syndrome     Mixed hyperlipidemia 5/10/2017    Neuropathy     Obesity (BMI 35.0-39.9 without comorbidity) 3/30/2017    PONV (postoperative nausea and vomiting)     nausea    PTSD (post-traumatic stress disorder)     Tremor of unknown origin     Type 2 diabetes mellitus ([facility]     meds > 5yrs     Ulcerative colitis ([facility] 11/2017    Vaginitis          SURGICAL HISTORY       Past Surgical History:   Procedure Laterality Date    BREAST CYST EXCISION Right 1994    exc mass benign    CHOLECYSTECTOMY  2009    COLONOSCOPY  2015    negative    ENDOSCOPY, COLON, DIAGNOSTIC      HERNIA REPAIR  2015    ventral / mesh    LEG TENDON SURGERY Right 2007    leg.  ankle and foot    OVARIAN CYST REMOVAL Left 1994    with mass and both fallopian tube    OVARY REMOVAL  12/2015    [facility] / mass left ovary & bilateral  tubes    PARTIAL HYSTERECTOMY      [place] COLON CA SCRN NOT HI RSK IND N/A 11/7/2017    COLONOSCOPY performed by Cherisse Mortimer, [facility] on bx    [place] COLONOSCOPY W/BIOPSY SINGLE/MULTIPLE N/A 3/15/2018    COLONOSCOPY performed by Cherisse Mortimer, MD at [facility] Right 8/3/2017    RIGHT KNEE ARTHROSCOPIC PARTIAL MEDIAL MENISCECTOMY KNEE performed by Nedra Kelly MD at [facility] N/CARPAL TUNNEL SURG Right 11/30/2017    RIGHT WRIST  CARPAL TUNNEL RELEASE performed by Nedra Kelly MD at [facility] N/CARPAL TUNNEL SURG Left 5/10/2018    LEFT WRIST CARPAL TUNNEL RELEASE performed by Nedra Kelly MD at [address] [place] [address]       Previous Medications    ALBUTEROL SULFATE HFA (PROVENTIL HFA) 108 (90 BASE) MCG/ACT INHALER    Inhale 2 puffs into the lungs every 6 hours as needed for Wheezing    ALLOPURINOL (ZYLOPRIM) 100 MG TABLET    TAKE ONE TABLET BY MOUTH EVERY DAY    AZELASTINE (ASTELIN) 0.1 % NASAL SPRAY    1 spray by Nasal route 2 times daily Use in each nostril as directed    BACLOFEN (LIORESAL) 20 MG TABLET    20 mg 4 times daily     BLOOD GLUCOSE MONITOR STRIPS    Test 2 times a day & as needed for symptoms of irregular blood glucose. BLOOD GLUCOSE MONITORING SUPPL (ONE TOUCH ULTRA 2) W/DEVICE KIT    TEST TWICE DAILY    BUTALBITAL-APAP-CAFFEINE (FIORICET) -40 MG CAPS PER CAPSULE    Take 1 capsule by mouth every 6 hours as needed for Headaches    DICYCLOMINE (BENTYL) 10 MG CAPSULE    TAKE ONE CAPSULE BY MOUTH FOUR TIMES A DAY, BEFORE MEALS & NIGHTLY    DULOXETINE (CYMBALTA) 60 MG EXTENDED RELEASE CAPSULE    TAKE ONE CAPSULE BY MOUTH TWICE DAILY AS DIRECTED IN THE MORNING AND AFTERNOON    FREESTYLE LITE STRIP    use three times daily     FUROSEMIDE (LASIX) 40 MG TABLET    TAKE ONE TABLET BY MOUTH TWO TIMES A DAY    HYOSCYAMINE (ANASPAZ;LEVSIN) 125 MCG TABLET    TAKE ONE TABLET BY MOUTH EVERY 4 HOURS AS NEEDED for cramping    IPRATROPIUM-ALBUTEROL (DUONEB) 0.5-2.5 (3) MG/3ML SOLN NEBULIZER SOLUTION    INHALE 1 VIAL VIA NEBULIZER EVERY 4 HOURS    LANCETS MISC    Test bid    LYRICA 150 MG CAPSULE    150 mg 3 times daily Indications: last filled 3/5/17 no refills     MAGNESIUM OXIDE (MAG-OX) 400 (241.3 MG) MG TABS TABLET    Take 1 tablet by mouth 2 times daily    MELATONIN 5 MG CAPS    Take 10 mg by mouth daily     MELOXICAM (MOBIC) 7.5 MG TABLET    TAKE ONE TABLET BY MOUTH TWO TIMES A DAY WITH MEALS TAKE ONLY WITH FOOD AS NEEDED    METFORMIN (GLUCOPHAGE) 500 MG TABLET    TAKE ONE TABLET BY MOUTH TWICE A DAY WITH MEALS    METOPROLOL SUCCINATE (TOPROL XL) 50 MG EXTENDED RELEASE TABLET    TAKE ONE TABLET BY MOUTH TWO TIMES A DAY. HOLD FOR SYSTOLIC BLOOD PRESSURE LOWER THAN 100 OR HEART RATE LOWER THAN 60.      MOMETASONE (ELOCON) 0.1 % CREAM    APPLY TOPICALLY DAILY    MOMETASONE-FORMOTEROL (DULERA) 200-5 MCG/ACT INHALER    Inhale 2 puffs into the lungs every 12 hours    MONTELUKAST (SINGULAIR) 10 MG TABLET    TAKE ONE TABLET BY MOUTH NIGHTLY    NYSTATIN-TRIAMCINOLONE (MYCOLOG II) 539507-0.1 UNIT/GM-% CREAM    APPLY TOPICALLY TWICE DAILY    OMEPRAZOLE (PRILOSEC) 40 MG DELAYED RELEASE CAPSULE    TAKE ONE CAPSULE BY MOUTH TWO TIMES A DAY    PIOGLITAZONE (ACTOS) 15 MG TABLET    TAKE ONE TABLET BY MOUTH EVERY DAY    POTASSIUM CHLORIDE (KLOR-CON M) 20 MEQ EXTENDED RELEASE TABLET    TAKE ONE TABLET BY MOUTH TWO TIMES A DAY    PRAZOSIN (MINIPRESS) 2 MG CAPSULE    TAKE 1 CAPSULE BY MOUTH EVERY NIGHT FOR NIGHTMARES    QUETIAPINE (SEROQUEL) 100 MG TABLET    TAKE ONE TABLET BYMOUTH EVERY EVENING AS NEEDED AT 8 PM    RIZATRIPTAN (MAXALT) 10 MG TABLET    TAKE 1 TABLET BY MOUTH ONCE AS NEEDED FOR MIGRAINES.  MAY REPEAT IN 2 HOURS IF NEEDED    SUMATRIPTAN (IMITREX) 100 MG TABLET    take 1 tablet by mouth once as needed for migraine    TRAZODONE (DESYREL) 100 MG TABLET    TAKE 1 TO 2 TABLETS BY MOUTH AT BEDTIME AS NEEDED FOR SLEEP       ALLERGIES     Latex, Penicillins, Shellfish-derived products, Abilify [aripiprazole], Adhesive tape, Statins, Topamax [topiramate], Buspar [buspirone], Other, and Sulfa antibiotics    FAMILY HISTORY       Family History   Problem Relation Age of Onset    Emphysema Mother     Cancer Maternal Grandfather     Diabetes Paternal Grandmother     Emphysema Paternal Grandfather     Heart Disease Sister     Stroke Sister     Diabetes Sister           SOCIAL HISTORY       Social History     Socioeconomic History    Marital status: Single     Spouse name: None    Number of children: None    Years of education: None    Highest education level: None   Occupational History    None   Tobacco Use    Smoking status: Never Smoker    Smokeless tobacco: Never Used   Vaping Use    Vaping Use: Never used   Substance and Sexual Activity    Alcohol use: No     Alcohol/week: 0.0 standard drinks    Drug use: No    Sexual activity: Not Currently   Other Topics Concern    None   Social History Narrative    None     Social Determinants of Health     Financial Resource Strain: Low Risk     Difficulty of Paying Living Expenses: Not hard at all   Food Insecurity: No Food Insecurity    Worried About Running Out of Zimory in the Last Year: Never true    Ran Out of Food in the Last Year: Never true   Transportation Needs: No Transportation Needs    Lack of Transportation (Medical): No    Lack of Transportation (Non-Medical): No   Physical Activity:     Days of Exercise per Week:     Minutes of Exercise per Session:    Stress:     Feeling of Stress :    Social Connections:     Frequency of Communication with Friends and Family:     Frequency of Social Gatherings with Friends and Family:     Attends Sikhism Services:     Active Member of Clubs or Organizations:     Attends Club or Organization Meetings:     Marital Status:    Intimate Partner Violence:     Fear of Current or Ex-Partner:     Emotionally Abused:     Physically Abused:     Sexually Abused:          PHYSICAL EXAM       ED Triage Vitals [05/29/21 1758]   BP Temp Temp Source Pulse Resp SpO2 Height Weight   (!) 149/77 96.9 °F (36.1 °C) Temporal 63 16 100 % 5' 2\" (1.575 m) 300 lb (136.1 kg)       Physical Exam  Vitals and nursing note reviewed. Constitutional:       Appearance: She is well-developed. HENT:      Head: Normocephalic. Right Ear: External ear normal.      Left Ear: External ear normal.   Eyes:      Conjunctiva/sclera: Conjunctivae normal.      Pupils: Pupils are equal, round, and reactive to light. Cardiovascular:      Rate and Rhythm: Normal rate and regular rhythm. Heart sounds: Normal heart sounds. Pulmonary:      Effort: Pulmonary effort is normal.      Breath sounds: Normal breath sounds. Abdominal:      General: Bowel sounds are normal. There is no distension. Palpations: Abdomen is soft. Tenderness: There is no abdominal tenderness. Musculoskeletal:         General: Normal range of motion. Cervical back: Normal range of motion and neck supple. Skin:     General: Skin is warm and dry. Neurological:      Mental Status: She is alert and oriented to person, place, and time.    Psychiatric:         Mood and Affect: Mood normal.           MDM  60 yo female presents to the ED with myalgias. Pt is afebrile, hemodynamically stable. Pt given 1 L NS, IV Mg in the ED. Labs remarkable for BUn 24, Cr 1.18, mg 0.8. Pt reassessed and feels better. Pt given IV Mg in the Ed. Pt educated about myalgias and hypomagnesemia. Pt given myalgia, hypomagnesemia warning signs. Pt advised to keep taking her Mg supplements. Pt given myalgia warning signs and will f/u with pcp. Pt understands plan. FINAL IMPRESSION      1. Myalgia    2.  Hypomagnesemia          DISPOSITION/PLAN   DISPOSITION Decision To Discharge 05/29/2021 07:16:06 PM        DISCHARGE MEDICATIONS:  [unfilled]         Bret Freeman MD(electronically signed)  Attending Emergency Physician            Brte Freeman MD  05/29/21 0374

## 2021-06-01 ENCOUNTER — CARE COORDINATION (OUTPATIENT)
Dept: CARE COORDINATION | Age: 56
End: 2021-06-01

## 2021-06-01 NOTE — CARE COORDINATION
Call placed to patient for COVID-19 Risk Monitoring. Female answering phone indicated patient not available. Message left regarding the purpose of the call. Requested call back. Provided call back number.

## 2021-06-16 NOTE — PROGRESS NOTES
Subjective:     Patient ID: Inocencio Washington is a 61 y.o. female who presentstoday for:  Chief Complaint   Patient presents with    Cough     Pt. c/o having a cough for a month. Cough  This is a new problem. The current episode started 1 to 4 weeks ago. The problem has been waxing and waning. The cough is non-productive. Associated symptoms include nasal congestion, postnasal drip and rhinorrhea. Pertinent negatives include no chest pain, chills, ear congestion, ear pain, fever, headaches, heartburn, hemoptysis, myalgias, rash, sore throat, shortness of breath, sweats, weight loss or wheezing. She has tried a beta-agonist inhaler and OTC cough suppressant for the symptoms. Past Medical History:   Diagnosis Date    Anemia     Asthma     Benign essential HTN     meds > 5 yrs / denies TIA or stroke    Borderline personality disorder ([address]     2016 ?suggested by me-meets many criteria    Carpal tunnel syndrome of right wrist     Crohn's disease ([address]     Depression     Fibromyalgia 2/13/2018    Fibromyalgia     GERD (gastroesophageal reflux disease)     Gout     Headache     migraines    Irritable bowel syndrome     Mixed hyperlipidemia 5/10/2017    Neuropathy     Obesity (BMI 35.0-39.9 without comorbidity) 3/30/2017    PONV (postoperative nausea and vomiting)     nausea    PTSD (post-traumatic stress disorder)     Tremor of unknown origin     Type 2 diabetes mellitus ([address]     meds > 5yrs     Ulcerative colitis ([address] 11/2017    Vaginitis      Current Outpatient Medications on File Prior to Visit   Medication Sig Dispense Refill    traZODone (DESYREL) 100 MG tablet TAKE 1 TO 2 TABLETS BY MOUTH AT BEDTIME AS NEEDED FOR SLEEP      metoprolol succinate (TOPROL XL) 50 MG extended release tablet TAKE ONE TABLET BY MOUTH TWO TIMES A DAY.  HOLD FOR SYSTOLIC BLOOD PRESSURE LOWER THAN 100 OR HEART RATE LOWER THAN 60.  180 tablet 3    mometasone (ELOCON) 0.1 % cream APPLY TOPICALLY DAILY 45 g 0  pioglitazone (ACTOS) 15 MG tablet TAKE ONE TABLET BY MOUTH EVERY DAY 90 tablet 3    SUMAtriptan (IMITREX) 100 MG tablet take 1 tablet by mouth once as needed for migraine 9 tablet 0    omeprazole (PRILOSEC) 40 MG delayed release capsule TAKE ONE CAPSULE BY MOUTH TWO TIMES A  capsule 3    potassium chloride (KLOR-CON M) 20 MEQ extended release tablet TAKE ONE TABLET BY MOUTH TWO TIMES A  tablet 3    furosemide (LASIX) 40 MG tablet TAKE ONE TABLET BY MOUTH TWO TIMES A DAY 60 tablet 5    QUEtiapine (SEROQUEL) 100 MG tablet TAKE ONE TABLET BYMOUTH EVERY EVENING AS NEEDED AT 8 PM      montelukast (SINGULAIR) 10 MG tablet TAKE ONE TABLET BY MOUTH NIGHTLY 30 tablet 5    allopurinol (ZYLOPRIM) 100 MG tablet TAKE ONE TABLET BY MOUTH EVERY DAY 90 tablet 3    albuterol sulfate HFA (PROVENTIL HFA) 108 (90 Base) MCG/ACT inhaler Inhale 2 puffs into the lungs every 6 hours as needed for Wheezing 1 Inhaler 3    metFORMIN (GLUCOPHAGE) 500 MG tablet TAKE ONE TABLET BY MOUTH TWICE A DAY WITH MEALS 180 tablet 1    Lancets MISC Test bid 100 each 3    dicyclomine (BENTYL) 10 MG capsule TAKE ONE CAPSULE BY MOUTH FOUR TIMES A DAY BEFORE MEALS AND NIGHTLY 120 capsule 5    rizatriptan (MAXALT) 10 MG tablet TAKE 1 TABLET BY MOUTH ONCE AS NEEDED FOR MIGRAINES.  MAY REPEAT IN 2 HOURS IF NEEDED 30 tablet 0    hyoscyamine (ANASPAZ;LEVSIN) 125 MCG tablet TAKE ONE TABLET BY MOUTH EVERY 4 HOURS AS NEEDED for cramping 164 tablet 0    ipratropium-albuterol (DUONEB) 0.5-2.5 (3) MG/3ML SOLN nebulizer solution INHALE 1 VIAL VIA NEBULIZER EVERY 4 HOURS 360 mL 5    magnesium oxide (MAG-OX) 400 (241.3 Mg) MG TABS tablet Take 1 tablet by mouth 2 times daily 60 tablet 2    Blood Glucose Monitoring Suppl (ONE TOUCH ULTRA 2) w/Device KIT TEST TWICE DAILY  0    prazosin (MINIPRESS) 2 MG capsule TAKE 1 CAPSULE BY MOUTH EVERY NIGHT FOR NIGHTMARES  3    blood glucose monitor strips Test 2 times a day & as needed for symptoms of irregular blood glucose. 100 strip 3    nystatin-triamcinolone (MYCOLOG II) 900332-9.1 UNIT/GM-% cream APPLY TOPICALLY TWICE DAILY 45 g 2    Melatonin 5 MG CAPS Take 10 mg by mouth daily   2    FREESTYLE LITE strip use three times daily  50 each 5    DULoxetine (CYMBALTA) 60 MG extended release capsule TAKE ONE CAPSULE BY MOUTH TWICE DAILY AS DIRECTED IN THE MORNING AND AFTERNOON  1    meloxicam (MOBIC) 7.5 MG tablet TAKE ONE TABLET BY MOUTH TWO TIMES A DAY WITH MEALS TAKE ONLY WITH FOOD AS NEEDED  3    baclofen (LIORESAL) 20 MG tablet 20 mg 4 times daily       LYRICA 150 MG capsule 150 mg 3 times daily Indications: last filled 3/5/17 no refills        No current facility-administered medications on file prior to visit. Past Surgical History:   Procedure Laterality Date    BREAST CYST EXCISION Right 1994    exc mass benign    CHOLECYSTECTOMY  2009    COLONOSCOPY  2015    negative    ENDOSCOPY, COLON, DIAGNOSTIC      HERNIA REPAIR  2015    ventral / mesh    LEG TENDON SURGERY Right 2007    leg.  ankle and foot    OVARIAN CYST REMOVAL Left 1994    with mass and both fallopian tube    OVARY REMOVAL  12/2015    [facility] / mass left ovary & bilateral  tubes    PARTIAL HYSTERECTOMY      [place] COLON CA SCRN NOT HI RSK IND N/A 11/7/2017    COLONOSCOPY performed by MANOJ Sylvester on bx    [place] COLONOSCOPY W/BIOPSY SINGLE/MULTIPLE N/A 3/15/2018    COLONOSCOPY performed [facility] MD [facility] Right 8/3/2017    RIGHT KNEE ARTHROSCOPIC PARTIAL MEDIAL MENISCECTOMY KNEE performed by Cookie Contreras MD at [facility] N/CARPAL TUNNEL SURG Right 11/30/2017    RIGHT WRIST  CARPAL TUNNEL RELEASE performed by Cookie Contreras MD at [facility] N/CARPAL TUNNEL SURG Left 5/10/2018    LEFT WRIST CARPAL TUNNEL RELEASE performed by Cookie Contreras MD at [facility]        Family History   Problem Relation Age of Onset    Emphysema Mother     Cancer Maternal Grandfather     Diabetes Paternal Grandmother     Emphysema Paternal Grandfather     Heart Disease Sister     Stroke Sister     Diabetes Sister      Social History     Socioeconomic History    Marital status: Single     Spouse name: Not on file    Number of children: Not on file    Years of education: Not on file    Highest education level: Not on file   Occupational History    Not on file   Social Needs    Financial resource strain: Not hard at all   Juan-Vernell insecurity     Worry: Never true     Inability: Never true   [employer] needs     Medical: No     Non-medical: No   Tobacco Use    Smoking status: Never Smoker    Smokeless tobacco: Never Used   Substance and Sexual Activity    Alcohol use: No     Alcohol/week: 0.0 standard drinks    Drug use: No    Sexual activity: Not Currently   Lifestyle    Physical activity     Days per week: Not on file     Minutes per session: Not on file    Stress: Not on file   Relationships    Social connections     Talks on phone: Not on file     Gets together: Not on file     Attends Church service: Not on file     Active member of club or organization: Not on file     Attends meetings of clubs or organizations: Not on file     Relationship status: Not on file    Intimate partner violence     Fear of current or ex partner: Not on file     Emotionally abused: Not on file     Physically abused: Not on file     Forced sexual activity: Not on file   Other Topics Concern    Not on file   Social History Narrative    Not on file     Allergies:  Latex, Penicillins, Shellfish-derived products, Abilify [aripiprazole], Adhesive tape, Statins, Topamax [topiramate], Buspar [buspirone], Other, and Sulfa antibiotics    Review of Systems   Constitutional: Negative for chills, diaphoresis, fever and weight loss. HENT: Positive for congestion, postnasal drip and rhinorrhea.  Negative for ear pain, sinus pressure, sore throat and trouble swallowing. Respiratory: Positive for cough. Negative for hemoptysis, shortness of breath and wheezing. Cardiovascular: Negative for chest pain and palpitations. Gastrointestinal: Negative for abdominal pain, heartburn, nausea and vomiting. Musculoskeletal: Negative for myalgias. Skin: Negative for rash. Neurological: Negative for dizziness and headaches. Objective:    LMP 07/27/1993 (Approximate)     Physical Exam  Pulmonary:      Effort: No respiratory distress. Neurological:      Mental Status: She is alert and oriented to person, place, and time. Assessment & Plan:       Diagnosis Orders   1. Upper respiratory tract infection, unspecified type  doxycycline hyclate (VIBRA-TABS) 100 MG tablet     No orders of the defined types were placed in this encounter. Orders Placed This Encounter   Medications    doxycycline hyclate (VIBRA-TABS) 100 MG tablet     Sig: Take 1 tablet by mouth 2 times daily for 7 days Take with food     Dispense:  14 tablet     Refill:  0     Medications Discontinued During This Encounter   Medication Reason    doxycycline hyclate (VIBRA-TABS) 100 MG tablet REORDER     Return if symptoms worsen or fail to improve. Halle Lua is a 61 y.o. female evaluated via telephone on 4/2/2021. Consent:  She and/or health care decision maker is aware that that she may receive a bill for this telephone service, depending on her insurance coverage, and has provided verbal consent to proceed: Yes      This visit was a telephone encounter. Patient was located at their home. Provider was located at their ___ home or        __x__ office. I affirm this is a Patient Initiated Episode with an Established Patient who has not had a related appointment within my department in the past 7 days or scheduled within the next 24 hours.     Total Time: minutes: 11-20 minutes    Note: not billable if this call serves to triage the patient into an appointment for the relevant concern      Desirae Sharma     Reviewed with the patient: currentclinical status, medications, activities and diet. Side effects, adverse effects of the medicationprescribed today, as well as treatment plan/ rationale and result expectations havebeen discussed with the patient who expresses understanding and desires to proceed. Pt instructions reviewed and given to patient.     Close follow up to evaluate treatment resultsand for coordination of care. I have reviewed the patient's medical historyin detail and updated the computerized patient record.     Desirae Sharma, APRN - CNP                 [employer]: [employer]

## 2021-06-30 ENCOUNTER — OFFICE VISIT (OUTPATIENT)
Dept: FAMILY MEDICINE CLINIC | Age: 56
End: 2021-06-30
Payer: MEDICARE

## 2021-06-30 VITALS
HEIGHT: 62 IN | BODY MASS INDEX: 52.7 KG/M2 | OXYGEN SATURATION: 91 % | WEIGHT: 286.4 LBS | HEART RATE: 62 BPM | SYSTOLIC BLOOD PRESSURE: 118 MMHG | DIASTOLIC BLOOD PRESSURE: 68 MMHG | TEMPERATURE: 98.6 F

## 2021-06-30 DIAGNOSIS — I10 BENIGN ESSENTIAL HTN: ICD-10-CM

## 2021-06-30 DIAGNOSIS — E11.42 TYPE 2 DIABETES MELLITUS WITH DIABETIC POLYNEUROPATHY, WITHOUT LONG-TERM CURRENT USE OF INSULIN (HCC): Primary | ICD-10-CM

## 2021-06-30 DIAGNOSIS — E66.01 MORBID OBESITY (HCC): ICD-10-CM

## 2021-06-30 DIAGNOSIS — F33.2 SEVERE EPISODE OF RECURRENT MAJOR DEPRESSIVE DISORDER, WITHOUT PSYCHOTIC FEATURES (HCC): ICD-10-CM

## 2021-06-30 DIAGNOSIS — K50.919 CROHN'S DISEASE WITH COMPLICATION, UNSPECIFIED GASTROINTESTINAL TRACT LOCATION (HCC): ICD-10-CM

## 2021-06-30 PROCEDURE — 3017F COLORECTAL CA SCREEN DOC REV: CPT | Performed by: FAMILY MEDICINE

## 2021-06-30 PROCEDURE — G8417 CALC BMI ABV UP PARAM F/U: HCPCS | Performed by: FAMILY MEDICINE

## 2021-06-30 PROCEDURE — 3044F HG A1C LEVEL LT 7.0%: CPT | Performed by: FAMILY MEDICINE

## 2021-06-30 PROCEDURE — 1036F TOBACCO NON-USER: CPT | Performed by: FAMILY MEDICINE

## 2021-06-30 PROCEDURE — 2022F DILAT RTA XM EVC RTNOPTHY: CPT | Performed by: FAMILY MEDICINE

## 2021-06-30 PROCEDURE — G8427 DOCREV CUR MEDS BY ELIG CLIN: HCPCS | Performed by: FAMILY MEDICINE

## 2021-06-30 PROCEDURE — 99214 OFFICE O/P EST MOD 30 MIN: CPT | Performed by: FAMILY MEDICINE

## 2021-06-30 RX ORDER — METRONIDAZOLE 500 MG/1
500 TABLET ORAL 2 TIMES DAILY
Qty: 14 TABLET | Refills: 0 | Status: SHIPPED | OUTPATIENT
Start: 2021-06-30 | End: 2021-07-07

## 2021-06-30 ASSESSMENT — ENCOUNTER SYMPTOMS
EYES NEGATIVE: 1
COUGH: 0
CHEST TIGHTNESS: 0
DIARRHEA: 1
RHINORRHEA: 0
RESPIRATORY NEGATIVE: 1

## 2021-07-06 RX ORDER — MOMETASONE FUROATE 1 MG/G
CREAM TOPICAL
Qty: 45 G | Refills: 0 | Status: SHIPPED | OUTPATIENT
Start: 2021-07-06 | End: 2021-08-12

## 2021-07-06 NOTE — TELEPHONE ENCOUNTER
Future Appointments     Provider Department   10/1/2021 MD DANI Magana AT [facility]   Appt Notes: 3 mo DM/Chrohns   Recent Visits    06/30/2021 Type 2 diabetes mellitus with diabetic polyneuropathy, without long-term current use of insulin [facility])   [facility] AT [facility] [place] MD Andi

## 2021-08-06 ENCOUNTER — TELEPHONE (OUTPATIENT)
Dept: PHARMACY | Facility: CLINIC | Age: 56
End: 2021-08-06

## 2021-08-06 NOTE — LETTER
[place]  [address], [address]        William Gill   [address] [place] [address]           08/11/21     Dear William Gill,    We tried to reach you recently regarding your medications, but were unable to reach you on the telephone. We have on file that you were taking atorvastatin but it was stopped. We are reaching out to discuss this medication as taking a statin medication is very important for patients with diabetes because those with diabetes are at an increased risk of having a heart attack or stroke. These medications, like atorvastatin, can help lower the risk of having an event in the future. If you have had side effects to atorvastatin, there are other medications in this class that can be tried or alternative dosing strategies (every other day versus daily). We can go over these options with you if you are concerned about this. Please give us a call back or reply directly to this message so we can discuss statin therapy and possibly restart if you have not been taking.      Sincerely,   Constantin Taylor, PharmD, [address] [facility]  Department, toll free: [phone], option 1

## 2021-08-06 NOTE — TELEPHONE ENCOUNTER
[facility] CLINICAL PHARMACY: STATIN THERAPY REVIEW  Identified statin use in persons with diabetes and/or cardiovascular disease care gap per [employer]; Records dated: 07/19/2021    Last Office Visit: 06/30/2021    Patient also appears to be taking: metformin, pioglitazone    Allergies   Allergen Reactions    Latex Hives    Penicillins Swelling and Rash    Shellfish-Derived Products Anaphylaxis    Abilify [Aripiprazole] shaking    Adhesive Tape Swelling, If left on too long    Statins Swelling    Topamax [Topiramate]     Buspar [Buspirone] shaking    Other Nausea And Vomiting    Sulfa Antibiotics Nausea And Vomiting     ASSESSMENT  Estimated Creatinine Clearance: 68 mL/min (A) (based on SCr of 1.18 mg/dL (H)). Lab Results   Component Value Date    LABGLOM 47.2 05/29/2021     DIABETES ADHERENCE    Per Insurance Records through 07/19/2021 ([facility] = 99%; Potential Fail Date: 12/18/2021):   Metformin 500mg BID last filled on 07/06/2021 for 90 day supply;  Next refill due: 10/04/2021    Per Reconciled Dispense Report: same as above for metformin  Pioglitazone 15mg daily last filled on 06/01/2021 for 90 day supply    Lab Results   Component Value Date    LABA1C 6.2 (H) 04/19/2021    LABA1C 6.5 03/15/2021    LABA1C 6.0 11/13/2019     STATIN GAP IDENTIFIED    Per Reconciled Dispense Report:  Atorvastatin last filled in 2016    Lab Results   Component Value Date    CHOL 204 (H) 04/19/2021    TRIG 224 (H) 04/19/2021    HDL 65 (H) 04/19/2021    LDLCALC 94 04/19/2021     ALT   Date Value Ref Range Status   05/29/2021 9 0 - 33 U/L Final     AST   Date Value Ref Range Status   05/29/2021 15 0 - 35 U/L Final     The 10-year ASCVD risk score ([address] et al., 2013) is: 4.7%    Values used to calculate the score:      Age: 61 years      Sex: Female      Is Non- : No      Diabetic: Yes      Tobacco smoker: No      Systolic Blood Pressure: 096 mmHg      Is BP treated: Yes      HDL Cholesterol: 65 mg/dL      Total Cholesterol: 204 mg/dL     2021 ADA Guidelines Age:    >/= 36years old:   o No history of ASCVD or 10-year ASCVD risk < 20% - Moderate-intensity statin is recommended. PLAN  The following are interventions that have been identified:   - Patient identified as having SUPD gap    Per chart review, tried atorvastatin in past. Cardiology note from 02/04/2021 mentions risk factor modification; possible that patient does not want to add any new medications. Attempting to reach patient to review. Left message asking for return call.     Future Appointments   Date Time Provider Abe Azar   9/2/2021  1:30 PM DO NGUYEN Carreno CARDIO [facility] EMERGENCY [facility] AT [place]   10/1/2021 10:45 AM Raegan Ferrari [place] [id number] [place] Abdias Catherine, PharmD, [facility]  Department, toll free: [phone], option 1

## 2021-08-11 NOTE — TELEPHONE ENCOUNTER
POPULATION HEALTH CLINICAL PHARMACY REVIEW: STATIN THERAPY REVIEW    Second attempt to reach patient to review medication adherence. Left message asking for return call. Will send letter and close encounter.      Willem Diehl PharmD, BCACP, [address]   Department, toll free: [phone], option 1    =======================================================    For Pharmacy Admin Tracking Only   Gap Closed?: No    Time Spent (min): 30

## 2021-08-12 RX ORDER — MOMETASONE FUROATE 1 MG/G
CREAM TOPICAL
Qty: 45 G | Refills: 0 | Status: SHIPPED | OUTPATIENT
Start: 2021-08-12 | End: 2021-09-29

## 2021-09-01 ENCOUNTER — TELEPHONE (OUTPATIENT)
Dept: PHARMACY | Facility: CLINIC | Age: 56
End: 2021-09-01

## 2021-09-01 NOTE — TELEPHONE ENCOUNTER
Dr. Madelyn Galaviz, DO,     Patient with diabetes, age >40 years, LDL >100. Would patient benefit from moderate-intensity statin therapy? Please consider discussing with patient on 09/02/2021 appointment. Could consider rosuvastatin 5mg daily. Last visit: 02/04/2021, Next visit: 09/02/2021     See encounter note(s) below for complete details. Please let me know if you have any questions. Thank you,  Loren Mac, PharmD, BCACP, [facility]  Department, toll free: [phone], option 1     =======================================================    POPULATION HEALTH CLINICAL PHARMACY: STATIN THERAPY REVIEW  Identified statin use in persons with diabetes and/or cardiovascular disease care gap per [employer]; Records dated: 08/23/2021    Last Office Visit: 02/04/2021 w/ cardiology, 06/30/2021 w/ PCP    ASSESSMENT  Allergies/Intolerances   Allergen Reactions    Latex Hives    Penicillins Swelling and Rash    Shellfish-Derived Products Anaphylaxis    Abilify [Aripiprazole] shaking    Adhesive Tape Swelling If left on too long    Statins Swelling    Topamax [Topiramate]     Buspar [Buspirone] shaking    Other Nausea And Vomiting    Sulfa Antibiotics Nausea And Vomiting     BP Readings from Last 3 Encounters:   06/30/21 118/68   05/29/21 133/74   05/10/21 130/80     Estimated Creatinine Clearance: 68 mL/min (A) (based on SCr of 1.18 mg/dL (H)).      Lab Results   Component Value Date    LABGLOM 47.2 05/29/2021     Lab Results   Component Value Date    LABA1C 6.2 (H) 04/19/2021    LABA1C 6.5 03/15/2021    LABA1C 6.0 11/13/2019     STATIN GAP IDENTIFIED    Per Reconciled Dispense Report:  Atorvastatin in 2016    Lab Results   Component Value Date    CHOL 204 (H) 04/19/2021    TRIG 224 (H) 04/19/2021    HDL 65 (H) 04/19/2021    LDLCALC 94 04/19/2021     ALT   Date Value Ref Range Status   05/29/2021 9 0 - 33 U/L Final     AST   Date Value Ref Range Status 05/29/2021 15 0 - 35 U/L Final     The 10-year ASCVD risk score (Alyssa Oates, et al., 2013) is: 4.7%    Values used to calculate the score:      Age: 61 years      Sex: Female      Is Non- : No      Diabetic: Yes      Tobacco smoker: No      Systolic Blood Pressure: 842 mmHg      Is BP treated: Yes      HDL Cholesterol: 65 mg/dL      Total Cholesterol: 204 mg/dL     2021 ADA Guidelines Age:    >/= 36years old:   o No history of ASCVD or 10-year ASCVD risk < 20% - Moderate-intensity statin is recommended. PLAN  The following are interventions that have been identified:   - Patient identified as having SUPD gap    Per chart review, patient tried atorvastatin in 2016. Unclear if patient actually had side effects (\"swelling\") as noted in 02/05/2016 note. Or if patient just didn't want to take any more as suggested in 10/10/2021 provider note. Could consider more hydrophilic statin such as rosuvastatin or pravastatin. Guidelines do suggest rechallenging or trying different statin if goals not achieved and side effects not severe. Some strategies to help tolerate statins could be three times weekly dosing and while the 2018 AHA/ACC Guideline on the Management of Blood Cholesterol makes no recommendation for using coenzyme Q10 with statins or ensuring patient is not vitamin D deficient, there is some data that suggest these could be beneficial.    Unable to previously reach patient. Will send recommendation to cardiology.      Future Appointments   Date Time Provider Abe Azar   9/2/2021  1:30 PM DO NGUYEN Dong CARDIO [facility] EMERGENCY [facility] AT [place]   10/1/2021 10:45 AM Marisabel Pagan [place] [address] [place] Abdias Catherine, PharmD, [facility]  Department, toll free: [phone], option 1

## 2021-09-02 ENCOUNTER — OFFICE VISIT (OUTPATIENT)
Dept: CARDIOLOGY CLINIC | Age: 56
End: 2021-09-02
Payer: MEDICARE

## 2021-09-02 VITALS
TEMPERATURE: 97.1 F | DIASTOLIC BLOOD PRESSURE: 70 MMHG | SYSTOLIC BLOOD PRESSURE: 130 MMHG | OXYGEN SATURATION: 96 % | HEART RATE: 94 BPM | BODY MASS INDEX: 52.13 KG/M2 | WEIGHT: 285 LBS

## 2021-09-02 DIAGNOSIS — R60.0 BILATERAL EDEMA OF LOWER EXTREMITY: ICD-10-CM

## 2021-09-02 DIAGNOSIS — E66.9 OBESITY WITH BODY MASS INDEX 30 OR GREATER: Primary | ICD-10-CM

## 2021-09-02 DIAGNOSIS — E78.2 MIXED HYPERLIPIDEMIA: ICD-10-CM

## 2021-09-02 DIAGNOSIS — I10 BENIGN ESSENTIAL HYPERTENSION: ICD-10-CM

## 2021-09-02 PROCEDURE — G8427 DOCREV CUR MEDS BY ELIG CLIN: HCPCS | Performed by: INTERNAL MEDICINE

## 2021-09-02 PROCEDURE — 3017F COLORECTAL CA SCREEN DOC REV: CPT | Performed by: INTERNAL MEDICINE

## 2021-09-02 PROCEDURE — G8417 CALC BMI ABV UP PARAM F/U: HCPCS | Performed by: INTERNAL MEDICINE

## 2021-09-02 PROCEDURE — 1036F TOBACCO NON-USER: CPT | Performed by: INTERNAL MEDICINE

## 2021-09-02 PROCEDURE — 99213 OFFICE O/P EST LOW 20 MIN: CPT | Performed by: INTERNAL MEDICINE

## 2021-09-02 NOTE — PROGRESS NOTES
Chief Complaint   Patient presents with    Hypertension    6 Month Follow-Up       5-24-18:Patient presents for initial medical evaluation. Patient is followed on a regular basis by Dr. Rosemarie Restrepo MD. Having b/l LE edema and was seen by dr. Red Mukherjee and states her veins were ok. No hx of cardiac disease. No hx of DVT/PE. Worse over the past few months. Does have SOB on exertion. Has been gaining weight rapidly due to LE edema. Not on any CCB or steroids. Pt denies chest pain, dyspnea,  change in exercise capacity, fatigue,  nausea, vomiting, diarrhea, constipation, motor weakness, insomnia, weight loss, syncope, dizziness, lightheadedness, palpitations, PND, orthopnea, or claudication. BP and hr are good. No LE discoloration or ulcers. No LE edema. Lipid profile is normal. No recent hospitalization. No change in meds. Hx of DM, HTN, HLP. No smoking. 6-28-18: LE edema improved with lasix 40mg BID. Has lost 23 pounds. S/p EHCO with EF of 65%, no valve abn, RVSP of 49WZDM, normal diastolic function. Pt denies chest pain, dyspnea, dyspnea on exertion, change in exercise capacity, fatigue,  nausea, vomiting, diarrhea, constipation, motor weakness, insomnia, weight loss, syncope, dizziness, lightheadedness,  PND, orthopnea, or claudication. No nitro use. BP and hr are good. CAD is stable. No LE discoloration or ulcers. +  LE edema. No CHF type symptoms. Lipid profile is normal. No recent hospitalization. No change in meds. Does have palpitations at times. BMP with CR of 1.09, GFR of 52.       12-27-18: + dyspnea, dyspnea on exertion, change in exercise capacity, fatigue. Pt denies chest pain,  nausea, vomiting, diarrhea, constipation, motor weakness, insomnia, weight loss, syncope, dizziness, lightheadedness, palpitations, PND, orthopnea, or claudication. No nitro use. BP and hr are good. CAD is stable. No LE discoloration or ulcers. No CHF type symptoms. Lipid profile is normal. No recent hospitalization. No change in meds. LE edema has improved. On Lasix. She has CKD stage III, GFR 59, cr is 0.98. No hx of stress test.     6-27-19: doing ok overall. S/p normal nuclear stress test in 1/19. LE edema is under control. Pt denies chest pain, dyspnea, dyspnea on exertion, change in exercise capacity, fatigue,  nausea, vomiting, diarrhea, constipation, motor weakness, insomnia, weight loss, syncope, dizziness, lightheadedness,  PND, orthopnea, or claudication. No nitro use. BP and hr are good. CAD is stable. No LE discoloration or ulcers. No LE edema. No CHF type symptoms. Lipid profile is normal. Occasional palpitations. Renal function is wnl now. 12-19-19: had a rxn to iron transfusion. Had rapid heart beat, skipped beat. Workup was negative. No arrhythmia noted. Symptoms resolved. Pt denies chest pain, dyspnea, dyspnea on exertion, change in exercise capacity,ausea, vomiting, diarrhea, constipation, motor weakness, insomnia, weight loss, syncope, dizziness, lightheadedness, palpitations, PND, orthopnea, or claudication. Echo in 2018 was negative. 2-4-21: Pt denies chest pain,  fatigue,  nausea, vomiting, diarrhea, constipation, motor weakness, insomnia, weight loss, syncope, dizziness, lightheadedness, palpitations, PND, orthopnea, or claudication. No nitro use. BP and hr are good. CAD is stable. No LE discoloration or ulcers. No LE edema. No CHF type symptoms. Lipid profile is normal. No recent hospitalization. No change in meds. Dealing with a lot of depression and following up with [facility]. + panic attacks, has tightness in her chest.  Positive history of diabetes, hemoglobin A1c is 6.0. EKG with normal sinus rhythm nonspecific ST changes. 9-2-21: LE edema has decreased on lasix. Has hx of venous insuffiencey. + hxof anxiety and depression.    Pt denies chest pain, dyspnea, dyspnea on exertion, change in exercise capacity, fatigue,  nausea, vomiting, diarrhea, constipation, motor weakness, insomnia, weight loss, syncope, dizziness, lightheadedness, palpitations, PND, orthopnea, or claudication. Hx of normal nuclear stress test. Echo in 2018 was normal. BP and HR are ok. Has lost 25 pounds. Lipid profile is mildly abnormal. Does have stage III CKD. Patient Active Problem List   Diagnosis    Benign essential hypertension    Asthma    Polyneuropathy due to type 2 diabetes mellitus ([address]    Crohn's disease ([address]    Gastroesophageal reflux disease    Headache    Severe episode of recurrent major depressive disorder, without psychotic features ([address]    Borderline personality disorder ([address]    Pain in right knee    Hip pain, right    Obesity with body mass index 30 or greater    Muscle pain    Mixed hyperlipidemia    Tear of meniscus of knee    Bilateral edema of lower extremity    Varicose veins of lower extremity    History of Crohn's disease    Carpal tunnel syndrome of right wrist    Posttraumatic stress disorder    Fibromyalgia    Carpal tunnel syndrome of left wrist    Intermittent tremor    Anemia    Iron deficiency anemia    Chest pain    Tremor    Meralgia paresthetica of left side    Intractable migraine without aura and without status migrainosus    Morbid obesity ([address]    Edema of lower extremity    Gastroenteritis    Injury of left foot    Leukocytosis    Nausea and vomiting    Shoulder pain    Pain of right hip joint       Past Surgical History:   Procedure Laterality Date    BREAST CYST EXCISION Right 1994    exc mass benign    CHOLECYSTECTOMY  2009    COLONOSCOPY  2015    negative    ENDOSCOPY, COLON, DIAGNOSTIC      HERNIA REPAIR  2015    ventral / mesh    LEG TENDON SURGERY Right 2007    leg.  ankle and foot    OVARIAN CYST REMOVAL Left 1994    with mass and both fallopian tube    OVARY REMOVAL  12/2015    [facility] / mass left ovary & bilateral  tubes    PARTIAL HYSTERECTOMY      ND COLON CA SCRN NOT HI RSK IND N/A 11/7/2017    COLONOSCOPY performed by MANOJ Garay on bx    IA COLONOSCOPY W/BIOPSY SINGLE/MULTIPLE N/A 3/15/2018    COLONOSCOPY performed by Shilo Joya MD at [facility] Right 8/3/2017    RIGHT KNEE ARTHROSCOPIC PARTIAL MEDIAL MENISCECTOMY KNEE performed by Juanis Reynolds MD at [address] [place] [address] N/CARPAL TUNNEL SURG Right 11/30/2017    RIGHT WRIST  CARPAL TUNNEL RELEASE performed by Juanis Reynolds MD at [facility] N/CARPAL TUNNEL SURG Left 5/10/2018    LEFT WRIST CARPAL TUNNEL RELEASE performed by Juanis Reynolds MD at [address] [facility]3]       Social History     Socioeconomic History    Marital status: Single     Spouse name: Not on file    Number of children: Not on file    Years of education: Not on file    Highest education level: Not on file   Occupational History    Not on file   Tobacco Use    Smoking status: Never Smoker    Smokeless tobacco: Never Used   Vaping Use    Vaping Use: Never used   Substance and Sexual Activity    Alcohol use: No     Alcohol/week: 0.0 standard drinks    Drug use: No    Sexual activity: Not Currently   Other Topics Concern    Not on file   Social History Narrative    Not on file     Social Determinants of Health     Financial Resource Strain: Low Risk     Difficulty of Paying Living Expenses: Not hard at all   Food Insecurity: No Food Insecurity    Worried About [facility] in the Last Year: Never true    Glenna of Food in the Last Year: Never true   Transportation Needs: No Transportation Needs    Lack of Transportation (Medical): No    Lack of Transportation (Non-Medical):  No   Physical Activity:     Days of Exercise per Week:     Minutes of Exercise per Session:    Stress:     Feeling of Stress :    Social Connections:     Frequency of Communication with Friends and Family:     Frequency of Social Gatherings with Friends and Family:     Attends [employer] Services:  Active Member of Clubs or Organizations:     Attends Club or Organization Meetings:     Marital Status:    Intimate Partner Violence:     Fear of Current or Ex-Partner:     Emotionally Abused:     Physically Abused:     Sexually Abused:        Family History   Problem Relation Age of Onset    Emphysema Mother     Cancer Maternal Grandfather     Diabetes Paternal Grandmother     Emphysema Paternal Grandfather     Heart Disease Sister     Stroke Sister     Diabetes Sister        Current Outpatient Medications   Medication Sig Dispense Refill    mometasone (ELOCON) 0.1 % cream APPLY TOPICALLY DAILY. 45 g 0    furosemide (LASIX) 40 MG tablet TAKE ONE TABLET BY MOUTH TWO TIMES A DAY 60 tablet 5    pioglitazone (ACTOS) 15 MG tablet TAKE ONE TABLET BY MOUTH EVERY DAY 90 tablet 3    montelukast (SINGULAIR) 10 MG tablet TAKE ONE TABLET BY MOUTH NIGHTLY 30 tablet 5    dicyclomine (BENTYL) 10 MG capsule TAKE ONE CAPSULE BY MOUTH FOUR TIMES A DAY, BEFORE MEALS & NIGHTLY 120 capsule 5    metFORMIN (GLUCOPHAGE) 500 MG tablet TAKE ONE TABLET BY MOUTH TWICE A DAY WITH MEALS 180 tablet 3    mometasone-formoterol (DULERA) 200-5 MCG/ACT inhaler Inhale 2 puffs into the lungs every 12 hours 1 Inhaler 3    azelastine (ASTELIN) 0.1 % nasal spray 1 spray by Nasal route 2 times daily Use in each nostril as directed 2 Bottle 1    ipratropium-albuterol (DUONEB) 0.5-2.5 (3) MG/3ML SOLN nebulizer solution INHALE 1 VIAL VIA NEBULIZER EVERY 4 HOURS 360 mL 5    traZODone (DESYREL) 100 MG tablet TAKE 1 TO 2 TABLETS BY MOUTH AT BEDTIME AS NEEDED FOR SLEEP      metoprolol succinate (TOPROL XL) 50 MG extended release tablet TAKE ONE TABLET BY MOUTH TWO TIMES A DAY.  HOLD FOR SYSTOLIC BLOOD PRESSURE LOWER THAN 100 OR HEART RATE LOWER THAN 60.  180 tablet 3    omeprazole (PRILOSEC) 40 MG delayed release capsule TAKE ONE CAPSULE BY MOUTH TWO TIMES A  capsule 3    potassium chloride (KLOR-CON M) 20 MEQ extended release tablet TAKE ONE TABLET BY MOUTH TWO TIMES A  tablet 3    QUEtiapine (SEROQUEL) 100 MG tablet TAKE ONE TABLET BYMOUTH EVERY EVENING AS NEEDED AT 8 PM      allopurinol (ZYLOPRIM) 100 MG tablet TAKE ONE TABLET BY MOUTH EVERY DAY 90 tablet 3    albuterol sulfate HFA (PROVENTIL HFA) 108 (90 Base) MCG/ACT inhaler Inhale 2 puffs into the lungs every 6 hours as needed for Wheezing 1 Inhaler 3    Lancets MISC Test bid 100 each 3    hyoscyamine (ANASPAZ;LEVSIN) 125 MCG tablet TAKE ONE TABLET BY MOUTH EVERY 4 HOURS AS NEEDED for cramping 164 tablet 0    magnesium oxide (MAG-OX) 400 (241.3 Mg) MG TABS tablet Take 1 tablet by mouth 2 times daily 60 tablet 2    Blood Glucose Monitoring Suppl (ONE TOUCH ULTRA 2) w/Device KIT TEST TWICE DAILY  0    prazosin (MINIPRESS) 2 MG capsule TAKE 1 CAPSULE BY MOUTH EVERY NIGHT FOR NIGHTMARES  3    blood glucose monitor strips Test 2 times a day & as needed for symptoms of irregular blood glucose. 100 strip 3    nystatin-triamcinolone (MYCOLOG II) 765723-6.1 UNIT/GM-% cream APPLY TOPICALLY TWICE DAILY 45 g 2    Melatonin 5 MG CAPS Take 10 mg by mouth daily   2    FREESTYLE LITE strip use three times daily  50 each 5    DULoxetine (CYMBALTA) 60 MG extended release capsule TAKE ONE CAPSULE BY MOUTH TWICE DAILY AS DIRECTED IN THE MORNING AND AFTERNOON  1    meloxicam (MOBIC) 7.5 MG tablet TAKE ONE TABLET BY MOUTH TWO TIMES A DAY WITH MEALS TAKE ONLY WITH FOOD AS NEEDED  3    baclofen (LIORESAL) 20 MG tablet 20 mg 4 times daily       LYRICA 150 MG capsule 150 mg 3 times daily Indications: last filled 3/5/17 no refills       butalbital-APAP-caffeine (FIORICET) -40 MG CAPS per capsule Take 1 capsule by mouth every 6 hours as needed for Headaches 40 capsule 0     No current facility-administered medications for this visit.        Latex, Penicillins, Shellfish-derived products, Abilify [aripiprazole], Adhesive tape, Statins, Topamax [topiramate], Buspar [buspirone], Other, and Sulfa antibiotics    Review of Systems:  General ROS: negative  Psychological ROS: negative  Hematological and Lymphatic ROS: No history of blood clots or bleeding disorder. Respiratory ROS: no cough, shortness of breath, or wheezing  Cardiovascular ROS: positive for - edema  Gastrointestinal ROS: negative  Genito-Urinary ROS: no dysuria, trouble voiding, or hematuria  Musculoskeletal ROS: negative  Neurological ROS: no TIA or stroke symptoms  Dermatological ROS: negative    VITALS:  Blood pressure 130/70, pulse 94, temperature 97.1 °F (36.2 °C), temperature source Infrared, weight 285 lb (129.3 kg), last menstrual period 07/27/1993, SpO2 96 %, not currently breastfeeding. Body mass index is 52.13 kg/m². Physical Examination:  General appearance - alert, well appearing, and in no distress  Mental status - alert, oriented to person, place, and time  Neck - Neck is supple, no JVD or carotid bruits. No thyromegaly or adenopathy. Chest - clear to auscultation, no wheezes, rales or rhonchi, symmetric air entry  Heart - normal rate, regular rhythm, normal S1, S2, no murmurs, rubs, clicks or gallops  Abdomen - soft, nontender, nondistended, no masses or organomegaly  Neurological - alert, oriented, normal speech, no focal findings or movement disorder noted  Extremities - peripheral pulses normal, + 2b/l  pedal edema, no clubbing or cyanosis  Skin - normal coloration and turgor, no rashes, no suspicious skin lesions noted      EKG: normal sinus rhythm, nonspecific ST and T waves changes. Low voltage. No orders of the defined types were placed in this encounter. ASSESSMENT:     Diagnosis Orders   1. Obesity with body mass index 30 or greater     2. Mixed hyperlipidemia     3. Benign essential hypertension     4. Bilateral edema of lower extremity     4. Leg edema. 5.      Morbid obesity.        PLAN:     Patient will need to continue to follow up with you for their general medical care

## 2021-09-03 ENCOUNTER — TELEPHONE (OUTPATIENT)
Dept: PHARMACY | Facility: CLINIC | Age: 56
End: 2021-09-03

## 2021-09-03 NOTE — TELEPHONE ENCOUNTER
Dr. Yesy Warren MD,     Patient with diabetes, age >40 years, LDL >100. Would patient benefit from moderate-intensity statin therapy? Per chart review, patient tried atorvastatin in 2016. Unclear if patient actually had side effects (\"swelling\") as noted in 02/05/2016 note. Or if patient just didn't want to take any more as suggested in 10/10/2016 provider note. Could consider alternate statin such as rosuvastatin or pravastatin. Guidelines do suggest rechallenging or trying different statin if goals not achieved and side effects not severe. I can follow-up with patient/family to discuss any changes in therapy. Last visit: 06/30/2021, Next visit: 10/01/2021     See encounter note(s) below for complete details. Please let me know if you have any questions. Thank you,  Renetta Flores, PharmD, BCACP, [address] [place] [address], toll free: [phone], option 1     =======================================================    POPULATION HEALTH CLINICAL PHARMACY: STATIN THERAPY REVIEW  Identified statin use in persons with diabetes and/or cardiovascular disease care gap per [facility]; Records dated: 08/23/2021    Last Office Visit: 06/30/2021    ASSESSMENT  Allergies   Allergen Reactions    Latex Hives    Penicillins Swelling and Rash    Shellfish-Derived Products Anaphylaxis    Abilify [Aripiprazole] shaking    Adhesive Tape Swelling, If left on too long    Statins Swelling    Topamax [Topiramate]     Buspar [Buspirone] shaking    Other Nausea And Vomiting    Sulfa Antibiotics Nausea And Vomiting     BP Readings from Last 3 Encounters:   09/02/21 130/70   06/30/21 118/68   05/29/21 133/74     Estimated Creatinine Clearance: 68 mL/min (A) (based on SCr of 1.18 mg/dL (H)).      Lab Results   Component Value Date    LABGLOM 47.2 05/29/2021     Lab Results   Component Value Date    LABA1C 6.2 (H) 04/19/2021    LABA1C 6.5 03/15/2021    LABA1C 6.0 11/13/2019     STATIN GAP IDENTIFIED    Per Reconciled Dispense Report:   Atorvastatin dispensed in 2016     Lab Results   Component Value Date    CHOL 204 (H) 04/19/2021    TRIG 224 (H) 04/19/2021    HDL 65 (H) 04/19/2021    LDLCALC 94 04/19/2021     ALT   Date Value Ref Range Status   05/29/2021 9 0 - 33 U/L Final     AST   Date Value Ref Range Status   05/29/2021 15 0 - 35 U/L Final     The 10-year ASCVD risk score (Tutu Palacios, et al., 2013) is: 5.7%    Values used to calculate the score:      Age: 61 years      Sex: Female      Is Non- : No      Diabetic: Yes      Tobacco smoker: No      Systolic Blood Pressure: 257 mmHg      Is BP treated: Yes      HDL Cholesterol: 65 mg/dL      Total Cholesterol: 204 mg/dL     2021 ADA Guidelines Age:    >/= 36years old:   o No history of ASCVD or 10-year ASCVD risk < 20% - Moderate-intensity statin is recommended. PLAN  The following are interventions that have been identified:   - Patient identified as having SUPD gap    Per chart review, patient tried atorvastatin in 2016. Unclear if patient actually had side effects (\"swelling\") as noted in 02/05/2016 note. Or if patient just didn't want to take any more as suggested in 10/10/2016 provider note. Could consider more hydrophilic statin such as rosuvastatin or pravastatin. Guidelines do suggest rechallenging or trying different statin if goals not achieved and side effects not severe.     Some strategies to help tolerate statins could be three times weekly dosing and while the 2018 AHA/ACC Guideline on the Management of Blood Cholesterol makes no recommendation for using coenzyme Q10 with statins or ensuring patient is not vitamin D deficient, there is some data that suggest these could be beneficial.     Unable to previously reach patient. Will send recommendation to PCP as cardiology did not respond to writer or discuss with patient.      Future Appointments   Date Time Provider Department [place]   10/1/2021 10:45 AM Tammy Mancia  [place], [place] [address]   5/12/2022  1:30 PM Matthew Castro,  [facility], PharmD, Maxine Richard, [address] [place] [address], toll free: [phone], option 1

## 2021-09-03 NOTE — TELEPHONE ENCOUNTER
POPULATION HEALTH CLINICAL PHARMACY REVIEW: STATIN THERAPY REVIEW    Attempt made to reach patient - left message on phone listed (Carlos Manuel Sito's number). Will continue to attempt to reach as appropriate.      Inocente Phipps, PharmD, BCACP, [address] [facility], toll free: [phone], option 1

## 2021-09-03 NOTE — LETTER
[place]  [address], [address]        Parviz Oneil   [address] [place] [address] 09/08/21     Dear Parviz Oneil,    We tried to reach you recently regarding your medications, but were unable to reach you on the telephone. Our team of pharmacists works with your primary care provider Fauzia Francois MD) to make sure patients are on appropriate medications for their chronic conditions. Patients with diabetes are at an increased risk of stroke in the future, but taking a medication called a statin can help lower that risk. Your primary care provider has already given approval for you to start this medication. Please give us a call back so we can discuss the possibility of starting this medication with you.       Thank you,  Rolando Smith, PharmD, BCACP, [address] [facility], toll free: [phone], option 1

## 2021-09-08 NOTE — TELEPHONE ENCOUNTER
[facility] HEALTH CLINICAL PHARMACY REVIEW: STATIN THERAPY REVIEW    Second attempt made to reach patient - left message on phone listed (Carlos Manuel Sito's number). Added appointment note for next PCP visit. Will send letter and continue to follow patient.      Marilee Boggs PharmD, BCACP, [address] [place] [address], toll free: [phone], option 1

## 2021-09-29 RX ORDER — MOMETASONE FUROATE 1 MG/G
CREAM TOPICAL
Qty: 45 G | Refills: 0 | Status: SHIPPED | OUTPATIENT
Start: 2021-09-29 | End: 2021-11-08

## 2021-09-29 NOTE — TELEPHONE ENCOUNTER
Future Appointments     Provider   10/4/2021 iMkal Carvalho PA-C   Department: [facility]   Appt Notes: Appt Reason: Routine Existing Condition Follow Up (Diabetes)   Rescheduled: Screened green, 3 mo DM/Chrohns.  Discuss statin therapy and consider starting, new psych concerns   Booking Code: Estela Thomason   5/12/2022 Glenna Schuster DO   Department: [facility] [place]   Appt Notes: 8m fu   Recent Visits    09/02/2021 Obesity with body mass index 30 or greater   [facility] and [address] [facility] [place], DO   06/30/2021 Type 2 diabetes mellitus with diabetic polyneuropathy, without long-term current use of insulin [facility])   [facility] and [place] MD Andi

## 2021-10-04 ENCOUNTER — OFFICE VISIT (OUTPATIENT)
Dept: FAMILY MEDICINE CLINIC | Age: 56
End: 2021-10-04
Payer: MEDICARE

## 2021-10-04 ENCOUNTER — TELEPHONE (OUTPATIENT)
Dept: FAMILY MEDICINE CLINIC | Age: 56
End: 2021-10-04

## 2021-10-04 VITALS
DIASTOLIC BLOOD PRESSURE: 80 MMHG | HEIGHT: 62 IN | TEMPERATURE: 98.2 F | WEIGHT: 285 LBS | SYSTOLIC BLOOD PRESSURE: 130 MMHG | RESPIRATION RATE: 14 BRPM | OXYGEN SATURATION: 95 % | HEART RATE: 57 BPM | BODY MASS INDEX: 52.44 KG/M2

## 2021-10-04 DIAGNOSIS — E78.2 MIXED HYPERLIPIDEMIA: ICD-10-CM

## 2021-10-04 DIAGNOSIS — I10 BENIGN ESSENTIAL HTN: ICD-10-CM

## 2021-10-04 DIAGNOSIS — E11.42 TYPE 2 DIABETES MELLITUS WITH DIABETIC POLYNEUROPATHY, WITHOUT LONG-TERM CURRENT USE OF INSULIN (HCC): ICD-10-CM

## 2021-10-04 DIAGNOSIS — R41.82 ACUTE ALTERATION IN MENTAL STATUS: Primary | ICD-10-CM

## 2021-10-04 DIAGNOSIS — N30.00 ACUTE CYSTITIS WITHOUT HEMATURIA: ICD-10-CM

## 2021-10-04 LAB
BILIRUBIN, POC: NORMAL
BLOOD URINE, POC: NORMAL
CLARITY, POC: NORMAL
COLOR, POC: YELLOW
GLUCOSE URINE, POC: NORMAL
KETONES, POC: NORMAL
LEUKOCYTE EST, POC: NORMAL
NITRITE, POC: NORMAL
PH, POC: 6
PROTEIN, POC: NORMAL
SPECIFIC GRAVITY, POC: 1.02
UROBILINOGEN, POC: 3.5

## 2021-10-04 PROCEDURE — G8427 DOCREV CUR MEDS BY ELIG CLIN: HCPCS | Performed by: PHYSICIAN ASSISTANT

## 2021-10-04 PROCEDURE — 3044F HG A1C LEVEL LT 7.0%: CPT | Performed by: PHYSICIAN ASSISTANT

## 2021-10-04 PROCEDURE — G8417 CALC BMI ABV UP PARAM F/U: HCPCS | Performed by: PHYSICIAN ASSISTANT

## 2021-10-04 PROCEDURE — 81003 URINALYSIS AUTO W/O SCOPE: CPT | Performed by: PHYSICIAN ASSISTANT

## 2021-10-04 PROCEDURE — 3017F COLORECTAL CA SCREEN DOC REV: CPT | Performed by: PHYSICIAN ASSISTANT

## 2021-10-04 PROCEDURE — G8484 FLU IMMUNIZE NO ADMIN: HCPCS | Performed by: PHYSICIAN ASSISTANT

## 2021-10-04 PROCEDURE — 2022F DILAT RTA XM EVC RTNOPTHY: CPT | Performed by: PHYSICIAN ASSISTANT

## 2021-10-04 PROCEDURE — 99214 OFFICE O/P EST MOD 30 MIN: CPT | Performed by: PHYSICIAN ASSISTANT

## 2021-10-04 PROCEDURE — 1036F TOBACCO NON-USER: CPT | Performed by: PHYSICIAN ASSISTANT

## 2021-10-04 RX ORDER — NITROFURANTOIN 25; 75 MG/1; MG/1
100 CAPSULE ORAL 2 TIMES DAILY
Qty: 20 CAPSULE | Refills: 0 | Status: SHIPPED | OUTPATIENT
Start: 2021-10-04 | End: 2021-10-14

## 2021-10-04 ASSESSMENT — ENCOUNTER SYMPTOMS
BACK PAIN: 1
GASTROINTESTINAL NEGATIVE: 1
RESPIRATORY NEGATIVE: 1

## 2021-10-04 NOTE — PROGRESS NOTES
Subjective  Swathi Wallace, 61 y.o. female presents today with:  Chief Complaint   Patient presents with    Diabetes    Hypertension    Hyperlipidemia         Treatment Adherence:   Medication compliance:  compliant most of the time  Diet compliance:  compliant most of the time  Weight trend: stable  Current exercise: no regular exercise      Diabetes Mellitus Type 2: Current symptoms/problems include none. Home blood sugar records:  fasting range: 100s  Any episodes of hypoglycemia? no  Eye exam current (within one year): yes  Tobacco history: She  reports that she has never smoked. She has never used smokeless tobacco.   Daily Aspirin? No:   Known diabetic complications: peripheral neuropathy        Lab Results   Component Value Date    LABA1C 6.2 (H) 04/19/2021    LABA1C 6.5 03/15/2021    LABA1C 6.0 11/13/2019     Lab Results   Component Value Date    LABMICR <1.20 04/19/2021    CREATININE 1.18 (H) 05/29/2021     Lab Results   Component Value Date    ALT 9 05/29/2021    AST 15 05/29/2021     Lab Results   Component Value Date    CHOL 204 (H) 04/19/2021    TRIG 224 (H) 04/19/2021    HDL 65 (H) 04/19/2021    LDLCALC 94 04/19/2021          HPI   Pt is seen acutely with c.o  sudden attacks of profuse sweating and forgetfulness over x past month . States she forgest where she is or where she is going and  what she doing when they occur. Had sxs in the past with uti  Denies dysuria  Her psych at  the [address] [place] [address] advised her to f.u with PCP -  no recent change in her meds    Review of Systems   Constitutional: Negative. Respiratory: Negative. Cardiovascular: Negative. Gastrointestinal: Negative. Genitourinary: Negative for dysuria, flank pain, frequency, pelvic pain and urgency. Musculoskeletal: Positive for arthralgias, back pain and myalgias. Neurological: Positive for numbness. See hpi   Psychiatric/Behavioral: Negative. All other systems reviewed and are negative.         Past Medical History:   Diagnosis Date    Anemia     Asthma     Benign essential HTN     meds > 5 yrs / denies TIA or stroke    Borderline personality disorder ([address]     2016 ?suggested by me-meets many criteria    Carpal tunnel syndrome of right wrist     Crohn's disease ([facility]     Depression     Fibromyalgia 2/13/2018    Fibromyalgia     GERD (gastroesophageal reflux disease)     Gout     Headache     migraines    Irritable bowel syndrome     Mixed hyperlipidemia 5/10/2017    Neuropathy     Obesity (BMI 35.0-39.9 without comorbidity) 3/30/2017    PONV (postoperative nausea and vomiting)     nausea    PTSD (post-traumatic stress disorder)     Tremor of unknown origin     Type 2 diabetes mellitus ([address]     meds > 5yrs     Ulcerative colitis ([facility] 11/2017    Vaginitis      Past Surgical History:   Procedure Laterality Date    BREAST CYST EXCISION Right 1994    exc mass benign    CHOLECYSTECTOMY  2009    COLONOSCOPY  2015    negative    ENDOSCOPY, COLON, DIAGNOSTIC      HERNIA REPAIR  2015    ventral / mesh    LEG TENDON SURGERY Right 2007    leg.  ankle and foot    OVARIAN CYST REMOVAL Left 1994    with mass and both fallopian tube    OVARY REMOVAL  12/2015    [facility] / mass left ovary & bilateral  tubes    PARTIAL HYSTERECTOMY      IL COLON CA SCRN NOT HI RSK IND N/A 11/7/2017    COLONOSCOPY performed by MANOJ Ahumada on bx    IL COLONOSCOPY W/BIOPSY SINGLE/MULTIPLE N/A 3/15/2018    COLONOSCOPY performed by Milton Valdez MD at [facility] Right 8/3/2017    RIGHT KNEE ARTHROSCOPIC PARTIAL MEDIAL MENISCECTOMY KNEE performed by Sharon England MD at [address] [place] [address] N/CARPAL TUNNEL SURG Right 11/30/2017    RIGHT WRIST  CARPAL TUNNEL RELEASE performed by Sharon England MD at [facility] N/CARPAL TUNNEL SURG Left 5/10/2018    LEFT WRIST CARPAL TUNNEL RELEASE performed by Sharon England MD at [address] [place] [address] ADENOIDECTOMY  1967     Social History     Socioeconomic History    Marital status: Single     Spouse name: Not on file    Number of children: Not on file    Years of education: Not on file    Highest education level: Not on file   Occupational History    Not on file   Tobacco Use    Smoking status: Never Smoker    Smokeless tobacco: Never Used   Vaping Use    Vaping Use: Never used   Substance and Sexual Activity    Alcohol use: No     Alcohol/week: 0.0 standard drinks    Drug use: No    Sexual activity: Not Currently   Other Topics Concern    Not on file   Social History Narrative    Not on file     Social Determinants of Health     Financial Resource Strain: Low Risk     Difficulty of Paying Living Expenses: Not hard at all   Food Insecurity: No Food Insecurity    [facility] in [facility] Year: Never true    Glenna of Food in the Last Year: Never true   Transportation Needs: No Transportation Needs    Lack of Transportation (Medical): No    Lack of Transportation (Non-Medical):  No   Physical Activity:     Days of Exercise per Week:     Minutes of Exercise per Session:    Stress:     Feeling of Stress :    Social Connections:     Frequency of Communication with Friends and Family:     Frequency of Social Gatherings with Friends and Family:     Attends Druze Services:     Active Member of Clubs or Organizations:     Attends Club or Organization Meetings:     Marital Status:    Intimate Partner Violence:     Fear of Current or Ex-Partner:     Emotionally Abused:     Physically Abused:     Sexually Abused:      Family History   Problem Relation Age of Onset    Emphysema Mother     Cancer Maternal Grandfather     Diabetes Paternal Grandmother     Emphysema Paternal Grandfather     Heart Disease Sister     Stroke Sister     Diabetes Sister      Allergies   Allergen Reactions    Latex Hives    Penicillins Swelling and Rash    Shellfish-Derived Products Anaphylaxis    Abilify [Aripiprazole]      shaking    Adhesive Tape Swelling     If left on too long    Statins      Swelling      Topamax [Topiramate]     Buspar [Buspirone]      shaking    Other Nausea And Vomiting    Sulfa Antibiotics Nausea And Vomiting        Current Outpatient Medications   Medication Sig Dispense Refill    nitrofurantoin, macrocrystal-monohydrate, (MACROBID) 100 MG capsule Take 1 capsule by mouth 2 times daily for 10 days 20 capsule 0    mometasone (ELOCON) 0.1 % cream APPLY TOPICALLY DAILY 45 g 0    furosemide (LASIX) 40 MG tablet TAKE ONE TABLET BY MOUTH TWO TIMES A DAY 60 tablet 5    pioglitazone (ACTOS) 15 MG tablet TAKE ONE TABLET BY MOUTH EVERY DAY 90 tablet 3    montelukast (SINGULAIR) 10 MG tablet TAKE ONE TABLET BY MOUTH NIGHTLY 30 tablet 5    dicyclomine (BENTYL) 10 MG capsule TAKE ONE CAPSULE BY MOUTH FOUR TIMES A DAY, BEFORE MEALS & NIGHTLY 120 capsule 5    metFORMIN (GLUCOPHAGE) 500 MG tablet TAKE ONE TABLET BY MOUTH TWICE A DAY WITH MEALS 180 tablet 3    mometasone-formoterol (DULERA) 200-5 MCG/ACT inhaler Inhale 2 puffs into the lungs every 12 hours 1 Inhaler 3    azelastine (ASTELIN) 0.1 % nasal spray 1 spray by Nasal route 2 times daily Use in each nostril as directed 2 Bottle 1    ipratropium-albuterol (DUONEB) 0.5-2.5 (3) MG/3ML SOLN nebulizer solution INHALE 1 VIAL VIA NEBULIZER EVERY 4 HOURS 360 mL 5    traZODone (DESYREL) 100 MG tablet TAKE 1 TO 2 TABLETS BY MOUTH AT BEDTIME AS NEEDED FOR SLEEP      metoprolol succinate (TOPROL XL) 50 MG extended release tablet TAKE ONE TABLET BY MOUTH TWO TIMES A DAY.  HOLD FOR SYSTOLIC BLOOD PRESSURE LOWER THAN 100 OR HEART RATE LOWER THAN 60.  180 tablet 3    omeprazole (PRILOSEC) 40 MG delayed release capsule TAKE ONE CAPSULE BY MOUTH TWO TIMES A  capsule 3    potassium chloride (KLOR-CON M) 20 MEQ extended release tablet TAKE ONE TABLET BY MOUTH TWO TIMES A  tablet 3    QUEtiapine (SEROQUEL) 100 MG tablet TAKE ONE TABLET BYMOUTH EVERY EVENING AS NEEDED AT 8 PM      allopurinol (ZYLOPRIM) 100 MG tablet TAKE ONE TABLET BY MOUTH EVERY DAY 90 tablet 3    albuterol sulfate HFA (PROVENTIL HFA) 108 (90 Base) MCG/ACT inhaler Inhale 2 puffs into the lungs every 6 hours as needed for Wheezing 1 Inhaler 3    Lancets MISC Test bid 100 each 3    hyoscyamine (ANASPAZ;LEVSIN) 125 MCG tablet TAKE ONE TABLET BY MOUTH EVERY 4 HOURS AS NEEDED for cramping 164 tablet 0    magnesium oxide (MAG-OX) 400 (241.3 Mg) MG TABS tablet Take 1 tablet by mouth 2 times daily 60 tablet 2    Blood Glucose Monitoring Suppl (ONE TOUCH ULTRA 2) w/Device KIT TEST TWICE DAILY  0    prazosin (MINIPRESS) 2 MG capsule TAKE 1 CAPSULE BY MOUTH EVERY NIGHT FOR NIGHTMARES  3    blood glucose monitor strips Test 2 times a day & as needed for symptoms of irregular blood glucose. 100 strip 3    nystatin-triamcinolone (MYCOLOG II) 090239-8.1 UNIT/GM-% cream APPLY TOPICALLY TWICE DAILY 45 g 2    Melatonin 5 MG CAPS Take 10 mg by mouth daily   2    FREESTYLE LITE strip use three times daily  50 each 5    DULoxetine (CYMBALTA) 60 MG extended release capsule TAKE ONE CAPSULE BY MOUTH TWICE DAILY AS DIRECTED IN THE MORNING AND AFTERNOON  1    meloxicam (MOBIC) 7.5 MG tablet TAKE ONE TABLET BY MOUTH TWO TIMES A DAY WITH MEALS TAKE ONLY WITH FOOD AS NEEDED  3    baclofen (LIORESAL) 20 MG tablet 20 mg 4 times daily       LYRICA 150 MG capsule 150 mg 3 times daily Indications: last filled 3/5/17 no refills       butalbital-APAP-caffeine (FIORICET) -40 MG CAPS per capsule Take 1 capsule by mouth every 6 hours as needed for Headaches 40 capsule 0     No current facility-administered medications for this visit.        Objective    Vitals:    10/04/21 1131   BP: 130/80   Pulse: 57   Resp: 14   Temp: 98.2 °F (36.8 °C)   SpO2: 95%   Weight: 285 lb (129.3 kg)   Height: 5' 2\" (1.575 m)     Physical Exam  Constitutional:       General: She is not in acute distress. Appearance: She is obese. She is not ill-appearing. HENT:      Head: Normocephalic and atraumatic. Eyes:      Extraocular Movements: Extraocular movements intact. Conjunctiva/sclera: Conjunctivae normal.      Pupils: Pupils are equal, round, and reactive to light. Neck:      Thyroid: No thyromegaly. Cardiovascular:      Rate and Rhythm: Normal rate and regular rhythm. Heart sounds: Normal heart sounds. No murmur heard. Pulmonary:      Effort: Pulmonary effort is normal.      Breath sounds: Normal breath sounds. Abdominal:      General: Bowel sounds are normal.      Palpations: Abdomen is soft. There is no mass. Tenderness: There is no abdominal tenderness. There is no guarding. Musculoskeletal:         General: Normal range of motion. Cervical back: Normal range of motion and neck supple. Lymphadenopathy:      Cervical: No cervical adenopathy. Skin:     General: Skin is warm and dry. Coloration: Skin is not jaundiced or pale. Findings: Bruising present. Neurological:      General: No focal deficit present. Mental Status: She is alert and oriented to person, place, and time. Cranial Nerves: No cranial nerve deficit. Motor: No weakness. Coordination: Coordination normal.      Gait: Gait normal.   Psychiatric:         Mood and Affect: Mood normal.         Behavior: Behavior normal.         Thought Content: Thought content normal.         Judgment: Judgment normal.              Assessment & Plan    Diagnosis Orders   1. Acute alteration in mental status     2. Acute cystitis without hematuria  Culture, Urine   3. Type 2 diabetes mellitus with diabetic polyneuropathy, without long-term current use of insulin ([facility])  Comprehensive Metabolic Panel    Hemoglobin A1C   4. Benign essential HTN  POCT Urinalysis No Micro (Auto)   5.  Mixed hyperlipidemia  Lipid, Fasting         Orders Placed This Encounter   Procedures    Culture, Urine Standing Status:   Future     Number of Occurrences:   1     Standing Expiration Date:   10/4/2022     Order Specific Question:   Specify (ex-cath, midstream, cysto, etc)? Answer:   mid stream    Comprehensive Metabolic Panel     Standing Status:   Future     Number of Occurrences:   1     Standing Expiration Date:   10/4/2022    Hemoglobin A1C     Standing Status:   Future     Number of Occurrences:   1     Standing Expiration Date:   10/4/2022    Lipid, Fasting     Standing Status:   Future     Number of Occurrences:   1     Standing Expiration Date:   10/4/2022    POCT Urinalysis No Micro (Auto)     Orders Placed This Encounter   Medications    nitrofurantoin, macrocrystal-monohydrate, (MACROBID) 100 MG capsule     Sig: Take 1 capsule by mouth 2 times daily for 10 days     Dispense:  20 capsule     Refill:  0     There are no discontinued medications. Return in about 6 months (around 4/4/2022), or if symptoms worsen or fail to improve, for follow up with your PCP as directed, call for results.     Vane Manzano PA-C

## 2021-10-04 NOTE — TELEPHONE ENCOUNTER
Vane Manzano PA-C,     Dr. Jarrod Iverson stated I could reach out to patient to discuss statin therapy but I was unable to reach her. Please see encounter notes for more information. Patient has diabetes, age >40 years, LDL >100. Would patient benefit from moderate-intensity statin therapy?     Per chart review, patient tried atorvastatin in 2016. Unclear if patient actually had side effects (\"swelling\") as noted in 02/05/2016 note. Or if patient just didn't want to take any more as suggested in 10/10/2016 provider note. Could consider alternate statin such as rosuvastatin or pravastatin. Guidelines do suggest rechallenging or trying different statin if goals not achieved and side effects not severe.     Last visit: 06/30/2021, Next visit: 10/04/2021 today    Please let me know if you have any questions. Thank you,  Corbin Taylor, PharmD, BCACP, [facility] [place]  Department, toll free: [phone], option 1    =======================================================    POPULATION HEALTH CLINICAL PHARMACY REVIEW: STATIN THERAPY REVIEW     No response from patient since sending letter and leaving voicemails.  Will send encounter to PA for appointment today.      Renetta Flores, PharmD, BCACP, [facility] [place]  Department, toll free: [phone], option 1

## 2021-10-06 RX ORDER — ALLOPURINOL 100 MG/1
TABLET ORAL
Qty: 90 TABLET | Refills: 0 | Status: SHIPPED | OUTPATIENT
Start: 2021-10-06 | End: 2021-10-18

## 2021-10-06 NOTE — TELEPHONE ENCOUNTER
Recent Visits    10/04/2021 Acute alteration in mental status   [facility] V[place], [place]   09/02/2021 Obesity with body mass index 30 or greater   [facility] DO [place]   06/30/2021 Type 2 diabetes mellitus with diabetic polyneuropathy, without long-term current use of insulin [facility])   [facility] [place] MD Andi

## 2021-10-06 NOTE — TELEPHONE ENCOUNTER
Dr. Michelle Schultz MD,     Discussed statin therapy with patient along with most recent lipid panel results - Patient agreeable to try pravastatin. She thinks previous side effect with atorvastatin was fluid retention, not a type of allergic reaction. Given patient's DM, age >43, and LDL >100, would recommend starting pravastatin 40mg daily (moderate-intensity). Last visit: 10/04/2021, Next visit: 02/07/2022    See encounter note(s) below for complete details. Please let me know if you have any questions. Thank you,  Lizabeth Banda, PharmD, BCACP, [address]   Department, toll free: [phone], option 1    =======================================================    POPULATION HEALTH CLINICAL PHARMACY REVIEW: STATIN THERAPY REVIEW    Patient returned writer's previous outreach. She shared she had been working on improving her diet to help lower cholesterol by decreasing amount of dairy intake (ex: less butter, using almond milk) - congratulated patient on progress made. Inquired about history with atorvastatin - she states her face was distorted, swollen. Asked if it was an allergic reaction or if she felt it was water retention - she states she thinks it was extra water as her ankles were swollen as well and unable to put shoes on. Patient asked if I could see her most recent labs - she had not discussed them with anyone yet. Reviewed labwork below:    Ref.  Range 4/19/2021 11:33 5/29/2021 18:15 10/4/2021 12:59   Creatinine Latest Ref Range: 0.50 - 0.90 mg/dL 0.99 (H) 1.18 (H) 1.03 (H)   GFR Non- Latest Ref Range: >60  57.8 (L) 47.2 (L) 55.1 (L)   Cholesterol Latest Ref Range: 0 - 199 mg/dL 204 (H)  205 (H)   HDL Cholesterol Latest Ref Range: 40 - 59 mg/dL 65 (H)  55   LDL Calculated Latest Ref Range: 0 - 129 mg/dL 94  119   Triglycerides Latest Ref Range: 0 - 150 mg/dL 224 (H)  154 (H)   ALT Latest Ref Range: 0 - 33 U/L 10 9 <5 AST Latest Ref Range: 0 - 35 U/L 20 15 17   Hemoglobin A1C Latest Ref Range: 4.8 - 5.9 % 6.2 (H)  6.5 (H)     Patient was hoping lipid panel would be a little better - congratulated her on lower TGs and encouraged to keep up the good work. Discussed how diet is just one part of lipids - family history/genes can play a role. She states she had not been doing diet changes for that long and is still working on them so discussed that could be part of results. Explained benefit of statin therapy even with good A1c and lipid control. Patient agreeable to trying another statin - discussed rosuvastatin and pravastatin given lower incidence of side effects. Patient shared she just wanted it to be affordable like her sister's pravastatin that has a $2 copay. Decided she'd like to try pravastatin if provider agrees. She had discussed with cardiology in past as well. Will reach out to PCP for Rx given previous statin recommendation attempts. Will follow-up with patient with provider's decision and copay cost. If medication started, patient would also like a check-in a few weeks after to ensure things are going well.      Tiffany Tovar, PharmD, BCACP, [facility]  Department, toll free: [phone], option 1

## 2021-10-07 RX ORDER — PRAVASTATIN SODIUM 40 MG
40 TABLET ORAL EVERY EVENING
Qty: 90 TABLET | Refills: 3 | Status: SHIPPED | OUTPATIENT
Start: 2021-10-07 | End: 2022-10-05 | Stop reason: SDUPTHER

## 2021-10-12 NOTE — TELEPHONE ENCOUNTER
[facility] CLINICAL PHARMACY REVIEW: STATIN THERAPY REVIEW    Called pharmacy to check copay price - was told $0 copay. Outreach to patient to see if she's taken yet - she states she has taken for three days. She has noticed some swelling but she thinks d/t eating salty chips last night, she will continue to monitor. Patient thought she paid $4 for copay, which is still affordable. Will follow-up with patient in 4-5 weeks so see how she is tolerating the medications.      Anais Rodriguez, PharmD, BCACP, [address] [place] [address]  Department, toll free: [phone], option 1    =======================================================    For Pharmacy Admin Tracking Only   Recommendation Provided To: Provider: 1 via Note to Provider and Patient/Caregiver: 1 via Telephone   Intervention Detail: New Rx: 1, reason: Needs Additional Therapy   Gap Closed?: Yes    Intervention Accepted By: Provider: 1 and Patient/Caregiver: 1   Time Spent (min): 120 Universal Safety Interventions

## 2021-10-18 RX ORDER — ALLOPURINOL 100 MG/1
TABLET ORAL
Qty: 30 TABLET | Refills: 0 | Status: SHIPPED | OUTPATIENT
Start: 2021-10-18 | End: 2022-02-16

## 2021-10-18 NOTE — TELEPHONE ENCOUNTER
Future Appointments     Provider   2/7/2022 Alva Beck MD   Department: [facility]   Appt Notes:  Follow Up   Recent Visits    06/30/2021 Type 2 diabetes mellitus with diabetic polyneuropathy, without long-term current use of insulin [facility])   [facility] and [place] MD Andi

## 2021-11-08 RX ORDER — MOMETASONE FUROATE 1 MG/G
CREAM TOPICAL
Qty: 45 G | Refills: 0 | Status: SHIPPED | OUTPATIENT
Start: 2021-11-08 | End: 2022-01-18

## 2021-11-08 NOTE — TELEPHONE ENCOUNTER
Future Appointments    Encounter Information    Provider Department Appt Notes   2/7/2022 Michael Moreira MD [facility] Follow Up   5/12/2022 Benson King DO [address] [place] [address]       Recent Visits    10/04/2021 Acute alteration in mental status   [facility] [place], [place]   09/02/2021 Obesity with body mass index 30 or greater   [facility] DO Chey   06/30/2021 Type 2 diabetes mellitus with diabetic polyneuropathy, without long-term current use of insulin [facility])   [facility] and [place] MD Andi

## 2021-11-18 DIAGNOSIS — J45.20 MILD INTERMITTENT ASTHMA WITHOUT COMPLICATION: ICD-10-CM

## 2021-11-19 RX ORDER — MONTELUKAST SODIUM 10 MG/1
TABLET ORAL
Qty: 30 TABLET | Refills: 5 | Status: SHIPPED | OUTPATIENT
Start: 2021-11-19 | End: 2022-06-15

## 2021-12-01 ENCOUNTER — TELEPHONE (OUTPATIENT)
Dept: FAMILY MEDICINE CLINIC | Age: 56
End: 2021-12-01

## 2021-12-01 NOTE — TELEPHONE ENCOUNTER
----- Message from Vadim Qureshi sent at 11/30/2021  4:36 PM EST -----  Subject: Message to Provider    QUESTIONS  Information for Provider? Patient goes to a pain clinic. They no longer   are going to be taking her insurance starting January 1st, 2022. She would   like to know if the medications she receives from there are something that   can be filled by . Does she need to schedule an appointment for   this issue. Can you please contact the patient.  ---------------------------------------------------------------------------  --------------  CALL BACK INFO  What is the best way for the office to contact you? OK to leave message on   voicemail  Preferred Call Back Phone Number? [phone]  ---------------------------------------------------------------------------  --------------  SCRIPT ANSWERS  Relationship to Patient?  Self

## 2021-12-02 NOTE — TELEPHONE ENCOUNTER
[facility] requesting that patient call back to let us know which med she's referring to.

## 2021-12-06 ENCOUNTER — OFFICE VISIT (OUTPATIENT)
Dept: FAMILY MEDICINE CLINIC | Age: 56
End: 2021-12-06
Payer: MEDICARE

## 2021-12-06 ENCOUNTER — TELEPHONE (OUTPATIENT)
Dept: FAMILY MEDICINE CLINIC | Age: 56
End: 2021-12-06

## 2021-12-06 VITALS
SYSTOLIC BLOOD PRESSURE: 130 MMHG | OXYGEN SATURATION: 95 % | BODY MASS INDEX: 52.44 KG/M2 | TEMPERATURE: 95.4 F | HEART RATE: 79 BPM | WEIGHT: 285 LBS | DIASTOLIC BLOOD PRESSURE: 82 MMHG | HEIGHT: 62 IN

## 2021-12-06 DIAGNOSIS — J01.90 ACUTE BACTERIAL SINUSITIS: Primary | ICD-10-CM

## 2021-12-06 DIAGNOSIS — B96.89 ACUTE BACTERIAL SINUSITIS: Primary | ICD-10-CM

## 2021-12-06 DIAGNOSIS — R05.9 COUGH: ICD-10-CM

## 2021-12-06 DIAGNOSIS — J40 BRONCHITIS: ICD-10-CM

## 2021-12-06 LAB
INFLUENZA A ANTIBODY: NORMAL
INFLUENZA B ANTIBODY: NORMAL
Lab: NORMAL
PERFORMING INSTRUMENT: NORMAL
QC PASS/FAIL: NORMAL
SARS-COV-2, POC: NORMAL

## 2021-12-06 PROCEDURE — 87804 INFLUENZA ASSAY W/OPTIC: CPT | Performed by: PHYSICIAN ASSISTANT

## 2021-12-06 PROCEDURE — 1036F TOBACCO NON-USER: CPT | Performed by: PHYSICIAN ASSISTANT

## 2021-12-06 PROCEDURE — 87426 SARSCOV CORONAVIRUS AG IA: CPT | Performed by: PHYSICIAN ASSISTANT

## 2021-12-06 PROCEDURE — G8484 FLU IMMUNIZE NO ADMIN: HCPCS | Performed by: PHYSICIAN ASSISTANT

## 2021-12-06 PROCEDURE — 99213 OFFICE O/P EST LOW 20 MIN: CPT | Performed by: PHYSICIAN ASSISTANT

## 2021-12-06 PROCEDURE — G8427 DOCREV CUR MEDS BY ELIG CLIN: HCPCS | Performed by: PHYSICIAN ASSISTANT

## 2021-12-06 PROCEDURE — 3017F COLORECTAL CA SCREEN DOC REV: CPT | Performed by: PHYSICIAN ASSISTANT

## 2021-12-06 PROCEDURE — G8417 CALC BMI ABV UP PARAM F/U: HCPCS | Performed by: PHYSICIAN ASSISTANT

## 2021-12-06 RX ORDER — DEXTROMETHORPHAN HYDROBROMIDE AND PROMETHAZINE HYDROCHLORIDE 15; 6.25 MG/5ML; MG/5ML
5 SYRUP ORAL 4 TIMES DAILY PRN
Qty: 150 ML | Refills: 0 | Status: SHIPPED | OUTPATIENT
Start: 2021-12-06 | End: 2021-12-13

## 2021-12-06 RX ORDER — DOXYCYCLINE HYCLATE 100 MG
100 TABLET ORAL 2 TIMES DAILY
Qty: 20 TABLET | Refills: 0 | Status: SHIPPED | OUTPATIENT
Start: 2021-12-06 | End: 2021-12-15 | Stop reason: SDUPTHER

## 2021-12-06 RX ORDER — MECLIZINE HYDROCHLORIDE 25 MG/1
25 TABLET ORAL 3 TIMES DAILY PRN
Qty: 30 TABLET | Refills: 0 | Status: SHIPPED | OUTPATIENT
Start: 2021-12-06 | End: 2021-12-16

## 2021-12-06 ASSESSMENT — ENCOUNTER SYMPTOMS
ABDOMINAL PAIN: 0
SINUS PAIN: 1
SHORTNESS OF BREATH: 0
BACK PAIN: 0
DIARRHEA: 0
SINUS PRESSURE: 1
TROUBLE SWALLOWING: 0
COUGH: 1
RHINORRHEA: 1
CHEST TIGHTNESS: 0
VOMITING: 0

## 2021-12-06 ASSESSMENT — PATIENT HEALTH QUESTIONNAIRE - PHQ9
SUM OF ALL RESPONSES TO PHQ QUESTIONS 1-9: 0
SUM OF ALL RESPONSES TO PHQ QUESTIONS 1-9: 0
SUM OF ALL RESPONSES TO PHQ9 QUESTIONS 1 & 2: 0
2. FEELING DOWN, DEPRESSED OR HOPELESS: 0
1. LITTLE INTEREST OR PLEASURE IN DOING THINGS: 0
SUM OF ALL RESPONSES TO PHQ QUESTIONS 1-9: 0

## 2021-12-06 NOTE — PATIENT INSTRUCTIONS
Patient Education        Bronchitis: Care Instructions  Your Care Instructions     Bronchitis is inflammation of the bronchial tubes, which carry air to the lungs. The tubes swell and produce mucus, or phlegm. The mucus and inflamed bronchial tubes make you cough. You may have trouble breathing. Most cases of bronchitis are caused by viruses like those that cause colds. Antibiotics usually do not help and they may be harmful. Bronchitis usually develops rapidly and lasts about 2 to 3 weeks in otherwise healthy people. Follow-up care is a key part of your treatment and safety. Be sure to make and go to all appointments, and call your doctor if you are having problems. It's also a good idea to know your test results and keep a list of the medicines you take. How can you care for yourself at home? · Take all medicines exactly as prescribed. Call your doctor if you think you are having a problem with your medicine. · Get some extra rest.  · Take an over-the-counter pain medicine, such as acetaminophen (Tylenol), ibuprofen (Advil, Motrin), or naproxen (Aleve) to reduce fever and relieve body aches. Read and follow all instructions on the label. · Do not take two or more pain medicines at the same time unless the doctor told you to. Many pain medicines have acetaminophen, which is Tylenol. Too much acetaminophen (Tylenol) can be harmful. · Take an over-the-counter cough medicine to help quiet a dry, hacking cough so that you can sleep. Avoid cough medicines that have more than one active ingredient. Read and follow all instructions on the label. · Do not smoke. Smoking can make bronchitis worse. If you need help quitting, talk to your doctor about stop-smoking programs and medicines. These can increase your chances of quitting for good. When should you call for help? Call 911 anytime you think you may need emergency care. For example, call if:    · You have severe trouble breathing.    Call your doctor now or seek immediate medical care if:    · You have new or worse trouble breathing.     · You cough up dark brown or bloody mucus (sputum).     · You have a new or higher fever.     · You have a new rash. Watch closely for changes in your health, and be sure to contact your doctor if:    · You cough more deeply or more often, especially if you notice more mucus or a change in the color of your mucus.     · You are not getting better as expected. Where can you learn more? Go to https://[employer]irnarsoft. org and sign in to your [employer] account. Enter H333 in the [employer] box to learn more about \"Bronchitis: Care Instructions. \"     If you do not have an account, please click on the \"Sign Up Now\" link. Current as of: July 6, 2021               Content Version: 13.0  © 8202-9429 [employer], [employer]. Care instructions adapted under license by [facility] ([facility]). If you have questions about a medical condition or this instruction, always ask your healthcare professional. [address] any warranty or liability for your use of this information.

## 2021-12-06 NOTE — PROGRESS NOTES
Subjective:      Patient ID: Sanford Dinh is a 61 y.o. female who presents today for:  Chief Complaint   Patient presents with    URI     Patient is here c/o cold symptoms. Pt states she has headache, SOB, cough, sneezing, sore throat, dizziness, sinus pressure, and facial pressure. Pt states symptoms have been ongoing for about 1 week. Pt states she has used OTC cold medication with minimal relief. HPI   61year old female who presents with complaints of sinus pain and pressure. Has sinus head ache. Brown colored nasal drainage. Coughing up brown sputum. Fatigue. Fever and chills. Has used otc remedies. Past Medical History:   Diagnosis Date    Anemia     Asthma     Benign essential HTN     meds > 5 yrs / denies TIA or stroke    Borderline personality disorder ([address]     2016 ?suggested by me-meets many criteria    Carpal tunnel syndrome of right wrist     Crohn's disease ([address]     Depression     Fibromyalgia 2/13/2018    Fibromyalgia     GERD (gastroesophageal reflux disease)     Gout     Headache     migraines    Irritable bowel syndrome     Mixed hyperlipidemia 5/10/2017    Neuropathy     Obesity (BMI 35.0-39.9 without comorbidity) 3/30/2017    PONV (postoperative nausea and vomiting)     nausea    PTSD (post-traumatic stress disorder)     Tremor of unknown origin     Type 2 diabetes mellitus ([address]     meds > 5yrs     Ulcerative colitis ([address] 11/2017    Vaginitis      Past Surgical History:   Procedure Laterality Date    BREAST CYST EXCISION Right 1994    exc mass benign    CHOLECYSTECTOMY  2009    COLONOSCOPY  2015    negative    ENDOSCOPY, COLON, DIAGNOSTIC      HERNIA REPAIR  2015    ventral / mesh    LEG TENDON SURGERY Right 2007    leg.  ankle and foot    OVARIAN CYST REMOVAL Left 1994    with mass and both fallopian tube    OVARY REMOVAL  12/2015    [facility] / mass left ovary & bilateral  tubes    PARTIAL HYSTERECTOMY      MT COLON CA SCRN NOT HI RSK IND N/A 11/7/2017    COLONOSCOPY performed by [facility] on bx    CT COLONOSCOPY W/BIOPSY SINGLE/MULTIPLE N/A 3/15/2018    COLONOSCOPY performed by Taryn [facility] at [facility] Right 8/3/2017    RIGHT KNEE ARTHROSCOPIC PARTIAL MEDIAL MENISCECTOMY KNEE performed by Erma Patterson MD at [facility] N/CARPAL TUNNEL SURG Right 11/30/2017    RIGHT WRIST  CARPAL TUNNEL RELEASE performed by Erma Patterson MD at [facility] N/CARPAL TUNNEL SURG Left 5/10/2018    LEFT WRIST CARPAL TUNNEL RELEASE performed by Erma Patterson MD at [address]     Social History     Socioeconomic History    Marital status: Single     Spouse name: Not on file    Number of children: Not on file    Years of education: Not on file    Highest education level: Not on file   Occupational History    Not on file   Tobacco Use    Smoking status: Never Smoker    Smokeless tobacco: Never Used   Vaping Use    Vaping Use: Never used   Substance and Sexual Activity    Alcohol use: No     Alcohol/week: 0.0 standard drinks    Drug use: No    Sexual activity: Not Currently   Other Topics Concern    Not on file   Social History Narrative    Not on file     Social Determinants of Health     Financial Resource Strain: Low Risk     Difficulty of Paying Living Expenses: Not hard at all   Food Insecurity: No Food Insecurity    Worried About [facility] in the Last Year: Never true    [place]enna of Food in the Last Year: Never true   Transportation Needs: No Transportation Needs    Lack of Transportation (Medical): No    Lack of Transportation (Non-Medical):  No   Physical Activity:     Days of Exercise per Week: Not on file    Minutes of Exercise per Session: Not on file   Stress:     Feeling of Stress : Not on file   Social Connections:     Frequency of Communication with Friends and Family: Not on file    Frequency of Social Gatherings with Friends and Family: Not on file    Attends Druze Services: Not on file    Active Member of Clubs or Organizations: Not on file    Attends Club or Organization Meetings: Not on file    Marital Status: Not on file   Intimate Partner Violence:     Fear of Current or Ex-Partner: Not on file    Emotionally Abused: Not on file    Physically Abused: Not on file    Sexually Abused: Not on file   Housing Stability:     Unable to Pay for Housing in the Last Year: Not on file    Number of [place] in the Last Year: Not on file    Unstable Housing in the Last Year: Not on file     Family History   Problem Relation Age of Onset    Emphysema Mother     Cancer Maternal Grandfather     Diabetes Paternal Grandmother     Emphysema Paternal Grandfather     Heart Disease Sister     Stroke Sister     Diabetes Sister      Allergies   Allergen Reactions    Latex Hives    Penicillins Swelling and Rash    Shellfish-Derived Products Anaphylaxis    Abilify [Aripiprazole]      shaking    Adhesive Tape Swelling     If left on too long    Statins      Swelling      Topamax [Topiramate]     Buspar [Buspirone]      shaking    Other Nausea And Vomiting    Sulfa Antibiotics Nausea And Vomiting     Current Outpatient Medications   Medication Sig Dispense Refill    doxycycline hyclate (VIBRA-TABS) 100 MG tablet Take 1 tablet by mouth 2 times daily for 10 days 20 tablet 0    promethazine-dextromethorphan (PROMETHAZINE-DM) 6.25-15 MG/5ML syrup Take 5 mLs by mouth 4 times daily as needed for Cough 150 mL 0    meclizine (ANTIVERT) 25 MG tablet Take 1 tablet by mouth 3 times daily as needed for Dizziness 30 tablet 0    montelukast (SINGULAIR) 10 MG tablet TAKE ONE TABLET BY MOUTH NIGHTLY 30 tablet 5    mometasone (ELOCON) 0.1 % cream APPLY TOPICALLY DAILY.  45 g 0    allopurinol (ZYLOPRIM) 100 MG tablet TAKE ONE TABLET BY MOUTH EVERY DAY 30 tablet 0    pravastatin (PRAVACHOL) 40 MG tablet Take 1 tablet by mouth every evening 90 tablet 3    furosemide (LASIX) 40 MG tablet TAKE ONE TABLET BY MOUTH TWO TIMES A DAY 60 tablet 5    pioglitazone (ACTOS) 15 MG tablet TAKE ONE TABLET BY MOUTH EVERY DAY 90 tablet 3    dicyclomine (BENTYL) 10 MG capsule TAKE ONE CAPSULE BY MOUTH FOUR TIMES A DAY, BEFORE MEALS & NIGHTLY 120 capsule 5    metFORMIN (GLUCOPHAGE) 500 MG tablet TAKE ONE TABLET BY MOUTH TWICE A DAY WITH MEALS 180 tablet 3    mometasone-formoterol (DULERA) 200-5 MCG/ACT inhaler Inhale 2 puffs into the lungs every 12 hours 1 Inhaler 3    azelastine (ASTELIN) 0.1 % nasal spray 1 spray by Nasal route 2 times daily Use in each nostril as directed 2 Bottle 1    ipratropium-albuterol (DUONEB) 0.5-2.5 (3) MG/3ML SOLN nebulizer solution INHALE 1 VIAL VIA NEBULIZER EVERY 4 HOURS 360 mL 5    traZODone (DESYREL) 100 MG tablet TAKE 1 TO 2 TABLETS BY MOUTH AT BEDTIME AS NEEDED FOR SLEEP      metoprolol succinate (TOPROL XL) 50 MG extended release tablet TAKE ONE TABLET BY MOUTH TWO TIMES A DAY.  HOLD FOR SYSTOLIC BLOOD PRESSURE LOWER THAN 100 OR HEART RATE LOWER THAN 60.  180 tablet 3    omeprazole (PRILOSEC) 40 MG delayed release capsule TAKE ONE CAPSULE BY MOUTH TWO TIMES A  capsule 3    potassium chloride (KLOR-CON M) 20 MEQ extended release tablet TAKE ONE TABLET BY MOUTH TWO TIMES A  tablet 3    QUEtiapine (SEROQUEL) 100 MG tablet TAKE ONE TABLET BYMOUTH EVERY EVENING AS NEEDED AT 8 PM      albuterol sulfate HFA (PROVENTIL HFA) 108 (90 Base) MCG/ACT inhaler Inhale 2 puffs into the lungs every 6 hours as needed for Wheezing 1 Inhaler 3    Lancets MISC Test bid 100 each 3    hyoscyamine (ANASPAZ;LEVSIN) 125 MCG tablet TAKE ONE TABLET BY MOUTH EVERY 4 HOURS AS NEEDED for cramping 164 tablet 0    magnesium oxide (MAG-OX) 400 (241.3 Mg) MG TABS tablet Take 1 tablet by mouth 2 times daily 60 tablet 2    Blood Glucose Monitoring Suppl (ONE TOUCH ULTRA 2) w/Device KIT TEST TWICE DAILY  0    prazosin (MINIPRESS) 2 MG capsule TAKE 1 CAPSULE BY MOUTH EVERY NIGHT FOR NIGHTMARES  3    blood glucose monitor strips Test 2 times a day & as needed for symptoms of irregular blood glucose. 100 strip 3    nystatin-triamcinolone (MYCOLOG II) 229603-5.1 UNIT/GM-% cream APPLY TOPICALLY TWICE DAILY 45 g 2    Melatonin 5 MG CAPS Take 10 mg by mouth daily   2    FREESTYLE LITE strip use three times daily  50 each 5    DULoxetine (CYMBALTA) 60 MG extended release capsule TAKE ONE CAPSULE BY MOUTH TWICE DAILY AS DIRECTED IN THE MORNING AND AFTERNOON  1    meloxicam (MOBIC) 7.5 MG tablet TAKE ONE TABLET BY MOUTH TWO TIMES A DAY WITH MEALS TAKE ONLY WITH FOOD AS NEEDED  3    baclofen (LIORESAL) 20 MG tablet 20 mg 4 times daily       LYRICA 150 MG capsule 150 mg 3 times daily Indications: last filled 3/5/17 no refills       butalbital-APAP-caffeine (FIORICET) -40 MG CAPS per capsule Take 1 capsule by mouth every 6 hours as needed for Headaches 40 capsule 0     No current facility-administered medications for this visit. Review of Systems   Constitutional: Positive for chills, fatigue and fever. Negative for activity change, appetite change and unexpected weight change. HENT: Positive for congestion, postnasal drip, rhinorrhea, sinus pressure and sinus pain. Negative for drooling, ear pain, nosebleeds and trouble swallowing. Respiratory: Positive for cough. Negative for chest tightness and shortness of breath. Cardiovascular: Negative for chest pain and leg swelling. Gastrointestinal: Negative for abdominal pain, diarrhea and vomiting. Endocrine: Negative for polydipsia and polyphagia. Genitourinary: Negative for dysuria, flank pain and frequency. Musculoskeletal: Negative for back pain and myalgias. Skin: Negative for pallor and rash. Neurological: Positive for dizziness. Negative for syncope, weakness and headaches. Hematological: Does not bruise/bleed easily. Psychiatric/Behavioral: Negative for agitation, behavioral problems and confusion. All other systems reviewed and are negative. Objective:   /82 (Site: Right Upper Arm, Position: Sitting, Cuff Size: Large Adult)   Pulse 79   Temp 95.4 °F (35.2 °C)   Ht 5' 2\" (1.575 m)   Wt 285 lb (129.3 kg)   LMP 07/27/1993 (Approximate)   SpO2 95%   BMI 52.13 kg/m²     Physical Exam  Vitals and nursing note reviewed. Constitutional:       General: She is awake. She is not in acute distress. Appearance: Normal appearance. She is well-developed and normal weight. She is not ill-appearing, toxic-appearing or diaphoretic. Comments: No photophobia. No phonophobia. HENT:      Head: Normocephalic and atraumatic. No Craft's sign. Right Ear: External ear normal.      Left Ear: External ear normal.      Nose: Nose normal. No congestion or rhinorrhea. Mouth/Throat:      Mouth: Mucous membranes are moist.      Pharynx: Oropharynx is clear. No oropharyngeal exudate or posterior oropharyngeal erythema. Eyes:      General: No scleral icterus. Right eye: No foreign body or discharge. Left eye: No discharge. Extraocular Movements: Extraocular movements intact. Conjunctiva/sclera: Conjunctivae normal.      Left eye: No exudate. Pupils: Pupils are equal, round, and reactive to light. Neck:      Vascular: No JVD. Trachea: No tracheal deviation. Comments: No meningismus. Cardiovascular:      Rate and Rhythm: Normal rate and regular rhythm. Heart sounds: Normal heart sounds. Heart sounds not distant. No murmur heard. No friction rub. No gallop. Pulmonary:      Effort: Pulmonary effort is normal. No respiratory distress. Breath sounds: Normal breath sounds. No stridor. No wheezing, rhonchi or rales. Chest:      Chest wall: No tenderness. Abdominal:      General: Abdomen is flat. Bowel sounds are normal. There is no distension.       Palpations: Abdomen is soft. There is no mass. Tenderness: There is no abdominal tenderness. There is no right CVA tenderness, left CVA tenderness, guarding or rebound. Hernia: No hernia is present. Musculoskeletal:         General: No swelling, tenderness, deformity or signs of injury. Normal range of motion. Cervical back: Normal range of motion and neck supple. No rigidity. Lymphadenopathy:      Head:      Right side of head: No submental adenopathy. Left side of head: No submental adenopathy. Skin:     General: Skin is warm and dry. Capillary Refill: Capillary refill takes less than 2 seconds. Coloration: Skin is not jaundiced or pale. Findings: No bruising, erythema, lesion or rash. Neurological:      General: No focal deficit present. Mental Status: She is alert and oriented to person, place, and time. Mental status is at baseline. Cranial Nerves: No cranial nerve deficit. Sensory: No sensory deficit. Motor: No weakness. Coordination: Coordination normal.      Deep Tendon Reflexes: Reflexes are normal and symmetric. Psychiatric:         Mood and Affect: Mood normal.         Behavior: Behavior normal. Behavior is cooperative. Thought Content: Thought content normal.         Judgment: Judgment normal.         Assessment:       Diagnosis Orders   1. Acute bacterial sinusitis     2. Bronchitis     3. Cough  POCT COVID-19, Antigen    POCT Influenza A/B     Results for POC orders placed in visit on 12/06/21   POCT Influenza A/B   Result Value Ref Range    Influenza A Ab neg     Influenza B Ab neg       Plan:     Assessment & Plan   Jace Amos was seen today for uri. Diagnoses and all orders for this visit:    Acute bacterial sinusitis    Bronchitis    Cough  -     POCT COVID-19, Antigen  -     POCT Influenza A/B    Other orders  -     doxycycline hyclate (VIBRA-TABS) 100 MG tablet;  Take 1 tablet by mouth 2 times daily for 10 days  - promethazine-dextromethorphan (PROMETHAZINE-DM) 6.25-15 MG/5ML syrup; Take 5 mLs by mouth 4 times daily as needed for Cough  -     meclizine (ANTIVERT) 25 MG tablet; Take 1 tablet by mouth 3 times daily as needed for Dizziness      Orders Placed This Encounter   Procedures    POCT COVID-19, Antigen     Order Specific Question:   Is this test for diagnosis or screening? Answer:   Diagnosis of ill patient     Order Specific Question:   Symptomatic for COVID-19 as defined by CDC? Answer:   Yes     Order Specific Question:   Date of Symptom Onset     Answer:   11/29/2021     Order Specific Question:   Hospitalized for COVID-19? Answer:   No     Order Specific Question:   Admitted to ICU for COVID-19? Answer:   No     Order Specific Question:   Employed in healthcare setting? Answer:   Unknown     Order Specific Question:   Resident in a congregate (group) care setting? Answer:   Unknown     Order Specific Question:   Pregnant? Answer:   No     Order Specific Question:   Previously tested for COVID-19? Answer:   Unknown    POCT Influenza A/B     Orders Placed This Encounter   Medications    doxycycline hyclate (VIBRA-TABS) 100 MG tablet     Sig: Take 1 tablet by mouth 2 times daily for 10 days     Dispense:  20 tablet     Refill:  0    promethazine-dextromethorphan (PROMETHAZINE-DM) 6.25-15 MG/5ML syrup     Sig: Take 5 mLs by mouth 4 times daily as needed for Cough     Dispense:  150 mL     Refill:  0    meclizine (ANTIVERT) 25 MG tablet     Sig: Take 1 tablet by mouth 3 times daily as needed for Dizziness     Dispense:  30 tablet     Refill:  0     There are no discontinued medications. Return if symptoms worsen or fail to improve. Reviewed with the patient/family: current clinical status & medications.   Side effects of the medication prescribed today, as well as treatment plan/rationale and result expectations have been discussed with the patient/family who expresses understanding. Patient will be discharged home in stable condition. Follow up with PCP to evaluate treatment results or return if symptoms worsen or fail to improve. Discussed signs and symptoms which require immediate follow-up in ED/call to 911. Understanding verbalized. I have reviewed the patient's medical history in detail and updated the computerized patient record.     CHESTER Root

## 2021-12-06 NOTE — TELEPHONE ENCOUNTER
Patient c/o COVID and wants to know if you want her to get tested or send a zpak for a cold/flu? She said she has sore throat, congestion, sob, puffiness of eyes, headaches. States when she blows her nose it is clody and green.

## 2021-12-10 RX ORDER — POTASSIUM CHLORIDE 20 MEQ/1
TABLET, EXTENDED RELEASE ORAL
Qty: 180 TABLET | Refills: 0 | Status: SHIPPED | OUTPATIENT
Start: 2021-12-10 | End: 2022-04-27

## 2021-12-10 NOTE — TELEPHONE ENCOUNTER
Future Appointments    Encounter Information    Provider Department Appt Notes   2/7/2022 Anuja Sanchez MD [facility] Follow Up   5/12/2022 Peace Ham DO [address] [facility] [place] [address]       Recent Visits    12/06/2021 Acute bacterial sinusitis   [facility] and [address] [place] [address]   10/04/2021 Acute alteration in mental status   [facility] [place], [place]

## 2021-12-13 ENCOUNTER — TELEPHONE (OUTPATIENT)
Dept: FAMILY MEDICINE CLINIC | Age: 56
End: 2021-12-13

## 2021-12-15 ENCOUNTER — OFFICE VISIT (OUTPATIENT)
Dept: FAMILY MEDICINE CLINIC | Age: 56
End: 2021-12-15
Payer: MEDICARE

## 2021-12-15 VITALS
HEART RATE: 70 BPM | SYSTOLIC BLOOD PRESSURE: 108 MMHG | BODY MASS INDEX: 52.44 KG/M2 | DIASTOLIC BLOOD PRESSURE: 62 MMHG | RESPIRATION RATE: 16 BRPM | HEIGHT: 62 IN | WEIGHT: 285 LBS | OXYGEN SATURATION: 97 % | TEMPERATURE: 96.9 F

## 2021-12-15 DIAGNOSIS — M47.9 SPONDYLOSIS: ICD-10-CM

## 2021-12-15 DIAGNOSIS — M79.7 FIBROMYALGIA: Primary | ICD-10-CM

## 2021-12-15 DIAGNOSIS — G89.29 CHRONIC BACK PAIN GREATER THAN 3 MONTHS DURATION: ICD-10-CM

## 2021-12-15 DIAGNOSIS — J40 BRONCHITIS: ICD-10-CM

## 2021-12-15 DIAGNOSIS — M54.9 CHRONIC BACK PAIN GREATER THAN 3 MONTHS DURATION: ICD-10-CM

## 2021-12-15 PROCEDURE — 99214 OFFICE O/P EST MOD 30 MIN: CPT | Performed by: NURSE PRACTITIONER

## 2021-12-15 PROCEDURE — G8427 DOCREV CUR MEDS BY ELIG CLIN: HCPCS | Performed by: NURSE PRACTITIONER

## 2021-12-15 PROCEDURE — G8484 FLU IMMUNIZE NO ADMIN: HCPCS | Performed by: NURSE PRACTITIONER

## 2021-12-15 PROCEDURE — G8417 CALC BMI ABV UP PARAM F/U: HCPCS | Performed by: NURSE PRACTITIONER

## 2021-12-15 PROCEDURE — 1036F TOBACCO NON-USER: CPT | Performed by: NURSE PRACTITIONER

## 2021-12-15 PROCEDURE — 3017F COLORECTAL CA SCREEN DOC REV: CPT | Performed by: NURSE PRACTITIONER

## 2021-12-15 RX ORDER — DOXYCYCLINE HYCLATE 100 MG
100 TABLET ORAL 2 TIMES DAILY
Qty: 20 TABLET | Refills: 0 | Status: SHIPPED | OUTPATIENT
Start: 2021-12-15 | End: 2021-12-25

## 2021-12-15 RX ORDER — PREGABALIN 150 MG/1
150 CAPSULE ORAL 3 TIMES DAILY
Qty: 90 CAPSULE | Refills: 2 | Status: SHIPPED | OUTPATIENT
Start: 2021-12-15 | End: 2022-07-06

## 2021-12-15 RX ORDER — HYDROXYZINE PAMOATE 25 MG/1
CAPSULE ORAL
COMMUNITY
Start: 2021-11-08

## 2021-12-15 RX ORDER — RIMEGEPANT SULFATE 75 MG/75MG
TABLET, ORALLY DISINTEGRATING ORAL
COMMUNITY
Start: 2021-10-22

## 2021-12-15 RX ORDER — PREDNISONE 20 MG/1
40 TABLET ORAL DAILY
Qty: 10 TABLET | Refills: 0 | Status: SHIPPED | OUTPATIENT
Start: 2021-12-15 | End: 2021-12-20

## 2021-12-15 RX ORDER — BACLOFEN 20 MG/1
20 TABLET ORAL 4 TIMES DAILY
Qty: 120 TABLET | Refills: 2 | Status: SHIPPED | OUTPATIENT
Start: 2021-12-15 | End: 2022-06-15

## 2021-12-15 RX ORDER — MELOXICAM 7.5 MG/1
7.5 TABLET ORAL 2 TIMES DAILY
Qty: 60 TABLET | Refills: 2 | Status: SHIPPED | OUTPATIENT
Start: 2021-12-15 | End: 2022-04-19 | Stop reason: SDUPTHER

## 2021-12-15 ASSESSMENT — ENCOUNTER SYMPTOMS
RHINORRHEA: 1
SINUS PRESSURE: 1
BACK PAIN: 1
WHEEZING: 1
TROUBLE SWALLOWING: 0
NAUSEA: 0
SORE THROAT: 0
COUGH: 1
FACIAL SWELLING: 0
SHORTNESS OF BREATH: 1
DIARRHEA: 0
VOMITING: 0
ABDOMINAL PAIN: 0
CHEST TIGHTNESS: 0

## 2021-12-15 NOTE — PROGRESS NOTES
Subjective:     Patient ID: Lou Hernandez is a 61 y.o. female who presentstoday for:  Chief Complaint   Patient presents with    Discuss Medications     Pt. states starting January 1, she will need to change medications from one provider to Dr. Jarrod Iverson. Pt. states her new insurance won't cover her current paint doctor.  URI     Pt. is here for a f/u from the walk in clinic on 12/06/2021 for a URI. Pt. still c/o shortness of breath, cough, nasal drainage of brown/yellow mucus that taste very bad. Pt. states when she coughs she can't get the mucus fully up. Both covid and flu tests were negative. Back Pain  This is a chronic problem. The current episode started more than 1 year ago. The problem occurs daily. The problem has been waxing and waning since onset. The pain is present in the lumbar spine, gluteal and thoracic spine. The quality of the pain is described as aching. The pain is moderate. Pertinent negatives include no abdominal pain, chest pain, fever, headaches or weakness. She has tried analgesics, heat, home exercises, ice, muscle relaxant and NSAIDs for the symptoms. The treatment provided moderate relief.        Past Medical History:   Diagnosis Date    Anemia     Asthma     Benign essential HTN     meds > 5 yrs / denies TIA or stroke    Borderline personality disorder ([address]     2016 ?suggested by me-meets many criteria    Carpal tunnel syndrome of right wrist     Crohn's disease ([address]     Depression     Fibromyalgia 2/13/2018    Fibromyalgia     GERD (gastroesophageal reflux disease)     Gout     Headache     migraines    Irritable bowel syndrome     Mixed hyperlipidemia 5/10/2017    Neuropathy     Obesity (BMI 35.0-39.9 without comorbidity) 3/30/2017    PONV (postoperative nausea and vomiting)     nausea    PTSD (post-traumatic stress disorder)     Tremor of unknown origin     Type 2 diabetes mellitus ([address]     meds > 5yrs     Ulcerative colitis ([address] 11/2017    Vaginitis Current Outpatient Medications on File Prior to Visit   Medication Sig Dispense Refill    NURTEC 75 MG TBDP DISSOLVE 1 TABLET IN MOUTH ONCE A DAY AS NEEDED FOR PAIN      hydrOXYzine (VISTARIL) 25 MG capsule TAKE ONE CAPSULE BY MOUTH EVERY DAY      potassium chloride (KLOR-CON M) 20 MEQ extended release tablet TAKE ONE TABLET BY MOUTH TWO TIMES A  tablet 0    meclizine (ANTIVERT) 25 MG tablet Take 1 tablet by mouth 3 times daily as needed for Dizziness 30 tablet 0    montelukast (SINGULAIR) 10 MG tablet TAKE ONE TABLET BY MOUTH NIGHTLY 30 tablet 5    mometasone (ELOCON) 0.1 % cream APPLY TOPICALLY DAILY. 45 g 0    allopurinol (ZYLOPRIM) 100 MG tablet TAKE ONE TABLET BY MOUTH EVERY DAY 30 tablet 0    pravastatin (PRAVACHOL) 40 MG tablet Take 1 tablet by mouth every evening 90 tablet 3    furosemide (LASIX) 40 MG tablet TAKE ONE TABLET BY MOUTH TWO TIMES A DAY 60 tablet 5    pioglitazone (ACTOS) 15 MG tablet TAKE ONE TABLET BY MOUTH EVERY DAY 90 tablet 3    dicyclomine (BENTYL) 10 MG capsule TAKE ONE CAPSULE BY MOUTH FOUR TIMES A DAY, BEFORE MEALS & NIGHTLY 120 capsule 5    butalbital-APAP-caffeine (FIORICET) -40 MG CAPS per capsule Take 1 capsule by mouth every 6 hours as needed for Headaches 40 capsule 0    metFORMIN (GLUCOPHAGE) 500 MG tablet TAKE ONE TABLET BY MOUTH TWICE A DAY WITH MEALS 180 tablet 3    mometasone-formoterol (DULERA) 200-5 MCG/ACT inhaler Inhale 2 puffs into the lungs every 12 hours 1 Inhaler 3    azelastine (ASTELIN) 0.1 % nasal spray 1 spray by Nasal route 2 times daily Use in each nostril as directed 2 Bottle 1    ipratropium-albuterol (DUONEB) 0.5-2.5 (3) MG/3ML SOLN nebulizer solution INHALE 1 VIAL VIA NEBULIZER EVERY 4 HOURS 360 mL 5    traZODone (DESYREL) 100 MG tablet TAKE 1 TO 2 TABLETS BY MOUTH AT BEDTIME AS NEEDED FOR SLEEP      metoprolol succinate (TOPROL XL) 50 MG extended release tablet TAKE ONE TABLET BY MOUTH TWO TIMES A DAY.  HOLD FOR SYSTOLIC BLOOD PRESSURE LOWER THAN 100 OR HEART RATE LOWER THAN 60.  180 tablet 3    omeprazole (PRILOSEC) 40 MG delayed release capsule TAKE ONE CAPSULE BY MOUTH TWO TIMES A  capsule 3    QUEtiapine (SEROQUEL) 100 MG tablet TAKE ONE TABLET BYMOUTH EVERY EVENING AS NEEDED AT 8 PM      albuterol sulfate HFA (PROVENTIL HFA) 108 (90 Base) MCG/ACT inhaler Inhale 2 puffs into the lungs every 6 hours as needed for Wheezing 1 Inhaler 3    Lancets MISC Test bid 100 each 3    hyoscyamine (ANASPAZ;LEVSIN) 125 MCG tablet TAKE ONE TABLET BY MOUTH EVERY 4 HOURS AS NEEDED for cramping 164 tablet 0    magnesium oxide (MAG-OX) 400 (241.3 Mg) MG TABS tablet Take 1 tablet by mouth 2 times daily 60 tablet 2    Blood Glucose Monitoring Suppl (ONE TOUCH ULTRA 2) w/Device KIT TEST TWICE DAILY  0    prazosin (MINIPRESS) 2 MG capsule TAKE 1 CAPSULE BY MOUTH EVERY NIGHT FOR NIGHTMARES  3    blood glucose monitor strips Test 2 times a day & as needed for symptoms of irregular blood glucose. 100 strip 3    nystatin-triamcinolone (MYCOLOG II) 034703-1.1 UNIT/GM-% cream APPLY TOPICALLY TWICE DAILY 45 g 2    Melatonin 5 MG CAPS Take 10 mg by mouth daily   2    FREESTYLE LITE strip use three times daily  50 each 5    DULoxetine (CYMBALTA) 60 MG extended release capsule TAKE ONE CAPSULE BY MOUTH TWICE DAILY AS DIRECTED IN THE MORNING AND AFTERNOON  1     No current facility-administered medications on file prior to visit. Past Surgical History:   Procedure Laterality Date    BREAST CYST EXCISION Right 1994    exc mass benign    CHOLECYSTECTOMY  2009    COLONOSCOPY  2015    negative    ENDOSCOPY, COLON, DIAGNOSTIC      HERNIA REPAIR  2015    ventral / mesh    LEG TENDON SURGERY Right 2007    leg.  ankle and foot    OVARIAN CYST REMOVAL Left 1994    with mass and both fallopian tube    OVARY REMOVAL  12/2015    [facility] / mass left ovary & bilateral  tubes    PARTIAL HYSTERECTOMY      UT COLON CA SCRN NOT HI RSK IND N/A 11/7/2017    COLONOSCOPY performed by MANOJ Taveras on bx    MI COLONOSCOPY W/BIOPSY SINGLE/MULTIPLE N/A 3/15/2018    COLONOS[facility] by Merissa Stout [facility] at [facility] Right 8/3/2017    RIGHT KNEE ARTHROSCOPIC PARTIAL MEDIAL MENISCECTOMY KNEE performed by Joe Lopez MD at [facility] N/CARPAL TUNNEL SURG Right 11/30/2017    RIGHT WRIST  CARPAL TUNNEL RELEASE performed by Joe Lopez MD at [facility] N/CARPAL TUNNEL SURG Left 5/10/2018    LEFT WRIST CARPAL TUNNEL RELEASE performed by Joe Lopez MD at [facility]        Family History   Problem Relation Age of Onset    Emphysema Mother     Cancer Maternal Grandfather     Diabetes Paternal [de-identified]     Emphysema Paternal Grandfather     Heart Disease Sister     Stroke Sister     Diabetes Sister      Social History     Socioeconomic History    Marital status: Single     Spouse name: Not on file    Number of children: Not on file    Years of education: Not on file    Highest education level: Not on file   Occupational History    Not on file   Tobacco Use    Smoking status: Never Smoker    Smokeless tobacco: Never Used   Vaping Use    Vaping Use: Never used   Substance and Sexual Activity    Alcohol use: No     Alcohol/week: 0.0 standard drinks    Drug use: No    Sexual activity: Not Currently   Other Topics Concern    Not on file   Social History Narrative    Not on file     Social Determinants of Health     Financial Resource Strain: Low Risk     Difficulty of Paying Living Expenses: Not hard at all   Food Insecurity: No Food Insecurity    Worried About [address] in the Last Year: Never true    [address] in the Last Year: Never true   Transportation Needs: No Transportation Needs    Lack of Transportation (Medical): No    Lack of Transportation (Non-Medical):  No   Physical Activity:     Days of Exercise per Week: Not on file    Minutes of Exercise per Session: Not on file   Stress:     Feeling of Stress : Not on file   Social Connections:     Frequency of Communication with Friends and Family: Not on file    Frequency of Social Gatherings with Friends and Family: Not on file    Attends [employer]oration[employer] Services: Not on file    Active Member of [address] [place] [address] or Organizations: Not on file    Attends Club or Organization Meetings: Not on file    Marital Status: Not on file   Intimate Partner Violence:     Fear of Current or Ex-Partner: Not on file    Emotionally Abused: Not on file    Physically Abused: Not on file    Sexually Abused: Not on file   Housing Stability:     Unable to Pay for Housing in the Last Year: Not on file    Number of [place] in the Last Year: Not on file    Unstable Housing in the Last Year: Not on file     Allergies:  Latex, Penicillins, Shellfish-derived products, Abilify [aripiprazole], Adhesive tape, Statins, Topamax [topiramate], Buspar [buspirone], Other, and Sulfa antibiotics    Review of Systems   Constitutional: Negative for chills, diaphoresis, fatigue and fever. HENT: Positive for congestion, postnasal drip, rhinorrhea and sinus pressure. Negative for ear pain, facial swelling, sore throat and trouble swallowing. Eyes: Negative for visual disturbance. Respiratory: Positive for cough, shortness of breath and wheezing. Negative for chest tightness. Cardiovascular: Negative for chest pain. Gastrointestinal: Negative for abdominal pain, diarrhea, nausea and vomiting. Musculoskeletal: Positive for arthralgias, back pain and myalgias. Skin: Negative for rash. Neurological: Negative for dizziness, weakness, light-headedness and headaches. Psychiatric/Behavioral: Negative.         Objective:    /62 (Site: Left Upper Arm, Position: Sitting, Cuff Size: Large Adult)   Pulse 70   Temp 96.9 °F (36.1 °C) (Infrared)   Resp 16   Ht 5' 2\" (1.575 m)   Wt Medications    baclofen (LIORESAL) 20 MG tablet     Sig: Take 1 tablet by mouth 4 times daily     Dispense:  120 tablet     Refill:  2    pregabalin (LYRICA) 150 MG capsule     Sig: Take 1 capsule by mouth 3 times daily for 90 days. Dispense:  90 capsule     Refill:  2    meloxicam (MOBIC) 7.5 MG tablet     Sig: Take 1 tablet by mouth 2 times daily     Dispense:  60 tablet     Refill:  2    predniSONE (DELTASONE) 20 MG tablet     Sig: Take 2 tablets by mouth daily for 5 days     Dispense:  10 tablet     Refill:  0    doxycycline hyclate (VIBRA-TABS) 100 MG tablet     Sig: Take 1 tablet by mouth 2 times daily for 10 days     Dispense:  20 tablet     Refill:  0     Medications Discontinued During This Encounter   Medication Reason    meloxicam (MOBIC) 7.5 MG tablet     LYRICA 150 MG capsule     baclofen (LIORESAL) 20 MG tablet     doxycycline hyclate (VIBRA-TABS) 100 MG tablet REORDER     Return in about 3 months (around 3/15/2022) for PCP follow-up. Reviewed with the patient: currentclinical status, medications, activities and diet. Side effects, adverse effects of the medicationprescribed today, as well as treatment plan/ rationale and result expectations havebeen discussed with the patient who expresses understanding and desires to proceed. Pt instructions reviewed and given to patient.     Close follow up to evaluate treatment resultsand for coordination of care. I have reviewed the patient's medical historyin detail and updated the computerized patient record.     Dagoberto Gutierrez, APRN - CNP

## 2022-01-05 PROBLEM — R05.9 COUGH: Status: RESOLVED | Noted: 2021-12-06 | Resolved: 2022-01-05

## 2022-01-13 DIAGNOSIS — K50.919 CROHN'S DISEASE WITH COMPLICATION, UNSPECIFIED GASTROINTESTINAL TRACT LOCATION (HCC): ICD-10-CM

## 2022-01-18 RX ORDER — OMEPRAZOLE 40 MG/1
CAPSULE, DELAYED RELEASE ORAL
Qty: 180 CAPSULE | Refills: 1 | Status: SHIPPED | OUTPATIENT
Start: 2022-01-18 | End: 2022-08-29

## 2022-01-18 RX ORDER — MOMETASONE FUROATE 1 MG/G
CREAM TOPICAL
Qty: 45 G | Refills: 0 | Status: SHIPPED | OUTPATIENT
Start: 2022-01-18 | End: 2022-02-16

## 2022-02-09 ENCOUNTER — OFFICE VISIT (OUTPATIENT)
Dept: FAMILY MEDICINE CLINIC | Age: 57
End: 2022-02-09
Payer: COMMERCIAL

## 2022-02-09 ENCOUNTER — TELEPHONE (OUTPATIENT)
Dept: FAMILY MEDICINE CLINIC | Age: 57
End: 2022-02-09

## 2022-02-09 VITALS
SYSTOLIC BLOOD PRESSURE: 140 MMHG | WEIGHT: 282 LBS | OXYGEN SATURATION: 95 % | DIASTOLIC BLOOD PRESSURE: 62 MMHG | TEMPERATURE: 95.7 F | BODY MASS INDEX: 51.89 KG/M2 | HEIGHT: 62 IN | HEART RATE: 79 BPM

## 2022-02-09 DIAGNOSIS — R19.7 DIARRHEA, UNSPECIFIED TYPE: ICD-10-CM

## 2022-02-09 DIAGNOSIS — R50.81 FEVER IN OTHER DISEASES: ICD-10-CM

## 2022-02-09 DIAGNOSIS — R10.11 RIGHT UPPER QUADRANT ABDOMINAL PAIN: Primary | ICD-10-CM

## 2022-02-09 PROBLEM — R50.9 FEVER: Status: ACTIVE | Noted: 2022-02-09

## 2022-02-09 LAB
INFLUENZA A ANTIBODY: NEGATIVE
INFLUENZA B ANTIBODY: NEGATIVE
Lab: NORMAL
PERFORMING INSTRUMENT: NORMAL
QC PASS/FAIL: NORMAL
SARS-COV-2, POC: NORMAL

## 2022-02-09 PROCEDURE — 87804 INFLUENZA ASSAY W/OPTIC: CPT | Performed by: PHYSICIAN ASSISTANT

## 2022-02-09 PROCEDURE — G8417 CALC BMI ABV UP PARAM F/U: HCPCS | Performed by: PHYSICIAN ASSISTANT

## 2022-02-09 PROCEDURE — G8484 FLU IMMUNIZE NO ADMIN: HCPCS | Performed by: PHYSICIAN ASSISTANT

## 2022-02-09 PROCEDURE — 3017F COLORECTAL CA SCREEN DOC REV: CPT | Performed by: PHYSICIAN ASSISTANT

## 2022-02-09 PROCEDURE — G8427 DOCREV CUR MEDS BY ELIG CLIN: HCPCS | Performed by: PHYSICIAN ASSISTANT

## 2022-02-09 PROCEDURE — 1036F TOBACCO NON-USER: CPT | Performed by: PHYSICIAN ASSISTANT

## 2022-02-09 PROCEDURE — 99215 OFFICE O/P EST HI 40 MIN: CPT | Performed by: PHYSICIAN ASSISTANT

## 2022-02-09 PROCEDURE — 87426 SARSCOV CORONAVIRUS AG IA: CPT | Performed by: PHYSICIAN ASSISTANT

## 2022-02-09 ASSESSMENT — ENCOUNTER SYMPTOMS
COUGH: 0
NAUSEA: 1
CHEST TIGHTNESS: 0
DIARRHEA: 1
VOMITING: 1
SHORTNESS OF BREATH: 0
SINUS PRESSURE: 0
BACK PAIN: 0
TROUBLE SWALLOWING: 0
ABDOMINAL PAIN: 1

## 2022-02-09 ASSESSMENT — PATIENT HEALTH QUESTIONNAIRE - PHQ9
SUM OF ALL RESPONSES TO PHQ QUESTIONS 1-9: 0
2. FEELING DOWN, DEPRESSED OR HOPELESS: 0
SUM OF ALL RESPONSES TO PHQ QUESTIONS 1-9: 0
1. LITTLE INTEREST OR PLEASURE IN DOING THINGS: 0
SUM OF ALL RESPONSES TO PHQ QUESTIONS 1-9: 0
SUM OF ALL RESPONSES TO PHQ QUESTIONS 1-9: 0
SUM OF ALL RESPONSES TO PHQ9 QUESTIONS 1 & 2: 0

## 2022-02-09 NOTE — PROGRESS NOTES
Subjective:      Patient ID: Dagoberto Amaya is a 61 y.o. female who presents today for:  Chief Complaint   Patient presents with    Emesis     not being able to keep anything down    Abdominal Pain    Congestion    Diarrhea       HPI  61year old female who presents with right upper quadrant abdominal pain, with nausea, vomiting and diarrhea for the past four days. Intermittent chills. Congestion. Past Medical History:   Diagnosis Date    Anemia     Asthma     Benign essential HTN     meds > 5 yrs / denies TIA or stroke    Borderline personality disorder ([address]     2016 ?suggested by me-meets many criteria    Carpal tunnel syndrome of right wrist     Crohn's disease ([address]     Depression     Fibromyalgia 2/13/2018    Fibromyalgia     GERD (gastroesophageal reflux disease)     Gout     Headache     migraines    Irritable bowel syndrome     Mixed hyperlipidemia 5/10/2017    Neuropathy     Obesity (BMI 35.0-39.9 without comorbidity) 3/30/2017    PONV (postoperative nausea and vomiting)     nausea    PTSD (post-traumatic stress disorder)     Tremor of unknown origin     Type 2 diabetes mellitus ([address]     meds > 5yrs     Ulcerative colitis ([address] 11/2017    Vaginitis      Past Surgical History:   Procedure Laterality Date    BREAST CYST EXCISION Right 1994    exc mass benign    CHOLECYSTECTOMY  2009    COLONOSCOPY  2015    negative    ENDOSCOPY, COLON, DIAGNOSTIC      HERNIA REPAIR  2015    ventral / mesh    LEG TENDON SURGERY Right 2007    leg.  ankle and foot    OVARIAN CYST REMOVAL Left 1994    with mass and both fallopian tube    OVARY REMOVAL  12/2015    [facility] / mass left ovary & bilateral  tubes    PARTIAL HYSTERECTOMY      AR COLON CA SCRN NOT HI RSK IND N/A 11/7/2017    COLONOSCOPY performed by MANOJ Galvin on bx    AR COLONOSCOPY W/BIOPSY SINGLE/MULTIPLE N/A 3/15/2018    COLONOSCOPY performed by Ann Prasad MD at [facility] Right 8/3/2017 RIGHT KNEE ARTHROSCOPIC PARTIAL MEDIAL MENISCECTOMY KNEE performed by Sylvie Jacobs MD at [facility] N/CARPAL TUNNEL SURG Right 11/30/2017    RIGHT WRIST  CARPAL TUNNEL RELEASE performed by Sylvie Jacobs MD at [facility] N/CARPAL TUNNEL SURG Left 5/10/2018    LEFT WRIST CARPAL TUNNEL RELEASE performed by Sylvie Jacobs MD at [address] [facility]     Social History     Socioeconomic History    Marital status: Single     Spouse name: Not on file    Number of children: Not on file    Years of education: Not on file    Highest education level: Not on file   Occupational History    Not on file   Tobacco Use    Smoking status: Never Smoker    Smokeless tobacco: Never Used   Vaping Use    Vaping Use: Never used   Substance and Sexual Activity    Alcohol use: No     Alcohol/week: 0.0 standard drinks    Drug use: No    Sexual activity: Not Currently   Other Topics Concern    Not on file   Social History Narrative    Not on file     Social Determinants of Health     Financial Resource Strain: Low Risk     Difficulty of Paying Living Expenses: Not hard at all   Food Insecurity: No Food Insecurity    Worried About [address] [place] [address] in the Last Year: Never true    Glenna of Food in the Last Year: Never true   Transportation Needs: No Transportation Needs    Lack of Transportation (Medical): No    Lack of Transportation (Non-Medical):  No   Physical Activity:     Days of Exercise per Week: Not on file    Minutes of Exercise per Session: Not on file   Stress:     Feeling of Stress : Not on file   Social Connections:     Frequency of Communication with Friends and Family: Not on file    Frequency of Social Gatherings with Friends and Family: Not on file    Attends Protestant Services: Not on file    Active Member of Clubs or Organizations: Not on file    Attends Club or Organization Meetings: Not on file    Marital Status: Not on file   Intimate Partner Violence:     Fear of Current or Ex-Partner: Not on file    Emotionally Abused: Not on file    Physically Abused: Not on file    Sexually Abused: Not on file   Housing Stability:     Unable to Pay for Housing in the Last Year: Not on file    Number of Places Lived in the Last Year: Not on file    Unstable Housing in the Last Year: Not on file     Family History   Problem Relation Age of Onset    Emphysema Mother     Cancer Maternal Grandfather     Diabetes Paternal Grandmother     Emphysema Paternal Grandfather     Heart Disease Sister     Stroke Sister     Diabetes Sister      Allergies   Allergen Reactions    Latex Hives    Penicillins Swelling and Rash    Shellfish-Derived Products Anaphylaxis    Abilify [Aripiprazole]      shaking    Adhesive Tape Swelling     If left on too long    Statins      Swelling      Topamax [Topiramate]     Buspar [Buspirone]      shaking    Other Nausea And Vomiting    Sulfa Antibiotics Nausea And Vomiting     Current Outpatient Medications   Medication Sig Dispense Refill    omeprazole (PRILOSEC) 40 MG delayed release capsule TAKE ONE CAPSULE BY MOUTH TWO TIMES A  capsule 1    mometasone (ELOCON) 0.1 % cream apply topically daily 45 g 0    NURTEC 75 MG TBDP DISSOLVE 1 TABLET IN MOUTH ONCE A DAY AS NEEDED FOR PAIN      hydrOXYzine (VISTARIL) 25 MG capsule TAKE ONE CAPSULE BY MOUTH EVERY DAY      baclofen (LIORESAL) 20 MG tablet Take 1 tablet by mouth 4 times daily 120 tablet 2    pregabalin (LYRICA) 150 MG capsule Take 1 capsule by mouth 3 times daily for 90 days.  90 capsule 2    meloxicam (MOBIC) 7.5 MG tablet Take 1 tablet by mouth 2 times daily 60 tablet 2    potassium chloride (KLOR-CON M) 20 MEQ extended release tablet TAKE ONE TABLET BY MOUTH TWO TIMES A  tablet 0    montelukast (SINGULAIR) 10 MG tablet TAKE ONE TABLET BY MOUTH NIGHTLY 30 tablet 5    allopurinol (ZYLOPRIM) 100 MG tablet TAKE ONE TABLET BY MOUTH EVERY DAY 30 tablet 0    pravastatin (PRAVACHOL) 40 MG tablet Take 1 tablet by mouth every evening 90 tablet 3    furosemide (LASIX) 40 MG tablet TAKE ONE TABLET BY MOUTH TWO TIMES A DAY 60 tablet 5    pioglitazone (ACTOS) 15 MG tablet TAKE ONE TABLET BY MOUTH EVERY DAY 90 tablet 3    dicyclomine (BENTYL) 10 MG capsule TAKE ONE CAPSULE BY MOUTH FOUR TIMES A DAY, BEFORE MEALS & NIGHTLY 120 capsule 5    metFORMIN (GLUCOPHAGE) 500 MG tablet TAKE ONE TABLET BY MOUTH TWICE A DAY WITH MEALS 180 tablet 3    mometasone-formoterol (DULERA) 200-5 MCG/ACT inhaler Inhale 2 puffs into the lungs every 12 hours 1 Inhaler 3    azelastine (ASTELIN) 0.1 % nasal spray 1 spray by Nasal route 2 times daily Use in each nostril as directed 2 Bottle 1    ipratropium-albuterol (DUONEB) 0.5-2.5 (3) MG/3ML SOLN nebulizer solution INHALE 1 VIAL VIA NEBULIZER EVERY 4 HOURS 360 mL 5    traZODone (DESYREL) 100 MG tablet TAKE 1 TO 2 TABLETS BY MOUTH AT BEDTIME AS NEEDED FOR SLEEP      metoprolol succinate (TOPROL XL) 50 MG extended release tablet TAKE ONE TABLET BY MOUTH TWO TIMES A DAY.  HOLD FOR SYSTOLIC BLOOD PRESSURE LOWER THAN 100 OR HEART RATE LOWER THAN 60.  180 tablet 3    QUEtiapine (SEROQUEL) 100 MG tablet TAKE ONE TABLET BYMOUTH EVERY EVENING AS NEEDED AT 8 PM      albuterol sulfate HFA (PROVENTIL HFA) 108 (90 Base) MCG/ACT inhaler Inhale 2 puffs into the lungs every 6 hours as needed for Wheezing 1 Inhaler 3    Lancets MISC Test bid 100 each 3    hyoscyamine (ANASPAZ;LEVSIN) 125 MCG tablet TAKE ONE TABLET BY MOUTH EVERY 4 HOURS AS NEEDED for cramping 164 tablet 0    magnesium oxide (MAG-OX) 400 (241.3 Mg) MG TABS tablet Take 1 tablet by mouth 2 times daily 60 tablet 2    Blood Glucose Monitoring Suppl (ONE TOUCH ULTRA 2) w/Device KIT TEST TWICE DAILY  0    prazosin (MINIPRESS) 2 MG capsule TAKE 1 CAPSULE BY MOUTH EVERY NIGHT FOR NIGHTMARES  3    blood glucose monitor strips Test 2 times a day & as needed for symptoms of irregular blood glucose. 100 strip 3    nystatin-triamcinolone (MYCOLOG II) 419935-3.1 UNIT/GM-% cream APPLY TOPICALLY TWICE DAILY 45 g 2    Melatonin 5 MG CAPS Take 10 mg by mouth daily   2    FREESTYLE LITE strip use three times daily  50 each 5    DULoxetine (CYMBALTA) 60 MG extended release capsule TAKE ONE CAPSULE BY MOUTH TWICE DAILY AS DIRECTED IN THE MORNING AND AFTERNOON  1    butalbital-APAP-caffeine (FIORICET) -40 MG CAPS per capsule Take 1 capsule by mouth every 6 hours as needed for Headaches 40 capsule 0     No current facility-administered medications for this visit. Review of Systems   Constitutional: Negative for activity change, appetite change, chills, fever and unexpected weight change. HENT: Positive for congestion. Negative for drooling, ear pain, nosebleeds, sinus pressure and trouble swallowing. Respiratory: Negative for cough, chest tightness and shortness of breath. Cardiovascular: Negative for chest pain and leg swelling. Gastrointestinal: Positive for abdominal pain (RUQ), diarrhea, nausea and vomiting. Endocrine: Negative for polydipsia and polyphagia. Genitourinary: Negative for dysuria, flank pain and frequency. Musculoskeletal: Negative for back pain and myalgias. Skin: Negative for pallor and rash. Neurological: Negative for syncope, weakness and headaches. Hematological: Does not bruise/bleed easily. Psychiatric/Behavioral: Negative for agitation, behavioral problems and confusion. All other systems reviewed and are negative. Objective:   BP (!) 140/62   Pulse 79   Temp 95.7 °F (35.4 °C) (Infrared)   Ht 5' 2\" (1.575 m)   Wt 282 lb (127.9 kg)   LMP 07/27/1993 (Approximate)   SpO2 95%   BMI 51.58 kg/m²     Physical Exam  Vitals and nursing note reviewed. Constitutional:       General: She is awake. She is not in acute distress. Appearance: Normal appearance. She is well-developed and normal weight. She is not ill-appearing, toxic-appearing or diaphoretic. Comments: No photophobia. No phonophobia. HENT:      Head: Normocephalic and atraumatic. No Craft's sign. Right Ear: External ear normal.      Left Ear: External ear normal.      Nose: Nose normal. No congestion or rhinorrhea. Mouth/Throat:      Mouth: Mucous membranes are moist.      Pharynx: Oropharynx is clear. No oropharyngeal exudate or posterior oropharyngeal erythema. Eyes:      General: No scleral icterus. Right eye: No foreign body or discharge. Left eye: No discharge. Extraocular Movements: Extraocular movements intact. Conjunctiva/sclera: Conjunctivae normal.      Left eye: No exudate. Pupils: Pupils are equal, round, and reactive to light. Neck:      Vascular: No JVD. Trachea: No tracheal deviation. Comments: No meningismus. Cardiovascular:      Rate and Rhythm: Normal rate and regular rhythm. Heart sounds: Normal heart sounds. Heart sounds not distant. No murmur heard. No friction rub. No gallop. Pulmonary:      Effort: Pulmonary effort is normal. No respiratory distress. Breath sounds: Normal breath sounds. No stridor. No wheezing, rhonchi or rales. Chest:      Chest wall: No tenderness. Abdominal:      General: Abdomen is flat. Bowel sounds are normal. There is no distension. Palpations: Abdomen is soft. There is no mass. Tenderness: There is no abdominal tenderness. There is no right CVA tenderness, left CVA tenderness, guarding or rebound. Hernia: No hernia is present. Musculoskeletal:         General: No swelling, tenderness, deformity or signs of injury. Normal range of motion. Cervical back: Normal range of motion and neck supple. No rigidity. Lymphadenopathy:      Head:      Right side of head: No submental adenopathy. Left side of head: No submental adenopathy. Skin:     General: Skin is warm and dry.       Capillary Refill: Capillary refill takes less than 2 seconds. Coloration: Skin is not jaundiced or pale. Findings: No bruising, erythema, lesion or rash. Neurological:      General: No focal deficit present. Mental Status: She is alert and oriented to person, place, and time. Mental status is at baseline. Cranial Nerves: No cranial nerve deficit. Sensory: No sensory deficit. Motor: No weakness. Coordination: Coordination normal.      Deep Tendon Reflexes: Reflexes are normal and symmetric. Psychiatric:         Mood and Affect: Mood normal.         Behavior: Behavior normal. Behavior is cooperative. Thought Content: Thought content normal.         Judgment: Judgment normal.         Assessment:       Diagnosis Orders   1. Right upper quadrant abdominal pain     2. Diarrhea, unspecified type  POCT COVID-19, Antigen   3. Fever in other diseases  POCT Influenza A/B     No results found for this visit on 02/09/22. Plan:     Assessment & Plan   Za Miranda was seen today for emesis, abdominal pain, congestion and diarrhea. Diagnoses and all orders for this visit:    Right upper quadrant abdominal pain  -     Cancel: CT ABDOMEN PELVIS WO CONTRAST Additional Contrast? None; Future  -     Cancel: CBC With Auto Differential; Future  -     Cancel: Comprehensive Metabolic Panel; Future    Diarrhea, unspecified type  -     POCT COVID-19, Antigen  -     Cancel: CT ABDOMEN PELVIS WO CONTRAST Additional Contrast? None; Future  -     Cancel: CBC With Auto Differential; Future  -     Cancel: Comprehensive Metabolic Panel; Future    Fever in other diseases  -     POCT Influenza A/B  -     Cancel: CT ABDOMEN PELVIS WO CONTRAST Additional Contrast? None; Future  -     Cancel: CBC With Auto Differential; Future  -     Cancel: Comprehensive Metabolic Panel; Future      Orders Placed This Encounter   Procedures    POCT COVID-19, Antigen     Order Specific Question:   Is this test for diagnosis or screening? Answer:   Diagnosis of ill patient     Order Specific Question:   Symptomatic for COVID-19 as defined by CDC? Answer:   Yes     Order Specific Question:   Date of Symptom Onset     Answer:   2/9/2022     Order Specific Question:   Hospitalized for COVID-19? Answer:   No     Order Specific Question:   Admitted to ICU for COVID-19? Answer:   No     Order Specific Question:   Employed in healthcare setting? Answer:   No     Order Specific Question:   Resident in a congregate (group) care setting? Answer:   No     Order Specific Question:   Pregnant? Answer:   No     Order Specific Question:   Previously tested for COVID-19? Answer:   No    POCT Influenza A/B     No orders of the defined types were placed in this encounter. There are no discontinued medications. Return if symptoms worsen or fail to improve. The patient states that she is in such severe pain and cannot take it anymore. She is unable to eat due to nausea and vomiting. Due to the severity of the pain I have instructed the patient to go to the ED for work up and pain control. She is in agreement and will go now. Offered to call EMS. She states that her sister christopher is here and will take her now       Reviewed with the patient/family: current clinical status & medications. Side effects of the medication prescribed today, as well as treatment plan/rationale and result expectations have been discussed with the patient/family who expresses understanding. Patient will be discharged home in stable condition. Follow up with PCP to evaluate treatment results or return if symptoms worsen or fail to improve. Discussed signs and symptoms which require immediate follow-up in ED/call to 911. Understanding verbalized. I have reviewed the patient's medical history in detail and updated the computerized patient record.     CHESTER Granados

## 2022-02-09 NOTE — TELEPHONE ENCOUNTER
Patient states that she has been dry heaving since Sunday. She states she is also having diarrhea. She is shaking and has a headache. She is calling office for advice. She said the last time she went to the ER the provider was rude and said \"its just a stomach thing\"    Please advise.     Patient [phone]

## 2022-02-10 ENCOUNTER — HOSPITAL ENCOUNTER (EMERGENCY)
Age: 57
Discharge: HOME | End: 2022-02-10
Attending: EMERGENCY MEDICINE
Payer: COMMERCIAL

## 2022-02-10 VITALS
BODY MASS INDEX: 53.92 KG/M2 | DIASTOLIC BLOOD PRESSURE: 82 MMHG | SYSTOLIC BLOOD PRESSURE: 161 MMHG | OXYGEN SATURATION: 94 % | WEIGHT: 293 LBS | HEIGHT: 62 IN | RESPIRATION RATE: 17 BRPM | TEMPERATURE: 97.6 F | HEART RATE: 72 BPM

## 2022-02-10 DIAGNOSIS — R19.7 DIARRHEA, UNSPECIFIED TYPE: ICD-10-CM

## 2022-02-10 DIAGNOSIS — E83.42 HYPOMAGNESEMIA: ICD-10-CM

## 2022-02-10 DIAGNOSIS — R11.2 NAUSEA VOMITING AND DIARRHEA: Primary | ICD-10-CM

## 2022-02-10 DIAGNOSIS — R19.7 NAUSEA VOMITING AND DIARRHEA: Primary | ICD-10-CM

## 2022-02-10 DIAGNOSIS — R10.11 RIGHT UPPER QUADRANT ABDOMINAL PAIN: Primary | ICD-10-CM

## 2022-02-10 LAB
ALBUMIN SERPL-MCNC: 3.9 G/DL (ref 3.5–4.6)
ALP BLD-CCNC: 108 U/L (ref 40–130)
ALT SERPL-CCNC: 6 U/L (ref 0–33)
ANION GAP SERPL CALCULATED.3IONS-SCNC: 11 MEQ/L (ref 9–15)
AST SERPL-CCNC: 16 U/L (ref 0–35)
BASOPHILS ABSOLUTE: 0 K/UL (ref 0–0.2)
BASOPHILS RELATIVE PERCENT: 0.4 %
BILIRUB SERPL-MCNC: 0.3 MG/DL (ref 0.2–0.7)
BUN BLDV-MCNC: 10 MG/DL (ref 6–20)
CALCIUM SERPL-MCNC: 9.1 MG/DL (ref 8.5–9.9)
CHLORIDE BLD-SCNC: 101 MEQ/L (ref 95–107)
CHP ED QC CHECK: NORMAL
CO2: 27 MEQ/L (ref 20–31)
CREAT SERPL-MCNC: 0.83 MG/DL (ref 0.5–0.9)
EOSINOPHILS ABSOLUTE: 0.1 K/UL (ref 0–0.7)
EOSINOPHILS RELATIVE PERCENT: 0.7 %
GFR AFRICAN AMERICAN: >60
GFR NON-AFRICAN AMERICAN: >60
GLOBULIN: 3.1 G/DL (ref 2.3–3.5)
GLUCOSE BLD-MCNC: 129 MG/DL
GLUCOSE BLD-MCNC: 129 MG/DL (ref 70–99)
GLUCOSE BLD-MCNC: 160 MG/DL (ref 70–99)
HCT VFR BLD CALC: 33.3 % (ref 37–47)
HEMOGLOBIN: 10.8 G/DL (ref 12–16)
LIPASE: 7 U/L (ref 12–95)
LYMPHOCYTES ABSOLUTE: 0.8 K/UL (ref 1–4.8)
LYMPHOCYTES RELATIVE PERCENT: 8.7 %
MAGNESIUM: 1.4 MG/DL (ref 1.7–2.4)
MCH RBC QN AUTO: 28 PG (ref 27–31.3)
MCHC RBC AUTO-ENTMCNC: 32.6 % (ref 33–37)
MCV RBC AUTO: 86 FL (ref 82–100)
MONOCYTES ABSOLUTE: 0.8 K/UL (ref 0.2–0.8)
MONOCYTES RELATIVE PERCENT: 8.5 %
NEUTROPHILS ABSOLUTE: 7.8 K/UL (ref 1.4–6.5)
NEUTROPHILS RELATIVE PERCENT: 81.7 %
PDW BLD-RTO: 16.1 % (ref 11.5–14.5)
PERFORMED ON: ABNORMAL
PLATELET # BLD: 236 K/UL (ref 130–400)
POTASSIUM SERPL-SCNC: 4.5 MEQ/L (ref 3.4–4.9)
RBC # BLD: 3.87 M/UL (ref 4.2–5.4)
SODIUM BLD-SCNC: 139 MEQ/L (ref 135–144)
TOTAL PROTEIN: 7 G/DL (ref 6.3–8)
WBC # BLD: 9.6 K/UL (ref 4.8–10.8)

## 2022-02-10 PROCEDURE — 36415 COLL VENOUS BLD VENIPUNCTURE: CPT

## 2022-02-10 PROCEDURE — 96365 THER/PROPH/DIAG IV INF INIT: CPT

## 2022-02-10 PROCEDURE — 96375 TX/PRO/DX INJ NEW DRUG ADDON: CPT

## 2022-02-10 PROCEDURE — 80053 COMPREHEN METABOLIC PANEL: CPT

## 2022-02-10 PROCEDURE — 83690 ASSAY OF LIPASE: CPT

## 2022-02-10 PROCEDURE — 99285 EMERGENCY DEPT VISIT HI MDM: CPT

## 2022-02-10 PROCEDURE — 83735 ASSAY OF MAGNESIUM: CPT

## 2022-02-10 PROCEDURE — 6360000002 HC RX W HCPCS: Performed by: EMERGENCY MEDICINE

## 2022-02-10 PROCEDURE — 96372 THER/PROPH/DIAG INJ SC/IM: CPT

## 2022-02-10 PROCEDURE — 2580000003 HC RX 258: Performed by: EMERGENCY MEDICINE

## 2022-02-10 PROCEDURE — 96366 THER/PROPH/DIAG IV INF ADDON: CPT

## 2022-02-10 PROCEDURE — 85025 COMPLETE CBC W/AUTO DIFF WBC: CPT

## 2022-02-10 RX ORDER — ONDANSETRON 4 MG/1
4 TABLET, ORALLY DISINTEGRATING ORAL 3 TIMES DAILY PRN
Qty: 12 TABLET | Refills: 0 | Status: SHIPPED | OUTPATIENT
Start: 2022-02-10 | End: 2022-05-27 | Stop reason: ALTCHOICE

## 2022-02-10 RX ORDER — PROMETHAZINE HYDROCHLORIDE 25 MG/ML
25 INJECTION, SOLUTION INTRAMUSCULAR; INTRAVENOUS ONCE
Status: COMPLETED | OUTPATIENT
Start: 2022-02-10 | End: 2022-02-10

## 2022-02-10 RX ORDER — MAGNESIUM SULFATE 1 G/100ML
1000 INJECTION INTRAVENOUS ONCE
Status: COMPLETED | OUTPATIENT
Start: 2022-02-10 | End: 2022-02-10

## 2022-02-10 RX ORDER — DICYCLOMINE HCL 20 MG
20 TABLET ORAL 4 TIMES DAILY
Qty: 12 TABLET | Refills: 0 | Status: SHIPPED | OUTPATIENT
Start: 2022-02-10 | End: 2022-10-21 | Stop reason: ALTCHOICE

## 2022-02-10 RX ORDER — ONDANSETRON 2 MG/ML
4 INJECTION INTRAMUSCULAR; INTRAVENOUS ONCE
Status: COMPLETED | OUTPATIENT
Start: 2022-02-10 | End: 2022-02-10

## 2022-02-10 RX ORDER — 0.9 % SODIUM CHLORIDE 0.9 %
1000 INTRAVENOUS SOLUTION INTRAVENOUS ONCE
Status: COMPLETED | OUTPATIENT
Start: 2022-02-10 | End: 2022-02-10

## 2022-02-10 RX ORDER — DICYCLOMINE HYDROCHLORIDE 10 MG/ML
20 INJECTION INTRAMUSCULAR ONCE
Status: COMPLETED | OUTPATIENT
Start: 2022-02-10 | End: 2022-02-10

## 2022-02-10 RX ORDER — KETOROLAC TROMETHAMINE 15 MG/ML
15 INJECTION, SOLUTION INTRAMUSCULAR; INTRAVENOUS ONCE
Status: COMPLETED | OUTPATIENT
Start: 2022-02-10 | End: 2022-02-10

## 2022-02-10 RX ADMIN — KETOROLAC TROMETHAMINE 15 MG: 15 INJECTION, SOLUTION INTRAMUSCULAR; INTRAVENOUS at 12:36

## 2022-02-10 RX ADMIN — SODIUM CHLORIDE 1000 ML: 9 INJECTION, SOLUTION INTRAVENOUS at 12:36

## 2022-02-10 RX ADMIN — DICYCLOMINE HYDROCHLORIDE 20 MG: 20 INJECTION INTRAMUSCULAR at 12:31

## 2022-02-10 RX ADMIN — ONDANSETRON 4 MG: 2 INJECTION INTRAMUSCULAR; INTRAVENOUS at 12:36

## 2022-02-10 RX ADMIN — MAGNESIUM SULFATE HEPTAHYDRATE 1000 MG: 1 INJECTION, SOLUTION INTRAVENOUS at 13:33

## 2022-02-10 RX ADMIN — PROMETHAZINE HYDROCHLORIDE 25 MG: 25 INJECTION, SOLUTION INTRAMUSCULAR; INTRAVENOUS at 13:34

## 2022-02-10 ASSESSMENT — PAIN DESCRIPTION - DESCRIPTORS: DESCRIPTORS: HEADACHE

## 2022-02-10 ASSESSMENT — ENCOUNTER SYMPTOMS
NAUSEA: 1
DIARRHEA: 1
VOMITING: 1

## 2022-02-10 ASSESSMENT — PAIN SCALES - GENERAL
PAINLEVEL_OUTOF10: 0
PAINLEVEL_OUTOF10: 10
PAINLEVEL_OUTOF10: 10

## 2022-02-10 ASSESSMENT — PAIN DESCRIPTION - FREQUENCY: FREQUENCY: CONTINUOUS

## 2022-02-10 ASSESSMENT — PAIN DESCRIPTION - PAIN TYPE: TYPE: ACUTE PAIN

## 2022-02-10 NOTE — ED TRIAGE NOTES
N/V/D since Sunday. PCP out of office. Went to urgent care yesterday. They did COVID and FLU test and were negative.

## 2022-02-10 NOTE — ED PROVIDER NOTES
[address] [facility] ED  EMERGENCY DEPARTMENT ENCOUNTER      Pt Name: Clinton Yusuf  MRN: [id number]  Solomongfeliane 1965  Date of evaluation: 2/10/2022  Provider: Salima Zhang MD    [address] [place] [address]       Chief Complaint   Patient presents with    Emesis         HISTORY OF PRESENT ILLNESS   (Location/Symptom, Timing/Onset, Context/Setting, Quality, Duration, Modifying Factors, Severity)  Note limiting factors. 67-year-old female presenting with nausea, vomiting and diarrhea. Symptoms have been ongoing for couple of days. Only kept down a bit of Pedialyte yesterday. No fevers or pain noted. Has not taken anything for relief prior to arrival.  Specifically denies any abdominal pain. Symptoms have not improved. Nursing Notes were reviewed. REVIEW OF SYSTEMS    (2-9 systems for level 4, 10 or more for level 5)     Review of Systems   Gastrointestinal: Positive for diarrhea, nausea and vomiting. All other systems reviewed and are negative. Except as noted above the remainder of the review of systems was reviewed and negative.        PAST MEDICAL HISTORY     Past Medical History:   Diagnosis Date    Anemia     Asthma     Benign essential HTN     meds > 5 yrs / denies TIA or stroke    Borderline personality disorder ([address]     2016 ?suggested by me-meets many criteria    Carpal tunnel syndrome of right wrist     Crohn's disease ([address]     Depression     Fibromyalgia 2/13/2018    Fibromyalgia     GERD (gastroesophageal reflux disease)     Gout     Headache     migraines    Irritable bowel syndrome     Mixed hyperlipidemia 5/10/2017    Neuropathy     Obesity (BMI 35.0-39.9 without comorbidity) 3/30/2017    PONV (postoperative nausea and vomiting)     nausea    PTSD (post-traumatic stress disorder)     Tremor of unknown origin     Type 2 diabetes mellitus ([address]     meds > 5yrs     Ulcerative colitis ([address] 11/2017    Vaginitis          SURGICAL HISTORY       Past Surgical History: Procedure Laterality Date    BREAST CYST EXCISION Right 1994    exc mass benign    CHOLECYSTECTOMY  2009    COLONOSCOPY  2015    negative    ENDOSCOPY, COLON, DIAGNOSTIC      HERNIA REPAIR  2015    ventral / mesh    LEG TENDON SURGERY Right 2007    leg. ankle and foot    OVARIAN CYST REMOVAL Left 1994    with mass and both fallopian tube    OVARY REMOVAL  12/2015    [facility] / mass left ovary & bilateral  tubes    PARTIAL HYSTERECTOMY      MT COLON CA SCRN NOT HI RSK IND N/A 11/7/2017    COLONOSCOPY performed by MANOJ Kohli on bx    MT COLONOSCOPY W/BIOPSY SINGLE/MULTIPLE N/A 3/15/2018    COLONOSCOPY performed by Sharon Aguirre MD at [facility] Right 8/3/2017    RIGHT KNEE ARTHROSCOPIC PARTIAL MEDIAL MENISCECTOMY KNEE performed by Aj Morgan MD at [address] [place] [address] N/CARPAL TUNNEL SURG Right 11/30/2017    RIGHT WRIST  CARPAL TUNNEL RELEASE performed by Aj Morgan MD at [address] [place] [address] N/CARPAL TUNNEL SURG Left 5/10/2018    LEFT WRIST CARPAL TUNNEL RELEASE performed by Aj Morgan MD at [facility]       Previous Medications    ALBUTEROL SULFATE HFA (PROVENTIL HFA) 108 (90 BASE) MCG/ACT INHALER    Inhale 2 puffs into the lungs every 6 hours as needed for Wheezing    ALLOPURINOL (ZYLOPRIM) 100 MG TABLET    TAKE ONE TABLET BY MOUTH EVERY DAY    AZELASTINE (ASTELIN) 0.1 % NASAL SPRAY    1 spray by Nasal route 2 times daily Use in each nostril as directed    BACLOFEN (LIORESAL) 20 MG TABLET    Take 1 tablet by mouth 4 times daily    BLOOD GLUCOSE MONITOR STRIPS    Test 2 times a day & as needed for symptoms of irregular blood glucose.     BLOOD GLUCOSE MONITORING SUPPL (ONE TOUCH ULTRA 2) W/DEVICE KIT    TEST TWICE DAILY    BUTALBITAL-APAP-CAFFEINE (FIORICET) -40 MG CAPS PER CAPSULE    Take 1 capsule by mouth every 6 hours as needed for Headaches DULOXETINE (CYMBALTA) 60 MG EXTENDED RELEASE CAPSULE    TAKE ONE CAPSULE BY MOUTH TWICE DAILY AS DIRECTED IN THE MORNING AND AFTERNOON    FREESTYLE LITE STRIP    use three times daily     FUROSEMIDE (LASIX) 40 MG TABLET    TAKE ONE TABLET BY MOUTH TWO TIMES A DAY    HYDROXYZINE (VISTARIL) 25 MG CAPSULE    TAKE ONE CAPSULE BY MOUTH EVERY DAY    HYOSCYAMINE (ANASPAZ;LEVSIN) 125 MCG TABLET    TAKE ONE TABLET BY MOUTH EVERY 4 HOURS AS NEEDED for cramping    IPRATROPIUM-ALBUTEROL (DUONEB) 0.5-2.5 (3) MG/3ML SOLN NEBULIZER SOLUTION    INHALE 1 VIAL VIA NEBULIZER EVERY 4 HOURS    LANCETS MISC    Test bid    MAGNESIUM OXIDE (MAG-OX) 400 (241.3 MG) MG TABS TABLET    Take 1 tablet by mouth 2 times daily    MELATONIN 5 MG CAPS    Take 10 mg by mouth daily     MELOXICAM (MOBIC) 7.5 MG TABLET    Take 1 tablet by mouth 2 times daily    METFORMIN (GLUCOPHAGE) 500 MG TABLET    TAKE ONE TABLET BY MOUTH TWICE A DAY WITH MEALS    METOPROLOL SUCCINATE (TOPROL XL) 50 MG EXTENDED RELEASE TABLET    TAKE ONE TABLET BY MOUTH TWO TIMES A DAY. HOLD FOR SYSTOLIC BLOOD PRESSURE LOWER THAN 100 OR HEART RATE LOWER THAN 60.      MOMETASONE (ELOCON) 0.1 % CREAM    apply topically daily    MOMETASONE-FORMOTEROL (DULERA) 200-5 MCG/ACT INHALER    Inhale 2 puffs into the lungs every 12 hours    MONTELUKAST (SINGULAIR) 10 MG TABLET    TAKE ONE TABLET BY MOUTH NIGHTLY    NURTEC 75 MG TBDP    DISSOLVE 1 TABLET IN MOUTH ONCE A DAY AS NEEDED FOR PAIN    NYSTATIN-TRIAMCINOLONE (MYCOLOG II) 057443-1.1 UNIT/GM-% CREAM    APPLY TOPICALLY TWICE DAILY    OMEPRAZOLE (PRILOSEC) 40 MG DELAYED RELEASE CAPSULE    TAKE ONE CAPSULE BY MOUTH TWO TIMES A DAY    PIOGLITAZONE (ACTOS) 15 MG TABLET    TAKE ONE TABLET BY MOUTH EVERY DAY    POTASSIUM CHLORIDE (KLOR-CON M) 20 MEQ EXTENDED RELEASE TABLET    TAKE ONE TABLET BY MOUTH TWO TIMES A DAY    PRAVASTATIN (PRAVACHOL) 40 MG TABLET    Take 1 tablet by mouth every evening    PRAZOSIN (MINIPRESS) 2 MG CAPSULE    TAKE 1 CAPSULE Connections:     Frequency of Communication with Friends and Family: Not on file    Frequency of Social Gatherings with Friends and Family: Not on file    Attends Mu-ism Services: Not on file    Active Member of Clubs or Organizations: Not on file    Attends Club or Organization Meetings: Not on file    Marital Status: Not on file   Intimate Partner Violence:     Fear of Current or Ex-Partner: Not on file    Emotionally Abused: Not on file    Physically Abused: Not on file    Sexually Abused: Not on file   Housing Stability:     Unable to Pay for Housing in the Last Year: Not on file    Number of [place] in the Last Year: Not on file    Unstable Housing in the Last Year: Not on file       SCREENINGS               PHYSICAL EXAM    (up to 7 for level 4, 8 or more for level 5)     ED Triage Vitals [02/10/22 1206]   BP Temp Temp src Pulse Resp SpO2 Height Weight   (!) 161/75 97.6 °F (36.4 °C) -- 75 16 95 % 5' 2\" (1.575 m) 300 lb (136.1 kg)       Physical Exam  Vitals and nursing note reviewed. Constitutional:       General: She is not in acute distress. Appearance: Normal appearance. She is well-developed. She is obese. She is not ill-appearing. HENT:      Head: Normocephalic and atraumatic. Mouth/Throat:      Mouth: Mucous membranes are moist.      Pharynx: Oropharynx is clear. Eyes:      Extraocular Movements: Extraocular movements intact. Conjunctiva/sclera: Conjunctivae normal.   Cardiovascular:      Rate and Rhythm: Normal rate and regular rhythm. Pulmonary:      Effort: Pulmonary effort is normal.      Breath sounds: Normal breath sounds. Abdominal:      General: Bowel sounds are normal.      Palpations: Abdomen is soft. Tenderness: There is no abdominal tenderness. There is no guarding or rebound. Musculoskeletal:         General: No deformity. Normal range of motion. Cervical back: Normal range of motion and neck supple.    Skin:     General: Skin is warm and dry. Capillary Refill: Capillary refill takes less than 2 seconds. Neurological:      General: No focal deficit present. Mental Status: She is alert and oriented to person, place, and time. Mental status is at baseline. Cranial Nerves: No cranial nerve deficit. Psychiatric:         Thought Content: Thought content normal.         DIAGNOSTIC RESULTS     EKG: All EKG's are interpreted by the Emergency Department Physician who either signs or Co-signs this chart in the absence of a cardiologist.    RADIOLOGY:   Non-plain film images such as CT, Ultrasound and MRI are read by the radiologist. Plain radiographic images are visualized and preliminarily interpreted by the emergency physician with the below findings:    Interpretation per the Radiologist below, if available at the time of this note:    No orders to display       LABS:  Labs Reviewed   COMPREHENSIVE METABOLIC PANEL - Abnormal; Notable for the following components:       Result Value    Glucose 160 (*)     All other components within normal limits   CBC WITH AUTO DIFFERENTIAL - Abnormal; Notable for the following components:    RBC 3.87 (*)     Hemoglobin 10.8 (*)     Hematocrit 33.3 (*)     MCHC 32.6 (*)     RDW 16.1 (*)     Neutrophils Absolute 7.8 (*)     Lymphocytes Absolute 0.8 (*)     All other components within normal limits   LIPASE - Abnormal; Notable for the following components:    Lipase 7 (*)     All other components within normal limits   MAGNESIUM - Abnormal; Notable for the following components:    Magnesium 1.4 (*)     All other components within normal limits   POCT GLUCOSE - Abnormal; Notable for the following components:    POC Glucose 129 (*)     All other components within normal limits   POCT GLUCOSE - Normal       All other labs were within normal range or not returned as of this dictation.     EMERGENCY DEPARTMENT COURSE and DIFFERENTIAL DIAGNOSIS/MDM:   Vitals:    Vitals:    02/10/22 1206   BP: (!) 161/75 Pulse: 75   Resp: 16   Temp: 97.6 °F (36.4 °C)   SpO2: 95%   Weight: 300 lb (136.1 kg)   Height: 5' 2\" (1.575 m)       MDM  Number of Diagnoses or Management Options  Hypomagnesemia  Nausea vomiting and diarrhea  Diagnosis management comments: Presents with nausea, vomiting, diarrhea. Work-up is negative and patient feels improved on reevaluation. She is tolerating fluids. Suspect GI bug and will send home with prescription for supportive care. Patient will be discharged home in good condition. Patient has been hemodynamically stable throughout ED course and is appropriate for outpatient follow up. Patient should follow up with PCP in 2-3 days or return to ED immediately for any new or worsening symptoms. Patient is well appearing on discharge and agreeable with plan of care. Procedures    CRITICAL CARE TIME   Total Critical Care time was 0 minutes, excluding separately reportable procedures. There was a high probability of clinically significant/life threatening deterioration in the patient's condition which required my urgent intervention. FINAL IMPRESSION      1. Nausea vomiting and diarrhea    2.  Hypomagnesemia          DISPOSITION/PLAN   DISPOSITION Discharge - Pending Orders Complete 02/10/2022 01:25:15 PM      (Please note that portions of this note were completed with a voice recognition program.  Efforts were made to edit the dictations but occasionally words are mis-transcribed.)    Duane Sake, MD (electronically signed)  Attending Emergency Physician        Duane Sake, MD  02/10/22 [id number]

## 2022-02-11 ENCOUNTER — CARE COORDINATION (OUTPATIENT)
Dept: CARE COORDINATION | Age: 57
End: 2022-02-11

## 2022-02-11 RX ORDER — ONDANSETRON 4 MG/1
4 TABLET, FILM COATED ORAL DAILY PRN
Qty: 30 TABLET | Refills: 1 | Status: SHIPPED | OUTPATIENT
Start: 2022-02-11 | End: 2022-10-21 | Stop reason: ALTCHOICE

## 2022-02-14 ENCOUNTER — OFFICE VISIT (OUTPATIENT)
Dept: FAMILY MEDICINE CLINIC | Age: 57
End: 2022-02-14
Payer: COMMERCIAL

## 2022-02-14 VITALS
RESPIRATION RATE: 12 BRPM | SYSTOLIC BLOOD PRESSURE: 124 MMHG | OXYGEN SATURATION: 95 % | WEIGHT: 287 LBS | HEART RATE: 65 BPM | BODY MASS INDEX: 52.81 KG/M2 | TEMPERATURE: 97.7 F | DIASTOLIC BLOOD PRESSURE: 62 MMHG | HEIGHT: 62 IN

## 2022-02-14 DIAGNOSIS — I10 BENIGN ESSENTIAL HTN: ICD-10-CM

## 2022-02-14 DIAGNOSIS — R11.2 NAUSEA AND VOMITING, INTRACTABILITY OF VOMITING NOT SPECIFIED, UNSPECIFIED VOMITING TYPE: ICD-10-CM

## 2022-02-14 DIAGNOSIS — E11.9 TYPE 2 DIABETES MELLITUS WITHOUT COMPLICATION, WITHOUT LONG-TERM CURRENT USE OF INSULIN (HCC): Primary | ICD-10-CM

## 2022-02-14 LAB — HBA1C MFR BLD: 6.3 %

## 2022-02-14 PROCEDURE — 99213 OFFICE O/P EST LOW 20 MIN: CPT | Performed by: FAMILY MEDICINE

## 2022-02-14 PROCEDURE — 83036 HEMOGLOBIN GLYCOSYLATED A1C: CPT | Performed by: FAMILY MEDICINE

## 2022-02-14 ASSESSMENT — ENCOUNTER SYMPTOMS
RHINORRHEA: 0
DIARRHEA: 0
EYES NEGATIVE: 1
COUGH: 0
VOMITING: 1
NAUSEA: 1
CHEST TIGHTNESS: 0
RESPIRATORY NEGATIVE: 1

## 2022-02-14 NOTE — PROGRESS NOTES
Patient is seen in follow up for   Chief Complaint   Patient presents with    3 Month Follow-Up    Diabetes     last A1C 6.5 on 10/4/21    Hypertension    Hyperlipidemia    ED Follow-up     nausea and diarrhea still having issues and only told to take nausea med fonce a day    Health Maintenance     due for mammogram     Diabetes  She presents for her follow-up diabetic visit. She has type 2 diabetes mellitus. Her disease course has been stable. There are no hypoglycemic associated symptoms. Pertinent negatives for hypoglycemia include no dizziness. There are no diabetic associated symptoms. Pertinent negatives for diabetes include no fatigue. There are no hypoglycemic complications. Symptoms are stable. There are no diabetic complications. Risk factors for coronary artery disease include diabetes mellitus. Current diabetic treatment includes oral agent (dual therapy). She is compliant with treatment most of the time. Hypertension  This is a chronic problem. The current episode started more than 1 year ago. The problem is unchanged. The problem is controlled. There are no associated agents to hypertension. Risk factors for coronary artery disease include diabetes mellitus. Past treatments include beta blockers. The current treatment provides moderate improvement.    Hyperlipidemia        Past Medical History:   Diagnosis Date    Anemia     Asthma     Benign essential HTN     meds > 5 yrs / denies TIA or stroke    Borderline personality disorder ([address]     2016 ?suggested by me-meets many criteria    Carpal tunnel syndrome of right wrist     Crohn's disease ([address]     Depression     Fibromyalgia 2/13/2018    Fibromyalgia     GERD (gastroesophageal reflux disease)     Gout     Headache     migraines    Irritable bowel syndrome     Mixed hyperlipidemia 5/10/2017    Neuropathy     Obesity (BMI 35.0-39.9 without comorbidity) 3/30/2017    PONV (postoperative nausea and vomiting)     nausea    PTSD (post-traumatic stress disorder)     Tremor of unknown origin     Type 2 diabetes mellitus ([address]     meds > 5yrs     Ulcerative colitis ([address] 11/2017    Vaginitis      Patient Active Problem List    Diagnosis Date Noted    Right upper quadrant abdominal pain 02/09/2022    Diarrhea 02/09/2022    Fever 02/09/2022    Acute bacterial sinusitis 12/06/2021    Bronchitis 12/06/2021    Gastroenteritis 12/21/2020    Injury of left foot 12/21/2020    Leukocytosis 12/21/2020    Nausea and vomiting 12/21/2020    Shoulder pain 12/21/2020    Morbid obesity ([address] 09/29/2020    Headache 12/30/2019    Tremor 12/30/2019    Meralgia paresthetica of left side 12/30/2019    Intractable migraine without aura and without status migrainosus 12/30/2019    Chest pain 09/03/2019    Iron deficiency anemia 11/13/2018    Anemia 06/06/2018    Carpal tunnel syndrome of left wrist 05/03/2018    Intermittent tremor 05/03/2018    Posttraumatic stress disorder 02/13/2018    Fibromyalgia 02/13/2018    Carpal tunnel syndrome of right wrist 11/27/2017    History of Crohn's disease     Bilateral edema of lower extremity 07/31/2017    Varicose veins of lower extremity 07/31/2017    Edema of lower extremity 07/31/2017    Tear of meniscus of knee 07/27/2017    Mixed hyperlipidemia 05/10/2017    Pain in right knee 03/30/2017    Hip pain, right 03/30/2017    Obesity with body mass index 30 or greater 03/30/2017    Muscle pain 03/30/2017    Pain of right hip joint 03/30/2017    Severe episode of recurrent major depressive disorder, without psychotic features ([address] 10/25/2016    Borderline personality disorder ([address] 10/25/2016    Polyneuropathy due to type 2 diabetes mellitus ([address] 02/05/2016    Benign essential hypertension     Asthma     Crohn's disease ([address]     Gastroesophageal reflux disease      Past Surgical History:   Procedure Laterality Date    BREAST CYST EXCISION Right 1994    exc mass benign    CHOLECYSTECTOMY  2009    COLONOSCOPY  2015    negative    ENDOSCOPY, COLON, DIAGNOSTIC      HERNIA REPAIR  2015    ventral / mesh    LEG TENDON SURGERY Right 2007    leg.  ankle and foot    OVARIAN CYST REMOVAL Left 1994    with mass and both fallopian tube    OVARY REMOVAL  12/2015    [facility] / mass left ovary & bilateral  tubes    PARTIAL HYSTERECTOMY      CA COLON CA SCRN NOT HI RSK IND N/A 11/7/2017    COLONOSCOPY performed by MANOJ Sanabria on bx    CA COLONOSCOPY W/BIOPSY SINGLE/MULTIPLE N/A 3/15/2018    COLONOSCOPY performed by Shelbi Nicholson MD at [facility] Right 8/3/2017    RIGHT KNEE ARTHROSCOPIC PARTIAL MEDIAL MENISCECTOMY KNEE performed by Caron Schlatter, MD at [facility] N/CARPAL TUNNEL SURG Right 11/30/2017    RIGHT WRIST  CARPAL TUNNEL RELEASE performed by Caron Schlatter, MD at [facility] N/CARPAL TUNNEL SURG Left 5/10/2018    LEFT WRIST CARPAL TUNNEL RELEASE performed by Caron Schlatter, MD at [address]     Family History   Problem Relation Age of Onset    Emphysema Mother     Cancer Maternal Grandfather     Diabetes Paternal [de-identified]     Emphysema Paternal Grandfather     Heart Disease Sister     Stroke Sister     Diabetes Sister      Social History     Socioeconomic History    Marital status: Single     Spouse name: None    Number of children: None    Years of education: None    Highest education level: None   Occupational History    None   Tobacco Use    Smoking status: Never Smoker    Smokeless tobacco: Never Used   Vaping Use    Vaping Use: Never used   Substance and Sexual Activity    Alcohol use: No     Alcohol/week: 0.0 standard drinks    Drug use: No    Sexual activity: Not Currently   Other Topics Concern    None   Social History Narrative    None     Social Determinants of Health     Financial Resource Strain: Low Risk     Difficulty of Paying Living Expenses: Not hard at all   Food Insecurity: No Food Insecurity    Worried About [address] [place] [address] in the Last Year: Never true    Ran Out of Food in the Last Year: Never true   Transportation Needs: No Transportation Needs    Lack of Transportation (Medical): No    Lack of Transportation (Non-Medical):  No   Physical Activity:     Days of Exercise per Week: Not on file    Minutes of Exercise per Session: Not on file   Stress:     Feeling of Stress : Not on file   Social Connections:     Frequency of Communication with Friends and Family: Not on file    Frequency of Social Gatherings with Friends and Family: Not on file    Attends Orthodoxy Services: Not on file    Active Member of [address] [place] [address] or Organizations: Not on file    Attends Club or Organization Meetings: Not on file    Marital Status: Not on file   Intimate Partner Violence:     Fear of Current or Ex-Partner: Not on file    Emotionally Abused: Not on file    Physically Abused: Not on file    Sexually Abused: Not on file   Housing Stability:     Unable to Pay for Housing in the Last Year: Not on file    Number of [place] in the Last Year: Not on file    Unstable Housing in the Last Year: Not on file     Current Outpatient Medications   Medication Sig Dispense Refill    ondansetron (ZOFRAN) 4 MG tablet Take 1 tablet by mouth daily as needed for Nausea or Vomiting 30 tablet 1    ondansetron (ZOFRAN-ODT) 4 MG disintegrating tablet Take 1 tablet by mouth 3 times daily as needed for Nausea or Vomiting 12 tablet 0    dicyclomine (BENTYL) 20 MG tablet Take 1 tablet by mouth 4 times daily 12 tablet 0    omeprazole (PRILOSEC) 40 MG delayed release capsule TAKE ONE CAPSULE BY MOUTH TWO TIMES A  capsule 1    mometasone (ELOCON) 0.1 % cream apply topically daily 45 g 0    NURTEC 75 MG TBDP DISSOLVE 1 TABLET IN MOUTH ONCE A DAY AS NEEDED FOR PAIN      hydrOXYzine (VISTARIL) 25 MG capsule TAKE ONE CAPSULE BY MOUTH EVERY DAY  baclofen (LIORESAL) 20 MG tablet Take 1 tablet by mouth 4 times daily 120 tablet 2    pregabalin (LYRICA) 150 MG capsule Take 1 capsule by mouth 3 times daily for 90 days. 90 capsule 2    meloxicam (MOBIC) 7.5 MG tablet Take 1 tablet by mouth 2 times daily 60 tablet 2    potassium chloride (KLOR-CON M) 20 MEQ extended release tablet TAKE ONE TABLET BY MOUTH TWO TIMES A  tablet 0    montelukast (SINGULAIR) 10 MG tablet TAKE ONE TABLET BY MOUTH NIGHTLY 30 tablet 5    allopurinol (ZYLOPRIM) 100 MG tablet TAKE ONE TABLET BY MOUTH EVERY DAY 30 tablet 0    pravastatin (PRAVACHOL) 40 MG tablet Take 1 tablet by mouth every evening 90 tablet 3    furosemide (LASIX) 40 MG tablet TAKE ONE TABLET BY MOUTH TWO TIMES A DAY 60 tablet 5    pioglitazone (ACTOS) 15 MG tablet TAKE ONE TABLET BY MOUTH EVERY DAY 90 tablet 3    butalbital-APAP-caffeine (FIORICET) -40 MG CAPS per capsule Take 1 capsule by mouth every 6 hours as needed for Headaches 40 capsule 0    metFORMIN (GLUCOPHAGE) 500 MG tablet TAKE ONE TABLET BY MOUTH TWICE A DAY WITH MEALS 180 tablet 3    mometasone-formoterol (DULERA) 200-5 MCG/ACT inhaler Inhale 2 puffs into the lungs every 12 hours 1 Inhaler 3    azelastine (ASTELIN) 0.1 % nasal spray 1 spray by Nasal route 2 times daily Use in each nostril as directed 2 Bottle 1    ipratropium-albuterol (DUONEB) 0.5-2.5 (3) MG/3ML SOLN nebulizer solution INHALE 1 VIAL VIA NEBULIZER EVERY 4 HOURS 360 mL 5    traZODone (DESYREL) 100 MG tablet TAKE 1 TO 2 TABLETS BY MOUTH AT BEDTIME AS NEEDED FOR SLEEP      metoprolol succinate (TOPROL XL) 50 MG extended release tablet TAKE ONE TABLET BY MOUTH TWO TIMES A DAY.  HOLD FOR SYSTOLIC BLOOD PRESSURE LOWER THAN 100 OR HEART RATE LOWER THAN 60.  180 tablet 3    QUEtiapine (SEROQUEL) 100 MG tablet TAKE ONE TABLET BYMOUTH EVERY EVENING AS NEEDED AT 8 PM      albuterol sulfate HFA (PROVENTIL HFA) 108 (90 Base) MCG/ACT inhaler Inhale 2 puffs into the lungs every 6 hours as needed for Wheezing 1 Inhaler 3    Lancets MISC Test bid 100 each 3    hyoscyamine (ANASPAZ;LEVSIN) 125 MCG tablet TAKE ONE TABLET BY MOUTH EVERY 4 HOURS AS NEEDED for cramping 164 tablet 0    magnesium oxide (MAG-OX) 400 (241.3 Mg) MG TABS tablet Take 1 tablet by mouth 2 times daily 60 tablet 2    Blood Glucose Monitoring Suppl (ONE TOUCH ULTRA 2) w/Device KIT TEST TWICE DAILY  0    prazosin (MINIPRESS) 2 MG capsule TAKE 1 CAPSULE BY MOUTH EVERY NIGHT FOR NIGHTMARES  3    blood glucose monitor strips Test 2 times a day & as needed for symptoms of irregular blood glucose. 100 strip 3    nystatin-triamcinolone (MYCOLOG II) 119221-4.1 UNIT/GM-% cream APPLY TOPICALLY TWICE DAILY 45 g 2    Melatonin 5 MG CAPS Take 10 mg by mouth daily   2    FREESTYLE LITE strip use three times daily  50 each 5    DULoxetine (CYMBALTA) 60 MG extended release capsule TAKE ONE CAPSULE BY MOUTH TWICE DAILY AS DIRECTED IN THE MORNING AND AFTERNOON  1     No current facility-administered medications for this visit.      Current Outpatient Medications on File Prior to Visit   Medication Sig Dispense Refill    ondansetron (ZOFRAN) 4 MG tablet Take 1 tablet by mouth daily as needed for Nausea or Vomiting 30 tablet 1    ondansetron (ZOFRAN-ODT) 4 MG disintegrating tablet Take 1 tablet by mouth 3 times daily as needed for Nausea or Vomiting 12 tablet 0    dicyclomine (BENTYL) 20 MG tablet Take 1 tablet by mouth 4 times daily 12 tablet 0    omeprazole (PRILOSEC) 40 MG delayed release capsule TAKE ONE CAPSULE BY MOUTH TWO TIMES A  capsule 1    mometasone (ELOCON) 0.1 % cream apply topically daily 45 g 0    NURTEC 75 MG TBDP DISSOLVE 1 TABLET IN MOUTH ONCE A DAY AS NEEDED FOR PAIN      hydrOXYzine (VISTARIL) 25 MG capsule TAKE ONE CAPSULE BY MOUTH EVERY DAY      baclofen (LIORESAL) 20 MG tablet Take 1 tablet by mouth 4 times daily 120 tablet 2    pregabalin (LYRICA) 150 MG capsule Take 1 capsule by mouth 3 times daily for 90 days. 90 capsule 2    meloxicam (MOBIC) 7.5 MG tablet Take 1 tablet by mouth 2 times daily 60 tablet 2    potassium chloride (KLOR-CON M) 20 MEQ extended release tablet TAKE ONE TABLET BY MOUTH TWO TIMES A  tablet 0    montelukast (SINGULAIR) 10 MG tablet TAKE ONE TABLET BY MOUTH NIGHTLY 30 tablet 5    allopurinol (ZYLOPRIM) 100 MG tablet TAKE ONE TABLET BY MOUTH EVERY DAY 30 tablet 0    pravastatin (PRAVACHOL) 40 MG tablet Take 1 tablet by mouth every evening 90 tablet 3    furosemide (LASIX) 40 MG tablet TAKE ONE TABLET BY MOUTH TWO TIMES A DAY 60 tablet 5    pioglitazone (ACTOS) 15 MG tablet TAKE ONE TABLET BY MOUTH EVERY DAY 90 tablet 3    butalbital-APAP-caffeine (FIORICET) -40 MG CAPS per capsule Take 1 capsule by mouth every 6 hours as needed for Headaches 40 capsule 0    metFORMIN (GLUCOPHAGE) 500 MG tablet TAKE ONE TABLET BY MOUTH TWICE A DAY WITH MEALS 180 tablet 3    mometasone-formoterol (DULERA) 200-5 MCG/ACT inhaler Inhale 2 puffs into the lungs every 12 hours 1 Inhaler 3    azelastine (ASTELIN) 0.1 % nasal spray 1 spray by Nasal route 2 times daily Use in each nostril as directed 2 Bottle 1    ipratropium-albuterol (DUONEB) 0.5-2.5 (3) MG/3ML SOLN nebulizer solution INHALE 1 VIAL VIA NEBULIZER EVERY 4 HOURS 360 mL 5    traZODone (DESYREL) 100 MG tablet TAKE 1 TO 2 TABLETS BY MOUTH AT BEDTIME AS NEEDED FOR SLEEP      metoprolol succinate (TOPROL XL) 50 MG extended release tablet TAKE ONE TABLET BY MOUTH TWO TIMES A DAY.  HOLD FOR SYSTOLIC BLOOD PRESSURE LOWER THAN 100 OR HEART RATE LOWER THAN 60.  180 tablet 3    QUEtiapine (SEROQUEL) 100 MG tablet TAKE ONE TABLET BYMOUTH EVERY EVENING AS NEEDED AT 8 PM      albuterol sulfate HFA (PROVENTIL HFA) 108 (90 Base) MCG/ACT inhaler Inhale 2 puffs into the lungs every 6 hours as needed for Wheezing 1 Inhaler 3    Lancets MISC Test bid 100 each 3    hyoscyamine (ANASPAZ;LEVSIN) 125 MCG tablet TAKE ONE TABLET BY MOUTH EVERY 4 HOURS AS NEEDED for cramping 164 tablet 0    magnesium oxide (MAG-OX) 400 (241.3 Mg) MG TABS tablet Take 1 tablet by mouth 2 times daily 60 tablet 2    Blood Glucose Monitoring Suppl (ONE TOUCH ULTRA 2) w/Device KIT TEST TWICE DAILY  0    prazosin (MINIPRESS) 2 MG capsule TAKE 1 CAPSULE BY MOUTH EVERY NIGHT FOR NIGHTMARES  3    blood glucose monitor strips Test 2 times a day & as needed for symptoms of irregular blood glucose. 100 strip 3    nystatin-triamcinolone (MYCOLOG II) 663629-5.1 UNIT/GM-% cream APPLY TOPICALLY TWICE DAILY 45 g 2    Melatonin 5 MG CAPS Take 10 mg by mouth daily   2    FREESTYLE LITE strip use three times daily  50 each 5    DULoxetine (CYMBALTA) 60 MG extended release capsule TAKE ONE CAPSULE BY MOUTH TWICE DAILY AS DIRECTED IN THE MORNING AND AFTERNOON  1     No current facility-administered medications on file prior to visit.      Allergies   Allergen Reactions    Latex Hives    Penicillins Swelling and Rash    Shellfish-Derived Products Anaphylaxis    Abilify [Aripiprazole]      shaking    Adhesive Tape Swelling     If left on too long    Statins      Swelling      Topamax [Topiramate]     Buspar [Buspirone]      shaking    Other Nausea And Vomiting    Sulfa Antibiotics Nausea And Vomiting     Health Maintenance   Topic Date Due    Hepatitis B vaccine (1 of 3 - Risk 3-dose series) Never done    DTaP/Tdap/Td vaccine (1 - Tdap) Never done    Cervical cancer screen  Never done    Shingles Vaccine (1 of 2) Never done    Diabetic retinal exam  02/01/2017    Pneumococcal 0-64 years Vaccine (1 of 2 - PPSV23) 05/31/2019    Breast cancer screen  11/10/2019    Diabetic foot exam  04/05/2020    COVID-19 Vaccine (3 - Booster for Moderna series) 10/06/2021    Annual Wellness Visit (AWV)  02/10/2022    Diabetic microalbuminuria test  04/19/2022    Lipid screen  10/04/2022    Depression Monitoring  02/09/2023    Potassium monitoring  02/10/2023    Creatinine monitoring  02/10/2023    A1C test (Diabetic or Prediabetic)  02/14/2023    Colon cancer screen colonoscopy  03/15/2028    Flu vaccine  Completed    Hepatitis C screen  Completed    HIV screen  Completed    Hepatitis A vaccine  Aged Out    Hib vaccine  Aged Out    Meningococcal (ACWY) vaccine  Aged Out       Review of Systems     Review of Systems   Constitutional: Negative for activity change, appetite change, fatigue and fever. HENT: Negative for congestion and rhinorrhea. Eyes: Negative. Respiratory: Negative. Negative for cough and chest tightness. Cardiovascular: Negative. Gastrointestinal: Positive for nausea and vomiting. Negative for diarrhea. Endocrine: Negative. Genitourinary: Negative. Musculoskeletal: Negative. Skin: Negative. Neurological: Negative for dizziness, light-headedness and numbness. Hematological: Negative. Psychiatric/Behavioral: Negative. Physical Exam  Vitals:    02/14/22 1524   BP: 124/62   Pulse: 65   Resp: 12   Temp: 97.7 °F (36.5 °C)   SpO2: 95%   Weight: 287 lb (130.2 kg)   Height: 5' 2\" (1.575 m)       Physical Exam  Constitutional:       Appearance: She is well-developed. HENT:      Right Ear: External ear normal.      Left Ear: External ear normal.   Eyes:      Pupils: Pupils are equal, round, and reactive to light. Neck:      Thyroid: No thyromegaly. Cardiovascular:      Rate and Rhythm: Normal rate and regular rhythm. Heart sounds: Normal heart sounds. No murmur heard. No friction rub. No gallop. Pulmonary:      Effort: Pulmonary effort is normal. No respiratory distress. Breath sounds: No wheezing or rales. Chest:      Chest wall: No tenderness. Abdominal:      General: Bowel sounds are normal. There is no distension. Palpations: Abdomen is soft. There is no mass. Tenderness: There is no abdominal tenderness. There is no guarding or rebound.    Musculoskeletal:         General: Normal range of

## 2022-02-16 DIAGNOSIS — I10 BENIGN ESSENTIAL HTN: ICD-10-CM

## 2022-02-16 RX ORDER — MOMETASONE FUROATE 1 MG/G
CREAM TOPICAL
Qty: 45 G | Refills: 0 | Status: SHIPPED | OUTPATIENT
Start: 2022-02-16 | End: 2022-03-23

## 2022-02-16 RX ORDER — METOPROLOL SUCCINATE 50 MG/1
TABLET, EXTENDED RELEASE ORAL
Qty: 180 TABLET | Refills: 3 | Status: SHIPPED | OUTPATIENT
Start: 2022-02-16

## 2022-02-16 RX ORDER — ALLOPURINOL 100 MG/1
TABLET ORAL
Qty: 90 TABLET | Refills: 1 | Status: SHIPPED | OUTPATIENT
Start: 2022-02-16 | End: 2022-08-29

## 2022-02-16 NOTE — TELEPHONE ENCOUNTER
Future Appointments    8/15/2022 John Reed MD [facility] AT [facility] 6 mo DM       Past Visits    Date Provider Specialty Visit Type Primary Dx   02/14/2022 John Reed MD Family Medicine Office Visit Type 2 diabetes mellitus without complication, without long-term current use of insulin ([facility] [address]

## 2022-03-23 RX ORDER — MOMETASONE FUROATE 1 MG/G
CREAM TOPICAL
Qty: 45 G | Refills: 0 | Status: SHIPPED | OUTPATIENT
Start: 2022-03-23 | End: 2022-04-27

## 2022-03-26 ENCOUNTER — APPOINTMENT (OUTPATIENT)
Dept: GENERAL RADIOLOGY | Age: 57
End: 2022-03-26
Payer: COMMERCIAL

## 2022-03-26 ENCOUNTER — HOSPITAL ENCOUNTER (EMERGENCY)
Age: 57
Discharge: HOME | End: 2022-03-26
Attending: EMERGENCY MEDICINE
Payer: COMMERCIAL

## 2022-03-26 VITALS
WEIGHT: 274 LBS | TEMPERATURE: 99 F | SYSTOLIC BLOOD PRESSURE: 150 MMHG | OXYGEN SATURATION: 95 % | HEIGHT: 62 IN | RESPIRATION RATE: 20 BRPM | DIASTOLIC BLOOD PRESSURE: 52 MMHG | BODY MASS INDEX: 50.42 KG/M2 | HEART RATE: 68 BPM

## 2022-03-26 DIAGNOSIS — S52.502A CLOSED FRACTURE OF DISTAL ENDS OF LEFT RADIUS AND ULNA, INITIAL ENCOUNTER: Primary | ICD-10-CM

## 2022-03-26 DIAGNOSIS — S52.602A CLOSED FRACTURE OF DISTAL ENDS OF LEFT RADIUS AND ULNA, INITIAL ENCOUNTER: Primary | ICD-10-CM

## 2022-03-26 PROCEDURE — 6360000002 HC RX W HCPCS: Performed by: EMERGENCY MEDICINE

## 2022-03-26 PROCEDURE — 6370000000 HC RX 637 (ALT 250 FOR IP): Performed by: EMERGENCY MEDICINE

## 2022-03-26 PROCEDURE — 96372 THER/PROPH/DIAG INJ SC/IM: CPT

## 2022-03-26 PROCEDURE — 99283 EMERGENCY DEPT VISIT LOW MDM: CPT

## 2022-03-26 PROCEDURE — 73110 X-RAY EXAM OF WRIST: CPT

## 2022-03-26 RX ORDER — MORPHINE SULFATE 4 MG/ML
4 INJECTION, SOLUTION INTRAMUSCULAR; INTRAVENOUS ONCE
Status: COMPLETED | OUTPATIENT
Start: 2022-03-26 | End: 2022-03-26

## 2022-03-26 RX ORDER — KETOROLAC TROMETHAMINE 30 MG/ML
30 INJECTION, SOLUTION INTRAMUSCULAR; INTRAVENOUS ONCE
Status: COMPLETED | OUTPATIENT
Start: 2022-03-26 | End: 2022-03-26

## 2022-03-26 RX ORDER — HYDROCODONE BITARTRATE AND ACETAMINOPHEN 5; 325 MG/1; MG/1
1 TABLET ORAL EVERY 6 HOURS PRN
Qty: 12 TABLET | Refills: 0 | Status: SHIPPED | OUTPATIENT
Start: 2022-03-26 | End: 2022-03-29

## 2022-03-26 RX ORDER — HYDROCODONE BITARTRATE AND ACETAMINOPHEN 5; 325 MG/1; MG/1
1 TABLET ORAL ONCE
Status: COMPLETED | OUTPATIENT
Start: 2022-03-26 | End: 2022-03-26

## 2022-03-26 RX ADMIN — KETOROLAC TROMETHAMINE 30 MG: 30 INJECTION, SOLUTION INTRAMUSCULAR; INTRAVENOUS at 01:03

## 2022-03-26 RX ADMIN — MORPHINE SULFATE 4 MG: 4 INJECTION, SOLUTION INTRAMUSCULAR; INTRAVENOUS at 01:05

## 2022-03-26 RX ADMIN — HYDROCODONE BITARTRATE AND ACETAMINOPHEN 1 TABLET: 5; 325 TABLET ORAL at 02:00

## 2022-03-26 ASSESSMENT — PAIN DESCRIPTION - PAIN TYPE: TYPE: ACUTE PAIN

## 2022-03-26 ASSESSMENT — PAIN DESCRIPTION - ORIENTATION: ORIENTATION: LEFT

## 2022-03-26 ASSESSMENT — PAIN - FUNCTIONAL ASSESSMENT: PAIN_FUNCTIONAL_ASSESSMENT: 0-10

## 2022-03-26 ASSESSMENT — PAIN SCALES - GENERAL
PAINLEVEL_OUTOF10: 6
PAINLEVEL_OUTOF10: 9
PAINLEVEL_OUTOF10: 10

## 2022-03-26 ASSESSMENT — PAIN DESCRIPTION - DESCRIPTORS: DESCRIPTORS: STABBING

## 2022-03-26 ASSESSMENT — PAIN DESCRIPTION - LOCATION: LOCATION: WRIST
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
## 2022-03-26 NOTE — ED NOTES
Splint applied to left wrist, tolerated well per paramedics.  Tolerated well, positive movement, sensation and pulses distal.       Levi Maldonado RN  03/26/22 0151

## 2022-03-26 NOTE — ED PROVIDER NOTES
[address] [place] [address] ED  EMERGENCY DEPARTMENT ENCOUNTER      Pt Name: Champ Gerard  MRN: [id number]  [place] 1965  Date of evaluation: 3/26/2022  Provider: John Rodrigues DO    CHIEF COMPLAINT       Chief Complaint   Patient presents with    Wrist Pain     left wrist pain  fell      Chief complaint: Left wrist pain  History of chief complaint: This 58-year-old female presents the emergency department complaining of falling trying to step down onto the back of a moving van. Patient states she lost her balance as she came down and fell forward. Patient states she reached her arm out to catch herself. Patient states she did bump her head but not hard there was no loss of consciousness patient denies any head or neck pain. No chest pain and no difficulty breathing no abdominal pain no pain in the lower extremities. Patient complains of pain in the left wrist worse with movement no numb tingling or weak sensation. Patient denies any use of blood thinner. Nursing Notes were reviewed. REVIEW OF SYSTEMS    (2-9 systems for level 4, 10 or more for level 5)     Review of Systems  Pertinent findings documented in the history of present illness  Except as noted above the remainder of the review of systems was reviewed and negative.        PAST MEDICAL HISTORY     Past Medical History:   Diagnosis Date    Anemia     Asthma     Benign essential HTN     meds > 5 yrs / denies TIA or stroke    Borderline personality disorder ([address]     2016 ?suggested by me-meets many criteria    Carpal tunnel syndrome of right wrist     Crohn's disease ([facility]     Depression     Fibromyalgia 2/13/2018    Fibromyalgia     GERD (gastroesophageal reflux disease)     Gout     Headache     migraines    Irritable bowel syndrome     Mixed hyperlipidemia 5/10/2017    Neuropathy     Obesity (BMI 35.0-39.9 without comorbidity) 3/30/2017    PONV (postoperative nausea and vomiting)     nausea    PTSD (post-traumatic stress disorder)     Tremor of unknown origin     Type 2 diabetes mellitus ([facility]     meds > 5yrs     Ulcerative colitis ([facility] 11/2017    Vaginitis          SURGICAL HISTORY       Past Surgical History:   Procedure Laterality Date    BREAST CYST EXCISION Right 1994    exc mass benign    CHOLECYSTECTOMY  2009    COLONOSCOPY  2015    negative    ENDOSCOPY, COLON, DIAGNOSTIC      HERNIA REPAIR  2015    ventral / mesh    LEG TENDON SURGERY Right 2007    leg. ankle and foot    OVARIAN CYST REMOVAL Left 1994    with mass and both fallopian tube    OVARY REMOVAL  12/2015    [facility] / mass left ovary & bilateral  tubes    PARTIAL HYSTERECTOMY      NM COLON CA SCRN NOT HI RSK IND N/A 11/7/2017    COLONOSCOPY performed by Keith Black, [facility] on bx    NM COLONOSCOPY W/BIOPSY SINGLE/MULTIPLE N/A 3/15/2018    COLONOSCOPY performed by Keith Black MD at [facility] Right 8/3/2017    RIGHT KNEE ARTHROSCOPIC PARTIAL MEDIAL MENISCECTOMY KNEE performed by Kishan Nicole MD at [facility] N/CARPAL TUNNEL SURG Right 11/30/2017    RIGHT WRIST  CARPAL TUNNEL RELEASE performed by Kishan Nicole MD at [facility] N/CARPAL TUNNEL SURG Left 5/10/2018    LEFT WRIST CARPAL TUNNEL RELEASE performed by Kishan Nicole MD at [facility] [address]       Previous Medications    ALBUTEROL SULFATE HFA (PROVENTIL HFA) 108 (90 BASE) MCG/ACT INHALER    Inhale 2 puffs into the lungs every 6 hours as needed for Wheezing    ALLOPURINOL (ZYLOPRIM) 100 MG TABLET    TAKE ONE TABLET BY MOUTH EVERY DAY    AZELASTINE (ASTELIN) 0.1 % NASAL SPRAY    1 spray by Nasal route 2 times daily Use in each nostril as directed    BACLOFEN (LIORESAL) 20 MG TABLET    Take 1 tablet by mouth 4 times daily    BLOOD GLUCOSE MONITOR STRIPS    Test 2 times a day & as needed for symptoms of irregular blood glucose.     BLOOD GLUCOSE MONITORING SUPPL (ONE TOUCH ULTRA 2) W/DEVICE KIT    TEST TWICE DAILY    BUTALBITAL-APAP-CAFFEINE (FIORICET) -40 MG CAPS PER CAPSULE    Take 1 capsule by mouth every 6 hours as needed for Headaches    DICYCLOMINE (BENTYL) 20 MG TABLET    Take 1 tablet by mouth 4 times daily    DULOXETINE (CYMBALTA) 60 MG EXTENDED RELEASE CAPSULE    TAKE ONE CAPSULE BY MOUTH TWICE DAILY AS DIRECTED IN THE MORNING AND AFTERNOON    FREESTYLE LITE STRIP    use three times daily     FUROSEMIDE (LASIX) 40 MG TABLET    TAKE ONE TABLET BY MOUTH TWO TIMES A DAY    HYDROXYZINE (VISTARIL) 25 MG CAPSULE    TAKE ONE CAPSULE BY MOUTH EVERY DAY    HYOSCYAMINE (ANASPAZ;LEVSIN) 125 MCG TABLET    TAKE ONE TABLET BY MOUTH EVERY 4 HOURS AS NEEDED for cramping    IPRATROPIUM-ALBUTEROL (DUONEB) 0.5-2.5 (3) MG/3ML SOLN NEBULIZER SOLUTION    INHALE 1 VIAL VIA NEBULIZER EVERY 4 HOURS    LANCETS MISC    Test bid    MAGNESIUM OXIDE (MAG-OX) 400 (241.3 MG) MG TABS TABLET    Take 1 tablet by mouth 2 times daily    MELATONIN 5 MG CAPS    Take 10 mg by mouth daily     MELOXICAM (MOBIC) 7.5 MG TABLET    Take 1 tablet by mouth 2 times daily    METFORMIN (GLUCOPHAGE) 500 MG TABLET    TAKE ONE TABLET BY MOUTH TWICE A DAY WITH MEALS    METOPROLOL SUCCINATE (TOPROL XL) 50 MG EXTENDED RELEASE TABLET    TAKE ONE TABLET BY MOUTH TWO TIMES A DAY. HOLD FOR SYSTOLIC BLOOD PRESSURRE LOWER THAN 100 OR HEART RATE LOWER THAN 60.     MOMETASONE (ELOCON) 0.1 % CREAM    APPLY CREAM TOPICALLY DAILY    MOMETASONE-FORMOTEROL (DULERA) 200-5 MCG/ACT INHALER    Inhale 2 puffs into the lungs every 12 hours    MONTELUKAST (SINGULAIR) 10 MG TABLET    TAKE ONE TABLET BY MOUTH NIGHTLY    NURTEC 75 MG TBDP    DISSOLVE 1 TABLET IN MOUTH ONCE A DAY AS NEEDED FOR PAIN    NYSTATIN-TRIAMCINOLONE (MYCOLOG II) 426742-0.1 UNIT/GM-% CREAM    APPLY TOPICALLY TWICE DAILY    OMEPRAZOLE (PRILOSEC) 40 MG DELAYED RELEASE CAPSULE    TAKE ONE CAPSULE BY MOUTH TWO TIMES A DAY    ONDANSETRON (ZOFRAN) 4 MG TABLET    Take 1 tablet by mouth daily as needed for Nausea or Vomiting    ONDANSETRON (ZOFRAN-ODT) 4 MG DISINTEGRATING TABLET    Take 1 tablet by mouth 3 times daily as needed for Nausea or Vomiting    PIOGLITAZONE (ACTOS) 15 MG TABLET    TAKE ONE TABLET BY MOUTH EVERY DAY    POTASSIUM CHLORIDE (KLOR-CON M) 20 MEQ EXTENDED RELEASE TABLET    TAKE ONE TABLET BY MOUTH TWO TIMES A DAY    PRAVASTATIN (PRAVACHOL) 40 MG TABLET    Take 1 tablet by mouth every evening    PRAZOSIN (MINIPRESS) 2 MG CAPSULE    TAKE 1 CAPSULE BY MOUTH EVERY NIGHT FOR NIGHTMARES    PREGABALIN (LYRICA) 150 MG CAPSULE    Take 1 capsule by mouth 3 times daily for 90 days.     QUETIAPINE (SEROQUEL) 100 MG TABLET    TAKE ONE TABLET BYMOUTH EVERY EVENING AS NEEDED AT 8 PM    TRAZODONE (DESYREL) 100 MG TABLET    TAKE 1 TO 2 TABLETS BY MOUTH AT BEDTIME AS NEEDED FOR SLEEP       ALLERGIES     Latex, Penicillins, Shellfish-derived products, Abilify [aripiprazole], Adhesive tape, Topamax [topiramate], Buspar [buspirone], Other, and Sulfa antibiotics    FAMILY HISTORY       Family History   Problem Relation Age of Onset    Emphysema Mother     Cancer Maternal Grandfather     Diabetes Paternal Grandmother     Emphysema Paternal Grandfather     Heart Disease Sister     Stroke Sister     Diabetes Sister           SOCIAL HISTORY       Social History     Socioeconomic History    Marital status: Single     Spouse name: None    Number of children: None    Years of education: None    Highest education level: None   Occupational History    None   Tobacco Use    Smoking status: Never Smoker    Smokeless tobacco: Never Used   Vaping Use    Vaping Use: Never used   Substance and Sexual Activity    Alcohol use: No     Alcohol/week: 0.0 standard drinks    Drug use: No    Sexual activity: Not Currently   Other Topics Concern    None   Social History Narrative    None     Social Determinants of Health     Financial Resource Strain:     Difficulty of Paying Living Expenses: Not on file   Food Insecurity:     Worried About Running Out of Food in the Last Year: Not on file    Glenna of Food in the Last Year: Not on file   Transportation Needs:     Lack of Transportation (Medical): Not on file    Lack of Transportation (Non-Medical):  Not on file   Physical Activity:     Days of Exercise per Week: Not on file    Minutes of Exercise per Session: Not on file   Stress:     Feeling of Stress : Not on file   Social Connections:     Frequency of Communication with Friends and Family: Not on file    Frequency of Social Gatherings with Friends and Family: Not on file    Attends Scientologist Services: Not on file    Active Member of [address] [place] [address] [place] or Organizations: Not on file    Attends Club or Organization Meetings: Not on file    Marital Status: Not on file   Intimate Partner Violence:     Fear of Current or Ex-Partner: Not on file    Emotionally Abused: Not on file    Physically Abused: Not on file    Sexually Abused: Not on file   Housing Stability:     Unable to Pay for Housing in the Last Year: Not on file    Number of [place] in the Last Year: Not on file    Unstable Housing in the Last Year: Not on file           PHYSICAL EXAM    (up to 7 for level 4, 8 or more for level 5)     ED Triage Vitals [03/26/22 0024]   BP Temp Temp Source Pulse Resp SpO2 Height Weight   (!) 150/52 99 °F (37.2 °C) Oral 68 20 95 % 5' 2\" (1.575 m) 274 lb (124.3 kg)       Physical Exam   General appearance: Patient is awake alert interactive appropriate nontoxic in no distress,  GCS 15  Head: Atraumatic normocephalic no gross scalp tenderness swelling step-off or deformity  Face: Atraumatic no bony step-offs, ecchymosis or abrasion face is stable  Eyes: Pupils are equal and reactive sclera white conjunctive are pink extraocular muscles are grossly intact  Oral pharyngeal cavity: Membranes are pink and moist airway is widely patent  Neck: No focal point bony midline tenderness step-off or deformity no paravertebral muscle tenderness range of motion is full and intact with no pain elicited  Heart: Regular rate and rhythm no gross murmurs rubs or clicks  Lungs: Clear to auscultation with equal breast bilaterally no wheezes rales or rhonchi no respiratory distress  Chest wall: Nontender to palpation chest rise is full and symmetric  Abdomen: Soft nontender to palpation throughout no gross distention no rebound rigidity or guarding no ecchymosis or abrasions no palpable masses or fullness. Femoral pulses are full and symmetric  Back: No focal point bony midline tenderness step-off or deformity no paravertebral muscle tenderness no costovertebral angle tenderness. Straight leg raise test is negative  Lower extremities: No gross trauma range of motion full and intact hips knees and ankles, motor sensory pulses intact distally, straight leg raise test is negative  Neurologic: Patient is awake alert oriented x3 speech is clear and fluent pupils are equal and reactive extraocular muscles are intact facial symmetry is intact tongue is midline strength testing is symmetric in both the upper and lower extremities sensation is intact in all 4 extremities  Left wrist: There is obvious deformity with prominence over the area of the distal radius dorsally there is diffuse pain on palpation with limited range of motion secondary to pain lection extension of the digits is grossly intact sensation is intact throughout the digits there is a strong radial pulse capillary refill is less than 2 seconds. There is no bony tenderness about the elbow joint. DIAGNOSTIC RESULTS       RADIOLOGY:   Non-plain film images such as CT, Ultrasound and MRI are read by the radiologist. Plain radiographic images are visualized and preliminarily interpreted by the emergency physician with the below findings:    Left wrist x-ray multiple views interpreted by ED physician indication trauma:  There is a comminuted intra-articular fracture of the distal radius there is no gross fracture displacement no dislocation. Patient radiology report is pending    Interpretation per the Radiologist below, if available at the time of this note:    XR WRIST LEFT (MIN 3 VIEWS)    (Results Pending)         EMERGENCY DEPARTMENT COURSE and DIFFERENTIAL DIAGNOSIS/MDM:   Vitals:    Vitals:    03/26/22 0024   BP: (!) 150/52   Pulse: 68   Resp: 20   Temp: 99 °F (37.2 °C)   TempSrc: Oral   SpO2: 95%   Weight: 274 lb (124.3 kg)   Height: 5' 2\" (1.575 m)     Treatment and course: Patient was given morphine 4 mg IM Toradol 30 mg IM here. Custom short arm splint was applied. Vicodin 1 p.o. was initiated here    FINAL IMPRESSION      1. Closed fracture of distal ends of left radius and ulna, initial encounter          DISPOSITION/PLAN   DISPOSITION Discharge - Pending Orders Complete 03/26/2022 01:32:03 AM  Patient discharged home advised maintain the splint rest ice elevate home-going prescription for Vicodin No. 12 was written patient advised may use ibuprofen over-the-counter additionally as needed for pain monitor closely return if any change or worsening increased pain numb tingling weakness discoloration to the digits. Patient to follow-up with orthopedics for further evaluation and care on Monday    PATIENT REFERRED TO:  Keli Rivers MD  [id number]  THE [facility] [phone]    In 3 days        DISCHARGE MEDICATIONS:  New Prescriptions    HYDROCODONE-ACETAMINOPHEN (NORCO) 5-325 MG PER TABLET    Take 1 tablet by mouth every 6 hours as needed for Pain for up to 3 days. Intended supply: 3 days. Take lowest dose possible to manage pain     Controlled Substances Monitoring:     RX Monitoring 11/7/2020   Attestation -   Periodic Controlled Substance Monitoring Possible medication side effects, risk of tolerance/dependence & alternative treatments discussed.        (Please note that portions of this note were completed with a voice recognition program.  Efforts were made to edit the dictations but occasionally words are mis-transcribed.)    Chiquita Das DO (electronically signed)  Attending Emergency Physician            Chiquita Das DO  03/26/22 0138

## 2022-03-28 ENCOUNTER — HOSPITAL ENCOUNTER (OUTPATIENT)
Dept: CT IMAGING | Age: 57
Discharge: HOME | End: 2022-03-30
Payer: COMMERCIAL

## 2022-03-28 DIAGNOSIS — S52.509A CLOSED FRACTURE OF DISTAL END OF RADIUS, UNSPECIFIED FRACTURE MORPHOLOGY, UNSPECIFIED LATERALITY, INITIAL ENCOUNTER: ICD-10-CM

## 2022-03-28 PROCEDURE — 73200 CT UPPER EXTREMITY W/O DYE: CPT

## 2022-03-28 PROCEDURE — 76376 3D RENDER W/INTRP POSTPROCES: CPT

## 2022-04-19 DIAGNOSIS — M79.7 FIBROMYALGIA: ICD-10-CM

## 2022-04-19 DIAGNOSIS — G89.29 CHRONIC BACK PAIN GREATER THAN 3 MONTHS DURATION: ICD-10-CM

## 2022-04-19 DIAGNOSIS — M54.9 CHRONIC BACK PAIN GREATER THAN 3 MONTHS DURATION: ICD-10-CM

## 2022-04-19 DIAGNOSIS — M47.9 SPONDYLOSIS: ICD-10-CM

## 2022-04-19 RX ORDER — MELOXICAM 7.5 MG/1
7.5 TABLET ORAL 2 TIMES DAILY
Qty: 60 TABLET | Refills: 5 | Status: SHIPPED | OUTPATIENT
Start: 2022-04-19 | End: 2022-10-31

## 2022-04-19 NOTE — TELEPHONE ENCOUNTER
----- Message from Phuong Haas sent at 4/18/2022  4:07 PM EDT -----  Subject: Refill Request    QUESTIONS  Name of Medication? meloxicam (MOBIC) 7.5 MG tablet  Patient-reported dosage and instructions? Take 1 tablet by mouth 2 times   daily  How many days do you have left? 0  Preferred Pharmacy? [address] [place] [address] [id number]  Pharmacy phone number (if available)? [phone]  ---------------------------------------------------------------------------  --------------  Lucie TIJERINA  What is the best way for the office to contact you? OK to leave message on   voicemail  Preferred Call Back Phone Number? [phone]  ---------------------------------------------------------------------------  --------------  SCRIPT ANSWERS  Relationship to Patient?  Self

## 2022-04-27 RX ORDER — MOMETASONE FUROATE 1 MG/G
CREAM TOPICAL
Qty: 45 G | Refills: 0 | Status: SHIPPED | OUTPATIENT
Start: 2022-04-27 | End: 2022-07-05

## 2022-04-27 RX ORDER — POTASSIUM CHLORIDE 20 MEQ/1
TABLET, EXTENDED RELEASE ORAL
Qty: 180 TABLET | Refills: 0 | Status: SHIPPED | OUTPATIENT
Start: 2022-04-27 | End: 2022-05-12 | Stop reason: SDUPTHER

## 2022-05-12 ENCOUNTER — OFFICE VISIT (OUTPATIENT)
Dept: CARDIOLOGY CLINIC | Age: 57
End: 2022-05-12
Payer: COMMERCIAL

## 2022-05-12 VITALS
SYSTOLIC BLOOD PRESSURE: 102 MMHG | OXYGEN SATURATION: 95 % | HEIGHT: 62 IN | HEART RATE: 65 BPM | WEIGHT: 282 LBS | DIASTOLIC BLOOD PRESSURE: 64 MMHG | BODY MASS INDEX: 51.89 KG/M2

## 2022-05-12 DIAGNOSIS — R60.0 EDEMA OF LOWER EXTREMITY: ICD-10-CM

## 2022-05-12 DIAGNOSIS — I10 BENIGN ESSENTIAL HTN: Primary | ICD-10-CM

## 2022-05-12 DIAGNOSIS — E78.2 MIXED HYPERLIPIDEMIA: ICD-10-CM

## 2022-05-12 DIAGNOSIS — I10 BENIGN ESSENTIAL HYPERTENSION: ICD-10-CM

## 2022-05-12 DIAGNOSIS — E66.9 OBESITY WITH BODY MASS INDEX 30 OR GREATER: ICD-10-CM

## 2022-05-12 PROBLEM — R07.9 CHEST PAIN: Status: RESOLVED | Noted: 2019-09-03 | Resolved: 2022-05-12

## 2022-05-12 PROCEDURE — 99214 OFFICE O/P EST MOD 30 MIN: CPT | Performed by: INTERNAL MEDICINE

## 2022-05-12 PROCEDURE — 93000 ELECTROCARDIOGRAM COMPLETE: CPT | Performed by: INTERNAL MEDICINE

## 2022-05-12 RX ORDER — POTASSIUM CHLORIDE 20 MEQ/1
TABLET, EXTENDED RELEASE ORAL
Qty: 180 TABLET | Refills: 1 | Status: SHIPPED | OUTPATIENT
Start: 2022-05-12

## 2022-05-12 RX ORDER — FUROSEMIDE 40 MG/1
TABLET ORAL
Qty: 60 TABLET | Refills: 5 | Status: SHIPPED | OUTPATIENT
Start: 2022-05-12

## 2022-05-12 NOTE — PROGRESS NOTES
Chief Complaint   Patient presents with    Follow-up    Hypertension    Hyperlipidemia       5-24-18:Patient presents for initial medical evaluation. Patient is followed on a regular basis by Dr. Noemy Toney MD. Having b/l LE edema and was seen by dr. Lorraine Garrido and states her veins were ok. No hx of cardiac disease. No hx of DVT/PE. Worse over the past few months. Does have SOB on exertion. Has been gaining weight rapidly due to LE edema. Not on any CCB or steroids. Pt denies chest pain, dyspnea,  change in exercise capacity, fatigue,  nausea, vomiting, diarrhea, constipation, motor weakness, insomnia, weight loss, syncope, dizziness, lightheadedness, palpitations, PND, orthopnea, or claudication. BP and hr are good. No LE discoloration or ulcers. No LE edema. Lipid profile is normal. No recent hospitalization. No change in meds. Hx of DM, HTN, HLP. No smoking. 6-28-18: LE edema improved with lasix 40mg BID. Has lost 23 pounds. S/p EHCO with EF of 65%, no valve abn, RVSP of 24NCRP, normal diastolic function. Pt denies chest pain, dyspnea, dyspnea on exertion, change in exercise capacity, fatigue,  nausea, vomiting, diarrhea, constipation, motor weakness, insomnia, weight loss, syncope, dizziness, lightheadedness,  PND, orthopnea, or claudication. No nitro use. BP and hr are good. CAD is stable. No LE discoloration or ulcers. +  LE edema. No CHF type symptoms. Lipid profile is normal. No recent hospitalization. No change in meds. Does have palpitations at times. BMP with CR of 1.09, GFR of 52.       12-27-18: + dyspnea, dyspnea on exertion, change in exercise capacity, fatigue. Pt denies chest pain,  nausea, vomiting, diarrhea, constipation, motor weakness, insomnia, weight loss, syncope, dizziness, lightheadedness, palpitations, PND, orthopnea, or claudication. No nitro use. BP and hr are good. CAD is stable. No LE discoloration or ulcers. No CHF type symptoms.  Lipid profile is normal. No recent hospitalization. No change in meds. LE edema has improved. On Lasix. She has CKD stage III, GFR 59, cr is 0.98. No hx of stress test.     6-27-19: doing ok overall. S/p normal nuclear stress test in 1/19. LE edema is under control. Pt denies chest pain, dyspnea, dyspnea on exertion, change in exercise capacity, fatigue,  nausea, vomiting, diarrhea, constipation, motor weakness, insomnia, weight loss, syncope, dizziness, lightheadedness,  PND, orthopnea, or claudication. No nitro use. BP and hr are good. CAD is stable. No LE discoloration or ulcers. No LE edema. No CHF type symptoms. Lipid profile is normal. Occasional palpitations. Renal function is wnl now. 12-19-19: had a rxn to iron transfusion. Had rapid heart beat, skipped beat. Workup was negative. No arrhythmia noted. Symptoms resolved. Pt denies chest pain, dyspnea, dyspnea on exertion, change in exercise capacity,ausea, vomiting, diarrhea, constipation, motor weakness, insomnia, weight loss, syncope, dizziness, lightheadedness, palpitations, PND, orthopnea, or claudication. Echo in 2018 was negative. 2-4-21: Pt denies chest pain,  fatigue,  nausea, vomiting, diarrhea, constipation, motor weakness, insomnia, weight loss, syncope, dizziness, lightheadedness, palpitations, PND, orthopnea, or claudication. No nitro use. BP and hr are good. CAD is stable. No LE discoloration or ulcers. No LE edema. No CHF type symptoms. Lipid profile is normal. No recent hospitalization. No change in meds. Dealing with a lot of depression and following up with the [facility] center. + panic attacks, has tightness in her chest.  Positive history of diabetes, hemoglobin A1c is 6.0. EKG with normal sinus rhythm nonspecific ST changes. 9-2-21: LE edema has decreased on lasix. Has hx of venous insuffiencey. + hxof anxiety and depression.    Pt denies chest pain, dyspnea, dyspnea on exertion, change in exercise capacity, fatigue,  nausea, vomiting, diarrhea, constipation, motor weakness, insomnia, weight loss, syncope, dizziness, lightheadedness, palpitations, PND, orthopnea, or claudication. Hx of normal nuclear stress test. Echo in 2018 was normal. BP and HR are ok. Has lost 25 pounds. Lipid profile is mildly abnormal. Does have stage III CKD. 5-12-22: doing ok. No major issues. Pt denies chest pain, dyspnea, dyspnea on exertion, change in exercise capacity, fatigue,  nausea, vomiting, diarrhea, constipation, motor weakness, insomnia, weight loss, syncope, dizziness, lightheadedness, palpitations, PND, orthopnea, or claudication. Stage III CKD. Lipid profile is mildly abn. On statin. EKG with NSR, no ischemia.      Patient Active Problem List   Diagnosis    Benign essential hypertension    Asthma    Polyneuropathy due to type 2 diabetes mellitus ([address]    Crohn's disease ([address]    Gastroesophageal reflux disease    Headache    Severe episode of recurrent major depressive disorder, without psychotic features ([address]    Borderline personality disorder ([address]    Pain in right knee    Hip pain, right    Obesity with body mass index 30 or greater    Muscle pain    Mixed hyperlipidemia    Tear of meniscus of knee    Bilateral edema of lower extremity    Varicose veins of lower extremity    History of Crohn's disease    Carpal tunnel syndrome of right wrist    Posttraumatic stress disorder    Fibromyalgia    Carpal tunnel syndrome of left wrist    Intermittent tremor    Anemia    Iron deficiency anemia    Tremor    Meralgia paresthetica of left side    Intractable migraine without aura and without status migrainosus    Morbid obesity (HCC)    Edema of lower extremity    Gastroenteritis    Injury of left foot    Leukocytosis    Nausea and vomiting    Shoulder pain    Pain of right hip joint    Acute bacterial sinusitis    Bronchitis    Right upper quadrant abdominal pain    Diarrhea    Fever       Past Surgical History:   Procedure Laterality Date  BREAST CYST EXCISION Right 1994    exc mass benign    CHOLECYSTECTOMY  2009    COLONOSCOPY  2015    negative    ENDOSCOPY, COLON, DIAGNOSTIC      HERNIA REPAIR  2015    ventral / mesh    LEG TENDON SURGERY Right 2007    leg.  ankle and foot    OVARIAN CYST REMOVAL Left 1994    with mass and both fallopian tube    OVARY REMOVAL  12/2015    [facility] / mass left ovary & bilateral  tubes    PARTIAL HYSTERECTOMY      NE COLON CA SCRN NOT HI RSK IND N/A 11/7/2017    COLONOSCOPY performed by MANOJ Quintero on bx    NE COLONOSCOPY W/BIOPSY SINGLE/MULTIPLE N/A 3/15/2018    COLONOSCOPY performed by Irena León MD at [facility] Right 8/3/2017    RIGHT KNEE ARTHROSCOPIC PARTIAL MEDIAL MENISCECTOMY KNEE performed by Noemí Weldon MD at [facility] N/CARPAL TUNNEL SURG Right 11/30/2017    RIGHT WRIST  CARPAL TUNNEL RELEASE performed by Noemí Weldon MD at [facility] N/CARPAL TUNNEL SURG Left 5/10/2018    LEFT WRIST CARPAL TUNNEL RELEASE performed by Noemí Weldon MD at [address] History     Socioeconomic History    Marital status: Single     Spouse name: None    Number of children: None    Years of education: None    Highest education level: None   Occupational History    None   Tobacco Use    Smoking status: Never Smoker    Smokeless tobacco: Never Used   Vaping Use    Vaping Use: Never used   Substance and Sexual Activity    Alcohol use: No     Alcohol/week: 0.0 standard drinks    Drug use: No    Sexual activity: Not Currently   Other Topics Concern    None   Social History Narrative    None     Social Determinants of Health     Financial Resource Strain:     Difficulty of Paying Living Expenses: Not on file   Food Insecurity:     Worried About Running Out of Food in the Last Year: Not on file    Glenna of Food in the Last Year: Not on ESPERANZA Zapata Needs:     Lack of Transportation (Medical): Not on file    Lack of Transportation (Non-Medical): Not on file   Physical Activity:     Days of Exercise per Week: Not on file    Minutes of Exercise per Session: Not on file   Stress:     Feeling of Stress : Not on file   Social Connections:     Frequency of Communication with Friends and Family: Not on file    Frequency of Social Gatherings with Friends and Family: Not on file    Attends [employer] Services: Not on file    Active Member of Clubs or Organizations: Not on file    Attends Club or Organization Meetings: Not on file    Marital Status: Not on file   Intimate Partner Violence:     Fear of Current or Ex-Partner: Not on file    Emotionally Abused: Not on file    Physically Abused: Not on file    Sexually Abused: Not on file   Housing Stability:     Unable to Pay for Housing in the Last Year: Not on file    Number of Places Lived in the Last Year: Not on file    Unstable Housing in the Last Year: Not on file       Family History   Problem Relation Age of Onset    Emphysema Mother     Cancer Maternal Grandfather     Diabetes Paternal Grandmother     Emphysema Paternal Grandfather     Heart Disease Sister     Stroke Sister     Diabetes Sister        Current Outpatient Medications   Medication Sig Dispense Refill    furosemide (LASIX) 40 MG tablet TAKE ONE TABLET BY MOUTH TWO TIMES A DAY 60 tablet 5    potassium chloride (KLOR-CON M) 20 MEQ extended release tablet TAKE ONE TABLET BY MOUTH TWO TIMES A  tablet 1    mometasone (ELOCON) 0.1 % cream apply cream topically daily 45 g 0    meloxicam (MOBIC) 7.5 MG tablet Take 1 tablet by mouth 2 times daily 60 tablet 5    allopurinol (ZYLOPRIM) 100 MG tablet TAKE ONE TABLET BY MOUTH EVERY DAY 90 tablet 1    metoprolol succinate (TOPROL XL) 50 MG extended release tablet TAKE ONE TABLET BY MOUTH TWO TIMES A DAY.   HOLD FOR SYSTOLIC BLOOD PRESSURRE LOWER THAN 100 OR HEART RATE LOWER THAN 60. 180 tablet 3    ondansetron (ZOFRAN) 4 MG tablet Take 1 tablet by mouth daily as needed for Nausea or Vomiting 30 tablet 1    ondansetron (ZOFRAN-ODT) 4 MG disintegrating tablet Take 1 tablet by mouth 3 times daily as needed for Nausea or Vomiting 12 tablet 0    dicyclomine (BENTYL) 20 MG tablet Take 1 tablet by mouth 4 times daily 12 tablet 0    omeprazole (PRILOSEC) 40 MG delayed release capsule TAKE ONE CAPSULE BY MOUTH TWO TIMES A  capsule 1    NURTEC 75 MG TBDP DISSOLVE 1 TABLET IN MOUTH ONCE A DAY AS NEEDED FOR PAIN      hydrOXYzine (VISTARIL) 25 MG capsule TAKE ONE CAPSULE BY MOUTH EVERY DAY      baclofen (LIORESAL) 20 MG tablet Take 1 tablet by mouth 4 times daily 120 tablet 2    montelukast (SINGULAIR) 10 MG tablet TAKE ONE TABLET BY MOUTH NIGHTLY 30 tablet 5    pravastatin (PRAVACHOL) 40 MG tablet Take 1 tablet by mouth every evening 90 tablet 3    pioglitazone (ACTOS) 15 MG tablet TAKE ONE TABLET BY MOUTH EVERY DAY 90 tablet 3    metFORMIN (GLUCOPHAGE) 500 MG tablet TAKE ONE TABLET BY MOUTH TWICE A DAY WITH MEALS 180 tablet 3    mometasone-formoterol (DULERA) 200-5 MCG/ACT inhaler Inhale 2 puffs into the lungs every 12 hours 1 Inhaler 3    azelastine (ASTELIN) 0.1 % nasal spray 1 spray by Nasal route 2 times daily Use in each nostril as directed 2 Bottle 1    ipratropium-albuterol (DUONEB) 0.5-2.5 (3) MG/3ML SOLN nebulizer solution INHALE 1 VIAL VIA NEBULIZER EVERY 4 HOURS 360 mL 5    traZODone (DESYREL) 100 MG tablet TAKE 1 TO 2 TABLETS BY MOUTH AT BEDTIME AS NEEDED FOR SLEEP      QUEtiapine (SEROQUEL) 100 MG tablet TAKE ONE TABLET BYMOUTH EVERY EVENING AS NEEDED AT 8 PM      albuterol sulfate HFA (PROVENTIL HFA) 108 (90 Base) MCG/ACT inhaler Inhale 2 puffs into the lungs every 6 hours as needed for Wheezing 1 Inhaler 3    Lancets MISC Test bid 100 each 3    hyoscyamine (ANASPAZ;LEVSIN) 125 MCG tablet TAKE ONE TABLET BY MOUTH EVERY 4 HOURS AS NEEDED for cramping 164 tablet 0  magnesium oxide (MAG-OX) 400 (241.3 Mg) MG TABS tablet Take 1 tablet by mouth 2 times daily 60 tablet 2    Blood Glucose Monitoring Suppl (ONE TOUCH ULTRA 2) w/Device KIT TEST TWICE DAILY  0    prazosin (MINIPRESS) 2 MG capsule TAKE 1 CAPSULE BY MOUTH EVERY NIGHT FOR NIGHTMARES  3    blood glucose monitor strips Test 2 times a day & as needed for symptoms of irregular blood glucose. 100 strip 3    nystatin-triamcinolone (MYCOLOG II) 677653-4.1 UNIT/GM-% cream APPLY TOPICALLY TWICE DAILY 45 g 2    Melatonin 5 MG CAPS Take 10 mg by mouth daily   2    FREESTYLE LITE strip use three times daily  50 each 5    DULoxetine (CYMBALTA) 60 MG extended release capsule TAKE ONE CAPSULE BY MOUTH TWICE DAILY AS DIRECTED IN THE MORNING AND AFTERNOON  1    pregabalin (LYRICA) 150 MG capsule Take 1 capsule by mouth 3 times daily for 90 days. 90 capsule 2    butalbital-APAP-caffeine (FIORICET) -40 MG CAPS per capsule Take 1 capsule by mouth every 6 hours as needed for Headaches 40 capsule 0     No current facility-administered medications for this visit. Latex, Penicillins, Shellfish-derived products, Abilify [aripiprazole], Adhesive tape, Topamax [topiramate], Buspar [buspirone], Other, and Sulfa antibiotics    Review of Systems:  General ROS: negative  Psychological ROS: negative  Hematological and Lymphatic ROS: No history of blood clots or bleeding disorder. Respiratory ROS: no cough, shortness of breath, or wheezing  Cardiovascular ROS: positive for - edema  Gastrointestinal ROS: negative  Genito-Urinary ROS: no dysuria, trouble voiding, or hematuria  Musculoskeletal ROS: negative  Neurological ROS: no TIA or stroke symptoms  Dermatological ROS: negative    VITALS:  Blood pressure 102/64, pulse 65, height 5' 2\" (1.575 m), weight 282 lb (127.9 kg), last menstrual period 07/27/1993, SpO2 95 %, not currently breastfeeding. Body mass index is 51.58 kg/m².     Physical Examination:  General appearance - alert, well appearing, and in no distress  Mental status - alert, oriented to person, place, and time  Neck - Neck is supple, no JVD or carotid bruits. No thyromegaly or adenopathy. Chest - clear to auscultation, no wheezes, rales or rhonchi, symmetric air entry  Heart - normal rate, regular rhythm, normal S1, S2, no murmurs, rubs, clicks or gallops  Abdomen - soft, nontender, nondistended, no masses or organomegaly  Neurological - alert, oriented, normal speech, no focal findings or movement disorder noted  Extremities - peripheral pulses normal, + 2b/l  pedal edema, no clubbing or cyanosis  Skin - normal coloration and turgor, no rashes, no suspicious skin lesions noted      EKG: normal sinus rhythm, nonspecific ST and T waves changes. Low voltage. Orders Placed This Encounter   Procedures    EKG 12 lead       ASSESSMENT:     Diagnosis Orders   1. Benign essential HTN  EKG 12 lead    furosemide (LASIX) 40 MG tablet   2. Mixed hyperlipidemia     3. Obesity with body mass index 30 or greater     4. Benign essential hypertension     5. Edema of lower extremity     4. Leg edema. 5.      Morbid obesity. PLAN:     Patient will need to continue to follow up with you for their general medical care     As always, aggressive risk factor modification is strongly recommended. We should adhere to the JNC VIII guidelines for HTN management and the NCEP ATP III guidelines for LDL-C management. Cardiac diet is always recommended with low fat, cholesterol, calories and sodium. Continue medications at current doses. Check EKG today. Lasix for LE edema    No nephrotoxic agents    Consider holter monitor if palpitations get more frequent. Patient was advised and encouraged to check blood pressure at home or at a pharmacy, maintain a logbook, and also call us back if blood pressure are above the target ranges or if it is low. Patient clearly understands and agrees to the instructions.       We will need to continue to monitor muscle and liver enzymes, BUN, CR, and electrolytes. Details of medical condition explained and patient was warned about adverse consequences of uncontrolled medical conditions and possible side effects of prescribed medications.

## 2022-05-23 DIAGNOSIS — E11.42 TYPE 2 DIABETES MELLITUS WITH DIABETIC POLYNEUROPATHY, WITHOUT LONG-TERM CURRENT USE OF INSULIN (HCC): ICD-10-CM

## 2022-05-24 NOTE — TELEPHONE ENCOUNTER
Future Appointments    Encounter Information    Provider Department Appt Notes   8/15/2022 Jesús Loredo MD [facility] AT [facility] 6 mo DM   5/16/2023 DO KYLE Burns [facility] [place] 1 YEAR       Past Visits    Date Provider Specialty Visit Type Primary Dx   05/12/2022 aDnika Silver DO Cardiology Office Visit Benign essential HTN   02/14/2022 Jesús Loredo MD Family Medicine Office Visit Type 2 diabetes mellitus without complication, without long-term current use of insulin ([facility] [address]

## 2022-05-27 ENCOUNTER — OFFICE VISIT (OUTPATIENT)
Dept: FAMILY MEDICINE CLINIC | Age: 57
End: 2022-05-27
Payer: COMMERCIAL

## 2022-05-27 VITALS
BODY MASS INDEX: 53.29 KG/M2 | OXYGEN SATURATION: 94 % | DIASTOLIC BLOOD PRESSURE: 60 MMHG | TEMPERATURE: 97.4 F | HEIGHT: 62 IN | HEART RATE: 59 BPM | WEIGHT: 289.6 LBS | SYSTOLIC BLOOD PRESSURE: 92 MMHG

## 2022-05-27 DIAGNOSIS — L23.7 POISON IVY DERMATITIS: Primary | ICD-10-CM

## 2022-05-27 PROCEDURE — 99214 OFFICE O/P EST MOD 30 MIN: CPT | Performed by: STUDENT IN AN ORGANIZED HEALTH CARE EDUCATION/TRAINING PROGRAM

## 2022-05-27 RX ORDER — METHYLPREDNISOLONE 4 MG/1
TABLET ORAL
Qty: 21 TABLET | Refills: 0 | Status: SHIPPED | OUTPATIENT
Start: 2022-05-27 | End: 2022-06-02

## 2022-05-27 SDOH — ECONOMIC STABILITY: FOOD INSECURITY: WITHIN THE PAST 12 MONTHS, YOU WORRIED THAT YOUR FOOD WOULD RUN OUT BEFORE YOU GOT MONEY TO BUY MORE.: NEVER TRUE

## 2022-05-27 SDOH — ECONOMIC STABILITY: TRANSPORTATION INSECURITY
IN THE PAST 12 MONTHS, HAS THE LACK OF TRANSPORTATION KEPT YOU FROM MEDICAL APPOINTMENTS OR FROM GETTING MEDICATIONS?: NO

## 2022-05-27 SDOH — ECONOMIC STABILITY: FOOD INSECURITY: WITHIN THE PAST 12 MONTHS, THE FOOD YOU BOUGHT JUST DIDN'T LAST AND YOU DIDN'T HAVE MONEY TO GET MORE.: NEVER TRUE

## 2022-05-27 ASSESSMENT — SOCIAL DETERMINANTS OF HEALTH (SDOH): HOW HARD IS IT FOR YOU TO PAY FOR THE VERY BASICS LIKE FOOD, HOUSING, MEDICAL CARE, AND HEATING?: NOT HARD AT ALL

## 2022-05-27 ASSESSMENT — ENCOUNTER SYMPTOMS
WHEEZING: 0
ABDOMINAL PAIN: 0
DIARRHEA: 0
COUGH: 0
VOMITING: 0
SHORTNESS OF BREATH: 0
SORE THROAT: 0
NAUSEA: 0
RHINORRHEA: 0

## 2022-05-27 NOTE — PROGRESS NOTES
Subjective  Milana Dominguez, 61 y.o. female presents today with:  Chief Complaint   Patient presents with    Rash     States she thinks she has shingles & poison ivy. States she thinks she has shingles under her left arm. States area is painful, red & burns. Has tried using creams & such to the area, but it makes it burn, so is no longer using anything. States she has poison ivy areas on legs, arms, back & buttocks. States areas have been itching. States she did use bleach to the areas, but has not seemed to of helped. HPI    Patient with acute appointment today for poison ivy. She states she was doing some yard work and onto a patch of poison ivy. She has a rash. She tried using bleach and that did not work. She is no longer using anything. The area is very itchy. She has had poison ivy before. She has no other concerns at this time. Review of Systems   Constitutional: Negative for chills, fatigue, fever and unexpected weight change. HENT: Negative for congestion, rhinorrhea and sore throat. Respiratory: Negative for cough, shortness of breath and wheezing. Cardiovascular: Negative for chest pain, palpitations and leg swelling. Gastrointestinal: Negative for abdominal pain, diarrhea, nausea and vomiting. Musculoskeletal: Negative for arthralgias and joint swelling. Skin: Positive for rash. Neurological: Negative for weakness, light-headedness, numbness and headaches. Psychiatric/Behavioral: Negative for confusion and suicidal ideas. The patient is not nervous/anxious.         Past Medical History:   Diagnosis Date    Anemia     Asthma     Benign essential HTN     meds > 5 yrs / denies TIA or stroke    Borderline personality disorder ([facility] [address]     2016 ?suggested by me-meets many criteria    Carpal tunnel syndrome of right wrist     Crohn's disease ([facility] [address]     Depression     Fibromyalgia 2/13/2018    Fibromyalgia     GERD (gastroesophageal reflux disease)     Gout     Headache migraines    Irritable bowel syndrome     Mixed hyperlipidemia 5/10/2017    Neuropathy     Obesity (BMI 35.0-39.9 without comorbidity) 3/30/2017    PONV (postoperative nausea and vomiting)     nausea    PTSD (post-traumatic stress disorder)     Tremor of unknown origin     Type 2 diabetes mellitus ([facility] [address]     meds > 5yrs     Ulcerative colitis ([facility] [address] 11/2017    Vaginitis      Past Surgical History:   Procedure Laterality Date    BREAST CYST EXCISION Right 1994    exc mass benign    CHOLECYSTECTOMY  2009    COLONOSCOPY  2015    negative    ENDOSCOPY, COLON, DIAGNOSTIC      HERNIA REPAIR  2015    ventral / mesh    LEG TENDON SURGERY Right 2007    leg. ankle and foot    OVARIAN CYST REMOVAL Left 1994    with mass and both fallopian tube    OVARY REMOVAL  12/2015    [facility] / mass left ovary & bilateral  tubes    PARTIAL HYSTERECTOMY      AR COLON CA SCRN NOT HI RSK IND N/A 11/7/2017    COLONOSCOPY performed by MANOJ Hutchins on bx    AR COLONOSCOPY W/BIOPSY SINGLE/MULTIPLE N/A 3/15/2018    COLONOSCOPY performed by Erik Moody MD at [facility] Right 8/3/2017    RIGHT KNEE ARTHROSCOPIC PARTIAL MEDIAL MENISCECTOMY KNEE performed by Kieran Ng MD at [facility] N/CARPAL TUNNEL SURG Right 11/30/2017    RIGHT WRIST  CARPAL TUNNEL RELEASE performed by Kieran Ng MD at [facility] N/CARPAL TUNNEL SURG Left 5/10/2018    LEFT WRIST CARPAL TUNNEL RELEASE performed by Kieran Ng MD at [address] [facility]     Current Outpatient Medications   Medication Sig Dispense Refill    methylPREDNISolone (MEDROL DOSEPACK) 4 MG tablet Take by mouth.  21 tablet 0    metFORMIN (GLUCOPHAGE) 500 MG tablet TAKE ONE TABLET BY MOUTH TWO TIMES A DAY WITH MEALS 180 tablet 0    furosemide (LASIX) 40 MG tablet TAKE ONE TABLET BY MOUTH TWO TIMES A DAY 60 tablet 5    potassium chloride (KLOR-CON M) 20 MEQ extended release tablet TAKE ONE TABLET BY MOUTH TWO TIMES A  tablet 1    mometasone (ELOCON) 0.1 % cream apply cream topically daily 45 g 0    meloxicam (MOBIC) 7.5 MG tablet Take 1 tablet by mouth 2 times daily 60 tablet 5    allopurinol (ZYLOPRIM) 100 MG tablet TAKE ONE TABLET BY MOUTH EVERY DAY 90 tablet 1    metoprolol succinate (TOPROL XL) 50 MG extended release tablet TAKE ONE TABLET BY MOUTH TWO TIMES A DAY. HOLD FOR SYSTOLIC BLOOD PRESSURRE LOWER THAN 100 OR HEART RATE LOWER THAN 60. 180 tablet 3    ondansetron (ZOFRAN) 4 MG tablet Take 1 tablet by mouth daily as needed for Nausea or Vomiting 30 tablet 1    dicyclomine (BENTYL) 20 MG tablet Take 1 tablet by mouth 4 times daily 12 tablet 0    omeprazole (PRILOSEC) 40 MG delayed release capsule TAKE ONE CAPSULE BY MOUTH TWO TIMES A  capsule 1    NURTEC 75 MG TBDP DISSOLVE 1 TABLET IN MOUTH ONCE A DAY AS NEEDED FOR PAIN      hydrOXYzine (VISTARIL) 25 MG capsule TAKE ONE CAPSULE BY MOUTH EVERY DAY      baclofen (LIORESAL) 20 MG tablet Take 1 tablet by mouth 4 times daily 120 tablet 2    pregabalin (LYRICA) 150 MG capsule Take 1 capsule by mouth 3 times daily for 90 days.  90 capsule 2    montelukast (SINGULAIR) 10 MG tablet TAKE ONE TABLET BY MOUTH NIGHTLY 30 tablet 5    pravastatin (PRAVACHOL) 40 MG tablet Take 1 tablet by mouth every evening 90 tablet 3    pioglitazone (ACTOS) 15 MG tablet TAKE ONE TABLET BY MOUTH EVERY DAY 90 tablet 3    butalbital-APAP-caffeine (FIORICET) -40 MG CAPS per capsule Take 1 capsule by mouth every 6 hours as needed for Headaches 40 capsule 0    ipratropium-albuterol (DUONEB) 0.5-2.5 (3) MG/3ML SOLN nebulizer solution INHALE 1 VIAL VIA NEBULIZER EVERY 4 HOURS 360 mL 5    traZODone (DESYREL) 100 MG tablet TAKE 1 TO 2 TABLETS BY MOUTH AT BEDTIME AS NEEDED FOR SLEEP      QUEtiapine (SEROQUEL) 100 MG tablet TAKE ONE TABLET BYMOUTH EVERY EVENING AS NEEDED AT 8 PM      albuterol sulfate HFA (PROVENTIL HFA) 108 (90 Base) MCG/ACT inhaler Inhale 2 puffs into the lungs every 6 hours as needed for Wheezing 1 Inhaler 3    Lancets MISC Test bid 100 each 3    hyoscyamine (ANASPAZ;LEVSIN) 125 MCG tablet TAKE ONE TABLET BY MOUTH EVERY 4 HOURS AS NEEDED for cramping 164 tablet 0    magnesium oxide (MAG-OX) 400 (241.3 Mg) MG TABS tablet Take 1 tablet by mouth 2 times daily 60 tablet 2    Blood Glucose Monitoring Suppl (ONE TOUCH ULTRA 2) w/Device KIT TEST TWICE DAILY  0    prazosin (MINIPRESS) 2 MG capsule TAKE 1 CAPSULE BY MOUTH EVERY NIGHT FOR NIGHTMARES  3    blood glucose monitor strips Test 2 times a day & as needed for symptoms of irregular blood glucose. 100 strip 3    nystatin-triamcinolone (MYCOLOG II) 211037-1.1 UNIT/GM-% cream APPLY TOPICALLY TWICE DAILY 45 g 2    Melatonin 5 MG CAPS Take 10 mg by mouth daily   2    FREESTYLE LITE strip use three times daily  50 each 5    DULoxetine (CYMBALTA) 60 MG extended release capsule TAKE ONE CAPSULE BY MOUTH TWICE DAILY AS DIRECTED IN THE MORNING AND AFTERNOON  1    mometasone-formoterol (DULERA) 200-5 MCG/ACT inhaler Inhale 2 puffs into the lungs every 12 hours (Patient not taking: Reported on 5/27/2022) 1 Inhaler 3     No current facility-administered medications for this visit. PMH, Surgical Hx, Family Hx, and Social Hx reviewed and updated. Health Maintenance reviewed. Objective    Vitals:    05/27/22 1041   BP: 92/60   Pulse: 59   Temp: 97.4 °F (36.3 °C)   SpO2: 94%   Weight: 289 lb 9.6 oz (131.4 kg)   Height: 5' 2\" (1.575 m)       Physical Exam  Vitals reviewed. Constitutional:       General: She is not in acute distress. HENT:      Head: Normocephalic and atraumatic. Eyes:      Conjunctiva/sclera: Conjunctivae normal.   Neck:      Thyroid: No thyromegaly or thyroid tenderness. Cardiovascular:      Rate and Rhythm: Normal rate and regular rhythm. Heart sounds: No murmur heard. No friction rub. No gallop.     Pulmonary: Effort: Pulmonary effort is normal.      Breath sounds: Normal breath sounds. No wheezing, rhonchi or rales. Abdominal:      Tenderness: There is no abdominal tenderness. Musculoskeletal:         General: No swelling or deformity. Cervical back: Normal range of motion and neck supple. Right lower leg: No edema. Left lower leg: No edema. Comments:  rash consistent with likely poison ivy on lower extremities and upper extremities. No signs of bacterial infection. Lymphadenopathy:      Cervical: No cervical adenopathy. Skin:     General: Skin is warm and dry. Findings: No rash. Neurological:      General: No focal deficit present. Mental Status: She is alert. Gait: Gait is intact. Psychiatric:         Mood and Affect: Mood normal.         Behavior: Behavior normal.        Assessment & Plan     1. Poison ivy dermatitis  Patient with poison ivy dermatitis. Will treat with Medrol Dosepak. Instructed that if symptoms are not improving she should return to care sooner. Supportive care recommended. All questions answered. Follow-up as scheduled with PCP. - methylPREDNISolone (MEDROL DOSEPACK) 4 MG tablet; Take by mouth. Dispense: 21 tablet; Refill: 0    No orders of the defined types were placed in this encounter. Orders Placed This Encounter   Medications    methylPREDNISolone (MEDROL DOSEPACK) 4 MG tablet     Sig: Take by mouth. Dispense:  21 tablet     Refill:  0     Medications Discontinued During This Encounter   Medication Reason    azelastine (ASTELIN) 0.1 % nasal spray Therapy completed    ondansetron (ZOFRAN-ODT) 4 MG disintegrating tablet Therapy completed       Reviewed with the patient: current clinical status,medications, activities and diet.      Side effects, adverse effects of themedication prescribed today, as well as treatment plan/ rationale and result expectations have been discussed with the patient who expresses understanding and desires to

## 2022-06-15 DIAGNOSIS — J45.20 MILD INTERMITTENT ASTHMA WITHOUT COMPLICATION: ICD-10-CM

## 2022-06-15 DIAGNOSIS — M79.7 FIBROMYALGIA: ICD-10-CM

## 2022-06-15 DIAGNOSIS — G89.29 CHRONIC BACK PAIN GREATER THAN 3 MONTHS DURATION: ICD-10-CM

## 2022-06-15 DIAGNOSIS — M54.9 CHRONIC BACK PAIN GREATER THAN 3 MONTHS DURATION: ICD-10-CM

## 2022-06-15 DIAGNOSIS — M47.9 SPONDYLOSIS: ICD-10-CM

## 2022-06-15 RX ORDER — MONTELUKAST SODIUM 10 MG/1
TABLET ORAL
Qty: 30 TABLET | Refills: 0 | Status: SHIPPED | OUTPATIENT
Start: 2022-06-15 | End: 2022-07-05

## 2022-06-15 RX ORDER — BACLOFEN 20 MG/1
TABLET ORAL
Qty: 120 TABLET | Refills: 0 | Status: SHIPPED | OUTPATIENT
Start: 2022-06-15 | End: 2022-07-29

## 2022-06-21 ENCOUNTER — COMMUNITY OUTREACH (OUTPATIENT)
Dept: FAMILY MEDICINE CLINIC | Age: 57
End: 2022-06-21

## 2022-06-23 ENCOUNTER — COMMUNITY OUTREACH (OUTPATIENT)
Dept: FAMILY MEDICINE CLINIC | Age: 57
End: 2022-06-23

## 2022-06-23 NOTE — PROGRESS NOTES
Patient's HM shows they are overdue for [place] [id number] and  files searched without success. No results to attach to order nor HM updated.

## 2022-07-01 DIAGNOSIS — J45.20 MILD INTERMITTENT ASTHMA WITHOUT COMPLICATION: ICD-10-CM

## 2022-07-05 RX ORDER — MOMETASONE FUROATE 1 MG/G
CREAM TOPICAL
Qty: 45 G | Refills: 0 | Status: SHIPPED | OUTPATIENT
Start: 2022-07-05 | End: 2022-07-29

## 2022-07-05 RX ORDER — MONTELUKAST SODIUM 10 MG/1
TABLET ORAL
Qty: 30 TABLET | Refills: 0 | Status: SHIPPED | OUTPATIENT
Start: 2022-07-05 | End: 2022-07-29

## 2022-07-06 DIAGNOSIS — G89.29 CHRONIC BACK PAIN GREATER THAN 3 MONTHS DURATION: ICD-10-CM

## 2022-07-06 DIAGNOSIS — M54.9 CHRONIC BACK PAIN GREATER THAN 3 MONTHS DURATION: ICD-10-CM

## 2022-07-06 DIAGNOSIS — M47.9 SPONDYLOSIS: ICD-10-CM

## 2022-07-06 DIAGNOSIS — M79.7 FIBROMYALGIA: ICD-10-CM

## 2022-07-06 RX ORDER — PREGABALIN 150 MG/1
CAPSULE ORAL
Qty: 90 CAPSULE | Refills: 2 | Status: SHIPPED | OUTPATIENT
Start: 2022-07-06 | End: 2022-10-21

## 2022-07-15 RX ORDER — PIOGLITAZONEHYDROCHLORIDE 15 MG/1
TABLET ORAL
Qty: 90 TABLET | Refills: 0 | Status: SHIPPED | OUTPATIENT
Start: 2022-07-15

## 2022-07-28 DIAGNOSIS — M54.9 CHRONIC BACK PAIN GREATER THAN 3 MONTHS DURATION: ICD-10-CM

## 2022-07-28 DIAGNOSIS — G89.29 CHRONIC BACK PAIN GREATER THAN 3 MONTHS DURATION: ICD-10-CM

## 2022-07-28 DIAGNOSIS — J45.20 MILD INTERMITTENT ASTHMA WITHOUT COMPLICATION: ICD-10-CM

## 2022-07-28 DIAGNOSIS — M47.9 SPONDYLOSIS: ICD-10-CM

## 2022-07-28 DIAGNOSIS — M79.7 FIBROMYALGIA: ICD-10-CM

## 2022-07-29 RX ORDER — MOMETASONE FUROATE 1 MG/G
CREAM TOPICAL
Qty: 45 G | Refills: 0 | Status: SHIPPED | OUTPATIENT
Start: 2022-07-29 | End: 2022-08-29

## 2022-07-29 RX ORDER — BACLOFEN 20 MG/1
TABLET ORAL
Qty: 120 TABLET | Refills: 0 | Status: SHIPPED | OUTPATIENT
Start: 2022-07-29

## 2022-07-29 RX ORDER — MONTELUKAST SODIUM 10 MG/1
TABLET ORAL
Qty: 30 TABLET | Refills: 0 | Status: SHIPPED | OUTPATIENT
Start: 2022-07-29 | End: 2022-08-29

## 2022-08-05 ENCOUNTER — COMMUNITY OUTREACH (OUTPATIENT)
Dept: FAMILY MEDICINE CLINIC | Age: 57
End: 2022-08-05

## 2022-08-15 ENCOUNTER — OFFICE VISIT (OUTPATIENT)
Dept: FAMILY MEDICINE CLINIC | Age: 57
End: 2022-08-15
Payer: COMMERCIAL

## 2022-08-15 VITALS
SYSTOLIC BLOOD PRESSURE: 134 MMHG | WEIGHT: 283 LBS | HEART RATE: 88 BPM | DIASTOLIC BLOOD PRESSURE: 78 MMHG | HEIGHT: 62 IN | BODY MASS INDEX: 52.08 KG/M2

## 2022-08-15 DIAGNOSIS — E11.42 POLYNEUROPATHY DUE TO TYPE 2 DIABETES MELLITUS (HCC): ICD-10-CM

## 2022-08-15 DIAGNOSIS — E11.42 POLYNEUROPATHY DUE TO TYPE 2 DIABETES MELLITUS (HCC): Primary | ICD-10-CM

## 2022-08-15 DIAGNOSIS — M79.7 FIBROMYALGIA: ICD-10-CM

## 2022-08-15 DIAGNOSIS — Z12.31 SCREENING MAMMOGRAM FOR BREAST CANCER: ICD-10-CM

## 2022-08-15 LAB — HBA1C MFR BLD: 6.4 %

## 2022-08-15 PROCEDURE — 3044F HG A1C LEVEL LT 7.0%: CPT | Performed by: FAMILY MEDICINE

## 2022-08-15 PROCEDURE — 99213 OFFICE O/P EST LOW 20 MIN: CPT | Performed by: FAMILY MEDICINE

## 2022-08-15 PROCEDURE — 83036 HEMOGLOBIN GLYCOSYLATED A1C: CPT | Performed by: FAMILY MEDICINE

## 2022-08-15 ASSESSMENT — ENCOUNTER SYMPTOMS
RHINORRHEA: 0
RESPIRATORY NEGATIVE: 1
GASTROINTESTINAL NEGATIVE: 1
EYES NEGATIVE: 1
CHEST TIGHTNESS: 0
COUGH: 0

## 2022-08-15 NOTE — PROGRESS NOTES
Patient is seen in follow up for   Chief Complaint   Patient presents with    6 Month Follow-Up    Diabetes    Hypertension    Chronic Pain    Knee Pain     Left      Wrist Injury     Left wrist pt injured it 3/30/22 and it still hurting      Diabetes  She presents for her follow-up diabetic visit. She has type 2 diabetes mellitus. The initial diagnosis of diabetes was made 20 years ago. Her disease course has been stable. There are no hypoglycemic associated symptoms. Pertinent negatives for hypoglycemia include no dizziness. There are no diabetic associated symptoms. Pertinent negatives for diabetes include no fatigue. There are no hypoglycemic complications. Symptoms are stable. There are no diabetic complications. Risk factors for coronary artery disease include diabetes mellitus and hypertension. She is compliant with treatment most of the time. There is no change in her home blood glucose trend. Hypertension  This is a chronic problem. The current episode started more than 1 year ago. The problem is unchanged. The problem is controlled. There are no associated agents to hypertension. Risk factors for coronary artery disease include diabetes mellitus. Past treatments include beta blockers. The current treatment provides moderate improvement. There are no compliance problems. Knee Pain   Pertinent negatives include no numbness. Wrist Injury   The incident occurred more than 1 week ago. The incident occurred at home. The injury mechanism was a fall. The pain is present in the left wrist. The quality of the pain is described as aching. The pain is moderate. The pain has been Constant since the incident. Pertinent negatives include no numbness. The symptoms are aggravated by lifting and movement. She has tried immobilization and ice for the symptoms.      Past Medical History:   Diagnosis Date    Anemia     Asthma     Benign essential HTN     meds > 5 yrs / denies TIA or stroke    Borderline personality disorder ([address]     2016 ?suggested by me-meets many criteria    Carpal tunnel syndrome of right wrist     Crohn's disease ([address]     Depression     Fibromyalgia 2/13/2018    Fibromyalgia     GERD (gastroesophageal reflux disease)     Gout     Headache     migraines    Irritable bowel syndrome     Mixed hyperlipidemia 5/10/2017    Neuropathy     Obesity (BMI 35.0-39.9 without comorbidity) 3/30/2017    PONV (postoperative nausea and vomiting)     nausea    PTSD (post-traumatic stress disorder)     Tremor of unknown origin     Type 2 diabetes mellitus ([address]     meds > 5yrs     Ulcerative colitis ([address] 11/2017    Vaginitis      Patient Active Problem List    Diagnosis Date Noted    Right upper quadrant abdominal pain 02/09/2022    Diarrhea 02/09/2022    Fever 02/09/2022    Acute bacterial sinusitis 12/06/2021    Bronchitis 12/06/2021    Gastroenteritis 12/21/2020    Injury of left foot 12/21/2020    Leukocytosis 12/21/2020    Nausea and vomiting 12/21/2020    Shoulder pain 12/21/2020    Morbid obesity ([address] 09/29/2020    Headache 12/30/2019    Tremor 12/30/2019    Meralgia paresthetica of left side 12/30/2019    Intractable migraine without aura and without status migrainosus 12/30/2019    Iron deficiency anemia 11/13/2018    Anemia 06/06/2018    Carpal tunnel syndrome of left wrist 05/03/2018    Intermittent tremor 05/03/2018    Posttraumatic stress disorder 02/13/2018    Fibromyalgia 02/13/2018    Carpal tunnel syndrome of right wrist 11/27/2017    History of Crohn's disease     Bilateral edema of lower extremity 07/31/2017    Varicose veins of lower extremity 07/31/2017    Edema of lower extremity 07/31/2017    Tear of meniscus of knee 07/27/2017    Mixed hyperlipidemia 05/10/2017    Pain in right knee 03/30/2017    Hip pain, right 03/30/2017    Obesity with body mass index 30 or greater 03/30/2017    Muscle pain 03/30/2017    Pain of right hip joint 03/30/2017    Severe episode of recurrent major depressive disorder, without psychotic features ([facility] 10/25/2016    Borderline personality disorder ([facility] 10/25/2016    Polyneuropathy due to type 2 diabetes mellitus ([facility] 02/05/2016    Benign essential hypertension     Asthma     Crohn's disease ([facility]     Gastroesophageal reflux disease      Past Surgical History:   Procedure Laterality Date    BREAST CYST EXCISION Right 1994    exc mass benign    CHOLECYSTECTOMY  2009    COLONOSCOPY  2015    negative    ENDOSCOPY, COLON, DIAGNOSTIC      HERNIA REPAIR  2015    ventral / mesh    LEG TENDON SURGERY Right 2007    leg.  ankle and foot    OVARIAN CYST REMOVAL Left 1994    with mass and both fallopian tube    OVARY REMOVAL  12/2015    [facility] / mass left ovary & bilateral  tubes    PARTIAL HYSTERECTOMY (CERVIX NOT REMOVED)      SC COLON CA SCRN NOT HI RSK IND N/A 11/7/2017    COLONOSCOPY performed by MANOJ Card on bx    SC COLONOSCOPY W/BIOPSY SINGLE/MULTIPLE N/A 3/15/2018    COLONOSCOPY performed by Naya Antunez MD at [address] [place] [address] Right 8/3/2017    RIGHT KNEE ARTHROSCOPIC PARTIAL MEDIAL MENISCECTOMY KNEE performed by Daily Saleh MD at [address] N/CARPAL TUNNEL SURG Right 11/30/2017    RIGHT WRIST  CARPAL TUNNEL RELEASE performed by Daily Saleh MD at [address] [address] N/CARPAL TUNNEL SURG Left 5/10/2018    LEFT WRIST CARPAL TUNNEL RELEASE performed by Daily Saleh MD at [facility]     Family History   Problem Relation Age of Onset    Emphysema Mother     Cancer Maternal Grandfather     Diabetes Paternal Grandmother     Emphysema Paternal Grandfather     Heart Disease Sister     Stroke Sister     Diabetes Sister      Social History     Socioeconomic History    Marital status: Single     Spouse name: None    Number of children: None    Years of education: None    Highest education level: None   Tobacco Use    Smoking status: Never    Smokeless tobacco: Never   Vaping Use Vaping Use: Never used   Substance and Sexual Activity    Alcohol use: No     Alcohol/week: 0.0 standard drinks    Drug use: No    Sexual activity: Not Currently     Social Determinants of Health     Financial Resource Strain: Low Risk     Difficulty of Paying Living Expenses: Not hard at all   Food Insecurity: No Food Insecurity    Worried About Running Out of Food in the Last Year: Never true    Ran Out of Food in the Last Year: Never true   Transportation Needs: No Transportation Needs    Lack of Transportation (Medical): No    Lack of Transportation (Non-Medical): No     Current Outpatient Medications   Medication Sig Dispense Refill    mometasone (ELOCON) 0.1 % cream APPLY TOPICALLY DAILY 45 g 0    montelukast (SINGULAIR) 10 MG tablet TAKE 1 TABLET BY MOUTH NIGHTLY 30 tablet 0    baclofen (LIORESAL) 20 MG tablet TAKE 1 TABLET BY MOUTH 4 TIMES DAILY 120 tablet 0    pioglitazone (ACTOS) 15 MG tablet TAKE ONE TABLET BY MOUTH EVERY DAY 90 tablet 0    pregabalin (LYRICA) 150 MG capsule TAKE ONE CAPSULE BY MOUTH THREE TIMES A DAY 90 capsule 2    metFORMIN (GLUCOPHAGE) 500 MG tablet TAKE ONE TABLET BY MOUTH TWO TIMES A DAY WITH MEALS 180 tablet 0    furosemide (LASIX) 40 MG tablet TAKE ONE TABLET BY MOUTH TWO TIMES A DAY 60 tablet 5    potassium chloride (KLOR-CON M) 20 MEQ extended release tablet TAKE ONE TABLET BY MOUTH TWO TIMES A  tablet 1    meloxicam (MOBIC) 7.5 MG tablet Take 1 tablet by mouth 2 times daily 60 tablet 5    allopurinol (ZYLOPRIM) 100 MG tablet TAKE ONE TABLET BY MOUTH EVERY DAY 90 tablet 1    metoprolol succinate (TOPROL XL) 50 MG extended release tablet TAKE ONE TABLET BY MOUTH TWO TIMES A DAY.   HOLD FOR SYSTOLIC BLOOD PRESSURRE LOWER THAN 100 OR HEART RATE LOWER THAN 60. 180 tablet 3    ondansetron (ZOFRAN) 4 MG tablet Take 1 tablet by mouth daily as needed for Nausea or Vomiting 30 tablet 1    dicyclomine (BENTYL) 20 MG tablet Take 1 tablet by mouth 4 times daily 12 tablet 0 omeprazole (PRILOSEC) 40 MG delayed release capsule TAKE ONE CAPSULE BY MOUTH TWO TIMES A  capsule 1    NURTEC 75 MG TBDP DISSOLVE 1 TABLET IN MOUTH ONCE A DAY AS NEEDED FOR PAIN      hydrOXYzine (VISTARIL) 25 MG capsule TAKE ONE CAPSULE BY MOUTH EVERY DAY      pravastatin (PRAVACHOL) 40 MG tablet Take 1 tablet by mouth every evening 90 tablet 3    butalbital-APAP-caffeine (FIORICET) -40 MG CAPS per capsule Take 1 capsule by mouth every 6 hours as needed for Headaches 40 capsule 0    ipratropium-albuterol (DUONEB) 0.5-2.5 (3) MG/3ML SOLN nebulizer solution INHALE 1 VIAL VIA NEBULIZER EVERY 4 HOURS 360 mL 5    traZODone (DESYREL) 100 MG tablet TAKE 1 TO 2 TABLETS BY MOUTH AT BEDTIME AS NEEDED FOR SLEEP      QUEtiapine (SEROQUEL) 100 MG tablet TAKE ONE TABLET BYMOUTH EVERY EVENING AS NEEDED AT 8 PM      albuterol sulfate HFA (PROVENTIL HFA) 108 (90 Base) MCG/ACT inhaler Inhale 2 puffs into the lungs every 6 hours as needed for Wheezing 1 Inhaler 3    Lancets MISC Test bid 100 each 3    hyoscyamine (ANASPAZ;LEVSIN) 125 MCG tablet TAKE ONE TABLET BY MOUTH EVERY 4 HOURS AS NEEDED for cramping 164 tablet 0    magnesium oxide (MAG-OX) 400 (241.3 Mg) MG TABS tablet Take 1 tablet by mouth 2 times daily 60 tablet 2    Blood Glucose Monitoring Suppl (ONE TOUCH ULTRA 2) w/Device KIT TEST TWICE DAILY  0    prazosin (MINIPRESS) 2 MG capsule TAKE 1 CAPSULE BY MOUTH EVERY NIGHT FOR NIGHTMARES  3    blood glucose monitor strips Test 2 times a day & as needed for symptoms of irregular blood glucose. 100 strip 3    nystatin-triamcinolone (MYCOLOG II) 232908-4.1 UNIT/GM-% cream APPLY TOPICALLY TWICE DAILY 45 g 2    Melatonin 5 MG CAPS Take 10 mg by mouth daily   2    FREESTYLE LITE strip use three times daily  50 each 5    DULoxetine (CYMBALTA) 60 MG extended release capsule TAKE ONE CAPSULE BY MOUTH TWICE DAILY AS DIRECTED IN THE MORNING AND AFTERNOON  1     No current facility-administered medications for this visit. Current Outpatient Medications on File Prior to Visit   Medication Sig Dispense Refill    mometasone (ELOCON) 0.1 % cream APPLY TOPICALLY DAILY 45 g 0    montelukast (SINGULAIR) 10 MG tablet TAKE 1 TABLET BY MOUTH NIGHTLY 30 tablet 0    baclofen (LIORESAL) 20 MG tablet TAKE 1 TABLET BY MOUTH 4 TIMES DAILY 120 tablet 0    pioglitazone (ACTOS) 15 MG tablet TAKE ONE TABLET BY MOUTH EVERY DAY 90 tablet 0    pregabalin (LYRICA) 150 MG capsule TAKE ONE CAPSULE BY MOUTH THREE TIMES A DAY 90 capsule 2    metFORMIN (GLUCOPHAGE) 500 MG tablet TAKE ONE TABLET BY MOUTH TWO TIMES A DAY WITH MEALS 180 tablet 0    furosemide (LASIX) 40 MG tablet TAKE ONE TABLET BY MOUTH TWO TIMES A DAY 60 tablet 5    potassium chloride (KLOR-CON M) 20 MEQ extended release tablet TAKE ONE TABLET BY MOUTH TWO TIMES A  tablet 1    meloxicam (MOBIC) 7.5 MG tablet Take 1 tablet by mouth 2 times daily 60 tablet 5    allopurinol (ZYLOPRIM) 100 MG tablet TAKE ONE TABLET BY MOUTH EVERY DAY 90 tablet 1    metoprolol succinate (TOPROL XL) 50 MG extended release tablet TAKE ONE TABLET BY MOUTH TWO TIMES A DAY.   HOLD FOR SYSTOLIC BLOOD PRESSURRE LOWER THAN 100 OR HEART RATE LOWER THAN 60. 180 tablet 3    ondansetron (ZOFRAN) 4 MG tablet Take 1 tablet by mouth daily as needed for Nausea or Vomiting 30 tablet 1    dicyclomine (BENTYL) 20 MG tablet Take 1 tablet by mouth 4 times daily 12 tablet 0    omeprazole (PRILOSEC) 40 MG delayed release capsule TAKE ONE CAPSULE BY MOUTH TWO TIMES A  capsule 1    NURTEC 75 MG TBDP DISSOLVE 1 TABLET IN MOUTH ONCE A DAY AS NEEDED FOR PAIN      hydrOXYzine (VISTARIL) 25 MG capsule TAKE ONE CAPSULE BY MOUTH EVERY DAY      pravastatin (PRAVACHOL) 40 MG tablet Take 1 tablet by mouth every evening 90 tablet 3    butalbital-APAP-caffeine (FIORICET) -40 MG CAPS per capsule Take 1 capsule by mouth every 6 hours as needed for Headaches 40 capsule 0    ipratropium-albuterol (DUONEB) 0.5-2.5 (3) MG/3ML SOLN nebulizer solution INHALE 1 VIAL VIA NEBULIZER EVERY 4 HOURS 360 mL 5    traZODone (DESYREL) 100 MG tablet TAKE 1 TO 2 TABLETS BY MOUTH AT BEDTIME AS NEEDED FOR SLEEP      QUEtiapine (SEROQUEL) 100 MG tablet TAKE ONE TABLET BYMOUTH EVERY EVENING AS NEEDED AT 8 PM      albuterol sulfate HFA (PROVENTIL HFA) 108 (90 Base) MCG/ACT inhaler Inhale 2 puffs into the lungs every 6 hours as needed for Wheezing 1 Inhaler 3    Lancets MISC Test bid 100 each 3    hyoscyamine (ANASPAZ;LEVSIN) 125 MCG tablet TAKE ONE TABLET BY MOUTH EVERY 4 HOURS AS NEEDED for cramping 164 tablet 0    magnesium oxide (MAG-OX) 400 (241.3 Mg) MG TABS tablet Take 1 tablet by mouth 2 times daily 60 tablet 2    Blood Glucose Monitoring Suppl (ONE TOUCH ULTRA 2) w/Device KIT TEST TWICE DAILY  0    prazosin (MINIPRESS) 2 MG capsule TAKE 1 CAPSULE BY MOUTH EVERY NIGHT FOR NIGHTMARES  3    blood glucose monitor strips Test 2 times a day & as needed for symptoms of irregular blood glucose. 100 strip 3    nystatin-triamcinolone (MYCOLOG II) 877338-2.1 UNIT/GM-% cream APPLY TOPICALLY TWICE DAILY 45 g 2    Melatonin 5 MG CAPS Take 10 mg by mouth daily   2    FREESTYLE LITE strip use three times daily  50 each 5    DULoxetine (CYMBALTA) 60 MG extended release capsule TAKE ONE CAPSULE BY MOUTH TWICE DAILY AS DIRECTED IN THE MORNING AND AFTERNOON  1     No current facility-administered medications on file prior to visit.      Allergies   Allergen Reactions    Latex Hives    Penicillins Swelling and Rash    Shellfish-Derived Products Anaphylaxis    Abilify [Aripiprazole]      shaking    Adhesive Tape Swelling     If left on too long    Topamax [Topiramate]     Buspar [Buspirone]      shaking    Other Nausea And Vomiting    Sulfa Antibiotics Nausea And Vomiting     Health Maintenance   Topic Date Due    Hepatitis B vaccine (1 of 3 - Risk 3-dose series) Never done    DTaP/Tdap/Td vaccine (1 - Tdap) Never done    Cervical cancer screen  Never done    Shingles vaccine (1 of 2) Never done    Diabetic retinal exam  02/01/2017    Breast cancer screen  11/10/2019    Diabetic foot exam  04/05/2020    Pneumococcal 0-64 years Vaccine (2 - PPSV23 or PCV20) 04/05/2020    Annual Wellness Visit (AWV)  11/13/2020    COVID-19 Vaccine (4 - Booster for Moderna series) 03/11/2022    Diabetic microalbuminuria test  04/19/2022    Flu vaccine (1) 09/01/2022    Lipids  10/04/2022    Depression Monitoring  02/09/2023    A1C test (Diabetic or Prediabetic)  08/15/2023    Colorectal Cancer Screen  03/15/2028    Hepatitis C screen  Completed    HIV screen  Completed    Hepatitis A vaccine  Aged Out    Hib vaccine  Aged Out    Meningococcal (ACWY) vaccine  Aged Out       Review of Systems     Review of Systems   Constitutional:  Negative for activity change, appetite change, fatigue and fever. HENT:  Negative for congestion and rhinorrhea. Eyes: Negative. Respiratory: Negative. Negative for cough and chest tightness. Cardiovascular: Negative. Gastrointestinal: Negative. Endocrine: Negative. Genitourinary: Negative. Musculoskeletal: Negative. Skin: Negative. Neurological:  Negative for dizziness, light-headedness and numbness. Hematological: Negative. Psychiatric/Behavioral: Negative. Physical Exam  Vitals:    08/15/22 1358   BP: 134/78   Pulse: 88   Weight: 283 lb (128.4 kg)   Height: 5' 2\" (1.575 m)       Physical Exam  Constitutional:       Appearance: She is well-developed. HENT:      Right Ear: External ear normal.      Left Ear: External ear normal.   Eyes:      Pupils: Pupils are equal, round, and reactive to light. Neck:      Thyroid: No thyromegaly. Cardiovascular:      Rate and Rhythm: Normal rate and regular rhythm. Heart sounds: Normal heart sounds. No murmur heard. No friction rub. No gallop. Pulmonary:      Effort: Pulmonary effort is normal. No respiratory distress. Breath sounds: No wheezing or rales.    Chest:      Chest wall: No tenderness. Abdominal:      General: Bowel sounds are normal. There is no distension. Palpations: Abdomen is soft. There is no mass. Tenderness: There is no abdominal tenderness. There is no guarding or rebound. Musculoskeletal:         General: Normal range of motion. Cervical back: Normal range of motion and neck supple. Lymphadenopathy:      Cervical: No cervical adenopathy. Skin:     General: Skin is warm and dry. Neurological:      Mental Status: She is alert and oriented to person, place, and time. Cranial Nerves: No cranial nerve deficit. Coordination: Coordination normal.       Assessment   Diagnosis Orders   1. Polyneuropathy due to type 2 diabetes mellitus (HCC)  Microalbumin / Creatinine Urine Ratio    POCT glycosylated hemoglobin (Hb A1C)      2. Screening mammogram for breast cancer  ALEX DIGITAL SCREEN W OR WO CAD BILATERAL        Problem List       Polyneuropathy due to type 2 diabetes mellitus (HCC) - Primary    Relevant Medications    DULoxetine (CYMBALTA) 60 MG extended release capsule    FREESTYLE LITE strip    blood glucose monitor strips    Lancets MISC    QUEtiapine (SEROQUEL) 100 MG tablet    traZODone (DESYREL) 100 MG tablet    hydrOXYzine (VISTARIL) 25 MG capsule    metFORMIN (GLUCOPHAGE) 500 MG tablet    baclofen (LIORESAL) 20 MG tablet    Other Relevant Orders    Microalbumin / Creatinine Urine Ratio    POCT glycosylated hemoglobin (Hb A1C) (Completed)       Plan  Orders Placed This Encounter   Procedures    ALEX DIGITAL SCREEN W OR WO CAD BILATERAL     Standing Status:   Future     Standing Expiration Date:   8/15/2023     Order Specific Question:   Reason for exam:     Answer:   v76.12    Microalbumin / Creatinine Urine Ratio     Standing Status:   Future     Standing Expiration Date:   8/11/2023    POCT glycosylated hemoglobin (Hb A1C)     No orders of the defined types were placed in this encounter. No follow-ups on file.   [address] [place] [address], MD

## 2022-08-16 LAB
CREATININE URINE: 289.5 MG/DL
MICROALBUMIN UR-MCNC: <1.2 MG/DL
MICROALBUMIN/CREAT UR-RTO: NORMAL MG/G (ref 0–30)

## 2022-08-27 DIAGNOSIS — K50.919 CROHN'S DISEASE WITH COMPLICATION, UNSPECIFIED GASTROINTESTINAL TRACT LOCATION (HCC): ICD-10-CM

## 2022-08-27 DIAGNOSIS — J45.20 MILD INTERMITTENT ASTHMA WITHOUT COMPLICATION: ICD-10-CM

## 2022-08-27 DIAGNOSIS — E11.42 TYPE 2 DIABETES MELLITUS WITH DIABETIC POLYNEUROPATHY, WITHOUT LONG-TERM CURRENT USE OF INSULIN (HCC): ICD-10-CM

## 2022-08-27 NOTE — TELEPHONE ENCOUNTER
requesting medication refill.  Please approve or deny this request.    Rx requested:  Requested Prescriptions     Pending Prescriptions Disp Refills    allopurinol (ZYLOPRIM) 100 MG tablet [Pharmacy Med Name: Allopurinol Oral Tablet 100 MG] 90 tablet 0     Sig: TAKE ONE TABLET BY MOUTH EVERY DAY    omeprazole (PRILOSEC) 40 MG delayed release capsule [Pharmacy Med Name: Omeprazole Oral Capsule Delayed Release 40 MG] 180 capsule 0     Sig: take 1 capsule by mouth two times a day    metFORMIN (GLUCOPHAGE) 500 MG tablet [Pharmacy Med Name: metFORMIN HCl Oral Tablet 500 MG] 180 tablet 0     Sig: TAKE 1 TABLET BY MOUTH 2 TIMES A DAY WITH MEALS    montelukast (SINGULAIR) 10 MG tablet [Pharmacy Med Name: Montelukast Sodium Oral Tablet 10 MG] 30 tablet 0     Sig: TAKE 1 TABLET BY MOUTH NIGHTLY         Last Office Visit:   8/15/2022      Next Visit Date:  Future Appointments   Date Time Provider Abe Azar   9/8/2022  1:00 PM [place] [facility] ROOM 1 [facility] RAD   2/14/2023 11:45 AM Kat Baxter MD [facility]   5/16/2023 11:30 AM Matthew Haas, DO [facility]

## 2022-08-29 RX ORDER — MOMETASONE FUROATE 1 MG/G
CREAM TOPICAL
Qty: 45 G | Refills: 0 | Status: SHIPPED | OUTPATIENT
Start: 2022-08-29

## 2022-08-29 RX ORDER — OMEPRAZOLE 40 MG/1
CAPSULE, DELAYED RELEASE ORAL
Qty: 180 CAPSULE | Refills: 0 | Status: SHIPPED | OUTPATIENT
Start: 2022-08-29

## 2022-08-29 RX ORDER — ALLOPURINOL 100 MG/1
TABLET ORAL
Qty: 90 TABLET | Refills: 0 | Status: SHIPPED | OUTPATIENT
Start: 2022-08-29

## 2022-08-29 RX ORDER — MONTELUKAST SODIUM 10 MG/1
TABLET ORAL
Qty: 30 TABLET | Refills: 0 | Status: SHIPPED | OUTPATIENT
Start: 2022-08-29

## 2022-08-31 DIAGNOSIS — K50.919 CROHN'S DISEASE WITH COMPLICATION, UNSPECIFIED GASTROINTESTINAL TRACT LOCATION (HCC): ICD-10-CM

## 2022-09-01 RX ORDER — DICYCLOMINE HYDROCHLORIDE 10 MG/1
CAPSULE ORAL
Qty: 120 CAPSULE | Refills: 0 | Status: SHIPPED | OUTPATIENT
Start: 2022-09-01

## 2022-09-08 ENCOUNTER — HOSPITAL ENCOUNTER (OUTPATIENT)
Dept: WOMENS IMAGING | Age: 57
Discharge: HOME | End: 2022-09-10
Payer: COMMERCIAL

## 2022-09-08 DIAGNOSIS — Z12.31 SCREENING MAMMOGRAM FOR BREAST CANCER: ICD-10-CM

## 2022-09-08 PROCEDURE — 77067 SCR MAMMO BI INCL CAD: CPT

## 2022-09-27 ENCOUNTER — TELEPHONE (OUTPATIENT)
Dept: FAMILY MEDICINE CLINIC | Age: 57
End: 2022-09-27

## 2022-10-05 RX ORDER — PRAVASTATIN SODIUM 40 MG
40 TABLET ORAL EVERY EVENING
Qty: 90 TABLET | Refills: 3 | Status: SHIPPED | OUTPATIENT
Start: 2022-10-05 | End: 2022-10-17

## 2022-10-17 RX ORDER — PRAVASTATIN SODIUM 40 MG
TABLET ORAL
Qty: 90 TABLET | Refills: 0 | Status: SHIPPED | OUTPATIENT
Start: 2022-10-17

## 2022-10-21 ENCOUNTER — OFFICE VISIT (OUTPATIENT)
Dept: FAMILY MEDICINE CLINIC | Age: 57
End: 2022-10-21
Payer: COMMERCIAL

## 2022-10-21 VITALS
TEMPERATURE: 97.1 F | HEIGHT: 62 IN | DIASTOLIC BLOOD PRESSURE: 54 MMHG | WEIGHT: 285 LBS | BODY MASS INDEX: 52.44 KG/M2 | HEART RATE: 80 BPM | SYSTOLIC BLOOD PRESSURE: 94 MMHG | OXYGEN SATURATION: 96 %

## 2022-10-21 DIAGNOSIS — R19.7 DIARRHEA OF PRESUMED INFECTIOUS ORIGIN: Primary | ICD-10-CM

## 2022-10-21 DIAGNOSIS — R19.7 DIARRHEA OF PRESUMED INFECTIOUS ORIGIN: ICD-10-CM

## 2022-10-21 PROCEDURE — 99214 OFFICE O/P EST MOD 30 MIN: CPT | Performed by: STUDENT IN AN ORGANIZED HEALTH CARE EDUCATION/TRAINING PROGRAM

## 2022-10-21 RX ORDER — ONDANSETRON 4 MG/1
4 TABLET, ORALLY DISINTEGRATING ORAL EVERY 8 HOURS PRN
Qty: 21 TABLET | Refills: 0 | Status: SHIPPED | OUTPATIENT
Start: 2022-10-21 | End: 2022-10-24

## 2022-10-21 ASSESSMENT — ENCOUNTER SYMPTOMS
WHEEZING: 0
SHORTNESS OF BREATH: 0
SORE THROAT: 0
DIARRHEA: 1
CONSTIPATION: 0
RHINORRHEA: 0
COUGH: 0
NAUSEA: 1
ABDOMINAL PAIN: 0
EYE DISCHARGE: 0
VOMITING: 0

## 2022-10-21 NOTE — PROGRESS NOTES
Subjective  Jan Sanchez, 61 y.o. female presents today with:  Chief Complaint   Patient presents with    Diarrhea     States she is concerned she may have C Diff. States she was given clindamycin by her dentist & it started to make her mouth raw. Was then given a Z pack. 2 days after finishing antibioitc she started with diarrhea. States she is having at least 20 episodes a day. States there is a very foul odor. Denies any vomiting, but is dry heaving & nauseated. States she has abd pain. Has been having these symptoms for over a week now. HPI    Patient with acute appointment today for diarrhea. She is concerned she may have C. difficile. She was on clindamycin in September and then azithromycin in early October. She started to develop diarrhea about 10 to 14 days ago. No blood in the stool. She states that it is very loose. She is having 15-20 bowel movements per day. She has been having nausea as well. Occasional abdominal cramping. She has been having symptoms for about a week. No fevers or chills. She has no other concerns at this time. Eating and drinking well. Review of Systems   Constitutional:  Negative for chills, fatigue, fever and unexpected weight change. HENT:  Negative for congestion, rhinorrhea and sore throat. Eyes:  Negative for discharge. Respiratory:  Negative for cough, shortness of breath and wheezing. Cardiovascular:  Negative for chest pain, palpitations and leg swelling. Gastrointestinal:  Positive for diarrhea and nausea. Negative for abdominal pain, constipation and vomiting. Genitourinary:  Negative for difficulty urinating. Musculoskeletal:  Negative for arthralgias and joint swelling. Skin:  Negative for rash. Neurological:  Negative for weakness, light-headedness, numbness and headaches. Psychiatric/Behavioral:  Negative for confusion. The patient is not nervous/anxious.       Past Medical History:   Diagnosis Date    Anemia     Asthma Benign essential HTN     meds > 5 yrs / denies TIA or stroke    Borderline personality disorder ([address]     2016 ?suggested by me-meets many criteria    Carpal tunnel syndrome of right wrist     Crohn's disease ([facility]     Depression     Fibromyalgia 2/13/2018    Fibromyalgia     GERD (gastroesophageal reflux disease)     Gout     Headache     migraines    Irritable bowel syndrome     Mixed hyperlipidemia 5/10/2017    Neuropathy     Obesity (BMI 35.0-39.9 without comorbidity) 3/30/2017    PONV (postoperative nausea and vomiting)     nausea    PTSD (post-traumatic stress disorder)     Tremor of unknown origin     Type 2 diabetes mellitus ([address]     meds > 5yrs     Ulcerative colitis ([address] 11/2017    Vaginitis      Past Surgical History:   Procedure Laterality Date    BREAST CYST EXCISION Right 1994    exc mass benign    CHOLECYSTECTOMY  2009    COLONOSCOPY  2015    negative    ENDOSCOPY, COLON, DIAGNOSTIC      HERNIA REPAIR  2015    ventral / mesh    LEG TENDON SURGERY Right 2007    leg.  ankle and foot    OVARIAN CYST REMOVAL Left 1994    with mass and both fallopian tube    OVARY REMOVAL  12/2015    [facility] / mass left ovary & bilateral  tubes    PARTIAL HYSTERECTOMY (CERVIX NOT REMOVED)      AL COLON CA SCRN NOT HI RSK IND N/A 11/7/2017    COLONOSCOPY performed by MANOJ Quinones on bx    AL COLONOSCOPY W/BIOPSY SINGLE/MULTIPLE N/A 3/15/2018    COLONOSCOPY performed by Araseli Adam MD at [address] [place] [address] Right 8/3/2017    RIGHT KNEE ARTHROSCOPIC PARTIAL MEDIAL MENISCECTOMY KNEE performed by Jelena Boone MD at [address] [facility] [address] N/CARPAL TUNNEL SURG Right 11/30/2017    RIGHT WRIST  CARPAL TUNNEL RELEASE performed by Jelena Boone MD at [address] [facility] [address] N/CARPAL TUNNEL SURG Left 5/10/2018    LEFT WRIST CARPAL TUNNEL RELEASE performed by Jelena Boone MD at [facility]     Current Outpatient Medications   Medication Sig Dispense Refill    ondansetron (ZOFRAN ODT) 4 MG disintegrating tablet Take 1 tablet by mouth every 8 hours as needed for Nausea or Vomiting 21 tablet 0    pravastatin (PRAVACHOL) 40 MG tablet TAKE ONE TABLET BY MOUTH EVERY EVENING 90 tablet 0    dicyclomine (BENTYL) 10 MG capsule TAKE ONE CAPSULE BY MOUTH 4 TIMES DAILY BEFORE MEALS AND NIGHTLY 120 capsule 0    mometasone (ELOCON) 0.1 % cream APPLY TOPICALLY DAILY 45 g 0    allopurinol (ZYLOPRIM) 100 MG tablet TAKE ONE TABLET BY MOUTH EVERY DAY 90 tablet 0    omeprazole (PRILOSEC) 40 MG delayed release capsule take 1 capsule by mouth two times a day 180 capsule 0    metFORMIN (GLUCOPHAGE) 500 MG tablet TAKE 1 TABLET BY MOUTH 2 TIMES A DAY WITH MEALS 180 tablet 0    montelukast (SINGULAIR) 10 MG tablet TAKE 1 TABLET BY MOUTH NIGHTLY 30 tablet 0    baclofen (LIORESAL) 20 MG tablet TAKE 1 TABLET BY MOUTH 4 TIMES DAILY 120 tablet 0    pioglitazone (ACTOS) 15 MG tablet TAKE ONE TABLET BY MOUTH EVERY DAY 90 tablet 0    pregabalin (LYRICA) 150 MG capsule TAKE ONE CAPSULE BY MOUTH THREE TIMES A DAY 90 capsule 2    furosemide (LASIX) 40 MG tablet TAKE ONE TABLET BY MOUTH TWO TIMES A DAY 60 tablet 5    potassium chloride (KLOR-CON M) 20 MEQ extended release tablet TAKE ONE TABLET BY MOUTH TWO TIMES A  tablet 1    meloxicam (MOBIC) 7.5 MG tablet Take 1 tablet by mouth 2 times daily 60 tablet 5    metoprolol succinate (TOPROL XL) 50 MG extended release tablet TAKE ONE TABLET BY MOUTH TWO TIMES A DAY.   HOLD FOR SYSTOLIC BLOOD PRESSURRE LOWER THAN 100 OR HEART RATE LOWER THAN 60. 180 tablet 3    NURTEC 75 MG TBDP DISSOLVE 1 TABLET IN MOUTH ONCE A DAY AS NEEDED FOR PAIN      hydrOXYzine (VISTARIL) 25 MG capsule TAKE ONE CAPSULE BY MOUTH EVERY DAY      butalbital-APAP-caffeine (FIORICET) -40 MG CAPS per capsule Take 1 capsule by mouth every 6 hours as needed for Headaches 40 capsule 0    ipratropium-albuterol (DUONEB) 0.5-2.5 (3) MG/3ML SOLN nebulizer solution INHALE 1 VIAL VIA NEBULIZER EVERY 4 HOURS 360 mL 5    traZODone (DESYREL) 100 MG tablet TAKE 1 TO 2 TABLETS BY MOUTH AT BEDTIME AS NEEDED FOR SLEEP      QUEtiapine (SEROQUEL) 100 MG tablet TAKE ONE TABLET BYMOUTH EVERY EVENING AS NEEDED AT 8 PM      albuterol sulfate HFA (PROVENTIL HFA) 108 (90 Base) MCG/ACT inhaler Inhale 2 puffs into the lungs every 6 hours as needed for Wheezing 1 Inhaler 3    Lancets MISC Test bid 100 each 3    hyoscyamine (ANASPAZ;LEVSIN) 125 MCG tablet TAKE ONE TABLET BY MOUTH EVERY 4 HOURS AS NEEDED for cramping 164 tablet 0    magnesium oxide (MAG-OX) 400 (241.3 Mg) MG TABS tablet Take 1 tablet by mouth 2 times daily 60 tablet 2    Blood Glucose Monitoring Suppl (ONE TOUCH ULTRA 2) w/Device KIT TEST TWICE DAILY  0    prazosin (MINIPRESS) 2 MG capsule TAKE 1 CAPSULE BY MOUTH EVERY NIGHT FOR NIGHTMARES  3    blood glucose monitor strips Test 2 times a day & as needed for symptoms of irregular blood glucose. 100 strip 3    Melatonin 5 MG CAPS Take 10 mg by mouth daily   2    FREESTYLE LITE strip use three times daily  50 each 5    DULoxetine (CYMBALTA) 60 MG extended release capsule TAKE ONE CAPSULE BY MOUTH TWICE DAILY AS DIRECTED IN THE MORNING AND AFTERNOON  1     No current facility-administered medications for this visit. PMH, Surgical Hx, Family Hx, and Social Hx reviewed and updated. Health Maintenance reviewed. Objective    Vitals:    10/21/22 0840   BP: (!) 94/54   Pulse: 80   Temp: 97.1 °F (36.2 °C)   SpO2: 96%   Weight: 285 lb (129.3 kg)   Height: 5' 2\" (1.575 m)       Physical Exam  Vitals reviewed. Constitutional:       General: She is not in acute distress. HENT:      Head: Normocephalic and atraumatic. Eyes:      Conjunctiva/sclera: Conjunctivae normal.   Neck:      Thyroid: No thyromegaly or thyroid tenderness. Cardiovascular:      Rate and Rhythm: Normal rate and regular rhythm. Heart sounds: No murmur heard. No friction rub. No gallop.    Pulmonary:      Effort: Pulmonary effort is normal.      Breath sounds: Normal breath sounds. No wheezing, rhonchi or rales. Abdominal:      General: Bowel sounds are normal. There is no distension. Palpations: Abdomen is soft. Tenderness: There is no abdominal tenderness. Musculoskeletal:         General: No swelling or deformity. Cervical back: Normal range of motion and neck supple. Right lower leg: No edema. Left lower leg: No edema. Lymphadenopathy:      Cervical: No cervical adenopathy. Skin:     General: Skin is warm and dry. Findings: No rash. Neurological:      General: No focal deficit present. Mental Status: She is alert. Gait: Gait is intact. Psychiatric:         Mood and Affect: Mood normal.         Behavior: Behavior normal.          Assessment & Plan       1. Diarrhea of presumed infectious origin  Patient with presumed infectious diarrhea with history of recent antibiotic use. Will obtain C. difficile. Call with results when return. Zofran sent to the pharmacy for symptomatic relief. Continue to drink plenty of fluids. She is instructed that if she develops worsening symptoms including inability to keep fluids down, racing heart, high fever or any other concerning symptoms that she should seek care in emergency room right away. Patient voiced understanding. All questions answered. Follow-up as scheduled. If symptoms worsen or change, return to care sooner.  - Clostridium Difficile Toxin/Antigen; Future  - ondansetron (ZOFRAN ODT) 4 MG disintegrating tablet; Take 1 tablet by mouth every 8 hours as needed for Nausea or Vomiting  Dispense: 21 tablet;  Refill: 0  Orders Placed This Encounter   Procedures    Clostridium Difficile Toxin/Antigen     Standing Status:   Future     Standing Expiration Date:   10/21/2023       Orders Placed This Encounter   Medications    ondansetron (ZOFRAN ODT) 4 MG disintegrating tablet     Sig: Take 1 tablet by mouth every 8 hours as needed for Nausea or Vomiting     Dispense:  21 tablet     Refill:  0       Medications Discontinued During This Encounter   Medication Reason    dicyclomine (BENTYL) 20 MG tablet Therapy completed    nystatin-triamcinolone (MYCOLOG II) 061956-0.1 UNIT/GM-% cream Therapy completed    ondansetron (ZOFRAN) 4 MG tablet Therapy completed     Return if symptoms worsen or fail to improve. Reviewed with the patient: current clinical status,medications, activities and diet. Side effects, adverse effects of themedication prescribed today, as well as treatment plan/ rationale and result expectations have been discussed with the patient who expresses understanding and desires to proceed. Close follow up to evaluate treatment results and for coordination of care. I have reviewed the patient's medical history in detail and updated the computerized patient record. Please note, this report has been partially produced using speech recognition software and may cause  and /or contain errors related to that system including grammar, punctuation and spelling as well as words and phrases that may seem inappropriate. If there are questions or concerns please feel free to contact me to clarify.     Hiro Morales, DO

## 2022-10-22 LAB
C DIFF TOXIN/ANTIGEN: NORMAL
C. DIFFICILE TOXIN MOLECULAR: ABNORMAL

## 2022-10-24 DIAGNOSIS — A04.72 C. DIFFICILE COLITIS: Primary | ICD-10-CM

## 2022-10-24 RX ORDER — METRONIDAZOLE 500 MG/1
500 TABLET ORAL 3 TIMES DAILY
Qty: 42 TABLET | Refills: 0 | Status: SHIPPED | OUTPATIENT
Start: 2022-10-24 | End: 2022-11-07

## 2022-10-24 NOTE — RESULT ENCOUNTER NOTE
Please inform patient that her test result did come back positive for C. difficile. I have sent in an antibiotic for her. She should make sure to use good hand hygiene. If she lives with other people, she should try to use a different toilet from everyone else to prevent the spread. If symptoms not improving after antibiotics, return to care sooner.   Thanks

## 2022-10-31 DIAGNOSIS — M47.9 SPONDYLOSIS: ICD-10-CM

## 2022-10-31 DIAGNOSIS — G89.29 CHRONIC BACK PAIN GREATER THAN 3 MONTHS DURATION: ICD-10-CM

## 2022-10-31 DIAGNOSIS — M54.9 CHRONIC BACK PAIN GREATER THAN 3 MONTHS DURATION: ICD-10-CM

## 2022-10-31 DIAGNOSIS — M79.7 FIBROMYALGIA: ICD-10-CM

## 2022-10-31 RX ORDER — MELOXICAM 7.5 MG/1
TABLET ORAL
Qty: 60 TABLET | Refills: 5 | Status: SHIPPED | OUTPATIENT
Start: 2022-10-31

## 2022-11-06 DIAGNOSIS — J45.20 MILD INTERMITTENT ASTHMA WITHOUT COMPLICATION: ICD-10-CM

## 2022-11-07 RX ORDER — MONTELUKAST SODIUM 10 MG/1
TABLET ORAL
Qty: 30 TABLET | Refills: 5 | Status: SHIPPED | OUTPATIENT
Start: 2022-11-07

## 2022-11-07 RX ORDER — MOMETASONE FUROATE 1 MG/G
CREAM TOPICAL
Qty: 45 G | Refills: 0 | Status: SHIPPED | OUTPATIENT
Start: 2022-11-07

## 2022-11-28 ENCOUNTER — OFFICE VISIT (OUTPATIENT)
Dept: FAMILY MEDICINE CLINIC | Age: 57
End: 2022-11-28
Payer: COMMERCIAL

## 2022-11-28 ENCOUNTER — TELEPHONE (OUTPATIENT)
Dept: FAMILY MEDICINE CLINIC | Age: 57
End: 2022-11-28

## 2022-11-28 VITALS
RESPIRATION RATE: 16 BRPM | DIASTOLIC BLOOD PRESSURE: 60 MMHG | OXYGEN SATURATION: 95 % | WEIGHT: 292 LBS | HEART RATE: 65 BPM | SYSTOLIC BLOOD PRESSURE: 110 MMHG | TEMPERATURE: 96.8 F | HEIGHT: 62 IN | BODY MASS INDEX: 53.73 KG/M2

## 2022-11-28 DIAGNOSIS — B37.0 THRUSH, ORAL: Primary | ICD-10-CM

## 2022-11-28 DIAGNOSIS — K13.79 ACUTE PAIN OF MOUTH: ICD-10-CM

## 2022-11-28 PROCEDURE — 99213 OFFICE O/P EST LOW 20 MIN: CPT | Performed by: NURSE PRACTITIONER

## 2022-11-28 PROCEDURE — 3074F SYST BP LT 130 MM HG: CPT | Performed by: NURSE PRACTITIONER

## 2022-11-28 PROCEDURE — 3078F DIAST BP <80 MM HG: CPT | Performed by: NURSE PRACTITIONER

## 2022-11-28 NOTE — PROGRESS NOTES
Subjective:      Patient ID: Renu Ríos is a 61 y.o. female who presents today for:  Chief Complaint   Patient presents with    Thrush     Possible thrush in mouth x5 days cracked tongue, pain, swelling        HPI      The inside of her mouth feels like the skin is peeled off   The tongue is large   Like she cannot talk good with it   Lots of grooves and stuff   Starting to see a green haze down the tongue  After the green comes then it turns brown   Shes had thrush several times due to her inhaler and she knows this is happening   Her cheeks are so raw  Her lips   Its like the sputum doesn't know where to go and hangs on the lips and she will have to wash them really good   Shes spraying biotene in there and that's helps a little     She was on an antibiotic for her teeth recently and on a  zpack   Then she got cdiff     Past Medical History:   Diagnosis Date    Anemia     Asthma     Benign essential HTN     meds > 5 yrs / denies TIA or stroke    Borderline personality disorder ([address]     2016 ?suggested by me-meets many criteria    Carpal tunnel syndrome of right wrist     Crohn's disease ([address]     Depression     Fibromyalgia 2/13/2018    Fibromyalgia     GERD (gastroesophageal reflux disease)     Gout     Headache     migraines    Irritable bowel syndrome     Mixed hyperlipidemia 5/10/2017    Neuropathy     Obesity (BMI 35.0-39.9 without comorbidity) 3/30/2017    PONV (postoperative nausea and vomiting)     nausea    PTSD (post-traumatic stress disorder)     Tremor of unknown origin     Type 2 diabetes mellitus ([address]     meds > 5yrs     Ulcerative colitis ([address] 11/2017    Vaginitis      Past Surgical History:   Procedure Laterality Date    BREAST CYST EXCISION Right 1994    exc mass benign    CHOLECYSTECTOMY  2009    COLONOSCOPY  2015    negative    ENDOSCOPY, COLON, DIAGNOSTIC      HERNIA REPAIR  2015    ventral / mesh    LEG TENDON SURGERY Right 2007    leg.  ankle and foot    OVARIAN CYST REMOVAL Left 1994 with mass and both fallopian tube    OVARY REMOVAL  12/2015    [facility] / mass left ovary & bilateral  tubes    PARTIAL HYSTERECTOMY (CERVIX NOT REMOVED)      WA COLON CA SCRN NOT HI RSK IND N/A 11/7/2017    COLONOSCOPY performed by MANOJ Osborne on bx    WA COLONOSCOPY W/BIOPSY SINGLE/MULTIPLE N/A 3/15/2018    COLONOSCOPY performed by Juan Jose Allen MD at [address] [place] [address] Right 8/3/2017    RIGHT KNEE ARTHROSCOPIC PARTIAL MEDIAL MENISCECTOMY KNEE performed by Macie Handley MD at [address] [facility] [address] N/CARPAL TUNNEL SURG Right 11/30/2017    RIGHT WRIST  CARPAL TUNNEL RELEASE performed by Macie Handley MD at [address] [facility] [address] N/CARPAL TUNNEL SURG Left 5/10/2018    LEFT WRIST CARPAL TUNNEL RELEASE performed by Macie Handley MD at [facility]     Social History     Socioeconomic History    Marital status: Single     Spouse name: Not on file    Number of children: Not on file    Years of education: Not on file    Highest education level: Not on file   Occupational History    Not on file   Tobacco Use    Smoking status: Never    Smokeless tobacco: Never   Vaping Use    Vaping Use: Never used   Substance and Sexual Activity    Alcohol use: No     Alcohol/week: 0.0 standard drinks    Drug use: No    Sexual activity: Not Currently   Other Topics Concern    Not on file   Social History Narrative    Not on file     Social Determinants of Health     Financial Resource Strain: Low Risk     Difficulty of Paying Living Expenses: Not hard at all   Food Insecurity: No Food Insecurity    Worried About Running Out of Food in the Last Year: Never true    Ran Out of Food in the Last Year: Never true   Transportation Needs: No Transportation Needs    Lack of Transportation (Medical): No    Lack of Transportation (Non-Medical):  No   Physical Activity: Not on file   Stress: Not on file   Social Connections: Not on file   Intimate Partner Violence: Not on file   Housing Stability: Not on file     Family History   Problem Relation Age of Onset    Emphysema Mother     Cancer Maternal Grandfather     Diabetes Paternal Grandmother     Emphysema Paternal Grandfather     Heart Disease Sister     Stroke Sister     Diabetes Sister      Allergies   Allergen Reactions    Latex Hives    Penicillins Swelling and Rash    Shellfish-Derived Products Anaphylaxis    Abilify [Aripiprazole]      shaking    Adhesive Tape Swelling     If left on too long    Topamax [Topiramate]     Buspar [Buspirone]      shaking    Other Nausea And Vomiting    Sulfa Antibiotics Nausea And Vomiting     Current Outpatient Medications   Medication Sig Dispense Refill    nystatin (MYCOSTATIN) [id number] UNIT/ML suspension Take 5 mLs by mouth 4 times daily for 14 days Retain in mouth as long as possible 280 mL 0    Magic Mouthwash (MIRACLE MOUTHWASH) Swish and spit 5 mLs 4 times daily as needed for Irritation or Pain 100 mL 0    mometasone (ELOCON) 0.1 % cream APPLY TOPICALLY DAILY 45 g 0    montelukast (SINGULAIR) 10 MG tablet TAKE ONE TABLET BY MOUTH NIGHTLY 30 tablet 5    meloxicam (MOBIC) 7.5 MG tablet TAKE 1 TABLET BY MOUTH TWICE DAILY 60 tablet 5    pravastatin (PRAVACHOL) 40 MG tablet TAKE ONE TABLET BY MOUTH EVERY EVENING 90 tablet 0    dicyclomine (BENTYL) 10 MG capsule TAKE ONE CAPSULE BY MOUTH 4 TIMES DAILY BEFORE MEALS AND NIGHTLY 120 capsule 0    allopurinol (ZYLOPRIM) 100 MG tablet TAKE ONE TABLET BY MOUTH EVERY DAY 90 tablet 0    omeprazole (PRILOSEC) 40 MG delayed release capsule take 1 capsule by mouth two times a day 180 capsule 0    metFORMIN (GLUCOPHAGE) 500 MG tablet TAKE 1 TABLET BY MOUTH 2 TIMES A DAY WITH MEALS 180 tablet 0    baclofen (LIORESAL) 20 MG tablet TAKE 1 TABLET BY MOUTH 4 TIMES DAILY 120 tablet 0    pioglitazone (ACTOS) 15 MG tablet TAKE ONE TABLET BY MOUTH EVERY DAY 90 tablet 0    pregabalin (LYRICA) 150 MG capsule TAKE ONE CAPSULE BY MOUTH THREE TIMES A DAY 90 capsule 2    furosemide (LASIX) 40 MG tablet TAKE ONE TABLET BY MOUTH TWO TIMES A DAY 60 tablet 5    potassium chloride (KLOR-CON M) 20 MEQ extended release tablet TAKE ONE TABLET BY MOUTH TWO TIMES A  tablet 1    metoprolol succinate (TOPROL XL) 50 MG extended release tablet TAKE ONE TABLET BY MOUTH TWO TIMES A DAY. HOLD FOR SYSTOLIC BLOOD PRESSURRE LOWER THAN 100 OR HEART RATE LOWER THAN 60. 180 tablet 3    NURTEC 75 MG TBDP DISSOLVE 1 TABLET IN MOUTH ONCE A DAY AS NEEDED FOR PAIN      hydrOXYzine (VISTARIL) 25 MG capsule TAKE ONE CAPSULE BY MOUTH EVERY DAY      butalbital-APAP-caffeine (FIORICET) -40 MG CAPS per capsule Take 1 capsule by mouth every 6 hours as needed for Headaches 40 capsule 0    ipratropium-albuterol (DUONEB) 0.5-2.5 (3) MG/3ML SOLN nebulizer solution INHALE 1 VIAL VIA NEBULIZER EVERY 4 HOURS 360 mL 5    traZODone (DESYREL) 100 MG tablet TAKE 1 TO 2 TABLETS BY MOUTH AT BEDTIME AS NEEDED FOR SLEEP      QUEtiapine (SEROQUEL) 100 MG tablet TAKE ONE TABLET BYMOUTH EVERY EVENING AS NEEDED AT 8 PM      albuterol sulfate HFA (PROVENTIL HFA) 108 (90 Base) MCG/ACT inhaler Inhale 2 puffs into the lungs every 6 hours as needed for Wheezing 1 Inhaler 3    Lancets MISC Test bid 100 each 3    hyoscyamine (ANASPAZ;LEVSIN) 125 MCG tablet TAKE ONE TABLET BY MOUTH EVERY 4 HOURS AS NEEDED for cramping 164 tablet 0    magnesium oxide (MAG-OX) 400 (241.3 Mg) MG TABS tablet Take 1 tablet by mouth 2 times daily 60 tablet 2    Blood Glucose Monitoring Suppl (ONE TOUCH ULTRA 2) w/Device KIT TEST TWICE DAILY  0    prazosin (MINIPRESS) 2 MG capsule TAKE 1 CAPSULE BY MOUTH EVERY NIGHT FOR NIGHTMARES  3    blood glucose monitor strips Test 2 times a day & as needed for symptoms of irregular blood glucose.  100 strip 3    Melatonin 5 MG CAPS Take 10 mg by mouth daily   2    FREESTYLE LITE strip use three times daily  50 each 5    DULoxetine (CYMBALTA) 60 MG extended release capsule TAKE ONE CAPSULE BY MOUTH TWICE DAILY AS DIRECTED IN THE MORNING AND AFTERNOON  1     No current facility-administered medications for this visit. Review of Systems   Constitutional:  Negative for chills, fatigue and fever. HENT:  Positive for mouth sores. Negative for congestion, rhinorrhea and sore throat. Respiratory:  Negative for cough, shortness of breath and wheezing. Gastrointestinal:  Negative for diarrhea, nausea and vomiting. Musculoskeletal:  Negative for myalgias. Skin:  Negative for rash. Neurological:  Negative for headaches. Psychiatric/Behavioral:  Negative for agitation, confusion and hallucinations. Objective:   /60 (Site: Left Upper Arm, Position: Sitting, Cuff Size: Medium Adult)   Pulse 65   Temp 96.8 °F (36 °C) (Temporal)   Resp 16   Ht 5' 2\" (1.575 m)   Wt 292 lb (132.5 kg)   LMP 07/27/1993 (Approximate)   SpO2 95%   BMI 53.41 kg/m²     Physical Exam  Vitals reviewed. Constitutional:       Appearance: Normal appearance. She is obese. HENT:      Head: Normocephalic and atraumatic. Nose: Nose normal.      Mouth/Throat:      Lips: Pink. No lesions. Mouth: Mucous membranes are dry. Tongue: No lesions. Palate: Lesions present. Pharynx: Oropharynx is clear. Comments: A couple white spots on the roof   Tongue with a lot of small bumps on it   And very groovy     Eyes:      General: Lids are normal.      Conjunctiva/sclera: Conjunctivae normal.   Cardiovascular:      Rate and Rhythm: Normal rate. Pulmonary:      Effort: Pulmonary effort is normal.   Musculoskeletal:      Cervical back: Normal range of motion. Skin:     General: Skin is warm and dry. Findings: No rash. Neurological:      General: No focal deficit present. Mental Status: She is alert and oriented to person, place, and time. Psychiatric:         Mood and Affect: Mood normal.         Behavior: Behavior normal. Behavior is cooperative.        Assessment:       Diagnosis Orders   1. Thrush, oral  nystatin (MYCOSTATIN) [id number] UNIT/ML suspension      2. Acute pain of mouth  Magic Mouthwash (MIRACLE MOUTHWASH)        No results found for this visit on 11/28/22. Plan:     Assessment & Plan   Dannielle Concepcion was seen today for thrush. Diagnoses and all orders for this visit:    Thrush, oral  -     nystatin (MYCOSTATIN) [id number] UNIT/ML suspension; Take 5 mLs by mouth 4 times daily for 14 days Retain in mouth as long as possible    Acute pain of mouth  -     Magic Mouthwash (MIRACLE MOUTHWASH); Swish and spit 5 mLs 4 times daily as needed for Irritation or Pain    No orders of the defined types were placed in this encounter. Orders Placed This Encounter   Medications    nystatin (MYCOSTATIN) [id number] UNIT/ML suspension     Sig: Take 5 mLs by mouth 4 times daily for 14 days Retain in mouth as long as possible     Dispense:  280 mL     Refill:  0    Magic Mouthwash (MIRACLE MOUTHWASH)     Sig: Swish and spit 5 mLs 4 times daily as needed for Irritation or Pain     Dispense:  100 mL     Refill:  0       There are no discontinued medications. Return if symptoms worsen or fail to improve. Reviewed with the patient/family: current clinical status & medications. Side effects of the medication prescribed today, as well as treatment plan/rationale and result expectations have been discussed with the patient/family who expresses understanding. Patient will be discharged home in stable condition. Follow up with PCP to evaluate treatment results or return if symptoms worsen or fail to improve. Discussed signs and symptoms which require immediate follow-up in ED/call to 911. Understanding verbalized. I have reviewed the patient's medical history in detail and updated the computerized patient record.     Nakia Golden, APRN - CNP

## 2022-12-06 ASSESSMENT — ENCOUNTER SYMPTOMS
SHORTNESS OF BREATH: 0
NAUSEA: 0
RHINORRHEA: 0
DIARRHEA: 0
WHEEZING: 0
VOMITING: 0
COUGH: 0
SORE THROAT: 0

## 2022-12-23 RX ORDER — ALLOPURINOL 100 MG/1
TABLET ORAL
Qty: 90 TABLET | Refills: 0 | Status: SHIPPED | OUTPATIENT
Start: 2022-12-23

## 2022-12-23 RX ORDER — PIOGLITAZONEHYDROCHLORIDE 15 MG/1
TABLET ORAL
Qty: 90 TABLET | Refills: 0 | Status: SHIPPED | OUTPATIENT
Start: 2022-12-23

## 2023-01-03 ENCOUNTER — OFFICE VISIT (OUTPATIENT)
Dept: FAMILY MEDICINE CLINIC | Age: 58
End: 2023-01-03
Payer: MEDICARE

## 2023-01-03 VITALS
OXYGEN SATURATION: 94 % | HEIGHT: 62 IN | WEIGHT: 292 LBS | BODY MASS INDEX: 53.73 KG/M2 | DIASTOLIC BLOOD PRESSURE: 82 MMHG | RESPIRATION RATE: 18 BRPM | HEART RATE: 97 BPM | SYSTOLIC BLOOD PRESSURE: 138 MMHG | TEMPERATURE: 97.9 F

## 2023-01-03 DIAGNOSIS — R11.2 NAUSEA AND VOMITING, UNSPECIFIED VOMITING TYPE: Primary | ICD-10-CM

## 2023-01-03 DIAGNOSIS — R10.33 PERIUMBILICAL ABDOMINAL PAIN: ICD-10-CM

## 2023-01-03 PROCEDURE — 3074F SYST BP LT 130 MM HG: CPT | Performed by: NURSE PRACTITIONER

## 2023-01-03 PROCEDURE — G8417 CALC BMI ABV UP PARAM F/U: HCPCS | Performed by: NURSE PRACTITIONER

## 2023-01-03 PROCEDURE — 3017F COLORECTAL CA SCREEN DOC REV: CPT | Performed by: NURSE PRACTITIONER

## 2023-01-03 PROCEDURE — G8484 FLU IMMUNIZE NO ADMIN: HCPCS | Performed by: NURSE PRACTITIONER

## 2023-01-03 PROCEDURE — 96372 THER/PROPH/DIAG INJ SC/IM: CPT | Performed by: NURSE PRACTITIONER

## 2023-01-03 PROCEDURE — 99213 OFFICE O/P EST LOW 20 MIN: CPT | Performed by: NURSE PRACTITIONER

## 2023-01-03 PROCEDURE — G8427 DOCREV CUR MEDS BY ELIG CLIN: HCPCS | Performed by: NURSE PRACTITIONER

## 2023-01-03 PROCEDURE — 1036F TOBACCO NON-USER: CPT | Performed by: NURSE PRACTITIONER

## 2023-01-03 PROCEDURE — 3078F DIAST BP <80 MM HG: CPT | Performed by: NURSE PRACTITIONER

## 2023-01-03 RX ORDER — PROMETHAZINE HYDROCHLORIDE 25 MG/ML
6.25 INJECTION, SOLUTION INTRAMUSCULAR; INTRAVENOUS ONCE
Status: COMPLETED | OUTPATIENT
Start: 2023-01-03 | End: 2023-01-03

## 2023-01-03 RX ORDER — PROMETHAZINE HYDROCHLORIDE 12.5 MG/1
12.5 TABLET ORAL 3 TIMES DAILY PRN
Qty: 12 TABLET | Refills: 0 | Status: SHIPPED | OUTPATIENT
Start: 2023-01-03 | End: 2023-01-10

## 2023-01-03 RX ORDER — ONDANSETRON 4 MG/1
4 TABLET, FILM COATED ORAL 3 TIMES DAILY PRN
Qty: 30 TABLET | Refills: 0 | Status: SHIPPED | OUTPATIENT
Start: 2023-01-03

## 2023-01-03 RX ORDER — PROMETHAZINE HYDROCHLORIDE 50 MG/ML
25 INJECTION, SOLUTION INTRAMUSCULAR; INTRAVENOUS ONCE
Status: SHIPPED | OUTPATIENT
Start: 2023-01-03

## 2023-01-03 RX ADMIN — PROMETHAZINE HYDROCHLORIDE 6.25 MG: 25 INJECTION, SOLUTION INTRAMUSCULAR; INTRAVENOUS at 15:34

## 2023-01-03 ASSESSMENT — PATIENT HEALTH QUESTIONNAIRE - PHQ9
1. LITTLE INTEREST OR PLEASURE IN DOING THINGS: 0
SUM OF ALL RESPONSES TO PHQ QUESTIONS 1-9: 0
SUM OF ALL RESPONSES TO PHQ9 QUESTIONS 1 & 2: 0
4. FEELING TIRED OR HAVING LITTLE ENERGY: 0
SUM OF ALL RESPONSES TO PHQ QUESTIONS 1-9: 0
SUM OF ALL RESPONSES TO PHQ QUESTIONS 1-9: 0
5. POOR APPETITE OR OVEREATING: 0
7. TROUBLE CONCENTRATING ON THINGS, SUCH AS READING THE NEWSPAPER OR WATCHING TELEVISION: 0
SUM OF ALL RESPONSES TO PHQ QUESTIONS 1-9: 0
3. TROUBLE FALLING OR STAYING ASLEEP: 0
9. THOUGHTS THAT YOU WOULD BE BETTER OFF DEAD, OR OF HURTING YOURSELF: 0
2. FEELING DOWN, DEPRESSED OR HOPELESS: 0
6. FEELING BAD ABOUT YOURSELF - OR THAT YOU ARE A FAILURE OR HAVE LET YOURSELF OR YOUR FAMILY DOWN: 0
10. IF YOU CHECKED OFF ANY PROBLEMS, HOW DIFFICULT HAVE THESE PROBLEMS MADE IT FOR YOU TO DO YOUR WORK, TAKE CARE OF THINGS AT HOME, OR GET ALONG WITH OTHER PEOPLE: 0
8. MOVING OR SPEAKING SO SLOWLY THAT OTHER PEOPLE COULD HAVE NOTICED. OR THE OPPOSITE, BEING SO FIGETY OR RESTLESS THAT YOU HAVE BEEN MOVING AROUND A LOT MORE THAN USUAL: 0

## 2023-01-03 ASSESSMENT — ENCOUNTER SYMPTOMS
ABDOMINAL PAIN: 1
VOMITING: 1
DIARRHEA: 1
NAUSEA: 1

## 2023-01-03 NOTE — PROGRESS NOTES
Subjective:      Patient ID: Alicia Reynoso is a 61 y.o. female who presents today for:  Chief Complaint   Patient presents with    Nausea     Patient has crohn's disease and is very nausea hasnt been eating since 12/30 unable to keep food down        HPI      On the 30th and 31st she wasn't really hungry but she ate a little   This is how the cycle starts with her chron's   On Sunday had a party   She didn't even get all the way home and had liquid stool in large large quantities  Yesterday couldn't really eat  Today took a shower and got Pedialyte out    She started throwing up   Now her stomach is in a shambles   So nauseated and tied in knots   She cant hold down the Pedialyte   She was dry heaving   She has 3 Zofran left-says it doesn't work   She took bentyl   Abd is by the belly button and LUQ  Its a crampy pain     Past Medical History:   Diagnosis Date    Anemia     Asthma     Benign essential HTN     meds > 5 yrs / denies TIA or stroke    Borderline personality disorder ([address]     2016 ?suggested by me-meets many criteria    Carpal tunnel syndrome of right wrist     Crohn's disease ([address]     Depression     Fibromyalgia 2/13/2018    Fibromyalgia     GERD (gastroesophageal reflux disease)     Gout     Headache     migraines    Irritable bowel syndrome     Mixed hyperlipidemia 5/10/2017    Neuropathy     Obesity (BMI 35.0-39.9 without comorbidity) 3/30/2017    PONV (postoperative nausea and vomiting)     nausea    PTSD (post-traumatic stress disorder)     Tremor of unknown origin     Type 2 diabetes mellitus ([address]     meds > 5yrs     Ulcerative colitis ([address] 11/2017    Vaginitis      Past Surgical History:   Procedure Laterality Date    BREAST CYST EXCISION Right 1994    exc mass benign    CHOLECYSTECTOMY  2009    COLONOSCOPY  2015    negative    ENDOSCOPY, COLON, DIAGNOSTIC      HERNIA REPAIR  2015    ventral / mesh    LEG TENDON SURGERY Right 2007    leg.  ankle and foot    OVARIAN CYST REMOVAL Left 1994 with mass and both fallopian tube    OVARY REMOVAL  12/2015    [facility] / mass left ovary & bilateral  tubes    PARTIAL HYSTERECTOMY (CERVIX NOT REMOVED)      PA ARTHROSCOPY KNEE DIAGNOSTIC W/WO SYNOVIAL BX SPX Right 8/3/2017    RIGHT KNEE ARTHROSCOPIC PARTIAL MEDIAL MENISCECTOMY KNEE performed by Sravan Paige MD at [facility]    PA COLON CA SCRN NOT HI RSK IND N/A 11/7/2017    COLONOSCOPY performed by MANOJ Cesar on bx    PA COLONOSCOPY W/BIOPSY SINGLE/MULTIPLE N/A 3/15/2018    COLONOSCOPY performed [facility] Cayetano Perkins [place] [facility]    PA NEUROPLASTY &/TRANSPOS MEDIAN NRV CARPAL TUNNE Right 11/30/2017    RIGHT WRIST  CARPAL TUNNEL RELEASE performed by Sravan Paige MD at [facility]    PA NEUROPLASTY &/TRANSPOS MEDIAN NRV CARPAL TUNNE Left 5/10/2018    LEFT WRIST CARPAL TUNNEL RELEASE performed by Sravan Paige MD at [facility]    TONSILLECTOMY AND ADENOIDECTOMY  1967     Social History     Socioeconomic History    Marital status: Single     Spouse name: Not on file    Number of children: Not on file    Years of education: Not on file    Highest education level: Not on file   Occupational History    Not on file   Tobacco Use    Smoking status: Never    Smokeless tobacco: Never   Vaping Use    Vaping Use: Never used   Substance and Sexual Activity    Alcohol use: No     Alcohol/week: 0.0 standard drinks    Drug use: No    Sexual activity: Not Currently   Other Topics Concern    Not on file   Social History Narrative    Not on file     Social Determinants of Health     Financial Resource Strain: Low Risk     Difficulty of Paying Living Expenses: Not hard at all   Food Insecurity: No Food Insecurity    Worried About Running Out of Food in the Last Year: Never true    Ran Out of Food in the Last Year: Never true   Transportation Needs: No Transportation Needs    Lack of Transportation (Medical): No    Lack of Transportation (Non-Medical): No   Physical Activity: Not on file   Stress: Not on file   Social  Connections: Not on file   Intimate Partner Violence: Not on file   Housing Stability: Not on file     Family History   Problem Relation Age of Onset    Emphysema Mother     Cancer Maternal Grandfather     Diabetes Paternal Grandmother     Emphysema Paternal Grandfather     Heart Disease Sister     Stroke Sister     Diabetes Sister      Allergies   Allergen Reactions    Latex Hives    Penicillins Swelling and Rash    Shellfish-Derived Products Anaphylaxis    Abilify [Aripiprazole]      shaking    Adhesive Tape Swelling     If left on too long    Topamax [Topiramate]     Buspar [Buspirone]      shaking    Other Nausea And Vomiting    Sulfa Antibiotics Nausea And Vomiting     Current Outpatient Medications   Medication Sig Dispense Refill    promethazine (PHENERGAN) 12.5 MG tablet Take 1 tablet by mouth 3 times daily as needed for Nausea 12 tablet 0    ondansetron (ZOFRAN) 4 MG tablet Take 1 tablet by mouth 3 times daily as needed for Nausea or Vomiting 30 tablet 0    allopurinol (ZYLOPRIM) 100 MG tablet TAKE ONE TABLET BY MOUTH EVERY DAY 90 tablet 0    pioglitazone (ACTOS) 15 MG tablet TAKE ONE TABLET BY MOUTH EVERY DAY 90 tablet 0    Magic Mouthwash (MIRACLE MOUTHWASH) Swish and spit 5 mLs 4 times daily as needed for Irritation or Pain 100 mL 0    mometasone (ELOCON) 0.1 % cream APPLY TOPICALLY DAILY 45 g 0    montelukast (SINGULAIR) 10 MG tablet TAKE ONE TABLET BY MOUTH NIGHTLY 30 tablet 5    meloxicam (MOBIC) 7.5 MG tablet TAKE 1 TABLET BY MOUTH TWICE DAILY 60 tablet 5    pravastatin (PRAVACHOL) 40 MG tablet TAKE ONE TABLET BY MOUTH EVERY EVENING 90 tablet 0    dicyclomine (BENTYL) 10 MG capsule TAKE ONE CAPSULE BY MOUTH 4 TIMES DAILY BEFORE MEALS AND NIGHTLY 120 capsule 0    omeprazole (PRILOSEC) 40 MG delayed release capsule take 1 capsule by mouth two times a day 180 capsule 0    metFORMIN (GLUCOPHAGE) 500 MG tablet TAKE 1 TABLET BY MOUTH 2 TIMES A DAY WITH MEALS 180 tablet 0    baclofen (LIORESAL) 20 MG tablet TAKE 1 TABLET BY MOUTH 4 TIMES DAILY 120 tablet 0    pregabalin (LYRICA) 150 MG capsule TAKE ONE CAPSULE BY MOUTH THREE TIMES A DAY 90 capsule 2    furosemide (LASIX) 40 MG tablet TAKE ONE TABLET BY MOUTH TWO TIMES A DAY 60 tablet 5    potassium chloride (KLOR-CON M) 20 MEQ extended release tablet TAKE ONE TABLET BY MOUTH TWO TIMES A  tablet 1    metoprolol succinate (TOPROL XL) 50 MG extended release tablet TAKE ONE TABLET BY MOUTH TWO TIMES A DAY.  HOLD FOR SYSTOLIC BLOOD PRESSURRE LOWER THAN 100 OR HEART RATE LOWER THAN 60. 180 tablet 3    NURTEC 75 MG TBDP DISSOLVE 1 TABLET IN MOUTH ONCE A DAY AS NEEDED FOR PAIN      hydrOXYzine (VISTARIL) 25 MG capsule TAKE ONE CAPSULE BY MOUTH EVERY DAY      butalbital-APAP-caffeine (FIORICET) -40 MG CAPS per capsule Take 1 capsule by mouth every 6 hours as needed for Headaches 40 capsule 0    ipratropium-albuterol (DUONEB) 0.5-2.5 (3) MG/3ML SOLN nebulizer solution INHALE 1 VIAL VIA NEBULIZER EVERY 4 HOURS 360 mL 5    traZODone (DESYREL) 100 MG tablet TAKE 1 TO 2 TABLETS BY MOUTH AT BEDTIME AS NEEDED FOR SLEEP      QUEtiapine (SEROQUEL) 100 MG tablet TAKE ONE TABLET BYMOUTH EVERY EVENING AS NEEDED AT 8 PM      albuterol sulfate HFA (PROVENTIL HFA) 108 (90 Base) MCG/ACT inhaler Inhale 2 puffs into the lungs every 6 hours as needed for Wheezing 1 Inhaler 3    Lancets MISC Test bid 100 each 3    hyoscyamine (ANASPAZ;LEVSIN) 125 MCG tablet TAKE ONE TABLET BY MOUTH EVERY 4 HOURS AS NEEDED for cramping 164 tablet 0    magnesium oxide (MAG-OX) 400 (241.3 Mg) MG TABS tablet Take 1 tablet by mouth 2 times daily 60 tablet 2    Blood Glucose Monitoring Suppl (ONE TOUCH ULTRA 2) w/Device KIT TEST TWICE DAILY  0    prazosin (MINIPRESS) 2 MG capsule TAKE 1 CAPSULE BY MOUTH EVERY NIGHT FOR NIGHTMARES  3    blood glucose monitor strips Test 2 times a day & as needed for symptoms of irregular blood glucose. 100 strip 3    Melatonin 5 MG CAPS Take 10 mg by mouth daily   2     FREESTYLE LITE strip use three times daily  50 each 5    DULoxetine (CYMBALTA) 60 MG extended release capsule TAKE ONE CAPSULE BY MOUTH TWICE DAILY AS DIRECTED IN THE MORNING AND AFTERNOON  1     Current Facility-Administered Medications   Medication Dose Route Frequency Provider Last Rate Last Admin    promethazine (PHENERGAN) injection 25 mg  25 mg IntraMUSCular Once Angelica NIKOLAIAnkur Syed, APRN - CNP              Review of Systems   Constitutional:  Positive for appetite change. Negative for chills, fatigue and fever. HENT:  Negative for congestion, rhinorrhea and sore throat. Respiratory:  Negative for cough, shortness of breath and wheezing. Gastrointestinal:  Positive for abdominal pain, diarrhea, nausea and vomiting. Musculoskeletal:  Negative for myalgias. Skin:  Negative for rash. Neurological:  Negative for headaches. Psychiatric/Behavioral:  Negative for agitation, confusion and hallucinations. Objective:   /82 (Site: Left Upper Arm, Position: Sitting, Cuff Size: Large Adult)   Pulse 97   Temp 97.9 °F (36.6 °C) (Temporal)   Resp 18   Ht 5' 2\" (1.575 m)   Wt 292 lb (132.5 kg)   LMP 07/27/1993 (Approximate)   SpO2 94%   BMI 53.41 kg/m²     Physical Exam  Vitals reviewed. Constitutional:       Appearance: Normal appearance. HENT:      Head: Normocephalic and atraumatic. Nose: Nose normal.      Mouth/Throat:      Lips: Pink. Eyes:      General: Lids are normal.      Conjunctiva/sclera: Conjunctivae normal.   Cardiovascular:      Rate and Rhythm: Normal rate and regular rhythm. Heart sounds: Normal heart sounds, S1 normal and S2 normal.   Pulmonary:      Effort: Pulmonary effort is normal.      Breath sounds: No wheezing or rhonchi. Abdominal:      General: Abdomen is flat. Bowel sounds are normal.      Palpations: Abdomen is soft. Tenderness: There is abdominal tenderness (slight) in the periumbilical area and left upper quadrant.  There is no right CVA tenderness, left CVA tenderness or guarding. Musculoskeletal:      Cervical back: Normal range of motion. Skin:     General: Skin is warm and dry. Findings: No rash. Neurological:      General: No focal deficit present. Mental Status: She is alert and oriented to person, place, and time. Psychiatric:         Mood and Affect: Mood normal.         Behavior: Behavior normal. Behavior is cooperative. Assessment:       Diagnosis Orders   1. Nausea and vomiting, unspecified vomiting type  promethazine (PHENERGAN) 12.5 MG tablet    promethazine (PHENERGAN) injection 25 mg    ondansetron (ZOFRAN) 4 MG tablet    promethazine (PHENERGAN) injection 6.25 mg      2. Periumbilical abdominal pain  promethazine (PHENERGAN) injection 6.25 mg        No results found for this visit on 01/03/23. Plan:   Pt instructed to go to the ER if things do not get better. Assessment & Plan   Jagjit Tam was seen today for nausea. Diagnoses and all orders for this visit:    Nausea and vomiting, unspecified vomiting type  -     promethazine (PHENERGAN) 12.5 MG tablet; Take 1 tablet by mouth 3 times daily as needed for Nausea  -     promethazine (PHENERGAN) injection 25 mg  -     ondansetron (ZOFRAN) 4 MG tablet; Take 1 tablet by mouth 3 times daily as needed for Nausea or Vomiting  -     promethazine (PHENERGAN) injection 3.66 mg    Periumbilical abdominal pain  -     promethazine (PHENERGAN) injection 6.25 mg    No orders of the defined types were placed in this encounter.     Orders Placed This Encounter   Medications    promethazine (PHENERGAN) 12.5 MG tablet     Sig: Take 1 tablet by mouth 3 times daily as needed for Nausea     Dispense:  12 tablet     Refill:  0    promethazine (PHENERGAN) injection 25 mg    ondansetron (ZOFRAN) 4 MG tablet     Sig: Take 1 tablet by mouth 3 times daily as needed for Nausea or Vomiting     Dispense:  30 tablet     Refill:  0    promethazine (PHENERGAN) injection 6.25 mg     There are no discontinued medications. Return in about 2 days (around 1/5/2023) for Follow up with PCP. Reviewed with the patient/family: current clinical status & medications. Side effects of the medication prescribed today, as well as treatment plan/rationale and result expectations have been discussed with the patient/family who expresses understanding. Patient will be discharged home in stable condition. Follow up with PCP to evaluate treatment results or return if symptoms worsen or fail to improve. Discussed signs and symptoms which require immediate follow-up in ED/call to 911. Understanding verbalized. I have reviewed the patient's medical history in detail and updated the computerized patient record.     Orestes Tolbert, APRN - CNP

## 2023-01-05 ASSESSMENT — ENCOUNTER SYMPTOMS
SHORTNESS OF BREATH: 0
WHEEZING: 0
RHINORRHEA: 0
SORE THROAT: 0
COUGH: 0

## 2023-01-09 RX ORDER — MOMETASONE FUROATE 1 MG/G
CREAM TOPICAL
Qty: 45 G | Refills: 0 | Status: SHIPPED | OUTPATIENT
Start: 2023-01-09

## 2023-01-11 DIAGNOSIS — K50.919 CROHN'S DISEASE WITH COMPLICATION, UNSPECIFIED GASTROINTESTINAL TRACT LOCATION (HCC): ICD-10-CM

## 2023-01-11 DIAGNOSIS — M47.9 SPONDYLOSIS: ICD-10-CM

## 2023-01-11 DIAGNOSIS — G89.29 CHRONIC BACK PAIN GREATER THAN 3 MONTHS DURATION: ICD-10-CM

## 2023-01-11 DIAGNOSIS — E11.42 TYPE 2 DIABETES MELLITUS WITH DIABETIC POLYNEUROPATHY, WITHOUT LONG-TERM CURRENT USE OF INSULIN (HCC): ICD-10-CM

## 2023-01-11 DIAGNOSIS — M54.9 CHRONIC BACK PAIN GREATER THAN 3 MONTHS DURATION: ICD-10-CM

## 2023-01-11 DIAGNOSIS — M79.7 FIBROMYALGIA: ICD-10-CM

## 2023-01-11 RX ORDER — OMEPRAZOLE 40 MG/1
CAPSULE, DELAYED RELEASE ORAL
Qty: 180 CAPSULE | Refills: 0 | Status: SHIPPED | OUTPATIENT
Start: 2023-01-11

## 2023-01-11 RX ORDER — PRAVASTATIN SODIUM 40 MG
TABLET ORAL
Qty: 90 TABLET | Refills: 0 | Status: SHIPPED | OUTPATIENT
Start: 2023-01-11

## 2023-01-11 RX ORDER — BACLOFEN 20 MG/1
TABLET ORAL
Qty: 120 TABLET | Refills: 0 | Status: SHIPPED | OUTPATIENT
Start: 2023-01-11

## 2023-01-12 ENCOUNTER — TELEPHONE (OUTPATIENT)
Dept: FAMILY MEDICINE CLINIC | Age: 58
End: 2023-01-12

## 2023-01-28 DIAGNOSIS — I10 BENIGN ESSENTIAL HTN: ICD-10-CM

## 2023-01-30 RX ORDER — FUROSEMIDE 40 MG/1
TABLET ORAL
Qty: 60 TABLET | Refills: 4 | Status: SHIPPED | OUTPATIENT
Start: 2023-01-30

## 2023-01-30 NOTE — TELEPHONE ENCOUNTER
Requesting medication refill. Please approve or deny this request.    Rx requested:  Requested Prescriptions     Pending Prescriptions Disp Refills    furosemide (LASIX) 40 MG tablet [Pharmacy Med Name: Furosemide Oral Tablet 40 MG] 60 tablet 0     Sig: TAKE ONE TABLET BY MOUTH TWO TIMES A DAY         Last Office Visit:   5/12/2022      Next Visit Date:  Future Appointments   Date Time Provider Abe Azar   2/14/2023 11:45 AM Severo Erickson  [place]   5/16/2023 11:30 AM Matthew Leonard, DO [facility] [address] [facility]               Last refill 5/12/22. Please approve or deny.

## 2023-02-09 DIAGNOSIS — M79.7 FIBROMYALGIA: ICD-10-CM

## 2023-02-09 DIAGNOSIS — M47.9 SPONDYLOSIS: ICD-10-CM

## 2023-02-09 DIAGNOSIS — M54.9 CHRONIC BACK PAIN GREATER THAN 3 MONTHS DURATION: ICD-10-CM

## 2023-02-09 DIAGNOSIS — G89.29 CHRONIC BACK PAIN GREATER THAN 3 MONTHS DURATION: ICD-10-CM

## 2023-02-10 RX ORDER — PREGABALIN 150 MG/1
CAPSULE ORAL
Qty: 90 CAPSULE | Refills: 0 | Status: SHIPPED | OUTPATIENT
Start: 2023-02-10 | End: 2023-03-12

## 2023-02-14 ENCOUNTER — OFFICE VISIT (OUTPATIENT)
Dept: FAMILY MEDICINE CLINIC | Age: 58
End: 2023-02-14

## 2023-02-14 VITALS
TEMPERATURE: 97.6 F | OXYGEN SATURATION: 97 % | WEIGHT: 276 LBS | DIASTOLIC BLOOD PRESSURE: 62 MMHG | HEART RATE: 73 BPM | SYSTOLIC BLOOD PRESSURE: 120 MMHG | BODY MASS INDEX: 50.48 KG/M2 | RESPIRATION RATE: 19 BRPM

## 2023-02-14 VITALS
WEIGHT: 276 LBS | HEART RATE: 74 BPM | SYSTOLIC BLOOD PRESSURE: 120 MMHG | OXYGEN SATURATION: 97 % | DIASTOLIC BLOOD PRESSURE: 62 MMHG | TEMPERATURE: 97.6 F | RESPIRATION RATE: 20 BRPM | HEIGHT: 62 IN | BODY MASS INDEX: 50.79 KG/M2

## 2023-02-14 DIAGNOSIS — K43.2 INCISIONAL HERNIA, WITHOUT OBSTRUCTION OR GANGRENE: ICD-10-CM

## 2023-02-14 DIAGNOSIS — Z00.00 MEDICARE ANNUAL WELLNESS VISIT, SUBSEQUENT: Primary | ICD-10-CM

## 2023-02-14 DIAGNOSIS — E11.9 TYPE 2 DIABETES MELLITUS WITHOUT COMPLICATION, WITHOUT LONG-TERM CURRENT USE OF INSULIN (HCC): Primary | ICD-10-CM

## 2023-02-14 DIAGNOSIS — E78.2 MIXED HYPERLIPIDEMIA: ICD-10-CM

## 2023-02-14 DIAGNOSIS — D64.9 ANEMIA, UNSPECIFIED TYPE: ICD-10-CM

## 2023-02-14 DIAGNOSIS — I10 BENIGN ESSENTIAL HYPERTENSION: ICD-10-CM

## 2023-02-14 LAB — HBA1C MFR BLD: 6.4 %

## 2023-02-14 SDOH — ECONOMIC STABILITY: INCOME INSECURITY: HOW HARD IS IT FOR YOU TO PAY FOR THE VERY BASICS LIKE FOOD, HOUSING, MEDICAL CARE, AND HEATING?: NOT HARD AT ALL

## 2023-02-14 SDOH — ECONOMIC STABILITY: FOOD INSECURITY: WITHIN THE PAST 12 MONTHS, THE FOOD YOU BOUGHT JUST DIDN'T LAST AND YOU DIDN'T HAVE MONEY TO GET MORE.: NEVER TRUE

## 2023-02-14 SDOH — ECONOMIC STABILITY: FOOD INSECURITY: WITHIN THE PAST 12 MONTHS, YOU WORRIED THAT YOUR FOOD WOULD RUN OUT BEFORE YOU GOT MONEY TO BUY MORE.: NEVER TRUE

## 2023-02-14 SDOH — ECONOMIC STABILITY: HOUSING INSECURITY
IN THE LAST 12 MONTHS, WAS THERE A TIME WHEN YOU DID NOT HAVE A STEADY PLACE TO SLEEP OR SLEPT IN A SHELTER (INCLUDING NOW)?: NO

## 2023-02-14 ASSESSMENT — PATIENT HEALTH QUESTIONNAIRE - PHQ9
3. TROUBLE FALLING OR STAYING ASLEEP: 1
SUM OF ALL RESPONSES TO PHQ QUESTIONS 1-9: 5
5. POOR APPETITE OR OVEREATING: 0
SUM OF ALL RESPONSES TO PHQ QUESTIONS 1-9: 5
6. FEELING BAD ABOUT YOURSELF - OR THAT YOU ARE A FAILURE OR HAVE LET YOURSELF OR YOUR FAMILY DOWN: 1
9. THOUGHTS THAT YOU WOULD BE BETTER OFF DEAD, OR OF HURTING YOURSELF: 0
SUM OF ALL RESPONSES TO PHQ QUESTIONS 1-9: 5
SUM OF ALL RESPONSES TO PHQ9 QUESTIONS 1 & 2: 2
4. FEELING TIRED OR HAVING LITTLE ENERGY: 1
2. FEELING DOWN, DEPRESSED OR HOPELESS: 1
8. MOVING OR SPEAKING SO SLOWLY THAT OTHER PEOPLE COULD HAVE NOTICED. OR THE OPPOSITE, BEING SO FIGETY OR RESTLESS THAT YOU HAVE BEEN MOVING AROUND A LOT MORE THAN USUAL: 0
SUM OF ALL RESPONSES TO PHQ QUESTIONS 1-9: 5
7. TROUBLE CONCENTRATING ON THINGS, SUCH AS READING THE NEWSPAPER OR WATCHING TELEVISION: 0
10. IF YOU CHECKED OFF ANY PROBLEMS, HOW DIFFICULT HAVE THESE PROBLEMS MADE IT FOR YOU TO DO YOUR WORK, TAKE CARE OF THINGS AT HOME, OR GET ALONG WITH OTHER PEOPLE: 0
1. LITTLE INTEREST OR PLEASURE IN DOING THINGS: 1

## 2023-02-14 ASSESSMENT — ENCOUNTER SYMPTOMS
RESPIRATORY NEGATIVE: 1
COUGH: 0
EYES NEGATIVE: 1
CHEST TIGHTNESS: 0
GASTROINTESTINAL NEGATIVE: 1
RHINORRHEA: 0

## 2023-02-14 NOTE — PROGRESS NOTES
Medicare Annual Wellness Visit    Shantanu Tello is here for Medicare AWV (Here for Medicare AWV)    Assessment & Plan    Recommendations for Preventive Services Due: see orders and patient instructions/AVS.  Recommended screening schedule for the next 5-10 years is provided to the patient in written form: see Patient Instructions/AVS.     No follow-ups on file. Subjective   This encounter was performed under myJoleen MDs, direct supervision, 2/14/2023. Patient's complete Health Risk Assessment and screening values have been reviewed and are found in Flowsheets. The following problems were reviewed today and where indicated follow up appointments were made and/or referrals ordered. Positive Risk Factor Screenings with Interventions:    Fall Risk:  Do you feel unsteady or are you worried about falling? : no  2 or more falls in past year?: (!) yes  Fall with injury in past year?: (!) yes     Interventions:    See AVS for additional education material     Depression:  PHQ-2 Score: 2  PHQ-9 Total Score: 5    Interpretation:   1-4 = minimal  5-9 = mild  10-14 = moderate  15-19 = moderately severe  20-27 = severe    Interventions Is seen regularly at the [facility]. Does see a psychiatrist             Weight and Activity:  Physical Activity: Insufficiently Active    Days of Exercise per Week: 4 days    Minutes of Exercise per Session: 20 min     On average, how many days per week do you engage in moderate to strenuous exercise (like a brisk walk)?: 4 days  Have you lost any weight without trying in the past 3 months?: No  Body mass index: (!) 50.48    Obesity Interventions:  See AVS for additional education material                               Objective   Vitals:    02/14/23 1141   BP: 120/62   Pulse: 74   Resp: 20   Temp: 97.6 °F (36.4 °C)   SpO2: 97%   Weight: 276 lb (125.2 kg)   Height: 5' 2\" (1.575 m)      Body mass index is 50.48 kg/m².              Allergies   Allergen Reactions    Latex Hives Penicillins Swelling and Rash    Shellfish-Derived Products Anaphylaxis    Abilify [Aripiprazole]      shaking    Adhesive Tape Swelling     If left on too long    Topamax [Topiramate]     Buspar [Buspirone]      shaking    Other Nausea And Vomiting    Sulfa Antibiotics Nausea And Vomiting     Prior to Visit Medications    Medication Sig Taking? Authorizing Provider   pregabalin (LYRICA) 150 MG capsule TAKE ONE CAPSULE BY MOUTH THREE TIMES A DAY Yes DELMI Baron - CNP   furosemide (LASIX) 40 MG tablet TAKE ONE TABLET BY MOUTH TWO TIMES A DAY  Patient taking differently: daily TAKE ONE TABLET BY MOUTH TWO TIMES A DAY Yes DELMI Vallejo - CNP   pravastatin (PRAVACHOL) 40 MG tablet TAKE 1 TABLET BY MOUTH EVERY EVENING Yes Janeth Cervantes MD   metFORMIN (GLUCOPHAGE) 500 MG tablet TAKE ONE TABLET BY MOUTH TWO TIMES A DAY WITH MEALS Yes Janeth Cervantes MD   baclofen (LIORESAL) 20 MG tablet TAKE ONE TABLET BY MOUTH FOUR TIMES A DAY  Patient taking differently: 2 times daily Yes Janeth Cervantes MD   omeprazole (PRILOSEC) 40 MG delayed release capsule TAKE ONE CAPSULE BY MOUTH TWO TIMES A DAY Yes Janeth Cervantes MD   ondansetron (ZOFRAN) 4 MG tablet Take 1 tablet by mouth 3 times daily as needed for Nausea or Vomiting Yes DELMI Steiner - CNP   allopurinol (ZYLOPRIM) 100 MG tablet TAKE ONE TABLET BY MOUTH EVERY DAY Yes Janeth Cervantes MD   pioglitazone (ACTOS) 15 MG tablet TAKE ONE TABLET BY MOUTH EVERY DAY Yes Janeth Cervantes MD   montelukast (SINGULAIR) 10 MG tablet TAKE ONE TABLET BY MOUTH NIGHTLY Yes Janeth Cervantes MD   meloxicam (MOBIC) 7.5 MG tablet TAKE 1 TABLET BY MOUTH TWICE DAILY Yes Janeth Cervantes MD   dicyclomine (BENTYL) 10 MG capsule TAKE ONE CAPSULE BY MOUTH 4 TIMES DAILY BEFORE MEALS AND NIGHTLY Yes Janeth Cervantes MD   potassium chloride (KLOR-CON M) 20 MEQ extended release tablet TAKE ONE TABLET BY MOUTH TWO TIMES A DAY  Patient taking differently: 20 mEq daily TAKE ONE TABLET BY MOUTH TWO TIMES A DAY Yes Matthew WILSON Holiday, DO   metoprolol succinate (TOPROL XL) 50 MG extended release tablet TAKE ONE TABLET BY MOUTH TWO TIMES A DAY. HOLD FOR SYSTOLIC BLOOD PRESSURRE LOWER THAN 100 OR HEART RATE LOWER THAN 60. Yes DELMI Luis - CNP   traZODone (DESYREL) 100 MG tablet TAKE 1 TO 2 TABLETS BY MOUTH AT BEDTIME AS NEEDED FOR SLEEP Yes Historical Provider, MD   albuterol sulfate HFA (PROVENTIL HFA) 108 (90 Base) MCG/ACT inhaler Inhale 2 puffs into the lungs every 6 hours as needed for Wheezing Yes Sean Jane MD   magnesium oxide (MAG-OX) 400 (241.3 Mg) MG TABS tablet Take 1 tablet by mouth 2 times daily Yes CHESTER Mcfaddne   DULoxetine (CYMBALTA) 60 MG extended release capsule TAKE ONE CAPSULE BY MOUTH TWICE DAILY AS DIRECTED IN THE MORNING AND AFTERNOON Yes Historical Provider, MD   mometasone (ELOCON) 0.1 % cream APPLY TOPICALLY ONCE DAILY  Sarita Browne MD   butalbital-APAP-caffeine (FIORICET) -40 MG CAPS per capsule Take 1 capsule by mouth every 6 hours as needed for Headaches  CHESTER Fair   ipratropium-albuterol (DUONEB) 0.5-2.5 (3) MG/3ML SOLN nebulizer solution INHALE [address] [facility], MD   Lancets MISC Test bid  Sarita Browne MD   Blood Glucose Monitoring Suppl (ONE TOUCH ULTRA 2) w/Device KIT TEST TWICE DAILY  Historical Provider, MD   prazosin (MINIPRESS) 2 MG capsule TAKE 1 CAPSULE BY MOUTH EVERY NIGHT FOR NIGHTMARES  Patient not taking: No sig reported  Historical Provider, MD   blood glucose monitor strips Test 2 times a day & as needed for symptoms of irregular blood glucose.   Liza Engel MD   Melatonin 5 MG CAPS Take 10 mg by mouth daily   Historical Provider, MD   FREESTYLE LITE strip use three times daily   Patient not taking: Reported on 2/14/2023  [facility] (Including outside providers/suppliers regularly involved in providing care):   Patient Care Team:  Sarita Browne MD as PCP - General (Family Medicine)  Anitra Chang MD as PCP - Hematology/Oncology (Hematology and Oncology)  Michelle Schultz MD as PCP - Empaneled Provider     Reviewed and updated this visit:  Tobacco  Allergies  Meds  Med Hx  Surg Hx  Soc Hx  Fam Hx        I, Mackenzie Brooks LPN, 4/37/0088, performed the documented evaluation under the direct supervision of the attending physician.       Mackenzie Brooks LPN

## 2023-02-14 NOTE — PATIENT INSTRUCTIONS
Personalized Preventive Plan for Kate Damien - 2/14/2023  Medicare offers a range of preventive health benefits. Some of the tests and screenings are paid in full while other may be subject to a deductible, co-insurance, and/or copay. Some of these benefits include a comprehensive review of your medical history including lifestyle, illnesses that may run in your family, and various assessments and screenings as appropriate. After reviewing your medical record and screening and assessments performed today your provider may have ordered immunizations, labs, imaging, and/or referrals for you. A list of these orders (if applicable) as well as your Preventive Care list are included within your After Visit Summary for your review. Other Preventive Recommendations:    A preventive eye exam performed by an eye specialist is recommended every 1-2 years to screen for glaucoma; cataracts, macular degeneration, and other eye disorders. A preventive dental visit is recommended every 6 months. Try to get at least 150 minutes of exercise per week or 10,000 steps per day on a pedometer . Order or download the FREE \"Exercise & Physical Activity: Your Everyday Guide\" from [employer]. Call 7-435.519.9015 or search [employer] online. You need 0580-1833 mg of calcium and 3549-6344 IU of vitamin D per day. It is possible to meet your calcium requirement with diet alone, but a vitamin D supplement is usually necessary to meet this goal.  When exposed to the sun, use a sunscreen that protects against both UVA and UVB radiation with an SPF of 30 or greater. Reapply every 2 to 3 hours or after sweating, drying off with a towel, or swimming. Always wear a seat belt when traveling in a car. Always wear a helmet when riding a bicycle or motorcycle. Heart-Healthy Diet   Sodium, Fat, and Cholesterol Controlled Diet       What Is a Heart Healthy Diet?    A heart-healthy diet is one that limits sodium , certain types of fat , and cholesterol . This type of diet is recommended for:   People with any form of cardiovascular disease (eg, coronary heart disease , peripheral vascular disease , previous heart attack , previous stroke )   People with risk factors for cardiovascular disease, such as high blood pressure , high cholesterol , or diabetes   Anyone who wants to lower their risk of developing cardiovascular disease   Sodium    Sodium is a mineral found in many foods. In general, most people consume much more sodium than they need. Diets high in sodium can increase blood pressure and lead to edema (water retention). On a heart-healthy diet, you should consume no more than 2,300 mg (milligrams) of sodium per dayabout the amount in one teaspoon of table salt. The foods highest in sodium include table salt (about 50% sodium), processed foods, convenience foods, and preserved foods. Cholesterol    Cholesterol is a fat-like, waxy substance in your blood. Our bodies make some cholesterol. It is also found in animal products, with the highest amounts in fatty meat, egg yolks, whole milk, cheese, shellfish, and organ meats. On a heart-healthy diet, you should limit your cholesterol intake to less than 200 mg per day. It is normal and important to have some cholesterol in your bloodstream. But too much cholesterol can cause plaque to build up within your arteries, which can eventually lead to a heart attack or stroke. The two types of cholesterol that are most commonly referred to are:   Low-density lipoprotein (LDL) cholesterol  Also known as bad cholesterol, this is the cholesterol that tends to build up along your arteries. Bad cholesterol levels are increased by eating fats that are saturated or hydrogenated. Optimal level of this cholesterol is less than 100. Over 130 starts to get risky for heart disease.    High-density lipoprotein (HDL) cholesterol  Also known as good cholesterol, this type of cholesterol actually carries cholesterol away from your arteries and may, therefore, help lower your risk of having a heart attack. You want this level to be high (ideally greater than 60). It is a risk to have a level less than 40. You can raise this good cholesterol by eating olive oil, canola oil, avocados, or nuts. Exercise raises this level, too. Fat    Fat is calorie dense and packs a lot of calories into a small amount of food. Even though fats should be limited due to their high calorie content, not all fats are bad. In fact, some fats are quite healthful. Fat can be broken down into four main types. The good-for-you fats are:   Monounsaturated fat  found in oils such as olive and canola, avocados, and nuts and natural nut butters; can decrease cholesterol levels, while keeping levels of HDL cholesterol high   Polyunsaturated fat  found in oils such as safflower, sunflower, soybean, corn, and sesame; can decrease total cholesterol and LDL cholesterol   Omega-3 fatty acids  particularly those found in fatty fish (such as salmon, trout, tuna, mackerel, herring, and sardines); can decrease risk of arrhythmias, decrease triglyceride levels, and slightly lower blood pressure   The fats that you want to limit are:   Saturated fat  found in animal products, many fast foods, and a few vegetables; increases total blood cholesterol, including LDL levels   Animal fats that are saturated include: butter, lard, whole-milk dairy products, meat fat, and poultry skin   Vegetable fats that are saturated include: hydrogenated shortening, palm oil, coconut oil, cocoa butter   Hydrogenated or trans fat  found in margarine and vegetable shortening, most shelf stable snack foods, and fried foods; increases LDL and decreases HDL     It is generally recommended that you limit your total fat for the day to less than 30% of your total calories.  If you follow an 1800-calorie heart healthy diet, for example, this would mean 60 grams of fat or less per day.   Saturated fat and trans fat in your diet raises your blood cholesterol the most, much more than dietary cholesterol does. For this reason, on a heart-healthy diet, less than 7% of your calories should come from saturated fat and ideally 0% from trans fat. On an 1800-calorie diet, this translates into less than 14 grams of saturated fat per day, leaving 46 grams of fat to come from mono- and polyunsaturated fats.   Food Choices on a Heart Healthy Diet   Food Category   Foods Recommended   Foods to Avoid   Grains   Breads and rolls without salted tops Most dry and cooked cereals Unsalted crackers and breadsticks Low-sodium or homemade breadcrumbs or stuffing All rice and pastas   Breads, rolls, and crackers with salted tops High-fat baked goods (eg, muffins, donuts, pastries) Quick breads, self-rising flour, and biscuit mixes Regular bread crumbs Instant hot cereals Commercially prepared rice, pasta, or stuffing mixes   Vegetables   Most fresh, frozen, and low-sodium canned vegetables Low-sodium and salt-free vegetable juices Canned vegetables if unsalted or rinsed   Regular canned vegetables and juices, including sauerkraut and pickled vegetables Frozen vegetables with sauces Commercially prepared potato and vegetable mixes   Fruits   Most fresh, frozen, and canned fruits All fruit juices   Fruits processed with salt or sodium   Milk   Nonfat or low-fat (1%) milk Nonfat or low-fat yogurt Cottage cheese, low-fat ricotta, cheeses labeled as low-fat and low-sodium   Whole milk Reduced-fat (2%) milk Malted and chocolate milk Full fat yogurt Most cheeses (unless low-fat and low salt) Buttermilk (no more than 1 cup per week)   Meats and Beans   Lean cuts of fresh or frozen beef, veal, lamb, or pork (look for the word loin) Fresh or frozen poultry without the skin Fresh or frozen fish and some shellfish Egg whites and egg substitutes (Limit whole eggs to three per week) Tofu Nuts or seeds  (unsalted, dry-roasted), low-sodium peanut butter Dried peas, beans, and lentils   Any smoked, cured, salted, or canned meat, fish, or poultry (including mabry, chipped beef, cold cuts, hot dogs, sausages, sardines, and anchovies) Poultry skins Breaded and/or fried fish or meats Canned peas, beans, and lentils Salted nuts   Fats and Oils   Olive oil and canola oil Low-sodium, low-fat salad dressings and mayonnaise   Butter, margarine, coconut and palm oils, mabry fat   Snacks, Sweets, and Condiments   Low-sodium or unsalted versions of broths, soups, soy sauce, and condiments Pepper, herbs, and spices; vinegar, lemon, or lime juice Low-fat frozen desserts (yogurt, sherbet, fruit bars) Sugar, cocoa powder, honey, syrup, jam, and preserves Low-fat, trans-fat free cookies, cakes, and pies Rufus and animal crackers, fig bars, fredy snaps   High-fat desserts Broth, soups, gravies, and sauces, made from instant mixes or other high-sodium ingredients Salted snack foods Canned olives Meat tenderizers, seasoning salt, and most flavored vinegars   Beverages   Low-sodium carbonated beverages Tea and coffee in moderation Soy milk   Commercially softened water   Suggestions   Make whole grains, fruits, and vegetables the base of your diet. Choose heart-healthy fats such as canola, olive, and flaxseed oil, and foods high in heart-healthy fats, such as nuts, seeds, soybeans, tofu, and fish. Eat fish at least twice per week; the fish highest in omega-3 fatty acids and lowest in mercury include salmon, herring, mackerel, sardines, and canned chunk light tuna. If you eat fish less than twice per week or have high triglycerides, talk to your doctor about taking fish oil supplements. Read food labels. For products low in fat and cholesterol, look for fat free, low-fat, cholesterol free, saturated fat free, and trans fat freeAlso scan the Nutrition Facts Label, which lists saturated fat, trans fat, and cholesterol amounts. For products low in sodium, look for sodium free, very low sodium, low sodium, no added salt, and unsalted   Skip the salt when cooking or at the table; if food needs more flavor, get creative and try out different herbs and spices. Garlic and onion also add substantial flavor to foods. Trim any visible fat off meat and poultry before cooking, and drain the fat off after simental. Use cooking methods that require little or no added fat, such as grilling, boiling, baking, poaching, broiling, roasting, steaming, stir-frying, and sauting. Avoid fast food and convenience food. They tend to be high in saturated and trans fat and have a lot of added salt. Talk to a registered dietitian for individualized diet advice. Last Reviewed: March 2011 Mirlela Alba MS, MPH, RD   Updated: 3/29/2011     Heart-Healthy Diet   Sodium, Fat, and Cholesterol Controlled Diet       What Is a Heart Healthy Diet? A heart-healthy diet is one that limits sodium , certain types of fat , and cholesterol . This type of diet is recommended for:   People with any form of cardiovascular disease (eg, coronary heart disease , peripheral vascular disease , previous heart attack , previous stroke )   People with risk factors for cardiovascular disease, such as high blood pressure , high cholesterol , or diabetes   Anyone who wants to lower their risk of developing cardiovascular disease   Sodium    Sodium is a mineral found in many foods. In general, most people consume much more sodium than they need. Diets high in sodium can increase blood pressure and lead to edema (water retention). On a heart-healthy diet, you should consume no more than 2,300 mg (milligrams) of sodium per dayabout the amount in one teaspoon of table salt. The foods highest in sodium include table salt (about 50% sodium), processed foods, convenience foods, and preserved foods. Cholesterol    Cholesterol is a fat-like, waxy substance in your blood.  Our bodies make some cholesterol. It is also found in animal products, with the highest amounts in fatty meat, egg yolks, whole milk, cheese, shellfish, and organ meats. On a heart-healthy diet, you should limit your cholesterol intake to less than 200 mg per day. It is normal and important to have some cholesterol in your bloodstream. But too much cholesterol can cause plaque to build up within your arteries, which can eventually lead to a heart attack or stroke. The two types of cholesterol that are most commonly referred to are:   Low-density lipoprotein (LDL) cholesterol  Also known as bad cholesterol, this is the cholesterol that tends to build up along your arteries. Bad cholesterol levels are increased by eating fats that are saturated or hydrogenated. Optimal level of this cholesterol is less than 100. Over 130 starts to get risky for heart disease. High-density lipoprotein (HDL) cholesterol  Also known as good cholesterol, this type of cholesterol actually carries cholesterol away from your arteries and may, therefore, help lower your risk of having a heart attack. You want this level to be high (ideally greater than 60). It is a risk to have a level less than 40. You can raise this good cholesterol by eating olive oil, canola oil, avocados, or nuts. Exercise raises this level, too. Fat    Fat is calorie dense and packs a lot of calories into a small amount of food. Even though fats should be limited due to their high calorie content, not all fats are bad. In fact, some fats are quite healthful. Fat can be broken down into four main types.    The good-for-you fats are:   Monounsaturated fat  found in oils such as olive and canola, avocados, and nuts and natural nut butters; can decrease cholesterol levels, while keeping levels of HDL cholesterol high   Polyunsaturated fat  found in oils such as safflower, sunflower, soybean, corn, and sesame; can decrease total cholesterol and LDL cholesterol   Omega-3 fatty acids  particularly those found in fatty fish (such as salmon, trout, tuna, mackerel, herring, and sardines); can decrease risk of arrhythmias, decrease triglyceride levels, and slightly lower blood pressure   The fats that you want to limit are:   Saturated fat  found in animal products, many fast foods, and a few vegetables; increases total blood cholesterol, including LDL levels   Animal fats that are saturated include: butter, lard, whole-milk dairy products, meat fat, and poultry skin   Vegetable fats that are saturated include: hydrogenated shortening, palm oil, coconut oil, cocoa butter   Hydrogenated or trans fat  found in margarine and vegetable shortening, most shelf stable snack foods, and fried foods; increases LDL and decreases HDL     It is generally recommended that you limit your total fat for the day to less than 30% of your total calories. If you follow an 1800-calorie heart healthy diet, for example, this would mean 60 grams of fat or less per day. Saturated fat and trans fat in your diet raises your blood cholesterol the most, much more than dietary cholesterol does. For this reason, on a heart-healthy diet, less than 7% of your calories should come from saturated fat and ideally 0% from trans fat. On an 1800-calorie diet, this translates into less than 14 grams of saturated fat per day, leaving 46 grams of fat to come from mono- and polyunsaturated fats.    Food Choices on a Heart Healthy Diet   Food Category   Foods Recommended   Foods to Avoid   Grains   Breads and rolls without salted tops Most dry and cooked cereals Unsalted crackers and breadsticks Low-sodium or homemade breadcrumbs or stuffing All rice and pastas   Breads, rolls, and crackers with salted tops High-fat baked goods (eg, muffins, donuts, pastries) Quick breads, self-rising flour, and biscuit mixes Regular bread crumbs Instant hot cereals Commercially prepared rice, pasta, or stuffing mixes   Vegetables   Most fresh, frozen, and low-sodium canned vegetables Low-sodium and salt-free vegetable juices Canned vegetables if unsalted or rinsed   Regular canned vegetables and juices, including sauerkraut and pickled vegetables Frozen vegetables with sauces Commercially prepared potato and vegetable mixes   Fruits   Most fresh, frozen, and canned fruits All fruit juices   Fruits processed with salt or sodium   Milk   Nonfat or low-fat (1%) milk Nonfat or low-fat yogurt Cottage cheese, low-fat ricotta, cheeses labeled as low-fat and low-sodium   Whole milk Reduced-fat (2%) milk Malted and chocolate milk Full fat yogurt Most cheeses (unless low-fat and low salt) Buttermilk (no more than 1 cup per week)   Meats and Beans   Lean cuts of fresh or frozen beef, veal, lamb, or pork (look for the word loin) Fresh or frozen poultry without the skin Fresh or frozen fish and some shellfish Egg whites and egg substitutes (Limit whole eggs to three per week) Tofu Nuts or seeds (unsalted, dry-roasted), low-sodium peanut butter Dried peas, beans, and lentils   Any smoked, cured, salted, or canned meat, fish, or poultry (including mabry, chipped beef, cold cuts, hot dogs, sausages, sardines, and anchovies) Poultry skins Breaded and/or fried fish or meats Canned peas, beans, and lentils Salted nuts   Fats and Oils   Olive oil and canola oil Low-sodium, low-fat salad dressings and mayonnaise   Butter, margarine, coconut and palm oils, mabry fat   Snacks, Sweets, and Condiments   Low-sodium or unsalted versions of broths, soups, soy sauce, and condiments Pepper, herbs, and spices; vinegar, lemon, or lime juice Low-fat frozen desserts (yogurt, sherbet, fruit bars) Sugar, cocoa powder, honey, syrup, jam, and preserves Low-fat, trans-fat free cookies, cakes, and pies Rufus and animal crackers, fig bars, fredy snaps   High-fat desserts Broth, soups, gravies, and sauces, made from instant mixes or other high-sodium ingredients Salted snack foods Canned olives Meat  tenderizers, seasoning salt, and most flavored vinegars   Beverages   Low-sodium carbonated beverages Tea and coffee in moderation Soy milk   Commercially softened water   Suggestions   Make whole grains, fruits, and vegetables the base of your diet. Choose heart-healthy fats such as canola, olive, and flaxseed oil, and foods high in heart-healthy fats, such as nuts, seeds, soybeans, tofu, and fish. Eat fish at least twice per week; the fish highest in omega-3 fatty acids and lowest in mercury include salmon, herring, mackerel, sardines, and canned chunk light tuna. If you eat fish less than twice per week or have high triglycerides, talk to your doctor about taking fish oil supplements. Read food labels. For products low in fat and cholesterol, look for fat free, low-fat, cholesterol free, saturated fat free, and trans fat freeAlso scan the Nutrition Facts Label, which lists saturated fat, trans fat, and cholesterol amounts. For products low in sodium, look for sodium free, very low sodium, low sodium, no added salt, and unsalted   Skip the salt when cooking or at the table; if food needs more flavor, get creative and try out different herbs and spices. Garlic and onion also add substantial flavor to foods. Trim any visible fat off meat and poultry before cooking, and drain the fat off after simental. Use cooking methods that require little or no added fat, such as grilling, boiling, baking, poaching, broiling, roasting, steaming, stir-frying, and sauting. Avoid fast food and convenience food. They tend to be high in saturated and trans fat and have a lot of added salt. Talk to a registered dietitian for individualized diet advice. Last Reviewed: March 2011 Chaim Millan MS, MPH, RD   Updated: 3/29/2011     Patient information: Weight loss treatments    INTRODUCTION -- Obesity is a major international problem, and Americans are among the heaviest people in the world.  The percentage of obese people in the [place] has risen steadily. Many people find that although they initially lose weight by dieting, they quickly regain the weight after the diet ends. Because it so hard to keep weight off over time, it is important to have as much information and support as possible before starting a diet. You are most likely to be successful in losing weight and keeping it off when you believe that your body weight can be controlled. STARTING A WEIGHT LOSS PROGRAM -- Some people like to talk to their doctor or nurse to get help choosing the best plan, monitoring progress, and getting advice and support along the way. To know what treatment (or combination of treatments) will work best, determine your body mass index (BMI) and waist circumference (measurement). The BMI is calculated from your height and weight. A person with a BMI between 25 and 29.9 is considered overweight   A person with a BMI of 30 or greater is considered to be obese  A waist circumference greater than 35 inches (88 cm) in women and 40 inches (102 cm) in men increases the risk of obesity-related complications, such as heart disease and diabetes. People who are obese and who have a larger waist size may need more aggressive weight loss treatment than others. Talk to your doctor or nurse for advice. Types of treatment -- Based on your measurements and your medical history, your doctor or nurse can determine what combination of weight loss treatments would work best for you. Treatments may include changes in lifestyle, exercise, dieting, and, in some cases, weight loss medicines or weight loss surgery. Weight loss surgery, also called bariatric surgery, is reserved for people with severe obesity who have not responded to other weight loss treatments.    SETTING A WEIGHT LOSS GOAL -- It is important to set a realistic weight loss goal. Your first goal should be to avoid gaining more weight and staying at your current weight (or within 5 percent). Many people have a \"dream\" weight that is difficult or impossible to achieve. People at high risk of developing diabetes who are able to lose 5 percent of their body weight and maintain this weight will reduce their risk of developing diabetes by about 50 percent and reduce their blood pressure. This is a success. Losing more than 15 percent of your body weight and staying at this weight is an extremely good result, even if you never reach your \"dream\" or \"ideal\" weight. LIFESTYLE CHANGES -- Programs that help you to change your lifestyle are usually run by psychologists or other professionals. The goals of lifestyle changes are to help you change your eating habits, become more active, and be more aware of how much you eat and exercise, helping you to make healthier choices. This type of treatment can be broken down into three steps: The triggers that make you want to eat   Eating   What happens after you eat  Triggers to eat -- Determining what triggers you to eat involves figuring out what foods you eat and where and when you eat. To figure out what triggers you to eat, keep a record for a few days of everything you eat, the places where you eat, how often you eat, and the emotions you were feeling when you ate. For some people, the trigger is related to a certain time of day or night. For others, the trigger is related to a certain place, like sitting at a desk working. Eating -- You can change your eating habits by breaking the chain of events between the trigger for eating and eating itself. There are many ways to do this.  For instance, you can:  Limit where you eat to a few places (eg, dining room)   Restrict the number of utensils (eg, only a fork) used for eating   Drink a sip of water between each bite   Chew your food a certain number of times   Get up and stop eating every few minutes  What happens after you eat -- Rewarding yourself for good eating behaviors can help you to develop better habits. This is not a reward for weight loss; instead, it is a reward for changing unhealthy behaviors. Do not use food as a reward. Some people find money, clothing, or personal care (eg, a hair cut, manicure, or massage) to be effective rewards. Treat yourself immediately after making better eating choices to reinforce the value of the good behavior. You need to have clear behavior goals, and you must have a time frame for reaching your goals. Reward small changes along the way to your final goal.  Other factors that contribute to successful weight loss -- Changing your behavior involves more than just changing unhealthy eating habits; it also involves finding people around you to support your weight loss, reducing stress, and learning to be strong when tempted by food. Establish a \"gladis\" system -- Having a friend or family member available to provide support and reinforce good behavior is very helpful. The support person needs to understand your goals. Learn to be strong -- Learning to be strong when tempted by food is an important part of losing weight. As an example, you will need to learn how to say \"no\" and continue to say no when urged to eat at parties and social gatherings. Develop strategies for events before you go, such as eating before you go or taking low-calorie snacks and drinks with you. Develop a support system -- Having a support system is helpful when losing weight. This is why many commercial groups are successful. Family support is also essential; if your family does not support your efforts to lose weight, this can slow your progress or even keep you from losing weight. Positive thinking -- People often have conversations with themselves in their head; these conversations can be positive or negative. If you eat a piece of cake that was not planned, you may respond by thinking, \"Oh, you stupid idiot, you've blown your diet! \" and as a result, you may eat more cake.   A positive thought for the same event could be, \"Well, I ate cake when it was not on my plan. Now I should do something to get back on track. \" A positive approach is much more likely to be successful than a negative one. Reduce stress -- Although stress is a part of everyday life, it can trigger uncontrolled eating in some people. It is important to find a way to get through these difficult times without eating or by eating low-calorie food, like raw vegetables. It may be helpful to imagine a relaxing place that allows you to temporarily escape from stress. With deep breaths and closed eyes, you can imagine this relaxing place for a few minutes. Self-help programs -- Self-help programs like [employer] Watchers®, Overeaters Anonymous®, and Take Off [employer])© work for some people. As with all weight loss programs, you are most likely to be successful with these plans if you make long-term changes in how you eat. CHOOSING A DIET -- A calorie is a unit of energy found in food. Your body needs calories to function. The goal of any diet is to burn up more calories than you eat. How quickly you lose weight depends upon several factors, such as your age, gender, and starting weight. Older people have a slower metabolism than young people, so they lose weight more slowly. Men lose more weight than women of similar height and weight when dieting because they use more energy. People who are extremely overweight lose weight more quickly than those who are only mildly overweight. Try not to drink alcohol or drinks with added sugar, and most sweets (candy, cakes, cookies), since they rarely contain important nutrients. Portion-controlled diets -- One simple way to diet is to buy packaged foods, like frozen low-calorie meals or meal-replacement canned drinks.  A typical meal plan for one day may include:  A meal-replacement drink or breakfast bar for breakfast   A meal-replacement drink or a frozen low-calorie (250 to 350 calories) meal for lunch   A frozen low-calorie meal or other prepackaged, calorie-controlled meal, along with extra vegetables for dinner  This would give you 1000 to 1500 calories per day. Low-fat diet -- To reduce the amount of fat in your diet, you can:  Eat low-fat foods. Low-fat foods are those that contain less than 30 percent of calories from fat. Fat is listed on the food facts label (figure 1). Count fat grams. For a 1500 calorie diet, this would mean about 45 g or fewer of fat per day. Low-carbohydrate diet -- Low- and very-low-carbohydrate diets (eg, Atkins diet, [employer]) have become popular ways to lose weight quickly. With a very-low-carbohydrate diet, you eat between 0 and 60 grams of carbohydrates per day (a standard diet contains 200 to 300 grams of carbohydrates)   With a low-carbohydrate diet, you eat between 60 and 130 grams of carbohydrates per day  Carbohydrates are found in fruits, vegetables, and grains (including breads, rice, pasta, and cereal), alcoholic beverages, and in dairy products. Meat and fish do not contain carbohydrates. Side effects of very-low-carbohydrate diets can include constipation, headache, bad breath, muscle cramps, diarrhea, and weakness. Mediterranean diet -- The term \"Mediterranean diet\" refers to a way of eating that is common in [place] regions around the [facility]. Although there is some variation in Mediterranean diets, there are some similarities. Most Mediterranean diets include:  A high level of monounsaturated fats (from olive or canola oil, walnuts, pecans, almonds) and a low level of saturated fats (from butter)   A high amount of vegetables, fruits, legumes, and grains (7 to 10 servings of fruits and vegetables per day)   A moderate amount of milk and dairy products, mostly in the form of cheese. Use low-fat dairy products (skim milk, fat-free yogurt, low-fat cheese).    A relatively low amount of red meat and meat products. Substitute fish or poultry for red meat. For those who drink alcohol, a modest amount (mainly as red wine) may help to protect against cardiovascular disease. A modest amount is up to one (4 ounce) glass per day for women and up to two glasses per day for men. Which diet is best? -- Studies have compared different diets, including:  Very-low-carbohydrate (Atkins)   Macronutrient balance controlling glycemic load (Zone®)   Reduced-calorie (Weight Watchers®)   Very-low-fat (Ornish)  No one diet is \"best\" for weight loss. Any diet will help you to lose weight if you stick with the diet. Therefore, it is important to choose a diet that includes foods you like. Fad diets -- Fad diets often promise quick weight loss (more than 1 to 2 pounds per week) and may claim that you do not need to exercise or give up favorite foods. Some fad diets cost a lot of money, because you have to pay for seminars or pills. Fad diets generally lack any scientific evidence that they are safe and effective, but instead rely on \"before\" and \"after\" photos or testimonials. Diets that sound too good to be true usually are. These plans are a waste of time and money and are not recommended. A doctor, nurse, or nutritionist can help you find a safe and effective way to lose weight and keep it off. WEIGHT LOSS MEDICINES -- Taking a weight loss medicine may be helpful when used in combination with diet, exercise, and lifestyle changes. However, it is important to understand the risks and benefits of these medicines. It is also important to be realistic about your goal weight using a weight loss medicine; you may not reach your \"dream\" weight, but you may be able to reduce your risk of diabetes or heart disease.    Weight loss medicines may be recommended for people who have not been able to lose weight with diet and exercise who have a:  BMI of 30 or more    BMI between 27 and 29.9 and have other medical problems, such as diabetes, high cholesterol, or high blood pressure  Two weight loss medicines are approved in the [facility] for long-term use. These are sibutramine and orlistat. Other weight loss medicines (phentermine, diethylpropion) are available but are only approved for short-term use (up to 12 weeks). Sibutramine -- Sibutramine (Meridia®, Reductil®) is a medicine that reduces your appetite. In people who take the medicine for one year, the average weight loss is 10 percent of the initial body weight (5 percent more than those who took a placebo treatment). Side effects of sibutramine include insomnia, dry mouth, and constipation. Increases in blood pressure can occur. Therefore, blood pressure is usually monitored during treatment. There is no evidence that sibutramine causes heart or lung problems (like dexfenfluramine and fenfluramine (Phen/Fen)). However, experts agree that sibutramine should not used by people with coronary heart disease, heart failure, uncontrolled hypertension, stroke, irregular heart rhythms, or peripheral vascular disease (poor circulation in the legs). Orlistat -- Orlistat (Xenical® 120 mg capsules) is a medicine that reduces the amount of fat your body absorbs from the foods you eat. A lower-dose version is now available without a prescription (Bryon® 60 mg capsules) in many countries, including the [facility]. The medicine is recommended three times per day, taken with a meal; you can skip a dose if you skip a meal or if the meal contains no fat. After one year of treatment with orlistat, the average weight loss is approximately 8 to 10 percent of initial body weight (4 percent more than in those who took a placebo). Cholesterol levels often improve, and blood pressure sometimes falls. In people with diabetes, orlistat may help control blood sugar levels. Side effects occur in 15 to 10 percent of people and may include stomach cramps, gas, diarrhea, leakage of stool, or oily stools.  These problems are more likely when you take orlistat with a high-fat meal (if more than 30 percent of calories in the meal are from fat). Side effects usually improve as you learn to avoid high-fat foods. Severe liver injury has been reported rarely in patients taking orlistat, but it is not known if orlistat caused the liver problems. Diet supplements -- Diet supplements are widely used by people who are trying to lose weight, although the safety and efficacy of these supplements are often unproven. A few of the more common diet supplements are discussed below; none of these are recommended because they have not been studied carefully, and there is no proof they are safe or effective. Chitosan and wheat dextrin are ineffective for weight loss, and their use is not recommended. Ephedra, a compound related to ephedrine, is no longer available in the [place] due to safety concerns. Many nonprescription diet pills previously contained ephedra. Although some studies have shown that ephedra helps with weight loss, there can be serious side effects (psychiatric symptoms, palpitations, and stomach upset), including death. There are not enough data about the safety and efficacy of chromium, ginseng, glucomannan, green tea, hydroxycitric acid, L carnitine, psyllium, pyruvate supplements, Cook wort, and conjugated linoleic acid. Two supplements from [facility], [address] [place] (also known as the Krista Snyder 15 pill) and Herbathin dietary supplement, have been shown to contain prescription drugs. Hoodia gordonii is a dietary supplement derived from a plant in [place]. It is not recommended because there is no proof that it is safe or effective. Bitter orange (Citrus aurantium) can increase your heart rate and blood pressure and is not recommended.   SHOULD I HAVE SURGERY TO LOSE WEIGHT? -- Weight loss surgery is recommended ONLY for people with one of the following:  Severe obesity (body mass index above 40) (calculator 1 and calculator 2) who have not responded to diet, exercise, or weight loss medicines   Body mass index between 35 and 40, along with a serious medical problem (including diabetes, severe joint pain, or sleep apnea) that would improve with weight loss  You should be sure that you understand the potential risks and benefits of weight loss surgery. You must be motivated and willing to make lifelong changes in how you eat to reach and maintain a healthier weight after surgery. You must also be realistic about weight loss after surgery (see 'Effectiveness of weight loss surgery' below). PREPARING FOR WEIGHT LOSS SURGERY -- Most people who have weight loss surgery will meet with several specialists before surgery is scheduled. This often includes a dietitian, mental health counselor, a doctor who specializes in care of obese people, and a surgeon who performs weight loss surgery (bariatric surgeon). You may need to work with these providers for several weeks or months before surgery. The nutritionist will explain what and how much you will be able to eat after surgery. You may also need to lose a small amount of weight before surgery. The mental health specialist will help you to cope with stress and other factors that can make it harder to lose weight or trigger you to eat   The medical doctor will determine whether you need other tests, counseling, or treatment before surgery. He or she might also help you begin a medical weight loss program so that you can lose some weight before surgery. The bariatric surgeon will meet with you to discuss the surgeries available to treat obesity. He or she will also make sure you are a good candidate for surgery. TYPES OF WEIGHT LOSS SURGERY -- There are several types of weight loss surgeries, the most common being lap banding, gastric bypass, and gastric sleeve.   Lap banding -- Laparoscopic adjustable gastric banding (LAGB), or lap banding, is a surgery that uses an adjustable band around the opening to the stomach (figure 1). This reduces the amount of food that you can eat at one time. Lap banding is done through small incisions, with a laparoscope. The band can be adjusted after surgery, allowing you to eat more or less food. Adjustments to the size and tightness of the band are made by using a needle to add or remove fluid from a port (a small container under the skin that is connected to the band). Adding fluid to the band makes it tighter which restricts the amount of food you can eat and may help you to lose more weight. Lap banding is a popular choice because it is relatively simple to perform, can be adjusted or removed, and has a low risk of serious complications immediately after surgery. However, weight loss with the lap band depends on your ability to follow the program closely. You will need to prepare nutritious meals that [facility] with\" the band, not against it. For example, the lap band will not work well if you eat or drink a large amount of liquid calories (like ice cream). The band will not help you to feel full when you eat/drink liquid calories. Weight loss ranges from 45 to 75 percent after two years. As an example, a person who is 120 pounds overweight could expect to lose approximately 54 to 90 pounds in the two years after lap banding. Gastric bypass -- Alva-en-Y gastric bypass, also called gastric bypass, helps you to lose weight by reducing the amount of food you can eat and reducing the number of calories and nutrients you absorb from the food you eat. To perform gastric bypass, a surgeon creates a small stomach pouch by dividing the stomach and attaching it to the small intestine. This helps you to lose weight in two ways: The smaller stomach can hold less food than before surgery. This causes you to feel full after eating a very small amount of food or liquid. Over time, the pouch might stretch, allowing you to eat more food.    The body absorbs fewer calories, since food bypasses most of the stomach as well as the upper small intestine. This new arrangement seems to decrease your appetite and change how you break down foods by changing the release of various hormones.  Gastric bypass can be performed as open surgery (through an incision on the abdomen) or laparoscopically, which uses smaller incisions and smaller instruments. Both the laparoscopic and open techniques have risks and benefits. You and your surgeon should work together to decide which surgery, if any, is right for you.  Gastric bypass has a high success rate, and people lose an average of 62 to 68 percent of their excess body weight in the first year. Weight loss typically levels off after one to two years, with an overall excess weight loss between 50 and 75 percent. For a person who is 120 pounds overweight, an average of 60 to 90 pounds of weight loss would be expected.  Gastric sleeve -- Gastric sleeve, also known as sleeve gastrectomy, is a surgery that reduces the size of the stomach and makes it into a narrow tube (figure 3). The new stomach is much smaller and produces less of the hormone (ghrelin) that causes hunger, helping you feel satisfied with less food.  Sleeve gastrectomy is safer than gastric bypass because the intestines are not rearranged, and there is less chance of malnutrition. It also appears to control hunger better than lap banding. It might be safer than the lap banding because no foreign materials are used.  The gastric sleeve has a good success rate, and people lose an average of 33 percent of their excess body weight in the first year. For a person who is 120 pounds overweight, this would mean losing about 40 pounds in the first year.  WEIGHT LOSS SURGERY COMPLICATIONS -- A variety of complications can occur with weight loss surgery. The risks of surgery depend upon which surgery you have and any medical problems you had before surgery. Some of the  more common early surgical complications (one to six weeks after surgery) include:  Bleeding   Infection   Blockage or tear in the bowels   Need for further surgery  Important medical complications after surgery can include blood clots in the legs or lungs, heart attack, pneumonia, and urinary tract infection. Complications are less likely when surgery is performed in centers that are experienced in weight loss surgery. In general, centers with experience in weight loss surgery have:  Board-certified doctors and surgeons   A team of support staff (dietitians, counselors, nurses)   Long-term follow-up after surgery   Hospital staff experienced with the care of weight loss patients. This includes nurses who are trained in the care of patients immediately after surgery and anesthesiologists who are experienced in caring for the morbidly obese. EFFECTIVENESS OF WEIGHT LOSS SURGERY -- The goal of weight loss surgery is to reduce the risk of illness or death associated with obesity. Weight loss surgery can also help you to feel and look better, reduce the amount of money you spend on medicines, and cut down on sick days. As an example, weight loss surgery can improve health problems related to obesity (diabetes, high blood pressure, high cholesterol, sleep apnea) to the point that you need less or no medicine. Finally, weight loss surgery might reduce your risk of developing heart disease, cancer, and certain infections. AFTER WEIGHT LOSS SURGERY -- You will need to stay in the hospital until your team feels that it is safe for you to leave (on average, one to three days). Do not drive if you are taking prescription pain medicine. Begin exercising as soon as possible once you have healed; most weight loss centers will design an exercise program for you. Once you are home, it is important to eat and drink exactly what your doctor and dietitian recommend.  You will see your doctor, nurse, and dietitian on a regular basis after surgery to monitor your health, diet, and weight loss. You will be able to slowly increase how much you eat over time, although it will always be important to:  Eat small, frequent meals and not skip meals   Chew your food slowly and completely   Avoid eating while \"distracted\" (such as eating while watching TV)   Stop eating when you feel full   Drink liquids at least 30 minutes before or after eating   Avoid foods high in fat or sugar   Take vitamin supplements, as recommended  It can take several months to learn to listen to your body so that you know when you are hungry and when you are full. You may dislike foods you previously loved, and you may begin to prefer new foods. This can be a frustrating process for some people, so talk to your dietitian if you are having trouble. It usually takes between one and two years to lose weight after surgery. After reaching their goal weight, some people have plastic surgery (called \"body contouring\") to remove excess skin from the body, particularly in the abdominal area. Before you decide to have weight loss surgery, you must commit to staying healthy for life. This includes following up with your healthcare team, exercising most days of the week, and eating a sensible diet every day. It can be difficult to develop new eating and exercise habits after weight loss surgery, and you will have to work hard to stick to your goals. Recovering from surgery and losing weight can be stressful and emotional, and it is important to have the support of family and friends. Working with a , therapist, or support group can help you through the ups and downs. WHERE TO GET MORE INFORMATION -- Your healthcare provider is the best source of information for questions and concerns related to your medical problem.   This article will be updated as needed every four months on our Web site ([email or web address])    Keep Your Memory Kamini [place]       Many factors can affect your ability to remembera hectic lifestyle, aging, stress, chronic disease, and certain medicines. But, there are steps you can take to sharpen your mind and help preserve your memory. Challenge Your Brain   Regularly challenging your mind may help keeps it in top shape. Good mental exercises include:   Crossword puzzlesUse a dictionary if you need it; you will learn more that way. Brainteasers Try some! Crafts, such as wood working and sewing   Hobbies, such as gardening and building model airplanes   SocializingVisit old friends or join groups to meet new ones. Reading   Learning a new language   Taking a class, whether it be art history or faith chi   TravelingExperience the food, history, and culture of your destination   Learning to use a computer   Going to museums, the theater, or thought-provoking movies   Changing things in your daily life, such as reversing your pattern in the grocery store or brushing your teeth using your nondominant hand   Use Memory Aids   There is no need to remember every detail on your own. These memory aids can help:   Calendars and day planners   Electronic organizers to store all sorts of helpful informationThese devices can \"beep\" to remind you of appointments. A book of days to record birthdays, anniversaries, and other occasions that occur on the same date every year   Detailed \"to-do\" lists and strategically placed sticky notes   Quick \"study\" sessionsBefore a gathering, review who will be there so their names will be fresh in your mind. Establish routinesFor example, keep your keys, wallet, and umbrella in the same place all the time or take medicine with your 8:00 AM glass of juice   Live a Healthy Life   Many actions that will keep your body strong will do the same for your mind. For example:   Talk to Your Doctor About Herbs and Supplements    Malnutrition and vitamin deficiencies can impair your mental function.  For example, vitamin B12 deficiency can cause a range of symptoms, including confusion. But, what if your nutritional needs are being met? Can herbs and supplements still offer a benefit? Researchers have investigated a range of natural remedies, such as ginkgo , ginseng , and the supplement phosphatidylserine (PS). So far, though, the evidence is inconsistent as to whether these products can improve memory or thinking. If you are interested in taking herbs and supplements, talk to your doctor first because they may interact with other medicines that you are taking. Exercise Regularly    Among the many benefits of regular exercise are increased blood flow to the brain and decreased risk of certain diseases that can interfere with memory function. One study found that even moderate exercise has a beneficial effect. Examples of \"moderate\" exercise include:   Playing 18 holes of golf once a week, without a cart   Playing tennis twice a week   Walking one mile per day   Manage Stress    It can be tough to remember what is important when your mind is cluttered. Make time for relaxation. Choose activities that calm you down, and make it routine. Manage Chronic Conditions    Side effects of high blood pressure , diabetes, and heart disease can interfere with mental function. Many of the lifestyle steps discussed here can help manage these conditions. Strive to eat a healthy diet, exercise regularly, get stress under control, and follow your doctor's advice for your condition. Minimize Medications    Talk to your doctor about the medicines that you take. Some may be unnecessary. Also, healthy lifestyle habits may lower the need for certain drugs. Last Reviewed: April 2010 Shantel Boyer MD   Updated: 4/13/2010     Keeping Home a [address] [facility]       As we get older, changes in balance, gait, strength, vision, hearing, and cognition make even the most youthful senior more prone to accidents. Falls are one of the leading health risks for older people.  This increased risk of falling is related to:   Aging process (eg, decreased muscle strength, slowed reflexes)   Higher incidence of chronic health problems (eg, arthritis, diabetes) that may limit mobility, agility or sensory awareness   Side effects of medicine (eg, dizziness, blurred vision)especially medicines like prescription pain medicines and drugs used to treat mental health conditions   Depending on the brittleness of your bones, the consequences of a fall can be serious and long lasting. Home Life   Research by the Association of Aging [facility]) shows that some home accidents among older adults can be prevented by making simple lifestyle changes and basic modifications and repairs to the home environment. Here are some lifestyle changes that experts recommend:   Have your hearing and vision checked regularly. Be sure to wear prescription glasses that are right for you. Speak to your doctor or pharmacist about the possible side effects of your medicines. A number of medicines can cause dizziness. If you have problems with sleep, talk to your doctor. Limit your intake of alcohol. If necessary, use a cane or walker to help maintain your balance. Wear supportive, rubber-soled shoes, even at home. If you live in a region that gets wintry weather, you may want to put special cleats on your shoes to prevent you from slipping on the snow and ice. Exercise regularly to help maintain muscle tone, agility, and balance. Always hold the banister when going up or down stairs. Also, use  bars when getting in or out of the bath or shower, or using the toilet. To avoid dizziness, get up slowly from a lying down position. Sit up first, dangling your legs for a minute or two before rising to a standing position. Overall Home Safety Check   According to the Consumer [employer] Safety [employer]'s \"Older Consumer Home Safety Checklist,\" it is important to check for potential hazards in each room.  And remember, proper lighting is an essential factor in home safety. If you cannot see clearly, you are more likely to fall. Important questions to ask yourself include:   Are lamp, electric, extension, and telephone cords placed out of the flow of traffic and maintained in good condition? Have frayed cords been replaced? Are all small rugs and runners slip resistant? If not, you can secure them to the floor with a special double-sided carpet tape. Are smoke detectors properly locatedone on every floor of your home and one outside of every sleeping area? Are they in good working order? Are batteries replaced at least once a year? Do you have a well-maintained carbon monoxide detector outside every sleeping are in your home? Does your furniture layout leave plenty of space to maneuver between and around chairs, tables, beds, and sofas? Are hallways, stairs and passages between rooms well lit? Can you reach a lamp without getting out of bed? Are floor surfaces well maintained? Shag rugs, high-pile carpeting, tile floors, and polished wood floors can be particularly slippery. Stairs should always have handrails and be carpeted or fitted with a non-skid tread. Is your telephone easily reachable. Is the cord safely tucked away? Room by Room   According to the Association of Aging, bathrooms and raúl are the two most potentially hazardous rooms in your home. In the Kitchen    Be sure your stove is in proper working order and always make sure burners and the oven are off before you go out or go to sleep. Keep pots on the back burners, turn handles away from the front of the stove, and keep stove clean and free of grease build-up. Kitchen ventilation systems and range exhausts should be working properly. Keep flammable objects such as towels and pot holders away from the cooking area except when in use. Make sure kitchen curtains are tied back.     Move cords and appliances away from the sink and hot surfaces. If extension cords are needed, install wiring guides so they do not hang over the sink, range, or working areas. Look for coffee pots, kettles and toaster ovens with automatic shut-offs. Keep a mop handy in the kitchen so you can wipe up spills instantly. You should also have a small fire extinguisher. Arrange your kitchen with frequently used items on lower shelves to avoid the need to stand on a stepstool to reach them. Make sure countertops are well-lit to avoid injuries while cutting and preparing food. In the Bathroom    Use a non-slip mat or decals in the tub and shower, since wet, soapy tile or porcelain surfaces are extremely slippery. Make sure bathroom rugs are non-skid or tape them firmly to the floor. Bathtubs should have at least one, preferably two, grab bars, firmly attached to structural supports in the wall. (Do not use built-in soap holders or glass shower doors as grab bars.)    Tub seats fitted with non-slip material on the legs allow you to wash sitting down. For people with limited mobility, bathtub transfer benches allow you to slide safely into the tub. Raised toilet seats and toilet safety rails are helpful for those with knee or hip problems. In the [facility]    Make sure you use a nightlight and that the area around your bed is clear of potential obstacles. Be careful with electric blankets and never go to sleep with a heating pad, which can cause serious burns even if on a low setting. Use fire-resistant mattress covers and pillows, and NEVER smoke in bed. Keep a phone next to the bed that is programmed to dial 911 at the push of a button. If you have a chronic condition, you may want to sign on with an automatic call-in service. Typically the system includes a small pendant that connects directly to an emergency medical voice-response system.  You should also make arrangements to stay in contact with someonefriend, neighbor, family memberon a regular schedule. Fire Prevention   According to the [employer] (Smoke Alarms for Every) [facility] [facility], senior citizens are one of the two highest risk groups for death and serious injuries due to residential fires. When cooking, wear short-sleeved items, never a bulky long-sleeved robe. The [facility]'s Safety Checklist for Older Consumers emphasizes the importance of checking basements, garages, workshops and storage areas for fire hazards, such as volatile liquids, piles of old rags or clothing and overloaded circuits. Never smoke in bed or when lying down on a couch or recliner chair. Small portable electric or kerosene heaters are responsible for many home fires and should be used cautiously if at all. If you do use one, be sure to keep them away from flammable materials. In case of fire, make sure you have a pre-established emergency exit plan. Have a professional check your fireplace and other fuel-burning appliances yearly. [employer]   Baby boomers entering the marrero years will continue to see the development of new products to help older adults live safely and independently in spite of age-related changes. Making Life More Livable  , by Don[employer], lists over 1,000 products for \"living well in the mature years,\" such as bathing and mobility aids, household security devices, ergonomically designed knives and peelers, and faucet valves and knobs for temperature control. Medical supply stores and organizations are good sources of information about products that improve your quality of life and insure your safety.      Last Reviewed: November 2009 Shantel Boyer MD   Updated: 3/7/2011

## 2023-02-14 NOTE — PROGRESS NOTES
Patient is seen in follow up for   Chief Complaint   Patient presents with    Check-Up    Hypertension    Hyperlipidemia    Diabetes     Last a1c was 6.4     Hypertension  This is a chronic problem. The current episode started more than 1 year ago. The problem is unchanged. The problem is controlled. There are no associated agents to hypertension. Risk factors for coronary artery disease include diabetes mellitus. Past treatments include beta blockers and diuretics. The current treatment provides moderate improvement. There are no compliance problems. Hyperlipidemia  This is a chronic problem. The current episode started more than 1 year ago. The problem is controlled. Recent lipid tests were reviewed and are normal. There are no known factors aggravating her hyperlipidemia. Current antihyperlipidemic treatment includes statins. The current treatment provides significant improvement of lipids. There are no compliance problems. Risk factors for coronary artery disease include diabetes mellitus and hypertension. Diabetes  She presents for her follow-up diabetic visit. She has type 2 diabetes mellitus. The initial diagnosis of diabetes was made 13 years ago. There are no hypoglycemic associated symptoms. Pertinent negatives for hypoglycemia include no dizziness. There are no diabetic associated symptoms. Pertinent negatives for diabetes include no fatigue. There are no hypoglycemic complications. Symptoms are stable. There are no diabetic complications. Risk factors for coronary artery disease include diabetes mellitus and hypertension. Current diabetic treatment includes oral agent (dual therapy). She is compliant with treatment most of the time.      Past Medical History:   Diagnosis Date    Anemia     Asthma     Benign essential HTN     meds > 5 yrs / denies TIA or stroke    Borderline personality disorder ([facility] [address]     2016 ?suggested by me-meets many criteria    Carpal tunnel syndrome of right wrist     Crohn's disease ([address]     Depression     Fibromyalgia 2/13/2018    Fibromyalgia     GERD (gastroesophageal reflux disease)     Gout     Headache     migraines    Irritable bowel syndrome     Mixed hyperlipidemia 5/10/2017    Neuropathy     Obesity (BMI 35.0-39.9 without comorbidity) 3/30/2017    PONV (postoperative nausea and vomiting)     nausea    PTSD (post-traumatic stress disorder)     Tremor of unknown origin     Type 2 diabetes mellitus ([address]     meds > 5yrs     Ulcerative colitis ([address] 11/2017    Vaginitis      Patient Active Problem List    Diagnosis Date Noted    Right upper quadrant abdominal pain 02/09/2022    Diarrhea 02/09/2022    Fever 02/09/2022    Acute bacterial sinusitis 12/06/2021    Bronchitis 12/06/2021    Gastroenteritis 12/21/2020    Injury of left foot 12/21/2020    Leukocytosis 12/21/2020    Nausea and vomiting 12/21/2020    Shoulder pain 12/21/2020    Morbid obesity ([address] 09/29/2020    Headache 12/30/2019    Tremor 12/30/2019    Meralgia paresthetica of left side 12/30/2019    Intractable migraine without aura and without status migrainosus 12/30/2019    Iron deficiency anemia 11/13/2018    Anemia 06/06/2018    Carpal tunnel syndrome of left wrist 05/03/2018    Intermittent tremor 05/03/2018    Posttraumatic stress disorder 02/13/2018    Fibromyalgia 02/13/2018    Carpal tunnel syndrome of right wrist 11/27/2017    History of Crohn's disease     Bilateral edema of lower extremity 07/31/2017    Varicose veins of lower extremity 07/31/2017    Edema of lower extremity 07/31/2017    Tear of meniscus of knee 07/27/2017    Mixed hyperlipidemia 05/10/2017    Pain in right knee 03/30/2017    Hip pain, right 03/30/2017    Obesity with body mass index 30 or greater 03/30/2017    Muscle pain 03/30/2017    Pain of right hip joint 03/30/2017    Severe episode of recurrent major depressive disorder, without psychotic features ([address] 10/25/2016    Borderline personality disorder ([address] 10/25/2016    Polyneuropathy due to type 2 diabetes mellitus ([facility] [address] 02/05/2016    Benign essential hypertension     Asthma     Crohn's disease ([facility] [address]     Gastroesophageal reflux disease      Past Surgical History:   Procedure Laterality Date    BREAST CYST EXCISION Right 1994    exc mass benign    CHOLECYSTECTOMY  2009    COLONOSCOPY  2015    negative    ENDOSCOPY, COLON, DIAGNOSTIC      HERNIA REPAIR  2015    ventral / mesh    LEG TENDON SURGERY Right 2007    leg.  ankle and foot    OVARIAN CYST REMOVAL Left 1994    with mass and both fallopian tube    OVARY REMOVAL  12/2015    [facility] / mass left ovary & bilateral  tubes    PARTIAL HYSTERECTOMY (CERVIX NOT REMOVED)      MA ARTHROSCOPY KNEE DIAGNOSTIC W/WO SYNOVIAL BX SPX Right 8/3/2017    RIGHT KNEE ARTHROSCOPIC PARTIAL MEDIAL MENISCECTOMY KNEE performed by Nayeli Moreno MD at [address] IND N/A 11/7/2017    COLONOSCOPY performed by MANOJ Ordonez on bx    MA COLONOSCOPY W/BIOPSY SINGLE/MULTIPLE N/A 3/15/2018    COLONOSCOPY performed by Tae Graves MD at [address] NEUROPLASTY &/TRANSPOS MEDIAN NRV CARPAL TUNNE Right 11/30/2017    RIGHT WRIST  CARPAL TUNNEL RELEASE performed by Nayeli Moreno MD at [address] NEUROPLASTY &/TRANSPOS MEDIAN NRV CARPAL Sammye  Left 5/10/2018    LEFT WRIST CARPAL TUNNEL RELEASE performed by Nayeli Moreno MD at [facility]     Family History   Problem Relation Age of Onset    Emphysema Mother     Cancer Maternal Grandfather     Diabetes Paternal Grandmother     Emphysema Paternal Grandfather     Heart Disease Sister     Stroke Sister     Diabetes Sister      Social History     Socioeconomic History    Marital status: Single     Spouse name: None    Number of children: None    Years of education: None    Highest education level: None   Tobacco Use    Smoking status: Never    Smokeless tobacco: Never   Vaping Use    Vaping Use: Never used   Substance and Sexual Activity Alcohol use: No     Alcohol/week: 0.0 standard drinks    Drug use: No    Sexual activity: Not Currently     Social Determinants of Health     Financial Resource Strain: Low Risk     Difficulty of Paying Living Expenses: Not hard at all   Food Insecurity: No Food Insecurity    Worried About Running Out of Food in the Last Year: Never true    Ran Out of Food in the Last Year: Never true   Transportation Needs: No Transportation Needs    Lack of Transportation (Medical): No    Lack of Transportation (Non-Medical): No   Housing Stability: Unknown    Unstable Housing in the Last Year: No     Current Outpatient Medications   Medication Sig Dispense Refill    pregabalin (LYRICA) 150 MG capsule TAKE ONE CAPSULE BY MOUTH THREE TIMES A DAY 90 capsule 0    furosemide (LASIX) 40 MG tablet TAKE ONE TABLET BY MOUTH TWO TIMES A DAY (Patient taking differently: daily TAKE ONE TABLET BY MOUTH TWO TIMES A DAY) 60 tablet 4    pravastatin (PRAVACHOL) 40 MG tablet TAKE 1 TABLET BY MOUTH EVERY EVENING 90 tablet 0    metFORMIN (GLUCOPHAGE) 500 MG tablet TAKE ONE TABLET BY MOUTH TWO TIMES A DAY WITH MEALS 180 tablet 0    baclofen (LIORESAL) 20 MG tablet TAKE ONE TABLET BY MOUTH FOUR TIMES A DAY (Patient taking differently: 2 times daily) 120 tablet 0    omeprazole (PRILOSEC) 40 MG delayed release capsule TAKE ONE CAPSULE BY MOUTH TWO TIMES A  capsule 0    mometasone (ELOCON) 0.1 % cream APPLY TOPICALLY ONCE DAILY 45 g 0    ondansetron (ZOFRAN) 4 MG tablet Take 1 tablet by mouth 3 times daily as needed for Nausea or Vomiting 30 tablet 0    allopurinol (ZYLOPRIM) 100 MG tablet TAKE ONE TABLET BY MOUTH EVERY DAY 90 tablet 0    pioglitazone (ACTOS) 15 MG tablet TAKE ONE TABLET BY MOUTH EVERY DAY 90 tablet 0    montelukast (SINGULAIR) 10 MG tablet TAKE ONE TABLET BY MOUTH NIGHTLY 30 tablet 5    meloxicam (MOBIC) 7.5 MG tablet TAKE 1 TABLET BY MOUTH TWICE DAILY 60 tablet 5    dicyclomine (BENTYL) 10 MG capsule TAKE ONE CAPSULE BY MOUTH 4 TIMES DAILY BEFORE MEALS AND NIGHTLY 120 capsule 0    potassium chloride (KLOR-CON M) 20 MEQ extended release tablet TAKE ONE TABLET BY MOUTH TWO TIMES A DAY (Patient taking differently: 20 mEq daily TAKE ONE TABLET BY MOUTH TWO TIMES A DAY) 180 tablet 1    metoprolol succinate (TOPROL XL) 50 MG extended release tablet TAKE ONE TABLET BY MOUTH TWO TIMES A DAY. HOLD FOR SYSTOLIC BLOOD PRESSURRE LOWER THAN 100 OR HEART RATE LOWER THAN 60. 180 tablet 3    ipratropium-albuterol (DUONEB) 0.5-2.5 (3) MG/3ML SOLN nebulizer solution INHALE 1 VIAL VIA NEBULIZER EVERY 4 HOURS 360 mL 5    albuterol sulfate HFA (PROVENTIL HFA) 108 (90 Base) MCG/ACT inhaler Inhale 2 puffs into the lungs every 6 hours as needed for Wheezing 1 Inhaler 3    Lancets MISC Test bid 100 each 3    magnesium oxide (MAG-OX) 400 (241.3 Mg) MG TABS tablet Take 1 tablet by mouth 2 times daily 60 tablet 2    Blood Glucose Monitoring Suppl (ONE TOUCH ULTRA 2) w/Device KIT TEST TWICE DAILY  0    blood glucose monitor strips Test 2 times a day & as needed for symptoms of irregular blood glucose. 100 strip 3    DULoxetine (CYMBALTA) 60 MG extended release capsule TAKE ONE CAPSULE BY MOUTH TWICE DAILY AS DIRECTED IN THE MORNING AND AFTERNOON  1    butalbital-APAP-caffeine (FIORICET) -40 MG CAPS per capsule Take 1 capsule by mouth every 6 hours as needed for Headaches 40 capsule 0    traZODone (DESYREL) 100 MG tablet TAKE 1 TO 2 TABLETS BY MOUTH AT BEDTIME AS NEEDED FOR SLEEP      prazosin (MINIPRESS) 2 MG capsule TAKE 1 CAPSULE BY MOUTH EVERY NIGHT FOR NIGHTMARES (Patient not taking: Reported on 2/14/2023)  3    Melatonin 5 MG CAPS Take 10 mg by mouth daily   2    FREESTYLE LITE strip use three times daily  (Patient not taking: Reported on 2/14/2023) 50 each 5     Current Facility-Administered Medications   Medication Dose Route Frequency Provider Last Rate Last Admin    promethazine (PHENERGAN) injection 25 mg  25 mg IntraMUSCular Once Angelica M. Dhruv Mckeon, APRN - CNP         Current Outpatient Medications on File Prior to Visit   Medication Sig Dispense Refill    pregabalin (LYRICA) 150 MG capsule TAKE ONE CAPSULE BY MOUTH THREE TIMES A DAY 90 capsule 0    furosemide (LASIX) 40 MG tablet TAKE ONE TABLET BY MOUTH TWO TIMES A DAY (Patient taking differently: daily TAKE ONE TABLET BY MOUTH TWO TIMES A DAY) 60 tablet 4    pravastatin (PRAVACHOL) 40 MG tablet TAKE 1 TABLET BY MOUTH EVERY EVENING 90 tablet 0    metFORMIN (GLUCOPHAGE) 500 MG tablet TAKE ONE TABLET BY MOUTH TWO TIMES A DAY WITH MEALS 180 tablet 0    baclofen (LIORESAL) 20 MG tablet TAKE ONE TABLET BY MOUTH FOUR TIMES A DAY (Patient taking differently: 2 times daily) 120 tablet 0    omeprazole (PRILOSEC) 40 MG delayed release capsule TAKE ONE CAPSULE BY MOUTH TWO TIMES A  capsule 0    mometasone (ELOCON) 0.1 % cream APPLY TOPICALLY ONCE DAILY 45 g 0    ondansetron (ZOFRAN) 4 MG tablet Take 1 tablet by mouth 3 times daily as needed for Nausea or Vomiting 30 tablet 0    allopurinol (ZYLOPRIM) 100 MG tablet TAKE ONE TABLET BY MOUTH EVERY DAY 90 tablet 0    pioglitazone (ACTOS) 15 MG tablet TAKE ONE TABLET BY MOUTH EVERY DAY 90 tablet 0    montelukast (SINGULAIR) 10 MG tablet TAKE ONE TABLET BY MOUTH NIGHTLY 30 tablet 5    meloxicam (MOBIC) 7.5 MG tablet TAKE 1 TABLET BY MOUTH TWICE DAILY 60 tablet 5    dicyclomine (BENTYL) 10 MG capsule TAKE ONE CAPSULE BY MOUTH 4 TIMES DAILY BEFORE MEALS AND NIGHTLY 120 capsule 0    potassium chloride (KLOR-CON M) 20 MEQ extended release tablet TAKE ONE TABLET BY MOUTH TWO TIMES A DAY (Patient taking differently: 20 mEq daily TAKE ONE TABLET BY MOUTH TWO TIMES A DAY) 180 tablet 1    metoprolol succinate (TOPROL XL) 50 MG extended release tablet TAKE ONE TABLET BY MOUTH TWO TIMES A DAY.   HOLD FOR SYSTOLIC BLOOD PRESSURRE LOWER THAN 100 OR HEART RATE LOWER THAN 60. 180 tablet 3    ipratropium-albuterol (DUONEB) 0.5-2.5 (3) MG/3ML SOLN nebulizer solution INHALE 1 VIAL VIA NEBULIZER EVERY 4 HOURS 360 mL 5    albuterol sulfate HFA (PROVENTIL HFA) 108 (90 Base) MCG/ACT inhaler Inhale 2 puffs into the lungs every 6 hours as needed for Wheezing 1 Inhaler 3    Lancets MISC Test bid 100 each 3    magnesium oxide (MAG-OX) 400 (241.3 Mg) MG TABS tablet Take 1 tablet by mouth 2 times daily 60 tablet 2    Blood Glucose Monitoring Suppl (ONE TOUCH ULTRA 2) w/Device KIT TEST TWICE DAILY  0    blood glucose monitor strips Test 2 times a day & as needed for symptoms of irregular blood glucose. 100 strip 3    DULoxetine (CYMBALTA) 60 MG extended release capsule TAKE ONE CAPSULE BY MOUTH TWICE DAILY AS DIRECTED IN THE MORNING AND AFTERNOON  1    butalbital-APAP-caffeine (FIORICET) -40 MG CAPS per capsule Take 1 capsule by mouth every 6 hours as needed for Headaches 40 capsule 0    traZODone (DESYREL) 100 MG tablet TAKE 1 TO 2 TABLETS BY MOUTH AT BEDTIME AS NEEDED FOR SLEEP      prazosin (MINIPRESS) 2 MG capsule TAKE 1 CAPSULE BY MOUTH EVERY NIGHT FOR NIGHTMARES (Patient not taking: Reported on 2/14/2023)  3    Melatonin 5 MG CAPS Take 10 mg by mouth daily   2    FREESTYLE LITE strip use three times daily  (Patient not taking: Reported on 2/14/2023) 50 each 5     Current Facility-Administered Medications on File Prior to Visit   Medication Dose Route Frequency Provider Last Rate Last Admin    promethazine (PHENERGAN) injection 25 mg  25 mg IntraMUSCular Once Angelica Zamora, APRN - CNP         Allergies   Allergen Reactions    Latex Hives    Penicillins Swelling and Rash    Shellfish-Derived Products Anaphylaxis    Abilify [Aripiprazole]      shaking    Adhesive Tape Swelling     If left on too long    Topamax [Topiramate]     Buspar [Buspirone]      shaking    Other Nausea And Vomiting    Sulfa Antibiotics Nausea And Vomiting     Health Maintenance   Topic Date Due    Hepatitis B vaccine (1 of 3 - Risk 3-dose series) Never done    DTaP/Tdap/Td vaccine (1 - Tdap) Never done Cervical cancer screen  Never done    Shingles vaccine (1 of 2) Never done    Diabetic retinal exam  02/01/2017    Diabetic foot exam  04/05/2020    Pneumococcal 0-64 years Vaccine (2 - PPSV23 if available, else PCV20) 04/05/2020    Annual Wellness Visit (AWV)  02/10/2022    Lipids  10/04/2022    GFR test (Diabetes, CKD 3-4, OR last GFR 15-59)  02/10/2023    Diabetic Alb to Cr ratio (uACR) test  08/15/2023    Depression Monitoring  01/03/2024    A1C test (Diabetic or Prediabetic)  02/14/2024    Breast cancer screen  09/08/2024    Colorectal Cancer Screen  03/15/2028    Flu vaccine  Completed    COVID-19 Vaccine  Completed    Hepatitis C screen  Completed    HIV screen  Completed    Hepatitis A vaccine  Aged Out    Hib vaccine  Aged Out    Meningococcal (ACWY) vaccine  Aged Out       Review of Systems     Review of Systems   Constitutional:  Negative for activity change, appetite change, fatigue and fever. HENT:  Negative for congestion and rhinorrhea. Eyes: Negative. Respiratory: Negative. Negative for cough and chest tightness. Cardiovascular: Negative. Gastrointestinal: Negative. Endocrine: Negative. Genitourinary: Negative. Musculoskeletal: Negative. Skin: Negative. Neurological:  Negative for dizziness, light-headedness and numbness. Hematological: Negative. Psychiatric/Behavioral: Negative. Physical Exam  Vitals:    02/14/23 1103   BP: 120/62   Pulse: 73   Resp: 19   Temp: 97.6 °F (36.4 °C)   SpO2: 97%   Weight: 276 lb (125.2 kg)       Physical Exam  Constitutional:       Appearance: She is well-developed. HENT:      Right Ear: External ear normal.      Left Ear: External ear normal.   Eyes:      Pupils: Pupils are equal, round, and reactive to light. Neck:      Thyroid: No thyromegaly. Cardiovascular:      Rate and Rhythm: Normal rate and regular rhythm. Heart sounds: Normal heart sounds. No murmur heard. No friction rub. No gallop.    Pulmonary: Effort: Pulmonary effort is normal. No respiratory distress. Breath sounds: No wheezing or rales. Chest:      Chest wall: No tenderness. Abdominal:      General: Bowel sounds are normal. There is no distension. Palpations: Abdomen is soft. There is no mass. Tenderness: There is abdominal tenderness. There is no guarding or rebound. Comments: Tender over hernia repair. Musculoskeletal:         General: Normal range of motion. Cervical back: Normal range of motion and neck supple. Lymphadenopathy:      Cervical: No cervical adenopathy. Skin:     General: Skin is warm and dry. Neurological:      Mental Status: She is alert and oriented to person, place, and time. Cranial Nerves: No cranial nerve deficit. Coordination: Coordination normal.       Assessment   Diagnosis Orders   1. Type 2 diabetes mellitus without complication, without long-term current use of insulin ([facility])  POCT glycosylated hemoglobin (Hb A1C)    Comprehensive Metabolic Panel    CBC with Auto Differential      2. Mixed hyperlipidemia  Lipid Panel    Comprehensive Metabolic Panel    CBC with Auto Differential      3. Anemia, unspecified type  CBC with Auto Differential      4. Benign essential hypertension  Comprehensive Metabolic Panel    CBC with Auto Differential      5.  Incisional hernia, without obstruction or gangrene  Ambulatory referral to General Surgery        Problem List       Benign essential hypertension    Relevant Medications    metoprolol succinate (TOPROL XL) 50 MG extended release tablet    furosemide (LASIX) 40 MG tablet    Other Relevant Orders    Comprehensive Metabolic Panel    CBC with Auto Differential    Mixed hyperlipidemia    Relevant Medications    prazosin (MINIPRESS) 2 MG capsule    metoprolol succinate (TOPROL XL) 50 MG extended release tablet    pravastatin (PRAVACHOL) 40 MG tablet    furosemide (LASIX) 40 MG tablet    Other Relevant Orders    Lipid Panel    Comprehensive Metabolic Panel    CBC with Auto Differential    Anemia    Relevant Orders    CBC with Auto Differential       Plan  Orders Placed This Encounter   Procedures    Lipid Panel     Standing Status:   Future     Standing Expiration Date:   2/14/2024    Comprehensive Metabolic Panel     Standing Status:   Future     Standing Expiration Date:   2/14/2024    CBC with Auto Differential     Standing Status:   Future     Standing Expiration Date:   2/14/2024    Ambulatory referral to General Surgery     Referral Priority:   Routine     Referral Type:   Surgical     Referral Reason:   Specialty Services Required     Referred to Provider:   Jay Evans MD     Requested Specialty:   General Surgery     Number of Visits Requested:   1    POCT glycosylated hemoglobin (Hb A1C)     No orders of the defined types were placed in this encounter. No follow-ups on file.   Sarita Browne MD

## 2023-02-15 DIAGNOSIS — E87.5 HYPERKALEMIA: Primary | ICD-10-CM

## 2023-02-20 ENCOUNTER — OFFICE VISIT (OUTPATIENT)
Dept: SURGERY | Age: 58
End: 2023-02-20
Payer: MEDICARE

## 2023-02-20 VITALS
TEMPERATURE: 97.5 F | BODY MASS INDEX: 51.34 KG/M2 | OXYGEN SATURATION: 96 % | WEIGHT: 279 LBS | SYSTOLIC BLOOD PRESSURE: 100 MMHG | HEIGHT: 62 IN | HEART RATE: 60 BPM | DIASTOLIC BLOOD PRESSURE: 70 MMHG | RESPIRATION RATE: 16 BRPM

## 2023-02-20 DIAGNOSIS — E66.01 MORBID OBESITY (HCC): ICD-10-CM

## 2023-02-20 DIAGNOSIS — E87.5 HYPERKALEMIA: ICD-10-CM

## 2023-02-20 DIAGNOSIS — E66.9 OBESITY WITH BODY MASS INDEX 30 OR GREATER: Primary | ICD-10-CM

## 2023-02-20 DIAGNOSIS — K43.9 VENTRAL HERNIA WITHOUT OBSTRUCTION OR GANGRENE: ICD-10-CM

## 2023-02-20 LAB
ANION GAP SERPL CALCULATED.3IONS-SCNC: 14 MEQ/L (ref 9–15)
BUN BLDV-MCNC: 24 MG/DL (ref 6–20)
CALCIUM SERPL-MCNC: 8.7 MG/DL (ref 8.5–9.9)
CHLORIDE BLD-SCNC: 99 MEQ/L (ref 95–107)
CO2: 27 MEQ/L (ref 20–31)
CREAT SERPL-MCNC: 1.51 MG/DL (ref 0.5–0.9)
GFR SERPL CREATININE-BSD FRML MDRD: 39.6 ML/MIN/{1.73_M2}
GLUCOSE BLD-MCNC: 103 MG/DL (ref 70–99)
POTASSIUM SERPL-SCNC: 5 MEQ/L (ref 3.4–4.9)
SODIUM BLD-SCNC: 140 MEQ/L (ref 135–144)

## 2023-02-20 PROCEDURE — 1036F TOBACCO NON-USER: CPT | Performed by: SURGERY

## 2023-02-20 PROCEDURE — G8482 FLU IMMUNIZE ORDER/ADMIN: HCPCS | Performed by: SURGERY

## 2023-02-20 PROCEDURE — 3078F DIAST BP <80 MM HG: CPT | Performed by: SURGERY

## 2023-02-20 PROCEDURE — 99203 OFFICE O/P NEW LOW 30 MIN: CPT | Performed by: SURGERY

## 2023-02-20 PROCEDURE — 3074F SYST BP LT 130 MM HG: CPT | Performed by: SURGERY

## 2023-02-20 PROCEDURE — G8417 CALC BMI ABV UP PARAM F/U: HCPCS | Performed by: SURGERY

## 2023-02-20 PROCEDURE — 3017F COLORECTAL CA SCREEN DOC REV: CPT | Performed by: SURGERY

## 2023-02-20 PROCEDURE — G8427 DOCREV CUR MEDS BY ELIG CLIN: HCPCS | Performed by: SURGERY

## 2023-02-20 RX ORDER — QUETIAPINE FUMARATE 50 MG/1
TABLET, FILM COATED ORAL
COMMUNITY
Start: 2023-01-10

## 2023-02-20 NOTE — PROGRESS NOTES
GENERAL SURGERY CLINIC NOTE    Pt Name: Kathi Archer  MRN: [id number]  Date: 2/20/2023        SUBJECTIVE:     History of Chief Complaint:    Rina Born is a 61 y.o. female with a PMH of Morbid obesity, DM who presents with possible ventral hernia. She was complaining of this pain was getting worse recently. Past Medical History:   Diagnosis Date    Anemia     Asthma     Benign essential HTN     meds > 5 yrs / denies TIA or stroke    Borderline personality disorder ([address]     2016 ?suggested by me-meets many criteria    Carpal tunnel syndrome of right wrist     Crohn's disease ([address]     Depression     Fibromyalgia 2/13/2018    Fibromyalgia     GERD (gastroesophageal reflux disease)     Gout     Headache     migraines    Irritable bowel syndrome     Mixed hyperlipidemia 5/10/2017    Neuropathy     Obesity (BMI 35.0-39.9 without comorbidity) 3/30/2017    PONV (postoperative nausea and vomiting)     nausea    PTSD (post-traumatic stress disorder)     Tremor of unknown origin     Type 2 diabetes mellitus ([address]     meds > 5yrs     Ulcerative colitis ([address] 11/2017    Vaginitis      Past Surgical History:   Procedure Laterality Date    BREAST CYST EXCISION Right 1994    exc mass benign    CHOLECYSTECTOMY  2009    COLONOSCOPY  2015    negative    ENDOSCOPY, COLON, DIAGNOSTIC      HERNIA REPAIR  2015    ventral / mesh    LEG TENDON SURGERY Right 2007    leg.  ankle and foot    OVARIAN CYST REMOVAL Left 1994    with mass and both fallopian tube    OVARY REMOVAL  12/2015    [facility] / mass left ovary & bilateral  tubes    PARTIAL HYSTERECTOMY (CERVIX NOT REMOVED)      [facility] ARTHROSCOPY KNEE DIAGNOSTIC W/WO SYNOVIAL BX SPX Right 8/3/2017    RIGHT KNEE ARTHROSCOPIC PARTIAL MEDIAL MENISCECTOMY KNEE performed by Jelena Boone MD at [address] IND N/A 11/7/2017    COLONOSCOPY performed by MANOJ Quinones on bx    [facility] COLONOSCOPY W/BIOPSY SINGLE/MULTIPLE N/A 3/15/2018    COLONOSCOPY performed by Byron Olguin Antoinette Muniz MD [facility] [address] [facility]LAST[facility] &/TRANSPOS MEDIAN NRV CARPAL TUNNE Right 11/30/2017    RIGHT WRIST  CARPAL TUNNEL RELEASE performed by Macie Handley MD at [facility] &/TRANSPOS MEDIAN NRV CARPAL Deatra Idler Left 5/10/2018    LEFT WRIST CARPAL TUNNEL RELEASE performed by Macie Handley MD at [facility]     Prior to Admission medications    Medication Sig Start Date End Date Taking? Authorizing Provider   QUEtiapine (SEROQUEL) 50 MG tablet  1/10/23  Yes Historical Provider, MD   pregabalin (LYRICA) 150 MG capsule TAKE ONE CAPSULE BY MOUTH THREE TIMES A DAY 2/10/23 3/12/23 Yes DELMI Harrell CNP   furosemide (LASIX) 40 MG tablet TAKE ONE TABLET BY MOUTH TWO TIMES A DAY  Patient taking differently: daily TAKE ONE TABLET BY MOUTH TWO TIMES A DAY 1/30/23  Yes DELMI Hutchinson CNP   pravastatin (PRAVACHOL) 40 MG tablet TAKE 1 TABLET BY MOUTH EVERY EVENING 1/11/23  Yes Ansley Park MD   metFORMIN (GLUCOPHAGE) 500 MG tablet TAKE ONE TABLET BY MOUTH TWO TIMES A DAY WITH MEALS 1/11/23  Yes Ansley Park MD   baclofen (LIORESAL) 20 MG tablet TAKE ONE TABLET BY MOUTH FOUR TIMES A DAY  Patient taking differently: 2 times daily 1/11/23  Yes Ansley Park MD   omeprazole (PRILOSEC) 40 MG delayed release capsule TAKE ONE CAPSULE BY MOUTH TWO TIMES A DAY 1/11/23  Yes Ansley Park MD   mometasone (ELOCON) 0.1 % cream APPLY TOPICALLY ONCE DAILY 1/9/23  Yes Ansley Park MD   ondansetron (ZOFRAN) 4 MG tablet Take 1 tablet by mouth 3 times daily as needed for Nausea or Vomiting 1/3/23  Yes DELMI Greco CNP   allopurinol (ZYLOPRIM) 100 MG tablet TAKE ONE TABLET BY MOUTH EVERY DAY 12/23/22  Yes Ansley Park MD   pioglitazone (ACTOS) 15 MG tablet TAKE ONE TABLET BY MOUTH EVERY DAY 12/23/22  Yes Ansley Park MD   montelukast (SINGULAIR) 10 MG tablet TAKE ONE TABLET BY MOUTH NIGHTLY 11/7/22  Yes Ansley Park MD   meloxicam [facility]) 7.5 MG tablet TAKE 1 TABLET BY MOUTH TWICE DAILY 10/31/22  Yes Samir Harris MD   dicyclomine (BENTYL) 10 MG capsule TAKE ONE CAPSULE BY MOUTH 4 TIMES DAILY BEFORE MEALS AND NIGHTLY 9/1/22  Yes Samir Harris MD   potassium chloride (KLOR-CON M) 20 MEQ extended release tablet TAKE ONE TABLET BY MOUTH TWO TIMES A DAY  Patient taking differently: 20 mEq daily TAKE ONE TABLET BY MOUTH TWO TIMES A DAY 5/12/22  Yes Matthew WILSON Holiday, DO   metoprolol succinate (TOPROL XL) 50 MG extended release tablet TAKE ONE TABLET BY MOUTH TWO TIMES A DAY.  HOLD FOR SYSTOLIC BLOOD PRESSURRE LOWER THAN 100 OR HEART RATE LOWER THAN 60. 2/16/22  Yes DELMI Vallejo - CNP   ipratropium-albuterol (DUONEB) 0.5-2.5 (3) MG/3ML SOLN nebulizer solution INHALE 1 VIAL VIA NEBULIZER EVERY 4 HOURS 4/5/21  Yes Christy Rodriguez MD   traZODone (DESYREL) 100 MG tablet TAKE 1 TO 2 TABLETS BY MOUTH AT BEDTIME AS NEEDED FOR SLEEP 3/1/21  Yes Historical Provider, MD   albuterol sulfate HFA (PROVENTIL HFA) 108 (90 Base) MCG/ACT inhaler Inhale 2 puffs into the lungs every 6 hours as needed for Wheezing 10/1/20  Yes Namrata Hermosillo MD   Lancets MISC Test bid 9/8/20  Yes Samir Harris MD   magnesium oxide (MAG-OX) 400 (241.3 Mg) MG TABS tablet Take 1 tablet by mouth 2 times daily 9/4/19  Yes CHESTER Sanders   Blood Glucose Monitoring Suppl (ONE TOUCH ULTRA 2) w/Device KIT TEST TWICE DAILY 1/4/19  Yes Historical Provider, MD   prazosin (MINIPRESS) 2 MG capsule TAKE 1 CAPSULE BY MOUTH EVERY NIGHT FOR NIGHTMARES 2/1/19  Yes Historical Provider, MD   blood glucose monitor strips Test 2 times a day & as needed for symptoms of irregular blood glucose. 1/4/19  Yes Carolee Miguel MD   Melatonin 5 MG CAPS Take 10 mg by mouth daily  6/13/18  Yes Historical Provider, MD   FREESTYLE LITE strip use three times daily  8/14/18  Yes Carolee Miguel MD   DULoxetine (CYMBALTA) 60 MG extended release capsule TAKE ONE CAPSULE BY MOUTH TWICE DAILY AS DIRECTED IN  THE MORNING AND AFTERNOON 10/23/17  Yes Historical Provider, MD   butalbital-APAP-caffeine (FIORICET) -40 MG CAPS per capsule Take 1 capsule by mouth every 6 hours as needed for Headaches 5/10/21 1/3/23  CHESTER Mooney     Allergies   Allergen Reactions    Latex Hives    Penicillins Swelling and Rash    Shellfish-Derived Products Anaphylaxis    Abilify [Aripiprazole]      shaking    Adhesive Tape Swelling     If left on too long    Topamax [Topiramate]     Buspar [Buspirone]      shaking    Other Nausea And Vomiting    Sulfa Antibiotics Nausea And Vomiting     Family History   Problem Relation Age of Onset    Emphysema Mother     Cancer Maternal Grandfather     Diabetes Paternal Grandmother     Emphysema Paternal Grandfather     Heart Disease Sister     Stroke Sister     Diabetes Sister      Social History     Tobacco Use    Smoking status: Never    Smokeless tobacco: Never   Vaping Use    Vaping Use: Never used   Substance Use Topics    Alcohol use: No     Alcohol/week: 0.0 standard drinks    Drug use: No       Review of Systems:  General positive for  Morbid obesity BMI 51.03  Denies any fever or chills  HEENT negative  Denies any diplopia, tinnitus or vertigo  Resp negative  Denies any shortness of breath, cough or wheezing  Cardiac negative  Denies any chest pain, palpitations, claudication or edema  GI Normal  Denies any melena, hematochezia, hematemesis or pyrosis   negative  Denies any frequency, urgency, hesitancy or incontinence  Heme negative  Denies bruising or bleeding easily  Endocrine positive for diabetic symptoms including none and neuropathy, Denies any history of diabetes or thyroid disease  Neuro negative  Denies any focal motor or sensory deficits    OBJECTIVE:   CURRENT VITALS: /70 (Site: Left Lower Arm, Position: Sitting, Cuff Size: Small Adult)   Pulse 60   Temp 97.5 °F (36.4 °C) (Temporal)   Resp 16   Ht 5' 2\" (1.575 m)   Wt 279 lb (126.6 kg)   LMP 07/27/1993 (Approximate)   SpO2 96%   BMI 51.03 kg/m²      GEN: Alert and oriented x3, no acute distress, cooperative   SKIN: Skin color, texture, turgor normal. No rashes or lesions  LYMPH: No inguinal nodes  HEENT: Head is normocephalic, atraumatic. EOMI  NECK: Supple, symmetrical, trachea midline, skin normal  PULM: Chest symmetric, clear to auscultation bilaterally without wheezes, rales or rhonchi. No increased work of breathing or accessory muscle use  CV: Heart regular rate and rhythm, no murmurs appreciated  ABD: Soft, nontender, nondistended, no palpable masses. Possible ventral hernia, unable to feel the fascia defect. GROIN:  no palapble right or left inguinal defects  NEURO: No focalizing motor or sensory deficits   EXTREMITIES: Warm, dry, no lower extremity edema    LABS:   No results for input(s): WBC, HGB, HCT, PLT, NA, K, CL, CO2, BUN, CREATININE, MG, PHOS, CALCIUM, PTT, INR, AST, ALT, BILITOT, BILIDIR, AMYLASE, LIPASE, LDH, LACTA, NITRU, COLORU, BACTERIA in the last 72 hours. Invalid input(s): PT, WBCU, RBCU, LEUKOCYTESUA    RADIOLOGY:   I have personally reviewed the following films:    No results found. ASSESSMENT AND PLAN:   Damian Clements is a 61 y.o. female with a PMH of Morbid obesity, DM who presents with possible ventral hernia. She was complaining of this pain was getting worse recently.      Patient with Morbid obesity ( BMI 51.03), DM  Possible ventral hernia, unable to feel fascia defect  We will refer patient for evaluation for Bariatric Surgery      Tim Leiva MD   General surgeon    Electronically signed by Tim Leiva MD FACS, on 2/20/2023

## 2023-02-28 ENCOUNTER — OFFICE VISIT (OUTPATIENT)
Dept: SURGERY | Age: 58
End: 2023-02-28
Payer: MEDICARE

## 2023-02-28 VITALS
OXYGEN SATURATION: 97 % | HEIGHT: 62 IN | TEMPERATURE: 97.1 F | WEIGHT: 279 LBS | BODY MASS INDEX: 51.34 KG/M2 | HEART RATE: 63 BPM

## 2023-02-28 DIAGNOSIS — K43.2 INCISIONAL HERNIA, WITHOUT OBSTRUCTION OR GANGRENE: Primary | ICD-10-CM

## 2023-02-28 PROBLEM — K43.9 VENTRAL HERNIA: Status: ACTIVE | Noted: 2023-02-28

## 2023-02-28 PROCEDURE — 3017F COLORECTAL CA SCREEN DOC REV: CPT | Performed by: COLON & RECTAL SURGERY

## 2023-02-28 PROCEDURE — 1036F TOBACCO NON-USER: CPT | Performed by: COLON & RECTAL SURGERY

## 2023-02-28 PROCEDURE — G8427 DOCREV CUR MEDS BY ELIG CLIN: HCPCS | Performed by: COLON & RECTAL SURGERY

## 2023-02-28 PROCEDURE — G8482 FLU IMMUNIZE ORDER/ADMIN: HCPCS | Performed by: COLON & RECTAL SURGERY

## 2023-02-28 PROCEDURE — G8417 CALC BMI ABV UP PARAM F/U: HCPCS | Performed by: COLON & RECTAL SURGERY

## 2023-02-28 PROCEDURE — 99203 OFFICE O/P NEW LOW 30 MIN: CPT | Performed by: COLON & RECTAL SURGERY

## 2023-02-28 RX ORDER — SODIUM CHLORIDE 0.9 % (FLUSH) 0.9 %
5-40 SYRINGE (ML) INJECTION EVERY 12 HOURS SCHEDULED
OUTPATIENT
Start: 2023-02-28

## 2023-02-28 RX ORDER — SODIUM CHLORIDE 9 MG/ML
INJECTION, SOLUTION INTRAVENOUS PRN
OUTPATIENT
Start: 2023-02-28

## 2023-02-28 RX ORDER — SODIUM CHLORIDE 0.9 % (FLUSH) 0.9 %
5-40 SYRINGE (ML) INJECTION PRN
OUTPATIENT
Start: 2023-02-28

## 2023-02-28 ASSESSMENT — ENCOUNTER SYMPTOMS
CONSTIPATION: 0
COLOR CHANGE: 0
DIARRHEA: 0
CHEST TIGHTNESS: 0
VOMITING: 0
ABDOMINAL PAIN: 1

## 2023-02-28 NOTE — PROGRESS NOTES
Subjective:      Patient ID: Gabriela Arnold is a 61 y.o. female who presents for:  Chief Complaint   Patient presents with    New Patient       This is a 59-year-old female who had a oophorectomy in 2015 at [facility].  She apparently had an existing umbilical hernia which was repaired primarily as well. I reviewed the operative notes from December 21, 2015 at [facility] clinic. The ovary was benign. She has been having worsening swelling in her umbilicus with pain. She had a CAT scan that I reviewed personally with her in November 2020 which showed an incarcerated incisional hernia present at that site. The remainder of her past medical and surgical history was reviewed. Past Medical History:   Diagnosis Date    Anemia     Asthma     Benign essential HTN     meds > 5 yrs / denies TIA or stroke    Borderline personality disorder ([address]     2016 ?suggested by me-meets many criteria    Carpal tunnel syndrome of right wrist     Crohn's disease ([address]     Depression     Fibromyalgia 2/13/2018    Fibromyalgia     GERD (gastroesophageal reflux disease)     Gout     Headache     migraines    Irritable bowel syndrome     Mixed hyperlipidemia 5/10/2017    Neuropathy     Obesity (BMI 35.0-39.9 without comorbidity) 3/30/2017    PONV (postoperative nausea and vomiting)     nausea    PTSD (post-traumatic stress disorder)     Tremor of unknown origin     Type 2 diabetes mellitus ([address]     meds > 5yrs     Ulcerative colitis ([address] 11/2017    Vaginitis      Past Surgical History:   Procedure Laterality Date    BREAST CYST EXCISION Right 1994    exc mass benign    CHOLECYSTECTOMY  2009    COLONOSCOPY  2015    negative    ENDOSCOPY, COLON, DIAGNOSTIC      HERNIA REPAIR  2015    ventral / mesh    LEG TENDON SURGERY Right 2007    leg.  ankle and foot    OVARIAN CYST REMOVAL Left 1994    with mass and both fallopian tube    OVARY REMOVAL  12/2015    [facility] / mass left ovary & bilateral  tubes    PARTIAL HYSTERECTOMY (CERVIX NOT REMOVED)      [facility] ARTHROSCOPY KNEE DIAGNOSTIC W/WO SYNOVIAL BX SPX Right 8/3/2017    RIGHT KNEE ARTHROSCOPIC PARTIAL MEDIAL MENISCECTOMY KNEE performed by Kalyani Han MD at [address] IND N/A 11/7/2017    COLONOSCOPY performed by MANOJ Vargas on bx    [facility] COLONOSCOPY W/BIOPSY SINGLE/MULTIPLE N/A 3/15/2018    COLONOSCOPY performed by Jeanette Winston MD at [address] [facility] NEUROPLASTY &/TRANSPOS MEDIAN NRV CARPAL TUNNE Right 11/30/2017    RIGHT WRIST  CARPAL TUNNEL RELEASE performed by Kalyani Han MD at [address] [facility] &/TRANSPOS MEDIAN NRV CARPAL Daniel Hagan Left 5/10/2018    LEFT WRIST CARPAL TUNNEL RELEASE performed by K[facility] Han MD at [facility]     Social History     Socioeconomic History    Marital status: Single     Spouse name: Not on file    Number of children: Not on file    Years of education: Not on file    Highest education level: Not on file   Occupational History    Not on file   Tobacco Use    Smoking status: Never    Smokeless tobacco: Never   Vaping Use    Vaping Use: Never used   Substance and Sexual Activity    Alcohol use: No     Alcohol/week: 0.0 standard drinks    Drug use: No    Sexual activity: Not Currently   Other Topics Concern    Not on file   Social History Narrative    Not on file     Social Determinants of Health     Financial Resource Strain: Low Risk     Difficulty of Paying Living Expenses: Not hard at all   Food Insecurity: No Food Insecurity    Worried About Running Out of Food in the Last Year: Never true    Ran Out of Food in the Last Year: Never true   Transportation Needs: No Transportation Needs    Lack of Transportation (Medical): No    Lack of Transportation (Non-Medical):  No   Physical Activity: Insufficiently Active    Days of Exercise per Week: 4 days    Minutes of Exercise per Session: 20 min   Stress: Not on file   Social Connections: Not on file Intimate Partner Violence: Not on file   Housing Stability: Unknown    Unable to Pay for Housing in the Last Year: Not on file    Number of Places Lived in the Last Year: Not on file    Unstable Housing in the Last Year: No     Family History   Problem Relation Age of Onset    Emphysema Mother     Cancer Maternal Grandfather     Diabetes Paternal Grandmother     Emphysema Paternal Grandfather     Heart Disease Sister     Stroke Sister     Diabetes Sister      Allergies:  Latex, Penicillins, Shellfish-derived products, Abilify [aripiprazole], Adhesive tape, Topamax [topiramate], Buspar [buspirone], Other, and Sulfa antibiotics    Review of Systems   Constitutional:  Negative for activity change, appetite change, fatigue and fever. HENT:  Negative for congestion. Respiratory:  Negative for chest tightness. Cardiovascular:  Negative for chest pain. Gastrointestinal:  Positive for abdominal pain. Negative for constipation, diarrhea and vomiting. Genitourinary:  Negative for difficulty urinating. Musculoskeletal:  Negative for arthralgias. Skin:  Negative for color change. Neurological:  Negative for dizziness and headaches. Hematological:  Does not bruise/bleed easily. Psychiatric/Behavioral:  Negative for agitation. Objective:    Pulse 63   Temp 97.1 °F (36.2 °C) (Temporal)   Ht 5' 2\" (1.575 m)   Wt 279 lb (126.6 kg)   LMP 07/27/1993 (Approximate)   SpO2 97%   BMI 51.03 kg/m²     Physical Exam  Constitutional:       Appearance: She is well-developed. HENT:      Head: Normocephalic and atraumatic. Eyes:      Pupils: Pupils are equal, round, and reactive to light. Cardiovascular:      Rate and Rhythm: Normal rate and regular rhythm. Heart sounds: Normal heart sounds. Pulmonary:      Effort: Pulmonary effort is normal. No respiratory distress. Breath sounds: Normal breath sounds. No wheezing. Abdominal:      General: There is no distension. Palpations:  There is no mass. Tenderness: There is no abdominal tenderness. There is no guarding or rebound. Hernia: A hernia is present. Comments: Nonreducible supraumbilical hernia present without skin changes   Musculoskeletal:         General: Normal range of motion. Cervical back: Normal range of motion and neck supple. Skin:     General: Skin is warm and dry. Coloration: Skin is not pale. Findings: No erythema or rash. Neurological:      Mental Status: She is alert and oriented to person, place, and time. Psychiatric:         Behavior: Behavior normal.         Judgment: Judgment normal.            Assessment/Plan:          Diagnosis Orders   1. Incisional hernia, without obstruction or gangrene          Using her imaging as well as anatomic diagrams, I described the risks and benefits of incisional hernia repair with mesh. Risks of infection, bleeding, recurrence reoperation postoperative pain and mesh complications discussed. Wound complications addressed as well. Nonoperative alternatives were given but not recommended. Despite the risks, she wishes to proceed with incisional hernia repair with mesh. Consent obtained. Please note this report has beenpartially produced using speech recognition software and may cause contain errors related to that system including grammar, punctuation and spelling as well as words and phrases that may seem inappropriate.  If there arequestions or concerns please feel free to contact me to clarify

## 2023-03-02 ENCOUNTER — APPOINTMENT (OUTPATIENT)
Dept: GENERAL RADIOLOGY | Age: 58
End: 2023-03-02
Payer: MEDICARE

## 2023-03-02 ENCOUNTER — HOSPITAL ENCOUNTER (OUTPATIENT)
Age: 58
Setting detail: OBSERVATION
Discharge: HOME | End: 2023-03-05
Attending: STUDENT IN AN ORGANIZED HEALTH CARE EDUCATION/TRAINING PROGRAM | Admitting: INTERNAL MEDICINE
Payer: MEDICARE

## 2023-03-02 DIAGNOSIS — R55 SYNCOPE AND COLLAPSE: Primary | ICD-10-CM

## 2023-03-02 DIAGNOSIS — N39.0 URINARY TRACT INFECTION WITHOUT HEMATURIA, SITE UNSPECIFIED: ICD-10-CM

## 2023-03-02 DIAGNOSIS — R41.0 CONFUSION: ICD-10-CM

## 2023-03-02 DIAGNOSIS — E83.42 HYPOMAGNESEMIA: ICD-10-CM

## 2023-03-02 PROCEDURE — 96375 TX/PRO/DX INJ NEW DRUG ADDON: CPT

## 2023-03-02 PROCEDURE — 82550 ASSAY OF CK (CPK): CPT

## 2023-03-02 PROCEDURE — 96374 THER/PROPH/DIAG INJ IV PUSH: CPT

## 2023-03-02 PROCEDURE — 81001 URINALYSIS AUTO W/SCOPE: CPT

## 2023-03-02 PROCEDURE — 83735 ASSAY OF MAGNESIUM: CPT

## 2023-03-02 PROCEDURE — 87086 URINE CULTURE/COLONY COUNT: CPT

## 2023-03-02 PROCEDURE — 80053 COMPREHEN METABOLIC PANEL: CPT

## 2023-03-02 PROCEDURE — 84443 ASSAY THYROID STIM HORMONE: CPT

## 2023-03-02 PROCEDURE — 83880 ASSAY OF NATRIURETIC PEPTIDE: CPT

## 2023-03-02 PROCEDURE — 85025 COMPLETE CBC W/AUTO DIFF WBC: CPT

## 2023-03-02 PROCEDURE — 99285 EMERGENCY DEPT VISIT HI MDM: CPT

## 2023-03-02 PROCEDURE — 96361 HYDRATE IV INFUSION ADD-ON: CPT

## 2023-03-02 PROCEDURE — 93005 ELECTROCARDIOGRAM TRACING: CPT | Performed by: STUDENT IN AN ORGANIZED HEALTH CARE EDUCATION/TRAINING PROGRAM

## 2023-03-02 PROCEDURE — 71045 X-RAY EXAM CHEST 1 VIEW: CPT

## 2023-03-02 PROCEDURE — 2580000003 HC RX 258: Performed by: STUDENT IN AN ORGANIZED HEALTH CARE EDUCATION/TRAINING PROGRAM

## 2023-03-02 PROCEDURE — 36415 COLL VENOUS BLD VENIPUNCTURE: CPT

## 2023-03-02 PROCEDURE — 84484 ASSAY OF TROPONIN QUANT: CPT

## 2023-03-02 RX ORDER — 0.9 % SODIUM CHLORIDE 0.9 %
1000 INTRAVENOUS SOLUTION INTRAVENOUS ONCE
Status: COMPLETED | OUTPATIENT
Start: 2023-03-02 | End: 2023-03-03

## 2023-03-02 RX ADMIN — SODIUM CHLORIDE 1000 ML: 9 INJECTION, SOLUTION INTRAVENOUS at 23:25

## 2023-03-02 ASSESSMENT — PAIN - FUNCTIONAL ASSESSMENT: PAIN_FUNCTIONAL_ASSESSMENT: NONE - DENIES PAIN

## 2023-03-03 ENCOUNTER — APPOINTMENT (OUTPATIENT)
Dept: CT IMAGING | Age: 58
End: 2023-03-03
Payer: MEDICARE

## 2023-03-03 ENCOUNTER — APPOINTMENT (OUTPATIENT)
Dept: MRI IMAGING | Age: 58
End: 2023-03-03
Payer: MEDICARE

## 2023-03-03 PROBLEM — R55 SYNCOPE AND COLLAPSE: Status: ACTIVE | Noted: 2023-03-03

## 2023-03-03 LAB
ALBUMIN SERPL-MCNC: 3.9 G/DL (ref 3.5–4.6)
ALP BLD-CCNC: 119 U/L (ref 40–130)
ALT SERPL-CCNC: 7 U/L (ref 0–33)
ANION GAP SERPL CALCULATED.3IONS-SCNC: 12 MEQ/L (ref 9–15)
ANION GAP SERPL CALCULATED.3IONS-SCNC: 17 MEQ/L (ref 9–15)
ANISOCYTOSIS: ABNORMAL
AST SERPL-CCNC: 15 U/L (ref 0–35)
ATYPICAL LYMPHOCYTE RELATIVE PERCENT: 2 %
BACTERIA: NEGATIVE /HPF
BASOPHILS ABSOLUTE: 0.1 K/UL (ref 0–0.2)
BASOPHILS ABSOLUTE: 0.3 K/UL (ref 0–0.2)
BASOPHILS RELATIVE PERCENT: 0.7 %
BASOPHILS RELATIVE PERCENT: 2 %
BILIRUB SERPL-MCNC: <0.2 MG/DL (ref 0.2–0.7)
BILIRUBIN URINE: NEGATIVE
BLOOD, URINE: NEGATIVE
BUN BLDV-MCNC: 24 MG/DL (ref 6–20)
BUN BLDV-MCNC: 24 MG/DL (ref 6–20)
CALCIUM SERPL-MCNC: 7.9 MG/DL (ref 8.5–9.9)
CALCIUM SERPL-MCNC: 8.1 MG/DL (ref 8.5–9.9)
CHLORIDE BLD-SCNC: 100 MEQ/L (ref 95–107)
CHLORIDE BLD-SCNC: 102 MEQ/L (ref 95–107)
CLARITY: ABNORMAL
CO2: 19 MEQ/L (ref 20–31)
CO2: 25 MEQ/L (ref 20–31)
COLOR: YELLOW
CREAT SERPL-MCNC: 1.89 MG/DL (ref 0.5–0.9)
CREAT SERPL-MCNC: 2.26 MG/DL (ref 0.5–0.9)
EKG ATRIAL RATE: 59 BPM
EKG P AXIS: 42 DEGREES
EKG P-R INTERVAL: 164 MS
EKG Q-T INTERVAL: 440 MS
EKG QRS DURATION: 86 MS
EKG QTC CALCULATION (BAZETT): 435 MS
EKG R AXIS: 51 DEGREES
EKG T AXIS: 20 DEGREES
EKG VENTRICULAR RATE: 59 BPM
EOSINOPHILS ABSOLUTE: 0 K/UL (ref 0–0.7)
EOSINOPHILS ABSOLUTE: 0.2 K/UL (ref 0–0.7)
EOSINOPHILS RELATIVE PERCENT: 1.5 %
EOSINOPHILS RELATIVE PERCENT: 1.5 %
EPITHELIAL CELLS, UA: ABNORMAL /HPF (ref 0–5)
GFR SERPL CREATININE-BSD FRML MDRD: 24.4 ML/MIN/{1.73_M2}
GFR SERPL CREATININE-BSD FRML MDRD: 30.2 ML/MIN/{1.73_M2}
GLOBULIN: 3.3 G/DL (ref 2.3–3.5)
GLUCOSE BLD-MCNC: 100 MG/DL (ref 70–99)
GLUCOSE BLD-MCNC: 108 MG/DL (ref 70–99)
GLUCOSE BLD-MCNC: 111 MG/DL (ref 70–99)
GLUCOSE BLD-MCNC: 113 MG/DL (ref 70–99)
GLUCOSE URINE: NEGATIVE MG/DL
HCT VFR BLD CALC: 36 % (ref 37–47)
HCT VFR BLD CALC: 38.8 % (ref 37–47)
HEMOGLOBIN: 11.6 G/DL (ref 12–16)
HEMOGLOBIN: 12.5 G/DL (ref 12–16)
HYALINE CASTS: ABNORMAL /HPF (ref 0–5)
INFLUENZA A BY PCR: NEGATIVE
INFLUENZA B BY PCR: NEGATIVE
KETONES, URINE: NEGATIVE MG/DL
LACTIC ACID: 2.9 MMOL/L (ref 0.5–2.2)
LEUKOCYTE ESTERASE, URINE: ABNORMAL
LV EF: 60 %
LVEF MODALITY: NORMAL
LYMPHOCYTES ABSOLUTE: 2.4 K/UL (ref 1–4.8)
LYMPHOCYTES ABSOLUTE: 3.6 K/UL (ref 1–4.8)
LYMPHOCYTES RELATIVE PERCENT: 17.4 %
LYMPHOCYTES RELATIVE PERCENT: 25 %
MAGNESIUM: 0.9 MG/DL (ref 1.7–2.4)
MCH RBC QN AUTO: 28.6 PG (ref 27–31.3)
MCH RBC QN AUTO: 28.8 PG (ref 27–31.3)
MCHC RBC AUTO-ENTMCNC: 32.2 % (ref 33–37)
MCHC RBC AUTO-ENTMCNC: 32.3 % (ref 33–37)
MCV RBC AUTO: 88.6 FL (ref 79.4–94.8)
MCV RBC AUTO: 89.6 FL (ref 79.4–94.8)
MONOCYTES ABSOLUTE: 1.6 K/UL (ref 0.2–0.8)
MONOCYTES ABSOLUTE: 2 K/UL (ref 0.2–0.8)
MONOCYTES RELATIVE PERCENT: 12.4 %
MONOCYTES RELATIVE PERCENT: 14.8 %
NEUTROPHILS ABSOLUTE: 7.9 K/UL (ref 1.4–6.5)
NEUTROPHILS ABSOLUTE: 8.9 K/UL (ref 1.4–6.5)
NEUTROPHILS RELATIVE PERCENT: 59 %
NEUTROPHILS RELATIVE PERCENT: 65.6 %
NITRITE, URINE: NEGATIVE
PDW BLD-RTO: 15.3 % (ref 11.5–14.5)
PDW BLD-RTO: 15.6 % (ref 11.5–14.5)
PERFORMED ON: ABNORMAL
PERFORMED ON: ABNORMAL
PH UA: 5 (ref 5–9)
PLATELET # BLD: 306 K/UL (ref 130–400)
PLATELET # BLD: 340 K/UL (ref 130–400)
PLATELET SLIDE REVIEW: NORMAL
PLATELET SLIDE REVIEW: NORMAL
POTASSIUM REFLEX MAGNESIUM: 4.7 MEQ/L (ref 3.4–4.9)
POTASSIUM SERPL-SCNC: 4 MEQ/L (ref 3.4–4.9)
PRO-BNP: 494 PG/ML
PROTEIN UA: NEGATIVE MG/DL
RBC # BLD: 4.07 M/UL (ref 4.2–5.4)
RBC # BLD: 4.33 M/UL (ref 4.2–5.4)
RBC # BLD: NORMAL 10*6/UL
RBC UA: ABNORMAL /HPF (ref 0–5)
SARS-COV-2, NAAT: NOT DETECTED
SODIUM BLD-SCNC: 137 MEQ/L (ref 135–144)
SODIUM BLD-SCNC: 138 MEQ/L (ref 135–144)
SPECIFIC GRAVITY UA: 1.01 (ref 1–1.03)
TOTAL CK: 73 U/L (ref 0–170)
TOTAL PROTEIN: 7.2 G/DL (ref 6.3–8)
TROPONIN: <0.01 NG/ML (ref 0–0.01)
TSH REFLEX: 1.69 UIU/ML (ref 0.44–3.86)
URINE REFLEX TO CULTURE: YES
UROBILINOGEN, URINE: 0.2 E.U./DL
WBC # BLD: 13.4 K/UL (ref 4.8–10.8)
WBC # BLD: 13.5 K/UL (ref 4.8–10.8)
WBC UA: ABNORMAL /HPF (ref 0–5)

## 2023-03-03 PROCEDURE — 96375 TX/PRO/DX INJ NEW DRUG ADDON: CPT

## 2023-03-03 PROCEDURE — 70551 MRI BRAIN STEM W/O DYE: CPT

## 2023-03-03 PROCEDURE — 96366 THER/PROPH/DIAG IV INF ADDON: CPT

## 2023-03-03 PROCEDURE — 6360000002 HC RX W HCPCS: Performed by: STUDENT IN AN ORGANIZED HEALTH CARE EDUCATION/TRAINING PROGRAM

## 2023-03-03 PROCEDURE — G0378 HOSPITAL OBSERVATION PER HR: HCPCS

## 2023-03-03 PROCEDURE — 2580000003 HC RX 258: Performed by: STUDENT IN AN ORGANIZED HEALTH CARE EDUCATION/TRAINING PROGRAM

## 2023-03-03 PROCEDURE — 2580000003 HC RX 258: Performed by: INTERNAL MEDICINE

## 2023-03-03 PROCEDURE — 6370000000 HC RX 637 (ALT 250 FOR IP): Performed by: INTERNAL MEDICINE

## 2023-03-03 PROCEDURE — 6370000000 HC RX 637 (ALT 250 FOR IP): Performed by: STUDENT IN AN ORGANIZED HEALTH CARE EDUCATION/TRAINING PROGRAM

## 2023-03-03 PROCEDURE — 6360000002 HC RX W HCPCS

## 2023-03-03 PROCEDURE — 83605 ASSAY OF LACTIC ACID: CPT

## 2023-03-03 PROCEDURE — 80048 BASIC METABOLIC PNL TOTAL CA: CPT

## 2023-03-03 PROCEDURE — 36415 COLL VENOUS BLD VENIPUNCTURE: CPT

## 2023-03-03 PROCEDURE — 96367 TX/PROPH/DG ADDL SEQ IV INF: CPT

## 2023-03-03 PROCEDURE — 70450 CT HEAD/BRAIN W/O DYE: CPT

## 2023-03-03 PROCEDURE — 85025 COMPLETE CBC W/AUTO DIFF WBC: CPT

## 2023-03-03 PROCEDURE — 99222 1ST HOSP IP/OBS MODERATE 55: CPT | Performed by: PSYCHIATRY & NEUROLOGY

## 2023-03-03 PROCEDURE — 99222 1ST HOSP IP/OBS MODERATE 55: CPT | Performed by: INTERNAL MEDICINE

## 2023-03-03 PROCEDURE — 6370000000 HC RX 637 (ALT 250 FOR IP)

## 2023-03-03 PROCEDURE — 96372 THER/PROPH/DIAG INJ SC/IM: CPT

## 2023-03-03 PROCEDURE — 97161 PT EVAL LOW COMPLEX 20 MIN: CPT

## 2023-03-03 PROCEDURE — 2580000003 HC RX 258

## 2023-03-03 PROCEDURE — C8929 TTE W OR WO FOL WCON,DOPPLER: HCPCS

## 2023-03-03 PROCEDURE — 6360000004 HC RX CONTRAST MEDICATION: Performed by: INTERNAL MEDICINE

## 2023-03-03 PROCEDURE — 87502 INFLUENZA DNA AMP PROBE: CPT

## 2023-03-03 PROCEDURE — 87635 SARS-COV-2 COVID-19 AMP PRB: CPT

## 2023-03-03 PROCEDURE — 96365 THER/PROPH/DIAG IV INF INIT: CPT

## 2023-03-03 RX ORDER — DEXTROSE MONOHYDRATE 100 MG/ML
INJECTION, SOLUTION INTRAVENOUS CONTINUOUS PRN
Status: DISCONTINUED | OUTPATIENT
Start: 2023-03-03 | End: 2023-03-05 | Stop reason: HOSPADM

## 2023-03-03 RX ORDER — ENOXAPARIN SODIUM 100 MG/ML
30 INJECTION SUBCUTANEOUS 2 TIMES DAILY
Status: DISCONTINUED | OUTPATIENT
Start: 2023-03-03 | End: 2023-03-05 | Stop reason: HOSPADM

## 2023-03-03 RX ORDER — PRAVASTATIN SODIUM 40 MG
40 TABLET ORAL NIGHTLY
Status: DISCONTINUED | OUTPATIENT
Start: 2023-03-03 | End: 2023-03-05 | Stop reason: HOSPADM

## 2023-03-03 RX ORDER — POLYETHYLENE GLYCOL 3350 17 G/17G
17 POWDER, FOR SOLUTION ORAL DAILY PRN
Status: DISCONTINUED | OUTPATIENT
Start: 2023-03-03 | End: 2023-03-05 | Stop reason: HOSPADM

## 2023-03-03 RX ORDER — BACLOFEN 10 MG/1
10 TABLET ORAL 2 TIMES DAILY
Status: DISCONTINUED | OUTPATIENT
Start: 2023-03-03 | End: 2023-03-05 | Stop reason: HOSPADM

## 2023-03-03 RX ORDER — SODIUM CHLORIDE 9 MG/ML
INJECTION, SOLUTION INTRAVENOUS CONTINUOUS
Status: DISCONTINUED | OUTPATIENT
Start: 2023-03-03 | End: 2023-03-05 | Stop reason: HOSPADM

## 2023-03-03 RX ORDER — ONDANSETRON 2 MG/ML
4 INJECTION INTRAMUSCULAR; INTRAVENOUS EVERY 6 HOURS PRN
Status: DISCONTINUED | OUTPATIENT
Start: 2023-03-03 | End: 2023-03-05 | Stop reason: HOSPADM

## 2023-03-03 RX ORDER — INSULIN LISPRO 100 [IU]/ML
0-8 INJECTION, SOLUTION INTRAVENOUS; SUBCUTANEOUS
Status: DISCONTINUED | OUTPATIENT
Start: 2023-03-03 | End: 2023-03-05 | Stop reason: HOSPADM

## 2023-03-03 RX ORDER — INSULIN LISPRO 100 [IU]/ML
0-4 INJECTION, SOLUTION INTRAVENOUS; SUBCUTANEOUS NIGHTLY
Status: DISCONTINUED | OUTPATIENT
Start: 2023-03-03 | End: 2023-03-05 | Stop reason: HOSPADM

## 2023-03-03 RX ORDER — SODIUM CHLORIDE, SODIUM LACTATE, POTASSIUM CHLORIDE, CALCIUM CHLORIDE 600; 310; 30; 20 MG/100ML; MG/100ML; MG/100ML; MG/100ML
INJECTION, SOLUTION INTRAVENOUS CONTINUOUS
Status: DISCONTINUED | OUTPATIENT
Start: 2023-03-03 | End: 2023-03-03

## 2023-03-03 RX ORDER — ACETAMINOPHEN 650 MG/1
650 SUPPOSITORY RECTAL EVERY 6 HOURS PRN
Status: DISCONTINUED | OUTPATIENT
Start: 2023-03-03 | End: 2023-03-05 | Stop reason: HOSPADM

## 2023-03-03 RX ORDER — 0.9 % SODIUM CHLORIDE 0.9 %
1000 INTRAVENOUS SOLUTION INTRAVENOUS ONCE
Status: COMPLETED | OUTPATIENT
Start: 2023-03-03 | End: 2023-03-03

## 2023-03-03 RX ORDER — DULOXETIN HYDROCHLORIDE 60 MG/1
60 CAPSULE, DELAYED RELEASE ORAL 2 TIMES DAILY
Status: DISCONTINUED | OUTPATIENT
Start: 2023-03-03 | End: 2023-03-05 | Stop reason: HOSPADM

## 2023-03-03 RX ORDER — MAGNESIUM SULFATE IN WATER 40 MG/ML
2000 INJECTION, SOLUTION INTRAVENOUS ONCE
Status: COMPLETED | OUTPATIENT
Start: 2023-03-03 | End: 2023-03-03

## 2023-03-03 RX ORDER — PREGABALIN 150 MG/1
150 CAPSULE ORAL 3 TIMES DAILY
Status: DISCONTINUED | OUTPATIENT
Start: 2023-03-03 | End: 2023-03-05 | Stop reason: HOSPADM

## 2023-03-03 RX ORDER — ACETAMINOPHEN 325 MG/1
650 TABLET ORAL EVERY 6 HOURS PRN
Status: DISCONTINUED | OUTPATIENT
Start: 2023-03-03 | End: 2023-03-05 | Stop reason: HOSPADM

## 2023-03-03 RX ORDER — MAGNESIUM SULFATE IN WATER 40 MG/ML
4000 INJECTION, SOLUTION INTRAVENOUS ONCE
Status: COMPLETED | OUTPATIENT
Start: 2023-03-03 | End: 2023-03-03

## 2023-03-03 RX ORDER — QUETIAPINE FUMARATE 50 MG/1
50 TABLET, FILM COATED ORAL NIGHTLY
Status: DISCONTINUED | OUTPATIENT
Start: 2023-03-03 | End: 2023-03-05 | Stop reason: HOSPADM

## 2023-03-03 RX ORDER — ONDANSETRON 4 MG/1
4 TABLET, ORALLY DISINTEGRATING ORAL EVERY 8 HOURS PRN
Status: DISCONTINUED | OUTPATIENT
Start: 2023-03-03 | End: 2023-03-05 | Stop reason: HOSPADM

## 2023-03-03 RX ORDER — POTASSIUM CHLORIDE 20 MEQ/1
40 TABLET, EXTENDED RELEASE ORAL ONCE
Status: COMPLETED | OUTPATIENT
Start: 2023-03-03 | End: 2023-03-03

## 2023-03-03 RX ORDER — LOPERAMIDE HYDROCHLORIDE 2 MG/1
4 CAPSULE ORAL ONCE
Status: COMPLETED | OUTPATIENT
Start: 2023-03-03 | End: 2023-03-03

## 2023-03-03 RX ADMIN — ENOXAPARIN SODIUM 30 MG: 100 INJECTION SUBCUTANEOUS at 21:41

## 2023-03-03 RX ADMIN — PERFLUTREN 1.5 ML: 6.52 INJECTION, SUSPENSION INTRAVENOUS at 09:45

## 2023-03-03 RX ADMIN — SODIUM CHLORIDE: 9 INJECTION, SOLUTION INTRAVENOUS at 17:04

## 2023-03-03 RX ADMIN — POTASSIUM CHLORIDE 40 MEQ: 1500 TABLET, EXTENDED RELEASE ORAL at 00:44

## 2023-03-03 RX ADMIN — QUETIAPINE FUMARATE 50 MG: 50 TABLET ORAL at 21:41

## 2023-03-03 RX ADMIN — PREGABALIN 150 MG: 150 CAPSULE ORAL at 21:40

## 2023-03-03 RX ADMIN — BACLOFEN 10 MG: 10 TABLET ORAL at 12:05

## 2023-03-03 RX ADMIN — BACLOFEN 10 MG: 10 TABLET ORAL at 21:40

## 2023-03-03 RX ADMIN — SODIUM CHLORIDE 1000 ML: 9 INJECTION, SOLUTION INTRAVENOUS at 02:44

## 2023-03-03 RX ADMIN — MAGNESIUM SULFATE HEPTAHYDRATE 4000 MG: 40 INJECTION, SOLUTION INTRAVENOUS at 00:43

## 2023-03-03 RX ADMIN — ENOXAPARIN SODIUM 30 MG: 100 INJECTION SUBCUTANEOUS at 12:08

## 2023-03-03 RX ADMIN — PRAVASTATIN SODIUM 40 MG: 40 TABLET ORAL at 21:41

## 2023-03-03 RX ADMIN — CEFTRIAXONE SODIUM 1000 MG: 1 INJECTION, POWDER, FOR SOLUTION INTRAMUSCULAR; INTRAVENOUS at 03:37

## 2023-03-03 RX ADMIN — MAGNESIUM SULFATE HEPTAHYDRATE 2000 MG: 40 INJECTION, SOLUTION INTRAVENOUS at 07:48

## 2023-03-03 RX ADMIN — LOPERAMIDE HYDROCHLORIDE 4 MG: 2 CAPSULE ORAL at 12:04

## 2023-03-03 RX ADMIN — SODIUM CHLORIDE, POTASSIUM CHLORIDE, SODIUM LACTATE AND CALCIUM CHLORIDE: 600; 310; 30; 20 INJECTION, SOLUTION INTRAVENOUS at 06:49

## 2023-03-03 RX ADMIN — ACETAMINOPHEN 650 MG: 325 TABLET ORAL at 21:40

## 2023-03-03 RX ADMIN — DULOXETINE HYDROCHLORIDE 60 MG: 60 CAPSULE, DELAYED RELEASE ORAL at 12:05

## 2023-03-03 RX ADMIN — PREGABALIN 150 MG: 150 CAPSULE ORAL at 17:00

## 2023-03-03 ASSESSMENT — ENCOUNTER SYMPTOMS
PHOTOPHOBIA: 0
RHINORRHEA: 0
EYES NEGATIVE: 1
VOMITING: 0
CHEST TIGHTNESS: 0
NAUSEA: 0
GASTROINTESTINAL NEGATIVE: 1
SORE THROAT: 0
ABDOMINAL PAIN: 0
NAUSEA: 1
COLOR CHANGE: 0
BLOOD IN STOOL: 0
TROUBLE SWALLOWING: 0
WHEEZING: 0
SHORTNESS OF BREATH: 0
ABDOMINAL PAIN: 1
CONSTIPATION: 0
EYE PAIN: 0
BACK PAIN: 0
STRIDOR: 0
ABDOMINAL DISTENTION: 0
COUGH: 0
DIARRHEA: 0
RESPIRATORY NEGATIVE: 1
DIARRHEA: 1

## 2023-03-03 ASSESSMENT — PAIN SCALES - GENERAL: PAINLEVEL_OUTOF10: 4

## 2023-03-03 ASSESSMENT — PAIN DESCRIPTION - DESCRIPTORS: DESCRIPTORS: ACHING

## 2023-03-03 ASSESSMENT — PAIN DESCRIPTION - LOCATION: LOCATION: ABDOMEN

## 2023-03-03 NOTE — ED TRIAGE NOTES
Pt to ED due to LOC for 1+ minute. Pt was sitting and had a LOC. Pt was lowered to the ground. Pt arrives to the ED lethargic and slow to respond. Pt is alert and oriented x4. Resp are regular and equal. Skin is warm, dry, pale.

## 2023-03-03 NOTE — FLOWSHEET NOTE
Patient home medication list reconciled, Dr. Mary Chan aware. MRI form completed. Patient reported that Dr. Damien Angela was going to do  hernia repair on Monday, perfectserve sent to Dr. Damien Angela.   Electronically signed by Leslie Spring RN on 3/3/2023 at 2:17 PM

## 2023-03-03 NOTE — CONSULTS
[facility] Neurology Consult Note  Name: Abdoulaye Almanza  Age: 61 y.o. Gender: female  CodeStatus: Full Code  Allergies: Latex  Penicillins  Shellfish-Derived Products  Abilify [Aripiprazole]  Adhesive Tape  Topamax [Topiramate]  Buspar [Buspirone]  Other  Sulfa Antibiotics    Chief Complaint:Loss of Consciousness    Primary Care Provider: Selam Babb MD  InpatientTreatment Team: Treatment Team: Attending Provider: Dolores Duncan MD; Consulting Physician: Sim Nunez MD; Consulting Physician: Jhoana Barnhart MD; Registered Nurse: Cara Michelle RN; : Augie Moreira, RN; Unit Clerk: Marjorie Monzon; Utilization Reviewer: Gil Sifuentes RN  Admission Date: 3/2/2023      HPI     Patient is a pleasant 28-year-old female who was in her usual state of health until yesterday. Patient states that she was playing cards with friends and family member and she got up to get a glass of water and she states she felt lightheaded and the next thing she remembers is passing out. The patient states that she could hear those in the room talking to her but could not move or sit up or communicate back. The patient states that she felt extremely fatigued she was found to be down and unresponsive for roughly 1 minute and then started coming around. Patient does think she was able to hear those around her during this whole time. .  Patient's family members could not feel a pulse but this does not mean there was not 100 could have been thready. Patient states she slowly remembers becoming more alert member some of the ambulance ride here some of the issues in the emergency department does remember getting a CAT scan of her head this was done with him with without contrast there is no evidence of any acute bleed no mass lesions no structural lesions no early CT sign changes are seen to suggest any acute ischemia.   The patient's states over the last few weeks she has had increasing bouts of diarrhea states that she did see a specialist for this and she does have a known history of irritable bowel syndrome also inflammatory bowel disease with Crohn's. Patient states that she has had more voluminous episodes of diarrhea recently and did appear to be dehydrated upon admission with evidence of hypomagnesemia. Patient denies previous episodes of seizure or syncope does admit to migraine headaches denies any previous episodes similar to this. Pt seen and examined for neuro follow up.   NO Headache, double vision, blurry vision, difficulty with speech, difficulty with swallowing, weakness, numbness, pain, nausea, vomiting, choking, neck pain, dizziness         Allergies   Allergen Reactions    Latex Hives    Penicillins Swelling and Rash    Shellfish-Derived Products Anaphylaxis    Abilify [Aripiprazole]      shaking    Adhesive Tape Swelling     If left on too long    Topamax [Topiramate]     Buspar [Buspirone]      shaking    Other Nausea And Vomiting    Sulfa Antibiotics Nausea And Vomiting       Patient Active Problem List   Diagnosis    Benign essential hypertension    Asthma    Polyneuropathy due to type 2 diabetes mellitus (HCC)    Crohn's disease (HCC)    Gastroesophageal reflux disease    Headache    Severe episode of recurrent major depressive disorder, without psychotic features (HCC)    Borderline personality disorder ([facility])    Pain in right knee    Hip pain, right    Obesity with body mass index 30 or greater    Muscle pain    Mixed hyperlipidemia    Tear of meniscus of knee    Bilateral edema of lower extremity    Varicose veins of lower extremity    History of Crohn's disease    Carpal tunnel syndrome of right wrist    Posttraumatic stress disorder    Fibromyalgia    Carpal tunnel syndrome of left wrist    Intermittent tremor    Anemia    Iron deficiency anemia    Tremor    Meralgia paresthetica of left side    Intractable migraine without aura and without status migrainosus    Morbid obesity (HCC)    Edema of lower extremity    Gastroenteritis    Injury of left foot    Leukocytosis    Nausea and vomiting    Shoulder pain    Pain of right hip joint    Acute bacterial sinusitis    Bronchitis    Right upper quadrant abdominal pain    Diarrhea    Fever    Ventral hernia    Syncope and collapse       Past Medical History:   Diagnosis Date    Anemia     Asthma     Benign essential HTN     meds > 5 yrs / denies TIA or stroke    Borderline personality disorder ([address]     2016 ?suggested by me-meets many criteria    Carpal tunnel syndrome of right wrist     Crohn's disease ([address]     Depression     Fibromyalgia 02/13/2018    Fibromyalgia     GERD (gastroesophageal reflux disease)     Gout     Headache     migraines    Irritable bowel syndrome     Mixed hyperlipidemia 05/10/2017    Neuropathy     Obesity (BMI 35.0-39.9 without comorbidity) 03/30/2017    PONV (postoperative nausea and vomiting)     nausea    PTSD (post-traumatic stress disorder)     Tremor of unknown origin     Type 2 diabetes mellitus ([address]     meds > 5yrs     Ulcerative colitis ([address] 11/2017    Vaginitis        Family History   Problem Relation Age of Onset    Emphysema Mother     Heart Disease Sister     Stroke Sister     Diabetes Sister     Cancer Maternal Grandfather     Diabetes Paternal Grandmother     Emphysema Paternal Grandfather        Past Surgical History:   Procedure Laterality Date    BREAST CYST EXCISION Right 1994    exc mass benign    CHOLECYSTECTOMY  2009    COLONOSCOPY  2015    negative    ENDOSCOPY, COLON, DIAGNOSTIC      HERNIA REPAIR  2015    ventral / mesh    LEG TENDON SURGERY Right 2007    leg.  ankle and foot    OVARIAN CYST REMOVAL Left 1994    with mass and both fallopian tube    OVARY REMOVAL  12/2015    [facility] / mass left ovary & bilateral  tubes    PARTIAL HYSTERECTOMY ([facility]ERV[facility] NOT REMOVED)      MI ARTHROSCOPY KNEE DIAGNOSTIC W/WO SYNOVIAL BX SPX Right 08/03/2017    RIGHT KNEE ARTHROSCOPIC PARTIAL MEDIAL MENISCECTOMY KNEE performed by Sen Goss MD at [address] IND N/A 11/07/2017    COLONOSCOPY performed by MANOJ Baugh on bx    LA COLONOSCOPY W/BIOPSY SINGLE/MULTIPLE N/A 03/15/2018    COLONOSCOPY performed by Daniela Stroud MD at [address] [facility] &/TRANSPOS MEDIAN NRV CARPAL TUNNE Right 11/30/2017    RIGHT WRIST  CARPAL TUNNEL RELEASE performed by Sen Goss MD at [address] NEUROPLASTY &/TRANSPOS MEDIAN NRV CARPAL Samuella Agree Left 05/10/2018    LEFT WRIST CARPAL TUNNEL RELEASE performed by Sen Goss MD at [facility]      lower back-negative, right upper arm    TONSILLECTOMY AND ADENOIDECTOMY  1967        Social History     Socioeconomic History    Marital status: Single     Spouse name: None    Number of children: None    Years of education: None    Highest education level: None   Tobacco Use    Smoking status: Never    Smokeless tobacco: Never   Vaping Use    Vaping Use: Never used   Substance and Sexual Activity    Alcohol use: No     Alcohol/week: 0.0 standard drinks    Drug use: No    Sexual activity: Not Currently     Social Determinants of Health     Financial Resource Strain: Low Risk     Difficulty of Paying Living Expenses: Not hard at all   Food Insecurity: No Food Insecurity    Worried About Running Out of Food in the Last Year: Never true    Ran Out of Food in the Last Year: Never true   Transportation Needs: No Transportation Needs    Lack of Transportation (Medical): No    Lack of Transportation (Non-Medical): No   Physical Activity: Insufficiently Active    Days of Exercise per Week: 4 days    Minutes of Exercise per Session: 20 min   Housing Stability: Unknown    Unstable Housing in the Last Year: No        Vitals:    03/03/23 0727   BP: 130/64   Pulse: 53   Resp: 18   Temp: 97.5 °F (36.4 °C)   SpO2: 99%       Review of Systems extensively reviewed x10 systems is otherwise stated negative other than as stated in HPI.     Physical Exam vitals are stable slightly bradycardic at 53. HEENT: Pupils equal round reactive to light extract muscles are full V1 through V3 are intact, face is symmetric, palate elevates symmetrically bilaterally, tongue is midline, good shoulder shrug present bilaterally. Neck supple no carotid bruits noted. Heart: Regular rate and rhythm S1-S2 no murmurs rubs or gallops. Extremities no cyanosis no edema. Neurologic Exam patient is alert and oriented times person place day date month year speech is fluent. Funduscopic examination not tested but visual fields are intact to all 4 quadrants. Cranial nerves: Pupils equal round reactive to light extra muscles are full V1 through V3 intact face is symmetric. Palate elevates symmetrically bilaterally tongue is midline. Good shoulder shrug present bilaterally tongue is midline gag is normal.    Motor examination: 5/5 MRC grade strength is seen in upper and lower extremities proximally as well as distally. Sensory examinations intact to light touch all 4 extremities equally. Reflexes 2 at biceps triceps big radialis 1 - patellars 1 - at Achilles.     Gait and station normal.        Medications:  Reviewed    Infusion Medications:    dextrose      sodium chloride       Scheduled Medications:    enoxaparin  30 mg SubCUTAneous BID    insulin lispro  0-8 Units SubCUTAneous TID WC    insulin lispro  0-4 Units SubCUTAneous Nightly    loperamide  4 mg Oral Once    DULoxetine  60 mg Oral BID    baclofen  10 mg Oral BID    pravastatin  40 mg Oral Nightly    QUEtiapine  50 mg Oral Nightly    [START ON 3/4/2023] cefTRIAXone (ROCEPHIN) IV  1,000 mg IntraVENous Q24H     PRN Meds: ondansetron **OR** ondansetron, polyethylene glycol, acetaminophen **OR** acetaminophen, glucose, dextrose bolus **OR** dextrose bolus, glucagon (rDNA), dextrose, perflutren lipid microspheres    Labs:   Recent Labs     03/02/23  2300 03/03/23  0632   WBC 13.5* 13.4*   HGB 11.6* 12.5   HCT 36.0* 38.8   PLT 340 306     Recent Labs     03/02/23  2300 03/03/23  0632    138   K 4.0 4.7    102   CO2 25 19*   BUN 24* 24*   CREATININE 2.26* 1.89*   CALCIUM 7.9* 8.1*     Recent Labs     03/02/23  2300   AST 15   ALT 7   BILITOT <0.2   ALKPHOS 119     No results for input(s): INR in the last 72 hours.  Recent Labs     03/02/23  2300   CKTOTAL 73   TROPONINI <0.010       Urinalysis:   Lab Results   Component Value Date/Time    NITRU Negative 03/02/2023 11:00 PM    WBCUA  03/02/2023 11:00 PM    BACTERIA Negative 03/02/2023 11:00 PM    RBCUA 3-5 03/02/2023 11:00 PM    BLOODU Negative 03/02/2023 11:00 PM    SPECGRAV 1.010 03/02/2023 11:00 PM    GLUCOSEU Negative 03/02/2023 11:00 PM       Radiology:   Most recent    EEG No valid procedures specified.    MRI of Brain No results found for this or any previous visit.  No results found for this or any previous visit.                            MRA of the Head and Neck: No results found for this or any previous visit.  No results found for this or any previous visit.  No results found for this or any previous visit.                            CT of the Head: Results for orders placed during the hospital encounter of 03/02/23    CT HEAD WO CONTRAST    Narrative  EXAMINATION:  CT OF THE HEAD WITHOUT CONTRAST  3/3/2023 1:29 am    TECHNIQUE:  CT of the head was performed without the administration of intravenous  contrast. Automated exposure control, iterative reconstruction, and/or weight  based adjustment of the mA/kV was utilized to reduce the radiation dose to as  low as reasonably achievable.    COMPARISON:  None.    HISTORY:  ORDERING SYSTEM PROVIDED HISTORY: Trauma  TECHNOLOGIST PROVIDED HISTORY:  Has a \"code stroke\" or \"stroke alert\" been called?->No  Reason for exam:->Trauma  Decision Support Exception - unselect if not a suspected or confirmed  emergency medical condition->Emergency Medical Condition (MA)  What reading provider will be dictating this  exam?->CRC    FINDINGS:  BRAIN/VENTRICLES: There is no acute intracranial hemorrhage, mass effect or  midline shift. No abnormal extra-axial fluid collection. The gray-white  differentiation is maintained without evidence of an acute infarct. There is  no evidence of hydrocephalus. ORBITS: The visualized portion of the orbits demonstrate no acute abnormality. SINUSES: The visualized paranasal sinuses and mastoid air cells demonstrate  no acute abnormality. SOFT TISSUES/SKULL:  No acute abnormality of the visualized skull or soft  tissues. Impression  No acute intracranial abnormality. Specifically, there is no acute  intracranial hemorrhage  No results found for this or any previous visit. No results found for this or any previous visit. Carotid duplex: No results found for this or any previous visit. No results found for this or any previous visit. No results found for this or any previous visit. Echo No results found for this or any previous visit. Assessment/Plan:  Patient is a pleasant 65-year-old female who presents with episode of syncope. The patient denies nor is there any description of any tonic-clonic activity and patient is bradycardic in dehydrated upon admission with evidence of increasing problems with diarrhea due to inflammatory or irritable bowel in the last 2 weeks. Patient also presented with hypomagnesemia despite the fact that she takes magnesium 500 mg twice daily. Whether or not there is ongoing signs of malabsorption is unclear. On examination the patient has no focal or lateralizing signs she denies any headache and thus complicated migraine certainly unlikely. I do agree with checking an MRI of the brain without contrast to see if there is any evidence of any acute changes or diffusion-weighted imaging changes seen to suggest any posterior brainstem ischemia which could cause similar symptoms.   I will defer and hold off on EEG at this time as I do not see any evidence of epileptic type activity. Patient will have orthostatic vitals performed she is also on IV fluids with magnesium drip as well. I did personally review the CT scan of the head I do not see any evidence of any acute bleed mass lesions no structural lesions no early CT scan changes are seen to suggest any acute ischemia. I do agree with continuing her on telemetry to follow bradycardia and see if there is any evidence of any cardiac arrhythmias present. Also check echocardiogram 2D echo in addition to MRI.           Collaborating physicians: Dr Marcy López    Electronically signed by Moriah Espinosa MD on 3/3/2023 at 9:49 AM

## 2023-03-03 NOTE — CONSULTS
Inpatient consult to Cardiology  Consult performed by: Janna Thomas MD  Consult ordered by: Chucho Fernandez, APRN - CNP        Patient Name: Awais Range Date: 3/2/2023 10:44 PM  MR #: [id number]  : 1965    Attending Physician: Silvestre Godwin MD  Reason for consult: Syncope    History of Presenting Illness:      Carlos A Harry is a 61 y.o. female on hospital day 0 with a history of . History Obtained From:  patient, electronic medical record    Pt has chronic Crohn's with recent flare up last couple weeks. Otherwise she was in her usual state of health. She was playing cards w friends and family. Got up to get water and apparently collapsed. Bystanders caught her and therefore no injury. Reported 1 minute LOC and ? No pulse during this time per family. No seizure activity. No incontinence. No prior such episode. She denies any prodrome. No cp no sob. She is comfortable lying in bed. No O2. No fever.      She has no known CAD  ECG SR 59  On telemetry she shows SB 50-60 no pause    She has recent CKD   Severe Hypomag 0.9  History:      EKG:  Past Medical History:   Diagnosis Date    Anemia     Asthma     Benign essential HTN     meds > 5 yrs / denies TIA or stroke    Borderline personality disorder ([address]      ?suggested by me-meets many criteria    Carpal tunnel syndrome of right wrist     Crohn's disease ([address]     Depression     Fibromyalgia 2018    Fibromyalgia     GERD (gastroesophageal reflux disease)     Gout     Headache     migraines    Irritable bowel syndrome     Mixed hyperlipidemia 05/10/2017    Neuropathy     Obesity (BMI 35.0-39.9 without comorbidity) 2017    PONV (postoperative nausea and vomiting)     nausea    PTSD (post-traumatic stress disorder)     Tremor of unknown origin     Type 2 diabetes mellitus ([address]     meds > 5yrs     Ulcerative colitis ([address] 2017    Vaginitis      Past Surgical History:   Procedure Laterality Date    BREAST CYST EXCISION Right  exc mass benign    CHOLECYSTECTOMY  2009    COLONOSCOPY  2015    negative    ENDOSCOPY, COLON, DIAGNOSTIC      HERNIA REPAIR  2015    ventral / mesh    LEG TENDON SURGERY Right 2007    leg.  ankle and foot    OVARIAN CYST REMOVAL Left 1994    with mass and both fallopian tube    OVARY REMOVAL  12/2015    [facility] / mass left ovary & bilateral  tubes    PARTIAL HYSTERECTOMY (CERVIX NOT REMOVED)      MD ARTHROSCOPY KNEE DIAGNOSTIC W/WO SYNOVIAL BX SPX Right 08/03/2017    RIGHT KNEE ARTHROSCOPIC PARTIAL MEDIAL MENISCECTOMY KNEE performed by Nayeli Moreno MD at [address] IND N/A 11/07/2017    COLONOSCOPY performed by MANOJ Ordonez on bx    MD COLONOSCOPY W/BIOPSY SINGLE/MULTIPLE N/A 03/15/2018    COLONOSCOPY performed by Tae Graves MD at [address] NEUROPLASTY &/TRANSPOS MEDIAN NRV CARPAL TUNNE Right 11/30/2017    RIGHT WRIST  CARPAL TUNNEL RELEASE performed by Nayeli Moreno MD at [address] NEUROPLASTY &/TRANSPOS MEDIAN NRV CARPAL Sammye  Left 05/10/2018    LEFT WRIST CARPAL TUNNEL RELEASE performed by Nayeli Moreno MD at [facility]      lower back-negative, right upper arm    TONSILLECTOMY AND ADENOIDECTOMY  1967       Family History  Family History   Problem Relation Age of Onset    Emphysema Mother     Heart Disease Sister     Stroke Sister     Diabetes Sister     Cancer Maternal Grandfather     Diabetes Paternal Grandmother     Emphysema Paternal Grandfather      [] Unable to obtain due to ventilated and/ or neurologic status    Social History     Socioeconomic History    Marital status: Single     Spouse name: Not on file    Number of children: Not on file    Years of education: Not on file    Highest education level: Not on file   Occupational History    Not on file   Tobacco Use    Smoking status: Never    Smokeless tobacco: Never   Vaping Use    Vaping Use: Never used   Substance and Sexual Activity    Alcohol use: No     Alcohol/week: 0.0 standard drinks    Drug use: No    Sexual activity: Not Currently   Other Topics Concern    Not on file   Social History Narrative    Not on file     Social Determinants of Health     Financial Resource Strain: Low Risk     Difficulty of Paying Living Expenses: Not hard at all   Food Insecurity: No Food Insecurity    Worried About Running Out of Food in the Last Year: Never true    Ran Out of Food in the Last Year: Never true   Transportation Needs: No Transportation Needs    Lack of Transportation (Medical): No    Lack of Transportation (Non-Medical): No   Physical Activity: Insufficiently Active    Days of Exercise per Week: 4 days    Minutes of Exercise per Session: 20 min   Stress: Not on file   Social Connections: Not on file   Intimate Partner Violence: Not on file   Housing Stability: Unknown    Unable to Pay for Housing in the Last Year: Not on file    Number of Places Lived in the Last Year: Not on file    Unstable Housing in the Last Year: No      [] Unable to obtain due to ventilated and/ or neurologic status      Home Medications:      Medications Prior to Admission: hydrOXYzine pamoate (VISTARIL) 25 MG capsule, Take 25 mg by mouth 3 times daily as needed for Itching  RIZATRIPTAN BENZOATE PO, Take by mouth  QUEtiapine (SEROQUEL) 50 MG tablet,   pregabalin (LYRICA) 150 MG capsule, TAKE ONE CAPSULE BY MOUTH THREE TIMES A DAY  furosemide (LASIX) 40 MG tablet, TAKE ONE TABLET BY MOUTH TWO TIMES A DAY (Patient taking differently: daily TAKE ONE TABLET BY MOUTH TWO TIMES A DAY)  pravastatin (PRAVACHOL) 40 MG tablet, TAKE 1 TABLET BY MOUTH EVERY EVENING  metFORMIN (GLUCOPHAGE) 500 MG tablet, TAKE ONE TABLET BY MOUTH TWO TIMES A DAY WITH MEALS  baclofen (LIORESAL) 20 MG tablet, TAKE ONE TABLET BY MOUTH FOUR TIMES A DAY (Patient taking differently: 2 times daily)  omeprazole (PRILOSEC) 40 MG delayed release capsule, TAKE ONE CAPSULE BY MOUTH TWO TIMES A DAY  mometasone (ELOCON) 0.1 % cream, APPLY TOPICALLY ONCE  DAILY  ondansetron (ZOFRAN) 4 MG tablet, Take 1 tablet by mouth 3 times daily as needed for Nausea or Vomiting  allopurinol (ZYLOPRIM) 100 MG tablet, TAKE ONE TABLET BY MOUTH EVERY DAY  pioglitazone (ACTOS) 15 MG tablet, TAKE ONE TABLET BY MOUTH EVERY DAY  montelukast (SINGULAIR) 10 MG tablet, TAKE ONE TABLET BY MOUTH NIGHTLY  meloxicam (MOBIC) 7.5 MG tablet, TAKE 1 TABLET BY MOUTH TWICE DAILY  dicyclomine (BENTYL) 10 MG capsule, TAKE ONE CAPSULE BY MOUTH 4 TIMES DAILY BEFORE MEALS AND NIGHTLY  potassium chloride (KLOR-CON M) 20 MEQ extended release tablet, TAKE ONE TABLET BY MOUTH TWO TIMES A DAY (Patient taking differently: 20 mEq daily TAKE ONE TABLET BY MOUTH TWO TIMES A DAY)  metoprolol succinate (TOPROL XL) 50 MG extended release tablet, TAKE ONE TABLET BY MOUTH TWO TIMES A DAY. HOLD FOR SYSTOLIC BLOOD PRESSURRE LOWER THAN 100 OR HEART RATE LOWER THAN 60.  ipratropium-albuterol (DUONEB) 0.5-2.5 (3) MG/3ML SOLN nebulizer solution, INHALE 1 VIAL VIA NEBULIZER EVERY 4 HOURS  traZODone (DESYREL) 100 MG tablet, TAKE 1 TO 2 TABLETS BY MOUTH AT BEDTIME AS NEEDED FOR SLEEP  albuterol sulfate HFA (PROVENTIL HFA) 108 (90 Base) MCG/ACT inhaler, Inhale 2 puffs into the lungs every 6 hours as needed for Wheezing  Lancets MISC, Test bid  magnesium oxide (MAG-OX) 400 (241.3 Mg) MG TABS tablet, Take 1 tablet by mouth 2 times daily  Blood Glucose Monitoring Suppl (ONE TOUCH ULTRA 2) w/Device KIT, TEST TWICE DAILY  prazosin (MINIPRESS) 2 MG capsule, TAKE 1 CAPSULE BY MOUTH EVERY NIGHT FOR NIGHTMARES  blood glucose monitor strips, Test 2 times a day & as needed for symptoms of irregular blood glucose.   Melatonin 5 MG CAPS, Take 10 mg by mouth daily   FREESTYLE LITE strip, use three times daily   DULoxetine (CYMBALTA) 60 MG extended release capsule, TAKE ONE CAPSULE BY MOUTH TWICE DAILY AS DIRECTED IN THE MORNING AND AFTERNOON    Current Hospital Medications:     Scheduled Meds:   enoxaparin  30 mg SubCUTAneous BID    insulin lispro  0-8 Units SubCUTAneous TID     insulin lispro  0-4 Units SubCUTAneous Nightly    loperamide  4 mg Oral Once    DULoxetine  60 mg Oral BID    baclofen  10 mg Oral BID    pravastatin  40 mg Oral Nightly    QUEtiapine  50 mg Oral Nightly    [START ON 3/4/2023] cefTRIAXone (ROCEPHIN) IV  1,000 mg IntraVENous Q24H     Continuous Infusions:   dextrose      sodium chloride       PRN Meds:.ondansetron **OR** ondansetron, polyethylene glycol, acetaminophen **OR** acetaminophen, glucose, dextrose bolus **OR** dextrose bolus, glucagon (rDNA), dextrose  . dextrose      sodium chloride          Allergies: Allergies   Allergen Reactions    Latex Hives    Penicillins Swelling and Rash    Shellfish-Derived Products Anaphylaxis    Abilify [Aripiprazole]      shaking    Adhesive Tape Swelling     If left on too long    Topamax [Topiramate]     Buspar [Buspirone]      shaking    Other Nausea And Vomiting    Sulfa Antibiotics Nausea And Vomiting        Review of Systems:       Review of Systems   Constitutional: Negative. Negative for diaphoresis and fatigue. HENT: Negative. Eyes: Negative. Respiratory: Negative. Negative for cough, chest tightness, shortness of breath, wheezing and stridor. Cardiovascular: Negative. Negative for chest pain, palpitations and leg swelling. Gastrointestinal: Negative. Negative for blood in stool and nausea. Genitourinary: Negative. Musculoskeletal: Negative. Skin: Negative. Neurological:  Positive for syncope. Negative for dizziness, weakness and light-headedness. Hematological: Negative. Psychiatric/Behavioral: Negative. Objective Findings:     Vitals:/64   Pulse 53   Temp 97.5 °F (36.4 °C) (Axillary)   Resp 18   Ht 5' 2\" (1.575 m) Comment: per pt  Wt 280 lb 3.3 oz (127.1 kg)   LMP 07/27/1993 (Approximate)   SpO2 99%   BMI 51.25 kg/m²      Physical Examination:    Physical Exam   Constitutional: She appears healthy. No distress. HENT:   Normal cephalic and Atraumatic   Eyes: Pupils are equal, round, and reactive to light. Neck: Thyroid normal. No JVD present. No neck adenopathy. No thyromegaly present. Cardiovascular: Normal rate, regular rhythm, normal heart sounds, intact distal pulses and normal pulses. Pulmonary/Chest: Effort normal and breath sounds normal. She has no wheezes. She has no rales. She exhibits no tenderness. Abdominal: Soft. Bowel sounds are normal. There is no abdominal tenderness. Musculoskeletal:         General: No tenderness or edema. Normal range of motion. Cervical back: Normal range of motion and neck supple. Neurological: She is alert and oriented to person, place, and time. Skin: Skin is warm. No cyanosis. Nails show no clubbing. Results/ Medications reviewed 3/3/2023, 10:40 AM     Laboratory, Microbiology, Pathology, Radiology, Cardiology, Medications and Transcriptions reviewed  Scheduled Meds:   enoxaparin  30 mg SubCUTAneous BID    insulin lispro  0-8 Units SubCUTAneous TID WC    insulin lispro  0-4 Units SubCUTAneous Nightly    loperamide  4 mg Oral Once    DULoxetine  60 mg Oral BID    baclofen  10 mg Oral BID    pravastatin  40 mg Oral Nightly    QUEtiapine  50 mg Oral Nightly    [START ON 3/4/2023] cefTRIAXone (ROCEPHIN) IV  1,000 mg IntraVENous Q24H     Continuous Infusions:   dextrose      sodium chloride         Recent Labs     03/02/23  2300 03/03/23  0632   WBC 13.5* 13.4*   HGB 11.6* 12.5   HCT 36.0* 38.8   MCV 88.6 89.6    306     Recent Labs     03/02/23  2300 03/03/23  0632    138   K 4.0 4.7    102   CO2 25 19*   BUN 24* 24*   CREATININE 2.26* 1.89*     Recent Labs     03/02/23  2300   AST 15   ALT 7   BILITOT <0.2   ALKPHOS 119     No results for input(s): LIPASE, AMYLASE in the last 72 hours.   Recent Labs     03/02/23  2300   PROT 7.2     CT HEAD WO CONTRAST    Result Date: 3/3/2023  EXAMINATION: CT OF THE HEAD WITHOUT CONTRAST  3/3/2023 1:29 am TECHNIQUE: CT of the head was performed without the administration of intravenous contrast. Automated exposure control, iterative reconstruction, and/or weight based adjustment of the mA/kV was utilized to reduce the radiation dose to as low as reasonably achievable. COMPARISON: None. HISTORY: ORDERING SYSTEM PROVIDED HISTORY: Trauma TECHNOLOGIST PROVIDED HISTORY: Has a \"code stroke\" or \"stroke alert\" been called? ->No Reason for exam:->Trauma Decision Support Exception - unselect if not a suspected or confirmed emergency medical condition->Emergency Medical Condition (MA) What reading provider will be dictating this exam?->CRC FINDINGS: BRAIN/VENTRICLES: There is no acute intracranial hemorrhage, mass effect or midline shift. No abnormal extra-axial fluid collection. The gray-white differentiation is maintained without evidence of an acute infarct. There is no evidence of hydrocephalus. ORBITS: The visualized portion of the orbits demonstrate no acute abnormality. SINUSES: The visualized paranasal sinuses and mastoid air cells demonstrate no acute abnormality. SOFT TISSUES/SKULL:  No acute abnormality of the visualized skull or soft tissues. No acute intracranial abnormality. Specifically, there is no acute intracranial hemorrhage     XR CHEST PORTABLE    Result Date: 3/3/2023  EXAMINATION: ONE XRAY VIEW OF THE CHEST 3/2/2023 11:18 pm COMPARISON: 11-20 HISTORY: ORDERING SYSTEM PROVIDED HISTORY: syncope TECHNOLOGIST PROVIDED HISTORY: Reason for exam:->syncope What reading provider will be dictating this exam?->CRC FINDINGS: Cardiomediastinal silhouette: Is borderline enlarged, difficult to assess due to technical exaggeration and body habitus. If further concern consider PA/lateral exam or CT evaluation Lungs/pleura: No acute pulmonary infiltrate, pleural effusion, or pneumothorax is identified. Other: No additional acute abnormality. Overlying monitoring wires/ leads are present.      1. No acute pleural or pulmonary abnormality identified on portable exam 2. Borderline enlarged cardiomediastinal silhouette as described above       Active Hospital Problems    Diagnosis Date Noted    Syncope and collapse [R55] 03/03/2023     Priority: Medium         Impression/Plan:   Syncope - w/u in progress. Keep on Telemetry   Hypomag- replace iv and consider long term PO - likely related to Crohn's flare and diarrhea. Will review Echo   SB- she was on Toprol XL 50 bid- hold for now. And reduce as as tolerated. CKD/TETE  Thank you for allowing us to participate in the care of this patient. Will continue to follow. Please call if questions or concerns arise.     Electronically signed by Lenon Brunner, MD on 3/3/2023 at 10:40 AM

## 2023-03-03 NOTE — PROGRESS NOTES
Physical Therapy Med Surg Initial Assessment  Facility/Department: [facility]  [facility]     NAME: Parviz Oneil  : 1965 (61 y.o.)  MRN: [id number]  CODE STATUS: Full Code    Date of Service: 3/3/2023    Patient Diagnosis(es): Syncope and collapse [R55]  Hypomagnesemia [E83.42]  Confusion [R41.0]  Urinary tract infection without hematuria, site unspecified [N39.0]   Chief Complaint   Patient presents with    Loss of Consciousness     Patient Active Problem List    Diagnosis Date Noted    Syncope and collapse 2023    Ventral hernia 2023    Right upper quadrant abdominal pain 2022    Diarrhea 2022    Fever 2022    Acute bacterial sinusitis 2021    Bronchitis 2021    Gastroenteritis 2020    Injury of left foot 2020    Leukocytosis 2020    Nausea and vomiting 2020    Shoulder pain 2020    Morbid obesity ([facility] 2020    Headache 2019    Tremor 2019    Meralgia paresthetica of left side 2019    Intractable migraine without aura and without status migrainosus 2019    Iron deficiency anemia 2018    Anemia 2018    Carpal tunnel syndrome of left wrist 2018    Intermittent tremor 2018    Posttraumatic stress disorder 2018    Fibromyalgia 2018    Carpal tunnel syndrome of right wrist 2017    History of Crohn's disease     Bilateral edema of lower extremity 2017    Varicose veins of lower extremity 2017    Edema of lower extremity 2017    Tear of meniscus of knee 2017    Mixed hyperlipidemia 05/10/2017    Pain in right knee 2017    Hip pain, right 2017    Obesity with body mass index 30 or greater 2017    Muscle pain 2017    Pain of right hip joint 2017    Severe episode of recurrent major depressive disorder, without psychotic features ([facility] 10/25/2016    Borderline personality disorder ([facility] 10/25/2016 Polyneuropathy due to type 2 diabetes mellitus ([address] 02/05/2016    Benign essential hypertension     Asthma     Crohn's disease ([address]     Gastroesophageal reflux disease         Past Medical History:   Diagnosis Date    Anemia     Asthma     Benign essential HTN     meds > 5 yrs / denies TIA or stroke    Borderline personality disorder ([address]     2016 ?suggested by me-meets many criteria    Carpal tunnel syndrome of right wrist     Crohn's disease ([facility]     Depression     Fibromyalgia 02/13/2018    Fibromyalgia     GERD (gastroesophageal reflux disease)     Gout     Headache     migraines    Irritable bowel syndrome     Mixed hyperlipidemia 05/10/2017    Neuropathy     Obesity (BMI 35.0-39.9 without comorbidity) 03/30/2017    PONV (postoperative nausea and vomiting)     nausea    PTSD (post-traumatic stress disorder)     Tremor of unknown origin     Type 2 diabetes mellitus ([address]     meds > 5yrs     Ulcerative colitis ([facility] 11/2017    Vaginitis      Past Surgical History:   Procedure Laterality Date    BREAST CYST EXCISION Right 1994    exc mass benign    CHOLECYSTECTOMY  2009    COLONOSCOPY  2015    negative    ENDOSCOPY, COLON, DIAGNOSTIC      HERNIA REPAIR  2015    ventral / mesh    LEG TENDON SURGERY Right 2007    leg.  ankle and foot    OVARIAN CYST REMOVAL Left 1994    with mass and both fallopian tube    OVARY REMOVAL  12/2015    [facility] / mass left ovary & bilateral  tubes    PARTIAL HYSTERECTOMY (CERVIX NOT REMOVED)      GA ARTHROSCOPY KNEE DIAGNOSTIC W/WO SYNOVIAL BX SPX Right 08/03/2017    RIGHT KNEE ARTHROSCOPIC PARTIAL MEDIAL MENISCECTOMY KNEE performed by Nhung Moreno MD at [address] IND N/A 11/07/2017    COLONOSCOPY performed by MANOJ Sr on bx    GA COLONOSCOPY W/BIOPSY SINGLE/MULTIPLE N/A 03/15/2018    COLONOSCOPY performed by Madelyn Naidu MD at [address] NEUROPLASTY &/TRANSPOS MEDIAN NRV CARPAL TUNNE Right 11/30/2017    RIGHT WRIST  CARPAL TUNNEL RELEASE performed by Mari Yeager MD at [address] [facility] [address] NEUROPLASTY &/TRANSPOS MEDIAN NRV CARPAL [place]ra [place] Left 05/10/2018    LEFT WRIST CARPAL TUNNEL RELEASE performed by Mari Yeager MD at [facility]      lower back-negative, right upper arm    TONSILLECTOMY AND ADENOIDECTOMY  1967       Chart Reviewed: Yes  Patient assessed for rehabilitation services?: Yes  Family / Caregiver Present: No    Restrictions:  Restrictions/Precautions: Fall Risk (medium fall risk per Alistair Rummer)     SUBJECTIVE:   Subjective: pt denies pain currently. Pt reports that the last thing she remembers is getting up from playing cards with friends. Pt describes seeing herself, watching/hearing event transpire from above her body. Pain   Pre treatment screening: denies  Post treatment screening denies    Prior Level of Function:  Social/Functional History  Lives With:  (sister)  Type of Home: Mobile home  Home Layout: One level  Home Access: Stairs to enter with rails  Entrance Stairs - Number of Steps: 4  Bathroom Shower/Tub: Tub/Shower unit, Walk-in shower  Bathroom Equipment: Shower chair, Grab bars in shower  Home Equipment: Cane, Rollator (loftrand crutches)  ADL Assistance: Independent  Homemaking Assistance: Independent  Homemaking Responsibilities: Yes (shared)  Ambulation Assistance: Independent (no AD)  Transfer Assistance: Independent    OBJECTIVE:   Vision  Vision: Impaired  Vision Exceptions: Wears glasses for reading;Wears glasses for distance  Hearing: Within functional limits    Cognition:  Orientation Level: Oriented X4  Follows Commands: Within Functional Limits  Overall Cognitive Status: WFL    Observation/Palpation  Observation: alert, pleasant, cooperative, no acute distress noted, on RA    ROM:  AROM: Within functional limits  PROM: Within functional limits    Strength:  Strength:  Within functional limits    Neuro:  Balance  Sitting - Static: Good  Sitting - Dynamic: Good  Standing - Static: Good  Standing - Dynamic: Good;-     Tone: Normal  Coordination: Within functional limits    Sensation: Impaired (pt reports B LE neuropathy)    Bed mobility  Supine to Sit: Modified independent  Sit to Supine: Modified independent    Transfers  Sit to Stand: Independent  Stand to Sit: Independent    Ambulation  Surface: Level tile  Device: No Device  Assistance: Independent  Quality of Gait: wide JHONNY  Gait Deviations: Slow Di  Distance: 50ft  Comments: with turns, navigating room environment, distance limited d/t need for restroom    Activity Tolerance  Activity Tolerance: Patient tolerated evaluation without incident      Patient Education  Education Given To: Patient  Education Provided: Role of Therapy  Education Provided Comments: benefits of mobility during hospitalization  Education Method: Verbal  Barriers to Learning: None  Education Outcome: Verbalized understanding     ASSESSMENT:   Decision Making: Low Complexity  History: med  Exam: low  Clinical Presentation: low    Therapy Prognosis: Good  Barriers to Learning: none     DISCHARGE RECOMMENDATIONS:  No Skilled PT: Independent with functional mobility     Assessment: Pt demonstrates indep with all functional mobility including ambulation without AD. Pt denies concerns for return home at current level of function. Pt demonstrates no further acute PT needs. Requires PT Follow-Up: No       PLAN OF CARE:  Physcial Therapy Plan  General Plan: Discharge with evaluation only    Safety Devices  Type of Devices:  (in restroom with call button in reach)    Goals:  Patient Goals : to go home    [facility] (6 CLICK) [address] Mobility Raw Score : 23     Therapy Time:   Individual   Time In 1345   Time Out 1401   Minutes [address] [place], [place], 03/03/23 at 2:12 PM     Definitions for assistance levels  Independent = pt does not require any physical supervision or assistance from another person for activity completion.  Device may be needed.   Stand by assistance = pt requires verbal cues or instructions from another person, close to but not touching, to perform the activity  Minimal assistance= pt performs 75% or more of the activity; assistance is required to complete the activity  Moderate assistance= pt performs 50% of the activity; assistance is required to complete the activity  Maximal assistance = pt performs 25% of the activity; assistance is required to complete the activity  Dependent = pt requires total physical assistance to accomplish the task

## 2023-03-03 NOTE — PROGRESS NOTES
Patient has history of Crohn's and had episode of diarrhea today. Will give loperamide. Spoke with nursing. Home meds needs to be reconciled. Patient admitted overnight. There is H&P from today. Continue current care. Possible discharge tomorrow.

## 2023-03-03 NOTE — H&P
[place] [address] MEDICINE    HISTORY AND PHYSICAL EXAM    PATIENT NAME:  Sanford Dinh    MRN:  [id number]  SERVICE DATE:  3/3/2023   SERVICE TIME:  4:18 AM    Primary Care Physician: Kat Baxter MD     SUBJECTIVE  CHIEF COMPLAINT:  Loss of Consciousness       HPI:     Sanford Dinh is a 61 y.o. female presented to the emergency department with complaints of syncopal episode with LOC. Per chart, patient was playing cards and got up to get water and passed out while talking to a friend. She was unresponsive and non communicative with LOC approximately 1 minute length. Family was unable to get a pulse during the episode. Denies injury from fall. Patient was lethargic during interview. She endorses right sided facial numbness, failed at heel to shin and finger to nose,        Sanford Dinh is a 61 y.o. female  has a past medical history of Anemia, Asthma, Benign essential HTN, Borderline personality disorder ([address] Carpal tunnel syndrome of right wrist, Crohn's disease ([address] Depression, Fibromyalgia (02/13/2018), Fibromyalgia, GERD (gastroesophageal reflux disease), Gout, Headache, Irritable bowel syndrome, Mixed hyperlipidemia (05/10/2017), Neuropathy, Obesity (BMI 35.0-39.9 without comorbidity) (03/30/2017), PONV (postoperative nausea and vomiting), PTSD (post-traumatic stress disorder), Tremor of unknown origin, Type 2 diabetes mellitus ([address] Ulcerative colitis ([address] (11/2017), and Vaginitis. is being admitted for Syncope and collapse.        PAST MEDICAL HISTORY:    Past Medical History:   Diagnosis Date    Anemia     Asthma     Benign essential HTN     meds > 5 yrs / denies TIA or stroke    Borderline personality disorder ([address]     2016 ?suggested by me-meets many criteria    Carpal tunnel syndrome of right wrist     Crohn's disease ([address]     Depression     Fibromyalgia 02/13/2018    Fibromyalgia     GERD (gastroesophageal reflux disease)     Gout     Headache     migraines    Irritable bowel syndrome Mixed hyperlipidemia 05/10/2017    Neuropathy     Obesity (BMI 35.0-39.9 without comorbidity) 03/30/2017    PONV (postoperative nausea and vomiting)     nausea    PTSD (post-traumatic stress disorder)     Tremor of unknown origin     Type 2 diabetes mellitus ([facility]     meds > 5yrs     Ulcerative colitis ([facility] 11/2017    Vaginitis      PAST SURGICAL HISTORY:    Past Surgical History:   Procedure Laterality Date    BREAST CYST EXCISION Right 1994    exc mass benign    CHOLECYSTECTOMY  2009    COLONOSCOPY  2015    negative    ENDOSCOPY, COLON, DIAGNOSTIC      HERNIA REPAIR  2015    ventral / mesh    LEG TENDON SURGERY Right 2007    leg.  ankle and foot    OVARIAN CYST REMOVAL Left 1994    with mass and both fallopian tube    OVARY REMOVAL  12/2015    [facility] / mass left ovary & bilateral  tubes    PARTIAL HYSTERECTOMY (CERVIX NOT REMOVED)      TN ARTHROSCOPY KNEE DIAGNOSTIC W/WO SYNOVIAL BX SPX Right 08/03/2017    RIGHT KNEE ARTHROSCOPIC PARTIAL MEDIAL MENISCECTOMY KNEE performed by Nayeli Moreno MD at [address] IND N/A 11/07/2017    COLONOSCOPY performed by MANOJ Ordonez on bx    TN COLONOSCOPY W/BIOPSY SINGLE/MULTIPLE N/A 03/15/2018    COLONOSCOPY performed by Tae Graves MD at [address] NEUROPLASTY &/TRANSPOS MEDIAN NRV CARPAL TUNNE Right 11/30/2017    RIGHT WRIST  CARPAL TUNNEL RELEASE performed by Nayeli Moreno MD at [address] NEUROPLASTY &/TRANSPOS MEDIAN NRV CARPAL Sammye  Left 05/10/2018    LEFT WRIST CARPAL TUNNEL RELEASE performed by Nayeli Moreno MD at [facility]      lower back-negative, right upper arm    TONSILLECTOMY AND ADENOIDECTOMY  1967     FAMILY HISTORY:    Family History   Problem Relation Age of Onset    Emphysema Mother     Heart Disease Sister     Stroke Sister     Diabetes Sister     Cancer Maternal Grandfather     Diabetes Paternal Grandmother     Emphysema Paternal Grandfather      SOCIAL HISTORY:    Social History Socioeconomic History    Marital status: Single     Spouse name: Not on file    Number of children: Not on file    Years of education: Not on file    Highest education level: Not on file   Occupational History    Not on file   Tobacco Use    Smoking status: Never    Smokeless tobacco: Never   Vaping Use    Vaping Use: Never used   Substance and Sexual Activity    Alcohol use: No     Alcohol/week: 0.0 standard drinks    Drug use: No    Sexual activity: Not Currently   Other Topics Concern    Not on file   Social History Narrative    Not on file     Social Determinants of Health     Financial Resource Strain: Low Risk     Difficulty of Paying Living Expenses: Not hard at all   Food Insecurity: No Food Insecurity    Worried About [employer] [employer] in the Last Year: Never true    [address] [employer] in the Last Year: Never true   Transportation Needs: No Transportation Needs    Lack of Transportation (Medical): No    Lack of Transportation (Non-Medical): No   Physical Activity: Insufficiently Active    Days of Exercise per Week: 4 days    Minutes of Exercise per Session: 20 min   Stress: Not on file   Social Connections: Not on file   Intimate Partner Violence: Not on file   Housing Stability: Unknown    Unable to Pay for Housing in the Last Year: Not on file    Number of Places Lived in the Last Year: Not on file    Unstable Housing in the Last Year: No     MEDICATIONS:   Prior to Admission medications    Medication Sig Start Date End Date Taking?  Authorizing Provider   hydrOXYzine pamoate (VISTARIL) 25 MG capsule Take 25 mg by mouth 3 times daily as needed for Itching    Historical Provider, MD   RIZATRIPTAN BENZOATE PO Take by mouth    Historical Provider, MD   QUEtiapine (SEROQUEL) 50 MG tablet  1/10/23   Historical Provider, MD   pregabalin (LYRICA) 150 MG capsule TAKE ONE CAPSULE BY MOUTH THREE TIMES A DAY 2/10/23 3/12/23  DELMI Gamino CNP   furosemide (LASIX) 40 MG tablet TAKE ONE TABLET BY MOUTH TWO TIMES A DAY  Patient taking differently: daily TAKE ONE TABLET BY MOUTH TWO TIMES A DAY 1/30/23   DELMI Luis CNP   pravastatin (PRAVACHOL) 40 MG tablet TAKE 1 TABLET BY MOUTH EVERY EVENING 1/11/23   Sarita Browne MD   metFORMIN (GLUCOPHAGE) 500 MG tablet TAKE ONE TABLET BY MOUTH TWO TIMES A DAY WITH MEALS 1/11/23   Sarita Browne MD   baclofen (LIORESAL) 20 MG tablet TAKE ONE TABLET BY MOUTH FOUR TIMES A DAY  Patient taking differently: 2 times daily 1/11/23   Sarita Browne MD   omeprazole (PRILOSEC) 40 MG delayed release capsule TAKE ONE CAPSULE BY MOUTH TWO TIMES A DAY 1/11/23   Sarita Browne MD   mometasone (ELOCON) 0.1 % cream APPLY TOPICALLY ONCE DAILY 1/9/23   Sarita Browne MD   ondansetron (ZOFRAN) 4 MG tablet Take 1 tablet by mouth 3 times daily as needed for Nausea or Vomiting 1/3/23   Alisha Fernández. DELMI Warner CNP   allopurinol (ZYLOPRIM) 100 MG tablet TAKE ONE TABLET BY MOUTH EVERY DAY 12/23/22   Sarita Browne MD   pioglitazone (ACTOS) 15 MG tablet TAKE ONE TABLET BY MOUTH EVERY DAY 12/23/22   Sarita Browne MD   montelukast (SINGULAIR) 10 MG tablet TAKE ONE TABLET BY MOUTH NIGHTLY 11/7/22   Sarita Browne MD   meloxicam (MOBIC) 7.5 MG tablet TAKE 1 TABLET BY MOUTH TWICE DAILY 10/31/22   Sarita Browne MD   dicyclomine (BENTYL) 10 MG capsule TAKE ONE CAPSULE BY MOUTH 4 TIMES DAILY BEFORE MEALS AND NIGHTLY 9/1/22   Sarita Browne MD   potassium chloride (KLOR-CON M) 20 MEQ extended release tablet TAKE ONE TABLET BY MOUTH TWO TIMES A DAY  Patient taking differently: 20 mEq daily TAKE ONE TABLET BY MOUTH TWO TIMES A DAY 5/12/22   Matthew WILSON Holiday, DO   metoprolol succinate (TOPROL XL) 50 MG extended release tablet TAKE ONE TABLET BY MOUTH TWO TIMES A DAY.   HOLD FOR SYSTOLIC BLOOD PRESSURRE LOWER THAN 100 OR HEART RATE LOWER THAN 60. 2/16/22   Marina A Repko, APRN - CNP   ipratropium-albuterol (DUONEB) 0.5-2.5 (3) MG/3ML SOLN nebulizer solution INHALE 1 VIAL VIA NEBULIZER EVERY 4 HOURS 4/5/21   Yovany Bethea MD   traZODone (DESYREL) 100 MG tablet TAKE 1 TO 2 TABLETS BY MOUTH AT BEDTIME AS NEEDED FOR SLEEP 3/1/21   Historical Provider, MD   albuterol sulfate HFA (PROVENTIL HFA) 108 (90 Base) MCG/ACT inhaler Inhale 2 puffs into the lungs every 6 hours as needed for Wheezing 10/1/20   Ata Rebollar MD   Lancets MISC Test bid 9/8/20   Leah Pearce MD   magnesium oxide (MAG-OX) 400 (241.3 Mg) MG TABS tablet Take 1 tablet by mouth 2 times daily 9/4/19   CHESTER Anna   Blood Glucose Monitoring Suppl (ONE TOUCH ULTRA 2) w/Device KIT TEST TWICE DAILY 1/4/19   Historical Provider, MD   prazosin (MINIPRESS) 2 MG capsule TAKE 1 CAPSULE BY MOUTH EVERY NIGHT FOR NIGHTMARES 2/1/19   Historical Provider, MD   blood glucose monitor strips Test 2 times a day & as needed for symptoms of irregular blood glucose. 1/4/19   Marylee Foster, MD   Melatonin 5 MG CAPS Take 10 mg by mouth daily  6/13/18   Historical Provider, MD   FREESTYLE LITE strip use three times daily  8/14/18   Marylee Foster, MD   DULoxetine (CYMBALTA) 60 MG extended release capsule TAKE ONE CAPSULE BY MOUTH TWICE DAILY AS DIRECTED IN THE MORNING AND AFTERNOON 10/23/17   Historical Provider, MD       ALLERGIES: Latex, Penicillins, Shellfish-derived products, Abilify [aripiprazole], Adhesive tape, Topamax [topiramate], Buspar [buspirone], Other, and Sulfa antibiotics    REVIEW OF SYSTEM:   Review of Systems   Constitutional:  Negative for chills, fatigue and fever. HENT:  Negative for congestion, rhinorrhea, sore throat and trouble swallowing. Eyes:  Negative for photophobia and visual disturbance. Respiratory:  Negative for cough, chest tightness and shortness of breath. Cardiovascular:  Negative for chest pain, palpitations and leg swelling. Gastrointestinal:  Positive for diarrhea and nausea. Negative for abdominal distention, abdominal pain, constipation and vomiting.    Genitourinary:  Negative for difficulty urinating, flank pain and hematuria. Musculoskeletal:  Negative for back pain and gait problem. Skin:  Negative for color change and wound. Neurological:  Positive for syncope and headaches. Negative for dizziness, speech difficulty, weakness, light-headedness and numbness. Psychiatric/Behavioral:  Negative for behavioral problems and confusion. OBJECTIVE  PHYSICAL EXAM: /65   Pulse 57   Temp 97.9 °F (36.6 °C) (Oral)   Resp 12   LMP 07/27/1993 (Approximate)   SpO2 92%   Physical Exam  Vitals and nursing note reviewed. Constitutional:       General: She is sleeping. She is not in acute distress. Appearance: Normal appearance. She is well-developed and normal weight. She is not ill-appearing, toxic-appearing or diaphoretic. HENT:      Head: Normocephalic and atraumatic. Nose: Nose normal. No congestion or rhinorrhea. Mouth/Throat:      Lips: Pink. Mouth: Mucous membranes are dry. Tongue: Tongue does not deviate from midline. Pharynx: Oropharynx is clear. No oropharyngeal exudate or posterior oropharyngeal erythema. Eyes:      General: Lids are normal. No visual field deficit or scleral icterus. Extraocular Movements: Extraocular movements intact. Right eye: Nystagmus present. Left eye: Nystagmus present. Conjunctiva/sclera: Conjunctivae normal.   Neck:      Thyroid: No thyromegaly. Vascular: No JVD. Trachea: Trachea and phonation normal.   Cardiovascular:      Rate and Rhythm: Regular rhythm. Bradycardia present. Pulses:           Radial pulses are 2+ on the right side and 2+ on the left side. Dorsalis pedis pulses are 1+ on the right side and 1+ on the left side. Posterior tibial pulses are 2+ on the right side and 2+ on the left side. Heart sounds: Normal heart sounds, S1 normal and S2 normal. No murmur heard. Pulmonary:      Effort: Pulmonary effort is normal. No respiratory distress.       Breath sounds: Normal breath sounds and air entry. No stridor or decreased air movement. No decreased breath sounds, wheezing, rhonchi or rales. Chest:      Chest wall: No tenderness. Abdominal:      General: Bowel sounds are normal. There is no distension. Palpations: Abdomen is soft. Tenderness: There is no abdominal tenderness. There is no guarding. Hernia: A hernia is present. Musculoskeletal:         General: No swelling, tenderness, deformity or signs of injury. Normal range of motion. Cervical back: Normal range of motion and neck supple. No rigidity or tenderness. Right lower leg: No edema. Left lower leg: No edema. Feet:      Right foot:      Skin integrity: Skin integrity normal.      Left foot:      Skin integrity: Skin integrity normal.   Skin:     General: Skin is warm and dry. Capillary Refill: Capillary refill takes less than 2 seconds. Coloration: Skin is not jaundiced or pale. Findings: No bruising, erythema, lesion or rash. Nails: There is no clubbing. Neurological:      General: No focal deficit present. Mental Status: She is oriented to person, place, and time. Mental status is at baseline. She is lethargic. Sensory: Sensation is intact. Motor: Weakness present. Coordination: Finger-Nose-Finger Test abnormal (missed my finger on each attempt) and Heel to [place] ruthann Test abnormal (unable to complete this action). Psychiatric:         Attention and Perception: Attention and perception normal.         Mood and Affect: Mood and affect normal.         Speech: Speech normal.         Behavior: Behavior normal. Behavior is cooperative. Thought Content: Thought content normal.         Cognition and Memory: Cognition and memory normal.         Judgment: Judgment normal.     Neurologic Exam     Mental Status   Oriented to person, place, and time. Oriented to person. Oriented to place. Oriented to time. Attention: decreased. Concentration: normal.   Speech: speech is normal   Level of consciousness: drowsy  Knowledge: good.   Able to name object. Normal comprehension.     Cranial Nerves     CN III, IV, VI   Right pupil: Size: 3 mm. Shape: regular. Reactivity: sluggish.   Left pupil: Size: 3 mm. Shape: regular. Reactivity: non-reactive.   CN III: no CN III palsy  CN VI: no CN VI palsy  Nystagmus: bilateral   Nystagmus type: slow and horizontal    CN V   Right facial sensation deficit: cheeks and mandible  Left facial sensation deficit: none    CN VII   Facial expression full, symmetric.   Right facial weakness: none  Left facial weakness: none    CN XI   Right sternocleidomastoid strength: normal  Left sternocleidomastoid strength: normal  Right trapezius strength: normal  Left trapezius strength: normal    Motor Exam Patient was weak everywhere but equally bilaterally     Sensory Exam   Light touch normal.   Pinprick normal.     Gait, Coordination, and Reflexes     Coordination   Finger to nose coordination: abnormal (missed my finger on each attempt)  Heel to shin coordination: abnormal (unable to complete this action)   NIH Stroke Scale  Interval: Baseline  Level of Consciousness (1a): Alert  LOC Questions (1b): Answers both correctly  LOC Commands (1c): Performs both tasks correctly  Best Gaze (2): Normal  Visual (3): No visual loss  Facial Palsy (4): Normal symmetrical movement  Motor Arm, Left (5a): No drift  Motor Arm, Right (5b): No drift  Motor Leg, Left (6a): No drift  Motor Leg, Right (6b): No drift  Limb Ataxia (7): Absent  Sensory (8): Normal  Best Language (9): No aphasia  Dysarthria (10): Normal  Extinction and Inattention (11): No abnormality  Total: 0    DATA:     Diagnostic tests reviewed for today's visit:    Most recent labs and imaging results reviewed.     LABS:    Recent Results (from the past 24 hour(s))   Comprehensive Metabolic Panel    Collection Time: 03/02/23 11:00 PM   Result Value Ref Range    Sodium 137  135 - 144 mEq/L    Potassium 4.0 3.4 - 4.9 mEq/L    Chloride 100 95 - 107 mEq/L    CO2 25 20 - 31 mEq/L    Anion Gap 12 9 - 15 mEq/L    Glucose 100 (H) 70 - 99 mg/dL    BUN 24 (H) 6 - 20 mg/dL    Creatinine 2.26 (H) 0.50 - 0.90 mg/dL    Est, Glom Filt Rate 24.4 (L) >60    Calcium 7.9 (L) 8.5 - 9.9 mg/dL    Total Protein 7.2 6.3 - 8.0 g/dL    Albumin 3.9 3.5 - 4.6 g/dL    Total Bilirubin <0.2 0.2 - 0.7 mg/dL    Alkaline Phosphatase 119 40 - 130 U/L    ALT 7 0 - 33 U/L    AST 15 0 - 35 U/L    Globulin 3.3 2.3 - 3.5 g/dL   Magnesium    Collection Time: 03/02/23 11:00 PM   Result Value Ref Range    Magnesium 0.9 (LL) 1.7 - 2.4 mg/dL   CBC with Auto Differential    Collection Time: 03/02/23 11:00 PM   Result Value Ref Range    WBC 13.5 (H) 4.8 - 10.8 K/uL    RBC 4.07 (L) 4.20 - 5.40 M/uL    Hemoglobin 11.6 (L) 12.0 - 16.0 g/dL    Hematocrit 36.0 (L) 37.0 - 47.0 %    MCV 88.6 79.4 - 94.8 fL    MCH 28.6 27.0 - 31.3 pg    MCHC 32.3 (L) 33.0 - 37.0 %    RDW 15.6 (H) 11.5 - 14.5 %    Platelets 016 871 - 856 K/uL   Troponin    Collection Time: 03/02/23 11:00 PM   Result Value Ref Range    Troponin <0.010 0.000 - 0.010 ng/mL   Brain Natriuretic Peptide    Collection Time: 03/02/23 11:00 PM   Result Value Ref Range    Pro- pg/mL   CK    Collection Time: 03/02/23 11:00 PM   Result Value Ref Range    Total CK 73 0 - 170 U/L   TSH with Reflex    Collection Time: 03/02/23 11:00 PM   Result Value Ref Range    TSH 1.690 0.440 - 3.860 uIU/mL   Urinalysis with Reflex to Culture    Collection Time: 03/02/23 11:00 PM    Specimen: Urine, clean catch   Result Value Ref Range    Color, UA Yellow Straw/Yellow    Clarity, UA CLOUDY (A) Clear    Glucose, Ur Negative Negative mg/dL    Bilirubin Urine Negative Negative    Ketones, Urine Negative Negative mg/dL    Specific Gravity, UA 1.010 1.005 - 1.030    Blood, Urine Negative Negative    pH, UA 5.0 5.0 - 9.0    Protein, UA Negative Negative mg/dL    Urobilinogen, Urine 0.2 <2.0 E.U./dL Nitrite, Urine Negative Negative    Leukocyte Esterase, Urine LARGE (A) Negative    Urine Reflex to Culture Yes    Microscopic Urinalysis    Collection Time: 03/02/23 11:00 PM   Result Value Ref Range    Bacteria, UA Negative Negative /HPF    Hyaline Casts, UA 3-5 0 - 5 /HPF    WBC, UA  (A) 0 - 5 /HPF    RBC, UA 3-5 (A) 0 - 5 /HPF    Epithelial Cells, UA 3-5 0 - 5 /HPF   EKG 12 Lead    Collection Time: 03/02/23 11:22 PM   Result Value Ref Range    Ventricular Rate 59 BPM    Atrial Rate 59 BPM    P-R Interval 164 ms    QRS Duration 86 ms    Q-T Interval 440 ms    QTc Calculation (Bazett) 435 ms    P Axis 42 degrees    R Axis 51 degrees    T Axis 20 degrees   COVID-19, Rapid    Collection Time: 03/03/23 12:52 AM    Specimen: Nasopharyngeal Swab   Result Value Ref Range    SARS-CoV-2, NAAT Not Detected Not Detected   Rapid influenza A/B antigens    Collection Time: 03/03/23 12:52 AM    Specimen: Nasopharyngeal   Result Value Ref Range    Influenza A by PCR Negative     Influenza B by PCR Negative        IMAGING:   XR CHEST PORTABLE    Result Date: 3/3/2023  1. No acute pleural or pulmonary abnormality identified on portable exam 2. Borderline enlarged cardiomediastinal silhouette as described above        VTE Prophylaxis: low molecular weight heparin -  start     ASSESSMENT AND PLAN    Syncope with collapse  Syncopal episode with LOC+ x 1 min, pulseless on the scene. Mag 0.9. NIHSS 8  [address] cardiology   Consult neurology  Monitor magnesium, maximize electrolytes and replenish to maintain magnesium above 2.0.    Monitor labs  Orthostatic blood pressure  PT/OT/SLP  Neuro checks  EKG  Echo  Additional imaging per neurology  TETE    SCR 2.26, baseline 1.1, gave 2L IV in ed  Gave Rocephin in ED no additional ABX  Avoid nephrotoxic agents  LR @ 100  Maximize lost electrolytes  Diabetes Mellitis II  Takes metformin, last A1C 6.5 on 10/4/2021,   Hold any p.o. antidiabetic medication  Medium dose sliding scale coverage  Accu-Cheks AC at bedtime  check A1c  diabetic diet  hypoglycemia rescue meds as needed. Hypertension  /65, takes lasix and toprol at home, denies blurred vision and chest pain  Will resume home meds, as tolerated, when home med list is completed by nursing. Hyperlipidemia  Stable on statin, no history of MI or Stroke. Will resume home meds, as tolerated, when home med list is completed by nursing. Depression  Followed by [address] [place] [address], [facility]  Will resume home meds, as tolerated, when home med list is completed by nursing.     Plan of care discussed with: patient    SIGNATURE: DELMI Coyle - CNP  DATE: March 3, 2023  TIME: 4:18 AM     Traci Russell MD - supervising physician

## 2023-03-03 NOTE — PROGRESS NOTES
Comprehensive Nutrition Assessment    Type and Reason for Visit:  Initial, Positive Nutrition Screen    Nutrition Recommendations/Plan:        Malnutrition Assessment:  Malnutrition Status:  No malnutrition (03/03/23 1447)      Nutrition Assessment:    Pt presents with overall decreased appeite/intake and intermittent GI complaints (nausea, diarrhea, and abdominal discomfort relating to [place] per pt) x ~2months, with resulting weight loss. Pt stated appetite is currently fair to good. To provide Carb Control/Cardiac diet and monitor adequacy of intake. Nutrition Related Findings:    PMH-Fibromyalgia, Depression/PTSD, IBS, Crohn's, GERD , HTN, HLD, DM2, Anemia, Morbid Obesity, Migraines,  Asthma, and HH per pt; adm with Syncope and collapse, Urinary tract infection. Meds/labs reviewed. Labs (3/3) Na-138, K-4.7, BUN/Cr-24/1.89, gluc-113, Mg-.9 (IV Mg ordered), IVF . 9% NaCl @100ml/hr. Wound Type: None       Current Nutrition Intake & Therapies:    Average Meal Intake: 51-75%     ADULT DIET; Regular; 5 carb choices (75 gm/meal); Low Fat/Low Chol/High Fiber/MIKE    Anthropometric Measures:  Height: 5' 2\" (157.5 cm) (per pt)  Ideal Body Weight (IBW): 110 lbs (50 kg)    Admission Body Weight: 280 lb (127 kg) (? source)  Current BMI (kg/m2):  51.25  Usual Body Weight: 292 lb (132.5 kg) (11/2022 ? source)                       BMI Categories: Obese Class 3 (BMI 40.0 or greater)    Estimated Daily Nutrient Needs:  Energy Requirements Based On: Kcal/kg  Weight Used for Energy Requirements: Admission  Energy (kcal/day): 4744-8478 (kg x 10-11)  Weight Used for Protein Requirements: Ideal  Protein (g/day): 100 (kg x 2.0)  Method Used for Fluid Requirements: ml/Kg  Fluid (ml/day): ~1500 (kg IBW x 30)    Nutrition Diagnosis:   Unintended weight loss related to inadequate protein-energy intake, altered GI function as evidenced by weight loss  Altered nutrition-related lab values related to endocrine dysfuntion as evidenced by lab values    Nutrition Interventions:   Food and/or Nutrient Delivery: Modify Current Diet  Nutrition Education/Counseling: No recommendation at this time  Coordination of Nutrition Care: Continue to monitor while inpatient       Goals:     Goals: PO intake 75% or greater (Gluc <160)       Nutrition Monitoring and Evaluation:      Food/Nutrient Intake Outcomes: Food and Nutrient Intake  Physical Signs/Symptoms Outcomes: Biochemical Data, Weight, GI Status    Darrell Aguilar RD, LD

## 2023-03-03 NOTE — PROGRESS NOTES
[facility]   Facility/Department: [facility]  Speech Language Pathology    NAME:Yanci Nguyen  : 1965  ROOM: [id number]      DATE: 3/3/2023      This patient is currently in observation/outpatient status. To comply with Medicare and insurance regulations, please update orders to include a valid Speech Therapy treatment diagnosis (i.e. aphasia, dysphagia, dysarthria, cognitive impairment). Thank you,  Tisha Sharp.  Humza Mccoy, SLP, CCC-SLP, Date: 3/3/2023, Time: 8:08 AM

## 2023-03-03 NOTE — ED PROVIDER NOTES
[facility] ED  eMERGENCY dEPARTMENT eNCOUnter      Pt Name: Alvira Hashimoto  MRN: [id number]  [place] 1965  Date of evaluation: 3/2/2023  Provider: Natalya Garcia MD      HISTORY OF PRESENT ILLNESS      Chief Complaint   Patient presents with    Loss of Consciousness       The history is provided by the Patient, family and EMS. Alvira Hashimoto is a 61 y.o. female with a PMH clinically significant for PTSD, Fibromyalgia, Depression, IBS, Crohn's, UC, HTN, HLD, DMII, Anemia, Morbid Obesity, Migraines and Asthma presenting to the ED via EMS c/o syncopal event occurring just prior to arrival.  Per EMS, patient did not hit head or have any injuries from the fall. Was able to be sat down on a chair sitting next to the patient's. No medications prior to arrival.  Did not get reports of seizure-like activity at the scene. Family states that the patient was playing cards and then went up to go get a glass of water. Was talking to a friend and then seem to just pass out. Was unresponsive and unable to communicate just prior to the event. Think that the patient was out for approximately 1 minute and then was able to respond to them. States that they were not able to get a pulse for an unknown amount of time. Again denies any trauma due to the event. Patient states she does remember playing cards and going out to get the water. Denies any preceding symptoms. States she feels very fatigued right now, but otherwise denies any symptoms. Has had chronic abdominal pain and nausea due to a hernia that she has and will be having surgery on, but has not had any other recent illnesses. Denies any recent F/C/D, cough, urinary symptoms. Has had abnormal bowel movements with increased diarrhea. Currently denying any chest pain, shortness of breath, palpitations, headache. States she just feels very fatigued. Denies any anticoagulation. States no history of similar previous episodes.   States they have otherwise been feeling well. Taking all medications as indicated. Per Chart Review: PMH as noted above obtained via outpatient chart review. Most recent echo obtained in 06/2018 showing normal LVEF of 65%. No significant valvular disease. Largely normal echo. REVIEW OF SYSTEMS       Review of Systems   Constitutional:  Positive for fatigue. Negative for activity change, appetite change, chills, diaphoresis and fever. HENT:  Negative for rhinorrhea and sore throat. Eyes:  Negative for pain and visual disturbance. Respiratory:  Negative for cough and shortness of breath. Cardiovascular:  Negative for chest pain, palpitations and leg swelling. Gastrointestinal:  Positive for abdominal pain and nausea. Negative for diarrhea and vomiting. Genitourinary:  Negative for difficulty urinating and dysuria. Musculoskeletal:  Negative for back pain and neck pain. Skin:  Negative for rash. Neurological:  Positive for syncope. Negative for dizziness, weakness, light-headedness, numbness and headaches. Psychiatric/Behavioral:  Positive for confusion.       PAST MEDICAL HISTORY     Past Medical History:   Diagnosis Date    Anemia     Asthma     Benign essential HTN     meds > 5 yrs / denies TIA or stroke    Borderline personality disorder ([address]     2016 ?suggested by me-meets many criteria    Carpal tunnel syndrome of right wrist     Crohn's disease ([address]     Depression     Fibromyalgia 02/13/2018    Fibromyalgia     GERD (gastroesophageal reflux disease)     Gout     Headache     migraines    Irritable bowel syndrome     Mixed hyperlipidemia 05/10/2017    Neuropathy     Obesity (BMI 35.0-39.9 without comorbidity) 03/30/2017    PONV (postoperative nausea and vomiting)     nausea    PTSD (post-traumatic stress disorder)     Tremor of unknown origin     Type 2 diabetes mellitus ([address]     meds > 5yrs     Ulcerative colitis ([address] 11/2017    Vaginitis        SURGICAL HISTORY       Past Surgical History: Procedure Laterality Date    BREAST CYST EXCISION Right 1994    exc mass benign    CHOLECYSTECTOMY  2009    COLONOSCOPY  2015    negative    ENDOSCOPY, COLON, DIAGNOSTIC      HERNIA REPAIR  2015    ventral / mesh    LEG TENDON SURGERY Right 2007    leg.  ankle and foot    OVARIAN CYST REMOVAL Left 1994    with mass and both fallopian tube    OVARY REMOVAL  12/2015    [facility] / mass left ovary & bilateral  tubes    PARTIAL HYSTERECTOMY (CERVIX NOT REMOVED)      [place] ARTHROSCOPY KNEE DIAGNOSTIC W/WO SYNOVIAL BX SPX Right 08/03/2017    RIGHT KNEE ARTHROSCOPIC PARTIAL MEDIAL MENISCECTOMY KNEE performed by Sam Adams MD at [address] IND N/A 11/07/2017    COLONOSCOPY performed by MANOJ Guzman on bx    [place] COLONOSCOPY W/BIOPSY SINGLE/MULTIPLE N/A 03/15/2018    COLONOSCOPY performed by Marisa Hickman MD at [address] NEUROPLASTY &/TRANSPOS MEDIAN NRV CARPAL TUNNE Right 11/30/2017    RIGHT WRIST  CARPAL TUNNEL RELEASE performed by Sam Adams MD at [address] [place] NEUROPLASTY &/TRANSPOS MEDIAN NRV CARPAL Rulon Mussel Left 05/10/2018    LEFT WRIST CARPAL TUNNEL RELEASE performed by Sam Adams MD at [address]      lower back-negative, right upper arm    TONSILLECTOMY AND ADENOIDECTOMY  1967       FAMILY HISTORY       Family History   Problem Relation Age of Onset    Emphysema Mother     Heart Disease Sister     Stroke Sister     Diabetes Sister     Cancer Maternal Grandfather     Diabetes Paternal Grandmother     Emphysema Paternal Grandfather        SOCIAL HISTORY       Social History     Socioeconomic History    Marital status: Single     Spouse name: None    Number of children: None    Years of education: None    Highest education level: None   Tobacco Use    Smoking status: Never    Smokeless tobacco: Never   Vaping Use    Vaping Use: Never used   Substance and Sexual Activity    Alcohol use: No     Alcohol/week: 0.0 standard drinks    Drug use: No    Sexual activity: Not Currently     Social Determinants of Health     Financial Resource Strain: Low Risk     Difficulty of Paying Living Expenses: Not hard at all   Food Insecurity: No Food Insecurity    Worried About [facility] in the Last Year: Never true    [facility] in the Last Year: Never true   Transportation Needs: No Transportation Needs    Lack of Transportation (Medical): No    Lack of Transportation (Non-Medical): No   Physical Activity: Insufficiently Active    Days of Exercise per Week: 4 days    Minutes of Exercise per Session: 20 min   Housing Stability: Unknown    Unstable Housing in the Last Year: No       CURRENT MEDICATIONS       Previous Medications    ALBUTEROL SULFATE HFA (PROVENTIL HFA) 108 (90 BASE) MCG/ACT INHALER    Inhale 2 puffs into the lungs every 6 hours as needed for Wheezing    ALLOPURINOL (ZYLOPRIM) 100 MG TABLET    TAKE ONE TABLET BY MOUTH EVERY DAY    BACLOFEN (LIORESAL) 20 MG TABLET    TAKE ONE TABLET BY MOUTH FOUR TIMES A DAY    BLOOD GLUCOSE MONITOR STRIPS    Test 2 times a day & as needed for symptoms of irregular blood glucose.     BLOOD GLUCOSE MONITORING SUPPL (ONE TOUCH ULTRA 2) W/DEVICE KIT    TEST TWICE DAILY    DICYCLOMINE (BENTYL) 10 MG CAPSULE    TAKE ONE CAPSULE BY MOUTH 4 TIMES DAILY BEFORE MEALS AND NIGHTLY    DULOXETINE (CYMBALTA) 60 MG EXTENDED RELEASE CAPSULE    TAKE ONE CAPSULE BY MOUTH TWICE DAILY AS DIRECTED IN THE MORNING AND AFTERNOON    FREESTYLE LITE STRIP    use three times daily     FUROSEMIDE (LASIX) 40 MG TABLET    TAKE ONE TABLET BY MOUTH TWO TIMES A DAY    HYDROXYZINE PAMOATE (VISTARIL) 25 MG CAPSULE    Take 25 mg by mouth 3 times daily as needed for Itching    IPRATROPIUM-ALBUTEROL (DUONEB) 0.5-2.5 (3) MG/3ML SOLN NEBULIZER SOLUTION    INHALE 1 VIAL VIA NEBULIZER EVERY 4 HOURS    LANCETS MISC    Test bid    MAGNESIUM OXIDE (MAG-OX) 400 (241.3 MG) MG TABS TABLET    Take 1 tablet by mouth 2 times daily    MELATONIN 5 MG CAPS    Take 10 mg by mouth daily     MELOXICAM (MOBIC) 7.5 MG TABLET    TAKE 1 TABLET BY MOUTH TWICE DAILY    METFORMIN (GLUCOPHAGE) 500 MG TABLET    TAKE ONE TABLET BY MOUTH TWO TIMES A DAY WITH MEALS    METOPROLOL SUCCINATE (TOPROL XL) 50 MG EXTENDED RELEASE TABLET    TAKE ONE TABLET BY MOUTH TWO TIMES A DAY. HOLD FOR SYSTOLIC BLOOD PRESSURRE LOWER THAN 100 OR HEART RATE LOWER THAN 60. MOMETASONE (ELOCON) 0.1 % CREAM    APPLY TOPICALLY ONCE DAILY    MONTELUKAST (SINGULAIR) 10 MG TABLET    TAKE ONE TABLET BY MOUTH NIGHTLY    OMEPRAZOLE (PRILOSEC) 40 MG DELAYED RELEASE CAPSULE    TAKE ONE CAPSULE BY MOUTH TWO TIMES A DAY    ONDANSETRON (ZOFRAN) 4 MG TABLET    Take 1 tablet by mouth 3 times daily as needed for Nausea or Vomiting    PIOGLITAZONE (ACTOS) 15 MG TABLET    TAKE ONE TABLET BY MOUTH EVERY DAY    POTASSIUM CHLORIDE (KLOR-CON M) 20 MEQ EXTENDED RELEASE TABLET    TAKE ONE TABLET BY MOUTH TWO TIMES A DAY    PRAVASTATIN (PRAVACHOL) 40 MG TABLET    TAKE 1 TABLET BY MOUTH EVERY EVENING    PRAZOSIN (MINIPRESS) 2 MG CAPSULE    TAKE 1 CAPSULE BY MOUTH EVERY NIGHT FOR NIGHTMARES    PREGABALIN (LYRICA) 150 MG CAPSULE    TAKE ONE CAPSULE BY MOUTH THREE TIMES A DAY    QUETIAPINE (SEROQUEL) 50 MG TABLET        RIZATRIPTAN BENZOATE PO    Take by mouth    TRAZODONE (DESYREL) 100 MG TABLET    TAKE 1 TO 2 TABLETS BY MOUTH AT BEDTIME AS NEEDED FOR SLEEP       ALLERGIES     Latex, Penicillins, Shellfish-derived products, Abilify [aripiprazole], Adhesive tape, Topamax [topiramate], Buspar [buspirone], Other, and Sulfa antibiotics      PHYSICAL EXAM       ED Triage Vitals [03/02/23 2242]   BP Temp Temp Source Heart Rate Resp SpO2 Height Weight   103/71 97.9 °F (36.6 °C) Oral 61 15 96 % -- --       Physical Exam  Vitals and nursing note reviewed. Constitutional:       Appearance: She is obese. She is ill-appearing. Comments: Fatigued   HENT:      Head: Normocephalic and atraumatic. Mouth/Throat:      Mouth: Mucous membranes are dry. Pharynx: Oropharynx is clear. Eyes:      Extraocular Movements: Extraocular movements intact. Pupils: Pupils are equal, round, and reactive to light. Cardiovascular:      Rate and Rhythm: Normal rate and regular rhythm. Pulses: Normal pulses. Heart sounds: Normal heart sounds. Pulmonary:      Effort: Pulmonary effort is normal.      Breath sounds: Normal breath sounds. Abdominal:      General: There is no distension. Palpations: Abdomen is soft. Tenderness: There is no abdominal tenderness. There is no guarding or rebound. Musculoskeletal:         General: No tenderness or deformity. Cervical back: Normal range of motion and neck supple. No rigidity or tenderness. Right lower leg: No edema. Left lower leg: No edema. Skin:     General: Skin is warm and dry. Capillary Refill: Capillary refill takes less than 2 seconds. Neurological:      Mental Status: She is alert and oriented to person, place, and time. Sensory: Sensation is intact. Motor: Motor function is intact. Comments: Slow to respond to questions. MDM:   Chart Reviewed: PMH and additional information as noted in HPI obtained from outside chart review.     Vitals:    Vitals:    03/03/23 0101 03/03/23 0201 03/03/23 0231 03/03/23 0234   BP: 122/71 112/62 (!) 86/54 103/60   Pulse: 63 69 59 58   Resp: 16 12 15 14   Temp:       TempSrc:       SpO2:  95% 95% 95%       PROCEDURES:  Unless otherwise noted below, none  Procedures    LABS:  Labs Reviewed   COMPREHENSIVE METABOLIC PANEL - Abnormal; Notable for the following components:       Result Value    Glucose 100 (*)     BUN 24 (*)     Creatinine 2.26 (*)     Est, Glom Filt Rate 24.4 (*)     Calcium 7.9 (*)     All other components within normal limits    Narrative:     Odalys Tony tel. [phone],  Chemistry results called to and read back by rahat, 03/03/2023 00:05, by  [facility]   MAGNESIUM - Abnormal; Notable for the following components:    Magnesium 0.9 (*)     All other components within normal limits    Narrative:     CALL  H[facility]  [facility] tel. [phone],  Chemistry results called to and read back by rahat, 03/03/2023 00:05, by  BILLY   CBC WITH AUTO DIFFERENTIAL - Abnormal; Notable for the following components:    WBC 13.5 (*)     RBC 4.07 (*)     Hemoglobin 11.6 (*)     Hematocrit 36.0 (*)     MCHC 32.3 (*)     RDW 15.6 (*)     All other components within normal limits   URINALYSIS WITH REFLEX TO CULTURE - Abnormal; Notable for the following components:    Clarity, UA CLOUDY (*)     Leukocyte Esterase, Urine LARGE (*)     All other components within normal limits   MICROSCOPIC URINALYSIS - Abnormal; Notable for the following components:    WBC, UA  (*)     RBC, UA 3-5 (*)     All other components within normal limits   COVID-19, RAPID   RAPID INFLUENZA A/B ANTIGENS   CULTURE, URINE   TROPONIN    Narrative:     Lis Finley tel. [phone],  Chemistry results called to and read back by rahat, 03/03/2023 00:05, by  [address] [place] [address]    Narrative:     Lis Finley tel. [phone],  Chemistry results called to and read back by rahat, 03/03/2023 00:05, by  [address]   [place]    Narrative:     Lis Finley tel. [phone],  Chemistry results called to and read back by rahat, 03/03/2023 00:05, by  [address]   TSH WITH REFLEX    Narrative:     Lis Finley tel. [phone],  Chemistry results called to and read back by rahat, 03/03/2023 00:05, by  BILLY       XR CHEST PORTABLE   Final Result   1. No acute pleural or pulmonary abnormality identified on portable exam   2. Borderline enlarged cardiomediastinal silhouette as described above         CT HEAD WO CONTRAST    (Results Pending)       ED Course as of 03/03/23 0310   Fri Mar 03, 2023   0036 EKG 12 Lead  EKG showing sinus bradycardia, rate of 59 bpm. Normal axis, otherwise normal intervals. No gross ST-T wave abnls.  [NA]   0043 WBC(!): 13.5  Leukocytosis [NA]   0043 Hemoglobin Quant(!): 11.6  Anemia c/w baseline [NA]   0043 Creatinine(!): 2.26  TETE on CKD. [NA]   0043 Glucose, Random(!): 100 [NA]   0043 Total CK: 73 [NA]   0043 Pro-BNP: 494 [NA]   0043 TSH: 1.690 [NA]   0043 Troponin: <0.010 [NA]   0139 EKG 12 Lead  EKG showing sinus bradycardia, rate of 59 bpm.  Normal axis, normal intervals. No gross acute ST-T wave abnormalities. [NA]   0232 WBC, UA(!):  [NA]   0232 Leukocyte Esterase, Urine(!): LARGE  Possible UTI [NA]      ED Course User Index  [NA] Triny Rico MD       61 y.o. female with a PMH clinically significant for PTSD, Fibromyalgia, Depression, IBS, Crohn's, UC, HTN, HLD, DMII, Anemia, Morbid Obesity, Migraines and Asthma presenting to the ED via EMS c/o syncopal event occurring just prior to arrival.  Upon initial evaluation, Pt fatigued and generally ill-appearing, but otherwise Afebrile, HDS and in NAD. PE as noted above. Labs, , EKG,, and Imaging visualized and interpreted by myself and as noted above. Given findings, clinical presentation most likely consistent w/ syncopal event in the setting of likely UTI, dehydration and multiple electrolyte abnormalities. Although considered, lower suspicion for ACS or acute decompensated heart failure at this time. Also without evidence of acute traumatic injuries. Did however have concern for possible arrhythmia contributing given hypomagnesemia. Initiated on appropriate antibiotics in the ED and will be admitted to medicine for further evaluation and management. Electrolyte replacement initiated in the ED.    Pt was administered   Medications   magnesium sulfate 4000 mg in 100 mL IVPB premix (4,000 mg IntraVENous New Bag 3/3/23 0043)   0.9 % sodium chloride bolus (1,000 mLs IntraVENous New Bag 3/3/23 0244)   cefTRIAXone (ROCEPHIN) 1,000 mg in sodium chloride 0.9 % 50 mL IVPB (mini-bag) (has no administration in time range)   0.9 % sodium chloride bolus (0 mLs IntraVENous Stopped 3/3/23 0032)   potassium chloride (KLOR-CON M) extended release tablet 40 mEq (40 mEq Oral Given 3/3/23 0044)       Plan: Admit to IM for further evaluation and management. Report given to Dr. Taryn White. and Patient understanding and amenable to the POC. CRITICAL CARE TIME   Total CriticalCare time was 0 minutes, excluding separately reportable procedures. There was a high probability of clinically significant/life threatening deterioration in the patient's condition which required my urgent intervention. FINAL IMPRESSION      1. Syncope and collapse    2. Urinary tract infection without hematuria, site unspecified    3. Hypomagnesemia    4.  Confusion          DISPOSITION/PLAN   DISPOSITION Decision To Admit 03/03/2023 02:40:14 AM      Current Discharge Medication List           MD Jude Muir MD  03/11/23 7722

## 2023-03-04 DIAGNOSIS — I10 BENIGN ESSENTIAL HTN: ICD-10-CM

## 2023-03-04 PROBLEM — G43.109 MIGRAINE AURA OCCURRING WITH AND WITHOUT HEADACHE: Status: ACTIVE | Noted: 2023-03-04

## 2023-03-04 PROBLEM — R41.0 CONFUSION: Status: ACTIVE | Noted: 2023-03-04

## 2023-03-04 LAB
ANION GAP SERPL CALCULATED.3IONS-SCNC: 9 MEQ/L (ref 9–15)
BASOPHILS ABSOLUTE: 0.1 K/UL (ref 0–0.2)
BASOPHILS RELATIVE PERCENT: 0.7 %
BUN BLDV-MCNC: 15 MG/DL (ref 6–20)
CALCIUM SERPL-MCNC: 8.5 MG/DL (ref 8.5–9.9)
CHLORIDE BLD-SCNC: 106 MEQ/L (ref 95–107)
CO2: 23 MEQ/L (ref 20–31)
CREAT SERPL-MCNC: 0.96 MG/DL (ref 0.5–0.9)
EOSINOPHILS ABSOLUTE: 0.3 K/UL (ref 0–0.7)
EOSINOPHILS RELATIVE PERCENT: 3.8 %
GFR SERPL CREATININE-BSD FRML MDRD: >60 ML/MIN/{1.73_M2}
GLUCOSE BLD-MCNC: 103 MG/DL (ref 70–99)
GLUCOSE BLD-MCNC: 105 MG/DL (ref 70–99)
GLUCOSE BLD-MCNC: 120 MG/DL (ref 70–99)
GLUCOSE BLD-MCNC: 148 MG/DL (ref 70–99)
GLUCOSE BLD-MCNC: 85 MG/DL (ref 70–99)
HCT VFR BLD CALC: 34 % (ref 37–47)
HEMOGLOBIN: 10.9 G/DL (ref 12–16)
LYMPHOCYTES ABSOLUTE: 1.6 K/UL (ref 1–4.8)
LYMPHOCYTES RELATIVE PERCENT: 18.9 %
MCH RBC QN AUTO: 28.1 PG (ref 27–31.3)
MCHC RBC AUTO-ENTMCNC: 32 % (ref 33–37)
MCV RBC AUTO: 87.9 FL (ref 79.4–94.8)
MONOCYTES ABSOLUTE: 1.4 K/UL (ref 0.2–0.8)
MONOCYTES RELATIVE PERCENT: 16.5 %
NEUTROPHILS ABSOLUTE: 5.2 K/UL (ref 1.4–6.5)
NEUTROPHILS RELATIVE PERCENT: 60.1 %
PDW BLD-RTO: 15.5 % (ref 11.5–14.5)
PERFORMED ON: ABNORMAL
PERFORMED ON: NORMAL
PLATELET # BLD: 253 K/UL (ref 130–400)
POTASSIUM REFLEX MAGNESIUM: 4.2 MEQ/L (ref 3.4–4.9)
RBC # BLD: 3.87 M/UL (ref 4.2–5.4)
SODIUM BLD-SCNC: 138 MEQ/L (ref 135–144)
URINE CULTURE, ROUTINE: NORMAL
WBC # BLD: 8.7 K/UL (ref 4.8–10.8)

## 2023-03-04 PROCEDURE — 97165 OT EVAL LOW COMPLEX 30 MIN: CPT

## 2023-03-04 PROCEDURE — 96366 THER/PROPH/DIAG IV INF ADDON: CPT

## 2023-03-04 PROCEDURE — 85025 COMPLETE CBC W/AUTO DIFF WBC: CPT

## 2023-03-04 PROCEDURE — 96361 HYDRATE IV INFUSION ADD-ON: CPT

## 2023-03-04 PROCEDURE — 2580000003 HC RX 258: Performed by: INTERNAL MEDICINE

## 2023-03-04 PROCEDURE — G0378 HOSPITAL OBSERVATION PER HR: HCPCS

## 2023-03-04 PROCEDURE — 6370000000 HC RX 637 (ALT 250 FOR IP): Performed by: INTERNAL MEDICINE

## 2023-03-04 PROCEDURE — 99232 SBSQ HOSP IP/OBS MODERATE 35: CPT | Performed by: PSYCHIATRY & NEUROLOGY

## 2023-03-04 PROCEDURE — 96368 THER/DIAG CONCURRENT INF: CPT

## 2023-03-04 PROCEDURE — 96372 THER/PROPH/DIAG INJ SC/IM: CPT

## 2023-03-04 PROCEDURE — 92523 SPEECH SOUND LANG COMPREHEN: CPT

## 2023-03-04 PROCEDURE — 36415 COLL VENOUS BLD VENIPUNCTURE: CPT

## 2023-03-04 PROCEDURE — 92610 EVALUATE SWALLOWING FUNCTION: CPT

## 2023-03-04 PROCEDURE — 6370000000 HC RX 637 (ALT 250 FOR IP)

## 2023-03-04 PROCEDURE — 80048 BASIC METABOLIC PNL TOTAL CA: CPT

## 2023-03-04 PROCEDURE — APPSS30 APP SPLIT SHARED TIME 16-30 MINUTES: Performed by: NURSE PRACTITIONER

## 2023-03-04 PROCEDURE — 6360000002 HC RX W HCPCS

## 2023-03-04 PROCEDURE — 6360000002 HC RX W HCPCS: Performed by: INTERNAL MEDICINE

## 2023-03-04 RX ADMIN — PREGABALIN 150 MG: 150 CAPSULE ORAL at 14:22

## 2023-03-04 RX ADMIN — SODIUM CHLORIDE: 9 INJECTION, SOLUTION INTRAVENOUS at 14:23

## 2023-03-04 RX ADMIN — PREGABALIN 150 MG: 150 CAPSULE ORAL at 20:45

## 2023-03-04 RX ADMIN — ACETAMINOPHEN 650 MG: 325 TABLET ORAL at 06:48

## 2023-03-04 RX ADMIN — PREGABALIN 150 MG: 150 CAPSULE ORAL at 10:22

## 2023-03-04 RX ADMIN — DULOXETINE HYDROCHLORIDE 60 MG: 60 CAPSULE, DELAYED RELEASE ORAL at 10:22

## 2023-03-04 RX ADMIN — ENOXAPARIN SODIUM 30 MG: 100 INJECTION SUBCUTANEOUS at 10:25

## 2023-03-04 RX ADMIN — CEFTRIAXONE SODIUM 1000 MG: 1 INJECTION, POWDER, FOR SOLUTION INTRAMUSCULAR; INTRAVENOUS at 03:12

## 2023-03-04 RX ADMIN — ENOXAPARIN SODIUM 30 MG: 100 INJECTION SUBCUTANEOUS at 20:46

## 2023-03-04 RX ADMIN — PRAVASTATIN SODIUM 40 MG: 40 TABLET ORAL at 20:45

## 2023-03-04 RX ADMIN — BACLOFEN 10 MG: 10 TABLET ORAL at 20:45

## 2023-03-04 RX ADMIN — QUETIAPINE FUMARATE 50 MG: 50 TABLET ORAL at 20:45

## 2023-03-04 RX ADMIN — BACLOFEN 10 MG: 10 TABLET ORAL at 10:22

## 2023-03-04 ASSESSMENT — ENCOUNTER SYMPTOMS
VOMITING: 0
COUGH: 0
CHEST TIGHTNESS: 0
SHORTNESS OF BREATH: 0
COLOR CHANGE: 0
TROUBLE SWALLOWING: 0
WHEEZING: 0
NAUSEA: 0

## 2023-03-04 ASSESSMENT — PAIN DESCRIPTION - LOCATION: LOCATION: ABDOMEN

## 2023-03-04 ASSESSMENT — PAIN SCALES - GENERAL: PAINLEVEL_OUTOF10: 5

## 2023-03-04 ASSESSMENT — PAIN DESCRIPTION - DESCRIPTORS: DESCRIPTORS: ACHING

## 2023-03-04 NOTE — PROGRESS NOTES
[facility] OCCUPATIONAL THERAPY EVALUATION - ACUTE     NAME: Melany Asher  : 1965 (61 y.o.)  MRN: [id number]  CODE STATUS: Full Code  [facility]    Date of Service: 3/4/2023    Patient Diagnosis(es): Syncope and collapse [R55]  Hypomagnesemia [E83.42]  Confusion [R41.0]  Urinary tract infection without hematuria, site unspecified [N39.0]   Patient Active Problem List    Diagnosis Date Noted    Syncope and collapse 2023    Ventral hernia 2023    Right upper quadrant abdominal pain 2022    Diarrhea 2022    Fever 2022    Acute bacterial sinusitis 2021    Bronchitis 2021    Gastroenteritis 2020    Injury of left foot 2020    Leukocytosis 2020    Nausea and vomiting 2020    Shoulder pain 2020    Morbid obesity ([facility] 2020    Headache 2019    Tremor 2019    Meralgia paresthetica of left side 2019    Intractable migraine without aura and without status migrainosus 2019    Iron deficiency anemia 2018    Anemia 2018    Carpal tunnel syndrome of left wrist 2018    Intermittent tremor 2018    Posttraumatic stress disorder 2018    Fibromyalgia 2018    Carpal tunnel syndrome of right wrist 2017    History of Crohn's disease     Bilateral edema of lower extremity 2017    Varicose veins of lower extremity 2017    Edema of lower extremity 2017    Tear of meniscus of knee 2017    Mixed hyperlipidemia 05/10/2017    Pain in right knee 2017    Hip pain, right 2017    Obesity with body mass index 30 or greater 2017    Muscle pain 2017    Pain of right hip joint 2017    Severe episode of recurrent major depressive disorder, without psychotic features ([address] 10/25/2016    Borderline personality disorder ([address] 10/25/2016    Polyneuropathy due to type 2 diabetes mellitus ([address] 2016    Benign essential hypertension     Asthma Crohn's disease ([address]     Gastroesophageal reflux disease         Past Medical History:   Diagnosis Date    Anemia     Asthma     Benign essential HTN     meds > 5 yrs / denies TIA or stroke    Borderline personality disorder ([address]     2016 ?suggested by me-meets many criteria    Carpal tunnel syndrome of right wrist     Crohn's disease ([address]     Depression     Fibromyalgia 02/13/2018    Fibromyalgia     GERD (gastroesophageal reflux disease)     Gout     Headache     migraines    Irritable bowel syndrome     Mixed hyperlipidemia 05/10/2017    Neuropathy     Obesity (BMI 35.0-39.9 without comorbidity) 03/30/2017    PONV (postoperative nausea and vomiting)     nausea    PTSD (post-traumatic stress disorder)     Tremor of unknown origin     Type 2 diabetes mellitus ([address]     meds > 5yrs     Ulcerative colitis ([address] 11/2017    Vaginitis      Past Surgical History:   Procedure Laterality Date    BREAST CYST EXCISION Right 1994    exc mass benign    CHOLECYSTECTOMY  2009    COLONOSCOPY  2015    negative    ENDOSCOPY, COLON, DIAGNOSTIC      HERNIA REPAIR  2015    ventral / mesh    LEG TENDON SURGERY Right 2007    leg.  ankle and foot    OVARIAN CYST REMOVAL Left 1994    with mass and both fallopian tube    OVARY REMOVAL  12/2015    [facility] / mass left ovary & bilateral  tubes    PARTIAL HYSTERECTOMY (CERVIX NOT REMOVED)      TN ARTHROSCOPY KNEE DIAGNOSTIC W/WO SYNOVIAL BX SPX Right 08/03/2017    RIGHT KNEE ARTHROSCOPIC PARTIAL MEDIAL MENISCECTOMY KNEE performed by Naman Veliz MD at [address] IND N/A 11/07/2017    COLONOSCOPY performed by MANOJ Quinn on bx    [place] COLONOSCOPY W/BIOPSY SINGLE/MULTIPLE N/A 03/15/2018    COLONOSCOPY performed by Shraddha Solano MD at [address] [facility] NEUROPLASTY &/TRANSPOS MEDIAN NRV CARPAL TUNNE Right 11/30/2017    RIGHT WRIST  CARPAL TUNNEL RELEASE performed by Naman Veliz MD at [address] [facility] &/TRANSPOS MEDIAN NRV CARPAL TUNNE Left 05/10/2018    LEFT WRIST CARPAL TUNNEL RELEASE performed by Yamini Mcghee MD at [address] [place] [address]      lower back-negative, right upper arm    TONSILLECTOMY AND ADENOIDECTOMY  1967        Restrictions  Restrictions/Precautions: Fall Risk            Safety Devices: Safety Devices  Type of Devices: Call light within reach; Left in bed     Patient's date of birth confirmed: Yes    General:  Chart Reviewed: Yes    Subjective          Pain at start of treatment: No    Pain at end of treatment: No    Location: n/a  Description: n/a  Nursing notified: No  Intervention: None    Prior Level of Function:  Social/Functional History  Lives With: Family  Type of Home: Mobile home  Home Layout: One level  Home Access: Stairs to enter with rails  Entrance Stairs - Number of Steps: 4  Entrance Stairs - Rails: Left (up)  Bathroom Shower/Tub: Tub/Shower unit, Walk-in shower  Bathroom Equipment: Shower chair, Grab bars in shower (pt stated has suction cup grab bar, but does not use)  Home Equipment: Cane, Rollator (loftrand crutches)  ADL Assistance: Independent  Homemaking Assistance: Independent  Homemaking Responsibilities: Yes (shared)  Ambulation Assistance: Independent  Transfer Assistance: Independent  Active : Yes  Occupation: Retired  Type of Occupation: RN    OBJECTIVE:     Orientation Status:  Orientation  Overall Orientation Status: Within Functional Limits    Observation:  Observation/Palpation  Observation: alert, pleasant, cooperative, no acute distress noted, on RA    Cognition Status:  Cognition  Overall Cognitive Status: WFL    Perception Status:  Perception  Overall Perceptual Status: WFL    Vision and Hearing Status:  Vision  Vision Exceptions: Wears glasses for reading;Wears glasses for distance  Hearing  Hearing: Within functional limits        GROSS ASSESSMENT AROM/PROM:  AROM: Within functional limits       ROM:   LUE AROM (degrees)  LUE AROM : WFL  Left Hand AROM (degrees)  Left Hand AROM: WFL  RUE AROM (degrees)  RUE AROM : WFL  Right Hand AROM (degrees)  Right Hand AROM: WFL    UE STRENGTH:  Strength: Within functional limits (5/5 BUE)    UE COORDINATION:  Coordination: Within functional limits    UE TONE:  Tone: Normal    UE SENSATION:  Sensation: Intact    Hand Dominance:  Hand Dominance  Hand Dominance: Right    ADL Status:  ADL  Feeding: Independent  Grooming: Independent  UE Bathing: Independent  LE Bathing: Independent  UE Dressing: Independent  LE Dressing: Independent  Toileting: Independent  Additional Comments: Pt don socks seated EOB, used toilet. Other ADL's simulated. Toilet Transfers  Toilet Transfer: Modified independent  Toilet Transfers Comments: ues of grab bars    Functional Mobility:    Transfers  Sit to stand: Independent  Stand to sit: Independent    Patient ambulated to/from bathroom with Other: IV pole  at Independent level. Bed Mobility  Bed mobility  Supine to Sit: Modified independent  Sit to Supine: Modified independent    Seated and Standing Balance:  Balance  Sitting: Intact  Standing: Intact    Functional Endurance:  Activity Tolerance  Activity Tolerance Comments: good    D/C Recommendations:  OT D/C RECOMMENDATIONS  REQUIRES OT FOLLOW-UP: No    Equipment Recommendations:       OT Education:   Patient Education  Education Given To: Patient  Education Provided: Role of Therapy  Education Method: Verbal  Education Outcome: Verbalized understanding    OT Follow Up:   OT D/C RECOMMENDATIONS  REQUIRES OT FOLLOW-UP: No       Assessment/Discharge Disposition:  Assessment: Pt is a 61 y.o. female observed IND in room.   Prognosis: Good  Decision Making: Medium Complexity  History: multi comorb  Exam: IND in room  Assistance / Modification: IND in room    AMPAC (Six Click) Self care Score   How much help is needed for putting on and taking off regular lower body clothing?: None  How much help is needed for bathing (which includes washing, rinsing, drying)?: None  How much help is needed for toileting (which includes using toilet, bedpan, or urinal)?: None  How much help is needed for putting on and taking off regular upper body clothing?: None  How much help is needed for taking care of personal grooming?: None  How much help for eating meals?: None  [facility] Inpatient Daily Activity Raw Score: 24  AM-PAC Inpatient ADL T-Scale Score : 57.54  ADL Inpatient CMS 0-100% Score: 0    Therapy key for assistance levels -   Independent/Mod I = Pt. is able to perform task with no assistance but may require a device   Stand by assistance = Pt. does not perform task at an independent level but does not need physical assistance, requires verbal cues  Minimal, Moderate, Maximal Assistance = Pt. requires physical assistance (25%, 50%, 75% assist from helper) for task but is able to actively participate in task   Dependent = Pt. requires total assistance with task and is not able to actively participate with task completion     Plan:  Occupational Therapy Plan  Times Per Week: No acute OT recommended at this time. Goals: n/a    Patient Goal:    home    Discussed and agreed upon: Yes Comments:       Therapy Time:   Individual   Time In 0926   Time Out 0936   Minutes 10        Eval: 10 minutes     Electronically signed by:     VINICIO Cruz,   3/4/2023, 10:06 AM

## 2023-03-04 NOTE — PROGRESS NOTES
[facility]   Facility/Department: [address] ORTHO TELE  Speech Language Pathology  Initial Speech/Language/Cognitive Assessment    NAME:Yanci Cunningham  : 1965 (57 y.o.)   [x]   confirmed    MRN: [id number]  ROOM: [place] [address]  ADMISSION DATE: 3/2/2023  PATIENT DIAGNOSIS(ES): Syncope and collapse [R55]  Hypomagnesemia [E83.42]  Confusion [R41.0]  Urinary tract infection without hematuria, site unspecified [N39.0]  Chief Complaint   Patient presents with    Loss of Consciousness     Patient Active Problem List    Diagnosis Date Noted    Syncope and collapse 2023    Ventral hernia 2023    Right upper quadrant abdominal pain 2022    Diarrhea 2022    Fever 2022    Acute bacterial sinusitis 2021    Bronchitis 2021    Gastroenteritis 2020    Injury of left foot 2020    Leukocytosis 2020    Nausea and vomiting 2020    Shoulder pain 2020    Morbid obesity (HCC) 2020    Headache 2019    Tremor 2019    Meralgia paresthetica of left side 2019    Intractable migraine without aura and without status migrainosus 2019    Iron deficiency anemia 2018    Anemia 2018    Carpal tunnel syndrome of left wrist 2018    Intermittent tremor 2018    Posttraumatic stress disorder 2018    Fibromyalgia 2018    Carpal tunnel syndrome of right wrist 2017    History of Crohn's disease     Bilateral edema of lower extremity 2017    Varicose veins of lower extremity 2017    Edema of lower extremity 2017    Tear of meniscus of knee 2017    Mixed hyperlipidemia 05/10/2017    Pain in right knee 2017    Hip pain, right 2017    Obesity with body mass index 30 or greater 2017    Muscle pain 2017    Pain of right hip joint 2017    Severe episode of recurrent major depressive disorder, without psychotic features (HCC) 10/25/2016    Borderline personality  disorder ([facility] [address] 10/25/2016    Polyneuropathy due to type 2 diabetes mellitus ([facility] [facility] 02/05/2016    Benign essential hypertension     Asthma     Crohn's disease ([facility] [address]     Gastroesophageal reflux disease      Past Medical History:   Diagnosis Date    Anemia     Asthma     Benign essential HTN     meds > 5 yrs / denies TIA or stroke    Borderline personality disorder ([address]     2016 ?suggested by me-meets many criteria    Carpal tunnel syndrome of right wrist     Crohn's disease ([facility] [address]     Depression     Fibromyalgia 02/13/2018    Fibromyalgia     GERD (gastroesophageal reflux disease)     Gout     Headache     migraines    Irritable bowel syndrome     Mixed hyperlipidemia 05/10/2017    Neuropathy     Obesity (BMI 35.0-39.9 without comorbidity) 03/30/2017    PONV (postoperative nausea and vomiting)     nausea    PTSD (post-traumatic stress disorder)     Tremor of unknown origin     Type 2 diabetes mellitus ([facility] [address]     meds > 5yrs     Ulcerative colitis ([facility] [address] 11/2017    Vaginitis      Past Surgical History:   Procedure Laterality Date    BREAST CYST EXCISION Right 1994    exc mass benign    CHOLECYSTECTOMY  2009    COLONOSCOPY  2015    negative    ENDOSCOPY, COLON, DIAGNOSTIC      HERNIA REPAIR  2015    ventral / mesh    LEG TENDON SURGERY Right 2007    leg.  ankle and foot    OVARIAN CYST REMOVAL Left 1994    with mass and both fallopian tube    OVARY REMOVAL  12/2015    [facility] / mass left ovary & bilateral  tubes    PARTIAL HYSTERECTOMY (CERVIX NOT REMOVED)      PA ARTHROSCOPY KNEE DIAGNOSTIC W/WO SYNOVIAL BX SPX Right 08/03/2017    RIGHT KNEE ARTHROSCOPIC PARTIAL MEDIAL MENISCECTOMY KNEE performed by Lennie Gandara MD at [address] IND N/A 11/07/2017    COLONOSCOPY performed by MANOJ Tomlinson on bx    [place] COLONOSCOPY W/BIOPSY SINGLE/MULTIPLE N/A 03/15/2018    COLONOSCOPY performed by Felisa Benson MD at [address] NEUROPLASTY &/TRANSPOS MEDIAN NRV CARPAL TUNNE Right 11/30/2017    RIGHT WRIST  CARPAL TUNNEL RELEASE performed by Mari Yeager MD at [address] &/TRANSPOS MEDIAN NRV CARPAL Kate [place] Left 05/10/2018    LEFT WRIST CARPAL TUNNEL RELEASE performed by Mari Yeager MD at [facility]      lower back-negative, right upper arm    [address]       Date of Onset: 3/2/2023    Date of Evaluation: 3/4/2023   Evaluating Therapist: LEATHA Dubose        Diagnosis: Pt presents with cognitive-linguistic and speech skills that are [facility] at this time. Mild deficits noted with STM, however, pt reports implementation of calendars, alarms, writing important information down, and use of pill box to compensate for memory deficits. Pt receives help from her sister if needed. No deficits have been identified that warrant ST intervention during this hospitalization. Please re-consult ST if new issues/concerns arise. Requires SLP Intervention: No     D/C Recommendations: No follow up therapy recommended post discharge    General  Subjective: Pt was alert, cooperative, and pleasant for evaluation. Pt admitted 3/2/23 secondary to syncope. MRI 3/3/23 clear, CT head clear. CXR clear. PmHx of GERD. Behavior/Cognition: Alert;Pleasant mood; Cooperative   Respiratory Status: Room air  O2 Device: None (Room air)  Previous level of function and limitations:   Social/Functional History  Lives With: Family  Type of Home: Mobile home  Home Layout: One level  ADL Assistance: Independent  Homemaking Assistance: Independent  Ambulation Assistance: Independent  Transfer Assistance: Independent  Active : Yes  Occupation: Retired  Type of Occupation: RN    Vision and Hearing  Vision  Vision: Impaired  Vision Exceptions: Wears glasses for reading;Wears glasses for distance  Hearing  Hearing: Within functional limits     Oral/Motor  Oral Motor   Labial: No impairment  Dentition: Partials (comment) (Upper partials.  Does not wear lower partial)  Oral Hygiene: Moist;Clean  Lingual: No impairment  Mandible: No impairment    Motor Speech  Motor Speech  Apraxic Characteristics: None  Dysarthric Characteristics: None  Intelligibility: No impairment  Overall Impairment Severity: None    Comprehension  Auditory Comprehension  Comprehension: Within Functional Limits    Expression  Expression  Primary Mode of Expression: Verbal  Verbal Expression  Verbal Expression: Within functional limits  Written Expression  Dominant Hand: Right  Written Expression: Unable to assess    Cognition  Orientation  Overall Orientation Status: Within Normal Limits  Attention  Attention: Within Functional Limits  Memory  Memory: Exceptions to WFL  Short-term Memory: Mild  Problem Solving  Problem Solving: Within Functional Limits    Additional Assessments  N/A    Recommendations  Requires SLP Intervention: No  D/C Recommendations: No follow up therapy recommended post discharge  Patient Education: Results of evaluation  Patient Education Response: Verbalizes understanding  Duration of Treatment: No f/u  Frequency of Treatment: No f/u    Prognosis  Speech Therapy Prognosis  Prognosis: Good  Prognosis Considerations: Previous Level of Function    Education  Individuals consulted  Consulted and agree with results and recommendations: ROBERTO  RN Name: Bette    Treatment/Goals  N/A    Safety Devices  Safety Devices  Safety Devices in place: Yes  Type of devices: Bed alarm in place;Call light within reach  Restraints Initially in Place: No    Pain Assessment  Patient does not c/o pain.    Pain Re-assessment  Patient does not c/o pain.    Therapy Time  SLP Individual Minutes  Time In: 0844  Time Out: 0858  Minutes: 14          Signature: Electronically signed by LEATHA Levine on 3/4/2023 at 10:04 AM

## 2023-03-04 NOTE — PROGRESS NOTES
[facility]   Facility/Department: [facility]  Speech Language Pathology  Clinical Bedside Swallow Evaluation    Lico Ashwin  : 1965 (61 y.o.)   [x]   confirmed    MRN: [id number]  [facility]  ADMISSION DATE: 3/2/2023  PATIENT DIAGNOSIS(ES): Syncope and collapse [R55]  Hypomagnesemia [E83.42]  Confusion [R41.0]  Urinary tract infection without hematuria, site unspecified [N39.0]  Chief Complaint   Patient presents with    Loss of Consciousness     Patient Active Problem List    Diagnosis Date Noted    Syncope and collapse 2023    Ventral hernia 2023    Right upper quadrant abdominal pain 2022    Diarrhea 2022    Fever 2022    Acute bacterial sinusitis 2021    Bronchitis 2021    Gastroenteritis 2020    Injury of left foot 2020    Leukocytosis 2020    Nausea and vomiting 2020    Shoulder pain 2020    Morbid obesity ([facility] [address] 2020    Headache 2019    Tremor 2019    Meralgia paresthetica of left side 2019    Intractable migraine without aura and without status migrainosus 2019    Iron deficiency anemia 2018    Anemia 2018    Carpal tunnel syndrome of left wrist 2018    Intermittent tremor 2018    Posttraumatic stress disorder 2018    Fibromyalgia 2018    Carpal tunnel syndrome of right wrist 2017    History of Crohn's disease     Bilateral edema of lower extremity 2017    Varicose veins of lower extremity 2017    Edema of lower extremity 2017    Tear of meniscus of knee 2017    Mixed hyperlipidemia 05/10/2017    Pain in right knee 2017    Hip pain, right 2017    Obesity with body mass index 30 or greater 2017    Muscle pain 2017    Pain of right hip joint 2017    Severe episode of recurrent major depressive disorder, without psychotic features ([address] 10/25/2016    Borderline personality disorder ([facility] [address] 10/25/2016    Polyneuropathy due to type 2 diabetes mellitus ([facility] [address] 02/05/2016    Benign essential hypertension     Asthma     Crohn's disease ([address]     Gastroesophageal reflux disease      Past Medical History:   Diagnosis Date    Anemia     Asthma     Benign essential HTN     meds > 5 yrs / denies TIA or stroke    Borderline personality disorder ([address]     2016 ?suggested by me-meets many criteria    Carpal tunnel syndrome of right wrist     Crohn's disease ([facility] [address]     Depression     Fibromyalgia 02/13/2018    Fibromyalgia     GERD (gastroesophageal reflux disease)     Gout     Headache     migraines    Irritable bowel syndrome     Mixed hyperlipidemia 05/10/2017    Neuropathy     Obesity (BMI 35.0-39.9 without comorbidity) 03/30/2017    PONV (postoperative nausea and vomiting)     nausea    PTSD (post-traumatic stress disorder)     Tremor of unknown origin     Type 2 diabetes mellitus ([address]     meds > 5yrs     Ulcerative colitis ([facility] [address] 11/2017    Vaginitis      Past Surgical History:   Procedure Laterality Date    BREAST CYST EXCISION Right 1994    exc mass benign    CHOLECYSTECTOMY  2009    COLONOSCOPY  2015    negative    ENDOSCOPY, COLON, DIAGNOSTIC      HERNIA REPAIR  2015    ventral / mesh    LEG TENDON SURGERY Right 2007    leg.  ankle and foot    OVARIAN CYST REMOVAL Left 1994    with mass and both fallopian tube    OVARY REMOVAL  12/2015    [facility] / mass left ovary & bilateral  tubes    PARTIAL HYSTERECTOMY (CERVIX NOT REMOVED)      AR ARTHROSCOPY KNEE DIAGNOSTIC W/WO SYNOVIAL BX SPX Right 08/03/2017    RIGHT KNEE ARTHROSCOPIC PARTIAL MEDIAL MENISCECTOMY KNEE performed by Erma Patterson MD at [address] IND N/A 11/07/2017    COLONOSCOPY performed by MANOJ Kirk on bx    AR COLONOSCOPY W/BIOPSY SINGLE/MULTIPLE N/A 03/15/2018    COLONOSCOPY performed by Taryn Mendieta MD at [address] NEUROPLASTY &/TRANSPOS MEDIAN NRV CARPAL TUNNE Right 11/30/2017    RIGHT WRIST CARPAL TUNNEL RELEASE performed by Laura Bacon MD at [address] [employer] NEUROPLASTY &/TRANSPOS MEDIAN NRV CARPAL Linda Perez Left 05/10/2018    LEFT WRIST CARPAL TUNNEL RELEASE performed by Laura Bacon MD at [facility]      lower back-negative, right upper arm    TONSILLECTOMY AND ADENOIDECTOMY  1967     Allergies   Allergen Reactions    Latex Hives    Penicillins Swelling and Rash    Shellfish-Derived Products Anaphylaxis    Abilify [Aripiprazole]      shaking    Adhesive Tape Swelling     If left on too long    Topamax [Topiramate]     Buspar [Buspirone]      shaking    Other Nausea And Vomiting    Sulfa Antibiotics Nausea And Vomiting       DATE ONSET: 3/2/2023    Date of Evaluation: 3/4/2023   Evaluating Therapist: LEATHA Bhat    Dysphagia Diagnosis  Dysphagia Diagnosis: Swallow function appears WFL  Dysphagia Impression : Oropharyngeal phase of swallow WFL. Pt was able to form and clear a cohesive bolus with min lingual residuals post swallow, given increased mastication time secondary to dentition. Pharyngeal phase WFL at bedside. No overt s/s of aspiration were observed following any of the presented consistencies. Hyolaryngeal movement was present during the evaluation, noted via observation. Recommended Diet  Recommendations: Self feed  Diet Solids Recommendation: Regular  Liquid Consistency Recommendation: Thin  Recommended Form of Meds: PO  Compensatory Swallowing Strategies : Eat/Feed slowly;Upright as possible for all oral intake;Small bites/sips      Reason for Referral  Thais Grimm was referred for a bedside swallow evaluation to assess the efficiency of her swallow function, identify signs and symptoms of aspiration, identify risk factors, and make recommendations regarding safe dietary consistencies, effective compensatory strategies, and safe eating environment. General  Chart Reviewed: Yes  Comments: Pt admitted 3/2/23 secondary to syncope.  MRI 3/3/23 clear, CT head clear. CXR clear. PmHx of GERD. Subjective  Subjective: Pt was alert, cooperative, and pleasant for evaluation. Behavior/Cognition: Alert;Pleasant mood; Cooperative  Respiratory Status: Room air  O2 Device: None (Room air)  Communication Observation: Functional  Follows Directions: Simple  Dentition: Adequate; Some missing teeth (Upper partial. Has lower partial but does not wear)  Patient Positioning: Upright in bed  Baseline Vocal Quality: Normal  Volitional Cough: Strong  Prior Dysphagia History: None  Consistencies Administered: Regular; Soft and Bite-Sized;Pureed; Thin    Vision and Hearing  Vision  Vision: Impaired  Vision Exceptions: Wears glasses for reading;Wears glasses for distance  Hearing  Hearing: Within functional limits    Current Diet level  Current Diet : Regular  Current Liquid Diet : Thin    Oral Motor  Labial: No impairment  Dentition: Partials (comment) (Upper partials. Does not wear lower partial)  Oral Hygiene: Moist;Clean  Lingual: No impairment  Mandible: No impairment    Oral/Pharyngeal Phase  Oral Phase - Comment: WFL  Pharyngeal Phase: WFL    PO Trials  Neuromuscular Estim Used: No  Assessment Method(s): Observation  Patient Position: Upright in bed  Vocal Quality: No Impairment  Consistency Presented: All consistencies  How Presented: Self-fed/presented  Bolus Acceptance: No impairment  Bolus Formation/Control: Impaired  Type of Impairment: Mastication  Propulsion: No impairment  Oral Residue: Less than 10% of bolus  Initiation of Swallow: No impairment  Laryngeal Elevation: Functional  Aspiration Signs/Symptoms: None  Pharyngeal Phase Characteristics: No impairment, issues, or problems  Effective Modifications: None  Cues for Modifications: None    Dysphagia Diagnosis  Dysphagia Diagnosis: Swallow function appears WFL  Dysphagia Impression : Oropharyngeal phase of swallow WFL.  Pt was able to form and clear a cohesive bolus with min lingual residuals post swallow, given increased mastication time secondary to dentition. Pharyngeal phase WFL at bedside. No overt s/s of aspiration were observed following any of the presented consistencies. Hyolaryngeal movement was present during the evaluation, noted via observation. Dysphagia Outcome Severity Scale: Level 6: Within functional limits/Modified independence     Recommendations  Requires SLP Intervention: No  Recommendations: Self feed  D/C Recommendations: No follow up therapy recommended post discharge  Diet Solids Recommendation: Regular  Liquid Consistency Recommendation: Thin  Compensatory Swallowing Strategies : Eat/Feed slowly;Upright as possible for all oral intake;Small bites/sips  Recommended Form of Meds: PO  Duration of Treatment: No f/u  Frequency of Treatment: No f/u    Prognosis  Speech Therapy Prognosis  Prognosis: Good  Prognosis Considerations: Previous Level of Function    Education  Individuals consulted  Consulted and agree with results and recommendations: RN  RN Name: Wing Cure    Treatment/Goals       Safety Devices  Safety Devices  Safety Devices in place: Yes  Type of devices: Bed alarm in place;Call light within reach  Restraints Initially in Place: No    Pain Assessment  Patient does not c/o pain. Pain Re-assessment  Patient does not c/o pain.       Therapy Time  SLP Individual Minutes  Time In: 1913  Time Out: 5198  Minutes: 8     Signature: Electronically signed by LEATHA Campos on 3/4/2023 at 9:57 AM Consent: The patient's consent was obtained including but not limited to risks of crusting, scabbing, blistering, scarring, darker or lighter pigmentary change, recurrence, incomplete removal and infection.

## 2023-03-04 NOTE — PROGRESS NOTES
Hospitalist Progress Note      Date of Admission: 3/2/2023  Chief Complaint:    Chief Complaint   Patient presents with    Loss of Consciousness     Subjective:  No new complaints. No nausea, vomiting, chest pain, or headache      Medications:    Infusion Medications    dextrose      sodium chloride 100 mL/hr at 03/03/23 1704     Scheduled Medications    enoxaparin  30 mg SubCUTAneous BID    insulin lispro  0-8 Units SubCUTAneous TID WC    insulin lispro  0-4 Units SubCUTAneous Nightly    DULoxetine  60 mg Oral BID    baclofen  10 mg Oral BID    pravastatin  40 mg Oral Nightly    QUEtiapine  50 mg Oral Nightly    cefTRIAXone (ROCEPHIN) IV  1,000 mg IntraVENous Q24H    pregabalin  150 mg Oral TID     PRN Meds: ondansetron **OR** ondansetron, polyethylene glycol, acetaminophen **OR** acetaminophen, glucose, dextrose bolus **OR** dextrose bolus, glucagon (rDNA), dextrose    Intake/Output Summary (Last 24 hours) at 3/4/2023 1155  Last data filed at 3/3/2023 2137  Gross per 24 hour   Intake 480 ml   Output --   Net 480 ml     Exam:  /63   Pulse 70   Temp 98 °F (36.7 °C) (Oral)   Resp 18   Ht 5' 2\" (1.575 m) Comment: per pt  Wt 280 lb 3.3 oz (127.1 kg)   LMP 07/27/1993 (Approximate)   SpO2 96%   BMI 51.25 kg/m²   Head: Normocephalic, atraumatic  Sclera clear  Neck JVD flat  Lungs: normal effort of breathing    Labs:   Recent Labs     03/02/23  2300 03/03/23  0632 03/04/23  0605   WBC 13.5* 13.4* 8.7   HGB 11.6* 12.5 10.9*   HCT 36.0* 38.8 34.0*    306 253     Recent Labs     03/02/23  2300 03/03/23  0632 03/04/23  0605    138 138   K 4.0 4.7 4.2    102 106   CO2 25 19* 23   BUN 24* 24* 15   CREATININE 2.26* 1.89* 0.96*   CALCIUM 7.9* 8.1* 8.5   AST 15  --   --    ALT 7  --   --    BILITOT <0.2  --   --    ALKPHOS 119  --   --      No results for input(s): INR in the last 72 hours.   Recent Labs     03/02/23  2300   CKTOTAL 68   TROPONINI <0.010     Radiology:  MRI BRAIN WO CONTRAST   Final Result   Unremarkable unenhanced MRI of the brain.         CT HEAD WO CONTRAST   Final Result   No acute intracranial abnormality.  Specifically, there is no acute   intracranial hemorrhage         XR CHEST PORTABLE   Final Result   1. No acute pleural or pulmonary abnormality identified on portable exam   2. Borderline enlarged cardiomediastinal silhouette as described above         US CAROTID ARTERY BILATERAL    (Results Pending)     Assessment/Plan:    Syncope: cardiology w/u in progress   Tele red alarms reviewed - non sustained tachyarrhythmia @1209 3/3 noted, cardiology primarily managing.   MRI brain neg for acute pathology    Sinus ashley: HR currently 58, toprol on hold.     Pyuria with no growth on UC.  No fever or symptoms.  Dc ceftriaxone.     DM: monitor glucose accordion, titrate meds as needed  Crohns disease: chronic diarrhea, not in flare  HTN: monitor BP, adjust meds as needed    Awaiting completion of home meds by RN in order to complete physician medication reconciliation.     FABRICE STANTON MD ,MD

## 2023-03-04 NOTE — PROGRESS NOTES
[facility] Neurology Daily Progress Note  Name: Vitaly Esquivel  Age: 61 y.o. Gender: female  CodeStatus: Full Code  Allergies: Latex  Penicillins  Shellfish-Derived Products  Abilify [Aripiprazole]  Adhesive Tape  Topamax [Topiramate]  Buspar [Buspirone]  Other  Sulfa Antibiotics    Chief Complaint:Loss of Consciousness    Primary Care Provider: María Burden MD  InpatientTreatment Team: Treatment Team: Attending Provider: Pat Culver MD; Consulting Physician: Mona Leonard MD; Consulting Physician: Angelo Barrett MD; Patient Care Tech: Jae Monreal, OTR/L; Utilization Reviewer: Shannen Clements RN; LPN: Ever Bran LPN; Registered Nurse: Bia Dorman RN  Admission Date: 3/2/2023      HPI   Pt seen and examined for neuro follow up. Currently alert and oriented x3, no acute distress, cooperative. Denies dizziness or lightheadedness. Blood pressure stable. Nonfocal.  No seizure activity. No recurrent syncope. Vitals:    03/04/23 0015   BP: 137/63   Pulse: 70   Resp: 18   Temp: 98 °F (36.7 °C)   SpO2: 96%        Review of Systems   Constitutional:  Negative for appetite change and fever. HENT:  Negative for hearing loss and trouble swallowing. Eyes:  Negative for visual disturbance. Respiratory:  Negative for cough, chest tightness, shortness of breath and wheezing. Cardiovascular:  Negative for chest pain, palpitations and leg swelling. Gastrointestinal:  Negative for nausea and vomiting. Musculoskeletal:  Negative for gait problem. Skin:  Negative for color change and rash. Neurological:  Negative for dizziness, tremors, seizures, syncope, facial asymmetry, speech difficulty, weakness, light-headedness, numbness and headaches. Psychiatric/Behavioral:  Negative for agitation, confusion and hallucinations. The patient is not nervous/anxious. Physical Exam  Vitals and nursing note reviewed. Constitutional:       General: She is not in acute distress.      Appearance: She is not diaphoretic. HENT:      Head: Normocephalic. Eyes:      Pupils: Pupils are equal, round, and reactive to light. Cardiovascular:      Rate and Rhythm: Normal rate and regular rhythm. Pulmonary:      Effort: Pulmonary effort is normal. No respiratory distress. Breath sounds: Normal breath sounds. Abdominal:      General: Bowel sounds are normal.   Skin:     General: Skin is warm and dry. Neurological:      General: No focal deficit present. Mental Status: She is alert and oriented to person, place, and time. Mental status is at baseline. Medications:  Reviewed    Infusion Medications:    dextrose      sodium chloride 100 mL/hr at 03/03/23 1704     Scheduled Medications:    enoxaparin  30 mg SubCUTAneous BID    insulin lispro  0-8 Units SubCUTAneous TID WC    insulin lispro  0-4 Units SubCUTAneous Nightly    DULoxetine  60 mg Oral BID    baclofen  10 mg Oral BID    pravastatin  40 mg Oral Nightly    QUEtiapine  50 mg Oral Nightly    cefTRIAXone (ROCEPHIN) IV  1,000 mg IntraVENous Q24H    pregabalin  150 mg Oral TID     PRN Meds: ondansetron **OR** ondansetron, polyethylene glycol, acetaminophen **OR** acetaminophen, glucose, dextrose bolus **OR** dextrose bolus, glucagon (rDNA), dextrose    Labs:   Recent Labs     03/02/23  2300 03/03/23  0632 03/04/23  0605   WBC 13.5* 13.4* 8.7   HGB 11.6* 12.5 10.9*   HCT 36.0* 38.8 34.0*    306 253     Recent Labs     03/02/23  2300 03/03/23  0632 03/04/23  0605    138 138   K 4.0 4.7 4.2    102 106   CO2 25 19* 23   BUN 24* 24* 15   CREATININE 2.26* 1.89* 0.96*   CALCIUM 7.9* 8.1* 8.5     Recent Labs     03/02/23  2300   AST 15   ALT 7   BILITOT <0.2   ALKPHOS 119     No results for input(s): INR in the last 72 hours.   Recent Labs     03/02/23  2300   CKTOTAL 68   TROPONINI <0.010       Urinalysis:   Lab Results   Component Value Date/Time    NITRU Negative 03/02/2023 11:00 PM    WBCUA  03/02/2023 11:00 PM BACTERIA Negative 03/02/2023 11:00 PM    RBCUA 3-5 03/02/2023 11:00 PM    BLOODU Negative 03/02/2023 11:00 PM    SPECGRAV 1.010 03/02/2023 11:00 PM    GLUCOSEU Negative 03/02/2023 11:00 PM       Radiology:   Most recent    EEG No valid procedures specified. MRI of Brain No results found for this or any previous visit. Results for orders placed during the hospital encounter of 03/02/23    MRI BRAIN WO CONTRAST    Narrative  EXAMINATION:  MRI OF THE BRAIN WITHOUT CONTRAST  3/3/2023 3:27 pm    TECHNIQUE:  Multiplanar multisequence MRI of the brain was performed without the  administration of intravenous contrast.    COMPARISON:  None. HISTORY:  ORDERING SYSTEM PROVIDED HISTORY: syncope  TECHNOLOGIST PROVIDED HISTORY:  Reason for exam:->syncope  What reading provider will be dictating this exam?->CRC    FINDINGS:  INTRACRANIAL STRUCTURES/VENTRICLES: There is no acute infarct. No mass effect  or midline shift. No evidence of an acute intracranial hemorrhage. The  ventricles and sulci are normal in size and configuration. The  sellar/suprasellar regions appear unremarkable. The normal signal voids  within the major intracranial vessels appear maintained. ORBITS: The visualized portion of the orbits demonstrate no acute abnormality. SINUSES: The visualized paranasal sinuses and mastoid air cells demonstrate  no acute abnormality. BONES/SOFT TISSUES: The bone marrow signal intensity appears normal. The soft  tissues demonstrate no acute abnormality. Impression  Unremarkable unenhanced MRI of the brain. MRA of the Head and Neck: No results found for this or any previous visit. No results found for this or any previous visit. No results found for this or any previous visit.                             CT of the Head: Results for orders placed during the hospital encounter of 03/02/23    CT HEAD WO CONTRAST    Narrative  EXAMINATION:  CT OF THE HEAD WITHOUT CONTRAST  3/3/2023 1:29 am    TECHNIQUE:  CT of the head was performed without the administration of intravenous  contrast. Automated exposure control, iterative reconstruction, and/or weight  based adjustment of the mA/kV was utilized to reduce the radiation dose to as  low as reasonably achievable. COMPARISON:  None. HISTORY:  ORDERING SYSTEM PROVIDED HISTORY: Trauma  TECHNOLOGIST PROVIDED HISTORY:  Has a \"code stroke\" or \"stroke alert\" been called? ->No  Reason for exam:->Trauma  Decision Support Exception - unselect if not a suspected or confirmed  emergency medical condition->Emergency Medical Condition (MA)  What reading provider will be dictating this exam?->CRC    FINDINGS:  BRAIN/VENTRICLES: There is no acute intracranial hemorrhage, mass effect or  midline shift. No abnormal extra-axial fluid collection. The gray-white  differentiation is maintained without evidence of an acute infarct. There is  no evidence of hydrocephalus. ORBITS: The visualized portion of the orbits demonstrate no acute abnormality. SINUSES: The visualized paranasal sinuses and mastoid air cells demonstrate  no acute abnormality. SOFT TISSUES/SKULL:  No acute abnormality of the visualized skull or soft  tissues. Impression  No acute intracranial abnormality. Specifically, there is no acute  intracranial hemorrhage  No results found for this or any previous visit. No results found for this or any previous visit. Carotid duplex: No results found for this or any previous visit. No results found for this or any previous visit. No results found for this or any previous visit. Echo No results found for this or any previous visit. Assessment/Plan:    3/3/23:  Patient is a pleasant 79-year-old female who presents with episode of syncope.   The patient denies nor is there any description of any tonic-clonic activity and patient is bradycardic in dehydrated upon admission with evidence of increasing problems with diarrhea due to inflammatory or irritable bowel in the last 2 weeks. Patient also presented with hypomagnesemia despite the fact that she takes magnesium 500 mg twice daily. Whether or not there is ongoing signs of malabsorption is unclear. On examination the patient has no focal or lateralizing signs she denies any headache and thus complicated migraine certainly unlikely. I do agree with checking an MRI of the brain without contrast to see if there is any evidence of any acute changes or diffusion-weighted imaging changes seen to suggest any posterior brainstem ischemia which could cause similar symptoms. I will defer and hold off on EEG at this time as I do not see any evidence of epileptic type activity. Patient will have orthostatic vitals performed she is also on IV fluids with magnesium drip as well. I did personally review the CT scan of the head I do not see any evidence of any acute bleed mass lesions no structural lesions no early CT scan changes are seen to suggest any acute ischemia. I do agree with continuing her on telemetry to follow bradycardia and see if there is any evidence of any cardiac arrhythmias present. Also check echocardiogram 2D echo in addition to MRI.    3/4/23:  Syncope in the setting of dehydration and TETE with recent diarrhea  TETE resolved  MRI of the brain negative for acute findings  Echo completed with ejection fraction of 60%  Nonfocal  Cardiology following for initial bradycardia. Also with possible tachyarrhythmia. Okay to DC from neurology standpoint  Neurology to sign off. Call with questions or concerns. Collaborating physicians: Dr Franca Duron  I independently performed an evaluation on this patient. I have reviewed the above documentation completed by the Nurse Practitioner. Please see my additional contributions to the HPI, physical exam, assessment/medical decision making.   Agree with above patient seen and examined by myself, agree syncope likely related to TETE plus hypotension plus hypovolemia. MRI fails to demonstrate any evidence of any acute infarction bradycardia as noted and followed by cardiology. Okay to discharge home from neurological standpoint.     Electronically signed by DELMI Mcallister CNP on 3/4/2023 at 12:58 PM

## 2023-03-05 ENCOUNTER — APPOINTMENT (OUTPATIENT)
Dept: ULTRASOUND IMAGING | Age: 58
End: 2023-03-05
Payer: MEDICARE

## 2023-03-05 VITALS
DIASTOLIC BLOOD PRESSURE: 57 MMHG | OXYGEN SATURATION: 97 % | HEART RATE: 73 BPM | BODY MASS INDEX: 51.56 KG/M2 | WEIGHT: 280.2 LBS | SYSTOLIC BLOOD PRESSURE: 145 MMHG | RESPIRATION RATE: 20 BRPM | HEIGHT: 62 IN | TEMPERATURE: 97.5 F

## 2023-03-05 LAB
ANION GAP SERPL CALCULATED.3IONS-SCNC: 9 MEQ/L (ref 9–15)
BASOPHILS ABSOLUTE: 0 K/UL (ref 0–0.2)
BASOPHILS RELATIVE PERCENT: 0.9 %
BUN BLDV-MCNC: 10 MG/DL (ref 6–20)
CALCIUM SERPL-MCNC: 8.8 MG/DL (ref 8.5–9.9)
CHLORIDE BLD-SCNC: 107 MEQ/L (ref 95–107)
CO2: 23 MEQ/L (ref 20–31)
CREAT SERPL-MCNC: 0.77 MG/DL (ref 0.5–0.9)
EOSINOPHILS ABSOLUTE: 0.3 K/UL (ref 0–0.7)
EOSINOPHILS RELATIVE PERCENT: 4.6 %
GFR SERPL CREATININE-BSD FRML MDRD: >60 ML/MIN/{1.73_M2}
GLUCOSE BLD-MCNC: 116 MG/DL (ref 70–99)
GLUCOSE BLD-MCNC: 85 MG/DL (ref 70–99)
GLUCOSE BLD-MCNC: 97 MG/DL (ref 70–99)
HCT VFR BLD CALC: 32 % (ref 37–47)
HEMOGLOBIN: 10.5 G/DL (ref 12–16)
LYMPHOCYTES ABSOLUTE: 1.8 K/UL (ref 1–4.8)
LYMPHOCYTES RELATIVE PERCENT: 32.8 %
MCH RBC QN AUTO: 29.4 PG (ref 27–31.3)
MCHC RBC AUTO-ENTMCNC: 33 % (ref 33–37)
MCV RBC AUTO: 89.1 FL (ref 79.4–94.8)
MONOCYTES ABSOLUTE: 0.9 K/UL (ref 0.2–0.8)
MONOCYTES RELATIVE PERCENT: 15.6 %
NEUTROPHILS ABSOLUTE: 2.6 K/UL (ref 1.4–6.5)
NEUTROPHILS RELATIVE PERCENT: 46.1 %
PDW BLD-RTO: 15.3 % (ref 11.5–14.5)
PERFORMED ON: ABNORMAL
PERFORMED ON: NORMAL
PLATELET # BLD: 233 K/UL (ref 130–400)
POTASSIUM REFLEX MAGNESIUM: 4.4 MEQ/L (ref 3.4–4.9)
RBC # BLD: 3.59 M/UL (ref 4.2–5.4)
SODIUM BLD-SCNC: 139 MEQ/L (ref 135–144)
WBC # BLD: 5.6 K/UL (ref 4.8–10.8)

## 2023-03-05 PROCEDURE — 94640 AIRWAY INHALATION TREATMENT: CPT

## 2023-03-05 PROCEDURE — 94761 N-INVAS EAR/PLS OXIMETRY MLT: CPT

## 2023-03-05 PROCEDURE — 2580000003 HC RX 258: Performed by: INTERNAL MEDICINE

## 2023-03-05 PROCEDURE — 6370000000 HC RX 637 (ALT 250 FOR IP): Performed by: INTERNAL MEDICINE

## 2023-03-05 PROCEDURE — 85025 COMPLETE CBC W/AUTO DIFF WBC: CPT

## 2023-03-05 PROCEDURE — 96361 HYDRATE IV INFUSION ADD-ON: CPT

## 2023-03-05 PROCEDURE — 93880 EXTRACRANIAL BILAT STUDY: CPT

## 2023-03-05 PROCEDURE — G0378 HOSPITAL OBSERVATION PER HR: HCPCS

## 2023-03-05 PROCEDURE — 94664 DEMO&/EVAL PT USE INHALER: CPT

## 2023-03-05 PROCEDURE — 96372 THER/PROPH/DIAG INJ SC/IM: CPT

## 2023-03-05 PROCEDURE — 36415 COLL VENOUS BLD VENIPUNCTURE: CPT

## 2023-03-05 PROCEDURE — 80048 BASIC METABOLIC PNL TOTAL CA: CPT

## 2023-03-05 PROCEDURE — 6360000002 HC RX W HCPCS: Performed by: INTERNAL MEDICINE

## 2023-03-05 PROCEDURE — 6360000002 HC RX W HCPCS

## 2023-03-05 RX ORDER — METOPROLOL SUCCINATE 25 MG/1
25 TABLET, EXTENDED RELEASE ORAL DAILY
Qty: 30 TABLET | Refills: 0 | Status: SHIPPED | OUTPATIENT
Start: 2023-03-05 | End: 2023-04-04

## 2023-03-05 RX ORDER — IPRATROPIUM BROMIDE AND ALBUTEROL SULFATE 2.5; .5 MG/3ML; MG/3ML
1 SOLUTION RESPIRATORY (INHALATION) EVERY 4 HOURS PRN
Status: DISCONTINUED | OUTPATIENT
Start: 2023-03-05 | End: 2023-03-05 | Stop reason: HOSPADM

## 2023-03-05 RX ADMIN — PREGABALIN 150 MG: 150 CAPSULE ORAL at 08:09

## 2023-03-05 RX ADMIN — DULOXETINE HYDROCHLORIDE 60 MG: 60 CAPSULE, DELAYED RELEASE ORAL at 08:09

## 2023-03-05 RX ADMIN — IPRATROPIUM BROMIDE AND ALBUTEROL SULFATE 1 AMPULE: .5; 2.5 SOLUTION RESPIRATORY (INHALATION) at 05:57

## 2023-03-05 RX ADMIN — ENOXAPARIN SODIUM 30 MG: 100 INJECTION SUBCUTANEOUS at 08:09

## 2023-03-05 RX ADMIN — BACLOFEN 10 MG: 10 TABLET ORAL at 08:10

## 2023-03-05 RX ADMIN — CEFTRIAXONE SODIUM 1000 MG: 1 INJECTION, POWDER, FOR SOLUTION INTRAMUSCULAR; INTRAVENOUS at 03:12

## 2023-03-05 NOTE — PROGRESS NOTES
[facility] EMERGENCY [facility] AT [place] Respiratory Therapy Evaluation   Current Order:  duoneb q4prn      Home Regimen: yes      Ordering Physician: Jenna  Re-evaluation Date:  3/8/23     Diagnosis: syncope and collapse      Patient Status: Stable / Unstable + Physician notified    The following MDI Criteria must be met in order to convert aerosol to MDI with spacer. If unable to meet, MDI will be converted to aerosol:  []  Patient able to demonstrate the ability to use MDI effectively  []  Patient alert and cooperative  []  Patient able to take deep breath with 5-10 second hold  []  Medication(s) available in this delivery method   []  Peak flow greater than or equal to 200 ml/min            Current Order Substituted To  (same drug, same frequency)   Aerosol to MDI [] Albuterol Sulfate 0.083% unit dose by aerosol Albuterol Sulfate MDI 2 puffs by inhalation with spacer    [] Levalbuterol 1.25 mg unit dose by aerosol Levalbuterol MDI 2 puffs by inhalation with spacer    [] Levalbuterol 0.63 mg unit dose by aerosol Levalbuterol MDI 2 puffs by inhalation with spacer    [] Ipratropium Bromide 0.02% unit dose by aerosol Ipratropium Bromide MDI 2 puffs by inhalation with spacer    [] Duoneb (Ipratropium + Albuterol) unit dose by aerosol Ipratropium MDI + Albuterol MDI 2 puffs by inhalation w/spacer   MDI to Aerosol [] Albuterol Sulfate MDI Albuterol Sulfate 0.083% unit dose by aerosol    [] Levalbuterol MDI 2 puffs by inhalation Levalbuterol 1.25 mg unit dose by aerosol    [] Ipratropium Bromide MDI by inhalation Ipratropium Bromide 0.02% unit dose by aerosol    [] Combivent (Ipratropium + Albuterol) MDI by inhalation Duoneb (Ipratropium + Albuterol) unit dose by aerosol       Treatment Assessment [Frequency/Schedule]:  Change frequency to: no change__________________________________________________per Protocol, P&T, MEC      Points 0 1 2 3 4   Pulmonary Status  Non-Smoker  []   Smoking history   < 20 pack years  []   Smoking history  ?  20 pack years  []   Pulmonary Disorder  (acute or chronic)  [x]   Severe or Chronic w/ Exacerbation  []     Surgical Status No [x]   Surgeries     General []   Surgery Lower []   Abdominal Thoracic or []   Upper Abdominal Thoracic with  PulmonaryDisorder  []     Chest X-ray Clear/Not  Ordered     [x]  Chronic Changes  Results Pending  []  Infiltrates, atelectasis, pleural effusion, or edema  []  Infiltrates in more than one lobe []  Infiltrate + Atelectasis, &/or pleural effusion  []    Respiratory Pattern Regular,  RR = 12-20 [x]  Increased,  RR = 21-25 []  MERCEDES, irregular,  or RR = 26-30 []  Decreased FEV1  or RR = 31-35 []  Severe SOB, use  of accessory muscles, or RR ? 35  []    Mental Status Alert, oriented,  Cooperative [x]  Confused but Follows commands []  Lethargic or unable to follow commands []  Obtunded  []  Comatose  []    Breath Sounds Clear to  auscultation  []  Decreased unilaterally or  in bases only [x]  Decreased  bilaterally  []  Crackles or intermittent wheezes []  Wheezes []    Cough Strong, Spontan., & nonproductive [x]  Strong,  spontaneous, &  productive []  Weak,  Nonproductive []  Weak, productive or  with wheezes []  No spontaneous  cough or may require suctioning []    Level of Activity Ambulatory []  Ambulatory w/ Assist  [x]  Non-ambulatory []  Paraplegic []  Quadriplegic []    Total    Score:__5_____     Triage Score:_5_______      Tri       Triage:     1. (>20) Freq: Q3    2. (16-20) Freq: Q4   3. (11-15) Freq: QID & Albuterol Q2 PRN    4. (6-10) Freq: TID & Albuterol Q2 PRN    5. (0-5) Freq Q4prn

## 2023-03-05 NOTE — CARE COORDINATION
MET WITH PATIENT AT BEDSIDE. DC PLAN REMAINS HOME. DENIES NEEDS. PT STATES SHE IS SCHEDULED FOR SURGERY WITH DR. Karyna Brar TOMORROW AM AS OUTPT. BELIEVES SHE WILL NEED TO RESCHEDULE.  ATTEMPT TO REACH DR. Karyna Brar FOR ASSISTANCE AND CONTACTED NURSING SUPERVISOR FOR PT.

## 2023-03-05 NOTE — PLAN OF CARE
Problem: Skin/Tissue Integrity  Goal: Absence of new skin breakdown  Outcome: Progressing     Problem: Safety - Adult  Goal: Free from fall injury  Outcome: Progressing     Problem: Chronic Conditions and Co-morbidities  Goal: Patient's chronic conditions and co-morbidity symptoms are monitored and maintained or improved  Outcome: Progressing  Flowsheets (Taken 3/5/2023 0800)  Care Plan - Patient's Chronic Conditions and Co-Morbidity Symptoms are Monitored and Maintained or Improved:   Collaborate with multidisciplinary team to address chronic and comorbid conditions and prevent exacerbation or deterioration   Monitor and assess patient's chronic conditions and comorbid symptoms for stability, deterioration, or improvement   Update acute care plan with appropriate goals if chronic or comorbid symptoms are exacerbated and prevent overall improvement and discharge     Problem: Nutrition Deficit:  Goal: Optimize nutritional status  Outcome: Progressing

## 2023-03-06 ENCOUNTER — TELEPHONE (OUTPATIENT)
Dept: FAMILY MEDICINE CLINIC | Age: 58
End: 2023-03-06

## 2023-03-06 PROCEDURE — 93880 EXTRACRANIAL BILAT STUDY: CPT | Performed by: INTERNAL MEDICINE

## 2023-03-06 NOTE — TELEPHONE ENCOUNTER
Care Transitions Initial Follow Up Call    Outreach made within 2 business days of discharge: Yes    Patient: Victor Manuel Anderson Patient : 1965   MRN: [id number]  Reason for Admission: There are no discharge diagnoses documented for the most recent discharge. Discharge Date: 3/5/23       Spoke with: Pt was called, left message to call back.      Discharge department/facility: [facility]      Scheduled appointment with PCP within 7-14 days    Follow Up  Future Appointments   Date Time Provider Abe Azar   2023 11:30 AM Wes Monnie Meckel, DO SHEF C[facility]IO [facility] EMERGENCY [facility] AT [place]   2023 10:45 AM Jennifer Caceres MD [address] [place], [place]

## 2023-03-08 RX ORDER — METOPROLOL SUCCINATE 50 MG/1
TABLET, EXTENDED RELEASE ORAL
Qty: 180 TABLET | Refills: 0 | Status: SHIPPED | OUTPATIENT
Start: 2023-03-08

## 2023-03-22 DIAGNOSIS — M54.9 CHRONIC BACK PAIN GREATER THAN 3 MONTHS DURATION: ICD-10-CM

## 2023-03-22 DIAGNOSIS — G89.29 CHRONIC BACK PAIN GREATER THAN 3 MONTHS DURATION: ICD-10-CM

## 2023-03-22 DIAGNOSIS — J45.20 MILD INTERMITTENT ASTHMA WITHOUT COMPLICATION: ICD-10-CM

## 2023-03-22 DIAGNOSIS — K50.919 CROHN'S DISEASE WITH COMPLICATION, UNSPECIFIED GASTROINTESTINAL TRACT LOCATION (HCC): ICD-10-CM

## 2023-03-22 DIAGNOSIS — M47.9 SPONDYLOSIS: ICD-10-CM

## 2023-03-22 DIAGNOSIS — I10 BENIGN ESSENTIAL HTN: ICD-10-CM

## 2023-03-22 DIAGNOSIS — E11.42 TYPE 2 DIABETES MELLITUS WITH DIABETIC POLYNEUROPATHY, WITHOUT LONG-TERM CURRENT USE OF INSULIN (HCC): ICD-10-CM

## 2023-03-22 DIAGNOSIS — M79.7 FIBROMYALGIA: ICD-10-CM

## 2023-03-22 RX ORDER — PIOGLITAZONEHYDROCHLORIDE 15 MG/1
15 TABLET ORAL DAILY
Qty: 90 TABLET | Refills: 3 | Status: SHIPPED | OUTPATIENT
Start: 2023-03-22

## 2023-03-22 RX ORDER — BACLOFEN 20 MG/1
20 TABLET ORAL 2 TIMES DAILY
Qty: 180 TABLET | Refills: 3 | Status: SHIPPED | OUTPATIENT
Start: 2023-03-22

## 2023-03-22 RX ORDER — PREGABALIN 150 MG/1
CAPSULE ORAL
Qty: 90 CAPSULE | Refills: 0 | Status: SHIPPED | OUTPATIENT
Start: 2023-03-22 | End: 2023-04-22

## 2023-03-22 RX ORDER — POTASSIUM CHLORIDE 20 MEQ/1
TABLET, EXTENDED RELEASE ORAL
Qty: 180 TABLET | Refills: 3 | Status: SHIPPED | OUTPATIENT
Start: 2023-03-22

## 2023-03-22 RX ORDER — MONTELUKAST SODIUM 10 MG/1
TABLET ORAL
Qty: 90 TABLET | Refills: 3 | Status: SHIPPED | OUTPATIENT
Start: 2023-03-22

## 2023-03-22 RX ORDER — PRAVASTATIN SODIUM 40 MG
40 TABLET ORAL NIGHTLY
Qty: 90 TABLET | Refills: 3 | Status: SHIPPED | OUTPATIENT
Start: 2023-03-22

## 2023-03-22 RX ORDER — ALLOPURINOL 100 MG/1
100 TABLET ORAL DAILY
Qty: 90 TABLET | Refills: 3 | Status: SHIPPED | OUTPATIENT
Start: 2023-03-22

## 2023-03-22 RX ORDER — FUROSEMIDE 40 MG/1
40 TABLET ORAL 2 TIMES DAILY
Qty: 180 TABLET | Refills: 3 | Status: SHIPPED | OUTPATIENT
Start: 2023-03-22

## 2023-03-22 RX ORDER — OMEPRAZOLE 40 MG/1
40 CAPSULE, DELAYED RELEASE ORAL 2 TIMES DAILY
Qty: 180 CAPSULE | Refills: 3 | Status: SHIPPED | OUTPATIENT
Start: 2023-03-22

## 2023-03-28 ENCOUNTER — OFFICE VISIT (OUTPATIENT)
Dept: CARDIOLOGY CLINIC | Age: 58
End: 2023-03-28
Payer: MEDICARE

## 2023-03-28 VITALS
WEIGHT: 286 LBS | HEART RATE: 86 BPM | SYSTOLIC BLOOD PRESSURE: 118 MMHG | DIASTOLIC BLOOD PRESSURE: 60 MMHG | BODY MASS INDEX: 52.31 KG/M2

## 2023-03-28 DIAGNOSIS — R60.0 BILATERAL EDEMA OF LOWER EXTREMITY: ICD-10-CM

## 2023-03-28 DIAGNOSIS — R94.31 ABNORMAL ELECTROCARDIOGRAM (ECG) (EKG): ICD-10-CM

## 2023-03-28 DIAGNOSIS — E66.01 MORBID OBESITY (HCC): ICD-10-CM

## 2023-03-28 DIAGNOSIS — I10 BENIGN ESSENTIAL HYPERTENSION: Primary | ICD-10-CM

## 2023-03-28 DIAGNOSIS — R55 SYNCOPE, UNSPECIFIED SYNCOPE TYPE: ICD-10-CM

## 2023-03-28 DIAGNOSIS — E78.2 MIXED HYPERLIPIDEMIA: ICD-10-CM

## 2023-03-28 PROCEDURE — 3074F SYST BP LT 130 MM HG: CPT | Performed by: INTERNAL MEDICINE

## 2023-03-28 PROCEDURE — 1036F TOBACCO NON-USER: CPT | Performed by: INTERNAL MEDICINE

## 2023-03-28 PROCEDURE — G8427 DOCREV CUR MEDS BY ELIG CLIN: HCPCS | Performed by: INTERNAL MEDICINE

## 2023-03-28 PROCEDURE — 3017F COLORECTAL CA SCREEN DOC REV: CPT | Performed by: INTERNAL MEDICINE

## 2023-03-28 PROCEDURE — 99214 OFFICE O/P EST MOD 30 MIN: CPT | Performed by: INTERNAL MEDICINE

## 2023-03-28 PROCEDURE — 3078F DIAST BP <80 MM HG: CPT | Performed by: INTERNAL MEDICINE

## 2023-03-28 PROCEDURE — G8482 FLU IMMUNIZE ORDER/ADMIN: HCPCS | Performed by: INTERNAL MEDICINE

## 2023-03-28 PROCEDURE — G8417 CALC BMI ABV UP PARAM F/U: HCPCS | Performed by: INTERNAL MEDICINE

## 2023-03-28 RX ORDER — METOPROLOL SUCCINATE 25 MG/1
25 TABLET, EXTENDED RELEASE ORAL DAILY
Qty: 90 TABLET | Refills: 3 | Status: SHIPPED | OUTPATIENT
Start: 2023-03-28 | End: 2023-04-27

## 2023-03-28 NOTE — PROGRESS NOTES
Intermittent tremor    Anemia    Iron deficiency anemia    Tremor    Meralgia paresthetica of left side    Intractable migraine without aura and without status migrainosus    Morbid obesity (HCC)    Edema of lower extremity    Gastroenteritis    Injury of left foot    Leukocytosis    Nausea and vomiting    Shoulder pain    Pain of right hip joint    Acute bacterial sinusitis    Bronchitis    Right upper quadrant abdominal pain    Diarrhea    Fever    Ventral hernia    Syncope and collapse    Migraine aura occurring with and without headache    Confusion       Past Surgical History:   Procedure Laterality Date    BREAST CYST EXCISION Right 1994    exc mass benign    CHOLECYSTECTOMY  2009    COLONOSCOPY  2015    negative    ENDOSCOPY, COLON, DIAGNOSTIC      HERNIA REPAIR  2015    ventral / mesh    LEG TENDON SURGERY Right 2007    leg.  ankle and foot    OVARIAN CYST REMOVAL Left 1994    with mass and both fallopian tube    OVARY REMOVAL  12/2015    [facility] / mass left ovary & bilateral  tubes    PARTIAL HYSTERECTOMY (CERVIX NOT REMOVED)      [place] ARTHROSCOPY KNEE DIAGNOSTIC W/WO SYNOVIAL BX SPX Right 08/03/2017    RIGHT KNEE ARTHROSCOPIC PARTIAL MEDIAL MENISCECTOMY KNEE performed by Daily Saleh MD at [address] IND N/A 11/07/2017    COLONOSCOPY performed by MANOJ Card on bx    NC COLONOSCOPY W/BIOPSY SINGLE/MULTIPLE N/A 03/15/2018    COLONOSCOPY performed by Naya Antunez MD at [address] [place] NEUROPLASTY &/TRANSPOS MEDIAN NRV CARPAL TUNNE Right 11/30/2017    RIGHT WRIST  CARPAL TUNNEL RELEASE performed by Daily Saleh MD at [address] [place] NEUROPLASTY &/TRANSPOS MEDIAN NRV CARPAL Nina Rachel Left 05/10/2018    LEFT WRIST CARPAL TUNNEL RELEASE performed by Daily Saleh MD at [facility]      lower back-negative, right upper arm    TONSILLECTOMY AND ADENOIDECTOMY  1967       Social History     Socioeconomic History    Marital status: Single   Tobacco Use

## 2023-03-31 RX ORDER — MOMETASONE FUROATE 1 MG/G
CREAM TOPICAL
Qty: 45 G | Refills: 0 | Status: SHIPPED | OUTPATIENT
Start: 2023-03-31

## 2023-04-14 ENCOUNTER — HOSPITAL ENCOUNTER (OUTPATIENT)
Dept: NUCLEAR MEDICINE | Age: 58
Discharge: HOME | End: 2023-04-16
Payer: MEDICARE

## 2023-04-14 PROCEDURE — A9502 TC99M TETROFOSMIN: HCPCS | Performed by: INTERNAL MEDICINE

## 2023-04-14 PROCEDURE — 3430000000 HC RX DIAGNOSTIC RADIOPHARMACEUTICAL: Performed by: INTERNAL MEDICINE

## 2023-04-14 RX ADMIN — TETROFOSMIN 33 MILLICURIE: 1.38 INJECTION, POWDER, LYOPHILIZED, FOR SOLUTION INTRAVENOUS at 08:07

## 2023-04-17 DIAGNOSIS — E11.42 TYPE 2 DIABETES MELLITUS WITH DIABETIC POLYNEUROPATHY, WITHOUT LONG-TERM CURRENT USE OF INSULIN (HCC): ICD-10-CM

## 2023-04-28 ENCOUNTER — HOSPITAL ENCOUNTER (OUTPATIENT)
Dept: NON INVASIVE DIAGNOSTICS | Age: 58
Discharge: HOME | End: 2023-04-28
Payer: MEDICARE

## 2023-04-28 DIAGNOSIS — R55 SYNCOPE, UNSPECIFIED SYNCOPE TYPE: ICD-10-CM

## 2023-04-28 PROCEDURE — 93270 REMOTE 30 DAY ECG REV/REPORT: CPT

## 2023-05-03 DIAGNOSIS — M47.9 SPONDYLOSIS: ICD-10-CM

## 2023-05-03 DIAGNOSIS — M54.9 CHRONIC BACK PAIN GREATER THAN 3 MONTHS DURATION: ICD-10-CM

## 2023-05-03 DIAGNOSIS — M79.7 FIBROMYALGIA: ICD-10-CM

## 2023-05-03 DIAGNOSIS — G89.29 CHRONIC BACK PAIN GREATER THAN 3 MONTHS DURATION: ICD-10-CM

## 2023-05-03 RX ORDER — MELOXICAM 7.5 MG/1
TABLET ORAL
Qty: 60 TABLET | Refills: 5 | Status: SHIPPED | OUTPATIENT
Start: 2023-05-03

## 2023-05-16 ENCOUNTER — OFFICE VISIT (OUTPATIENT)
Dept: CARDIOLOGY CLINIC | Age: 58
End: 2023-05-16
Payer: MEDICARE

## 2023-05-16 VITALS — DIASTOLIC BLOOD PRESSURE: 70 MMHG | OXYGEN SATURATION: 95 % | HEART RATE: 71 BPM | SYSTOLIC BLOOD PRESSURE: 120 MMHG

## 2023-05-16 DIAGNOSIS — E83.42 HYPOMAGNESEMIA: ICD-10-CM

## 2023-05-16 DIAGNOSIS — E66.9 OBESITY WITH BODY MASS INDEX 30 OR GREATER: Primary | ICD-10-CM

## 2023-05-16 DIAGNOSIS — E78.2 MIXED HYPERLIPIDEMIA: ICD-10-CM

## 2023-05-16 DIAGNOSIS — Z87.898 HISTORY OF SYNCOPE: ICD-10-CM

## 2023-05-16 LAB — MAGNESIUM SERPL-MCNC: 1.9 MG/DL (ref 1.7–2.4)

## 2023-05-16 PROCEDURE — 3078F DIAST BP <80 MM HG: CPT | Performed by: INTERNAL MEDICINE

## 2023-05-16 PROCEDURE — 3074F SYST BP LT 130 MM HG: CPT | Performed by: INTERNAL MEDICINE

## 2023-05-16 PROCEDURE — 99214 OFFICE O/P EST MOD 30 MIN: CPT | Performed by: INTERNAL MEDICINE

## 2023-05-16 NOTE — PROGRESS NOTES
Chief Complaint   Patient presents with    Hypertension    Hyperlipidemia       5-24-18:Patient presents for initial medical evaluation. Patient is followed on a regular basis by Dr. Latesha Campoverde MD. Having b/l LE edema and was seen by dr. Angus Montalvo and states her veins were ok. No hx of cardiac disease. No hx of DVT/PE. Worse over the past few months. Does have SOB on exertion. Has been gaining weight rapidly due to LE edema. Not on any CCB or steroids. Pt denies chest pain, dyspnea,  change in exercise capacity, fatigue,  nausea, vomiting, diarrhea, constipation, motor weakness, insomnia, weight loss, syncope, dizziness, lightheadedness, palpitations, PND, orthopnea, or claudication. BP and hr are good. No LE discoloration or ulcers. No LE edema. Lipid profile is normal. No recent hospitalization. No change in meds. Hx of DM, HTN, HLP. No smoking. 6-28-18: LE edema improved with lasix 40mg BID. Has lost 23 pounds. S/p EHCO with EF of 65%, no valve abn, RVSP of 05AWPQ, normal diastolic function. Pt denies chest pain, dyspnea, dyspnea on exertion, change in exercise capacity, fatigue,  nausea, vomiting, diarrhea, constipation, motor weakness, insomnia, weight loss, syncope, dizziness, lightheadedness,  PND, orthopnea, or claudication. No nitro use. BP and hr are good. CAD is stable. No LE discoloration or ulcers. +  LE edema. No CHF type symptoms. Lipid profile is normal. No recent hospitalization. No change in meds. Does have palpitations at times. BMP with CR of 1.09, GFR of 52.       12-27-18: + dyspnea, dyspnea on exertion, change in exercise capacity, fatigue. Pt denies chest pain,  nausea, vomiting, diarrhea, constipation, motor weakness, insomnia, weight loss, syncope, dizziness, lightheadedness, palpitations, PND, orthopnea, or claudication. No nitro use. BP and hr are good. CAD is stable. No LE discoloration or ulcers. No CHF type symptoms. Lipid profile is normal. No recent hospitalization.  No

## 2023-05-23 RX ORDER — MOMETASONE FUROATE 1 MG/G
CREAM TOPICAL
Qty: 45 G | Refills: 0 | Status: SHIPPED | OUTPATIENT
Start: 2023-05-23

## 2023-05-30 DIAGNOSIS — M47.9 SPONDYLOSIS: ICD-10-CM

## 2023-05-30 DIAGNOSIS — M54.9 CHRONIC BACK PAIN GREATER THAN 3 MONTHS DURATION: ICD-10-CM

## 2023-05-30 DIAGNOSIS — G89.29 CHRONIC BACK PAIN GREATER THAN 3 MONTHS DURATION: ICD-10-CM

## 2023-05-30 DIAGNOSIS — M79.7 FIBROMYALGIA: ICD-10-CM

## 2023-05-30 RX ORDER — PRAVASTATIN SODIUM 40 MG
TABLET ORAL
Qty: 90 TABLET | Refills: 0 | Status: SHIPPED | OUTPATIENT
Start: 2023-05-30

## 2023-05-30 RX ORDER — PREGABALIN 150 MG/1
CAPSULE ORAL
Qty: 90 CAPSULE | Refills: 0 | Status: SHIPPED | OUTPATIENT
Start: 2023-05-30 | End: 2023-06-30

## 2023-06-01 DIAGNOSIS — K50.919 CROHN'S DISEASE WITH COMPLICATION, UNSPECIFIED GASTROINTESTINAL TRACT LOCATION (HCC): ICD-10-CM

## 2023-06-01 RX ORDER — DICYCLOMINE HYDROCHLORIDE 10 MG/1
CAPSULE ORAL
Qty: 120 CAPSULE | Refills: 0 | Status: SHIPPED | OUTPATIENT
Start: 2023-06-01

## 2023-06-19 DIAGNOSIS — G89.29 CHRONIC BACK PAIN GREATER THAN 3 MONTHS DURATION: ICD-10-CM

## 2023-06-19 DIAGNOSIS — M47.9 SPONDYLOSIS: ICD-10-CM

## 2023-06-19 DIAGNOSIS — M54.9 CHRONIC BACK PAIN GREATER THAN 3 MONTHS DURATION: ICD-10-CM

## 2023-06-19 DIAGNOSIS — M79.7 FIBROMYALGIA: ICD-10-CM

## 2023-06-19 RX ORDER — MELOXICAM 7.5 MG/1
7.5 TABLET ORAL 2 TIMES DAILY
Qty: 180 TABLET | Refills: 1 | Status: SHIPPED | OUTPATIENT
Start: 2023-06-19

## 2023-07-11 DIAGNOSIS — K50.919 CROHN'S DISEASE WITH COMPLICATION, UNSPECIFIED GASTROINTESTINAL TRACT LOCATION (HCC): ICD-10-CM

## 2023-07-12 RX ORDER — MOMETASONE FUROATE 1 MG/G
CREAM TOPICAL
Qty: 45 G | Refills: 0 | Status: SHIPPED | OUTPATIENT
Start: 2023-07-12

## 2023-07-12 RX ORDER — DICYCLOMINE HYDROCHLORIDE 10 MG/1
CAPSULE ORAL
Qty: 120 CAPSULE | Refills: 0 | Status: SHIPPED | OUTPATIENT
Start: 2023-07-12

## 2023-08-02 DIAGNOSIS — M79.7 FIBROMYALGIA: ICD-10-CM

## 2023-08-02 DIAGNOSIS — G89.29 CHRONIC BACK PAIN GREATER THAN 3 MONTHS DURATION: ICD-10-CM

## 2023-08-02 DIAGNOSIS — M54.9 CHRONIC BACK PAIN GREATER THAN 3 MONTHS DURATION: ICD-10-CM

## 2023-08-02 DIAGNOSIS — M47.9 SPONDYLOSIS: ICD-10-CM

## 2023-08-02 NOTE — TELEPHONE ENCOUNTER
MEDICATION REFILL REQUEST     Rx Requested    Requested Prescriptions     Pending Prescriptions Disp Refills    pregabalin (LYRICA) 150 MG capsule [Pharmacy Med Name: Pregabalin Oral Capsule 150 MG] 90 capsule 0     Sig: TAKE ONE CAPSULE BY MOUTH THREE TIMES A DAY         Patient's Last Office Visit   2/14/2023      Patient's Next Office Visit   Future Appointments   Date Time Provider [address]   8/15/2023 10:00 AM Melvina Jeffrey MD [facility]   5/9/2024  1:00 PM Matthew Potts DO [address] [place] [address]         Other comments

## 2023-08-03 RX ORDER — PREGABALIN 150 MG/1
CAPSULE ORAL
Qty: 90 CAPSULE | Refills: 0 | Status: SHIPPED | OUTPATIENT
Start: 2023-08-03 | End: 2023-09-02

## 2023-08-15 ENCOUNTER — OFFICE VISIT (OUTPATIENT)
Dept: FAMILY MEDICINE CLINIC | Age: 58
End: 2023-08-15
Payer: MEDICARE

## 2023-08-15 VITALS
BODY MASS INDEX: 51.71 KG/M2 | WEIGHT: 281 LBS | OXYGEN SATURATION: 93 % | SYSTOLIC BLOOD PRESSURE: 126 MMHG | RESPIRATION RATE: 18 BRPM | DIASTOLIC BLOOD PRESSURE: 64 MMHG | HEART RATE: 75 BPM | HEIGHT: 62 IN

## 2023-08-15 DIAGNOSIS — E11.42 POLYNEUROPATHY DUE TO TYPE 2 DIABETES MELLITUS (HCC): ICD-10-CM

## 2023-08-15 DIAGNOSIS — E11.9 TYPE 2 DIABETES MELLITUS WITHOUT COMPLICATION, WITHOUT LONG-TERM CURRENT USE OF INSULIN (HCC): Primary | ICD-10-CM

## 2023-08-15 DIAGNOSIS — F33.2 SEVERE EPISODE OF RECURRENT MAJOR DEPRESSIVE DISORDER, WITHOUT PSYCHOTIC FEATURES (HCC): ICD-10-CM

## 2023-08-15 DIAGNOSIS — K50.919 CROHN'S DISEASE WITH COMPLICATION, UNSPECIFIED GASTROINTESTINAL TRACT LOCATION (HCC): ICD-10-CM

## 2023-08-15 LAB — HBA1C MFR BLD: 6.4 %

## 2023-08-15 PROCEDURE — 83037 HB GLYCOSYLATED A1C HOME DEV: CPT | Performed by: FAMILY MEDICINE

## 2023-08-15 PROCEDURE — 3078F DIAST BP <80 MM HG: CPT | Performed by: FAMILY MEDICINE

## 2023-08-15 PROCEDURE — 3074F SYST BP LT 130 MM HG: CPT | Performed by: FAMILY MEDICINE

## 2023-08-15 PROCEDURE — 99214 OFFICE O/P EST MOD 30 MIN: CPT | Performed by: FAMILY MEDICINE

## 2023-08-15 PROCEDURE — 3044F HG A1C LEVEL LT 7.0%: CPT | Performed by: FAMILY MEDICINE

## 2023-08-15 ASSESSMENT — ENCOUNTER SYMPTOMS
EYES NEGATIVE: 1
CHEST TIGHTNESS: 0
RHINORRHEA: 0
GASTROINTESTINAL NEGATIVE: 1
COUGH: 0
RESPIRATORY NEGATIVE: 1

## 2023-08-15 NOTE — PROGRESS NOTES
Patient is seen in follow up for   Chief Complaint   Patient presents with    Diabetes     Last a1c was 6.4     Diabetes  She presents for her follow-up diabetic visit. She has type 2 diabetes mellitus. The initial diagnosis of diabetes was made 13 years ago. Her disease course has been stable. There are no hypoglycemic associated symptoms. Pertinent negatives for hypoglycemia include no dizziness. There are no diabetic associated symptoms. Pertinent negatives for diabetes include no fatigue. There are no hypoglycemic complications. Symptoms are stable. There are no diabetic complications. Risk factors for coronary artery disease include diabetes mellitus. Current diabetic treatment includes oral agent (dual therapy). She is compliant with treatment most of the time.      Past Medical History:   Diagnosis Date    Anemia     Asthma     Benign essential HTN     meds > 5 yrs / denies TIA or stroke    Borderline personality disorder ([address])     2016 ?suggested by me-meets many criteria    Carpal tunnel syndrome of right wrist     Crohn's disease ([address])     Depression     Fibromyalgia 02/13/2018    Fibromyalgia     GERD (gastroesophageal reflux disease)     Gout     Headache     migraines    History of syncope 5/16/2023    Irritable bowel syndrome     Mixed hyperlipidemia 05/10/2017    Neuropathy     Obesity (BMI 35.0-39.9 without comorbidity) 03/30/2017    PONV (postoperative nausea and vomiting)     nausea    PTSD (post-traumatic stress disorder)     Tremor of unknown origin     Type 2 diabetes mellitus ([address])     meds > 5yrs     Ulcerative colitis ([address]) 11/2017    Vaginitis      Patient Active Problem List    Diagnosis Date Noted    Migraine aura occurring with and without headache 03/04/2023    Confusion 03/04/2023    Syncope and collapse 03/03/2023    History of syncope 05/16/2023    Ventral hernia 02/28/2023    Right upper quadrant abdominal pain 02/09/2022    Diarrhea 02/09/2022    Fever 02/09/2022    Acute bacterial

## 2023-08-18 DIAGNOSIS — M54.9 CHRONIC BACK PAIN GREATER THAN 3 MONTHS DURATION: ICD-10-CM

## 2023-08-18 DIAGNOSIS — M47.9 SPONDYLOSIS: ICD-10-CM

## 2023-08-18 DIAGNOSIS — M79.7 FIBROMYALGIA: ICD-10-CM

## 2023-08-18 DIAGNOSIS — G89.29 CHRONIC BACK PAIN GREATER THAN 3 MONTHS DURATION: ICD-10-CM

## 2023-08-21 RX ORDER — MELOXICAM 7.5 MG/1
TABLET ORAL
Qty: 180 TABLET | Refills: 1 | Status: SHIPPED | OUTPATIENT
Start: 2023-08-21

## 2023-09-01 DIAGNOSIS — M47.9 SPONDYLOSIS: ICD-10-CM

## 2023-09-01 DIAGNOSIS — G89.29 CHRONIC BACK PAIN GREATER THAN 3 MONTHS DURATION: ICD-10-CM

## 2023-09-01 DIAGNOSIS — M54.9 CHRONIC BACK PAIN GREATER THAN 3 MONTHS DURATION: ICD-10-CM

## 2023-09-01 DIAGNOSIS — M79.7 FIBROMYALGIA: ICD-10-CM

## 2023-09-01 NOTE — TELEPHONE ENCOUNTER
Patient is requesting medication refill.  Please approve or deny this request.    Rx requested:  Requested Prescriptions     Pending Prescriptions Disp Refills    mometasone (ELOCON) 0.1 % cream [Pharmacy Med Name: Mometasone Furoate External Cream 0.1 %] 45 g 0     Sig: apply topically once daily         Last Office Visit:   Visit date not found      Next Visit Date:  Future Appointments   Date Time Provider [address]   2/12/2024  2:15 PM Brandi Olivares MD [facility] [facility]   5/9/2024  1:00 PM Matthew Woody DO [address] [facility]

## 2023-09-01 NOTE — TELEPHONE ENCOUNTER
Patient is requesting medication refill.  Please approve or deny this request.    Rx requested:  Requested Prescriptions     Pending Prescriptions Disp Refills    pregabalin (LYRICA) 150 MG capsule [Pharmacy Med Name: Pregabalin Oral Capsule 150 MG] 90 capsule 0     Sig: TAKE ONE CAPSULE BY MOUTH THREE TIMES A DAY         Last Office Visit:   8/15/2023      Next Visit Date:  Future Appointments   Date Time Provider [address] [place] [address]   2/12/2024  2:15 PM Sadiq Vigil MD [address] [facility]   5/9/2024  1:00 PM Matthew Millan DO [address] [facility]

## 2023-09-03 DIAGNOSIS — E11.42 TYPE 2 DIABETES MELLITUS WITH DIABETIC POLYNEUROPATHY, WITHOUT LONG-TERM CURRENT USE OF INSULIN (HCC): ICD-10-CM

## 2023-09-05 RX ORDER — PREGABALIN 150 MG/1
CAPSULE ORAL
Qty: 90 CAPSULE | Refills: 2 | Status: SHIPPED | OUTPATIENT
Start: 2023-09-05 | End: 2023-11-05

## 2023-09-05 RX ORDER — MOMETASONE FUROATE 1 MG/G
CREAM TOPICAL
Qty: 45 G | Refills: 0 | Status: SHIPPED | OUTPATIENT
Start: 2023-09-05

## 2023-09-05 RX ORDER — PRAVASTATIN SODIUM 40 MG
TABLET ORAL
Qty: 90 TABLET | Refills: 0 | Status: SHIPPED | OUTPATIENT
Start: 2023-09-05

## 2023-10-05 ENCOUNTER — APPOINTMENT (OUTPATIENT)
Dept: CT IMAGING | Age: 58
End: 2023-10-05
Payer: MEDICARE

## 2023-10-05 ENCOUNTER — HOSPITAL ENCOUNTER (EMERGENCY)
Age: 58
Discharge: HOME | End: 2023-10-05
Attending: GENERAL PRACTICE
Payer: MEDICARE

## 2023-10-05 VITALS
BODY MASS INDEX: 52.44 KG/M2 | HEART RATE: 75 BPM | DIASTOLIC BLOOD PRESSURE: 69 MMHG | WEIGHT: 285 LBS | HEIGHT: 62 IN | SYSTOLIC BLOOD PRESSURE: 147 MMHG | TEMPERATURE: 98 F | OXYGEN SATURATION: 96 % | RESPIRATION RATE: 20 BRPM
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
## 2023-10-05 DIAGNOSIS — S09.90XA INJURY OF HEAD, INITIAL ENCOUNTER: Primary | ICD-10-CM

## 2023-10-05 PROCEDURE — 99284 EMERGENCY DEPT VISIT MOD MDM: CPT

## 2023-10-05 PROCEDURE — 70450 CT HEAD/BRAIN W/O DYE: CPT

## 2023-10-05 ASSESSMENT — PAIN DESCRIPTION - PAIN TYPE: TYPE: ACUTE PAIN

## 2023-10-05 ASSESSMENT — PAIN SCALES - GENERAL: PAINLEVEL_OUTOF10: 4

## 2023-10-05 ASSESSMENT — PAIN DESCRIPTION - LOCATION: LOCATION: KNEE

## 2023-10-05 ASSESSMENT — LIFESTYLE VARIABLES
HOW OFTEN DO YOU HAVE A DRINK CONTAINING ALCOHOL: NEVER
HOW MANY STANDARD DRINKS CONTAINING ALCOHOL DO YOU HAVE ON A TYPICAL DAY: PATIENT DOES NOT DRINK

## 2023-10-05 ASSESSMENT — PAIN - FUNCTIONAL ASSESSMENT
PAIN_FUNCTIONAL_ASSESSMENT: 0-10
PAIN_FUNCTIONAL_ASSESSMENT: NONE - DENIES PAIN

## 2023-10-05 ASSESSMENT — PAIN DESCRIPTION - ONSET: ONSET: ON-GOING

## 2023-10-05 ASSESSMENT — PAIN DESCRIPTION - ORIENTATION: ORIENTATION: RIGHT

## 2023-10-05 ASSESSMENT — PAIN DESCRIPTION - FREQUENCY: FREQUENCY: CONTINUOUS

## 2023-10-05 NOTE — ED TRIAGE NOTES
Patient present to the ED after a fall coming off of the elevator. Patient report tripping over her book bag strap. Patient report falling to her right knee and hitting her nose . Patient denies LOC.   Patient is A/O x 4 during triage

## 2023-10-06 NOTE — ED PROVIDER NOTES
[facility] ED  Emergency Department Encounter  Emergency Medicine Attending     Pt Farzaneh Silverman  MRN: [id number]  [address] [facility] [place] 1965  Date of evaluation: 10/5/23  PCP:  Lynnea Barthel, MD    [address]       Chief Complaint   Patient presents with    Fall       HISTORY OF PRESENT ILLNESS  (Location/Symptom, Timing/Onset, Context/Setting, Quality, Duration, Modifying Factors, Severity.)      Virginia Whitten is a 60 y.o. female who presents following a mechanical fall. Patient states she has been tired, staying with a family member here at the hospital.  Stepped off the elevators, tripped on something fell forward and struck her nose on the ground. She denies any loss of consciousness. No lightheadedness or chest pain or shortness of breath prior to falling. She is convinced her fall was mechanical in nature. PAST MEDICAL / SURGICAL / SOCIAL / FAMILY HISTORY      has a past medical history of Anemia, Asthma, Benign essential HTN, Borderline personality disorder ([address]), Carpal tunnel syndrome of right wrist, Crohn's disease ([address]), Depression, Fibromyalgia, Fibromyalgia, GERD (gastroesophageal reflux disease), Gout, Headache, History of syncope, Irritable bowel syndrome, Mixed hyperlipidemia, Neuropathy, Obesity (BMI 35.0-39.9 without comorbidity), PONV (postoperative nausea and vomiting), PTSD (post-traumatic stress disorder), Tremor of unknown origin, Type 2 diabetes mellitus ([address]), Ulcerative colitis ([address]), and Vaginitis. Denies further past medical hx     has a past surgical history that includes Cholecystectomy (2009); Leg Tendon Surgery (Right, 2007); Tonsillectomy and adenoidectomy (1967); ovarian cyst removal (Left, 1994); Colonoscopy (2015); Ovary removal (12/2015); pr arthroscopy knee diagnostic w/wo synovial bx spx (Right, 08/03/2017); pr colon ca scrn not hi rsk ind (N/A, 11/07/2017);  Endoscopy, colon, diagnostic; pr neuroplasty &/transpos median nrv carpal tunne (Right, without intracranial abnormalities. Discharged home in good health with return precautions               PROCEDURES:  None    CONSULTS:  None    CRITICAL CARE:  None    FINAL IMPRESSION      1. Injury of head, initial encounter    2.  Fall, initial encounter              DISPOSITION / PLAN     DISPOSITION Decision To Discharge 10/05/2023 08:56:19 PM      PATIENT REFERRED TO:  Yoly Jordan MD  [place] [address]  [phone]    In 1 week        DISCHARGE MEDICATIONS:  New Prescriptions    No medications on file       Nicola Royal DO  Emergency Medicine Physician    (Please note that portions of thisnote were completed with a voice recognition program.  Efforts were made to edit the dictations but occasionally words are mis-transcribed.)        Nicola Royal DO  10/05/23 0707

## 2023-10-09 ENCOUNTER — OFFICE VISIT (OUTPATIENT)
Dept: FAMILY MEDICINE CLINIC | Age: 58
End: 2023-10-09
Payer: MEDICARE

## 2023-10-09 ENCOUNTER — NURSE TRIAGE (OUTPATIENT)
Dept: OTHER | Facility: CLINIC | Age: 58
End: 2023-10-09

## 2023-10-09 VITALS
HEART RATE: 71 BPM | OXYGEN SATURATION: 94 % | TEMPERATURE: 97.7 F | WEIGHT: 285 LBS | BODY MASS INDEX: 52.44 KG/M2 | DIASTOLIC BLOOD PRESSURE: 72 MMHG | SYSTOLIC BLOOD PRESSURE: 114 MMHG | HEIGHT: 62 IN

## 2023-10-09 DIAGNOSIS — M79.661 PAIN AND SWELLING OF RIGHT LOWER LEG: ICD-10-CM

## 2023-10-09 DIAGNOSIS — M25.561 ACUTE PAIN OF RIGHT KNEE: Primary | ICD-10-CM

## 2023-10-09 DIAGNOSIS — M79.89 PAIN AND SWELLING OF RIGHT LOWER LEG: ICD-10-CM

## 2023-10-09 PROCEDURE — 99213 OFFICE O/P EST LOW 20 MIN: CPT | Performed by: NURSE PRACTITIONER

## 2023-10-09 PROCEDURE — 3074F SYST BP LT 130 MM HG: CPT | Performed by: NURSE PRACTITIONER

## 2023-10-09 PROCEDURE — 3078F DIAST BP <80 MM HG: CPT | Performed by: NURSE PRACTITIONER

## 2023-10-09 RX ORDER — HYDROCODONE BITARTRATE AND ACETAMINOPHEN 5; 325 MG/1; MG/1
1 TABLET ORAL EVERY 8 HOURS PRN
Qty: 9 TABLET | Refills: 0 | Status: SHIPPED | OUTPATIENT
Start: 2023-10-09 | End: 2023-10-12

## 2023-10-09 NOTE — TELEPHONE ENCOUNTER
Location of patient: [place]    Received call from [facility] with [email or web address]. Subjective: Caller states \"fall 10/5, seen in ER and diagnosed for head injury with CT head completed\"     Current Symptoms:   -right leg pain and swelling following fall on 10/5  -right calf swelling with the calf feeling hard  -right knee feels warm to the touch  -CT head was performed following fall and was told the cartilage pulled away from her nose    Onset: 5 days ago; worsening    Pain Severity: 8/10; Temperature: denies fevers     What has been tried:   -tylenol  -Aspercreme    Recommended disposition: Go to ED/UCC Now (Or to Office with PCP Approval)    Care advice provided, patient verbalizes understanding; denies any other questions or concerns; instructed to call back for any new or worsening symptoms. Writer provided warm transfer to [facility] at [place] for 2nd level triage    Attention Provider: Thank you for allowing me to participate in the care of your patient. The patient was connected to triage in response to information provided to the ECC/PSC. Please do not respond through this encounter as the response is not directed to a shared pool.     Reason for Disposition   SEVERE pain (e.g., excruciating pain, unable to do any normal activities)    Protocols used: Leg Injury-ADULT-[place]

## 2023-10-09 NOTE — PROGRESS NOTES
Subjective:      Patient ID: Danna Paulino is a 60 y.o. female who presents today for:  Chief Complaint   Patient presents with    Knee Pain     Pt states right knee/leg pain and swelling. Pt states she fell Wednesday night. HPI  Patient is here with c/o fall Wednesday. She is now having right knee pain and swelling of the right lower extremity. Says she was visiting her sister in the hospital.   Says she tripped when walking out of the elevator. Says she was seen on 10/5 in the ER for her facial contusion. Past Medical History:   Diagnosis Date    Anemia     Asthma     Benign essential HTN     meds > 5 yrs / denies TIA or stroke    Borderline personality disorder ([address])     2016 ?suggested by me-meets many criteria    Carpal tunnel syndrome of right wrist     Crohn's disease ([address])     Depression     Fibromyalgia 02/13/2018    Fibromyalgia     GERD (gastroesophageal reflux disease)     Gout     Headache     migraines    History of syncope 5/16/2023    Irritable bowel syndrome     Mixed hyperlipidemia 05/10/2017    Neuropathy     Obesity (BMI 35.0-39.9 without comorbidity) 03/30/2017    PONV (postoperative nausea and vomiting)     nausea    PTSD (post-traumatic stress disorder)     Tremor of unknown origin     Type 2 diabetes mellitus ([address])     meds > 5yrs     Ulcerative colitis ([address]) 11/2017    Vaginitis      Past Surgical History:   Procedure Laterality Date    BREAST CYST EXCISION Right 1994    exc mass benign    CHOLECYSTECTOMY  2009    COLONOSCOPY  2015    negative    ENDOSCOPY, COLON, DIAGNOSTIC      HERNIA REPAIR  2015    ventral / mesh    LEG TENDON SURGERY Right 2007    leg.  ankle and foot    OVARIAN CYST REMOVAL Left 1994    with mass and both fallopian tube    OVARY REMOVAL  12/2015    [facility] / mass left ovary & bilateral  tubes    PARTIAL HYSTERECTOMY (CERVIX NOT REMOVED)      [place] ARTHROSCOPY KNEE DIAGNOSTIC W/WO SYNOVIAL BX SPX Right 08/03/2017    RIGHT KNEE ARTHROSCOPIC PARTIAL MEDIAL

## 2023-10-10 ASSESSMENT — ENCOUNTER SYMPTOMS: COLOR CHANGE: 0

## 2023-10-13 ENCOUNTER — TELEPHONE (OUTPATIENT)
Dept: FAMILY MEDICINE CLINIC | Age: 58
End: 2023-10-13

## 2023-10-13 DIAGNOSIS — M25.561 ACUTE PAIN OF RIGHT KNEE: Primary | ICD-10-CM

## 2023-10-13 DIAGNOSIS — S89.91XA INJURY OF RIGHT KNEE, INITIAL ENCOUNTER: ICD-10-CM

## 2023-10-13 NOTE — TELEPHONE ENCOUNTER
Patient called about the referral to ortho. She wants to know if she can get a referral to Dr. Frank aDvis. Thank you.

## 2023-10-17 ENCOUNTER — ANCILLARY PROCEDURE (OUTPATIENT)
Dept: RADIOLOGY | Facility: CLINIC | Age: 58
End: 2023-10-17
Payer: MEDICARE

## 2023-10-17 ENCOUNTER — OFFICE VISIT (OUTPATIENT)
Dept: ORTHOPEDIC SURGERY | Facility: CLINIC | Age: 58
End: 2023-10-17
Payer: MEDICARE

## 2023-10-17 DIAGNOSIS — M79.89 LEG SWELLING: ICD-10-CM

## 2023-10-17 DIAGNOSIS — S82.831A OTHER CLOSED FRACTURE OF DISTAL END OF RIGHT FIBULA, INITIAL ENCOUNTER: ICD-10-CM

## 2023-10-17 DIAGNOSIS — M25.571 ACUTE RIGHT ANKLE PAIN: ICD-10-CM

## 2023-10-17 PROCEDURE — 93971 EXTREMITY STUDY: CPT | Performed by: RADIOLOGY

## 2023-10-17 PROCEDURE — 73610 X-RAY EXAM OF ANKLE: CPT | Mod: RT

## 2023-10-17 PROCEDURE — 99213 OFFICE O/P EST LOW 20 MIN: CPT | Performed by: FAMILY MEDICINE

## 2023-10-17 PROCEDURE — 27786 TREATMENT OF ANKLE FRACTURE: CPT | Mod: RT | Performed by: FAMILY MEDICINE

## 2023-10-17 PROCEDURE — 27786 TREATMENT OF ANKLE FRACTURE: CPT | Performed by: FAMILY MEDICINE

## 2023-10-17 PROCEDURE — 93971 EXTREMITY STUDY: CPT

## 2023-10-17 PROCEDURE — 99213 OFFICE O/P EST LOW 20 MIN: CPT | Mod: 25 | Performed by: FAMILY MEDICINE

## 2023-10-17 PROCEDURE — 1036F TOBACCO NON-USER: CPT | Performed by: FAMILY MEDICINE

## 2023-10-17 PROCEDURE — L4361 PNEUMA/VAC WALK BOOT PRE OTS: HCPCS | Performed by: FAMILY MEDICINE

## 2023-10-17 PROCEDURE — 73610 X-RAY EXAM OF ANKLE: CPT | Mod: RIGHT SIDE | Performed by: FAMILY MEDICINE

## 2023-10-17 RX ORDER — METHYLPREDNISOLONE 4 MG/1
TABLET ORAL
Qty: 1 EACH | Refills: 0 | Status: SHIPPED | OUTPATIENT
Start: 2023-10-17

## 2023-10-17 RX ORDER — HYDROCODONE BITARTRATE AND ACETAMINOPHEN 5; 325 MG/1; MG/1
1 TABLET ORAL EVERY 12 HOURS PRN
Qty: 14 TABLET | Refills: 0 | Status: SHIPPED | OUTPATIENT
Start: 2023-10-17 | End: 2023-10-24

## 2023-10-17 NOTE — PROGRESS NOTES
Acute Injury New Patient Visit    CC:   Chief Complaint   Patient presents with    Right Knee - Pain     Rt knee pain from fall 10/9/23  Bruising into foot  Xrays at [facility]        HPI: Vivi is a 58 y.o.female who presents today with new complaints of Pain and discomfort to the right lower extremity status post a fall 2 weeks ago.  She had initially gone to [facility] where x-rays of the tib-fib fib and right knee were obtained.  There is no obvious presence of fracture.  She presents here today 9 days out from injury.  She states some intermittent numbness and tingling down the lateral side of the leg.  She has pain from the lateral aspect of the thigh towards the lateral insertion of the hamstrings the lateral joint line of the knee and all throughout the calf and moderate tenderness palpation with worsening bruising and swelling to the right ankle and lateral side of the foot.  She presents here today due to persistent pain and discomfort inability to tolerate full weightbearing.        Review of Systems   GENERAL: Negative for malaise, significant weight loss, fever  MUSCULOSKELETAL: See HPI  NEURO: Positive for numbness / tingling     Past Medical History  History reviewed. No pertinent past medical history.    Medication review  Medication Documentation Review Audit       Reviewed by Nina Damico MA (Technologist) on 10/17/23 at 1109      Medication Order Taking? Sig Documenting Provider Last Dose Status            No Medications to Display                                   Allergies  Allergies   Allergen Reactions    Penicillins Rash and Swelling    Shellfish Containing Products Anaphylaxis and Hives    Shellfish Derived Anaphylaxis    Adhesive Swelling     If left on too long    Latex Hives    Sulfa (Sulfonamide Antibiotics) Nausea And Vomiting and Nausea/vomiting       Social History  Social History     Socioeconomic History    Marital status: Single     Spouse name: Not on file    Number of children: Not on  file    Years of education: Not on file    Highest education level: Not on file   Occupational History    Not on file   Tobacco Use    Smoking status: Never    Smokeless tobacco: Never   Substance and Sexual Activity    Alcohol use: Not on file    Drug use: Not on file    Sexual activity: Not on file   Other Topics Concern    Not on file   Social History Narrative    Not on file     Social Determinants of Health     Financial Resource Strain: Not on file   Food Insecurity: Not on file   Transportation Needs: Not on file   Physical Activity: Not on file   Stress: Not on file   Social Connections: Not on file   Intimate Partner Violence: Not on file   Housing Stability: Not on file       Surgical History  History reviewed. No pertinent surgical history.    Physical Exam:  GENERAL:  Patient is awake, alert, and oriented to person place and time.  Patient appears well nourished and well kept.  Affect Calm, Not Acutely Distressed.  HEENT:  Normocephalic, Atraumatic, EOMI  CARDIOVASCULAR:  Hemodynamically stable.  RESPIRATORY:  Normal respirations with unlabored breathing.  NEURO: Gross sensation intact to the lower extremities bilaterally.  Extremity: Right knee exam demonstrates skin which is warm pink well-perfused very minimal lateral sided joint line pain no significant fibular head pain.  Distal IT band pain and discomfort noted skin is otherwise unremarkable proximally.  No obvious Baker's cyst noted.  Positive calf squeeze with positive Homans' sign.  She has limited plantarflexion dorsiflexion eversion inversion.  Superficial redness with mild cellulitic appearance versus chronic wound to the anterior aspect of the shin anteromedially.  Medial malleolus nontender distal metatarsal squeeze positive laterally no midfoot pain.  Positive pain to the lateral side of the ankle at that distal fibula ATFL and CFL ligament.      Diagnostics: Previous [facility] images reviewed, see dictated report from today  XR ankle right 3+  views          Interpreted By:  Budinsky, Cole,   STUDY:  XR ANKLE RIGHT 3+ VIEWS;  ;  10/17/2023 11:40 am      INDICATION:  Signs/Symptoms:pain.      ACCESSION NUMBER(S):  [id number]      ORDERING CLINICIAN:  COLE BUDINSKY      FINDINGS:  Three views right ankle demonstrate soft tissue swelling about the  lateral aspect of the foot and ankle joint. Evidence of a subacute  nondisplaced distal fibular avulsion fracture is noted. No  medial-sided osseous abnormality present. Ankle joint mortise intact  and preserved. Chronic degenerative changes about the midfoot seen  moderate plantar calcaneal enthesophyte also noted. Question very  small medial malleolar nondisplaced malleolar tip avulsion injury.  Recommend repeat x-rays in 10-14 days if clinically correlated with  pain to the medial or lateral malleoli.          Signed by: Cole Budinsky 10/17/2023 12:05 PM  Dictation workstation:   [id number]             Procedure: None  Procedures    Assessment:   Problem List Items Addressed This Visit    None  Visit Diagnoses       Acute right ankle pain        Relevant Orders    XR ankle right 3+ views (Completed)    Leg swelling        Relevant Medications    methylPREDNISolone (Medrol Dospak) 4 mg tablets    HYDROcodone-acetaminophen (Norco) 5-325 mg tablet    Other Relevant Orders    Vascular US Lower Extremity Venous Duplex Right (Completed)    Walking boot    Other closed fracture of distal end of right fibula, initial encounter        Relevant Medications    methylPREDNISolone (Medrol Dospak) 4 mg tablets    HYDROcodone-acetaminophen (Norco) 5-325 mg tablet    Other Relevant Orders    Walking boot             Plan: At this time discussed my concern ultimately to obtain a stat DVT ultrasound to rule out a clot to the right lower extremity.  She will be provided with a tall boot in addition to pain medications if necessary.  We will hold off on a knee brace for now.  X-rays did determine that there is a small  nondisplaced distal fibular avulsion fracture.  We will offer her a tall boot.  We will provide her with a oral steroid pack to assist with swelling and discomfort in addition to a small one-time prescription of pain medication.  Patient recently did get some medications in the emergency department.  OARRS was checked and okay for her 8 out of 10 pain.  We will see her back in 2 weeks for repeat evaluation.  We will pre-CERT and submit for lace up ankle brace.  If needed with persistent pain or discomfort to the right knee we can consider further evaluation with an MRI versus injection down the road.  Orders Placed This Encounter    Walking boot    XR ankle right 3+ views    Vascular US Lower Extremity Venous Duplex Right    methylPREDNISolone (Medrol Dospak) 4 mg tablets    HYDROcodone-acetaminophen (Norco) 5-325 mg tablet      At the conclusion of the visit there were no further questions by the patient/family regarding their plan of care.  Patient was instructed to call or return with any issues, questions, or concerns regarding their injury and/or treatment plan described above.     10/17/23 at 6:07 PM - Cole C Budinsky, MD    Office: [phone]    This note was prepared using voice recognition software.  The details of this note are correct and have been reviewed, and corrected to the best of my ability.  Some grammatical errors may persist related to the Dragon software.

## 2023-10-18 ENCOUNTER — APPOINTMENT (OUTPATIENT)
Dept: ORTHOPEDIC SURGERY | Facility: CLINIC | Age: 58
End: 2023-10-18
Payer: MEDICARE

## 2023-10-29 DIAGNOSIS — K50.919 CROHN'S DISEASE WITH COMPLICATION, UNSPECIFIED GASTROINTESTINAL TRACT LOCATION (HCC): ICD-10-CM

## 2023-10-30 ENCOUNTER — OFFICE VISIT (OUTPATIENT)
Dept: FAMILY MEDICINE CLINIC | Age: 58
End: 2023-10-30
Payer: MEDICARE

## 2023-10-30 VITALS
WEIGHT: 257 LBS | HEART RATE: 63 BPM | SYSTOLIC BLOOD PRESSURE: 116 MMHG | DIASTOLIC BLOOD PRESSURE: 60 MMHG | OXYGEN SATURATION: 97 % | HEIGHT: 62 IN | RESPIRATION RATE: 18 BRPM | BODY MASS INDEX: 47.29 KG/M2

## 2023-10-30 DIAGNOSIS — K50.919 CROHN'S DISEASE WITH COMPLICATION, UNSPECIFIED GASTROINTESTINAL TRACT LOCATION (HCC): ICD-10-CM

## 2023-10-30 DIAGNOSIS — E11.42 POLYNEUROPATHY DUE TO TYPE 2 DIABETES MELLITUS (HCC): ICD-10-CM

## 2023-10-30 DIAGNOSIS — D72.829 LEUKOCYTOSIS, UNSPECIFIED TYPE: ICD-10-CM

## 2023-10-30 DIAGNOSIS — E78.2 MIXED HYPERLIPIDEMIA: ICD-10-CM

## 2023-10-30 DIAGNOSIS — I10 BENIGN ESSENTIAL HYPERTENSION: ICD-10-CM

## 2023-10-30 DIAGNOSIS — Z00.00 PHYSICAL EXAM, ANNUAL: Primary | ICD-10-CM

## 2023-10-30 DIAGNOSIS — Z12.31 ENCOUNTER FOR SCREENING MAMMOGRAM FOR MALIGNANT NEOPLASM OF BREAST: ICD-10-CM

## 2023-10-30 LAB
ALBUMIN SERPL-MCNC: 3.9 G/DL (ref 3.5–4.6)
ALP SERPL-CCNC: 129 U/L (ref 40–130)
ALT SERPL-CCNC: 7 U/L (ref 0–33)
ANION GAP SERPL CALCULATED.3IONS-SCNC: 15 MEQ/L (ref 9–15)
ANISOCYTOSIS BLD QL SMEAR: ABNORMAL
AST SERPL-CCNC: 18 U/L (ref 0–35)
BASOPHILS # BLD: 0.1 K/UL (ref 0–0.2)
BASOPHILS NFR BLD: 1 %
BILIRUB SERPL-MCNC: 0.3 MG/DL (ref 0.2–0.7)
BUN SERPL-MCNC: 18 MG/DL (ref 6–20)
CALCIUM SERPL-MCNC: 9.1 MG/DL (ref 8.5–9.9)
CHLORIDE SERPL-SCNC: 101 MEQ/L (ref 95–107)
CHOLEST SERPL-MCNC: 200 MG/DL (ref 0–199)
CO2 SERPL-SCNC: 25 MEQ/L (ref 20–31)
CREAT SERPL-MCNC: 1.04 MG/DL (ref 0.5–0.9)
EOSINOPHIL # BLD: 0.1 K/UL (ref 0–0.7)
EOSINOPHIL NFR BLD: 1 %
ERYTHROCYTE [DISTWIDTH] IN BLOOD BY AUTOMATED COUNT: 16.3 % (ref 11.5–14.5)
GLOBULIN SER CALC-MCNC: 3.5 G/DL (ref 2.3–3.5)
GLUCOSE SERPL-MCNC: 128 MG/DL (ref 70–99)
HBA1C MFR BLD: 6.4 %
HCT VFR BLD AUTO: 35.8 % (ref 37–47)
HDLC SERPL-MCNC: 67 MG/DL (ref 40–59)
HGB BLD-MCNC: 11 G/DL (ref 12–16)
LDLC SERPL CALC-MCNC: 102 MG/DL (ref 0–129)
LYMPHOCYTES # BLD: 0.9 K/UL (ref 1–4.8)
LYMPHOCYTES NFR BLD: 7 %
MACROCYTES BLD QL SMEAR: ABNORMAL
MCH RBC QN AUTO: 27.4 PG (ref 27–31.3)
MCHC RBC AUTO-ENTMCNC: 30.7 % (ref 33–37)
MCV RBC AUTO: 89.1 FL (ref 79.4–94.8)
MONOCYTES # BLD: 1.6 K/UL (ref 0.2–0.8)
MONOCYTES NFR BLD: 12.4 %
NEUTROPHILS # BLD: 10.6 K/UL (ref 1.4–6.5)
NEUTS SEG NFR BLD: 79 %
PLATELET # BLD AUTO: 288 K/UL (ref 130–400)
PLATELET BLD QL SMEAR: ADEQUATE
POIKILOCYTOSIS BLD QL SMEAR: ABNORMAL
POTASSIUM SERPL-SCNC: 4.4 MEQ/L (ref 3.4–4.9)
PROT SERPL-MCNC: 7.4 G/DL (ref 6.3–8)
RBC # BLD AUTO: 4.02 M/UL (ref 4.2–5.4)
SLIDE REVIEW: ABNORMAL
SMUDGE CELLS BLD QL SMEAR: 3.8
SODIUM SERPL-SCNC: 141 MEQ/L (ref 135–144)
TRIGL SERPL-MCNC: 154 MG/DL (ref 0–150)
WBC # BLD AUTO: 13.4 K/UL (ref 4.8–10.8)

## 2023-10-30 PROCEDURE — 83036 HEMOGLOBIN GLYCOSYLATED A1C: CPT | Performed by: FAMILY MEDICINE

## 2023-10-30 PROCEDURE — 3074F SYST BP LT 130 MM HG: CPT | Performed by: FAMILY MEDICINE

## 2023-10-30 PROCEDURE — 3078F DIAST BP <80 MM HG: CPT | Performed by: FAMILY MEDICINE

## 2023-10-30 PROCEDURE — 99396 PREV VISIT EST AGE 40-64: CPT | Performed by: FAMILY MEDICINE

## 2023-10-30 RX ORDER — MOMETASONE FUROATE 1 MG/G
CREAM TOPICAL
Qty: 45 G | Refills: 0 | Status: SHIPPED | OUTPATIENT
Start: 2023-10-30

## 2023-10-30 RX ORDER — METOPROLOL SUCCINATE 25 MG/1
25 TABLET, EXTENDED RELEASE ORAL 2 TIMES DAILY
Qty: 180 TABLET | Refills: 3 | Status: SHIPPED | OUTPATIENT
Start: 2023-10-30 | End: 2024-10-24

## 2023-10-30 RX ORDER — DICYCLOMINE HYDROCHLORIDE 10 MG/1
CAPSULE ORAL
Qty: 120 CAPSULE | Refills: 0 | OUTPATIENT
Start: 2023-10-30

## 2023-10-30 RX ORDER — DICYCLOMINE HYDROCHLORIDE 10 MG/1
CAPSULE ORAL
Qty: 120 CAPSULE | Refills: 0 | Status: SHIPPED | OUTPATIENT
Start: 2023-10-30

## 2023-10-30 RX ORDER — MOMETASONE FUROATE 1 MG/G
CREAM TOPICAL
Qty: 45 G | Refills: 0 | OUTPATIENT
Start: 2023-10-30

## 2023-10-30 NOTE — PROGRESS NOTES
EVERY 4 HOURS 360 mL 5    traZODone (DESYREL) 100 MG tablet TAKE 1 TO 2 TABLETS BY MOUTH AT BEDTIME AS NEEDED FOR SLEEP      albuterol sulfate HFA (PROVENTIL HFA) 108 (90 Base) MCG/ACT inhaler Inhale 2 puffs into the lungs every 6 hours as needed for Wheezing 1 Inhaler 3    Lancets MISC Test bid 100 each 3    magnesium oxide (MAG-OX) 400 (241.3 Mg) MG TABS tablet Take 1 tablet by mouth 2 times daily (Patient taking differently: Take 1 tablet by mouth 2 times daily Reports taking 500mg bid) 60 tablet 2    Blood Glucose Monitoring Suppl (ONE TOUCH ULTRA 2) w/Device KIT TEST TWICE DAILY  0    prazosin (MINIPRESS) 2 MG capsule TAKE 1 CAPSULE BY MOUTH EVERY NIGHT FOR NIGHTMARES  3    blood glucose monitor strips Test 2 times a day & as needed for symptoms of irregular blood glucose. 100 strip 3    Melatonin 5 MG CAPS Take 10 mg by mouth daily   2    FREESTYLE LITE strip use three times daily  50 each 5    DULoxetine (CYMBALTA) 60 MG extended release capsule TAKE ONE CAPSULE BY MOUTH TWICE DAILY AS DIRECTED IN THE MORNING AND AFTERNOON  1     No current facility-administered medications for this visit.      Current Outpatient Medications on File Prior to Visit   Medication Sig Dispense Refill    pregabalin (LYRICA) 150 MG capsule TAKE ONE CAPSULE BY MOUTH THREE TIMES A DAY 90 capsule 2    metFORMIN (GLUCOPHAGE) 500 MG tablet TAKE ONE TABLET BY MOUTH TWICE A DAY WITH MEALS 180 tablet 0    pravastatin (PRAVACHOL) 40 MG tablet TAKE ONE TABLET BY MOUTH IN THE EVENING 90 tablet 0    meloxicam (MOBIC) 7.5 MG tablet TAKE 1 TABLET BY MOUTH TWICE  DAILY 180 tablet 1    allopurinol (ZYLOPRIM) 100 MG tablet Take 1 tablet by mouth daily 90 tablet 3    omeprazole (PRILOSEC) 40 MG delayed release capsule Take 1 capsule by mouth 2 times daily 180 capsule 3    pioglitazone (ACTOS) 15 MG tablet Take 1 tablet by mouth daily 90 tablet 3    montelukast (SINGULAIR) 10 MG tablet TAKE ONE TABLET BY MOUTH NIGHTLY 90 tablet 3    potassium chloride

## 2023-11-01 ENCOUNTER — ANCILLARY PROCEDURE (OUTPATIENT)
Dept: RADIOLOGY | Facility: CLINIC | Age: 58
End: 2023-11-01
Payer: MEDICARE

## 2023-11-01 ENCOUNTER — OFFICE VISIT (OUTPATIENT)
Dept: ORTHOPEDIC SURGERY | Facility: CLINIC | Age: 58
End: 2023-11-01
Payer: MEDICARE

## 2023-11-01 DIAGNOSIS — S82.831A OTHER CLOSED FRACTURE OF DISTAL END OF RIGHT FIBULA, INITIAL ENCOUNTER: ICD-10-CM

## 2023-11-01 DIAGNOSIS — S82.831A OTHER CLOSED FRACTURE OF DISTAL END OF RIGHT FIBULA, INITIAL ENCOUNTER: Primary | ICD-10-CM

## 2023-11-01 PROBLEM — I10 BENIGN ESSENTIAL HYPERTENSION: Status: ACTIVE | Noted: 2023-11-01

## 2023-11-01 PROBLEM — E66.01 MORBID OBESITY (MULTI): Status: ACTIVE | Noted: 2020-09-29

## 2023-11-01 PROBLEM — K50.90 CROHN'S DISEASE (MULTI): Status: ACTIVE | Noted: 2023-11-01

## 2023-11-01 PROBLEM — D64.9 ANEMIA: Status: ACTIVE | Noted: 2018-06-06

## 2023-11-01 PROBLEM — E66.9 OBESITY WITH BODY MASS INDEX 30 OR GREATER: Status: ACTIVE | Noted: 2017-03-30

## 2023-11-01 PROBLEM — J40 BRONCHITIS: Status: ACTIVE | Noted: 2021-12-06

## 2023-11-01 PROBLEM — G56.01 CARPAL TUNNEL SYNDROME OF RIGHT WRIST: Status: ACTIVE | Noted: 2017-11-27

## 2023-11-01 PROBLEM — B35.1 ONYCHOMYCOSIS: Status: ACTIVE | Noted: 2022-10-19

## 2023-11-01 PROBLEM — R60.0 BILATERAL EDEMA OF LOWER EXTREMITY: Status: ACTIVE | Noted: 2017-07-31

## 2023-11-01 PROBLEM — R07.9 CHEST PAIN: Status: ACTIVE | Noted: 2023-11-01

## 2023-11-01 PROBLEM — R41.0 CONFUSION: Status: ACTIVE | Noted: 2023-03-04

## 2023-11-01 PROBLEM — K43.9 VENTRAL HERNIA: Status: ACTIVE | Noted: 2023-02-28

## 2023-11-01 PROBLEM — G62.9 PERIPHERAL NERVE DISEASE: Status: ACTIVE | Noted: 2022-03-10

## 2023-11-01 PROBLEM — M79.7 FIBROMYALGIA: Status: ACTIVE | Noted: 2018-02-13

## 2023-11-01 PROBLEM — S52.509A DISTAL RADIUS FRACTURE: Status: ACTIVE | Noted: 2023-11-01

## 2023-11-01 PROBLEM — G43.109 MIGRAINE AURA OCCURRING WITH AND WITHOUT HEADACHE: Status: ACTIVE | Noted: 2023-03-04

## 2023-11-01 PROBLEM — K52.9 GASTROENTERITIS: Status: ACTIVE | Noted: 2020-12-21

## 2023-11-01 PROBLEM — I10 HTN (HYPERTENSION): Status: ACTIVE | Noted: 2023-11-01

## 2023-11-01 PROBLEM — D72.829 LEUKOCYTOSIS: Status: ACTIVE | Noted: 2020-12-21

## 2023-11-01 PROBLEM — R94.31 ELECTROCARDIOGRAM ABNORMAL: Status: ACTIVE | Noted: 2023-11-01

## 2023-11-01 PROBLEM — G56.02 CARPAL TUNNEL SYNDROME OF LEFT WRIST: Status: ACTIVE | Noted: 2018-05-03

## 2023-11-01 PROBLEM — L85.3 XEROSIS OF SKIN: Status: ACTIVE | Noted: 2022-03-10

## 2023-11-01 PROBLEM — F43.10 POSTTRAUMATIC STRESS DISORDER: Status: ACTIVE | Noted: 2018-02-13

## 2023-11-01 PROBLEM — K21.9 GASTROESOPHAGEAL REFLUX DISEASE: Status: ACTIVE | Noted: 2023-11-01

## 2023-11-01 PROBLEM — J45.909 ASTHMA (HHS-HCC): Status: ACTIVE | Noted: 2023-11-01

## 2023-11-01 PROBLEM — E11.9 TYPE 2 DIABETES MELLITUS WITHOUT COMPLICATION, WITHOUT LONG-TERM CURRENT USE OF INSULIN (MULTI): Status: ACTIVE | Noted: 2022-03-10

## 2023-11-01 PROBLEM — L60.3 ONYCHODYSTROPHY: Status: ACTIVE | Noted: 2022-06-09

## 2023-11-01 PROBLEM — S83.209A TEAR OF MENISCUS OF KNEE: Status: ACTIVE | Noted: 2017-07-27

## 2023-11-01 PROBLEM — G43.019 INTRACTABLE MIGRAINE WITHOUT AURA AND WITHOUT STATUS MIGRAINOSUS: Status: ACTIVE | Noted: 2019-12-30

## 2023-11-01 PROBLEM — I83.90 VARICOSE VEINS OF LOWER EXTREMITY: Status: ACTIVE | Noted: 2017-07-31

## 2023-11-01 PROBLEM — R55 SYNCOPE AND COLLAPSE: Status: ACTIVE | Noted: 2023-03-03

## 2023-11-01 PROBLEM — R25.1 INTERMITTENT TREMOR: Status: ACTIVE | Noted: 2018-05-03

## 2023-11-01 PROBLEM — E78.2 MIXED HYPERLIPIDEMIA: Status: ACTIVE | Noted: 2017-05-10

## 2023-11-01 PROBLEM — G57.12 MERALGIA PARESTHETICA OF LEFT SIDE: Status: ACTIVE | Noted: 2019-12-30

## 2023-11-01 PROBLEM — D50.9 IRON DEFICIENCY ANEMIA: Status: ACTIVE | Noted: 2018-11-13

## 2023-11-01 PROCEDURE — 73610 X-RAY EXAM OF ANKLE: CPT | Mod: RT

## 2023-11-01 PROCEDURE — L1902 AFO ANKLE GAUNTLET PRE OTS: HCPCS | Performed by: FAMILY MEDICINE

## 2023-11-01 PROCEDURE — 73610 X-RAY EXAM OF ANKLE: CPT | Mod: RIGHT SIDE | Performed by: FAMILY MEDICINE

## 2023-11-01 PROCEDURE — 99024 POSTOP FOLLOW-UP VISIT: CPT | Performed by: FAMILY MEDICINE

## 2023-11-01 PROCEDURE — 1036F TOBACCO NON-USER: CPT | Performed by: FAMILY MEDICINE

## 2023-11-01 RX ORDER — NITROGLYCERIN 0.4 MG/1
TABLET SUBLINGUAL
COMMUNITY
Start: 2019-09-03

## 2023-11-01 RX ORDER — PANTOPRAZOLE SODIUM 40 MG/1
TABLET, DELAYED RELEASE ORAL
COMMUNITY
Start: 2019-09-04

## 2023-11-01 RX ORDER — IBUPROFEN 600 MG/1
600 TABLET ORAL EVERY 6 HOURS PRN
COMMUNITY
Start: 2015-12-21

## 2023-11-01 RX ORDER — ALLOPURINOL 100 MG/1
1 TABLET ORAL DAILY
COMMUNITY
Start: 2019-07-16

## 2023-11-01 RX ORDER — LANOLIN ALCOHOL/MO/W.PET/CERES
CREAM (GRAM) TOPICAL
COMMUNITY
Start: 2019-09-04

## 2023-11-01 RX ORDER — PRAVASTATIN SODIUM 40 MG/1
1 TABLET ORAL EVERY EVENING
COMMUNITY
Start: 2021-10-07

## 2023-11-01 RX ORDER — PRAZOSIN HYDROCHLORIDE 2 MG/1
CAPSULE ORAL
COMMUNITY
Start: 2019-02-01

## 2023-11-01 RX ORDER — PROMETHAZINE HYDROCHLORIDE AND DEXTROMETHORPHAN HYDROBROMIDE 6.25; 15 MG/5ML; MG/5ML
SYRUP ORAL
COMMUNITY
Start: 2021-12-06

## 2023-11-01 RX ORDER — ZOLMITRIPTAN 2.5 MG/1
TABLET, FILM COATED ORAL
COMMUNITY
Start: 2009-01-19

## 2023-11-01 RX ORDER — FEXOFENADINE HCL 60 MG
TABLET ORAL
COMMUNITY
Start: 2009-01-19

## 2023-11-01 RX ORDER — MELATONIN 5 MG
10 CAPSULE ORAL DAILY
COMMUNITY
Start: 2018-06-13

## 2023-11-01 RX ORDER — IPRATROPIUM BROMIDE AND ALBUTEROL SULFATE 2.5; .5 MG/3ML; MG/3ML
SOLUTION RESPIRATORY (INHALATION)
COMMUNITY
Start: 2018-06-08

## 2023-11-01 RX ORDER — PREGABALIN 150 MG/1
1 CAPSULE ORAL 3 TIMES DAILY
COMMUNITY
Start: 2021-10-22 | End: 2023-11-05

## 2023-11-01 RX ORDER — DOXYCYCLINE 100 MG/1
CAPSULE ORAL
COMMUNITY
Start: 2015-04-08

## 2023-11-01 RX ORDER — QUETIAPINE FUMARATE 50 MG/1
TABLET, FILM COATED ORAL
COMMUNITY
Start: 2023-01-10

## 2023-11-01 RX ORDER — LORATADINE 10 MG/1
TABLET ORAL
COMMUNITY

## 2023-11-01 RX ORDER — METFORMIN HYDROCHLORIDE 500 MG/1
1 TABLET ORAL
COMMUNITY
Start: 2019-08-12

## 2023-11-01 RX ORDER — ERENUMAB-AOOE 140 MG/ML
INJECTION, SOLUTION SUBCUTANEOUS
COMMUNITY

## 2023-11-01 RX ORDER — SERTRALINE HYDROCHLORIDE 100 MG/1
TABLET, FILM COATED ORAL
COMMUNITY

## 2023-11-01 RX ORDER — BETAMETHASONE DIPROPIONATE 0.5 MG/G
CREAM TOPICAL
COMMUNITY
Start: 2015-04-08

## 2023-11-01 RX ORDER — ALBUTEROL SULFATE 90 UG/1
2 AEROSOL, METERED RESPIRATORY (INHALATION) EVERY 6 HOURS PRN
COMMUNITY
Start: 2018-03-23

## 2023-11-01 RX ORDER — MONTELUKAST SODIUM 10 MG/1
1 TABLET ORAL NIGHTLY
COMMUNITY
Start: 2009-01-19

## 2023-11-01 RX ORDER — MELOXICAM 7.5 MG/1
1 TABLET ORAL 2 TIMES DAILY
COMMUNITY
Start: 2021-10-22

## 2023-11-01 RX ORDER — POTASSIUM CHLORIDE 20 MEQ/1
1 TABLET, EXTENDED RELEASE ORAL 2 TIMES DAILY
COMMUNITY
Start: 2019-08-12

## 2023-11-01 RX ORDER — PENTAZOCINE HYDROCHLORIDE AND NALOXONE HYDROCHLORIDE 50; .5 MG/1; MG/1
TABLET ORAL
COMMUNITY
Start: 2019-08-16

## 2023-11-01 RX ORDER — METOPROLOL SUCCINATE 50 MG/1
TABLET, EXTENDED RELEASE ORAL
COMMUNITY

## 2023-11-01 RX ORDER — DICYCLOMINE HYDROCHLORIDE 10 MG/1
CAPSULE ORAL
COMMUNITY
Start: 2009-01-19 | End: 2024-04-17

## 2023-11-01 RX ORDER — POTASSIUM CHLORIDE 750 MG/1
10 TABLET, FILM COATED, EXTENDED RELEASE ORAL
COMMUNITY

## 2023-11-01 RX ORDER — ADHESIVE BANDAGE
BANDAGE TOPICAL
COMMUNITY
Start: 2019-09-03

## 2023-11-01 RX ORDER — OMEPRAZOLE 20 MG/1
CAPSULE, DELAYED RELEASE ORAL
COMMUNITY

## 2023-11-01 RX ORDER — QUETIAPINE FUMARATE 100 MG/1
TABLET, FILM COATED ORAL
COMMUNITY
Start: 2020-10-26

## 2023-11-01 RX ORDER — LANOLIN ALCOHOL/MO/W.PET/CERES
1 CREAM (GRAM) TOPICAL 2 TIMES DAILY
COMMUNITY
Start: 2019-09-04

## 2023-11-01 RX ORDER — ALBUTEROL SULFATE 0.83 MG/ML
SOLUTION RESPIRATORY (INHALATION)
COMMUNITY
Start: 2009-01-19

## 2023-11-01 RX ORDER — OXYCODONE HYDROCHLORIDE 5 MG/1
5-10 CAPSULE ORAL EVERY 6 HOURS PRN
COMMUNITY
Start: 2015-12-21

## 2023-11-01 RX ORDER — MOMETASONE FUROATE 1 MG/G
CREAM TOPICAL DAILY
COMMUNITY
Start: 2019-08-12

## 2023-11-01 RX ORDER — TRAMADOL HYDROCHLORIDE 50 MG/1
TABLET ORAL
COMMUNITY
Start: 2022-11-08

## 2023-11-01 RX ORDER — HYDROCODONE BITARTRATE AND ACETAMINOPHEN 5; 325 MG/1; MG/1
TABLET ORAL
COMMUNITY
Start: 2022-03-07

## 2023-11-01 RX ORDER — BLOOD SUGAR DIAGNOSTIC
STRIP MISCELLANEOUS 2 TIMES DAILY
COMMUNITY
Start: 2020-08-27

## 2023-11-01 RX ORDER — PIOGLITAZONEHYDROCHLORIDE 15 MG/1
1 TABLET ORAL DAILY
COMMUNITY
Start: 2019-08-03

## 2023-11-01 RX ORDER — PROMETHAZINE HYDROCHLORIDE 12.5 MG/1
12.5 TABLET ORAL 3 TIMES DAILY PRN
COMMUNITY
Start: 2023-01-03

## 2023-11-01 RX ORDER — ONDANSETRON 4 MG/1
TABLET, FILM COATED ORAL
COMMUNITY
Start: 2022-02-11

## 2023-11-01 RX ORDER — NYSTATIN AND TRIAMCINOLONE ACETONIDE 100000; 1 [USP'U]/G; MG/G
CREAM TOPICAL 2 TIMES DAILY
COMMUNITY
Start: 2018-09-07

## 2023-11-01 RX ORDER — RIZATRIPTAN BENZOATE 10 MG/1
TABLET ORAL
COMMUNITY
Start: 2019-08-12

## 2023-11-01 RX ORDER — DULOXETIN HYDROCHLORIDE 60 MG/1
CAPSULE, DELAYED RELEASE ORAL
COMMUNITY
Start: 2017-10-23

## 2023-11-01 RX ORDER — AZELASTINE 1 MG/ML
1 SPRAY, METERED NASAL
COMMUNITY
Start: 2021-04-05

## 2023-11-01 RX ORDER — INSULIN LISPRO 100 [IU]/ML
INJECTION, SOLUTION INTRAVENOUS; SUBCUTANEOUS
COMMUNITY
Start: 2019-09-03

## 2023-11-01 RX ORDER — HYOSCYAMINE SULFATE 0.125 MG
TABLET ORAL
COMMUNITY
Start: 2009-01-19

## 2023-11-01 RX ORDER — HYDROXYZINE PAMOATE 25 MG/1
25 CAPSULE ORAL DAILY
COMMUNITY

## 2023-11-01 RX ORDER — GABAPENTIN 300 MG/1
300 CAPSULE ORAL 3 TIMES DAILY
COMMUNITY

## 2023-11-01 RX ORDER — BUTALBITAL, ACETAMINOPHEN AND CAFFEINE 300; 40; 50 MG/1; MG/1; MG/1
CAPSULE ORAL
COMMUNITY
Start: 2021-05-10

## 2023-11-01 RX ORDER — ENOXAPARIN SODIUM 100 MG/ML
40 INJECTION SUBCUTANEOUS
COMMUNITY
Start: 2019-09-03

## 2023-11-01 RX ORDER — FUROSEMIDE 40 MG/1
1 TABLET ORAL 2 TIMES DAILY
COMMUNITY
Start: 2021-05-20

## 2023-11-01 RX ORDER — METOPROLOL SUCCINATE 25 MG/1
25 TABLET, EXTENDED RELEASE ORAL DAILY
COMMUNITY

## 2023-11-01 RX ORDER — CLOBETASOL PROPIONATE 0.5 MG/G
CREAM TOPICAL
COMMUNITY
Start: 2009-01-19

## 2023-11-01 RX ORDER — ACETAMINOPHEN 500 MG
TABLET ORAL
COMMUNITY
Start: 2009-01-19

## 2023-11-01 RX ORDER — BACLOFEN 20 MG/1
1 TABLET ORAL 2 TIMES DAILY
COMMUNITY
Start: 2017-04-01

## 2023-11-01 RX ORDER — TRIAMTERENE AND HYDROCHLOROTHIAZIDE 75; 50 MG/1; MG/1
TABLET ORAL
COMMUNITY
Start: 2009-01-19

## 2023-11-01 RX ORDER — OMEPRAZOLE 40 MG/1
1 CAPSULE, DELAYED RELEASE ORAL 2 TIMES DAILY
COMMUNITY
Start: 2023-03-22

## 2023-11-01 RX ORDER — FLUTICASONE PROPIONATE 50 MCG
SPRAY, SUSPENSION (ML) NASAL
COMMUNITY
Start: 2020-01-21

## 2023-11-01 RX ORDER — COLCHICINE 0.6 MG/1
TABLET ORAL
COMMUNITY
Start: 2019-08-12

## 2023-11-01 RX ORDER — TRAZODONE HYDROCHLORIDE 100 MG/1
1-2 TABLET ORAL NIGHTLY PRN
COMMUNITY
Start: 2021-03-01

## 2023-11-01 NOTE — PROGRESS NOTES
Established Patient Follow-Up Visit    CC:   Chief Complaint   Patient presents with    Left Ankle - Follow-up, Pain     Xrays today       HPI:  Vivi is a 58 y.o. female returns here today for follow-up visit regarding: Right ankle pain swelling distal fibular avulsion fracture and numbness and tingling down the lateral side of her foot.  She denies any new injury or trauma since last visit.  She has been compliant with the boot she is hopeful to get into a lace up ankle brace.  She reiterates a history of a tendon reconstruction.  She states she has a lot of stiffness and has some sharp stinging pain across the front of the shin which wraps around laterally behind her fibular head goes down the leg and then across the forefoot.          REVIEW OF SYSTEMS:  GENERAL: Negative for malaise, significant weight loss, fever  MUSCULOSKELETAL: See HPI  NEURO: Positive for numbness / tingling       PHYSICAL EXAM:  -Neuro: Gross sensation intact to the lower extremities bilaterally.  Decree sensation but intact to the lateral aspect of the foot  -Extremity: Right lower extremity exam demonstrates skin which is warm pink well-perfused mild soft tissue tenderness palpation over the anterior aspect of the proximal tibia across the anterior aspect of the shin.  She has minimal pain and discomfort at the proximal fibular head.  She has a negative tib-fib squeeze.  Distal metatarsals are nontender she has no pain at the base of the fifth no navicular pain no medial malleoli or pain mild distal fibular pain mild ATFL CFL ligament pain.  She has stiffness but otherwise normal plantarflexion dorsiflexion eversion and inversion.  She is able to gently stand place full weightbearing and walk with a mild antalgic gait.    IMAGING: Repeat x-rays today as below discussed with patient  XR ankle right 3+ views  Interpreted By:  Budinsky, Cole,   STUDY:  XR ANKLE RIGHT 3+ VIEWS;  ;  11/1/2023 1:49 pm       INDICATION:  Signs/Symptoms:pain.      ACCESSION NUMBER(S):  [id number]      ORDERING CLINICIAN:  COLE BUDINSKY      FINDINGS:  Three views right ankle demonstrate stable appearing nondisplaced  distal fibular fracture. No evidence of ankle joint mortise  disruption. No widening of the distal fibular fracture. No presence  for any early sclerotic callus formation. Interval decrease in soft  tissue swelling noted.          Signed by: Cole Budinsky 11/1/2023 5:30 PM  Dictation workstation:   [id number]      PROCEDURE: None  Procedures     ASSESSMENT:   Follow-up visit for:  Problem List Items Addressed This Visit    None  Visit Diagnoses       Other closed fracture of distal end of right fibula, initial encounter    -  Primary    Relevant Orders    XR ankle right 3+ views (Completed)             PLAN: At this time we will have the patient transition out of the boot into a lace up ankle brace.  She was also given home exercises to work on range of motion and strength recovery.  This will also assist with some of soft tissue swelling and will hopefully improve her numbness and tingling to the lateral side of the foot and leg.  Should she have persistent swelling pain or nerve issues, we will consider further evaluation with an MRI given her history of prior tendon repair to the same affected right ankle.  If the right knee still continues to bother her or give an issue we can repeat x-rays at that time.  Orders Placed This Encounter    XR ankle right 3+ views           At the conclusion of the visit there were no further questions by the patient/family regarding their plan of care.  Patient was instructed to call or return with any issues, questions, or concerns regarding their injury and/or treatment plan described above.     11/01/23 at 9:17 PM - Cole C Budinsky, MD    Office: [phone]    This note was prepared using voice recognition software.  The details of this note are correct and have been reviewed,  and corrected to the best of my ability.  Some grammatical errors may persist related to the Dragon software.

## 2023-11-16 DIAGNOSIS — I10 BENIGN ESSENTIAL HTN: ICD-10-CM

## 2023-11-16 DIAGNOSIS — M47.9 SPONDYLOSIS: ICD-10-CM

## 2023-11-16 DIAGNOSIS — M54.9 CHRONIC BACK PAIN GREATER THAN 3 MONTHS DURATION: ICD-10-CM

## 2023-11-16 DIAGNOSIS — G89.29 CHRONIC BACK PAIN GREATER THAN 3 MONTHS DURATION: ICD-10-CM

## 2023-11-16 DIAGNOSIS — K50.919 CROHN'S DISEASE WITH COMPLICATION, UNSPECIFIED GASTROINTESTINAL TRACT LOCATION (HCC): ICD-10-CM

## 2023-11-16 DIAGNOSIS — M79.7 FIBROMYALGIA: ICD-10-CM

## 2023-11-16 DIAGNOSIS — J45.20 MILD INTERMITTENT ASTHMA WITHOUT COMPLICATION: ICD-10-CM

## 2023-11-17 RX ORDER — OMEPRAZOLE 40 MG/1
40 CAPSULE, DELAYED RELEASE ORAL 2 TIMES DAILY
Qty: 200 CAPSULE | Refills: 2 | Status: SHIPPED | OUTPATIENT
Start: 2023-11-17

## 2023-11-17 RX ORDER — MONTELUKAST SODIUM 10 MG/1
TABLET ORAL
Qty: 100 TABLET | Refills: 2 | Status: SHIPPED | OUTPATIENT
Start: 2023-11-17

## 2023-11-17 RX ORDER — MELOXICAM 7.5 MG/1
TABLET ORAL
Qty: 200 TABLET | Refills: 2 | Status: SHIPPED | OUTPATIENT
Start: 2023-11-17

## 2023-11-17 RX ORDER — FUROSEMIDE 40 MG/1
40 TABLET ORAL 2 TIMES DAILY
Qty: 200 TABLET | Refills: 2 | Status: SHIPPED | OUTPATIENT
Start: 2023-11-17

## 2023-11-17 RX ORDER — POTASSIUM CHLORIDE 20 MEQ/1
TABLET, EXTENDED RELEASE ORAL
Qty: 200 TABLET | Refills: 2 | Status: SHIPPED | OUTPATIENT
Start: 2023-11-17

## 2023-11-17 RX ORDER — ALLOPURINOL 100 MG/1
100 TABLET ORAL DAILY
Qty: 100 TABLET | Refills: 2 | Status: SHIPPED | OUTPATIENT
Start: 2023-11-17

## 2023-11-17 RX ORDER — PIOGLITAZONEHYDROCHLORIDE 15 MG/1
15 TABLET ORAL DAILY
Qty: 100 TABLET | Refills: 2 | Status: SHIPPED | OUTPATIENT
Start: 2023-11-17

## 2023-11-20 ENCOUNTER — HOSPITAL ENCOUNTER (OUTPATIENT)
Dept: WOMENS IMAGING | Age: 58
Discharge: HOME | End: 2023-11-22
Payer: MEDICARE

## 2023-11-20 DIAGNOSIS — Z12.31 ENCOUNTER FOR SCREENING MAMMOGRAM FOR MALIGNANT NEOPLASM OF BREAST: ICD-10-CM

## 2023-11-20 PROCEDURE — 77063 BREAST TOMOSYNTHESIS BI: CPT

## 2023-11-29 ENCOUNTER — APPOINTMENT (OUTPATIENT)
Dept: ORTHOPEDIC SURGERY | Facility: CLINIC | Age: 58
End: 2023-11-29
Payer: MEDICARE

## 2023-12-28 RX ORDER — MOMETASONE FUROATE 1 MG/G
CREAM TOPICAL
Qty: 45 G | Refills: 0 | Status: SHIPPED | OUTPATIENT
Start: 2023-12-28

## 2023-12-28 NOTE — TELEPHONE ENCOUNTER
Future Appointments    Encounter Information   Provider Department Appt Notes   2/12/2024 Libertad English MD [facility] AT [facility] 6 mo f/u   5/9/2024 Humberto Clark DO [facility] 1 YEAR     Past Visits    Date Provider Specialty Visit Type Primary Dx   10/30/2023 Libertad English MD Family Medicine Office Visit Physical exam, annual

## 2024-01-03 RX ORDER — PRAVASTATIN SODIUM 40 MG
40 TABLET ORAL NIGHTLY
Qty: 100 TABLET | Refills: 2 | Status: SHIPPED | OUTPATIENT
Start: 2024-01-03

## 2024-01-12 ENCOUNTER — TELEMEDICINE (OUTPATIENT)
Dept: FAMILY MEDICINE CLINIC | Age: 59
End: 2024-01-12

## 2024-01-12 DIAGNOSIS — Z00.00 MEDICARE ANNUAL WELLNESS VISIT, SUBSEQUENT: Primary | ICD-10-CM

## 2024-01-12 ASSESSMENT — PATIENT HEALTH QUESTIONNAIRE - PHQ9
SUM OF ALL RESPONSES TO PHQ9 QUESTIONS 1 & 2: 0
SUM OF ALL RESPONSES TO PHQ QUESTIONS 1-9: 0
7. TROUBLE CONCENTRATING ON THINGS, SUCH AS READING THE NEWSPAPER OR WATCHING TELEVISION: 0
SUM OF ALL RESPONSES TO PHQ QUESTIONS 1-9: 0
10. IF YOU CHECKED OFF ANY PROBLEMS, HOW DIFFICULT HAVE THESE PROBLEMS MADE IT FOR YOU TO DO YOUR WORK, TAKE CARE OF THINGS AT HOME, OR GET ALONG WITH OTHER PEOPLE: 0
SUM OF ALL RESPONSES TO PHQ QUESTIONS 1-9: 0
SUM OF ALL RESPONSES TO PHQ QUESTIONS 1-9: 0
9. THOUGHTS THAT YOU WOULD BE BETTER OFF DEAD, OR OF HURTING YOURSELF: 0
8. MOVING OR SPEAKING SO SLOWLY THAT OTHER PEOPLE COULD HAVE NOTICED. OR THE OPPOSITE, BEING SO FIGETY OR RESTLESS THAT YOU HAVE BEEN MOVING AROUND A LOT MORE THAN USUAL: 0
2. FEELING DOWN, DEPRESSED OR HOPELESS: 0
1. LITTLE INTEREST OR PLEASURE IN DOING THINGS: 0
6. FEELING BAD ABOUT YOURSELF - OR THAT YOU ARE A FAILURE OR HAVE LET YOURSELF OR YOUR FAMILY DOWN: 0
4. FEELING TIRED OR HAVING LITTLE ENERGY: 0
3. TROUBLE FALLING OR STAYING ASLEEP: 0
5. POOR APPETITE OR OVEREATING: 0

## 2024-01-12 ASSESSMENT — LIFESTYLE VARIABLES
HOW MANY STANDARD DRINKS CONTAINING ALCOHOL DO YOU HAVE ON A TYPICAL DAY: PATIENT DOES NOT DRINK
HOW OFTEN DO YOU HAVE A DRINK CONTAINING ALCOHOL: NEVER

## 2024-01-12 NOTE — PROGRESS NOTES
(and/or legal guardian's) consent. Patient identification was verified, and a caregiver was present when appropriate.   The patient was located at Home: [address] [place], [place] [id number]  Provider was located at Facility (Appt Dept): [id number] [id number]

## 2024-01-14 DIAGNOSIS — E11.42 TYPE 2 DIABETES MELLITUS WITH DIABETIC POLYNEUROPATHY, WITHOUT LONG-TERM CURRENT USE OF INSULIN (HCC): ICD-10-CM

## 2024-02-06 DIAGNOSIS — M47.9 SPONDYLOSIS: ICD-10-CM

## 2024-02-06 DIAGNOSIS — M54.9 CHRONIC BACK PAIN GREATER THAN 3 MONTHS DURATION: ICD-10-CM

## 2024-02-06 DIAGNOSIS — M79.7 FIBROMYALGIA: ICD-10-CM

## 2024-02-06 DIAGNOSIS — G89.29 CHRONIC BACK PAIN GREATER THAN 3 MONTHS DURATION: ICD-10-CM

## 2024-02-06 RX ORDER — PREGABALIN 150 MG/1
CAPSULE ORAL
Qty: 90 CAPSULE | Refills: 2 | Status: SHIPPED | OUTPATIENT
Start: 2024-02-06 | End: 2024-05-06

## 2024-02-12 ENCOUNTER — OFFICE VISIT (OUTPATIENT)
Dept: FAMILY MEDICINE CLINIC | Age: 59
End: 2024-02-12
Payer: MEDICARE

## 2024-02-12 VITALS
RESPIRATION RATE: 18 BRPM | SYSTOLIC BLOOD PRESSURE: 116 MMHG | OXYGEN SATURATION: 98 % | HEART RATE: 94 BPM | BODY MASS INDEX: 50.05 KG/M2 | HEIGHT: 62 IN | DIASTOLIC BLOOD PRESSURE: 60 MMHG | WEIGHT: 272 LBS

## 2024-02-12 DIAGNOSIS — M79.7 FIBROMYALGIA: ICD-10-CM

## 2024-02-12 DIAGNOSIS — F60.3 BORDERLINE PERSONALITY DISORDER (HCC): ICD-10-CM

## 2024-02-12 DIAGNOSIS — J45.20 MILD INTERMITTENT ASTHMA WITHOUT COMPLICATION: ICD-10-CM

## 2024-02-12 DIAGNOSIS — E11.9 TYPE 2 DIABETES MELLITUS WITHOUT COMPLICATION, WITHOUT LONG-TERM CURRENT USE OF INSULIN (HCC): Primary | ICD-10-CM

## 2024-02-12 DIAGNOSIS — M54.9 CHRONIC BACK PAIN GREATER THAN 3 MONTHS DURATION: ICD-10-CM

## 2024-02-12 DIAGNOSIS — E11.42 POLYNEUROPATHY DUE TO TYPE 2 DIABETES MELLITUS (HCC): ICD-10-CM

## 2024-02-12 DIAGNOSIS — M47.9 SPONDYLOSIS: ICD-10-CM

## 2024-02-12 DIAGNOSIS — K50.919 CROHN'S DISEASE WITH COMPLICATION, UNSPECIFIED GASTROINTESTINAL TRACT LOCATION (HCC): ICD-10-CM

## 2024-02-12 DIAGNOSIS — G89.29 CHRONIC BACK PAIN GREATER THAN 3 MONTHS DURATION: ICD-10-CM

## 2024-02-12 DIAGNOSIS — F33.2 SEVERE EPISODE OF RECURRENT MAJOR DEPRESSIVE DISORDER, WITHOUT PSYCHOTIC FEATURES (HCC): ICD-10-CM

## 2024-02-12 LAB — HBA1C MFR BLD: 6.9 %

## 2024-02-12 PROCEDURE — 99214 OFFICE O/P EST MOD 30 MIN: CPT | Performed by: FAMILY MEDICINE

## 2024-02-12 PROCEDURE — 83036 HEMOGLOBIN GLYCOSYLATED A1C: CPT | Performed by: FAMILY MEDICINE

## 2024-02-12 PROCEDURE — 3078F DIAST BP <80 MM HG: CPT | Performed by: FAMILY MEDICINE

## 2024-02-12 PROCEDURE — 3074F SYST BP LT 130 MM HG: CPT | Performed by: FAMILY MEDICINE

## 2024-02-12 PROCEDURE — 3044F HG A1C LEVEL LT 7.0%: CPT | Performed by: FAMILY MEDICINE

## 2024-02-12 RX ORDER — DICYCLOMINE HYDROCHLORIDE 10 MG/1
CAPSULE ORAL
Qty: 120 CAPSULE | Refills: 0 | Status: SHIPPED | OUTPATIENT
Start: 2024-02-12

## 2024-02-12 RX ORDER — IPRATROPIUM BROMIDE AND ALBUTEROL SULFATE 2.5; .5 MG/3ML; MG/3ML
SOLUTION RESPIRATORY (INHALATION)
Qty: 360 ML | Refills: 5 | Status: SHIPPED | OUTPATIENT
Start: 2024-02-12

## 2024-02-12 RX ORDER — BACLOFEN 20 MG/1
20 TABLET ORAL 2 TIMES DAILY
Qty: 180 TABLET | Refills: 3 | Status: SHIPPED | OUTPATIENT
Start: 2024-02-12

## 2024-02-12 RX ORDER — ALBUTEROL SULFATE 90 UG/1
2 AEROSOL, METERED RESPIRATORY (INHALATION) EVERY 6 HOURS PRN
Qty: 1 EACH | Refills: 3 | Status: SHIPPED | OUTPATIENT
Start: 2024-02-12

## 2024-02-12 NOTE — PROGRESS NOTES
Patient is seen in follow up for   Chief Complaint   Patient presents with    Check-Up    Diabetes     Last A1c was 6.4     Diabetes  She presents for her follow-up diabetic visit. She has type 2 diabetes mellitus. The initial diagnosis of diabetes was made 14 years ago. Her disease course has been stable. There are no hypoglycemic associated symptoms. Pertinent negatives for hypoglycemia include no dizziness. There are no diabetic associated symptoms. Pertinent negatives for diabetes include no fatigue. Symptoms are stable. There are no diabetic complications. Risk factors for coronary artery disease include diabetes mellitus. Current diabetic treatment includes oral agent (dual therapy). She is compliant with treatment most of the time.       Past Medical History:   Diagnosis Date    Anemia     Asthma     Benign essential HTN     meds > 5 yrs / denies TIA or stroke    Borderline personality disorder (HCC)     2016 ?suggested by me-meets many criteria    Carpal tunnel syndrome of right wrist     Crohn's disease (HCC)     Depression     Fibromyalgia 02/13/2018    Fibromyalgia     GERD (gastroesophageal reflux disease)     Gout     Headache     migraines    History of syncope 5/16/2023    Irritable bowel syndrome     Mixed hyperlipidemia 05/10/2017    Neuropathy     Obesity (BMI 35.0-39.9 without comorbidity) 03/30/2017    PONV (postoperative nausea and vomiting)     nausea    PTSD (post-traumatic stress disorder)     Tremor of unknown origin     Type 2 diabetes mellitus (HCC)     meds > 5yrs     Ulcerative colitis ([facility]) 11/2017    Vaginitis      Patient Active Problem List    Diagnosis Date Noted    Migraine aura occurring with and without headache 03/04/2023    Confusion 03/04/2023    Syncope and collapse 03/03/2023    History of syncope 05/16/2023    Ventral hernia 02/28/2023    Right upper quadrant abdominal pain 02/09/2022    Diarrhea 02/09/2022    Fever 02/09/2022    Acute bacterial sinusitis 12/06/2021

## 2024-04-01 DIAGNOSIS — K50.919 CROHN'S DISEASE WITH COMPLICATION, UNSPECIFIED GASTROINTESTINAL TRACT LOCATION (HCC): ICD-10-CM

## 2024-04-02 RX ORDER — DICYCLOMINE HYDROCHLORIDE 10 MG/1
CAPSULE ORAL
Qty: 120 CAPSULE | Refills: 3 | Status: SHIPPED | OUTPATIENT
Start: 2024-04-02

## 2024-04-02 RX ORDER — MOMETASONE FUROATE 1 MG/G
CREAM TOPICAL
Qty: 45 G | Refills: 3 | Status: SHIPPED | OUTPATIENT
Start: 2024-04-02

## 2024-04-17 ENCOUNTER — APPOINTMENT (OUTPATIENT)
Dept: RADIOLOGY | Facility: HOSPITAL | Age: 59
End: 2024-04-17
Payer: MEDICARE

## 2024-04-17 ENCOUNTER — APPOINTMENT (OUTPATIENT)
Dept: CARDIOLOGY | Facility: HOSPITAL | Age: 59
End: 2024-04-17
Payer: MEDICARE

## 2024-04-17 ENCOUNTER — HOSPITAL ENCOUNTER (EMERGENCY)
Facility: HOSPITAL | Age: 59
Discharge: HOME | End: 2024-04-17
Payer: MEDICARE

## 2024-04-17 VITALS
BODY MASS INDEX: 48.76 KG/M2 | HEART RATE: 53 BPM | DIASTOLIC BLOOD PRESSURE: 60 MMHG | SYSTOLIC BLOOD PRESSURE: 153 MMHG | OXYGEN SATURATION: 98 % | RESPIRATION RATE: 18 BRPM | TEMPERATURE: 95.2 F | HEIGHT: 62 IN | WEIGHT: 265 LBS

## 2024-04-17 DIAGNOSIS — K52.9 COLITIS: ICD-10-CM

## 2024-04-17 DIAGNOSIS — S30.1XXA CONTUSION OF ABDOMINAL WALL, INITIAL ENCOUNTER: ICD-10-CM

## 2024-04-17 DIAGNOSIS — N28.9 RENAL INSUFFICIENCY: ICD-10-CM
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
## 2024-04-17 DIAGNOSIS — S09.90XA CLOSED HEAD INJURY, INITIAL ENCOUNTER: ICD-10-CM

## 2024-04-17 DIAGNOSIS — S70.02XA CONTUSION OF LEFT HIP, INITIAL ENCOUNTER: ICD-10-CM

## 2024-04-17 DIAGNOSIS — S80.01XA CONTUSION OF RIGHT KNEE, INITIAL ENCOUNTER: ICD-10-CM

## 2024-04-17 LAB
ALBUMIN SERPL BCP-MCNC: 4 G/DL (ref 3.4–5)
ALP SERPL-CCNC: 87 U/L (ref 33–136)
ALT SERPL W P-5'-P-CCNC: 8 U/L (ref 7–45)
ANION GAP SERPL CALC-SCNC: 11 MMOL/L (ref 10–20)
APPEARANCE UR: ABNORMAL
AST SERPL W P-5'-P-CCNC: 15 U/L (ref 9–39)
BACTERIA #/AREA URNS AUTO: ABNORMAL /HPF
BASOPHILS # BLD AUTO: 0.05 X10*3/UL (ref 0–0.1)
BASOPHILS NFR BLD AUTO: 0.5 %
BILIRUB DIRECT SERPL-MCNC: 0 MG/DL (ref 0–0.3)
BILIRUB SERPL-MCNC: 0.3 MG/DL (ref 0–1.2)
BILIRUB UR STRIP.AUTO-MCNC: NEGATIVE MG/DL
BUN SERPL-MCNC: 25 MG/DL (ref 6–23)
CALCIUM SERPL-MCNC: 9.1 MG/DL (ref 8.6–10.3)
CARDIAC TROPONIN I PNL SERPL HS: 3 NG/L (ref 0–13)
CARDIAC TROPONIN I PNL SERPL HS: <3 NG/L (ref 0–13)
CHLORIDE SERPL-SCNC: 103 MMOL/L (ref 98–107)
CK SERPL-CCNC: 57 U/L (ref 0–215)
CO2 SERPL-SCNC: 29 MMOL/L (ref 21–32)
COLOR UR: ABNORMAL
CREAT SERPL-MCNC: 1.16 MG/DL (ref 0.5–1.05)
EGFRCR SERPLBLD CKD-EPI 2021: 54 ML/MIN/1.73M*2
EOSINOPHIL # BLD AUTO: 0.15 X10*3/UL (ref 0–0.7)
EOSINOPHIL NFR BLD AUTO: 1.5 %
ERYTHROCYTE [DISTWIDTH] IN BLOOD BY AUTOMATED COUNT: 16 % (ref 11.5–14.5)
GLUCOSE SERPL-MCNC: 110 MG/DL (ref 74–99)
GLUCOSE UR STRIP.AUTO-MCNC: NEGATIVE MG/DL
HCT VFR BLD AUTO: 34.4 % (ref 36–46)
HGB BLD-MCNC: 10.7 G/DL (ref 12–16)
HYALINE CASTS #/AREA URNS AUTO: ABNORMAL /LPF
IMM GRANULOCYTES # BLD AUTO: 0.05 X10*3/UL (ref 0–0.7)
IMM GRANULOCYTES NFR BLD AUTO: 0.5 % (ref 0–0.9)
INR PPP: 1 (ref 0.9–1.1)
KETONES UR STRIP.AUTO-MCNC: NEGATIVE MG/DL
LACTATE SERPL-SCNC: 0.9 MMOL/L (ref 0.4–2)
LEUKOCYTE ESTERASE UR QL STRIP.AUTO: ABNORMAL
LYMPHOCYTES # BLD AUTO: 1.93 X10*3/UL (ref 1.2–4.8)
LYMPHOCYTES NFR BLD AUTO: 19.2 %
MCH RBC QN AUTO: 27.2 PG (ref 26–34)
MCHC RBC AUTO-ENTMCNC: 31.1 G/DL (ref 32–36)
MCV RBC AUTO: 88 FL (ref 80–100)
MONOCYTES # BLD AUTO: 1.04 X10*3/UL (ref 0.1–1)
MONOCYTES NFR BLD AUTO: 10.3 %
NEUTROPHILS # BLD AUTO: 6.83 X10*3/UL (ref 1.2–7.7)
NEUTROPHILS NFR BLD AUTO: 68 %
NITRITE UR QL STRIP.AUTO: NEGATIVE
NRBC BLD-RTO: 0 /100 WBCS (ref 0–0)
PH UR STRIP.AUTO: 5 [PH]
PLATELET # BLD AUTO: 309 X10*3/UL (ref 150–450)
POTASSIUM SERPL-SCNC: 4.9 MMOL/L (ref 3.5–5.3)
PROT SERPL-MCNC: 7.3 G/DL (ref 6.4–8.2)
PROT UR STRIP.AUTO-MCNC: NEGATIVE MG/DL
PROTHROMBIN TIME: 11 SECONDS (ref 9.8–12.8)
RBC # BLD AUTO: 3.93 X10*6/UL (ref 4–5.2)
RBC # UR STRIP.AUTO: NEGATIVE /UL
RBC #/AREA URNS AUTO: ABNORMAL /HPF
SODIUM SERPL-SCNC: 138 MMOL/L (ref 136–145)
SP GR UR STRIP.AUTO: 1.01
UROBILINOGEN UR STRIP.AUTO-MCNC: <2 MG/DL
WBC # BLD AUTO: 10.1 X10*3/UL (ref 4.4–11.3)
WBC #/AREA URNS AUTO: ABNORMAL /HPF

## 2024-04-17 PROCEDURE — 82374 ASSAY BLOOD CARBON DIOXIDE: CPT | Performed by: PHYSICIAN ASSISTANT

## 2024-04-17 PROCEDURE — 85025 COMPLETE CBC W/AUTO DIFF WBC: CPT | Performed by: PHYSICIAN ASSISTANT

## 2024-04-17 PROCEDURE — 36415 COLL VENOUS BLD VENIPUNCTURE: CPT | Performed by: PHYSICIAN ASSISTANT

## 2024-04-17 PROCEDURE — 84075 ASSAY ALKALINE PHOSPHATASE: CPT | Performed by: PHYSICIAN ASSISTANT

## 2024-04-17 PROCEDURE — 84484 ASSAY OF TROPONIN QUANT: CPT | Performed by: PHYSICIAN ASSISTANT

## 2024-04-17 PROCEDURE — 96375 TX/PRO/DX INJ NEW DRUG ADDON: CPT | Mod: 59

## 2024-04-17 PROCEDURE — 70450 CT HEAD/BRAIN W/O DYE: CPT | Performed by: RADIOLOGY

## 2024-04-17 PROCEDURE — 70450 CT HEAD/BRAIN W/O DYE: CPT

## 2024-04-17 PROCEDURE — 83605 ASSAY OF LACTIC ACID: CPT | Performed by: PHYSICIAN ASSISTANT

## 2024-04-17 PROCEDURE — 2500000004 HC RX 250 GENERAL PHARMACY W/ HCPCS (ALT 636 FOR OP/ED): Performed by: PHYSICIAN ASSISTANT

## 2024-04-17 PROCEDURE — 99285 EMERGENCY DEPT VISIT HI MDM: CPT | Mod: 25

## 2024-04-17 PROCEDURE — 72131 CT LUMBAR SPINE W/O DYE: CPT | Mod: RCN

## 2024-04-17 PROCEDURE — 74177 CT ABD & PELVIS W/CONTRAST: CPT | Performed by: RADIOLOGY

## 2024-04-17 PROCEDURE — 73502 X-RAY EXAM HIP UNI 2-3 VIEWS: CPT | Mod: LT

## 2024-04-17 PROCEDURE — 72131 CT LUMBAR SPINE W/O DYE: CPT | Performed by: RADIOLOGY

## 2024-04-17 PROCEDURE — 74177 CT ABD & PELVIS W/CONTRAST: CPT

## 2024-04-17 PROCEDURE — 2550000001 HC RX 255 CONTRASTS: Performed by: PHYSICIAN ASSISTANT

## 2024-04-17 PROCEDURE — 81001 URINALYSIS AUTO W/SCOPE: CPT | Performed by: PHYSICIAN ASSISTANT

## 2024-04-17 PROCEDURE — 73502 X-RAY EXAM HIP UNI 2-3 VIEWS: CPT | Mod: LEFT SIDE | Performed by: RADIOLOGY

## 2024-04-17 PROCEDURE — 82550 ASSAY OF CK (CPK): CPT | Performed by: PHYSICIAN ASSISTANT

## 2024-04-17 PROCEDURE — 72125 CT NECK SPINE W/O DYE: CPT

## 2024-04-17 PROCEDURE — 72125 CT NECK SPINE W/O DYE: CPT | Performed by: RADIOLOGY

## 2024-04-17 PROCEDURE — 87086 URINE CULTURE/COLONY COUNT: CPT | Mod: ELYLAB | Performed by: PHYSICIAN ASSISTANT

## 2024-04-17 PROCEDURE — 72128 CT CHEST SPINE W/O DYE: CPT | Mod: RCN

## 2024-04-17 PROCEDURE — 73560 X-RAY EXAM OF KNEE 1 OR 2: CPT | Mod: RT

## 2024-04-17 PROCEDURE — 72128 CT CHEST SPINE W/O DYE: CPT | Performed by: RADIOLOGY

## 2024-04-17 PROCEDURE — 93005 ELECTROCARDIOGRAM TRACING: CPT

## 2024-04-17 PROCEDURE — 85610 PROTHROMBIN TIME: CPT | Performed by: PHYSICIAN ASSISTANT

## 2024-04-17 PROCEDURE — 71260 CT THORAX DX C+: CPT | Performed by: RADIOLOGY

## 2024-04-17 PROCEDURE — 73560 X-RAY EXAM OF KNEE 1 OR 2: CPT | Mod: RIGHT SIDE | Performed by: RADIOLOGY

## 2024-04-17 PROCEDURE — 96374 THER/PROPH/DIAG INJ IV PUSH: CPT | Mod: 59

## 2024-04-17 PROCEDURE — 96361 HYDRATE IV INFUSION ADD-ON: CPT

## 2024-04-17 RX ORDER — MORPHINE SULFATE 4 MG/ML
4 INJECTION, SOLUTION INTRAMUSCULAR; INTRAVENOUS ONCE
Status: COMPLETED | OUTPATIENT
Start: 2024-04-17 | End: 2024-04-17

## 2024-04-17 RX ORDER — ONDANSETRON HYDROCHLORIDE 2 MG/ML
4 INJECTION, SOLUTION INTRAVENOUS ONCE
Status: COMPLETED | OUTPATIENT
Start: 2024-04-17 | End: 2024-04-17

## 2024-04-17 RX ORDER — NAPROXEN 500 MG/1
500 TABLET ORAL
Qty: 30 TABLET | Refills: 0 | Status: SHIPPED | OUTPATIENT
Start: 2024-04-17 | End: 2024-05-02

## 2024-04-17 RX ORDER — METHOCARBAMOL 500 MG/1
500 TABLET, FILM COATED ORAL 2 TIMES DAILY
Qty: 20 TABLET | Refills: 0 | Status: SHIPPED | OUTPATIENT
Start: 2024-04-17 | End: 2024-04-27

## 2024-04-17 RX ORDER — DICYCLOMINE HYDROCHLORIDE 20 MG/1
20 TABLET ORAL 4 TIMES DAILY PRN
Qty: 20 TABLET | Refills: 0 | Status: SHIPPED | OUTPATIENT
Start: 2024-04-17 | End: 2024-04-22

## 2024-04-17 RX ADMIN — IOHEXOL 100 ML: 350 INJECTION, SOLUTION INTRAVENOUS at 21:23

## 2024-04-17 RX ADMIN — MORPHINE SULFATE 4 MG: 4 INJECTION, SOLUTION INTRAMUSCULAR; INTRAVENOUS at 20:41

## 2024-04-17 RX ADMIN — ONDANSETRON 4 MG: 2 INJECTION INTRAMUSCULAR; INTRAVENOUS at 20:41

## 2024-04-17 RX ADMIN — SODIUM CHLORIDE, POTASSIUM CHLORIDE, SODIUM LACTATE AND CALCIUM CHLORIDE 1000 ML: 600; 310; 30; 20 INJECTION, SOLUTION INTRAVENOUS at 20:41

## 2024-04-17 ASSESSMENT — LIFESTYLE VARIABLES
EVER FELT BAD OR GUILTY ABOUT YOUR DRINKING: NO
EVER HAD A DRINK FIRST THING IN THE MORNING TO STEADY YOUR NERVES TO GET RID OF A HANGOVER: NO
HAVE PEOPLE ANNOYED YOU BY CRITICIZING YOUR DRINKING: NO
HAVE YOU EVER FELT YOU SHOULD CUT DOWN ON YOUR DRINKING: NO
TOTAL SCORE: 0

## 2024-04-17 ASSESSMENT — PAIN - FUNCTIONAL ASSESSMENT: PAIN_FUNCTIONAL_ASSESSMENT: 0-10

## 2024-04-17 ASSESSMENT — PAIN SCALES - GENERAL
PAINLEVEL_OUTOF10: 3
PAINLEVEL_OUTOF10: 8

## 2024-04-18 LAB
ATRIAL RATE: 56 BPM
HOLD SPECIMEN: NORMAL
P AXIS: 31 DEGREES
P OFFSET: 190 MS
P ONSET: 141 MS
PR INTERVAL: 150 MS
Q ONSET: 216 MS
QRS COUNT: 9 BEATS
QRS DURATION: 82 MS
QT INTERVAL: 438 MS
QTC CALCULATION(BAZETT): 422 MS
QTC FREDERICIA: 428 MS
R AXIS: 48 DEGREES
T AXIS: -2 DEGREES
T OFFSET: 435 MS
VENTRICULAR RATE: 56 BPM

## 2024-04-18 NOTE — ED PROVIDER NOTES
HPI   Chief Complaint   Patient presents with   • Fall     Pt fell in a creek and was down for about an hour. Pain generalized through out body. No blood thinner or LOC.       59-year-old female presents emergency room with chief complaint of multiple injuries after slipping and falling down to a creek.  Patient states she was in the backyard picking up branches and leaves when she slipped on the mud causing her to stumble backwards and slide down into a creek.  Patient states that she laid on the ground for well over an hour calling out for help before her neighbor heard her and called 911.  Patient complains of injury to her right knee left hip and left side of her abdomen.  She denies any head injury, neck pain or back pain.  The patient is not on blood thinners.  The patient states that after she fell she was too weak to get up.  She denies any blurry vision, chest pain, heart palpitations, cough, dyspnea, nausea vomiting or diarrhea.  Patient was transported by EMS no medications were administered prior to arrival from home.  Patient is a past medical history of type 2 diabetes, elevated BMI, mixed hyperlipidemia, history of syncope, hypomagnesemia.  She denies any other complaints.      History provided by:  Patient and medical records                      Mercedes Coma Scale Score: 15                     Patient History   No past medical history on file.  No past surgical history on file.  No family history on file.  Social History     Tobacco Use   • Smoking status: Never   • Smokeless tobacco: Never   Substance Use Topics   • Alcohol use: Not on file   • Drug use: Not on file       Physical Exam   ED Triage Vitals [04/17/24 1903]   Temperature Heart Rate Respirations BP   35.1 °C (95.2 °F) 57 19 137/65      Pulse Ox Temp Source Heart Rate Source Patient Position   98 % Temporal Monitor Sitting      BP Location FiO2 (%)     Left arm --       Physical Exam  Vitals and nursing note reviewed. Exam conducted with  a chaperone present.   Constitutional:       General: She is awake. She is not in acute distress.     Appearance: Normal appearance. She is overweight. She is not ill-appearing, toxic-appearing or diaphoretic.   HENT:      Head: Normocephalic and atraumatic. No raccoon eyes, Hernandez's sign, abrasion, contusion, masses, right periorbital erythema, left periorbital erythema or laceration.      Jaw: There is normal jaw occlusion. No tenderness or swelling.      Right Ear: Hearing, tympanic membrane, ear canal and external ear normal.      Left Ear: Hearing, tympanic membrane, ear canal and external ear normal.      Nose: Nose normal. No signs of injury or nasal tenderness.      Mouth/Throat:      Lips: Pink.      Mouth: Mucous membranes are moist. No injury.      Pharynx: Oropharynx is clear.   Eyes:      Extraocular Movements: Extraocular movements intact.      Conjunctiva/sclera: Conjunctivae normal.      Pupils: Pupils are equal, round, and reactive to light.   Neck:      Trachea: Trachea and phonation normal. No tracheal tenderness.   Cardiovascular:      Rate and Rhythm: Normal rate and regular rhythm.      Pulses: Normal pulses.           Carotid pulses are 2+ on the right side and 2+ on the left side.       Radial pulses are 2+ on the right side and 2+ on the left side.        Femoral pulses are 2+ on the right side and 2+ on the left side.       Popliteal pulses are 2+ on the right side and 2+ on the left side.        Dorsalis pedis pulses are 2+ on the right side and 2+ on the left side.        Posterior tibial pulses are 2+ on the right side and 2+ on the left side.      Heart sounds: Normal heart sounds.   Pulmonary:      Effort: Pulmonary effort is normal.      Breath sounds: Normal breath sounds. No wheezing, rhonchi or rales.   Abdominal:      General: Abdomen is flat. Bowel sounds are normal.      Palpations: Abdomen is soft. There is no mass.      Tenderness: There is abdominal tenderness in the left  upper quadrant and left lower quadrant. There is no guarding.          Comments: Less abdominal wall shows contusion with bruising, no guarding or rebound, no distention intact pulses   Musculoskeletal:         General: No swelling.      Right shoulder: Normal.      Left shoulder: Normal.      Right upper arm: Normal.      Left upper arm: Normal.      Right elbow: Normal.      Left elbow: Normal.      Right forearm: Normal.      Left forearm: Normal.      Right wrist: Normal.      Left wrist: Normal.      Right hand: Normal.      Left hand: Normal.      Cervical back: Normal, full passive range of motion without pain, normal range of motion and neck supple. No edema, erythema, signs of trauma, rigidity, torticollis or crepitus. No pain with movement, spinous process tenderness or muscular tenderness. Normal range of motion.      Thoracic back: Normal. No bony tenderness.      Lumbar back: Normal. No bony tenderness.      Right hip: Normal.      Left hip: Tenderness and bony tenderness present.      Right upper leg: Normal.      Left upper leg: Normal.      Right knee: Bony tenderness present. Decreased range of motion. Tenderness present.      Left knee: Normal.      Right lower le+ Edema present.      Left lower le+ Edema present.      Right ankle: Normal.      Left ankle: Normal.      Right foot: Normal.      Left foot: Normal.        Legs:       Comments: Limited flexion extension of the left hip and right knee, no shortening of the extremities, both lower extremities are edematous which is chronic, no open wounds, intact distal pulses   Skin:     General: Skin is warm and dry.      Capillary Refill: Capillary refill takes less than 2 seconds.      Findings: No rash.   Neurological:      General: No focal deficit present.      Mental Status: She is alert and oriented to person, place, and time. Mental status is at baseline.   Psychiatric:         Mood and Affect: Mood normal.         Behavior: Behavior  normal. Behavior is cooperative.         Thought Content: Thought content normal.         Judgment: Judgment normal.         ED Course & MDM   Diagnoses as of 04/17/24 2330   Fall, initial encounter   Closed head injury, initial encounter   Contusion of abdominal wall, initial encounter   Contusion of left hip, initial encounter   Contusion of right knee, initial encounter   Colitis   Renal insufficiency       Medical Decision Making  Temperature is 35.1 heart rate 57 respirations 19 /65 pulse ox was 90% room air  Patient was given a liter of LR wide open, morphine 4 mg IV push Zofran 4 mg IV push.    EKG interpreted by me at 2009 hrs. sinus bradycardia rate 56  QRS was 82  QTc was 422 no ischemic changes  Lab results reviewed, white count 10.1 hemoglobin 10.7 hematocrit 34.4 platelet count was 309, metabolic glucose 110 BUN 25 creatinine 1.16 with GFR 54 acute renal insufficiency, hepatic function panel is unremarkable lactic 0.9 CK 57 PT 11 INR 1.0 first troponin was less than 3 repeat was 3, urinalysis shows small leuks jenn color hazy appearance 1 bacteria 2 hyaline casts 1-5 white cells 1-2 red cells most likely contaminant culture is in process.  X-ray of the right knee shows no acute fracture but does show degenerative changes, x-ray of left hip and pelvis shows degenerative changes without fracture, CT scan of chest on pelvis shows no evidence of acute intrathoracic trauma however does show colitis on the abdomen.  The patient states she is aware of this that she has Crohn's and takes Bentyl.  CT scan of the lumbar thoracic and cervical spine showed degenerative changes without evidence of traumatic subluxation or fracture.  CT scan of the head shows no evidence of acute intracranial hemorrhage or mass effect.  I rounded on the patient discussed results of workup repeat exam the patient ambulates to the bathroom twice and feels comfortable going home.  I discussed results of workup with  patient and family members.  Repeat BP upon discharge 153/60 heart rate 53 respirations 18 pulse ox is 90% room air  Patient was discharged home with prescription for naproxen, Robaxin and will follow-up with her PCP return with any concerns or worsening of symptoms all questions answered prior to discharge        Procedure  Procedures     Florentin Ontiveros PA-C  04/17/24 4304

## 2024-04-19 LAB — BACTERIA UR CULT: NORMAL

## 2024-05-01 ENCOUNTER — OFFICE VISIT (OUTPATIENT)
Dept: FAMILY MEDICINE CLINIC | Age: 59
End: 2024-05-01
Payer: MEDICARE

## 2024-05-01 VITALS
HEIGHT: 62 IN | SYSTOLIC BLOOD PRESSURE: 126 MMHG | WEIGHT: 270 LBS | OXYGEN SATURATION: 96 % | RESPIRATION RATE: 17 BRPM | HEART RATE: 68 BPM | DIASTOLIC BLOOD PRESSURE: 60 MMHG | BODY MASS INDEX: 49.69 KG/M2

## 2024-05-01 DIAGNOSIS — K50.919 CROHN'S DISEASE WITH COMPLICATION, UNSPECIFIED GASTROINTESTINAL TRACT LOCATION (HCC): ICD-10-CM
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
## 2024-05-01 DIAGNOSIS — T14.8XXA ABRASION: Primary | ICD-10-CM

## 2024-05-01 PROCEDURE — 3078F DIAST BP <80 MM HG: CPT | Performed by: FAMILY MEDICINE

## 2024-05-01 PROCEDURE — 99213 OFFICE O/P EST LOW 20 MIN: CPT | Performed by: FAMILY MEDICINE

## 2024-05-01 PROCEDURE — 3074F SYST BP LT 130 MM HG: CPT | Performed by: FAMILY MEDICINE

## 2024-05-01 RX ORDER — METHYLPREDNISOLONE 4 MG/1
TABLET ORAL
Qty: 1 KIT | Refills: 1 | Status: SHIPPED | OUTPATIENT
Start: 2024-05-01 | End: 2024-05-07

## 2024-05-01 RX ORDER — CEPHALEXIN 500 MG/1
500 CAPSULE ORAL 3 TIMES DAILY
Qty: 21 CAPSULE | Refills: 0 | Status: SHIPPED | OUTPATIENT
Start: 2024-05-01 | End: 2024-05-08

## 2024-05-01 RX ORDER — METHOCARBAMOL 500 MG/1
500 TABLET, FILM COATED ORAL 3 TIMES DAILY
COMMUNITY

## 2024-05-01 RX ORDER — METHOCARBAMOL 500 MG/1
500 TABLET, FILM COATED ORAL 4 TIMES DAILY
Qty: 40 TABLET | Refills: 1 | Status: SHIPPED | OUTPATIENT
Start: 2024-05-01 | End: 2024-05-21

## 2024-05-01 SDOH — ECONOMIC STABILITY: INCOME INSECURITY: HOW HARD IS IT FOR YOU TO PAY FOR THE VERY BASICS LIKE FOOD, HOUSING, MEDICAL CARE, AND HEATING?: NOT HARD AT ALL

## 2024-05-01 SDOH — ECONOMIC STABILITY: FOOD INSECURITY: WITHIN THE PAST 12 MONTHS, THE FOOD YOU BOUGHT JUST DIDN'T LAST AND YOU DIDN'T HAVE MONEY TO GET MORE.: NEVER TRUE

## 2024-05-01 SDOH — ECONOMIC STABILITY: FOOD INSECURITY: WITHIN THE PAST 12 MONTHS, YOU WORRIED THAT YOUR FOOD WOULD RUN OUT BEFORE YOU GOT MONEY TO BUY MORE.: NEVER TRUE

## 2024-05-01 ASSESSMENT — ENCOUNTER SYMPTOMS
RHINORRHEA: 0
DIARRHEA: 1
EYES NEGATIVE: 1
ABDOMINAL PAIN: 1
RESPIRATORY NEGATIVE: 1
CHEST TIGHTNESS: 0
COUGH: 0

## 2024-05-01 NOTE — PROGRESS NOTES
Encounter   Medications    methylPREDNISolone (MEDROL DOSEPACK) 4 MG tablet     Sig: Take by mouth.     Dispense:  1 kit     Refill:  1    cephALEXin (KEFLEX) 500 MG capsule     Sig: Take 1 capsule by mouth 3 times daily for 7 days     Dispense:  21 capsule     Refill:  0     No follow-ups on file.  Samir Harris MD

## 2024-05-09 ENCOUNTER — OFFICE VISIT (OUTPATIENT)
Dept: CARDIOLOGY CLINIC | Age: 59
End: 2024-05-09
Payer: MEDICARE

## 2024-05-09 VITALS
DIASTOLIC BLOOD PRESSURE: 68 MMHG | WEIGHT: 264 LBS | HEART RATE: 72 BPM | SYSTOLIC BLOOD PRESSURE: 128 MMHG | BODY MASS INDEX: 48.29 KG/M2

## 2024-05-09 DIAGNOSIS — R55 SYNCOPE, UNSPECIFIED SYNCOPE TYPE: ICD-10-CM

## 2024-05-09 DIAGNOSIS — I10 BENIGN ESSENTIAL HYPERTENSION: ICD-10-CM

## 2024-05-09 DIAGNOSIS — E78.2 MIXED HYPERLIPIDEMIA: ICD-10-CM

## 2024-05-09 DIAGNOSIS — I10 BENIGN ESSENTIAL HTN: Primary | ICD-10-CM

## 2024-05-09 DIAGNOSIS — E66.9 OBESITY WITH BODY MASS INDEX 30 OR GREATER: ICD-10-CM

## 2024-05-09 DIAGNOSIS — E66.01 MORBID OBESITY (HCC): ICD-10-CM

## 2024-05-09 DIAGNOSIS — Z87.898 HISTORY OF SYNCOPE: ICD-10-CM

## 2024-05-09 PROCEDURE — 3078F DIAST BP <80 MM HG: CPT | Performed by: INTERNAL MEDICINE

## 2024-05-09 PROCEDURE — 93000 ELECTROCARDIOGRAM COMPLETE: CPT | Performed by: INTERNAL MEDICINE

## 2024-05-09 PROCEDURE — 3074F SYST BP LT 130 MM HG: CPT | Performed by: INTERNAL MEDICINE

## 2024-05-09 PROCEDURE — 99214 OFFICE O/P EST MOD 30 MIN: CPT | Performed by: INTERNAL MEDICINE

## 2024-05-09 NOTE — PROGRESS NOTES
Chief Complaint   Patient presents with    Hypertension       5-24-18:Patient presents for initial medical evaluation. Patient is followed on a regular basis by Samir Cerda MD. Having b/l LE edema and was seen by dr. Chavarria and states her veins were ok.No hx of cardiac disease. No hx of DVT/PE. Worse over the past few months. Does have SOB on exertion.  Has been gaining weight rapidly due to LE edema. Not on any CCB or steroids. Pt denies chest pain, dyspnea,  change in exercise capacity, fatigue,  nausea, vomiting, diarrhea, constipation, motor weakness, insomnia, weight loss, syncope, dizziness, lightheadedness, palpitations, PND, orthopnea, or claudication.  BP and hr are good. No LE discoloration or ulcers. No LE edema.  Lipid profile is normal. No recent hospitalization. No change in meds.     Hx of DM, HTN, HLP.  No smoking.     6-28-18: LE edema improved with lasix 40mg BID. Has lost 23 pounds. S/p EHCO with EF of 65%, no valve abn, RVSP of 32mmHg, normal diastolic function. Pt denies chest pain, dyspnea, dyspnea on exertion, change in exercise capacity, fatigue,  nausea, vomiting, diarrhea, constipation, motor weakness, insomnia, weight loss, syncope, dizziness, lightheadedness,  PND, orthopnea, or claudication. No nitro use. BP and hr are good. CAD is stable. No LE discoloration or ulcers. +  LE edema. No CHF type symptoms. Lipid profile is normal. No recent hospitalization. No change in meds. Does have palpitations at times. BMP with CR of 1.09, GFR of 52.       12-27-18: + dyspnea, dyspnea on exertion, change in exercise capacity, fatigue.  Pt denies chest pain,  nausea, vomiting, diarrhea, constipation, motor weakness, insomnia, weight loss, syncope, dizziness, lightheadedness, palpitations, PND, orthopnea, or claudication. No nitro use. BP and hr are good. CAD is stable. No LE discoloration or ulcers.  No CHF type symptoms. Lipid profile is normal. No recent hospitalization. No change in meds.

## 2024-07-09 DIAGNOSIS — M47.9 SPONDYLOSIS: ICD-10-CM

## 2024-07-09 DIAGNOSIS — M54.9 CHRONIC BACK PAIN GREATER THAN 3 MONTHS DURATION: ICD-10-CM

## 2024-07-09 DIAGNOSIS — M79.7 FIBROMYALGIA: ICD-10-CM

## 2024-07-09 DIAGNOSIS — G89.29 CHRONIC BACK PAIN GREATER THAN 3 MONTHS DURATION: ICD-10-CM

## 2024-07-10 RX ORDER — PREGABALIN 150 MG/1
CAPSULE ORAL
Qty: 90 CAPSULE | Refills: 2 | Status: SHIPPED | OUTPATIENT
Start: 2024-07-10 | End: 2024-10-09

## 2024-07-25 NOTE — TELEPHONE ENCOUNTER
Patient Education    [facility] HAND[facility] PAT[facility]  18 THROUGH 21 YEAR VISITS  Here are some suggestions from [employer] experts that may be of value to your family.     HOW YOU ARE DOING  Enjoy spending time with your family.  Find activities you are really interested in, such as sports, theater, or volunteering.  Try to be responsible for your schoolwork or work obligations.  Always talk through problems and never use violence.  If you get angry with someone, try to walk away.  If you feel unsafe in your home or have been hurt by someone, let us know. Hotlines and community agencies can also provide confidential help.  Talk with us if you are worried about your living or food situation. Community agencies and programs such as SNAP can help.  Don t smoke, vape, or use drugs. Avoid people who do when you can. Talk with us if you are worried about alcohol or drug use in your family.    YOUR DAILY LIFE  Visit the dentist at least twice a year.  Brush your teeth at least twice a day and floss once a day.  Be a healthy eater.  Have vegetables, fruits, lean protein, and whole grains at meals and snacks.  Limit fatty, sugary, salty foods that are low in nutrients, such as candy, chips, and ice cream.  Eat when you re hungry. Stop when you feel satisfied.  Eat breakfast.  Drink plenty of water.  Make sure to get enough calcium every day.  Have 3 or more servings of low-fat (1%) or fat-free milk and other low-fat dairy products, such as yogurt and cheese.  Women: Make sure to eat foods rich in folate, such as fortified grains and dark- green leafy vegetables.  Aim for at least 1 hour of physical activity every day.  Wear safety equipment when you play sports.  Get enough sleep.  Talk with us about managing your health care and insurance as an adult.    YOUR FEELINGS  Most people have ups and downs. If you are feeling sad, depressed, nervous, irritable, hopeless, or angry, let us know or reach out to another health  Patient is calling in stating that she was told if she was not feeling better she should call back. Her pain is not any better and her ankle is now bruised. Stated the med she received only helped for a short period. What is her next step? care professional.  Figure out healthy ways to deal with stress.  Try your best to solve problems and make decisions on your own.  Sexuality is an important part of your life. If you have any questions or concerns, we are here for you.    HEALTHY BEHAVIOR CHOICES  Avoid using drugs, alcohol, tobacco, steroids, and diet pills. Support friends who choose not to use.  If you use drugs or alcohol, let us know or talk with another trusted adult about it. We can help you with quitting or cutting down on your use.  Make healthy decisions about your sexual behavior.  If you are sexually active, always practice safe sex. Always use birth control along with a condom to prevent pregnancy and sexually transmitted infections.  All sexual activity should be something you want. No one should ever force or try to convince you.  Protect your hearing at work, home, and concerts. Keep your earbud volume down.    STAYING SAFE  Always be a safe and cautious .  Insist that everyone use a lap and shoulder seat belt.  Limit the number of friends in the car and avoid driving at night.  Avoid distractions. Never text or talk on the phone while you drive.  Do not ride in a vehicle with someone who has been using drugs or alcohol.  If you feel unsafe driving or riding with someone, call someone you trust to drive you.  Wear helmets and protective gear while playing sports. Wear a helmet when riding a bike, a motorcycle, or an ATV or when skiing or skateboarding.  Always use sunscreen and a hat when you re outside.  Fighting and carrying weapons can be dangerous. Talk with your parents, teachers, or doctor about how to avoid these situations.        Consistent with Bright Futures: Guidelines for Health Supervision of Infants, Children, and Adolescents, 4th Edition  For more information, go to https://brightfutures.aap.org.

## 2024-08-01 DIAGNOSIS — M79.7 FIBROMYALGIA: ICD-10-CM

## 2024-08-01 DIAGNOSIS — K50.919 CROHN'S DISEASE WITH COMPLICATION, UNSPECIFIED GASTROINTESTINAL TRACT LOCATION (HCC): ICD-10-CM

## 2024-08-01 DIAGNOSIS — I10 BENIGN ESSENTIAL HTN: ICD-10-CM

## 2024-08-01 DIAGNOSIS — J45.20 MILD INTERMITTENT ASTHMA WITHOUT COMPLICATION: ICD-10-CM

## 2024-08-01 DIAGNOSIS — G89.29 CHRONIC BACK PAIN GREATER THAN 3 MONTHS DURATION: ICD-10-CM

## 2024-08-01 DIAGNOSIS — M47.9 SPONDYLOSIS: ICD-10-CM

## 2024-08-01 DIAGNOSIS — M54.9 CHRONIC BACK PAIN GREATER THAN 3 MONTHS DURATION: ICD-10-CM

## 2024-08-01 RX ORDER — OMEPRAZOLE 40 MG/1
40 CAPSULE, DELAYED RELEASE ORAL 2 TIMES DAILY
Qty: 200 CAPSULE | Refills: 2 | Status: SHIPPED | OUTPATIENT
Start: 2024-08-01

## 2024-08-01 RX ORDER — MONTELUKAST SODIUM 10 MG/1
TABLET ORAL
Qty: 100 TABLET | Refills: 2 | Status: SHIPPED | OUTPATIENT
Start: 2024-08-01

## 2024-08-01 RX ORDER — ALLOPURINOL 100 MG/1
100 TABLET ORAL DAILY
Qty: 100 TABLET | Refills: 2 | Status: SHIPPED | OUTPATIENT
Start: 2024-08-01

## 2024-08-01 RX ORDER — FUROSEMIDE 40 MG/1
40 TABLET ORAL 2 TIMES DAILY
Qty: 200 TABLET | Refills: 2 | Status: SHIPPED | OUTPATIENT
Start: 2024-08-01

## 2024-08-01 RX ORDER — PIOGLITAZONEHYDROCHLORIDE 15 MG/1
15 TABLET ORAL DAILY
Qty: 100 TABLET | Refills: 2 | Status: SHIPPED | OUTPATIENT
Start: 2024-08-01

## 2024-08-01 RX ORDER — MELOXICAM 7.5 MG/1
TABLET ORAL
Qty: 200 TABLET | Refills: 2 | Status: SHIPPED | OUTPATIENT
Start: 2024-08-01

## 2024-08-01 RX ORDER — POTASSIUM CHLORIDE 20 MEQ/1
TABLET, EXTENDED RELEASE ORAL
Qty: 200 TABLET | Refills: 2 | Status: SHIPPED | OUTPATIENT
Start: 2024-08-01

## 2024-08-12 ENCOUNTER — OFFICE VISIT (OUTPATIENT)
Dept: FAMILY MEDICINE CLINIC | Age: 59
End: 2024-08-12
Payer: MEDICARE

## 2024-08-12 VITALS
DIASTOLIC BLOOD PRESSURE: 60 MMHG | RESPIRATION RATE: 17 BRPM | OXYGEN SATURATION: 98 % | HEIGHT: 62 IN | BODY MASS INDEX: 48.76 KG/M2 | HEART RATE: 65 BPM | SYSTOLIC BLOOD PRESSURE: 114 MMHG | WEIGHT: 265 LBS

## 2024-08-12 DIAGNOSIS — M1A.9XX0 CHRONIC GOUT WITHOUT TOPHUS, UNSPECIFIED CAUSE, UNSPECIFIED SITE: ICD-10-CM

## 2024-08-12 DIAGNOSIS — I10 BENIGN ESSENTIAL HTN: ICD-10-CM

## 2024-08-12 DIAGNOSIS — E11.9 TYPE 2 DIABETES MELLITUS WITHOUT COMPLICATION, WITHOUT LONG-TERM CURRENT USE OF INSULIN (HCC): Primary | ICD-10-CM

## 2024-08-12 DIAGNOSIS — K50.919 CROHN'S DISEASE WITH COMPLICATION, UNSPECIFIED GASTROINTESTINAL TRACT LOCATION (HCC): ICD-10-CM

## 2024-08-12 LAB
HBA1C MFR BLD: 6.6 %
URATE SERPL-MCNC: 6 MG/DL (ref 2.4–5.7)

## 2024-08-12 PROCEDURE — 3074F SYST BP LT 130 MM HG: CPT | Performed by: FAMILY MEDICINE

## 2024-08-12 PROCEDURE — 3078F DIAST BP <80 MM HG: CPT | Performed by: FAMILY MEDICINE

## 2024-08-12 PROCEDURE — 83036 HEMOGLOBIN GLYCOSYLATED A1C: CPT | Performed by: FAMILY MEDICINE

## 2024-08-12 PROCEDURE — 99214 OFFICE O/P EST MOD 30 MIN: CPT | Performed by: FAMILY MEDICINE

## 2024-08-12 PROCEDURE — 3044F HG A1C LEVEL LT 7.0%: CPT | Performed by: FAMILY MEDICINE

## 2024-08-12 ASSESSMENT — ENCOUNTER SYMPTOMS
RHINORRHEA: 0
RESPIRATORY NEGATIVE: 1
EYES NEGATIVE: 1
GASTROINTESTINAL NEGATIVE: 1
COUGH: 0
CHEST TIGHTNESS: 0

## 2024-08-12 NOTE — PROGRESS NOTES
days     Minutes of Exercise per Session: 30 min   Housing Stability: Unknown (5/1/2024)    Housing Stability Vital Sign     Unstable Housing in the Last Year: No     Current Outpatient Medications   Medication Sig Dispense Refill    allopurinol (ZYLOPRIM) 100 MG tablet TAKE 1 TABLET BY MOUTH DAILY 100 tablet 2    potassium chloride (KLOR-CON M) 20 MEQ extended release tablet TAKE 1 TABLET BY MOUTH TWICE  DAILY 200 tablet 2    pioglitazone (ACTOS) 15 MG tablet TAKE 1 TABLET BY MOUTH DAILY 100 tablet 2    montelukast (SINGULAIR) 10 MG tablet TAKE 1 TABLET BY MOUTH AT NIGHT 100 tablet 2    meloxicam (MOBIC) 7.5 MG tablet TAKE 1 TABLET BY MOUTH TWICE  DAILY 200 tablet 2    furosemide (LASIX) 40 MG tablet TAKE 1 TABLET BY MOUTH TWICE  DAILY 200 tablet 2    omeprazole (PRILOSEC) 40 MG delayed release capsule TAKE 1 CAPSULE BY MOUTH TWICE  DAILY 200 capsule 2    pregabalin (LYRICA) 150 MG capsule TAKE ONE CAPSULE BY MOUTH THREE TIMES A DAY 90 capsule 2    methocarbamol (ROBAXIN) 500 MG tablet Take 1 tablet by mouth 3 times daily      dicyclomine (BENTYL) 10 MG capsule TAKE 1 CAPSULE BY MOUTH 4 TIMES A DAY (BEFORE MEALS AND NIGHTLY). 120 capsule 3    mometasone (ELOCON) 0.1 % cream apply topically daily 45 g 3    ipratropium 0.5 mg-albuterol 2.5 mg (DUONEB) 0.5-2.5 (3) MG/3ML SOLN nebulizer solution INHALE 1 VIAL VIA NEBULIZER EVERY 4 HOURS 360 mL 5    albuterol sulfate HFA (PROVENTIL HFA) 108 (90 Base) MCG/ACT inhaler Inhale 2 puffs into the lungs every 6 hours as needed for Wheezing 1 each 3    baclofen (LIORESAL) 20 MG tablet Take 1 tablet by mouth 2 times daily 180 tablet 3    metFORMIN (GLUCOPHAGE) 500 MG tablet TAKE 1 TABLET BY MOUTH TWICE  DAILY WITH MEALS 200 tablet 2    pravastatin (PRAVACHOL) 40 MG tablet TAKE 1 TABLET BY MOUTH AT NIGHT 100 tablet 2    metoprolol succinate (TOPROL XL) 25 MG extended release tablet Take 1 tablet by mouth in the morning and at bedtime 180 tablet 3    RIZATRIPTAN BENZOATE PO Take by

## 2024-09-16 RX ORDER — PRAVASTATIN SODIUM 40 MG
40 TABLET ORAL NIGHTLY
Qty: 100 TABLET | Refills: 2 | Status: SHIPPED | OUTPATIENT
Start: 2024-09-16

## 2024-09-27 DIAGNOSIS — E11.42 TYPE 2 DIABETES MELLITUS WITH DIABETIC POLYNEUROPATHY, WITHOUT LONG-TERM CURRENT USE OF INSULIN (HCC): ICD-10-CM

## 2024-09-28 NOTE — TELEPHONE ENCOUNTER
requesting medication refill. Please approve or deny this request.    Rx requested:  Requested Prescriptions     Pending Prescriptions Disp Refills    metFORMIN (GLUCOPHAGE) 500 MG tablet [Pharmacy Med Name: metFORMIN HCl 500 MG Oral Tablet] 200 tablet 2     Sig: TAKE 1 TABLET BY MOUTH TWICE  DAILY WITH MEALS         Last Office Visit:   8/12/2024      Next Visit Date:  Future Appointments   Date Time Provider Department Center   2/10/2025  2:00 PM Samir Harris MD [facility] DEP   5/22/2025  1:00 PM Matthew Montalvo DO [facility]

## 2024-10-17 ENCOUNTER — OFFICE VISIT (OUTPATIENT)
Dept: FAMILY MEDICINE CLINIC | Age: 59
End: 2024-10-17

## 2024-10-17 VITALS
WEIGHT: 258 LBS | DIASTOLIC BLOOD PRESSURE: 60 MMHG | BODY MASS INDEX: 47.48 KG/M2 | HEIGHT: 62 IN | RESPIRATION RATE: 17 BRPM | SYSTOLIC BLOOD PRESSURE: 112 MMHG | HEART RATE: 78 BPM | OXYGEN SATURATION: 97 %

## 2024-10-17 DIAGNOSIS — R11.2 NAUSEA AND VOMITING, UNSPECIFIED VOMITING TYPE: ICD-10-CM

## 2024-10-17 DIAGNOSIS — K50.919 CROHN'S DISEASE WITH COMPLICATION, UNSPECIFIED GASTROINTESTINAL TRACT LOCATION (HCC): Primary | ICD-10-CM

## 2024-10-17 RX ORDER — ONDANSETRON 4 MG/1
4 TABLET, FILM COATED ORAL 3 TIMES DAILY PRN
Qty: 30 TABLET | Refills: 3 | Status: SHIPPED | OUTPATIENT
Start: 2024-10-17

## 2024-10-17 RX ORDER — PREDNISONE 10 MG/1
TABLET ORAL
Qty: 40 TABLET | Refills: 0 | Status: SHIPPED | OUTPATIENT
Start: 2024-10-17

## 2024-10-17 ASSESSMENT — ENCOUNTER SYMPTOMS
ABDOMINAL PAIN: 1
RHINORRHEA: 0
CHEST TIGHTNESS: 0
BLOOD IN STOOL: 0
COUGH: 0
DIARRHEA: 1
NAUSEA: 1
VOMITING: 1
RESPIRATORY NEGATIVE: 1
EYES NEGATIVE: 1

## 2024-10-17 NOTE — PROGRESS NOTES
Patient is seen in follow up for   Chief Complaint   Patient presents with    GI Problem     Diarrhea,pain,nausea,sweating, unable to eat,dehydrated     GI Problem  The primary symptoms include abdominal pain, nausea, vomiting and diarrhea. Primary symptoms do not include fever or fatigue.   here for follow up on constanza which is flairing.    Past Medical History:   Diagnosis Date    Anemia     Asthma     Benign essential HTN     meds > 5 yrs / denies TIA or stroke    Borderline personality disorder (HCC)     2016 ?suggested by me-meets many criteria    Carpal tunnel syndrome of right wrist     Crohn's disease (HCC)     Depression     Fibromyalgia 02/13/2018    Fibromyalgia     GERD (gastroesophageal reflux disease)     Gout     Headache     migraines    History of syncope 5/16/2023    Irritable bowel syndrome     Mixed hyperlipidemia 05/10/2017    Neuropathy     Obesity (BMI 35.0-39.9 without comorbidity) 03/30/2017    PONV (postoperative nausea and vomiting)     nausea    PTSD (post-traumatic stress disorder)     Tremor of unknown origin     Type 2 diabetes mellitus ([facility])     meds > 5yrs     Ulcerative colitis ([facility]) 11/2017    Vaginitis      Patient Active Problem List    Diagnosis Date Noted    Migraine aura occurring with and without headache 03/04/2023    Confusion 03/04/2023    Syncope and collapse 03/03/2023    History of syncope 05/16/2023    Ventral hernia 02/28/2023    Right upper quadrant abdominal pain 02/09/2022    Diarrhea 02/09/2022    Fever 02/09/2022    Acute bacterial sinusitis 12/06/2021    Bronchitis 12/06/2021    Gastroenteritis 12/21/2020    Injury of left foot 12/21/2020    Leukocytosis 12/21/2020    Nausea and vomiting 12/21/2020    Shoulder pain 12/21/2020    Morbid obesity 09/29/2020    Headache 12/30/2019    Tremor 12/30/2019    Meralgia paresthetica of left side 12/30/2019    Intractable migraine without aura and without status migrainosus 12/30/2019    Iron deficiency anemia 11/13/2018

## 2024-12-17 ENCOUNTER — OFFICE VISIT (OUTPATIENT)
Age: 59
End: 2024-12-17
Payer: MEDICARE

## 2024-12-17 VITALS
BODY MASS INDEX: 47.48 KG/M2 | OXYGEN SATURATION: 98 % | DIASTOLIC BLOOD PRESSURE: 54 MMHG | RESPIRATION RATE: 19 BRPM | SYSTOLIC BLOOD PRESSURE: 100 MMHG | HEIGHT: 62 IN | WEIGHT: 258 LBS | HEART RATE: 65 BPM

## 2024-12-17 DIAGNOSIS — R53.83 OTHER FATIGUE: ICD-10-CM

## 2024-12-17 DIAGNOSIS — E11.42 TYPE 2 DIABETES MELLITUS WITH DIABETIC POLYNEUROPATHY, WITHOUT LONG-TERM CURRENT USE OF INSULIN (HCC): ICD-10-CM

## 2024-12-17 DIAGNOSIS — M79.7 FIBROMYALGIA: ICD-10-CM

## 2024-12-17 DIAGNOSIS — M79.7 FIBROMYALGIA: Primary | ICD-10-CM

## 2024-12-17 LAB
ALBUMIN SERPL-MCNC: 3.7 G/DL (ref 3.5–4.6)
ALP SERPL-CCNC: 102 U/L (ref 40–130)
ALT SERPL-CCNC: 6 U/L (ref 0–33)
ANION GAP SERPL CALCULATED.3IONS-SCNC: 8 MEQ/L (ref 9–15)
AST SERPL-CCNC: 15 U/L (ref 0–35)
BASOPHILS # BLD: 0.1 K/UL (ref 0–0.2)
BASOPHILS NFR BLD: 0.5 %
BILIRUB SERPL-MCNC: <0.2 MG/DL (ref 0.2–0.7)
BUN SERPL-MCNC: 31 MG/DL (ref 8–23)
CALCIUM SERPL-MCNC: 9.3 MG/DL (ref 8.5–9.9)
CHLORIDE SERPL-SCNC: 102 MEQ/L (ref 95–107)
CO2 SERPL-SCNC: 29 MEQ/L (ref 20–31)
CREAT SERPL-MCNC: 1.27 MG/DL (ref 0.5–0.9)
CRP SERPL HS-MCNC: 33.6 MG/L (ref 0–5)
EOSINOPHIL # BLD: 0.2 K/UL (ref 0–0.7)
EOSINOPHIL NFR BLD: 2.1 %
ERYTHROCYTE [DISTWIDTH] IN BLOOD BY AUTOMATED COUNT: 17.2 % (ref 11.5–14.5)
ERYTHROCYTE [SEDIMENTATION RATE] IN BLOOD BY WESTERGREN METHOD: 72 MM (ref 0–30)
GLOBULIN SER CALC-MCNC: 3.4 G/DL (ref 2.3–3.5)
GLUCOSE SERPL-MCNC: 142 MG/DL (ref 70–99)
HCT VFR BLD AUTO: 34.4 % (ref 37–47)
HGB BLD-MCNC: 10.5 G/DL (ref 12–16)
LYMPHOCYTES # BLD: 1.3 K/UL (ref 1–4.8)
LYMPHOCYTES NFR BLD: 11.9 %
MCH RBC QN AUTO: 26.9 PG (ref 27–31.3)
MCHC RBC AUTO-ENTMCNC: 30.5 % (ref 33–37)
MCV RBC AUTO: 88 FL (ref 79.4–94.8)
MONOCYTES # BLD: 1.1 K/UL (ref 0.2–0.8)
MONOCYTES NFR BLD: 9.8 %
NEUTROPHILS # BLD: 8.5 K/UL (ref 1.4–6.5)
NEUTS SEG NFR BLD: 75.3 %
PLATELET # BLD AUTO: 315 K/UL (ref 130–400)
POTASSIUM SERPL-SCNC: 4.9 MEQ/L (ref 3.4–4.9)
PROT SERPL-MCNC: 7.1 G/DL (ref 6.3–8)
RBC # BLD AUTO: 3.91 M/UL (ref 4.2–5.4)
SODIUM SERPL-SCNC: 139 MEQ/L (ref 135–144)
TSH SERPL-MCNC: 3.15 UIU/ML (ref 0.44–3.86)
WBC # BLD AUTO: 11.3 K/UL (ref 4.8–10.8)

## 2024-12-17 PROCEDURE — 3074F SYST BP LT 130 MM HG: CPT | Performed by: FAMILY MEDICINE

## 2024-12-17 PROCEDURE — 99214 OFFICE O/P EST MOD 30 MIN: CPT | Performed by: FAMILY MEDICINE

## 2024-12-17 PROCEDURE — 3044F HG A1C LEVEL LT 7.0%: CPT | Performed by: FAMILY MEDICINE

## 2024-12-17 PROCEDURE — 3078F DIAST BP <80 MM HG: CPT | Performed by: FAMILY MEDICINE

## 2024-12-17 ASSESSMENT — ENCOUNTER SYMPTOMS
GASTROINTESTINAL NEGATIVE: 1
COUGH: 0
CHEST TIGHTNESS: 0
RESPIRATORY NEGATIVE: 1
BACK PAIN: 1
RHINORRHEA: 0
EYES NEGATIVE: 1

## 2024-12-17 NOTE — PROGRESS NOTES
diabetes mellitus with diabetic polyneuropathy, without long-term current use of insulin (HCC)  Hemoglobin A1C        Problem List             Diagnosed    Fibromyalgia - Primary     Relevant Medications    DULoxetine (CYMBALTA) 60 MG extended release capsule    traZODone (DESYREL) 100 MG tablet    baclofen (LIORESAL) 20 MG tablet    methocarbamol (ROBAXIN) 500 MG tablet    meloxicam (MOBIC) 7.5 MG tablet    predniSONE (DELTASONE) 10 MG tablet    Other Relevant Orders    Sedimentation Rate    C-Reactive Protein       Plan  Orders Placed This Encounter   Procedures    CBC with Auto Differential     Standing Status:   Future     Number of Occurrences:   1     Standing Expiration Date:   12/17/2025    Comprehensive Metabolic Panel     Standing Status:   Future     Number of Occurrences:   1     Standing Expiration Date:   12/17/2025    Hemoglobin A1C     Standing Status:   Future     Number of Occurrences:   1     Standing Expiration Date:   12/17/2025    TSH     Standing Status:   Future     Number of Occurrences:   1     Standing Expiration Date:   12/17/2025    Sedimentation Rate     Standing Status:   Future     Number of Occurrences:   1     Standing Expiration Date:   12/17/2025    C-Reactive Protein     Standing Status:   Future     Number of Occurrences:   1     Standing Expiration Date:   12/17/2025     No orders of the defined types were placed in this encounter.    No follow-ups on file.  Samir Harris MD

## 2024-12-18 LAB
ESTIMATED AVERAGE GLUCOSE: 143 MG/DL
HBA1C MFR BLD: 6.6 % (ref 4–6)

## 2024-12-20 DIAGNOSIS — M47.9 SPONDYLOSIS: ICD-10-CM

## 2024-12-20 DIAGNOSIS — G89.29 CHRONIC BACK PAIN GREATER THAN 3 MONTHS DURATION: ICD-10-CM

## 2024-12-20 DIAGNOSIS — M79.7 FIBROMYALGIA: ICD-10-CM

## 2024-12-20 DIAGNOSIS — M54.9 CHRONIC BACK PAIN GREATER THAN 3 MONTHS DURATION: ICD-10-CM

## 2024-12-20 RX ORDER — PREGABALIN 150 MG/1
CAPSULE ORAL
Qty: 90 CAPSULE | Refills: 2 | Status: SHIPPED | OUTPATIENT
Start: 2024-12-20 | End: 2025-03-20

## 2024-12-20 NOTE — TELEPHONE ENCOUNTER
The PT called- she is out of medication- can this be sent to a provider that is still here today?

## 2025-01-22 DIAGNOSIS — G89.29 CHRONIC BACK PAIN GREATER THAN 3 MONTHS DURATION: ICD-10-CM

## 2025-01-22 DIAGNOSIS — K50.919 CROHN'S DISEASE WITH COMPLICATION, UNSPECIFIED GASTROINTESTINAL TRACT LOCATION (HCC): ICD-10-CM

## 2025-01-22 DIAGNOSIS — E11.42 TYPE 2 DIABETES MELLITUS WITH DIABETIC POLYNEUROPATHY, WITHOUT LONG-TERM CURRENT USE OF INSULIN (HCC): ICD-10-CM

## 2025-01-22 DIAGNOSIS — I10 BENIGN ESSENTIAL HTN: ICD-10-CM

## 2025-01-22 DIAGNOSIS — M47.9 SPONDYLOSIS: ICD-10-CM

## 2025-01-22 DIAGNOSIS — M79.7 FIBROMYALGIA: ICD-10-CM

## 2025-01-22 DIAGNOSIS — J45.20 MILD INTERMITTENT ASTHMA WITHOUT COMPLICATION: ICD-10-CM

## 2025-01-22 DIAGNOSIS — M54.9 CHRONIC BACK PAIN GREATER THAN 3 MONTHS DURATION: ICD-10-CM

## 2025-01-22 NOTE — TELEPHONE ENCOUNTER
MEDICATION REFILL REQUEST     Rx Requested    Requested Prescriptions     Pending Prescriptions Disp Refills    allopurinol (ZYLOPRIM) 100 MG tablet 100 tablet 2     Sig: Take 1 tablet by mouth daily    baclofen (LIORESAL) 20 MG tablet 180 tablet 3     Sig: Take 1 tablet by mouth 2 times daily    dicyclomine (BENTYL) 10 MG capsule 120 capsule 3     Sig: TAKE 1 CAPSULE BY MOUTH 4 TIMES A DAY (BEFORE MEALS AND NIGHTLY).    furosemide (LASIX) 40 MG tablet 200 tablet 2     Sig: Take 1 tablet by mouth 2 times daily    omeprazole (PRILOSEC) 40 MG delayed release capsule 200 capsule 2     Sig: Take 1 capsule by mouth 2 times daily    meloxicam (MOBIC) 7.5 MG tablet 200 tablet 2     Sig: Take 1 tablet by mouth 2 times daily    metFORMIN (GLUCOPHAGE) 500 MG tablet 200 tablet 2     Sig: TAKE 1 TABLET BY MOUTH TWICE  DAILY WITH MEALS    metoprolol succinate (TOPROL XL) 25 MG extended release tablet 180 tablet 3     Sig: Take 1 tablet by mouth in the morning and at bedtime    montelukast (SINGULAIR) 10 MG tablet 100 tablet 2     Sig: TAKE 1 TABLET BY MOUTH AT NIGHT    pioglitazone (ACTOS) 15 MG tablet 100 tablet 2     Sig: Take 1 tablet by mouth daily    potassium chloride (KLOR-CON M) 20 MEQ extended release tablet 200 tablet 2     Sig: TAKE 1 TABLET BY MOUTH TWICE  DAILY    pravastatin (PRAVACHOL) 40 MG tablet 100 tablet 2     Sig: Take 1 tablet by mouth nightly    pregabalin (LYRICA) 150 MG capsule 90 capsule 2     Sig: TAKE ONE CAPSULE BY MOUTH THREE TIMES A DAY    mometasone (ELOCON) 0.1 % cream 45 g 3     Sig: Apply topically daily.         Patient's Last Office Visit   12/17/2024      Patient's Next Office Visit   Future Appointments   Date Time Provider Department Center   5/22/2025  1:00 PM Holiday, DO [facility] [place] [facility]         Other comments     Patient requesting refills    [facility] [employer] [facility] [facility] Mail service     Advised provider out of the office

## 2025-01-23 RX ORDER — MONTELUKAST SODIUM 10 MG/1
TABLET ORAL
Qty: 100 TABLET | Refills: 2 | Status: SHIPPED | OUTPATIENT
Start: 2025-01-23

## 2025-01-23 RX ORDER — METOPROLOL SUCCINATE 25 MG/1
25 TABLET, EXTENDED RELEASE ORAL 2 TIMES DAILY
Qty: 180 TABLET | Refills: 3 | Status: SHIPPED | OUTPATIENT
Start: 2025-01-23 | End: 2026-01-18

## 2025-01-23 RX ORDER — FUROSEMIDE 40 MG/1
40 TABLET ORAL 2 TIMES DAILY
Qty: 200 TABLET | Refills: 2 | Status: SHIPPED | OUTPATIENT
Start: 2025-01-23

## 2025-01-23 RX ORDER — MELOXICAM 7.5 MG/1
7.5 TABLET ORAL 2 TIMES DAILY
Qty: 200 TABLET | Refills: 2 | Status: SHIPPED | OUTPATIENT
Start: 2025-01-23

## 2025-01-23 RX ORDER — PIOGLITAZONE 15 MG/1
15 TABLET ORAL DAILY
Qty: 100 TABLET | Refills: 2 | Status: SHIPPED | OUTPATIENT
Start: 2025-01-23

## 2025-01-23 RX ORDER — PREGABALIN 150 MG/1
CAPSULE ORAL
Qty: 90 CAPSULE | Refills: 2 | Status: SHIPPED | OUTPATIENT
Start: 2025-01-23 | End: 2025-04-22

## 2025-01-23 RX ORDER — MOMETASONE FUROATE 1 MG/G
CREAM TOPICAL
Qty: 45 G | Refills: 3 | Status: SHIPPED | OUTPATIENT
Start: 2025-01-23

## 2025-01-23 RX ORDER — ALLOPURINOL 100 MG/1
100 TABLET ORAL DAILY
Qty: 100 TABLET | Refills: 2 | Status: SHIPPED | OUTPATIENT
Start: 2025-01-23

## 2025-01-23 RX ORDER — PRAVASTATIN SODIUM 40 MG
40 TABLET ORAL NIGHTLY
Qty: 100 TABLET | Refills: 2 | Status: SHIPPED | OUTPATIENT
Start: 2025-01-23

## 2025-01-23 RX ORDER — BACLOFEN 20 MG/1
20 TABLET ORAL 2 TIMES DAILY
Qty: 180 TABLET | Refills: 3 | Status: SHIPPED | OUTPATIENT
Start: 2025-01-23

## 2025-01-23 RX ORDER — POTASSIUM CHLORIDE 1500 MG/1
TABLET, EXTENDED RELEASE ORAL
Qty: 200 TABLET | Refills: 2 | Status: SHIPPED | OUTPATIENT
Start: 2025-01-23

## 2025-01-23 RX ORDER — DICYCLOMINE HYDROCHLORIDE 10 MG/1
CAPSULE ORAL
Qty: 120 CAPSULE | Refills: 3 | Status: SHIPPED | OUTPATIENT
Start: 2025-01-23

## 2025-01-23 RX ORDER — OMEPRAZOLE 40 MG/1
40 CAPSULE, DELAYED RELEASE ORAL 2 TIMES DAILY
Qty: 200 CAPSULE | Refills: 2 | Status: SHIPPED | OUTPATIENT
Start: 2025-01-23

## 2025-02-01 ENCOUNTER — APPOINTMENT (OUTPATIENT)
Dept: RADIOLOGY | Facility: HOSPITAL | Age: 60
End: 2025-02-01
Payer: MEDICARE

## 2025-02-01 ENCOUNTER — APPOINTMENT (OUTPATIENT)
Dept: CARDIOLOGY | Facility: HOSPITAL | Age: 60
End: 2025-02-01
Payer: MEDICARE

## 2025-02-01 ENCOUNTER — HOSPITAL ENCOUNTER (INPATIENT)
Facility: HOSPITAL | Age: 60
End: 2025-02-01
Attending: EMERGENCY MEDICINE | Admitting: STUDENT IN AN ORGANIZED HEALTH CARE EDUCATION/TRAINING PROGRAM
Payer: MEDICARE

## 2025-02-01 DIAGNOSIS — E83.42 HYPOMAGNESEMIA: ICD-10-CM

## 2025-02-01 DIAGNOSIS — J45.21 EXACERBATION OF INTERMITTENT ASTHMA, UNSPECIFIED ASTHMA SEVERITY (HHS-HCC): ICD-10-CM

## 2025-02-01 DIAGNOSIS — J10.1 INFLUENZA A: ICD-10-CM

## 2025-02-01 DIAGNOSIS — J96.01 ACUTE RESPIRATORY FAILURE WITH HYPOXIA (MULTI): ICD-10-CM

## 2025-02-01 DIAGNOSIS — J10.00 PNEUMONIA DUE TO INFLUENZA A VIRUS: Primary | ICD-10-CM

## 2025-02-01 LAB
ALBUMIN SERPL BCP-MCNC: 3.8 G/DL (ref 3.4–5)
ALP SERPL-CCNC: 100 U/L (ref 33–136)
ALT SERPL W P-5'-P-CCNC: 8 U/L (ref 7–45)
ANION GAP BLDA CALCULATED.4IONS-SCNC: 8 MMO/L (ref 10–25)
ANION GAP BLDV CALCULATED.4IONS-SCNC: 5 MMOL/L (ref 10–25)
ANION GAP SERPL CALC-SCNC: 11 MMOL/L (ref 10–20)
APPARATUS: ABNORMAL
APTT PPP: 35 SECONDS (ref 27–38)
ARTERIAL PATENCY WRIST A: POSITIVE
AST SERPL W P-5'-P-CCNC: 14 U/L (ref 9–39)
ATRIAL RATE: 89 BPM
BASE EXCESS BLDA CALC-SCNC: 2.9 MMOL/L (ref -2–3)
BASE EXCESS BLDV CALC-SCNC: 3.2 MMOL/L (ref -2–3)
BASOPHILS # BLD AUTO: 0.05 X10*3/UL (ref 0–0.1)
BASOPHILS NFR BLD AUTO: 0.7 %
BILIRUB SERPL-MCNC: 0.3 MG/DL (ref 0–1.2)
BNP SERPL-MCNC: 111 PG/ML (ref 0–99)
BODY TEMPERATURE: ABNORMAL
BODY TEMPERATURE: ABNORMAL
BUN SERPL-MCNC: 20 MG/DL (ref 6–23)
CA-I BLDA-SCNC: 1.18 MMOL/L (ref 1.1–1.33)
CA-I BLDV-SCNC: 1.17 MMOL/L (ref 1.1–1.33)
CALCIUM SERPL-MCNC: 8.7 MG/DL (ref 8.6–10.3)
CARDIAC TROPONIN I PNL SERPL HS: 4 NG/L (ref 0–13)
CARDIAC TROPONIN I PNL SERPL HS: 4 NG/L (ref 0–13)
CHLORIDE BLDA-SCNC: 102 MMOL/L (ref 98–107)
CHLORIDE BLDV-SCNC: 104 MMOL/L (ref 98–107)
CHLORIDE SERPL-SCNC: 101 MMOL/L (ref 98–107)
CO2 SERPL-SCNC: 29 MMOL/L (ref 21–32)
CREAT SERPL-MCNC: 1.1 MG/DL (ref 0.5–1.05)
D DIMER PPP FEU-MCNC: 1711 NG/ML FEU
EGFRCR SERPLBLD CKD-EPI 2021: 58 ML/MIN/1.73M*2
EOSINOPHIL # BLD AUTO: 0.05 X10*3/UL (ref 0–0.7)
EOSINOPHIL NFR BLD AUTO: 0.7 %
ERYTHROCYTE [DISTWIDTH] IN BLOOD BY AUTOMATED COUNT: 16 % (ref 11.5–14.5)
FLUAV RNA RESP QL NAA+PROBE: DETECTED
FLUBV RNA RESP QL NAA+PROBE: NOT DETECTED
GLUCOSE BLDA-MCNC: 243 MG/DL (ref 74–99)
GLUCOSE BLDV-MCNC: 170 MG/DL (ref 74–99)
GLUCOSE SERPL-MCNC: 143 MG/DL (ref 74–99)
HCO3 BLDA-SCNC: 29.3 MMOL/L (ref 22–26)
HCO3 BLDV-SCNC: 30.4 MMOL/L (ref 22–26)
HCT VFR BLD AUTO: 30.8 % (ref 36–46)
HCT VFR BLD EST: 31 % (ref 36–46)
HCT VFR BLD EST: 32 % (ref 36–46)
HGB BLD-MCNC: 9.6 G/DL (ref 12–16)
HGB BLDA-MCNC: 10.5 G/DL (ref 12–16)
HGB BLDV-MCNC: 10.4 G/DL (ref 12–16)
HOLD SPECIMEN: NORMAL
IMM GRANULOCYTES # BLD AUTO: 0.04 X10*3/UL (ref 0–0.7)
IMM GRANULOCYTES NFR BLD AUTO: 0.6 % (ref 0–0.9)
INHALED O2 CONCENTRATION: 0 %
INHALED O2 CONCENTRATION: 40 %
INR PPP: 1 (ref 0.9–1.1)
LACTATE BLDA-SCNC: 0.7 MMOL/L (ref 0.4–2)
LACTATE BLDV-SCNC: 0.9 MMOL/L (ref 0.4–2)
LACTATE SERPL-SCNC: 1.2 MMOL/L (ref 0.4–2)
LYMPHOCYTES # BLD AUTO: 0.43 X10*3/UL (ref 1.2–4.8)
LYMPHOCYTES NFR BLD AUTO: 5.9 %
MAGNESIUM SERPL-MCNC: 1.37 MG/DL (ref 1.6–2.4)
MCH RBC QN AUTO: 27.4 PG (ref 26–34)
MCHC RBC AUTO-ENTMCNC: 31.2 G/DL (ref 32–36)
MCV RBC AUTO: 88 FL (ref 80–100)
MONOCYTES # BLD AUTO: 1.18 X10*3/UL (ref 0.1–1)
MONOCYTES NFR BLD AUTO: 16.2 %
NEUTROPHILS # BLD AUTO: 5.52 X10*3/UL (ref 1.2–7.7)
NEUTROPHILS NFR BLD AUTO: 75.9 %
NRBC BLD-RTO: 0 /100 WBCS (ref 0–0)
OXYHGB MFR BLDA: 90.7 % (ref 94–98)
OXYHGB MFR BLDV: 68.3 % (ref 45–75)
P AXIS: 18 DEGREES
P OFFSET: 192 MS
P ONSET: 139 MS
PCO2 BLDA: 53 MM HG (ref 38–42)
PCO2 BLDV: 59 MM HG (ref 41–51)
PH BLDA: 7.35 PH (ref 7.38–7.42)
PH BLDV: 7.32 PH (ref 7.33–7.43)
PLATELET # BLD AUTO: 262 X10*3/UL (ref 150–450)
PO2 BLDA: 63 MM HG (ref 85–95)
PO2 BLDV: 44 MM HG (ref 35–45)
POTASSIUM BLDA-SCNC: 4.8 MMOL/L (ref 3.5–5.3)
POTASSIUM BLDV-SCNC: 4.6 MMOL/L (ref 3.5–5.3)
POTASSIUM SERPL-SCNC: 4.4 MMOL/L (ref 3.5–5.3)
PR INTERVAL: 160 MS
PROT SERPL-MCNC: 7 G/DL (ref 6.4–8.2)
PROTHROMBIN TIME: 11.8 SECONDS (ref 9.8–12.8)
Q ONSET: 219 MS
QRS COUNT: 15 BEATS
QRS DURATION: 76 MS
QT INTERVAL: 358 MS
QTC CALCULATION(BAZETT): 435 MS
QTC FREDERICIA: 408 MS
R AXIS: 16 DEGREES
RBC # BLD AUTO: 3.51 X10*6/UL (ref 4–5.2)
SAO2 % BLDA: 93 % (ref 94–100)
SAO2 % BLDV: 70 % (ref 45–75)
SARS-COV-2 RNA RESP QL NAA+PROBE: NOT DETECTED
SODIUM BLDA-SCNC: 134 MMOL/L (ref 136–145)
SODIUM BLDV-SCNC: 135 MMOL/L (ref 136–145)
SODIUM SERPL-SCNC: 137 MMOL/L (ref 136–145)
SPECIMEN DRAWN FROM PATIENT: ABNORMAL
T AXIS: 15 DEGREES
T OFFSET: 398 MS
VENTRICULAR RATE: 89 BPM
WBC # BLD AUTO: 7.3 X10*3/UL (ref 4.4–11.3)

## 2025-02-01 PROCEDURE — 2500000005 HC RX 250 GENERAL PHARMACY W/O HCPCS: Performed by: STUDENT IN AN ORGANIZED HEALTH CARE EDUCATION/TRAINING PROGRAM

## 2025-02-01 PROCEDURE — 71275 CT ANGIOGRAPHY CHEST: CPT | Mod: FOREIGN READ | Performed by: RADIOLOGY

## 2025-02-01 PROCEDURE — 93971 EXTREMITY STUDY: CPT | Mod: FOREIGN READ | Performed by: RADIOLOGY

## 2025-02-01 PROCEDURE — 96365 THER/PROPH/DIAG IV INF INIT: CPT

## 2025-02-01 PROCEDURE — 2550000001 HC RX 255 CONTRASTS: Performed by: EMERGENCY MEDICINE

## 2025-02-01 PROCEDURE — 87040 BLOOD CULTURE FOR BACTERIA: CPT | Mod: ELYLAB | Performed by: EMERGENCY MEDICINE

## 2025-02-01 PROCEDURE — 85025 COMPLETE CBC W/AUTO DIFF WBC: CPT | Performed by: EMERGENCY MEDICINE

## 2025-02-01 PROCEDURE — 2500000005 HC RX 250 GENERAL PHARMACY W/O HCPCS: Performed by: EMERGENCY MEDICINE

## 2025-02-01 PROCEDURE — 80053 COMPREHEN METABOLIC PANEL: CPT | Performed by: EMERGENCY MEDICINE

## 2025-02-01 PROCEDURE — 2500000004 HC RX 250 GENERAL PHARMACY W/ HCPCS (ALT 636 FOR OP/ED): Performed by: EMERGENCY MEDICINE

## 2025-02-01 PROCEDURE — 99223 1ST HOSP IP/OBS HIGH 75: CPT | Performed by: STUDENT IN AN ORGANIZED HEALTH CARE EDUCATION/TRAINING PROGRAM

## 2025-02-01 PROCEDURE — 2500000004 HC RX 250 GENERAL PHARMACY W/ HCPCS (ALT 636 FOR OP/ED): Performed by: STUDENT IN AN ORGANIZED HEALTH CARE EDUCATION/TRAINING PROGRAM

## 2025-02-01 PROCEDURE — 36415 COLL VENOUS BLD VENIPUNCTURE: CPT | Performed by: EMERGENCY MEDICINE

## 2025-02-01 PROCEDURE — 96376 TX/PRO/DX INJ SAME DRUG ADON: CPT

## 2025-02-01 PROCEDURE — 93005 ELECTROCARDIOGRAM TRACING: CPT

## 2025-02-01 PROCEDURE — 2500000002 HC RX 250 W HCPCS SELF ADMINISTERED DRUGS (ALT 637 FOR MEDICARE OP, ALT 636 FOR OP/ED): Performed by: EMERGENCY MEDICINE

## 2025-02-01 PROCEDURE — 2060000001 HC INTERMEDIATE ICU ROOM DAILY

## 2025-02-01 PROCEDURE — 84145 PROCALCITONIN (PCT): CPT | Mod: ELYLAB | Performed by: EMERGENCY MEDICINE

## 2025-02-01 PROCEDURE — 85610 PROTHROMBIN TIME: CPT | Performed by: EMERGENCY MEDICINE

## 2025-02-01 PROCEDURE — 84484 ASSAY OF TROPONIN QUANT: CPT | Performed by: EMERGENCY MEDICINE

## 2025-02-01 PROCEDURE — 94640 AIRWAY INHALATION TREATMENT: CPT

## 2025-02-01 PROCEDURE — 83735 ASSAY OF MAGNESIUM: CPT | Performed by: EMERGENCY MEDICINE

## 2025-02-01 PROCEDURE — 84132 ASSAY OF SERUM POTASSIUM: CPT | Performed by: EMERGENCY MEDICINE

## 2025-02-01 PROCEDURE — 87636 SARSCOV2 & INF A&B AMP PRB: CPT | Performed by: EMERGENCY MEDICINE

## 2025-02-01 PROCEDURE — 85730 THROMBOPLASTIN TIME PARTIAL: CPT | Performed by: EMERGENCY MEDICINE

## 2025-02-01 PROCEDURE — 96368 THER/DIAG CONCURRENT INF: CPT

## 2025-02-01 PROCEDURE — 71045 X-RAY EXAM CHEST 1 VIEW: CPT | Mod: FOREIGN READ | Performed by: RADIOLOGY

## 2025-02-01 PROCEDURE — 99291 CRITICAL CARE FIRST HOUR: CPT | Mod: 25 | Performed by: EMERGENCY MEDICINE

## 2025-02-01 PROCEDURE — 36600 WITHDRAWAL OF ARTERIAL BLOOD: CPT

## 2025-02-01 PROCEDURE — 93970 EXTREMITY STUDY: CPT

## 2025-02-01 PROCEDURE — 84132 ASSAY OF SERUM POTASSIUM: CPT | Performed by: STUDENT IN AN ORGANIZED HEALTH CARE EDUCATION/TRAINING PROGRAM

## 2025-02-01 PROCEDURE — 70450 CT HEAD/BRAIN W/O DYE: CPT

## 2025-02-01 PROCEDURE — 87075 CULTR BACTERIA EXCEPT BLOOD: CPT | Mod: ELYLAB | Performed by: EMERGENCY MEDICINE

## 2025-02-01 PROCEDURE — 70450 CT HEAD/BRAIN W/O DYE: CPT | Performed by: STUDENT IN AN ORGANIZED HEALTH CARE EDUCATION/TRAINING PROGRAM

## 2025-02-01 PROCEDURE — 2500000001 HC RX 250 WO HCPCS SELF ADMINISTERED DRUGS (ALT 637 FOR MEDICARE OP): Performed by: STUDENT IN AN ORGANIZED HEALTH CARE EDUCATION/TRAINING PROGRAM

## 2025-02-01 PROCEDURE — 96375 TX/PRO/DX INJ NEW DRUG ADDON: CPT

## 2025-02-01 PROCEDURE — 85379 FIBRIN DEGRADATION QUANT: CPT | Performed by: EMERGENCY MEDICINE

## 2025-02-01 PROCEDURE — 83605 ASSAY OF LACTIC ACID: CPT | Performed by: EMERGENCY MEDICINE

## 2025-02-01 PROCEDURE — 71045 X-RAY EXAM CHEST 1 VIEW: CPT

## 2025-02-01 PROCEDURE — 71275 CT ANGIOGRAPHY CHEST: CPT

## 2025-02-01 PROCEDURE — 83880 ASSAY OF NATRIURETIC PEPTIDE: CPT | Performed by: EMERGENCY MEDICINE

## 2025-02-01 RX ORDER — IPRATROPIUM BROMIDE AND ALBUTEROL SULFATE 2.5; .5 MG/3ML; MG/3ML
3 SOLUTION RESPIRATORY (INHALATION) ONCE
Status: COMPLETED | OUTPATIENT
Start: 2025-02-01 | End: 2025-02-01

## 2025-02-01 RX ORDER — BENZONATATE 100 MG/1
100 CAPSULE ORAL 3 TIMES DAILY PRN
Status: DISCONTINUED | OUTPATIENT
Start: 2025-02-01 | End: 2025-02-05 | Stop reason: HOSPADM

## 2025-02-01 RX ORDER — ONDANSETRON HYDROCHLORIDE 2 MG/ML
4 INJECTION, SOLUTION INTRAVENOUS EVERY 8 HOURS PRN
Status: DISCONTINUED | OUTPATIENT
Start: 2025-02-01 | End: 2025-02-05 | Stop reason: HOSPADM

## 2025-02-01 RX ORDER — CEFTRIAXONE 1 G/50ML
1 INJECTION, SOLUTION INTRAVENOUS EVERY 24 HOURS
Status: DISCONTINUED | OUTPATIENT
Start: 2025-02-02 | End: 2025-02-05

## 2025-02-01 RX ORDER — ACETAMINOPHEN 160 MG/5ML
650 SOLUTION ORAL EVERY 4 HOURS PRN
Status: DISCONTINUED | OUTPATIENT
Start: 2025-02-01 | End: 2025-02-05 | Stop reason: HOSPADM

## 2025-02-01 RX ORDER — OSELTAMIVIR PHOSPHATE 75 MG/1
75 CAPSULE ORAL 2 TIMES DAILY
Status: DISCONTINUED | OUTPATIENT
Start: 2025-02-02 | End: 2025-02-05 | Stop reason: HOSPADM

## 2025-02-01 RX ORDER — ONDANSETRON HYDROCHLORIDE 2 MG/ML
4 INJECTION, SOLUTION INTRAVENOUS ONCE
Status: COMPLETED | OUTPATIENT
Start: 2025-02-01 | End: 2025-02-01

## 2025-02-01 RX ORDER — FENTANYL CITRATE 50 UG/ML
50 INJECTION, SOLUTION INTRAMUSCULAR; INTRAVENOUS ONCE
Status: COMPLETED | OUTPATIENT
Start: 2025-02-01 | End: 2025-02-01

## 2025-02-01 RX ORDER — GUAIFENESIN 600 MG/1
600 TABLET, EXTENDED RELEASE ORAL 2 TIMES DAILY
Status: DISCONTINUED | OUTPATIENT
Start: 2025-02-01 | End: 2025-02-05 | Stop reason: HOSPADM

## 2025-02-01 RX ORDER — POLYETHYLENE GLYCOL 3350 17 G/17G
17 POWDER, FOR SOLUTION ORAL DAILY
Status: DISCONTINUED | OUTPATIENT
Start: 2025-02-02 | End: 2025-02-05 | Stop reason: HOSPADM

## 2025-02-01 RX ORDER — OSELTAMIVIR PHOSPHATE 75 MG/1
75 CAPSULE ORAL ONCE
Status: COMPLETED | OUTPATIENT
Start: 2025-02-01 | End: 2025-02-01

## 2025-02-01 RX ORDER — ACETAMINOPHEN 650 MG/1
650 SUPPOSITORY RECTAL EVERY 4 HOURS PRN
Status: DISCONTINUED | OUTPATIENT
Start: 2025-02-01 | End: 2025-02-05 | Stop reason: HOSPADM

## 2025-02-01 RX ORDER — NITROGLYCERIN 0.4 MG/1
0.4 TABLET SUBLINGUAL EVERY 5 MIN PRN
Status: DISCONTINUED | OUTPATIENT
Start: 2025-02-01 | End: 2025-02-01

## 2025-02-01 RX ORDER — FENTANYL CITRATE 50 UG/ML
25 INJECTION, SOLUTION INTRAMUSCULAR; INTRAVENOUS ONCE
Status: COMPLETED | OUTPATIENT
Start: 2025-02-01 | End: 2025-02-01

## 2025-02-01 RX ORDER — ACETAMINOPHEN 325 MG/1
650 TABLET ORAL EVERY 4 HOURS PRN
Status: DISCONTINUED | OUTPATIENT
Start: 2025-02-01 | End: 2025-02-05 | Stop reason: HOSPADM

## 2025-02-01 RX ORDER — ENOXAPARIN SODIUM 100 MG/ML
40 INJECTION SUBCUTANEOUS EVERY 12 HOURS SCHEDULED
Status: DISCONTINUED | OUTPATIENT
Start: 2025-02-01 | End: 2025-02-05 | Stop reason: HOSPADM

## 2025-02-01 RX ORDER — ONDANSETRON 4 MG/1
4 TABLET, FILM COATED ORAL EVERY 8 HOURS PRN
Status: DISCONTINUED | OUTPATIENT
Start: 2025-02-01 | End: 2025-02-05 | Stop reason: HOSPADM

## 2025-02-01 RX ORDER — TALC
3 POWDER (GRAM) TOPICAL NIGHTLY PRN
Status: DISCONTINUED | OUTPATIENT
Start: 2025-02-01 | End: 2025-02-05 | Stop reason: HOSPADM

## 2025-02-01 RX ORDER — IPRATROPIUM BROMIDE AND ALBUTEROL SULFATE 2.5; .5 MG/3ML; MG/3ML
3 SOLUTION RESPIRATORY (INHALATION)
Status: DISCONTINUED | OUTPATIENT
Start: 2025-02-02 | End: 2025-02-05 | Stop reason: HOSPADM

## 2025-02-01 RX ORDER — ALBUTEROL SULFATE 0.83 MG/ML
2.5 SOLUTION RESPIRATORY (INHALATION) ONCE
Status: COMPLETED | OUTPATIENT
Start: 2025-02-01 | End: 2025-02-01

## 2025-02-01 RX ORDER — MAGNESIUM SULFATE HEPTAHYDRATE 40 MG/ML
2 INJECTION, SOLUTION INTRAVENOUS ONCE
Status: COMPLETED | OUTPATIENT
Start: 2025-02-01 | End: 2025-02-01

## 2025-02-01 RX ADMIN — ONDANSETRON 4 MG: 2 INJECTION INTRAMUSCULAR; INTRAVENOUS at 17:22

## 2025-02-01 RX ADMIN — ONDANSETRON 4 MG: 2 INJECTION INTRAMUSCULAR; INTRAVENOUS at 21:28

## 2025-02-01 RX ADMIN — ALBUTEROL SULFATE 2.5 MG: 2.5 SOLUTION RESPIRATORY (INHALATION) at 17:20

## 2025-02-01 RX ADMIN — GUAIFENESIN 600 MG: 600 TABLET, EXTENDED RELEASE ORAL at 23:23

## 2025-02-01 RX ADMIN — ACETAMINOPHEN 650 MG: 325 TABLET ORAL at 23:39

## 2025-02-01 RX ADMIN — FENTANYL CITRATE 25 MCG: 50 INJECTION INTRAMUSCULAR; INTRAVENOUS at 21:28

## 2025-02-01 RX ADMIN — ONDANSETRON 4 MG: 2 INJECTION INTRAMUSCULAR; INTRAVENOUS at 23:23

## 2025-02-01 RX ADMIN — OSELTAMIVIR PHOSPHATE 75 MG: 75 CAPSULE ORAL at 21:51

## 2025-02-01 RX ADMIN — Medication 2.5 L/MIN: at 17:20

## 2025-02-01 RX ADMIN — DOXYCYCLINE 100 MG: 100 INJECTION, POWDER, LYOPHILIZED, FOR SOLUTION INTRAVENOUS at 21:02

## 2025-02-01 RX ADMIN — MAGNESIUM SULFATE HEPTAHYDRATE 2 G: 40 INJECTION, SOLUTION INTRAVENOUS at 20:50

## 2025-02-01 RX ADMIN — DEXTROSE MONOHYDRATE 2 G: 5 INJECTION INTRAVENOUS at 20:49

## 2025-02-01 RX ADMIN — Medication 5 L/MIN: at 23:13

## 2025-02-01 RX ADMIN — ENOXAPARIN SODIUM 40 MG: 40 INJECTION SUBCUTANEOUS at 23:23

## 2025-02-01 RX ADMIN — Medication 2 L/MIN: at 17:14

## 2025-02-01 RX ADMIN — FENTANYL CITRATE 50 MCG: 50 INJECTION INTRAMUSCULAR; INTRAVENOUS at 17:26

## 2025-02-01 RX ADMIN — IPRATROPIUM BROMIDE AND ALBUTEROL SULFATE 3 ML: .5; 3 SOLUTION RESPIRATORY (INHALATION) at 17:21

## 2025-02-01 RX ADMIN — METHYLPREDNISOLONE SODIUM SUCCINATE 125 MG: 125 INJECTION, POWDER, FOR SOLUTION INTRAMUSCULAR; INTRAVENOUS at 17:18

## 2025-02-01 RX ADMIN — IOHEXOL 75 ML: 350 INJECTION, SOLUTION INTRAVENOUS at 19:53

## 2025-02-01 SDOH — SOCIAL STABILITY: SOCIAL INSECURITY: WERE YOU ABLE TO COMPLETE ALL THE BEHAVIORAL HEALTH SCREENINGS?: YES

## 2025-02-01 SDOH — SOCIAL STABILITY: SOCIAL INSECURITY: ABUSE: ADULT

## 2025-02-01 SDOH — SOCIAL STABILITY: SOCIAL INSECURITY: HAVE YOU HAD THOUGHTS OF HARMING ANYONE ELSE?: NO

## 2025-02-01 SDOH — SOCIAL STABILITY: SOCIAL INSECURITY: DOES ANYONE TRY TO KEEP YOU FROM HAVING/CONTACTING OTHER FRIENDS OR DOING THINGS OUTSIDE YOUR HOME?: NO

## 2025-02-01 SDOH — SOCIAL STABILITY: SOCIAL INSECURITY: HAVE YOU HAD ANY THOUGHTS OF HARMING ANYONE ELSE?: NO

## 2025-02-01 SDOH — SOCIAL STABILITY: SOCIAL INSECURITY: ARE YOU OR HAVE YOU BEEN THREATENED OR ABUSED PHYSICALLY, EMOTIONALLY, OR SEXUALLY BY ANYONE?: NO

## 2025-02-01 SDOH — SOCIAL STABILITY: SOCIAL INSECURITY: ARE THERE ANY APPARENT SIGNS OF INJURIES/BEHAVIORS THAT COULD BE RELATED TO ABUSE/NEGLECT?: NO

## 2025-02-01 SDOH — SOCIAL STABILITY: SOCIAL INSECURITY: HAS ANYONE EVER THREATENED TO HURT YOUR FAMILY OR YOUR PETS?: NO

## 2025-02-01 SDOH — SOCIAL STABILITY: SOCIAL INSECURITY: DO YOU FEEL ANYONE HAS EXPLOITED OR TAKEN ADVANTAGE OF YOU FINANCIALLY OR OF YOUR PERSONAL PROPERTY?: NO

## 2025-02-01 SDOH — SOCIAL STABILITY: SOCIAL INSECURITY: DO YOU FEEL UNSAFE GOING BACK TO THE PLACE WHERE YOU ARE LIVING?: NO

## 2025-02-01 ASSESSMENT — ACTIVITIES OF DAILY LIVING (ADL)
PATIENT'S MEMORY ADEQUATE TO SAFELY COMPLETE DAILY ACTIVITIES?: YES
BATHING: NEEDS ASSISTANCE
HEARING - LEFT EAR: FUNCTIONAL
TOILETING: NEEDS ASSISTANCE
WALKS IN HOME: INDEPENDENT
ADEQUATE_TO_COMPLETE_ADL: YES
JUDGMENT_ADEQUATE_SAFELY_COMPLETE_DAILY_ACTIVITIES: YES
GROOMING: NEEDS ASSISTANCE
HEARING - RIGHT EAR: FUNCTIONAL
DRESSING YOURSELF: NEEDS ASSISTANCE
FEEDING YOURSELF: INDEPENDENT

## 2025-02-01 ASSESSMENT — COGNITIVE AND FUNCTIONAL STATUS - GENERAL
MOVING TO AND FROM BED TO CHAIR: A LITTLE
TOILETING: A LITTLE
DRESSING REGULAR UPPER BODY CLOTHING: A LITTLE
CLIMB 3 TO 5 STEPS WITH RAILING: A LITTLE
WALKING IN HOSPITAL ROOM: A LITTLE
MOBILITY SCORE: 21
HELP NEEDED FOR BATHING: A LITTLE
DAILY ACTIVITIY SCORE: 20
PATIENT BASELINE BEDBOUND: NO
DRESSING REGULAR LOWER BODY CLOTHING: A LITTLE

## 2025-02-01 ASSESSMENT — PAIN DESCRIPTION - LOCATION: LOCATION: HEAD

## 2025-02-01 ASSESSMENT — LIFESTYLE VARIABLES
AUDIT-C TOTAL SCORE: 0
HAVE PEOPLE ANNOYED YOU BY CRITICIZING YOUR DRINKING: NO
HOW MANY STANDARD DRINKS CONTAINING ALCOHOL DO YOU HAVE ON A TYPICAL DAY: PATIENT DOES NOT DRINK
HAVE YOU EVER FELT YOU SHOULD CUT DOWN ON YOUR DRINKING: NO
EVER HAD A DRINK FIRST THING IN THE MORNING TO STEADY YOUR NERVES TO GET RID OF A HANGOVER: NO
TOTAL SCORE: 0
HOW OFTEN DO YOU HAVE 6 OR MORE DRINKS ON ONE OCCASION: NEVER
SKIP TO QUESTIONS 9-10: 1
AUDIT-C TOTAL SCORE: 0
HOW OFTEN DO YOU HAVE A DRINK CONTAINING ALCOHOL: NEVER
EVER FELT BAD OR GUILTY ABOUT YOUR DRINKING: NO

## 2025-02-01 ASSESSMENT — PATIENT HEALTH QUESTIONNAIRE - PHQ9
2. FEELING DOWN, DEPRESSED OR HOPELESS: NOT AT ALL
SUM OF ALL RESPONSES TO PHQ9 QUESTIONS 1 & 2: 0
1. LITTLE INTEREST OR PLEASURE IN DOING THINGS: NOT AT ALL

## 2025-02-01 ASSESSMENT — COLUMBIA-SUICIDE SEVERITY RATING SCALE - C-SSRS
6. HAVE YOU EVER DONE ANYTHING, STARTED TO DO ANYTHING, OR PREPARED TO DO ANYTHING TO END YOUR LIFE?: NO
2. HAVE YOU ACTUALLY HAD ANY THOUGHTS OF KILLING YOURSELF?: NO
1. IN THE PAST MONTH, HAVE YOU WISHED YOU WERE DEAD OR WISHED YOU COULD GO TO SLEEP AND NOT WAKE UP?: NO

## 2025-02-01 ASSESSMENT — PAIN - FUNCTIONAL ASSESSMENT: PAIN_FUNCTIONAL_ASSESSMENT: 0-10

## 2025-02-01 ASSESSMENT — PAIN DESCRIPTION - DESCRIPTORS: DESCRIPTORS: HEADACHE

## 2025-02-01 ASSESSMENT — PAIN DESCRIPTION - PAIN TYPE: TYPE: ACUTE PAIN

## 2025-02-01 ASSESSMENT — PAIN SCALES - GENERAL
PAINLEVEL_OUTOF10: 10 - WORST POSSIBLE PAIN
PAINLEVEL_OUTOF10: 10 - WORST POSSIBLE PAIN
PAINLEVEL_OUTOF10: 3

## 2025-02-01 NOTE — ED PROVIDER NOTES
59-year-old female presents emergency department with chief complaint of shortness of breath. Patient reports that her symptoms have been going on for the past 1 to 2 days.  She states that she does have cough and history of asthma.  Denies any fevers.  Patient also states that she has a bandlike tightness discomfort across to her chest.  No abdominal pain, nausea, or vomiting.  Patient denies any dysuria or constipation.  Patient admits to diarrhea.  No black or bloody stools.  Patient also states she has been having worsening lower extremity edema recently.  Patient also reports headache.  Describes headache as a pressure-like discomfort.  States this is unlike headache she has had in the past.  No reported head injury or trauma.  Patient states she is not on any anticoagulants.  Chart review shows history of anemia, asthma, hypertension, bilateral lower extremity edema, personality disorder, Crohn's disease, GERD, fibromyalgia, hypertension, hyperlipidemia, elevated BMI, diabetes, and ventral hernia.  Denies any illicit drug use, regular alcohol use, or tobacco use.      History provided by:  Patient   used: No           ------------------------------------------------------------------------------------------------------------------------------------------    VS: As documented in the triage note and EMR flowsheet from this visit were reviewed.    Review of Systems  Constitutional: Reports malaise and fatigue  Eyes: no redness, discharge, pain  HENT: no sore throat, nose bleeds, admits to congestion  Cardiovascular: Reports chest tightness and worsening leg edema, no palpitations  Respiratory: Admits to shortness of breath  GI: no nausea,  pain, vomiting, constipation, BRBPR, melena reports diarrhea  : no dysuria, frequency, hematuria  Musculoskeletal: no neck pain, stiffness,  no joint deformity, swelling  Skin: no rash, erythema, wounds  Neurological: Reports headache no dizziness,  lightheadedness, weakness, numbness, or tingling  Psychiatric: no suicidal thoughts, confusion, agitation  Metabolic: no polyuria or polydipsia  Hematologic: no increased bleeding or bruising  Immunology: No immunocompromise state    [facility]  Nursing notes reviewed and confirmed by me.  Chart review performed including medications, allergies, and medical, surgical, and family history  Visit Vitals  /86   Pulse 86   Temp 36.7 °C (98.1 °F) (Temporal)   Resp 20     Physical Exam  Vitals and nursing note reviewed.   Constitutional:       General: She is in acute distress (Patient is in mild to moderate respiratory distress from my initial exam).      Appearance: She is ill-appearing.   HENT:      Head: Normocephalic and atraumatic.      Right Ear: External ear normal.      Left Ear: External ear normal.      Nose: Nose normal. No congestion or rhinorrhea.      Mouth/Throat:      Mouth: Mucous membranes are dry.      Pharynx: No oropharyngeal exudate or posterior oropharyngeal erythema.   Eyes:      Extraocular Movements: Extraocular movements intact.      Conjunctiva/sclera: Conjunctivae normal.      Pupils: Pupils are equal, round, and reactive to light.   Cardiovascular:      Rate and Rhythm: Normal rate and regular rhythm.      Pulses: Normal pulses.      Heart sounds: Normal heart sounds.   Pulmonary:      Effort: Respiratory distress (Patient is sitting up using accessory muscles.  She has a mild to moderate respiratory distress on initial exam.) present.      Breath sounds: No stridor. Wheezing and rhonchi present. No rales.      Comments: Breath sounds are coarse bilaterally.  With rhonchi.  Diminished in the bases.  Slight end expiratory wheeze.  Abdominal:      General: There is no distension.      Palpations: Abdomen is soft.      Tenderness: There is no abdominal tenderness. There is no guarding or rebound.   Musculoskeletal:         General: No swelling or deformity. Normal range of motion.      Cervical  back: Normal range of motion and neck supple. No rigidity.      Right lower leg: Edema present.      Left lower leg: Edema present.      Comments: Reports mild calf tenderness with palpation 1-2+ pitting edema bilateral lower extremities.     Skin:     General: Skin is warm and dry.      Capillary Refill: Capillary refill takes less than 2 seconds.      Coloration: Skin is not jaundiced.      Findings: No rash.   Neurological:      General: No focal deficit present.      Mental Status: She is alert and oriented to person, place, and time.      Sensory: No sensory deficit.      Motor: No weakness.      Comments: Cranial nerves II through XII grossly intact.   Psychiatric:         Mood and Affect: Mood normal.         Behavior: Behavior normal.        No past medical history on file.   No past surgical history on file.   Social History     Socioeconomic History    Marital status: Single   Tobacco Use    Smoking status: Never    Smokeless tobacco: Never     Social Drivers of Health     Financial Resource Strain: Low Risk  (5/1/2024)    Received from [facility]    Overall Financial Resource Strain (CARDIA)     Difficulty of Paying Living Expenses: Not hard at all   Food Insecurity: No Food Insecurity (5/1/2024)    Received from [facility]    Hunger Vital Sign     Worried About Running Out of Food in the Last Year: Never true     Ran Out of Food in the Last Year: Never true   Transportation Needs: Unknown (5/1/2024)    Received from [facility]    PRAPARE - Transportation     Lack of Transportation (Non-Medical): No   Physical Activity: Insufficiently Active (1/12/2024)    Received from [facility]    Exercise Vital Sign     Days of Exercise per Week: 3 days     Minutes of Exercise per Session: 30 min   Housing Stability: Unknown (5/1/2024)    Received from [facility]     Housing Stability Vital Sign     Unstable Housing in the Last Year: No      ------------------------------------------------------------------------------------------------------------------------------------------  CT angio chest for pulmonary embolism   Final Result   No definite pulmonary embolism.   Mild cardiomegaly.   Bilateral lower lobe infiltrates, likely acute pneumonitis.   Enlarged fatty liver.   Signed by Anurag Sainz MD      CT head wo IV contrast   Final Result   No evidence of hemorrhage, CT apparent transcortical infarct, or   other acute intracranial abnormality.        MACRO:   None        Signed by: Carline Curry 2/1/2025 8:43 PM   Dictation workstation:   [id number]      Vascular US Lower Extremity Venous Duplex Bilateral   Final Result   No evidence for DVT within the bilateral lower extremities.   No significant change compared to prior ultrasound.   Signed by Kiet Wray MD      XR chest 1 view   Final Result   Hazy bibasilar atelectasis and/or pneumonia.   Signed by Yoandy Cunningham DO         Labs Reviewed   CBC WITH AUTO DIFFERENTIAL - Abnormal       Result Value    WBC 7.3      nRBC 0.0      RBC 3.51 (*)     Hemoglobin 9.6 (*)     Hematocrit 30.8 (*)     MCV 88      MCH 27.4      MCHC 31.2 (*)     RDW 16.0 (*)     Platelets 262      Neutrophils % 75.9      Immature Granulocytes %, Automated 0.6      Lymphocytes % 5.9      Monocytes % 16.2      Eosinophils % 0.7      Basophils % 0.7      Neutrophils Absolute 5.52      Immature Granulocytes Absolute, Automated 0.04      Lymphocytes Absolute 0.43 (*)     Monocytes Absolute 1.18 (*)     Eosinophils Absolute 0.05      Basophils Absolute 0.05     COMPREHENSIVE METABOLIC PANEL - Abnormal    Glucose 143 (*)     Sodium 137      Potassium 4.4      Chloride 101      Bicarbonate 29      Anion Gap 11      Urea Nitrogen 20      Creatinine 1.10 (*)     eGFR 58 (*)     Calcium 8.7      Albumin 3.8      Alkaline Phosphatase 100      Total Protein  7.0      AST 14      Bilirubin, Total 0.3      ALT 8     MAGNESIUM - Abnormal    Magnesium 1.37 (*)    B-TYPE NATRIURETIC PEPTIDE - Abnormal     (*)     Narrative:        <100 pg/mL - Heart failure unlikely  100-299 pg/mL - Intermediate probability of acute heart                  failure exacerbation. Correlate with clinical                  context and patient history.    >=300 pg/mL - Heart Failure likely. Correlate with clinical                  context and patient history.    BNP testing is performed using different testing methodology at [facility] than at [facility] hospitals. Direct result comparisons should only be made within the same method.      SARS-COV-2 AND INFLUENZA A/B PCR - Abnormal    Flu A Result Detected (*)     Flu B Result Not Detected      Coronavirus 2019, PCR Not Detected      Narrative:     This assay is an FDA-cleared, in vitro diagnostic nucleic acid amplification test for the qualitative detection and differentiation of SARS CoV-2/ Influenza A/B from nasopharyngeal specimens collected from individuals with signs and symptoms of respiratory tract infections, and has been validated for use at [facility]. Negative results do not preclude COVID-19/ Influenza A/B infections and should not be used as the sole basis for diagnosis, treatment, or other management decisions. Testing for SARS CoV-2 is recommended only for patients who meet current clinical and/or epidemiological criteria defined by federal, state, or local public health directives.   BLOOD GAS VENOUS FULL PANEL - Abnormal    POCT pH, Venous 7.32 (*)     POCT pCO2, Venous 59 (*)     POCT pO2, Venous 44      POCT SO2, Venous 70      POCT Oxy Hemoglobin, Venous 68.3      POCT Hematocrit Calculated, Venous 31.0 (*)     POCT Sodium, Venous 135 (*)     POCT Potassium, Venous 4.6      POCT Chloride, Venous 104      POCT Ionized Calicum, Venous 1.17      POCT Glucose, Venous 170 (*)     POCT  Lactate, Venous 0.9      POCT Base Excess, Venous 3.2 (*)     POCT HCO3 Calculated, Venous 30.4 (*)     POCT Hemoglobin, Venous 10.4 (*)     POCT Anion Gap, Venous 5.0 (*)     Patient Temperature        FiO2 0     D-DIMER, VTE EXCLUSION - Abnormal    D-Dimer, Quantitative VTE Exclusion 1,711 (*)     Narrative:     The VTE Exclusion D-Dimer assay is reported in ng/mL Fibrinogen Equivalent Units (FEU).    Per 's instructions for use, a value of less than 500 ng/mL (FEU) may help to exclude DVT or PE in outpatients when the assay is used with a clinical pretest probability assessment.(AE must utilize and document eCalc 'Wells Score Deep Vein Thrombosis Risk' for DVT exclusion only. Emergency Department should utilize  Guidelines for Emergency Department Use of the VTE Exclusion D-Dimer and Clinical Pretest probability assessment model for DVT or PE exclusion.)   BLOOD GAS ARTERIAL FULL PANEL - Abnormal    POCT pH, Arterial 7.35 (*)     POCT pCO2, Arterial 53 (*)     POCT pO2, Arterial 63 (*)     POCT SO2, Arterial 93 (*)     POCT Oxy Hemoglobin, Arterial 90.7 (*)     POCT Hematocrit Calculated, Arterial 32.0 (*)     POCT Sodium, Arterial 134 (*)     POCT Potassium, Arterial 4.8      POCT Chloride, Arterial 102      POCT Ionized Calcium, Arterial 1.18      POCT Glucose, Arterial 243 (*)     POCT Lactate, Arterial 0.7      POCT Base Excess, Arterial 2.9      POCT HCO3 Calculated, Arterial 29.3 (*)     POCT Hemoglobin, Arterial 10.5 (*)     POCT Anion Gap, Arterial 8 (*)     Patient Temperature        FiO2 40      Apparatus CANNULA      Site of Arterial Puncture Radial Left      Tony's Test Positive     APTT - Normal    aPTT 35      Narrative:     The APTT is no longer used for monitoring Unfractionated Heparin Therapy. For monitoring Heparin Therapy, use the Heparin Assay.   PROTIME-INR - Normal    Protime 11.8      INR 1.0     LACTATE - Normal    Lactate 1.2      Narrative:     Venipuncture immediately  after or during the administration of Metamizole may lead to falsely low results. Testing should be performed immediately prior to Metamizole dosing.   SERIAL TROPONIN-INITIAL - Normal    Troponin I, High Sensitivity 4      Narrative:     Less than 99th percentile of normal range cutoff-  Female and children under 18 years old <14 ng/L; Male <21 ng/L: Negative  Repeat testing should be performed if clinically indicated.     Female and children under 18 years old 14-50 ng/L; Male 21-50 ng/L:  Consistent with possible cardiac damage and possible increased clinical   risk. Serial measurements may help to assess extent of myocardial damage.     >50 ng/L: Consistent with cardiac damage, increased clinical risk and  myocardial infarction. Serial measurements may help assess extent of   myocardial damage.      NOTE: Children less than 1 year old may have higher baseline troponin   levels and results should be interpreted in conjunction with the overall   clinical context.     NOTE: Troponin I testing is performed using a different   testing methodology at [facility] than at other   [facility]. Direct result comparisons should only   be made within the same method.   SERIAL TROPONIN, 1 HOUR - Normal    Troponin I, High Sensitivity 4      Narrative:     Less than 99th percentile of normal range cutoff-  Female and children under 18 years old <14 ng/L; Male <21 ng/L: Negative  Repeat testing should be performed if clinically indicated.     Female and children under 18 years old 14-50 ng/L; Male 21-50 ng/L:  Consistent with possible cardiac damage and possible increased clinical   risk. Serial measurements may help to assess extent of myocardial damage.     >50 ng/L: Consistent with cardiac damage, increased clinical risk and  myocardial infarction. Serial measurements may help assess extent of   myocardial damage.      NOTE: Children less than 1 year old may have higher baseline troponin   levels and  results should be interpreted in conjunction with the overall   clinical context.     NOTE: Troponin I testing is performed using a different   testing methodology at [facility] than at other   [facility]. Direct result comparisons should only   be made within the same method.   BLOOD CULTURE   BLOOD CULTURE   TROPONIN SERIES- (INITIAL, 1 HR)    Narrative:     The following orders were created for panel order Troponin I Series, High Sensitivity (0, 1 HR).  Procedure                               Abnormality         Status                     ---------                               -----------         ------                     Troponin I, High Sensiti...[[id number]]  Normal              Final result               Troponin, High Sensitivi...[[id number]]  Normal              Final result                 Please view results for these tests on the individual orders.   URINALYSIS WITH REFLEX CULTURE AND MICROSCOPIC    Narrative:     The following orders were created for panel order Urinalysis with Reflex Culture and Microscopic.  Procedure                               Abnormality         Status                     ---------                               -----------         ------                     Urinalysis with Reflex C...[[id number]]                                                 Extra Urine Gray Tube[[id number]]                                                         Please view results for these tests on the individual orders.   URINALYSIS WITH REFLEX CULTURE AND MICROSCOPIC   EXTRA URINE GRAY TUBE   PROCALCITONIN   CBC   BASIC METABOLIC PANEL        Medical Decision Making  EKG interpreted by ED physician: Normal sinus rhythm rate of 89.  OH, QRS, QTc intervals all within normal limits.  No significant ST elevations or depressions.  No significant Q waves.  Poor R wave progression.  Normal axis.    59-year-old female presents emergency department with chief complaint of shortness of breath.  On  my exam patient is appreciated increased work of breathing and is requiring nasal cannula oxygen.  Oxygen was titrated up to 5 L while she was here in the emergency department.  Patient does not wear oxygen at baseline.  Given her presenting complaints a thorough workup was obtained.  Patient given aerosol treatments and Solu-Medrol for presumed asthma exacerbation given her history.  CBC does not show significant leukocytosis and shows anemia that is at baseline.  VBG does not show any significant hypercapnic or metabolic acidosis.  BNP is mildly elevated I do not suspect CHF exacerbation.  D-dimer significantly elevated and subsequent CT PE study is negative for blood clot.  Cardiac enzymes x 2 and EKG do not show acute ACS.  Patient is positive for flu which I suspect is contributing to her symptoms.  Magnesium is low and repleted IV.  CMP is consistent with chronic kidney disease.  DVT study is negative for clot.  Chest x-ray shows atelectasis versus infiltrate.  Cultures are obtained patient started on antibiotics for pneumonia.  PE study also demonstrates infiltrates which may represent pneumonitis.  Patient given Tamiflu.  Head CT obtained for patient's headache shows no acute intracranial process.  Patient treated symptomatically with fentanyl and Zofran.  Given findings of hypoxic respiratory failure, influenza, possible pneumonia I recommend admission for further evaluation and treatment.  Patient agreeable with this plan.  Case discussed with hospitalist on-call.       ED Course as of 02/01/25 2311   Sat Feb 01, 2025 1907 Vascular US Lower Extremity Venous Duplex Bilateral [AM]      ED Course User Index  [AM] Bob Lopez DO         Diagnoses as of 02/01/25 2311   Influenza A   Exacerbation of intermittent asthma, unspecified asthma severity ([facility])   Acute respiratory failure with hypoxia (Multi)   Pneumonia due to influenza A virus   Hypomagnesemia      1. Pneumonia due to influenza A virus         2. Influenza A        3. Exacerbation of intermittent asthma, unspecified asthma severity ([facility])        4. Acute respiratory failure with hypoxia (Multi)        5. Hypomagnesemia           Critical Care    Performed by: Bob Lopez DO  Authorized by: Bob Lopez DO    Critical care provider statement:     Critical care time (minutes):  35    Critical care time was exclusive of:  Separately billable procedures and treating other patients    Critical care was necessary to treat or prevent imminent or life-threatening deterioration of the following conditions:  Respiratory failure    Critical care was time spent personally by me on the following activities:  Development of treatment plan with patient or surrogate, evaluation of patient's response to treatment, examination of patient, re-evaluation of patient's condition, pulse oximetry, ordering and review of radiographic studies, ordering and review of laboratory studies and ordering and performing treatments and interventions    Care discussed with: admitting provider         This note was dictated using dragon software and may contain errors related to dictation interpretation errors.      Bob Lopez DO  02/01/25 4430       Bob Lopez DO  02/01/25 [id number]

## 2025-02-02 VITALS
BODY MASS INDEX: 52.09 KG/M2 | SYSTOLIC BLOOD PRESSURE: 129 MMHG | WEIGHT: 283.07 LBS | RESPIRATION RATE: 18 BRPM | OXYGEN SATURATION: 95 % | HEART RATE: 67 BPM | DIASTOLIC BLOOD PRESSURE: 59 MMHG | TEMPERATURE: 98.1 F | HEIGHT: 62 IN

## 2025-02-02 PROBLEM — R53.83 LETHARGY: Status: ACTIVE | Noted: 2025-02-02

## 2025-02-02 PROBLEM — E83.42 HYPOMAGNESEMIA: Status: ACTIVE | Noted: 2025-02-02

## 2025-02-02 PROBLEM — E11.65 TYPE 2 DIABETES MELLITUS WITH HYPERGLYCEMIA, WITHOUT LONG-TERM CURRENT USE OF INSULIN: Status: ACTIVE | Noted: 2022-03-10

## 2025-02-02 PROBLEM — J45.20 MILD INTERMITTENT ASTHMA WITHOUT COMPLICATION (HHS-HCC): Status: ACTIVE | Noted: 2023-11-01

## 2025-02-02 PROBLEM — J96.01 ACUTE HYPOXIC RESPIRATORY FAILURE (MULTI): Status: ACTIVE | Noted: 2025-02-02

## 2025-02-02 PROBLEM — J45.21 MILD INTERMITTENT ASTHMA WITH ACUTE EXACERBATION (HHS-HCC): Status: ACTIVE | Noted: 2023-11-01

## 2025-02-02 LAB
ANION GAP SERPL CALC-SCNC: 12 MMOL/L (ref 10–20)
APPEARANCE UR: CLEAR
BACTERIA BLD CULT: NORMAL
BACTERIA BLD CULT: NORMAL
BILIRUB UR STRIP.AUTO-MCNC: NEGATIVE MG/DL
BUN SERPL-MCNC: 20 MG/DL (ref 6–23)
CALCIUM SERPL-MCNC: 8.9 MG/DL (ref 8.6–10.3)
CHLORIDE SERPL-SCNC: 102 MMOL/L (ref 98–107)
CO2 SERPL-SCNC: 28 MMOL/L (ref 21–32)
COLOR UR: YELLOW
CREAT SERPL-MCNC: 1.04 MG/DL (ref 0.5–1.05)
EGFRCR SERPLBLD CKD-EPI 2021: 62 ML/MIN/1.73M*2
ERYTHROCYTE [DISTWIDTH] IN BLOOD BY AUTOMATED COUNT: 16 % (ref 11.5–14.5)
GLUCOSE BLD MANUAL STRIP-MCNC: 153 MG/DL (ref 74–99)
GLUCOSE BLD MANUAL STRIP-MCNC: 170 MG/DL (ref 74–99)
GLUCOSE BLD MANUAL STRIP-MCNC: 183 MG/DL (ref 74–99)
GLUCOSE BLD MANUAL STRIP-MCNC: 197 MG/DL (ref 74–99)
GLUCOSE SERPL-MCNC: 205 MG/DL (ref 74–99)
GLUCOSE UR STRIP.AUTO-MCNC: NORMAL MG/DL
HCT VFR BLD AUTO: 30.9 % (ref 36–46)
HGB BLD-MCNC: 9.7 G/DL (ref 12–16)
HYALINE CASTS #/AREA URNS AUTO: ABNORMAL /LPF
KETONES UR STRIP.AUTO-MCNC: NEGATIVE MG/DL
LEUKOCYTE ESTERASE UR QL STRIP.AUTO: NEGATIVE
MAGNESIUM SERPL-MCNC: 1.84 MG/DL (ref 1.6–2.4)
MCH RBC QN AUTO: 27.2 PG (ref 26–34)
MCHC RBC AUTO-ENTMCNC: 31.4 G/DL (ref 32–36)
MCV RBC AUTO: 87 FL (ref 80–100)
NITRITE UR QL STRIP.AUTO: NEGATIVE
NRBC BLD-RTO: 0 /100 WBCS (ref 0–0)
PH UR STRIP.AUTO: 5.5 [PH]
PLATELET # BLD AUTO: 268 X10*3/UL (ref 150–450)
POTASSIUM SERPL-SCNC: 4.7 MMOL/L (ref 3.5–5.3)
PROCALCITONIN SERPL-MCNC: 0.09 NG/ML
PROT UR STRIP.AUTO-MCNC: ABNORMAL MG/DL
RBC # BLD AUTO: 3.57 X10*6/UL (ref 4–5.2)
RBC # UR STRIP.AUTO: NEGATIVE /UL
RBC #/AREA URNS AUTO: ABNORMAL /HPF
SODIUM SERPL-SCNC: 137 MMOL/L (ref 136–145)
SP GR UR STRIP.AUTO: 1.04
SQUAMOUS #/AREA URNS AUTO: ABNORMAL /HPF
UROBILINOGEN UR STRIP.AUTO-MCNC: NORMAL MG/DL
WBC # BLD AUTO: 7.5 X10*3/UL (ref 4.4–11.3)
WBC #/AREA URNS AUTO: ABNORMAL /HPF

## 2025-02-02 PROCEDURE — 82947 ASSAY GLUCOSE BLOOD QUANT: CPT

## 2025-02-02 PROCEDURE — 2500000004 HC RX 250 GENERAL PHARMACY W/ HCPCS (ALT 636 FOR OP/ED): Performed by: STUDENT IN AN ORGANIZED HEALTH CARE EDUCATION/TRAINING PROGRAM

## 2025-02-02 PROCEDURE — 99232 SBSQ HOSP IP/OBS MODERATE 35: CPT | Performed by: STUDENT IN AN ORGANIZED HEALTH CARE EDUCATION/TRAINING PROGRAM

## 2025-02-02 PROCEDURE — 2500000001 HC RX 250 WO HCPCS SELF ADMINISTERED DRUGS (ALT 637 FOR MEDICARE OP): Performed by: STUDENT IN AN ORGANIZED HEALTH CARE EDUCATION/TRAINING PROGRAM

## 2025-02-02 PROCEDURE — 80048 BASIC METABOLIC PNL TOTAL CA: CPT | Performed by: STUDENT IN AN ORGANIZED HEALTH CARE EDUCATION/TRAINING PROGRAM

## 2025-02-02 PROCEDURE — 2500000002 HC RX 250 W HCPCS SELF ADMINISTERED DRUGS (ALT 637 FOR MEDICARE OP, ALT 636 FOR OP/ED): Performed by: STUDENT IN AN ORGANIZED HEALTH CARE EDUCATION/TRAINING PROGRAM

## 2025-02-02 PROCEDURE — 94640 AIRWAY INHALATION TREATMENT: CPT

## 2025-02-02 PROCEDURE — 81001 URINALYSIS AUTO W/SCOPE: CPT | Performed by: EMERGENCY MEDICINE

## 2025-02-02 PROCEDURE — 36415 COLL VENOUS BLD VENIPUNCTURE: CPT | Performed by: STUDENT IN AN ORGANIZED HEALTH CARE EDUCATION/TRAINING PROGRAM

## 2025-02-02 PROCEDURE — 85027 COMPLETE CBC AUTOMATED: CPT | Performed by: STUDENT IN AN ORGANIZED HEALTH CARE EDUCATION/TRAINING PROGRAM

## 2025-02-02 PROCEDURE — 2060000001 HC INTERMEDIATE ICU ROOM DAILY

## 2025-02-02 PROCEDURE — 83735 ASSAY OF MAGNESIUM: CPT | Performed by: STUDENT IN AN ORGANIZED HEALTH CARE EDUCATION/TRAINING PROGRAM

## 2025-02-02 PROCEDURE — 2500000005 HC RX 250 GENERAL PHARMACY W/O HCPCS: Performed by: STUDENT IN AN ORGANIZED HEALTH CARE EDUCATION/TRAINING PROGRAM

## 2025-02-02 RX ORDER — DEXTROSE 50 % IN WATER (D50W) INTRAVENOUS SYRINGE
12.5
Status: DISCONTINUED | OUTPATIENT
Start: 2025-02-02 | End: 2025-02-05 | Stop reason: HOSPADM

## 2025-02-02 RX ORDER — PANTOPRAZOLE SODIUM 40 MG/1
40 TABLET, DELAYED RELEASE ORAL
Status: DISCONTINUED | OUTPATIENT
Start: 2025-02-02 | End: 2025-02-05 | Stop reason: HOSPADM

## 2025-02-02 RX ORDER — LANOLIN ALCOHOL/MO/W.PET/CERES
400 CREAM (GRAM) TOPICAL 2 TIMES DAILY
Status: DISCONTINUED | OUTPATIENT
Start: 2025-02-02 | End: 2025-02-05 | Stop reason: HOSPADM

## 2025-02-02 RX ORDER — ASPIRIN 81 MG/1
81 TABLET ORAL DAILY
Status: DISCONTINUED | OUTPATIENT
Start: 2025-02-02 | End: 2025-02-05 | Stop reason: HOSPADM

## 2025-02-02 RX ORDER — SERTRALINE HYDROCHLORIDE 50 MG/1
100 TABLET, FILM COATED ORAL DAILY
Status: DISCONTINUED | OUTPATIENT
Start: 2025-02-02 | End: 2025-02-05 | Stop reason: HOSPADM

## 2025-02-02 RX ORDER — GABAPENTIN 300 MG/1
300 CAPSULE ORAL 3 TIMES DAILY
Status: DISCONTINUED | OUTPATIENT
Start: 2025-02-02 | End: 2025-02-03

## 2025-02-02 RX ORDER — BACLOFEN 10 MG/1
20 TABLET ORAL 2 TIMES DAILY
Status: DISCONTINUED | OUTPATIENT
Start: 2025-02-02 | End: 2025-02-05 | Stop reason: HOSPADM

## 2025-02-02 RX ORDER — MONTELUKAST SODIUM 10 MG/1
10 TABLET ORAL NIGHTLY
Status: DISCONTINUED | OUTPATIENT
Start: 2025-02-02 | End: 2025-02-05 | Stop reason: HOSPADM

## 2025-02-02 RX ORDER — ALLOPURINOL 100 MG/1
100 TABLET ORAL DAILY
Status: DISCONTINUED | OUTPATIENT
Start: 2025-02-02 | End: 2025-02-05 | Stop reason: HOSPADM

## 2025-02-02 RX ORDER — INSULIN LISPRO 100 [IU]/ML
0-5 INJECTION, SOLUTION INTRAVENOUS; SUBCUTANEOUS
Status: DISCONTINUED | OUTPATIENT
Start: 2025-02-02 | End: 2025-02-05 | Stop reason: HOSPADM

## 2025-02-02 RX ORDER — DEXTROSE 50 % IN WATER (D50W) INTRAVENOUS SYRINGE
25
Status: DISCONTINUED | OUTPATIENT
Start: 2025-02-02 | End: 2025-02-05 | Stop reason: HOSPADM

## 2025-02-02 RX ORDER — QUETIAPINE FUMARATE 100 MG/1
100 TABLET, FILM COATED ORAL NIGHTLY
Status: DISCONTINUED | OUTPATIENT
Start: 2025-02-02 | End: 2025-02-05 | Stop reason: HOSPADM

## 2025-02-02 RX ORDER — ALBUTEROL SULFATE 90 UG/1
2 INHALANT RESPIRATORY (INHALATION) EVERY 4 HOURS PRN
Status: DISCONTINUED | OUTPATIENT
Start: 2025-02-02 | End: 2025-02-05 | Stop reason: HOSPADM

## 2025-02-02 RX ORDER — METOPROLOL SUCCINATE 25 MG/1
25 TABLET, EXTENDED RELEASE ORAL DAILY
Status: DISCONTINUED | OUTPATIENT
Start: 2025-02-02 | End: 2025-02-05 | Stop reason: HOSPADM

## 2025-02-02 RX ORDER — PREDNISONE 20 MG/1
40 TABLET ORAL DAILY
Status: DISCONTINUED | OUTPATIENT
Start: 2025-02-02 | End: 2025-02-05 | Stop reason: HOSPADM

## 2025-02-02 RX ORDER — PRAVASTATIN SODIUM 20 MG/1
40 TABLET ORAL EVERY EVENING
Status: DISCONTINUED | OUTPATIENT
Start: 2025-02-02 | End: 2025-02-05 | Stop reason: HOSPADM

## 2025-02-02 RX ORDER — KETOROLAC TROMETHAMINE 30 MG/ML
30 INJECTION, SOLUTION INTRAMUSCULAR; INTRAVENOUS ONCE
Status: COMPLETED | OUTPATIENT
Start: 2025-02-02 | End: 2025-02-02

## 2025-02-02 RX ORDER — OXYCODONE AND ACETAMINOPHEN 5; 325 MG/1; MG/1
1 TABLET ORAL EVERY 6 HOURS PRN
Status: DISCONTINUED | OUTPATIENT
Start: 2025-02-02 | End: 2025-02-05 | Stop reason: HOSPADM

## 2025-02-02 RX ADMIN — OSELTAMIVIR PHOSPHATE 75 MG: 75 CAPSULE ORAL at 20:14

## 2025-02-02 RX ADMIN — KETOROLAC TROMETHAMINE 30 MG: 30 INJECTION, SOLUTION INTRAMUSCULAR at 04:26

## 2025-02-02 RX ADMIN — ONDANSETRON 4 MG: 2 INJECTION INTRAMUSCULAR; INTRAVENOUS at 11:28

## 2025-02-02 RX ADMIN — GABAPENTIN 300 MG: 300 CAPSULE ORAL at 20:15

## 2025-02-02 RX ADMIN — CEFTRIAXONE SODIUM 1 G: 1 INJECTION, SOLUTION INTRAVENOUS at 20:14

## 2025-02-02 RX ADMIN — ENOXAPARIN SODIUM 40 MG: 40 INJECTION SUBCUTANEOUS at 09:14

## 2025-02-02 RX ADMIN — ENOXAPARIN SODIUM 40 MG: 40 INJECTION SUBCUTANEOUS at 20:15

## 2025-02-02 RX ADMIN — OXYCODONE HYDROCHLORIDE AND ACETAMINOPHEN 1 TABLET: 5; 325 TABLET ORAL at 20:17

## 2025-02-02 RX ADMIN — BACLOFEN 20 MG: 10 TABLET ORAL at 20:14

## 2025-02-02 RX ADMIN — PREDNISONE 40 MG: 20 TABLET ORAL at 09:13

## 2025-02-02 RX ADMIN — DOXYCYCLINE 100 MG: 100 INJECTION, POWDER, LYOPHILIZED, FOR SOLUTION INTRAVENOUS at 20:14

## 2025-02-02 RX ADMIN — OSELTAMIVIR PHOSPHATE 75 MG: 75 CAPSULE ORAL at 09:14

## 2025-02-02 RX ADMIN — OXYCODONE HYDROCHLORIDE AND ACETAMINOPHEN 1 TABLET: 5; 325 TABLET ORAL at 12:31

## 2025-02-02 RX ADMIN — IPRATROPIUM BROMIDE AND ALBUTEROL SULFATE 3 ML: 2.5; .5 SOLUTION RESPIRATORY (INHALATION) at 02:24

## 2025-02-02 RX ADMIN — GABAPENTIN 300 MG: 300 CAPSULE ORAL at 16:41

## 2025-02-02 RX ADMIN — ACETAMINOPHEN 650 MG: 325 TABLET ORAL at 09:15

## 2025-02-02 RX ADMIN — Medication 6 L/MIN: at 08:00

## 2025-02-02 RX ADMIN — GUAIFENESIN 600 MG: 600 TABLET, EXTENDED RELEASE ORAL at 20:15

## 2025-02-02 RX ADMIN — ASPIRIN 81 MG: 81 TABLET, COATED ORAL at 09:14

## 2025-02-02 RX ADMIN — GUAIFENESIN 600 MG: 600 TABLET, EXTENDED RELEASE ORAL at 09:14

## 2025-02-02 RX ADMIN — IPRATROPIUM BROMIDE AND ALBUTEROL SULFATE 3 ML: 2.5; .5 SOLUTION RESPIRATORY (INHALATION) at 08:23

## 2025-02-02 RX ADMIN — IPRATROPIUM BROMIDE AND ALBUTEROL SULFATE 3 ML: 2.5; .5 SOLUTION RESPIRATORY (INHALATION) at 20:50

## 2025-02-02 RX ADMIN — METOPROLOL SUCCINATE 25 MG: 25 TABLET, EXTENDED RELEASE ORAL at 09:14

## 2025-02-02 RX ADMIN — QUETIAPINE FUMARATE 100 MG: 100 TABLET, FILM COATED ORAL at 20:15

## 2025-02-02 RX ADMIN — PANTOPRAZOLE SODIUM 40 MG: 40 TABLET, DELAYED RELEASE ORAL at 06:27

## 2025-02-02 RX ADMIN — MONTELUKAST SODIUM 10 MG: 10 TABLET, FILM COATED ORAL at 20:15

## 2025-02-02 RX ADMIN — IPRATROPIUM BROMIDE AND ALBUTEROL SULFATE 3 ML: 2.5; .5 SOLUTION RESPIRATORY (INHALATION) at 12:48

## 2025-02-02 RX ADMIN — MAGNESIUM OXIDE 400 MG (241.3 MG MAGNESIUM) TABLET 400 MG: TABLET at 20:15

## 2025-02-02 RX ADMIN — BACLOFEN 20 MG: 10 TABLET ORAL at 09:14

## 2025-02-02 RX ADMIN — Medication 6 L/MIN: at 03:10

## 2025-02-02 RX ADMIN — ALLOPURINOL 100 MG: 100 TABLET ORAL at 09:14

## 2025-02-02 RX ADMIN — Medication 2.5 L/MIN: at 20:19

## 2025-02-02 RX ADMIN — MAGNESIUM OXIDE 400 MG (241.3 MG MAGNESIUM) TABLET 400 MG: TABLET at 09:14

## 2025-02-02 RX ADMIN — DOXYCYCLINE 100 MG: 100 INJECTION, POWDER, LYOPHILIZED, FOR SOLUTION INTRAVENOUS at 09:15

## 2025-02-02 RX ADMIN — PRAVASTATIN SODIUM 40 MG: 20 TABLET ORAL at 20:14

## 2025-02-02 RX ADMIN — SERTRALINE HYDROCHLORIDE 100 MG: 50 TABLET, FILM COATED ORAL at 09:13

## 2025-02-02 SDOH — ECONOMIC STABILITY: FOOD INSECURITY: WITHIN THE PAST 12 MONTHS, YOU WORRIED THAT YOUR FOOD WOULD RUN OUT BEFORE YOU GOT THE MONEY TO BUY MORE.: NEVER TRUE

## 2025-02-02 SDOH — ECONOMIC STABILITY: HOUSING INSECURITY: AT ANY TIME IN THE PAST 12 MONTHS, WERE YOU HOMELESS OR LIVING IN A SHELTER (INCLUDING NOW)?: NO

## 2025-02-02 SDOH — ECONOMIC STABILITY: FOOD INSECURITY: WITHIN THE PAST 12 MONTHS, THE FOOD YOU BOUGHT JUST DIDN'T LAST AND YOU DIDN'T HAVE MONEY TO GET MORE.: NEVER TRUE

## 2025-02-02 SDOH — ECONOMIC STABILITY: TRANSPORTATION INSECURITY: IN THE PAST 12 MONTHS, HAS LACK OF TRANSPORTATION KEPT YOU FROM MEDICAL APPOINTMENTS OR FROM GETTING MEDICATIONS?: NO

## 2025-02-02 SDOH — SOCIAL STABILITY: SOCIAL INSECURITY
WITHIN THE LAST YEAR, HAVE YOU BEEN KICKED, HIT, SLAPPED, OR OTHERWISE PHYSICALLY HURT BY YOUR PARTNER OR EX-PARTNER?: NO

## 2025-02-02 SDOH — ECONOMIC STABILITY: INCOME INSECURITY: IN THE PAST 12 MONTHS HAS THE ELECTRIC, GAS, OIL, OR WATER COMPANY THREATENED TO SHUT OFF SERVICES IN YOUR HOME?: NO

## 2025-02-02 SDOH — SOCIAL STABILITY: SOCIAL INSECURITY: WITHIN THE LAST YEAR, HAVE YOU BEEN HUMILIATED OR EMOTIONALLY ABUSED IN OTHER WAYS BY YOUR PARTNER OR EX-PARTNER?: NO

## 2025-02-02 SDOH — SOCIAL STABILITY: SOCIAL INSECURITY
WITHIN THE LAST YEAR, HAVE YOU BEEN RAPED OR FORCED TO HAVE ANY KIND OF SEXUAL ACTIVITY BY YOUR PARTNER OR EX-PARTNER?: NO

## 2025-02-02 SDOH — SOCIAL STABILITY: SOCIAL INSECURITY: WITHIN THE LAST YEAR, HAVE YOU BEEN AFRAID OF YOUR PARTNER OR EX-PARTNER?: NO

## 2025-02-02 SDOH — ECONOMIC STABILITY: FOOD INSECURITY: HOW HARD IS IT FOR YOU TO PAY FOR THE VERY BASICS LIKE FOOD, HOUSING, MEDICAL CARE, AND HEATING?: PATIENT DECLINED

## 2025-02-02 SDOH — ECONOMIC STABILITY: HOUSING INSECURITY: IN THE LAST 12 MONTHS, WAS THERE A TIME WHEN YOU WERE NOT ABLE TO PAY THE MORTGAGE OR RENT ON TIME?: PATIENT DECLINED

## 2025-02-02 SDOH — ECONOMIC STABILITY: HOUSING INSECURITY: IN THE PAST 12 MONTHS, HOW MANY TIMES HAVE YOU MOVED WHERE YOU WERE LIVING?: 0

## 2025-02-02 ASSESSMENT — COGNITIVE AND FUNCTIONAL STATUS - GENERAL
DAILY ACTIVITIY SCORE: 20
MOBILITY SCORE: 21
MOVING TO AND FROM BED TO CHAIR: A LITTLE
DRESSING REGULAR LOWER BODY CLOTHING: A LITTLE
DRESSING REGULAR UPPER BODY CLOTHING: A LITTLE
CLIMB 3 TO 5 STEPS WITH RAILING: A LITTLE
TOILETING: A LITTLE
HELP NEEDED FOR BATHING: A LITTLE
WALKING IN HOSPITAL ROOM: A LITTLE

## 2025-02-02 ASSESSMENT — ACTIVITIES OF DAILY LIVING (ADL): LACK_OF_TRANSPORTATION: NO

## 2025-02-02 ASSESSMENT — PAIN SCALES - GENERAL: PAINLEVEL_OUTOF10: 7

## 2025-02-02 NOTE — PROGRESS NOTES
Vivi Hughes is a 59 y.o. female on day 1 of admission presenting with Pneumonia due to influenza A virus.      Subjective   Hemodynamically stable, complaining of severe headache, on oxygen via nasal cannula, sitting up on her bed    Objective     Last Recorded Vitals  /74   Pulse 90   Temp 36.5 °C (97.7 °F) (Temporal)   Resp 24   Wt 128 kg (283 lb 1.1 oz)   SpO2 93%   Intake/Output last 3 Shifts:  No intake or output data in the 24 hours ending 02/02/25 1156    Admission Weight  Weight: 120 kg (265 lb) (02/01/25 1659)    Daily Weight  02/01/25 : 128 kg (283 lb 1.1 oz)    Image Results  CT head wo IV contrast  Narrative: Interpreted By:  Carline Curry,   STUDY:  CT HEAD WO IV CONTRAST;  2/1/2025 7:59 pm      INDICATION:  Signs/Symptoms:headache.          COMPARISON:  CT head dated 04/17/2024.      ACCESSION NUMBER(S):  [id number]      ORDERING CLINICIAN:  ИВАН GOFF      TECHNIQUE:  Noncontrast axial CT scan of head was performed. Angled reformats in  brain and bone windows were generated. The images were reviewed in  bone, brain, blood and soft tissue windows.      FINDINGS:  No hyperdense intracranial hemorrhage is evident. No mass effect or  midline shift is present.      Gray-white differentiation is intact, without evidence of CT apparent  transcortical infarct. Subtle periventricular attenuation changes are  nonspecific, but favored to represent sequela of microvascular  disease.      No abnormal ventricular dilatation is present. Basal cisterns are  patent. No extra-axial fluid collection is identified.      Scalp soft tissues do not demonstrate any acute abnormality. No acute  calvarial abnormality is present.      Mastoid air cells and middle ear cavities are clear. Visualized  paranasal sinuses are unremarkable in appearance.      Impression: No evidence of hemorrhage, CT apparent transcortical infarct, or  other acute intracranial abnormality.      MACRO:  None      Signed by:  Carline Pitthufiordaliza 2/1/2025 8:43 PM  Dictation workstation:   [id number]  CT angio chest for pulmonary embolism  Narrative: STUDY:  CT Angiogram of the Chest; 2/1/2025 at 7:59 PM.  INDICATION:  Elevated D-dimer, shortness of breath.  COMPARISON:  XR chest 2/1/2025; CT CAP 4/17/2024.  ACCESSION NUMBER(S):  [id number]  ORDERING CLINICIAN:  ИВАН GOFF  TECHNIQUE:  CTA of the chest was performed with intravenous contrast.   Images are reviewed and processed at a workstation according to the CT  angiogram protocol with 3-D and/or MIP post processing imaging  generated.  Omnipaque 350 75 mL was administered intravenously.  Automated mA/kV exposure control was utilized and patient examination  was performed in strict accordance with principles of ALARA.  FINDINGS:  Pulmonary arteries are adequately opacified without acute or chronic  filling defects.  The thoracic aorta is normal in course and caliber  without dissection or aneurysm.  The heart is normal in size without pericardial effusion.  Thoracic  lymph nodes are not enlarged.  There is no pleural effusion, pleural thickening, or pneumothorax.   The airways are patent.  Lungs demonstrate bilateral lower lobe infiltrates.  Upper abdomen demonstrates no acute pathology. Enlarged fatty liver.  Cholecystectomy.  There are no acute fractures.  No suspicious bony lesions.  Impression: No definite pulmonary embolism.  Mild cardiomegaly.  Bilateral lower lobe infiltrates, likely acute pneumonitis.  Enlarged fatty liver.  Signed by Anurag Sainz MD  ECG 12 lead  Normal sinus rhythm  Cannot rule out Anterior infarct , age undetermined  Abnormal ECG  When compared with ECG of 17-APR-2024 20:09,  Vent. rate has increased BY  33 BPM  Nonspecific T wave abnormality has replaced inverted T waves in Inferior leads  Vascular US Lower Extremity Venous Duplex Bilateral  Narrative: STUDY:  Bilateral Lower Extremity Venous Doppler Ultrasound; 2/1/2025 6:12  PM.  INDICATION:  Elevated D-dimer. Shortness of breath x2 days.  COMPARISON:  US Right LE Venous 10/17/2023.  ACCESSION NUMBER(S):  [id number]  ORDERING CLINICIAN:  ИВАН GOFF  TECHNIQUE:    Real-time grayscale imaging, color Doppler flow imaging, and spectral  Doppler imaging of the bilateral lower extremity veins was performed.  FINDINGS:   The bilateral common femoral, profunda, femoral, and popliteal veins  demonstrated normal compressibility, normal phasic venous flow and  normal response to augmentation. There is no evidence for echogenic  thrombi. The visualized deep calf veins are patent.    Impression: No evidence for DVT within the bilateral lower extremities.  No significant change compared to prior ultrasound.  Signed by Kiet Wray MD  XR chest 1 view  Narrative: STUDY:  Chest Radiograph;  2/01/2025 5:23 PM  INDICATION:  Chest pain.  COMPARISON:  CT CAP 4/17/2024.  ACCESSION NUMBER(S):  [id number]  ORDERING CLINICIAN:  ИВАН GOFF  TECHNIQUE:  Frontal chest was obtained at 17:22 hours.  FINDINGS:  CARDIOMEDIASTINAL SILHOUETTE:  Cardiomediastinal silhouette is prominent in size. Aortic knob  calcifications.     LUNGS:  Hazy bibasilar atelectasis and/or pneumonia. No pleural effusion or  pneumothorax.     ABDOMEN:  No remarkable upper abdominal findings.     BONES:  No acute osseous changes.  Impression: Hazy bibasilar atelectasis and/or pneumonia.  Signed by Yoandy Cunningham DO      Physical Exam    General: Well-developed morbidly obese female, in no acute distress, on oxygen via nasal cannula  HEENT: AT, NC, no JVD, no lymphadenopathy, neck supple  Lungs: Bilateral wheezing noted  Cardiac: Normal S1-S2, no murmur, no gallop  Abdomen: Soft, nontender, no distention, positive bowel sound  Extremities: No deformity, bilateral lower extremity +1 edema noted, pulses intact, ROM intact  Neurological: Alert awake oriented x3, sensation intact, clear speech          Assessment & Plan  Pneumonia due to  influenza A virus    Mild intermittent asthma with acute exacerbation ([facility])    Benign essential hypertension    Mixed hyperlipidemia    Posttraumatic stress disorder    Type 2 diabetes mellitus with hyperglycemia, without long-term current use of insulin    Acute hypoxic respiratory failure ([facility])    Hypomagnesemia    Lethargy          Vivi Hughes is a 59 y.o. female with a history of mild intermittent asthma, type 2 diabetes, essential hypertension, mixed hyperlipidemia, PTSD who was admitted for management of following issues:        #.  Acute hypoxic respiratory failure 2/2 influenza A pneumonia  #.  Mild intermittent asthma with acute exacerbation  Influenza A positive, requiring supplemental oxygen (not on home O2)  CT angio chest reviewed and negative for PE, bilateral lower lobe infiltrates more consistent with viral pneumonia  Asthma exacerbation with wheezing      -Continue Tamiflu twice daily, prednisone, nebulizer, albuterol inhaler, Mucinex, Tessalon, Singulair  -Pending Pro-Sree, oxygen therapy as needed     #.  Type 2 diabetes with hyperglycemia  -Well controlled, last A1c 6.6%  -SSI, Accu-Cheks, hypoglycemia protocol, DM diet     #.  Hypomagnesemia  -Was replaced, will monitor     #.  Lethargy resolved  -Likely secondary to multiple doses of fentanyl in the ER  -Avoid further opiates in the setting of acute hypoxia and underlying asthma     #.  Benign essential hypertension  #.  Mixed hyperlipidemia  #.  PTSD  -Continue home meds     VTE PPX: Lovenox/SCDs  Disposition: Home once hemodynamically stable        Josh Armstrong MD

## 2025-02-02 NOTE — H&P
Medical Group History and Physical      ASSESSMENT & PLAN:     #.  Acute hypoxic respiratory failure 2/2 influenza A pneumonia  #.  Mild intermittent asthma with acute exacerbation  -P/w shortness of breath and nonproductive cough, found to be influenza A positive, requiring supplemental oxygen (not on home O2)  -CT angio chest reviewed and negative for PE and shows bilateral lower lobe infiltrates more consistent with viral pneumonia  -Mild wheezing on exam suggesting asthma exacerbation, likely precipitated by underlying influenza infection  -Low suspicion for sepsis given stable vitals and normal lactate    Plan:  -Tamiflu twice daily  -Lower suspicion for bacterial pneumonia given CT findings noted above however will continue empiric antibiotics for now and check procalcitonin, discontinue if negative  -Prednisone 40 mg daily  -Scheduled nebulizers, albuterol inhaler as needed  -Mucinex twice daily, Tessalon as needed  -Continuing home Singulair  -Wean supplemental oxygen as tolerated  -Repeat ABG shows mild hypercapnia that is stable from prior VBG, no indication for BiPAP at this time, avoiding further opiates as below    #.  Type 2 diabetes with hyperglycemia  -Well-controlled, last A1c 6.6%  -Holding home metformin  -SSI, Accu-Cheks, hypoglycemia protocol    #.  Hypomagnesemia  -S/p 2g IV Mg in ER  -Repeat level in the morning    #.  Lethargy  -Likely secondary to multiple doses of fentanyl in the ER  -Rechecking ABG to ensure no worsening hypercapnia, shows mild respiratory acidosis without indication for BiPAP at this time  -Avoid further opiates in the setting of acute hypoxia and underlying asthma    #.  Benign essential hypertension  #.  Mixed hyperlipidemia  #.  PTSD  -Continuing home medications    VTE PPX: Lovenox/SCDs      Florentin De La Cruz MD    --Of note, this documentation is completed using the Dragon Dictation system (voice recognition software). There may be spelling and/or grammatical  errors that were not corrected prior to final submission.--    HISTORY OF PRESENT ILLNESS:   Chief Complaint: Shortness of breath    Vivi Hughes is a 59 y.o. female with a history of mild intermittent asthma, type 2 diabetes, essential hypertension, mixed hyperlipidemia, PTSD presenting with shortness of breath.  Patient significantly lethargic during examination.  Patient states her symptoms started approximately 48 hours ago.  She has had a nonproductive cough and nasal congestion.  She has also felt slightly wheezy.  Denies any chest pain, fever, chills.  Denies any orthopnea and reports mild leg swelling that is at baseline.       ER Course: Mildly tachypneic on arrival, placed on nasal cannula for hypoxia, afebrile.  Labs notable for glucose 143, SCR 1.10, magnesium 1.37, , D-dimer 1711, hemoglobin 9.6.  Troponin negative x 2.  Influenza A positive.  ABG shows pH 7.32, pCO2 59, pO2 44.  CTPA negative for PE and shows bilateral lower lobe infiltrates.  CT head was negative.  Blood cultures were drawn and patient was started on empiric antibiotics.  She was also given Tamiflu, Solu-Medrol, IV magnesium, nebulizer treatments, and fentanyl.    ROS  10 point review of systems negative except per HPI     PAST HISTORIES:     Past Medical History  She has no past medical history on file.    Surgical History  She has no past surgical history on file.     Social History  She reports that she has never smoked. She has never used smokeless tobacco. No history on file for alcohol use and drug use.    Family History  No family history on file.    Allergies:  Penicillins, Shellfish containing products, Shellfish derived, Adhesive, Adhesive tape-silicones, Aripiprazole, Latex, Topiramate, Buspirone, and Sulfa (sulfonamide antibiotics)      OBJECTIVE:      Last Recorded Vitals  /66   Pulse 92   Temp 36.7 °C (98.1 °F) (Temporal)   Resp 20   Wt 120 kg (265 lb)   SpO2 (!) 89%     Last I/O:  No intake/output  data recorded.    Physical Exam   Gen: Lethargic but converses appropriately, appears stated age  HEENT: EOM, MMM  CV: RRR, no murmurs rubs or gallops  Resp: End expiratory wheezing bilaterally, normal effort  Abdomen: soft, NT,+BS  LE: 1+ pitting edema bilaterally, no deformity  Neuro: A&Ox4, moving all extremities    LABS AND IMAGING:       Relevant Results  Labs Reviewed   CBC WITH AUTO DIFFERENTIAL - Abnormal       Result Value    WBC 7.3      nRBC 0.0      RBC 3.51 (*)     Hemoglobin 9.6 (*)     Hematocrit 30.8 (*)     MCV 88      MCH 27.4      MCHC 31.2 (*)     RDW 16.0 (*)     Platelets 262      Neutrophils % 75.9      Immature Granulocytes %, Automated 0.6      Lymphocytes % 5.9      Monocytes % 16.2      Eosinophils % 0.7      Basophils % 0.7      Neutrophils Absolute 5.52      Immature Granulocytes Absolute, Automated 0.04      Lymphocytes Absolute 0.43 (*)     Monocytes Absolute 1.18 (*)     Eosinophils Absolute 0.05      Basophils Absolute 0.05     COMPREHENSIVE METABOLIC PANEL - Abnormal    Glucose 143 (*)     Sodium 137      Potassium 4.4      Chloride 101      Bicarbonate 29      Anion Gap 11      Urea Nitrogen 20      Creatinine 1.10 (*)     eGFR 58 (*)     Calcium 8.7      Albumin 3.8      Alkaline Phosphatase 100      Total Protein 7.0      AST 14      Bilirubin, Total 0.3      ALT 8     MAGNESIUM - Abnormal    Magnesium 1.37 (*)    B-TYPE NATRIURETIC PEPTIDE - Abnormal     (*)     Narrative:        <100 pg/mL - Heart failure unlikely  100-299 pg/mL - Intermediate probability of acute heart                  failure exacerbation. Correlate with clinical                  context and patient history.    >=300 pg/mL - Heart Failure likely. Correlate with clinical                  context and patient history.    BNP testing is performed using different testing methodology at [facility] than at other [facility] hospitals. Direct result comparisons should only be made within the same  method.      SARS-COV-2 AND INFLUENZA A/B PCR - Abnormal    Flu A Result Detected (*)     Flu B Result Not Detected      Coronavirus 2019, PCR Not Detected      Narrative:     This assay is an FDA-cleared, in vitro diagnostic nucleic acid amplification test for the qualitative detection and differentiation of SARS CoV-2/ Influenza A/B from nasopharyngeal specimens collected from individuals with signs and symptoms of respiratory tract infections, and has been validated for use at [facility]. Negative results do not preclude COVID-19/ Influenza A/B infections and should not be used as the sole basis for diagnosis, treatment, or other management decisions. Testing for SARS CoV-2 is recommended only for patients who meet current clinical and/or epidemiological criteria defined by federal, state, or local public health directives.   BLOOD GAS VENOUS FULL PANEL - Abnormal    POCT pH, Venous 7.32 (*)     POCT pCO2, Venous 59 (*)     POCT pO2, Venous 44      POCT SO2, Venous 70      POCT Oxy Hemoglobin, Venous 68.3      POCT Hematocrit Calculated, Venous 31.0 (*)     POCT Sodium, Venous 135 (*)     POCT Potassium, Venous 4.6      POCT Chloride, Venous 104      POCT Ionized Calicum, Venous 1.17      POCT Glucose, Venous 170 (*)     POCT Lactate, Venous 0.9      POCT Base Excess, Venous 3.2 (*)     POCT HCO3 Calculated, Venous 30.4 (*)     POCT Hemoglobin, Venous 10.4 (*)     POCT Anion Gap, Venous 5.0 (*)     Patient Temperature        FiO2 0     D-DIMER, VTE EXCLUSION - Abnormal    D-Dimer, Quantitative VTE Exclusion 1,711 (*)     Narrative:     The VTE Exclusion D-Dimer assay is reported in ng/mL Fibrinogen Equivalent Units (FEU).    Per 's instructions for use, a value of less than 500 ng/mL (FEU) may help to exclude DVT or PE in outpatients when the assay is used with a clinical pretest probability assessment.(AEM[facility] must utilize and document eCalc 'Wells Score Deep Vein Thrombosis  Risk' for DVT exclusion only. Emergency Department should utilize  Guidelines for Emergency Department Use of the VTE Exclusion D-Dimer and Clinical Pretest probability assessment model for DVT or PE exclusion.)   APTT - Normal    aPTT 35      Narrative:     The APTT is no longer used for monitoring Unfractionated Heparin Therapy. For monitoring Heparin Therapy, use the Heparin Assay.   PROTIME-INR - Normal    Protime 11.8      INR 1.0     LACTATE - Normal    Lactate 1.2      Narrative:     Venipuncture immediately after or during the administration of Metamizole may lead to falsely low results. Testing should be performed immediately prior to Metamizole dosing.   SERIAL TROPONIN-INITIAL - Normal    Troponin I, High Sensitivity 4      Narrative:     Less than 99th percentile of normal range cutoff-  Female and children under 18 years old <14 ng/L; Male <21 ng/L: Negative  Repeat testing should be performed if clinically indicated.     Female and children under 18 years old 14-50 ng/L; Male 21-50 ng/L:  Consistent with possible cardiac damage and possible increased clinical   risk. Serial measurements may help to assess extent of myocardial damage.     >50 ng/L: Consistent with cardiac damage, increased clinical risk and  myocardial infarction. Serial measurements may help assess extent of   myocardial damage.      NOTE: Children less than 1 year old may have higher baseline troponin   levels and results should be interpreted in conjunction with the overall   clinical context.     NOTE: Troponin I testing is performed using a different   testing methodology at [facility] than at other   [facility]. Direct result comparisons should only   be made within the same method.   SERIAL TROPONIN, 1 HOUR - Normal    Troponin I, High Sensitivity 4      Narrative:     Less than 99th percentile of normal range cutoff-  Female and children under 18 years old <14 ng/L; Male <21 ng/L: Negative  Repeat testing  should be performed if clinically indicated.     Female and children under 18 years old 14-50 ng/L; Male 21-50 ng/L:  Consistent with possible cardiac damage and possible increased clinical   risk. Serial measurements may help to assess extent of myocardial damage.     >50 ng/L: Consistent with cardiac damage, increased clinical risk and  myocardial infarction. Serial measurements may help assess extent of   myocardial damage.      NOTE: Children less than 1 year old may have higher baseline troponin   levels and results should be interpreted in conjunction with the overall   clinical context.     NOTE: Troponin I testing is performed using a different   testing methodology at [facility] than at other   [facility]. Direct result comparisons should only   be made within the same method.   BLOOD CULTURE   BLOOD CULTURE   TROPONIN SERIES- (INITIAL, 1 HR)    Narrative:     The following orders were created for panel order Troponin I Series, High Sensitivity (0, 1 HR).  Procedure                               Abnormality         Status                     ---------                               -----------         ------                     Troponin I, High Sensiti...[[id number]]  Normal              Final result               Troponin, High Sensitivi...[[id number]]  Normal              Final result                 Please view results for these tests on the individual orders.   URINALYSIS WITH REFLEX CULTURE AND MICROSCOPIC    Narrative:     The following orders were created for panel order Urinalysis with Reflex Culture and Microscopic.  Procedure                               Abnormality         Status                     ---------                               -----------         ------                     Urinalysis with Reflex C...[[id number]]                                                 Extra Urine Gray Tube[[id number]]                                                         Please view results  for these tests on the individual orders.   URINALYSIS WITH REFLEX CULTURE AND MICROSCOPIC   EXTRA URINE GRAY TUBE   PROCALCITONIN   BLOOD GAS ARTERIAL FULL PANEL     CT angio chest for pulmonary embolism   Final Result   No definite pulmonary embolism.   Mild cardiomegaly.   Bilateral lower lobe infiltrates, likely acute pneumonitis.   Enlarged fatty liver.   Signed by Anurag Sainz MD      CT head wo IV contrast   Final Result   No evidence of hemorrhage, CT apparent transcortical infarct, or   other acute intracranial abnormality.        MACRO:   None        Signed by: Carline Curry 2/1/2025 8:43 PM   Dictation workstation:   [id number]      Vascular US Lower Extremity Venous Duplex Bilateral   Final Result   No evidence for DVT within the bilateral lower extremities.   No significant change compared to prior ultrasound.   Signed by Kiet Wray MD      XR chest 1 view   Final Result   Hazy bibasilar atelectasis and/or pneumonia.   Signed by Yoandy Cunningham DO

## 2025-02-03 LAB
ANION GAP SERPL CALC-SCNC: 10 MMOL/L (ref 10–20)
BUN SERPL-MCNC: 24 MG/DL (ref 6–23)
CALCIUM SERPL-MCNC: 8.9 MG/DL (ref 8.6–10.3)
CHLORIDE SERPL-SCNC: 102 MMOL/L (ref 98–107)
CO2 SERPL-SCNC: 29 MMOL/L (ref 21–32)
CREAT SERPL-MCNC: 1.09 MG/DL (ref 0.5–1.05)
EGFRCR SERPLBLD CKD-EPI 2021: 58 ML/MIN/1.73M*2
ERYTHROCYTE [DISTWIDTH] IN BLOOD BY AUTOMATED COUNT: 16.1 % (ref 11.5–14.5)
GLUCOSE BLD MANUAL STRIP-MCNC: 116 MG/DL (ref 74–99)
GLUCOSE BLD MANUAL STRIP-MCNC: 128 MG/DL (ref 74–99)
GLUCOSE BLD MANUAL STRIP-MCNC: 164 MG/DL (ref 74–99)
GLUCOSE BLD MANUAL STRIP-MCNC: 188 MG/DL (ref 74–99)
GLUCOSE SERPL-MCNC: 120 MG/DL (ref 74–99)
HCT VFR BLD AUTO: 29.7 % (ref 36–46)
HGB BLD-MCNC: 9.1 G/DL (ref 12–16)
HOLD SPECIMEN: NORMAL
MAGNESIUM SERPL-MCNC: 2.02 MG/DL (ref 1.6–2.4)
MCH RBC QN AUTO: 26.9 PG (ref 26–34)
MCHC RBC AUTO-ENTMCNC: 30.6 G/DL (ref 32–36)
MCV RBC AUTO: 88 FL (ref 80–100)
NRBC BLD-RTO: 0 /100 WBCS (ref 0–0)
PLATELET # BLD AUTO: 242 X10*3/UL (ref 150–450)
POTASSIUM SERPL-SCNC: 4.6 MMOL/L (ref 3.5–5.3)
RBC # BLD AUTO: 3.38 X10*6/UL (ref 4–5.2)
SODIUM SERPL-SCNC: 136 MMOL/L (ref 136–145)
WBC # BLD AUTO: 8 X10*3/UL (ref 4.4–11.3)

## 2025-02-03 PROCEDURE — 85027 COMPLETE CBC AUTOMATED: CPT | Performed by: STUDENT IN AN ORGANIZED HEALTH CARE EDUCATION/TRAINING PROGRAM

## 2025-02-03 PROCEDURE — 2060000001 HC INTERMEDIATE ICU ROOM DAILY

## 2025-02-03 PROCEDURE — 82947 ASSAY GLUCOSE BLOOD QUANT: CPT

## 2025-02-03 PROCEDURE — 80048 BASIC METABOLIC PNL TOTAL CA: CPT | Performed by: STUDENT IN AN ORGANIZED HEALTH CARE EDUCATION/TRAINING PROGRAM

## 2025-02-03 PROCEDURE — 2500000001 HC RX 250 WO HCPCS SELF ADMINISTERED DRUGS (ALT 637 FOR MEDICARE OP): Performed by: STUDENT IN AN ORGANIZED HEALTH CARE EDUCATION/TRAINING PROGRAM

## 2025-02-03 PROCEDURE — 2500000004 HC RX 250 GENERAL PHARMACY W/ HCPCS (ALT 636 FOR OP/ED): Performed by: STUDENT IN AN ORGANIZED HEALTH CARE EDUCATION/TRAINING PROGRAM

## 2025-02-03 PROCEDURE — 94640 AIRWAY INHALATION TREATMENT: CPT

## 2025-02-03 PROCEDURE — 2500000005 HC RX 250 GENERAL PHARMACY W/O HCPCS: Performed by: STUDENT IN AN ORGANIZED HEALTH CARE EDUCATION/TRAINING PROGRAM

## 2025-02-03 PROCEDURE — 83735 ASSAY OF MAGNESIUM: CPT | Performed by: STUDENT IN AN ORGANIZED HEALTH CARE EDUCATION/TRAINING PROGRAM

## 2025-02-03 PROCEDURE — 2500000002 HC RX 250 W HCPCS SELF ADMINISTERED DRUGS (ALT 637 FOR MEDICARE OP, ALT 636 FOR OP/ED): Performed by: STUDENT IN AN ORGANIZED HEALTH CARE EDUCATION/TRAINING PROGRAM

## 2025-02-03 PROCEDURE — 36415 COLL VENOUS BLD VENIPUNCTURE: CPT | Performed by: STUDENT IN AN ORGANIZED HEALTH CARE EDUCATION/TRAINING PROGRAM

## 2025-02-03 PROCEDURE — 99232 SBSQ HOSP IP/OBS MODERATE 35: CPT | Performed by: STUDENT IN AN ORGANIZED HEALTH CARE EDUCATION/TRAINING PROGRAM

## 2025-02-03 RX ORDER — DICYCLOMINE HYDROCHLORIDE 10 MG/1
10 CAPSULE ORAL 3 TIMES DAILY PRN
Status: DISCONTINUED | OUTPATIENT
Start: 2025-02-03 | End: 2025-02-03

## 2025-02-03 RX ORDER — GABAPENTIN 100 MG/1
100 CAPSULE ORAL
Status: DISCONTINUED | OUTPATIENT
Start: 2025-02-03 | End: 2025-02-05 | Stop reason: HOSPADM

## 2025-02-03 RX ORDER — L. ACIDOPHILUS/L.BULGARICUS 1MM CELL
1 TABLET ORAL DAILY
Status: DISCONTINUED | OUTPATIENT
Start: 2025-02-03 | End: 2025-02-05 | Stop reason: HOSPADM

## 2025-02-03 RX ORDER — DICYCLOMINE HYDROCHLORIDE 20 MG/1
40 TABLET ORAL 4 TIMES DAILY PRN
COMMUNITY

## 2025-02-03 RX ORDER — DICYCLOMINE HYDROCHLORIDE 10 MG/1
10 CAPSULE ORAL 3 TIMES DAILY PRN
Status: DISCONTINUED | OUTPATIENT
Start: 2025-02-03 | End: 2025-02-05 | Stop reason: HOSPADM

## 2025-02-03 RX ORDER — GABAPENTIN 100 MG/1
200 CAPSULE ORAL NIGHTLY
Status: DISCONTINUED | OUTPATIENT
Start: 2025-02-03 | End: 2025-02-05 | Stop reason: HOSPADM

## 2025-02-03 RX ORDER — DICYCLOMINE HYDROCHLORIDE 10 MG/1
40 CAPSULE ORAL 3 TIMES DAILY PRN
Status: DISCONTINUED | OUTPATIENT
Start: 2025-02-03 | End: 2025-02-03

## 2025-02-03 RX ADMIN — BACLOFEN 20 MG: 10 TABLET ORAL at 10:06

## 2025-02-03 RX ADMIN — Medication 1 TABLET: at 11:15

## 2025-02-03 RX ADMIN — PRAVASTATIN SODIUM 40 MG: 20 TABLET ORAL at 20:59

## 2025-02-03 RX ADMIN — PREDNISONE 40 MG: 20 TABLET ORAL at 10:06

## 2025-02-03 RX ADMIN — DOXYCYCLINE 100 MG: 100 INJECTION, POWDER, LYOPHILIZED, FOR SOLUTION INTRAVENOUS at 21:01

## 2025-02-03 RX ADMIN — ENOXAPARIN SODIUM 40 MG: 40 INJECTION SUBCUTANEOUS at 21:01

## 2025-02-03 RX ADMIN — Medication 2 L/MIN: at 08:00

## 2025-02-03 RX ADMIN — ENOXAPARIN SODIUM 40 MG: 40 INJECTION SUBCUTANEOUS at 10:07

## 2025-02-03 RX ADMIN — MAGNESIUM OXIDE 400 MG (241.3 MG MAGNESIUM) TABLET 400 MG: TABLET at 21:00

## 2025-02-03 RX ADMIN — IPRATROPIUM BROMIDE AND ALBUTEROL SULFATE 3 ML: 2.5; .5 SOLUTION RESPIRATORY (INHALATION) at 01:30

## 2025-02-03 RX ADMIN — METOPROLOL SUCCINATE 25 MG: 25 TABLET, EXTENDED RELEASE ORAL at 10:06

## 2025-02-03 RX ADMIN — PANTOPRAZOLE SODIUM 40 MG: 40 TABLET, DELAYED RELEASE ORAL at 06:07

## 2025-02-03 RX ADMIN — DOXYCYCLINE 100 MG: 100 INJECTION, POWDER, LYOPHILIZED, FOR SOLUTION INTRAVENOUS at 09:35

## 2025-02-03 RX ADMIN — GUAIFENESIN 600 MG: 600 TABLET, EXTENDED RELEASE ORAL at 10:06

## 2025-02-03 RX ADMIN — GABAPENTIN 200 MG: 100 CAPSULE ORAL at 21:00

## 2025-02-03 RX ADMIN — IPRATROPIUM BROMIDE AND ALBUTEROL SULFATE 3 ML: 2.5; .5 SOLUTION RESPIRATORY (INHALATION) at 07:42

## 2025-02-03 RX ADMIN — DICYCLOMINE HYDROCHLORIDE 10 MG: 10 CAPSULE ORAL at 11:10

## 2025-02-03 RX ADMIN — ASPIRIN 81 MG: 81 TABLET, COATED ORAL at 10:06

## 2025-02-03 RX ADMIN — MONTELUKAST SODIUM 10 MG: 10 TABLET, FILM COATED ORAL at 20:59

## 2025-02-03 RX ADMIN — SERTRALINE HYDROCHLORIDE 100 MG: 50 TABLET, FILM COATED ORAL at 10:06

## 2025-02-03 RX ADMIN — IPRATROPIUM BROMIDE AND ALBUTEROL SULFATE 3 ML: 2.5; .5 SOLUTION RESPIRATORY (INHALATION) at 20:40

## 2025-02-03 RX ADMIN — MAGNESIUM OXIDE 400 MG (241.3 MG MAGNESIUM) TABLET 400 MG: TABLET at 10:07

## 2025-02-03 RX ADMIN — ALLOPURINOL 100 MG: 100 TABLET ORAL at 10:06

## 2025-02-03 RX ADMIN — GUAIFENESIN 600 MG: 600 TABLET, EXTENDED RELEASE ORAL at 21:00

## 2025-02-03 RX ADMIN — CEFTRIAXONE SODIUM 1 G: 1 INJECTION, SOLUTION INTRAVENOUS at 21:01

## 2025-02-03 RX ADMIN — Medication 5 L/MIN: at 20:44

## 2025-02-03 RX ADMIN — GABAPENTIN 100 MG: 100 CAPSULE ORAL at 18:02

## 2025-02-03 RX ADMIN — BACLOFEN 20 MG: 10 TABLET ORAL at 20:59

## 2025-02-03 RX ADMIN — OSELTAMIVIR PHOSPHATE 75 MG: 75 CAPSULE ORAL at 10:06

## 2025-02-03 RX ADMIN — OSELTAMIVIR PHOSPHATE 75 MG: 75 CAPSULE ORAL at 21:00

## 2025-02-03 RX ADMIN — QUETIAPINE FUMARATE 100 MG: 100 TABLET, FILM COATED ORAL at 20:59

## 2025-02-03 ASSESSMENT — COGNITIVE AND FUNCTIONAL STATUS - GENERAL
MOBILITY SCORE: 23
CLIMB 3 TO 5 STEPS WITH RAILING: A LITTLE

## 2025-02-03 ASSESSMENT — PAIN SCALES - GENERAL: PAINLEVEL_OUTOF10: 0 - NO PAIN

## 2025-02-03 NOTE — PROGRESS NOTES
Vivi Hughes is a 59 y.o. female on day 2 of admission presenting with Pneumonia due to influenza A virus.      Subjective   Patient feels better, lying down on her bed, on oxygen via NC      Objective     Last Recorded Vitals  /62 (BP Location: Left arm, Patient Position: Sitting)   Pulse 79   Temp 36.7 °C (98.1 °F) (Temporal)   Resp 16   Wt 128 kg (283 lb 1.1 oz)   SpO2 98%   Intake/Output last 3 Shifts:  No intake or output data in the 24 hours ending 02/03/25 1254    Admission Weight  Weight: 120 kg (265 lb) (02/01/25 1659)    Daily Weight  02/01/25 : 128 kg (283 lb 1.1 oz)    Image Results  CT head wo IV contrast  Narrative: Interpreted By:  Carline Curry,   STUDY:  CT HEAD WO IV CONTRAST;  2/1/2025 7:59 pm      INDICATION:  Signs/Symptoms:headache.          COMPARISON:  CT head dated 04/17/2024.      ACCESSION NUMBER(S):  [id number]      ORDERING CLINICIAN:  ИВАН GOFF      TECHNIQUE:  Noncontrast axial CT scan of head was performed. Angled reformats in  brain and bone windows were generated. The images were reviewed in  bone, brain, blood and soft tissue windows.      FINDINGS:  No hyperdense intracranial hemorrhage is evident. No mass effect or  midline shift is present.      Gray-white differentiation is intact, without evidence of CT apparent  transcortical infarct. Subtle periventricular attenuation changes are  nonspecific, but favored to represent sequela of microvascular  disease.      No abnormal ventricular dilatation is present. Basal cisterns are  patent. No extra-axial fluid collection is identified.      Scalp soft tissues do not demonstrate any acute abnormality. No acute  calvarial abnormality is present.      Mastoid air cells and middle ear cavities are clear. Visualized  paranasal sinuses are unremarkable in appearance.      Impression: No evidence of hemorrhage, CT apparent transcortical infarct, or  other acute intracranial abnormality.      MACRO:  None       Signed by: Carline Curry 2/1/2025 8:43 PM  Dictation workstation:   [id number]  CT angio chest for pulmonary embolism  Narrative: STUDY:  CT Angiogram of the Chest; 2/1/2025 at 7:59 PM.  INDICATION:  Elevated D-dimer, shortness of breath.  COMPARISON:  XR chest 2/1/2025; CT CAP 4/17/2024.  ACCESSION NUMBER(S):  [id number]  ORDERING CLINICIAN:  ИВАН GOFF  TECHNIQUE:  CTA of the chest was performed with intravenous contrast.   Images are reviewed and processed at a workstation according to the CT  angiogram protocol with 3-D and/or MIP post processing imaging  generated.  Omnipaque 350 75 mL was administered intravenously.  Automated mA/kV exposure control was utilized and patient examination  was performed in strict accordance with principles of ALARA.  FINDINGS:  Pulmonary arteries are adequately opacified without acute or chronic  filling defects.  The thoracic aorta is normal in course and caliber  without dissection or aneurysm.  The heart is normal in size without pericardial effusion.  Thoracic  lymph nodes are not enlarged.  There is no pleural effusion, pleural thickening, or pneumothorax.   The airways are patent.  Lungs demonstrate bilateral lower lobe infiltrates.  Upper abdomen demonstrates no acute pathology. Enlarged fatty liver.  Cholecystectomy.  There are no acute fractures.  No suspicious bony lesions.  Impression: No definite pulmonary embolism.  Mild cardiomegaly.  Bilateral lower lobe infiltrates, likely acute pneumonitis.  Enlarged fatty liver.  Signed by Anurag Sainz MD  ECG 12 lead  Normal sinus rhythm  Cannot rule out Anterior infarct , age undetermined  Abnormal ECG  When compared with ECG of 17-APR-2024 20:09,  Vent. rate has increased BY  33 BPM  Nonspecific T wave abnormality has replaced inverted T waves in Inferior leads  Vascular US Lower Extremity Venous Duplex Bilateral  Narrative: STUDY:  Bilateral Lower Extremity Venous Doppler Ultrasound; 2/1/2025 6:12  PM.  INDICATION:  Elevated D-dimer. Shortness of breath x2 days.  COMPARISON:  US Right LE Venous 10/17/2023.  ACCESSION NUMBER(S):  [id number]  ORDERING CLINICIAN:  ИВАН GOFF  TECHNIQUE:    Real-time grayscale imaging, color Doppler flow imaging, and spectral  Doppler imaging of the bilateral lower extremity veins was performed.  FINDINGS:   The bilateral common femoral, profunda, femoral, and popliteal veins  demonstrated normal compressibility, normal phasic venous flow and  normal response to augmentation. There is no evidence for echogenic  thrombi. The visualized deep calf veins are patent.    Impression: No evidence for DVT within the bilateral lower extremities.  No significant change compared to prior ultrasound.  Signed by Kiet Wray MD  XR chest 1 view  Narrative: STUDY:  Chest Radiograph;  2/01/2025 5:23 PM  INDICATION:  Chest pain.  COMPARISON:  CT CAP 4/17/2024.  ACCESSION NUMBER(S):  [id number]  ORDERING CLINICIAN:  ИВАН GOFF  TECHNIQUE:  Frontal chest was obtained at 17:22 hours.  FINDINGS:  CARDIOMEDIASTINAL SILHOUETTE:  Cardiomediastinal silhouette is prominent in size. Aortic knob  calcifications.     LUNGS:  Hazy bibasilar atelectasis and/or pneumonia. No pleural effusion or  pneumothorax.     ABDOMEN:  No remarkable upper abdominal findings.     BONES:  No acute osseous changes.  Impression: Hazy bibasilar atelectasis and/or pneumonia.  Signed by Yoandy Cunningham DO      Physical Exam    General: Well-developed morbidly obese female, in no acute distress, on oxygen via nasal cannula  HEENT: AT, NC, no JVD, no lymphadenopathy, neck supple  Lungs: Bilateral wheezing noted  Cardiac: Normal S1-S2, no murmur, no gallop  Abdomen: Soft, nontender, no distention, positive bowel sound  Extremities: No deformity, bilateral lower extremity +1 edema noted, pulses intact, ROM intact  Neurological: Alert awake oriented x3, sensation intact, clear speech        Assessment & Plan  Pneumonia due to  influenza A virus    Mild intermittent asthma with acute exacerbation ([facility])    Benign essential hypertension    Mixed hyperlipidemia    Posttraumatic stress disorder    Type 2 diabetes mellitus with hyperglycemia, without long-term current use of insulin    Acute hypoxic respiratory failure (Multi)    Hypomagnesemia    Lethargy        Vivi Hughes is a 59 y.o. female with a history of mild intermittent asthma, type 2 diabetes, essential hypertension, mixed hyperlipidemia, PTSD who was admitted for management of following issues:       #.  Acute hypoxic respiratory failure 2/2 influenza A pneumonia, resolved  #.  Mild intermittent asthma with acute exacerbation  Influenza A positive, requiring supplemental oxygen (not on home O2)  CT angio chest reviewed and negative for PE, bilateral lower lobe infiltrates more consistent with viral pneumonia  Asthma exacerbation with wheezing      -Continue Tamiflu twice daily, prednisone, nebulizer, albuterol inhaler, Mucinex, Tessalon, Singulair  -Pro-Sree 0.09, oxygen therapy as needed     #.  Type 2 diabetes with hyperglycemia  -Well controlled, last A1c 6.6%  -SSI, Accu-Cheks, hypoglycemia protocol, DM diet     #.  Hypomagnesemia resolved     #.  Lethargy resolved  -Likely secondary to multiple doses of fentanyl in the ER     #.  Benign essential hypertension  #.  Mixed hyperlipidemia  #.  PTSD  -Continue home meds     VTE PPX: Lovenox/SCDs  Disposition: Home once hemodynamically stable hopefully within the next 24 hours        Josh Armstrong MD

## 2025-02-03 NOTE — CARE PLAN
The patient's goals for the shift include  rest    The clinical goals for the shift include pt will report decrease SOB throughout shift      Problem: Pain - Adult  Goal: Verbalizes/displays adequate comfort level or baseline comfort level  Outcome: Progressing     Problem: Safety - Adult  Goal: Free from fall injury  Outcome: Progressing     Problem: Respiratory  Goal: Clear secretions with interventions this shift  Outcome: Progressing  Goal: Minimize anxiety/maximize coping throughout shift  Outcome: Progressing  Goal: Minimal/no exertional discomfort or dyspnea this shift  Outcome: Progressing  Goal: No signs of respiratory distress (eg. Use of accessory muscles. Peds grunting)  Outcome: Progressing  Goal: Patent airway maintained this shift  Outcome: Progressing

## 2025-02-04 LAB
ANION GAP SERPL CALC-SCNC: 11 MMOL/L (ref 10–20)
BUN SERPL-MCNC: 24 MG/DL (ref 6–23)
CALCIUM SERPL-MCNC: 8.8 MG/DL (ref 8.6–10.3)
CHLORIDE SERPL-SCNC: 102 MMOL/L (ref 98–107)
CO2 SERPL-SCNC: 29 MMOL/L (ref 21–32)
CREAT SERPL-MCNC: 1.09 MG/DL (ref 0.5–1.05)
EGFRCR SERPLBLD CKD-EPI 2021: 58 ML/MIN/1.73M*2
ERYTHROCYTE [DISTWIDTH] IN BLOOD BY AUTOMATED COUNT: 16.1 % (ref 11.5–14.5)
GLUCOSE BLD MANUAL STRIP-MCNC: 117 MG/DL (ref 74–99)
GLUCOSE BLD MANUAL STRIP-MCNC: 172 MG/DL (ref 74–99)
GLUCOSE BLD MANUAL STRIP-MCNC: 96 MG/DL (ref 74–99)
GLUCOSE SERPL-MCNC: 136 MG/DL (ref 74–99)
HCT VFR BLD AUTO: 28.4 % (ref 36–46)
HGB BLD-MCNC: 8.8 G/DL (ref 12–16)
MCH RBC QN AUTO: 27.1 PG (ref 26–34)
MCHC RBC AUTO-ENTMCNC: 31 G/DL (ref 32–36)
MCV RBC AUTO: 87 FL (ref 80–100)
NRBC BLD-RTO: 0 /100 WBCS (ref 0–0)
PLATELET # BLD AUTO: 241 X10*3/UL (ref 150–450)
POTASSIUM SERPL-SCNC: 4.4 MMOL/L (ref 3.5–5.3)
RBC # BLD AUTO: 3.25 X10*6/UL (ref 4–5.2)
SODIUM SERPL-SCNC: 138 MMOL/L (ref 136–145)
WBC # BLD AUTO: 7.6 X10*3/UL (ref 4.4–11.3)

## 2025-02-04 PROCEDURE — 99232 SBSQ HOSP IP/OBS MODERATE 35: CPT | Performed by: STUDENT IN AN ORGANIZED HEALTH CARE EDUCATION/TRAINING PROGRAM

## 2025-02-04 PROCEDURE — 2500000001 HC RX 250 WO HCPCS SELF ADMINISTERED DRUGS (ALT 637 FOR MEDICARE OP): Performed by: STUDENT IN AN ORGANIZED HEALTH CARE EDUCATION/TRAINING PROGRAM

## 2025-02-04 PROCEDURE — 2500000004 HC RX 250 GENERAL PHARMACY W/ HCPCS (ALT 636 FOR OP/ED): Performed by: STUDENT IN AN ORGANIZED HEALTH CARE EDUCATION/TRAINING PROGRAM

## 2025-02-04 PROCEDURE — 80048 BASIC METABOLIC PNL TOTAL CA: CPT | Performed by: STUDENT IN AN ORGANIZED HEALTH CARE EDUCATION/TRAINING PROGRAM

## 2025-02-04 PROCEDURE — 2060000001 HC INTERMEDIATE ICU ROOM DAILY

## 2025-02-04 PROCEDURE — 94640 AIRWAY INHALATION TREATMENT: CPT

## 2025-02-04 PROCEDURE — 2500000002 HC RX 250 W HCPCS SELF ADMINISTERED DRUGS (ALT 637 FOR MEDICARE OP, ALT 636 FOR OP/ED): Performed by: STUDENT IN AN ORGANIZED HEALTH CARE EDUCATION/TRAINING PROGRAM

## 2025-02-04 PROCEDURE — 36415 COLL VENOUS BLD VENIPUNCTURE: CPT | Performed by: STUDENT IN AN ORGANIZED HEALTH CARE EDUCATION/TRAINING PROGRAM

## 2025-02-04 PROCEDURE — 85027 COMPLETE CBC AUTOMATED: CPT | Performed by: STUDENT IN AN ORGANIZED HEALTH CARE EDUCATION/TRAINING PROGRAM

## 2025-02-04 PROCEDURE — 82947 ASSAY GLUCOSE BLOOD QUANT: CPT

## 2025-02-04 PROCEDURE — 2500000005 HC RX 250 GENERAL PHARMACY W/O HCPCS: Performed by: STUDENT IN AN ORGANIZED HEALTH CARE EDUCATION/TRAINING PROGRAM

## 2025-02-04 RX ORDER — HYDRALAZINE HYDROCHLORIDE 20 MG/ML
10 INJECTION INTRAMUSCULAR; INTRAVENOUS EVERY 6 HOURS PRN
Status: DISCONTINUED | OUTPATIENT
Start: 2025-02-04 | End: 2025-02-05 | Stop reason: HOSPADM

## 2025-02-04 RX ADMIN — DOXYCYCLINE 100 MG: 100 INJECTION, POWDER, LYOPHILIZED, FOR SOLUTION INTRAVENOUS at 23:06

## 2025-02-04 RX ADMIN — CEFTRIAXONE SODIUM 1 G: 1 INJECTION, SOLUTION INTRAVENOUS at 22:02

## 2025-02-04 RX ADMIN — BACLOFEN 20 MG: 10 TABLET ORAL at 08:50

## 2025-02-04 RX ADMIN — DICYCLOMINE HYDROCHLORIDE 10 MG: 10 CAPSULE ORAL at 09:02

## 2025-02-04 RX ADMIN — ENOXAPARIN SODIUM 40 MG: 40 INJECTION SUBCUTANEOUS at 08:50

## 2025-02-04 RX ADMIN — IPRATROPIUM BROMIDE AND ALBUTEROL SULFATE 3 ML: 2.5; .5 SOLUTION RESPIRATORY (INHALATION) at 07:08

## 2025-02-04 RX ADMIN — SERTRALINE HYDROCHLORIDE 100 MG: 50 TABLET, FILM COATED ORAL at 08:50

## 2025-02-04 RX ADMIN — OSELTAMIVIR PHOSPHATE 75 MG: 75 CAPSULE ORAL at 22:03

## 2025-02-04 RX ADMIN — IPRATROPIUM BROMIDE AND ALBUTEROL SULFATE 3 ML: 2.5; .5 SOLUTION RESPIRATORY (INHALATION) at 19:59

## 2025-02-04 RX ADMIN — GUAIFENESIN 600 MG: 600 TABLET, EXTENDED RELEASE ORAL at 08:50

## 2025-02-04 RX ADMIN — ACETAMINOPHEN 650 MG: 325 TABLET ORAL at 22:04

## 2025-02-04 RX ADMIN — PRAVASTATIN SODIUM 40 MG: 20 TABLET ORAL at 22:03

## 2025-02-04 RX ADMIN — DOXYCYCLINE 100 MG: 100 INJECTION, POWDER, LYOPHILIZED, FOR SOLUTION INTRAVENOUS at 08:51

## 2025-02-04 RX ADMIN — IPRATROPIUM BROMIDE AND ALBUTEROL SULFATE 3 ML: 2.5; .5 SOLUTION RESPIRATORY (INHALATION) at 01:27

## 2025-02-04 RX ADMIN — MAGNESIUM OXIDE 400 MG (241.3 MG MAGNESIUM) TABLET 400 MG: TABLET at 08:50

## 2025-02-04 RX ADMIN — ALLOPURINOL 100 MG: 100 TABLET ORAL at 08:50

## 2025-02-04 RX ADMIN — MAGNESIUM OXIDE 400 MG (241.3 MG MAGNESIUM) TABLET 400 MG: TABLET at 22:05

## 2025-02-04 RX ADMIN — ENOXAPARIN SODIUM 40 MG: 40 INJECTION SUBCUTANEOUS at 22:03

## 2025-02-04 RX ADMIN — METOPROLOL SUCCINATE 25 MG: 25 TABLET, EXTENDED RELEASE ORAL at 08:50

## 2025-02-04 RX ADMIN — PREDNISONE 40 MG: 20 TABLET ORAL at 08:50

## 2025-02-04 RX ADMIN — Medication 1 TABLET: at 08:50

## 2025-02-04 RX ADMIN — HYDRALAZINE HYDROCHLORIDE 10 MG: 20 INJECTION INTRAMUSCULAR; INTRAVENOUS at 23:07

## 2025-02-04 RX ADMIN — Medication 4 L/MIN: at 07:08

## 2025-02-04 RX ADMIN — ASPIRIN 81 MG: 81 TABLET, COATED ORAL at 08:50

## 2025-02-04 RX ADMIN — MONTELUKAST SODIUM 10 MG: 10 TABLET, FILM COATED ORAL at 22:04

## 2025-02-04 RX ADMIN — IPRATROPIUM BROMIDE AND ALBUTEROL SULFATE 3 ML: 2.5; .5 SOLUTION RESPIRATORY (INHALATION) at 13:20

## 2025-02-04 RX ADMIN — Medication 3 L/MIN: at 20:01

## 2025-02-04 RX ADMIN — GABAPENTIN 100 MG: 100 CAPSULE ORAL at 17:44

## 2025-02-04 RX ADMIN — Medication 3 MG: at 22:04

## 2025-02-04 RX ADMIN — GUAIFENESIN 600 MG: 600 TABLET, EXTENDED RELEASE ORAL at 22:03

## 2025-02-04 RX ADMIN — ACETAMINOPHEN 650 MG: 325 TABLET ORAL at 10:52

## 2025-02-04 RX ADMIN — OSELTAMIVIR PHOSPHATE 75 MG: 75 CAPSULE ORAL at 08:50

## 2025-02-04 RX ADMIN — QUETIAPINE FUMARATE 100 MG: 100 TABLET, FILM COATED ORAL at 22:04

## 2025-02-04 RX ADMIN — PANTOPRAZOLE SODIUM 40 MG: 40 TABLET, DELAYED RELEASE ORAL at 06:18

## 2025-02-04 RX ADMIN — GABAPENTIN 200 MG: 100 CAPSULE ORAL at 22:12

## 2025-02-04 RX ADMIN — BACLOFEN 20 MG: 10 TABLET ORAL at 22:04

## 2025-02-04 ASSESSMENT — ACTIVITIES OF DAILY LIVING (ADL): LACK_OF_TRANSPORTATION: NO

## 2025-02-04 NOTE — CONSULTS
Reason For Consult  Acute hypoxemic respiratory failure, acute influenza infection, bacterial pneumonia and other pulmonary related issues    HISTORY OF PRESENT ILLNESS:     Chief Complaint: Shortness of breath     Vivi Hughes is a 59 y.o. female with a history of mild intermittent asthma, type 2 diabetes, essential hypertension, mixed hyperlipidemia, PTSD presenting with shortness of breath.  Patient significantly lethargic during examination.  Patient states her symptoms started approximately 48 hours ago.  She has had a nonproductive cough and nasal congestion.  She has also felt slightly wheezy.  Denies any chest pain, fever, chills.  Denies any orthopnea and reports mild leg swelling that is at baseline.      ER Course: Mildly tachypneic on arrival, placed on nasal cannula for hypoxia, afebrile.  Labs notable for glucose 143, SCR 1.10, magnesium 1.37, , D-dimer 1711, hemoglobin 9.6.  Troponin negative x 2.  Influenza A positive.  ABG shows pH 7.32, pCO2 59, pO2 44.  CTPA negative for PE and shows bilateral lower lobe infiltrates.  CT head was negative.  Blood cultures were drawn and patient was started on empiric antibiotics.  She was also given Tamiflu, Solu-Medrol, IV magnesium, nebulizer treatments, and fentanyl.           ROS     10 point review of systems negative except per HPI       I was asked to evaluate and follow from a pulmonary perspective.  Patient is a retired RN.  She has no history of smoking cigarettes ever.  She has had fairly significant bronchial asthma since age 15 and previously received allergy shots for a few years.  She has never been hospitalized for asthma in the past.  She is not on a steroid inhaler recently due to severe thrush in the past.  She takes montelukast 10 mg p.o. nightly for asthma along with albuterol rescue inhaler 1-3 times a week.  She has no history of COPD.  Denies any history of DVT or pulmonary embolism.  She denies any known sleep apnea.  She has type  2 diabetes, hypertension, dyslipidemia, and  PTSD.  She presents on this admission with shortness of breath of 2 days duration with nonproductive cough nasal congestion and slight wheezing.  Denied any chest pain fever chills.  Denies any orthopnea and reports mites leg swelling at baseline.  She is noted on this admission to have influenza A infection with viral and superadded likely bacterial infection.  She was progressing nicely however when she was walked today her O2 sats dropped to high 70s.  She has been on DVT prophylaxis and acute pulmonary embolism was ruled out by a negative CT PE study on this admission.  She is also felt to have some exacerbation of bronchial asthma and has been on IV steroids in addition to bronchodilator nebs.  Currently coughing up brownish phlegm.     PAST HISTORIES:           Past Medical History     She has no past medical history on file.           Surgical History     She has no past surgical history on file.           Social History     She reports that she has never smoked. She has never used smokeless tobacco. No history on file for alcohol use and drug use.           Family History     Family History     No family history on file.                 Allergies:     Penicillins, Shellfish containing products, Shellfish derived, Adhesive, Adhesive tape-silicones, Aripiprazole, Latex, Topiramate, Buspirone, and Sulfa (sulfonamide antibiotics)          Objective     Last Recorded Vitals     /62 (BP Location: Left arm, Patient Position: Sitting)   Pulse 79   Temp 36.7 °C (98.1 °F) (Temporal)   Resp 16   Wt 128 kg (283 lb 1.1 oz)   SpO2 98%      Intake/Output last 3 Shifts:     No intake or output data in the 24 hours ending 02/03/25 1254           Admission Weight     Weight: 120 kg (265 lb) (02/01/25 1659)           Daily Weight     02/01/25 : 128 kg (283 lb 1.1 oz)           Image Results     CT head wo IV contrast     Narrative: Interpreted By:  Carline Curry       STUDY:     CT HEAD WO IV CONTRAST;  2/1/2025 7:59 pm            INDICATION:     Signs/Symptoms:headache.                   COMPARISON:     CT head dated 04/17/2024.            ACCESSION NUMBER(S):     [id number]            ORDERING CLINICIAN:     ИВАН GOFF            TECHNIQUE:     Noncontrast axial CT scan of head was performed. Angled reformats in     brain and bone windows were generated. The images were reviewed in     bone, brain, blood and soft tissue windows.            FINDINGS:     No hyperdense intracranial hemorrhage is evident. No mass effect or     midline shift is present.            Gray-white differentiation is intact, without evidence of CT apparent     transcortical infarct. Subtle periventricular attenuation changes are     nonspecific, but favored to represent sequela of microvascular     disease.            No abnormal ventricular dilatation is present. Basal cisterns are     patent. No extra-axial fluid collection is identified.            Scalp soft tissues do not demonstrate any acute abnormality. No acute     calvarial abnormality is present.            Mastoid air cells and middle ear cavities are clear. Visualized     paranasal sinuses are unremarkable in appearance.            Impression: No evidence of hemorrhage, CT apparent transcortical infarct, or     other acute intracranial abnormality.            MACRO:     None            Signed by: Carline Curry 2/1/2025 8:43 PM     Dictation workstation:   [id number]     CT angio chest for pulmonary embolism     Narrative: STUDY:     CT Angiogram of the Chest; 2/1/2025 at 7:59 PM.     INDICATION:     Elevated D-dimer, shortness of breath.     COMPARISON:     XR chest 2/1/2025; CT CAP 4/17/2024.     ACCESSION NUMBER(S):     [id number]     ORDERING CLINICIAN:     ИВАН GOFF     TECHNIQUE:  CTA of the chest was performed with intravenous contrast.      Images are reviewed and processed at a workstation according to the CT      angiogram protocol with 3-D and/or MIP post processing imaging     generated.  Omnipaque 350 75 mL was administered intravenously.     Automated mA/kV exposure control was utilized and patient examination     was performed in strict accordance with principles of ALARA.     FINDINGS:     Pulmonary arteries are adequately opacified without acute or chronic     filling defects.  The thoracic aorta is normal in course and caliber     without dissection or aneurysm.     The heart is normal in size without pericardial effusion.  Thoracic     lymph nodes are not enlarged.     There is no pleural effusion, pleural thickening, or pneumothorax.      The airways are patent.     Lungs demonstrate bilateral lower lobe infiltrates.     Upper abdomen demonstrates no acute pathology. Enlarged fatty liver.     Cholecystectomy.     There are no acute fractures.  No suspicious bony lesions.     Impression: No definite pulmonary embolism.     Mild cardiomegaly.     Bilateral lower lobe infiltrates, likely acute pneumonitis.     Enlarged fatty liver.     Signed by Anurag Sainz MD     ECG 12 lead     Normal sinus rhythm     Cannot rule out Anterior infarct , age undetermined     Abnormal ECG     When compared with ECG of 17-APR-2024 20:09,     Vent. rate has increased BY  33 BPM     Nonspecific T wave abnormality has replaced inverted T waves in Inferior leads     Vascular US Lower Extremity Venous Duplex Bilateral     Narrative: STUDY:     Bilateral Lower Extremity Venous Doppler Ultrasound; 2/1/2025 6:12 PM.     INDICATION:     Elevated D-dimer. Shortness of breath x2 days.     COMPARISON:     US Right LE Venous 10/17/2023.     ACCESSION NUMBER(S):     [id number]     ORDERING CLINICIAN:     ИВАН GOFF     TECHNIQUE:       Real-time grayscale imaging, color Doppler flow imaging, and spectral     Doppler imaging of the bilateral lower extremity veins was performed.     FINDINGS:      The bilateral common femoral, profunda,  femoral, and popliteal veins     demonstrated normal compressibility, normal phasic venous flow and     normal response to augmentation. There is no evidence for echogenic     thrombi. The visualized deep calf veins are patent.       Impression: No evidence for DVT within the bilateral lower extremities.     No significant change compared to prior ultrasound.     Signed by Kiet Wray MD     XR chest 1 view     Narrative: STUDY:     Chest Radiograph;  2/01/2025 5:23 PM     INDICATION:     Chest pain.     COMPARISON:     CT CAP 4/17/2024.     ACCESSION NUMBER(S):     [id number]     ORDERING CLINICIAN:     ИВАН GOFF     TECHNIQUE:  Frontal chest was obtained at 17:22 hours.     FINDINGS:     CARDIOMEDIASTINAL SILHOUETTE:     Cardiomediastinal silhouette is prominent in size. Aortic knob     calcifications.           LUNGS:     Hazy bibasilar atelectasis and/or pneumonia. No pleural effusion or     pneumothorax.           ABDOMEN:     No remarkable upper abdominal findings.           BONES:     No acute osseous changes.     Impression: Hazy bibasilar atelectasis and/or pneumonia.     Signed by Yoandy Cunningham DO                 Physical Exam           General: Well-developed morbidly obese female, in no acute distress, on oxygen via nasal cannula     HEENT: AT, NC, no JVD, no lymphadenopathy, neck supple     Lungs: Bilateral wheezing noted     Cardiac: Normal S1-S2, no murmur, no gallop     Abdomen: Soft, nontender, no distention, positive bowel sound     Extremities: No deformity, bilateral lower extremity +1 edema noted, pulses intact, ROM intact     Neurological: Alert awake oriented x3, sensation intact, clear speech                             Assessment & Plan     Pneumonia due to influenza A virus           Mild intermittent asthma with acute exacerbation ([facility])           Benign essential hypertension           Mixed hyperlipidemia           Posttraumatic stress disorder           Type 2 diabetes mellitus  with hyperglycemia, without long-term current use of insulin           Acute hypoxic respiratory failure (Multi)           Hypomagnesemia           Lethargy                       Vivi Hughes is a 59 y.o. female with a history of mild intermittent asthma, type 2 diabetes, essential hypertension, mixed hyperlipidemia, PTSD who was admitted for management of following issues:                 #.  Acute hypoxic respiratory failure 2/2 influenza A pneumonia, resolved     #.  Mild intermittent asthma with acute exacerbation     Influenza A positive, requiring supplemental oxygen (not on home O2)     CT angio chest reviewed and negative for PE, bilateral lower lobe infiltrates more consistent with viral pneumonia     Asthma exacerbation with wheezing            -Continue Tamiflu twice daily, prednisone, nebulizer, albuterol inhaler, Mucinex, Tessalon, Singulair     -Pro-Sree 0.09, oxygen therapy as needed           #.  Type 2 diabetes with hyperglycemia     -Well controlled, last A1c 6.6%     -SSI, Accu-Cheks, hypoglycemia protocol, DM diet           #.  Hypomagnesemia resolved           #.  Lethargy resolved     -Likely secondary to multiple doses of fentanyl in the ER           #.  Benign essential hypertension     #.  Mixed hyperlipidemia     #.  PTSD     -Continue home meds           VTE PPX: Lovenox/SCDs     Disposition: Home once hemodynamically stable hopefully within the next 24 hours     Pulmonary comments:-At this time agree with supplemental oxygen to keep SpO2 more than 92% as being done.  I doubt we are dealing with pulmonary embolism at this time.  Her hypoxia is likely due to combination of asthma exacerbation as well as pneumonia.  Patient appropriately on Tamiflu per protocol for acute influenza infection.  Patient is appropriately getting Rocephin and doxycycline for bacterial pneumonia.  For her asthma exacerbation she is appropriately on IV steroids and bronchodilator nebs as ordered.  Based on her  body habitus I suspect her to have sleep apnea.  I shall continue to monitor this patient with you, and I thank you for the referral.     I spent 55 minutes in the professional and overall care of this patient.      West Ramírez MD

## 2025-02-04 NOTE — PROGRESS NOTES
Vivi Hughes is a 59 y.o. female on day 3 of admission presenting with Pneumonia due to influenza A virus.      Subjective   Patient is desatting on room air especially with exertion, on oxygen via nasal cannula at rest otherwise hemodynamically stable  I will consult PT/OT to figure out if the patient is any placement or not    Objective     Last Recorded Vitals  /66 (BP Location: Left arm, Patient Position: Sitting)   Pulse 69   Temp 36.6 °C (97.9 °F) (Temporal)   Resp 17   Wt 128 kg (283 lb 1.1 oz)   SpO2 90%   Intake/Output last 3 Shifts:    Intake/Output Summary (Last 24 hours) at 2/4/2025 1524  Last data filed at 2/3/2025 2154  Gross per 24 hour   Intake 566.9 ml   Output --   Net 566.9 ml       Admission Weight  Weight: 120 kg (265 lb) (02/01/25 1659)    Daily Weight  02/01/25 : 128 kg (283 lb 1.1 oz)    Image Results  CT head wo IV contrast  Narrative: Interpreted By:  Carline Curry,   STUDY:  CT HEAD WO IV CONTRAST;  2/1/2025 7:59 pm      INDICATION:  Signs/Symptoms:headache.          COMPARISON:  CT head dated 04/17/2024.      ACCESSION NUMBER(S):  [id number]      ORDERING CLINICIAN:  ИВАН GOFF      TECHNIQUE:  Noncontrast axial CT scan of head was performed. Angled reformats in  brain and bone windows were generated. The images were reviewed in  bone, brain, blood and soft tissue windows.      FINDINGS:  No hyperdense intracranial hemorrhage is evident. No mass effect or  midline shift is present.      Gray-white differentiation is intact, without evidence of CT apparent  transcortical infarct. Subtle periventricular attenuation changes are  nonspecific, but favored to represent sequela of microvascular  disease.      No abnormal ventricular dilatation is present. Basal cisterns are  patent. No extra-axial fluid collection is identified.      Scalp soft tissues do not demonstrate any acute abnormality. No acute  calvarial abnormality is present.      Mastoid air cells and middle  ear cavities are clear. Visualized  paranasal sinuses are unremarkable in appearance.      Impression: No evidence of hemorrhage, CT apparent transcortical infarct, or  other acute intracranial abnormality.      MACRO:  None      Signed by: Carline Curry 2/1/2025 8:43 PM  Dictation workstation:   [id number]  CT angio chest for pulmonary embolism  Narrative: STUDY:  CT Angiogram of the Chest; 2/1/2025 at 7:59 PM.  INDICATION:  Elevated D-dimer, shortness of breath.  COMPARISON:  XR chest 2/1/2025; CT CAP 4/17/2024.  ACCESSION NUMBER(S):  [id number]  ORDERING CLINICIAN:  ИВАН GOFF  TECHNIQUE:  CTA of the chest was performed with intravenous contrast.   Images are reviewed and processed at a workstation according to the CT  angiogram protocol with 3-D and/or MIP post processing imaging  generated.  Omnipaque 350 75 mL was administered intravenously.  Automated mA/kV exposure control was utilized and patient examination  was performed in strict accordance with principles of ALARA.  FINDINGS:  Pulmonary arteries are adequately opacified without acute or chronic  filling defects.  The thoracic aorta is normal in course and caliber  without dissection or aneurysm.  The heart is normal in size without pericardial effusion.  Thoracic  lymph nodes are not enlarged.  There is no pleural effusion, pleural thickening, or pneumothorax.   The airways are patent.  Lungs demonstrate bilateral lower lobe infiltrates.  Upper abdomen demonstrates no acute pathology. Enlarged fatty liver.  Cholecystectomy.  There are no acute fractures.  No suspicious bony lesions.  Impression: No definite pulmonary embolism.  Mild cardiomegaly.  Bilateral lower lobe infiltrates, likely acute pneumonitis.  Enlarged fatty liver.  Signed by Anurag Sainz MD  ECG 12 lead  Normal sinus rhythm  Cannot rule out Anterior infarct , age undetermined  Abnormal ECG  When compared with ECG of 17-APR-2024 20:09,  Vent. rate has increased BY  33  BPM  Nonspecific T wave abnormality has replaced inverted T waves in Inferior leads  Vascular US Lower Extremity Venous Duplex Bilateral  Narrative: STUDY:  Bilateral Lower Extremity Venous Doppler Ultrasound; 2/1/2025 6:12 PM.  INDICATION:  Elevated D-dimer. Shortness of breath x2 days.  COMPARISON:  US Right LE Venous 10/17/2023.  ACCESSION NUMBER(S):  [id number]  ORDERING CLINICIAN:  ИВАН GOFF  TECHNIQUE:    Real-time grayscale imaging, color Doppler flow imaging, and spectral  Doppler imaging of the bilateral lower extremity veins was performed.  FINDINGS:   The bilateral common femoral, profunda, femoral, and popliteal veins  demonstrated normal compressibility, normal phasic venous flow and  normal response to augmentation. There is no evidence for echogenic  thrombi. The visualized deep calf veins are patent.    Impression: No evidence for DVT within the bilateral lower extremities.  No significant change compared to prior ultrasound.  Signed by Kiet Wray MD  XR chest 1 view  Narrative: STUDY:  Chest Radiograph;  2/01/2025 5:23 PM  INDICATION:  Chest pain.  COMPARISON:  CT CAP 4/17/2024.  ACCESSION NUMBER(S):  [id number]  ORDERING CLINICIAN:  ИВАН GOFF  TECHNIQUE:  Frontal chest was obtained at 17:22 hours.  FINDINGS:  CARDIOMEDIASTINAL SILHOUETTE:  Cardiomediastinal silhouette is prominent in size. Aortic knob  calcifications.     LUNGS:  Hazy bibasilar atelectasis and/or pneumonia. No pleural effusion or  pneumothorax.     ABDOMEN:  No remarkable upper abdominal findings.     BONES:  No acute osseous changes.  Impression: Hazy bibasilar atelectasis and/or pneumonia.  Signed by Yoandy Cunningham DO      Physical Exam    General: Well-developed morbidly obese female, in no acute distress, on oxygen via nasal cannula  HEENT: AT, NC, no JVD, no lymphadenopathy, neck supple  Lungs: Bilateral wheezing improving  Cardiac: Normal S1-S2, no murmur, no gallop  Abdomen: Soft, nontender, no distention,  positive bowel sound  Extremities: No deformity, bilateral lower extremity +1 edema noted, pulses intact, ROM intact  Neurological: Alert awake oriented x3, sensation intact, clear speech      Assessment & Plan  Pneumonia due to influenza A virus    Mild intermittent asthma with acute exacerbation ([facility])    Benign essential hypertension    Mixed hyperlipidemia    Posttraumatic stress disorder    Type 2 diabetes mellitus with hyperglycemia, without long-term current use of insulin    Acute hypoxic respiratory failure ([facility])    Hypomagnesemia    Lethargy        Vivi Hughes is a 59 y.o. female with a history of mild intermittent asthma, type 2 diabetes, essential hypertension, mixed hyperlipidemia, PTSD who was admitted for management of following issues:        #.  Acute hypoxic respiratory failure 2/2 influenza A pneumonia  #.  Mild intermittent asthma with acute exacerbation  Influenza A positive, requiring supplemental oxygen (not on home O2)  CT angio chest reviewed and negative for PE, bilateral lower lobe infiltrates more consistent with viral pneumonia  Asthma exacerbation with wheezing      -Continue Tamiflu twice daily, prednisone, nebulizer, albuterol inhaler, Mucinex, Tessalon, Singulair  -Pro-Sree 0.09, oxygen therapy as needed     #.  Type 2 diabetes with hyperglycemia  -Well controlled, last A1c 6.6%  -SSI, Accu-Cheks, hypoglycemia protocol, DM diet     #.  Hypomagnesemia resolved     #.  Lethargy resolved  -Likely secondary to multiple doses of fentanyl in the ER     #.  Benign essential hypertension  #.  Mixed hyperlipidemia  #.  PTSD  -Continue home meds     VTE PPX: Lovenox/SCDs  Disposition: I consulted PT/OT eval to figure out if the pt needs placement due to desaturation         Josh Armstrong MD

## 2025-02-04 NOTE — PROGRESS NOTES
02/04/25 1625   Discharge Planning   Living Arrangements Parent;Other (Comment)  (pt mary is residing home alone, family is at snf)   Support Systems Family members;Friends/neighbors   Assistance Needed pt independent prior to admission, drives, uses no DME. pt may need home o2 evaluation prior to dc   Type of Residence Private residence   Number of Stairs to Enter Residence 0   Number of Stairs Within Residence 0   Do you have animals or pets at home? Yes   Type of Animals or Pets dog   Who is requesting discharge planning? Provider   Home or Post Acute Services Community services;Other (Comment)  (pt is agreeable to [facility] program)   Expected Discharge Disposition Home   Does the patient need discharge transport arranged? No   Financial Resource Strain   How hard is it for you to pay for the very basics like food, housing, medical care, and heating? Not hard   Housing Stability   In the last 12 months, was there a time when you were not able to pay the mortgage or rent on time? N   At any time in the past 12 months, were you homeless or living in a shelter (including now)? N   Transportation Needs   In the past 12 months, has lack of transportation kept you from medical appointments or from getting medications? no   In the past 12 months, has lack of transportation kept you from meetings, work, or from getting things needed for daily living? No   Patient Choice   Patient / Family choosing to utilize agency / facility established prior to hospitalization No   Stroke Family Assessment   Stroke Family Assessment Needed No   Intensity of Service   Intensity of Service 0-30 min     Met with patient at bedside.  Pt admitted from home with influenza.  Requiring o2 per [place], which is new for her.  Pt follows with Dr Shyam Hernandez as PCP.   [facility] is preferred pharmacy.  Pt declines HHC however is agreeable to [facility]. Pt states she drove self to the ED and her car is parked here.  Pt plan is to dc home in  1-2 days and transport self home. Pt may require home o2 eval at discharge.  Care transitions team will continue to follow.

## 2025-02-05 ENCOUNTER — PHARMACY VISIT (OUTPATIENT)
Dept: PHARMACY | Facility: CLINIC | Age: 60
End: 2025-02-05
Payer: COMMERCIAL

## 2025-02-05 VITALS
OXYGEN SATURATION: 94 % | DIASTOLIC BLOOD PRESSURE: 85 MMHG | HEIGHT: 62 IN | WEIGHT: 283.07 LBS | RESPIRATION RATE: 19 BRPM | BODY MASS INDEX: 52.09 KG/M2 | SYSTOLIC BLOOD PRESSURE: 186 MMHG | TEMPERATURE: 97.7 F | HEART RATE: 65 BPM

## 2025-02-05 LAB
ANION GAP SERPL CALC-SCNC: 10 MMOL/L (ref 10–20)
BUN SERPL-MCNC: 22 MG/DL (ref 6–23)
CALCIUM SERPL-MCNC: 8.9 MG/DL (ref 8.6–10.3)
CHLORIDE SERPL-SCNC: 104 MMOL/L (ref 98–107)
CO2 SERPL-SCNC: 28 MMOL/L (ref 21–32)
CREAT SERPL-MCNC: 1.02 MG/DL (ref 0.5–1.05)
EGFRCR SERPLBLD CKD-EPI 2021: 63 ML/MIN/1.73M*2
ERYTHROCYTE [DISTWIDTH] IN BLOOD BY AUTOMATED COUNT: 15.9 % (ref 11.5–14.5)
GLUCOSE BLD MANUAL STRIP-MCNC: 105 MG/DL (ref 74–99)
GLUCOSE BLD MANUAL STRIP-MCNC: 149 MG/DL (ref 74–99)
GLUCOSE SERPL-MCNC: 106 MG/DL (ref 74–99)
HCT VFR BLD AUTO: 28.9 % (ref 36–46)
HGB BLD-MCNC: 9.1 G/DL (ref 12–16)
HOLD SPECIMEN: NORMAL
MCH RBC QN AUTO: 27.2 PG (ref 26–34)
MCHC RBC AUTO-ENTMCNC: 31.5 G/DL (ref 32–36)
MCV RBC AUTO: 86 FL (ref 80–100)
NRBC BLD-RTO: 0 /100 WBCS (ref 0–0)
PLATELET # BLD AUTO: 234 X10*3/UL (ref 150–450)
POTASSIUM SERPL-SCNC: 4.2 MMOL/L (ref 3.5–5.3)
RBC # BLD AUTO: 3.35 X10*6/UL (ref 4–5.2)
SODIUM SERPL-SCNC: 138 MMOL/L (ref 136–145)
WBC # BLD AUTO: 5.8 X10*3/UL (ref 4.4–11.3)

## 2025-02-05 PROCEDURE — 2500000001 HC RX 250 WO HCPCS SELF ADMINISTERED DRUGS (ALT 637 FOR MEDICARE OP): Performed by: STUDENT IN AN ORGANIZED HEALTH CARE EDUCATION/TRAINING PROGRAM

## 2025-02-05 PROCEDURE — 85027 COMPLETE CBC AUTOMATED: CPT | Performed by: STUDENT IN AN ORGANIZED HEALTH CARE EDUCATION/TRAINING PROGRAM

## 2025-02-05 PROCEDURE — 80048 BASIC METABOLIC PNL TOTAL CA: CPT | Performed by: STUDENT IN AN ORGANIZED HEALTH CARE EDUCATION/TRAINING PROGRAM

## 2025-02-05 PROCEDURE — 2500000004 HC RX 250 GENERAL PHARMACY W/ HCPCS (ALT 636 FOR OP/ED): Performed by: STUDENT IN AN ORGANIZED HEALTH CARE EDUCATION/TRAINING PROGRAM

## 2025-02-05 PROCEDURE — 99239 HOSP IP/OBS DSCHRG MGMT >30: CPT | Performed by: STUDENT IN AN ORGANIZED HEALTH CARE EDUCATION/TRAINING PROGRAM

## 2025-02-05 PROCEDURE — 94640 AIRWAY INHALATION TREATMENT: CPT

## 2025-02-05 PROCEDURE — 2500000002 HC RX 250 W HCPCS SELF ADMINISTERED DRUGS (ALT 637 FOR MEDICARE OP, ALT 636 FOR OP/ED): Performed by: STUDENT IN AN ORGANIZED HEALTH CARE EDUCATION/TRAINING PROGRAM

## 2025-02-05 PROCEDURE — 97165 OT EVAL LOW COMPLEX 30 MIN: CPT | Mod: GO

## 2025-02-05 PROCEDURE — 36415 COLL VENOUS BLD VENIPUNCTURE: CPT | Performed by: STUDENT IN AN ORGANIZED HEALTH CARE EDUCATION/TRAINING PROGRAM

## 2025-02-05 PROCEDURE — RXMED WILLOW AMBULATORY MEDICATION CHARGE

## 2025-02-05 PROCEDURE — 97161 PT EVAL LOW COMPLEX 20 MIN: CPT | Mod: GP

## 2025-02-05 PROCEDURE — 2500000005 HC RX 250 GENERAL PHARMACY W/O HCPCS: Performed by: STUDENT IN AN ORGANIZED HEALTH CARE EDUCATION/TRAINING PROGRAM

## 2025-02-05 PROCEDURE — 82947 ASSAY GLUCOSE BLOOD QUANT: CPT

## 2025-02-05 RX ORDER — GUAIFENESIN 600 MG/1
600 TABLET, EXTENDED RELEASE ORAL 2 TIMES DAILY
Qty: 10 TABLET | Refills: 0 | Status: SHIPPED | OUTPATIENT
Start: 2025-02-05 | End: 2025-02-05

## 2025-02-05 RX ORDER — OSELTAMIVIR PHOSPHATE 75 MG/1
75 CAPSULE ORAL 2 TIMES DAILY
Qty: 5 CAPSULE | Refills: 0 | Status: SHIPPED | OUTPATIENT
Start: 2025-02-05 | End: 2025-02-05

## 2025-02-05 RX ORDER — BENZONATATE 100 MG/1
100 CAPSULE ORAL 3 TIMES DAILY PRN
Qty: 20 CAPSULE | Refills: 0 | Status: SHIPPED | OUTPATIENT
Start: 2025-02-05

## 2025-02-05 RX ORDER — GUAIFENESIN 600 MG/1
600 TABLET, EXTENDED RELEASE ORAL 2 TIMES DAILY
Qty: 10 TABLET | Refills: 0 | Status: SHIPPED | OUTPATIENT
Start: 2025-02-05 | End: 2025-02-10

## 2025-02-05 RX ORDER — PREDNISONE 10 MG/1
10 TABLET ORAL DAILY
Qty: 5 TABLET | Refills: 0 | Status: SHIPPED | OUTPATIENT
Start: 2025-02-05 | End: 2025-02-10

## 2025-02-05 RX ORDER — BENZONATATE 100 MG/1
100 CAPSULE ORAL 3 TIMES DAILY PRN
Qty: 20 CAPSULE | Refills: 0 | Status: SHIPPED | OUTPATIENT
Start: 2025-02-05 | End: 2025-02-05

## 2025-02-05 RX ORDER — OSELTAMIVIR PHOSPHATE 75 MG/1
75 CAPSULE ORAL 2 TIMES DAILY
Qty: 5 CAPSULE | Refills: 0 | Status: SHIPPED | OUTPATIENT
Start: 2025-02-05 | End: 2025-02-08

## 2025-02-05 RX ORDER — PREDNISONE 20 MG/1
10 TABLET ORAL DAILY
Qty: 5 TABLET | Refills: 0 | Status: SHIPPED | OUTPATIENT
Start: 2025-02-06 | End: 2025-02-05 | Stop reason: HOSPADM

## 2025-02-05 RX ADMIN — OSELTAMIVIR PHOSPHATE 75 MG: 75 CAPSULE ORAL at 08:59

## 2025-02-05 RX ADMIN — PREDNISONE 40 MG: 20 TABLET ORAL at 08:59

## 2025-02-05 RX ADMIN — Medication 1 TABLET: at 08:59

## 2025-02-05 RX ADMIN — ALBUTEROL SULFATE 2 PUFF: 90 AEROSOL, METERED RESPIRATORY (INHALATION) at 12:08

## 2025-02-05 RX ADMIN — IPRATROPIUM BROMIDE AND ALBUTEROL SULFATE 3 ML: 2.5; .5 SOLUTION RESPIRATORY (INHALATION) at 01:25

## 2025-02-05 RX ADMIN — SERTRALINE HYDROCHLORIDE 100 MG: 50 TABLET, FILM COATED ORAL at 08:59

## 2025-02-05 RX ADMIN — ALLOPURINOL 100 MG: 100 TABLET ORAL at 08:59

## 2025-02-05 RX ADMIN — PANTOPRAZOLE SODIUM 40 MG: 40 TABLET, DELAYED RELEASE ORAL at 06:21

## 2025-02-05 RX ADMIN — ENOXAPARIN SODIUM 40 MG: 40 INJECTION SUBCUTANEOUS at 08:59

## 2025-02-05 RX ADMIN — OXYCODONE HYDROCHLORIDE AND ACETAMINOPHEN 1 TABLET: 5; 325 TABLET ORAL at 04:10

## 2025-02-05 RX ADMIN — ASPIRIN 81 MG: 81 TABLET, COATED ORAL at 08:59

## 2025-02-05 RX ADMIN — MAGNESIUM OXIDE 400 MG (241.3 MG MAGNESIUM) TABLET 400 MG: TABLET at 08:59

## 2025-02-05 RX ADMIN — DOXYCYCLINE 100 MG: 100 INJECTION, POWDER, LYOPHILIZED, FOR SOLUTION INTRAVENOUS at 08:59

## 2025-02-05 RX ADMIN — IPRATROPIUM BROMIDE AND ALBUTEROL SULFATE 3 ML: 2.5; .5 SOLUTION RESPIRATORY (INHALATION) at 07:24

## 2025-02-05 RX ADMIN — METOPROLOL SUCCINATE 25 MG: 25 TABLET, EXTENDED RELEASE ORAL at 08:59

## 2025-02-05 RX ADMIN — Medication 3 L/MIN: at 07:24

## 2025-02-05 RX ADMIN — GUAIFENESIN 600 MG: 600 TABLET, EXTENDED RELEASE ORAL at 08:59

## 2025-02-05 RX ADMIN — IPRATROPIUM BROMIDE AND ALBUTEROL SULFATE 3 ML: 2.5; .5 SOLUTION RESPIRATORY (INHALATION) at 12:13

## 2025-02-05 RX ADMIN — BACLOFEN 20 MG: 10 TABLET ORAL at 08:59

## 2025-02-05 ASSESSMENT — COGNITIVE AND FUNCTIONAL STATUS - GENERAL
DAILY ACTIVITIY SCORE: 24
MOBILITY SCORE: 24
MOBILITY SCORE: 24
DAILY ACTIVITIY SCORE: 24

## 2025-02-05 ASSESSMENT — PAIN SCALES - WONG BAKER: WONGBAKER_NUMERICALRESPONSE: NO HURT

## 2025-02-05 ASSESSMENT — PAIN - FUNCTIONAL ASSESSMENT
PAIN_FUNCTIONAL_ASSESSMENT: 0-10
PAIN_FUNCTIONAL_ASSESSMENT: 0-10

## 2025-02-05 ASSESSMENT — PAIN SCALES - GENERAL
PAINLEVEL_OUTOF10: 0 - NO PAIN
PAINLEVEL_OUTOF10: 0 - NO PAIN
PAINLEVEL_OUTOF10: 7
PAINLEVEL_OUTOF10: 0 - NO PAIN

## 2025-02-05 ASSESSMENT — PAIN DESCRIPTION - LOCATION: LOCATION: HEAD

## 2025-02-05 ASSESSMENT — ACTIVITIES OF DAILY LIVING (ADL): BATHING_ASSISTANCE: INDEPENDENT

## 2025-02-05 NOTE — PROGRESS NOTES
Pt to be discharge later today.  Requested attending to send medications to [facility] [facility] as pt is requesting med to bed services and also requested [facility] to be ordered.  Pt plans to drives self home.

## 2025-02-05 NOTE — DISCHARGE SUMMARY
Discharge Diagnosis  Pneumonia due to influenza A virus    Issues Requiring Follow-Up  Outpatient follow-up with primary care as needed    Discharge Meds     Medication List      START taking these medications     benzonatate 100 mg capsule; Commonly known as: Tessalon; Take 1 capsule   (100 mg) by mouth 3 times a day as needed for cough. Do not crush or chew.   guaiFENesin 600 mg 12 hr tablet; Commonly known as: Mucinex; Take 1   tablet (600 mg) by mouth 2 times a day for 5 days. Do not crush, chew, or   split.   oseltamivir 75 mg capsule; Commonly known as: Tamiflu; Take 1 capsule   (75 mg) by mouth 2 times a day for 3 doses.   predniSONE 10 mg tablet; Commonly known as: Deltasone; Take 1 tablet (10   mg) by mouth once daily for 5 days.     CONTINUE taking these medications     Aimovig Autoinjector 140 mg/mL injection; Generic drug: erenumab   * albuterol 2.5 mg /3 mL (0.083 %) nebulizer solution   * albuterol 90 mcg/actuation inhaler   allopurinol 100 mg tablet; Commonly known as: Zyloprim   ASPIR-81 ORAL   azelastine 137 mcg (0.1 %) nasal spray; Commonly known as: Astelin   baclofen 20 mg tablet; Commonly known as: Lioresal   betamethasone dipropionate 0.05 % cream   butalbital-acetaminophen-caff -40 mg capsule; Commonly known as:   Fioricet   clobetasol 0.05 % cream; Commonly known as: Temovate   colchicine 0.6 mg tablet   dicyclomine 20 mg tablet; Commonly known as: Bentyl   doxycycline 100 mg capsule; Commonly known as: Vibramycin   DULoxetine 60 mg DR capsule; Commonly known as: Cymbalta   enoxaparin 40 mg/0.4 mL syringe; Commonly known as: Lovenox   fexofenadine 60 mg tablet; Commonly known as: Allegra   fluticasone 50 mcg/actuation nasal spray; Commonly known as: Flonase   furosemide 40 mg tablet; Commonly known as: Lasix   gabapentin 300 mg capsule; Commonly known as: Neurontin   HYDROcodone-acetaminophen 5-325 mg tablet; Commonly known as: Norco   hydrOXYzine pamoate 25 mg capsule; Commonly known  as: Vistaril   hyoscyamine 0.125 mg tablet; Commonly known as: Anaspaz, Levsin   ibuprofen 600 mg tablet   ipratropium-albuteroL 0.5-2.5 mg/3 mL nebulizer solution; Commonly known   as: Duo-Neb   loratadine 10 mg tablet; Commonly known as: Claritin   magnesium hydroxide 400 mg/5 mL suspension; Commonly known as: Milk of   Magnesia   * magnesium oxide 400 mg (241.3 mg magnesium) tablet; Commonly known as:   Mag-Ox   * MagOx 400 mg (241.3 mg magnesium) tablet; Generic drug: magnesium   oxide   Maxzide 75-50 mg tablet; Generic drug: triamterene-hydrochlorothiazid   melatonin 5 mg capsule   meloxicam 7.5 mg tablet; Commonly known as: Mobic   metFORMIN 500 mg tablet; Commonly known as: Glucophage   methylPREDNISolone 4 mg tablets; Commonly known as: Medrol Dospak;   Follow schedule on package instructions   * metoprolol succinate XL 25 mg 24 hr tablet; Commonly known as:   Toprol-XL   * metoprolol succinate XL 50 mg 24 hr tablet; Commonly known as:   Toprol-XL   mometasone 0.1 % cream; Commonly known as: Elocon   mometasone-formoterol 200-5 mcg/actuation inhaler; Commonly known as:   Dulera 200   montelukast 10 mg tablet; Commonly known as: Singulair   nitroglycerin 0.4 mg SL tablet; Commonly known as: Nitrostat   Nurtec ODT 75 mg tablet,disintegrating; Generic drug: rimegepant   nystatin-triamcinolone cream; Commonly known as: Mycolog II   * omeprazole 20 mg DR capsule; Commonly known as: PriLOSEC   * omeprazole 40 mg DR capsule; Commonly known as: PriLOSEC   ondansetron 4 mg tablet; Commonly known as: Zofran   oxyCODONE 5 mg immediate release capsule; Commonly known as: Oxy-IR   pantoprazole 40 mg EC tablet; Commonly known as: ProtoNix   pentazocine-naloxone 50-0.5 mg tablet; Commonly known as: Talwin NX   pioglitazone 15 mg tablet; Commonly known as: Actos   * potassium chloride CR 10 mEq ER tablet; Commonly known as: Klor-Con   * potassium chloride CR 20 mEq ER tablet; Commonly known as: Klor-Con   M20   pravastatin  40 mg tablet; Commonly known as: Pravachol   prazosin 2 mg capsule; Commonly known as: Minipress   promethazine 12.5 mg tablet; Commonly known as: Phenergan   promethazine-DM 6.25-15 mg/5 mL syrup; Commonly known as: Phenergan-DM   * QUEtiapine 100 mg tablet; Commonly known as: SEROquel   * QUEtiapine 50 mg tablet; Commonly known as: SEROquel   rizatriptan 10 mg tablet; Commonly known as: Maxalt   traMADol 50 mg tablet; Commonly known as: Ultram   traZODone 100 mg tablet; Commonly known as: Desyrel   Tylenol Extra Strength 500 mg tablet; Generic drug: acetaminophen   Zoloft 100 mg tablet; Generic drug: sertraline   Zomig 2.5 mg tablet; Generic drug: ZOLMitriptan  * This list has 12 medication(s) that are the same as other medications   prescribed for you. Read the directions carefully, and ask your doctor or   other care provider to review them with you.     STOP taking these medications     methocarbamol 500 mg tablet; Commonly known as: Robaxin   pregabalin 150 mg capsule; Commonly known as: Lyrica       Test Results Pending At Discharge  Pending Labs       Order Current Status    Blood Culture Preliminary result    Blood Culture Preliminary result            Hospital Course  Vivi Hughes is a 59 y.o. female with a history of mild intermittent asthma, type 2 diabetes, essential hypertension, mixed hyperlipidemia, PTSD who was admitted for management of following issues:        #.  Acute hypoxic respiratory failure 2/2 influenza A pneumonia  #.  Mild intermittent asthma with acute exacerbation  Influenza A positive, requiring supplemental oxygen (not on home O2)  CT angio chest reviewed and negative for PE, but showed bilateral lower lobe infiltrates more consistent with viral pneumonia  Asthma exacerbation with wheezing      -Patient was placed on Tamiflu 75 mg twice daily, prednisone, nebulizer, albuterol inhaler, Mucinex, Tessalon, Singulair  -Pro-Sree 0.09, oxygen therapy as needed  -Her symptoms improved  significantly and she will be discharged today only on Tamiflu for few more doses     #.  Type 2 diabetes with hyperglycemia  -Well controlled, last A1c 6.6%  -SSI, Accu-Cheks, hypoglycemia protocol, DM diet, was considered     #.  Hypomagnesemia resolved     #.  Lethargy resolved  -Likely secondary to multiple doses of fentanyl in the ER     #.  Benign essential hypertension  #.  Mixed hyperlipidemia  #.  PTSD  -Continued home meds     VTE PPX: Was with Lovenox/SCDs    Currently patient is hemodynamically stable and ready for discharge home on Tamiflu for few more doses and rest of her home medication.  She will follow as outpatient with her primary care as needed.  She completed a course of antibiotic as inpatient.  Patient will benefit from healthy at home program.    Pertinent Physical Exam At Time of Discharge  Physical Exam    General: Well-developed morbidly obese female, in no acute distress  HEENT: AT, NC, no JVD, no lymphadenopathy, neck supple  Lungs: Bilateral coarse breath sound improving  Cardiac: Normal S1-S2, no murmur, no gallop  Abdomen: Soft, nontender, no distention, positive bowel sound  Extremities: No deformity, bilateral lower extremity +1 edema noted, pulses intact, ROM intact  Neurological: Alert awake oriented x3, sensation intact, clear speech        Time spent 35 minutes  Josh Armstrong MD

## 2025-02-05 NOTE — PROGRESS NOTES
Vivi Hughes is a 59 y.o. female on day 3 of admission presenting with Pneumonia due to influenza A virus.       Subjective     Patient is desatting on room air especially with exertion, on oxygen via nasal cannula at rest otherwise hemodynamically stable. Patient concerned about not getting better faster and continued need of oxygen.  She is still wheezing.     I will consult PT/OT to figure out if the patient needs any placement or not            Objective     Last Recorded Vitals     /66 (BP Location: Left arm, Patient Position: Sitting)   Pulse 69   Temp 36.6 °C (97.9 °F) (Temporal)   Resp 17   Wt 128 kg (283 lb 1.1 oz)   SpO2 90%      Intake/Output last 3 Shifts:           Intake/Output Summary (Last 24 hours) at 2/4/2025 1524     Last data filed at 2/3/2025 2154     Gross per 24 hour     Intake 566.9 ml     Output --     Net 566.9 ml                 Admission Weight     Weight: 120 kg (265 lb) (02/01/25 1659)           Daily Weight     02/01/25 : 128 kg (283 lb 1.1 oz)           Image Results     CT head wo IV contrast     Narrative: Interpreted By:  Carline Curry,      STUDY:     CT HEAD WO IV CONTRAST;  2/1/2025 7:59 pm            INDICATION:     Signs/Symptoms:headache.                   COMPARISON:     CT head dated 04/17/2024.            ACCESSION NUMBER(S):     [id number]            ORDERING CLINICIAN:     ИВАН GOFF            TECHNIQUE:     Noncontrast axial CT scan of head was performed. Angled reformats in     brain and bone windows were generated. The images were reviewed in     bone, brain, blood and soft tissue windows.            FINDINGS:     No hyperdense intracranial hemorrhage is evident. No mass effect or     midline shift is present.            Gray-white differentiation is intact, without evidence of CT apparent     transcortical infarct. Subtle periventricular attenuation changes are     nonspecific, but favored to represent sequela of microvascular     disease.             No abnormal ventricular dilatation is present. Basal cisterns are     patent. No extra-axial fluid collection is identified.            Scalp soft tissues do not demonstrate any acute abnormality. No acute     calvarial abnormality is present.            Mastoid air cells and middle ear cavities are clear. Visualized     paranasal sinuses are unremarkable in appearance.            Impression: No evidence of hemorrhage, CT apparent transcortical infarct, or     other acute intracranial abnormality.            MACRO:     None            Signed by: Carline Curry 2/1/2025 8:43 PM     Dictation workstation:   [id number]     CT angio chest for pulmonary embolism     Narrative: STUDY:     CT Angiogram of the Chest; 2/1/2025 at 7:59 PM.     INDICATION:     Elevated D-dimer, shortness of breath.     COMPARISON:     XR chest 2/1/2025; CT CAP 4/17/2024.     ACCESSION NUMBER(S):     [id number]     ORDERING CLINICIAN:     ИВАН GOFF     TECHNIQUE:  CTA of the chest was performed with intravenous contrast.      Images are reviewed and processed at a workstation according to the CT     angiogram protocol with 3-D and/or MIP post processing imaging     generated.  Omnipaque 350 75 mL was administered intravenously.     Automated mA/kV exposure control was utilized and patient examination     was performed in strict accordance with principles of ALARA.     FINDINGS:     Pulmonary arteries are adequately opacified without acute or chronic     filling defects.  The thoracic aorta is normal in course and caliber     without dissection or aneurysm.     The heart is normal in size without pericardial effusion.  Thoracic     lymph nodes are not enlarged.     There is no pleural effusion, pleural thickening, or pneumothorax.      The airways are patent.     Lungs demonstrate bilateral lower lobe infiltrates.     Upper abdomen demonstrates no acute pathology. Enlarged fatty liver.     Cholecystectomy.     There are  no acute fractures.  No suspicious bony lesions.     Impression: No definite pulmonary embolism.     Mild cardiomegaly.     Bilateral lower lobe infiltrates, likely acute pneumonitis.     Enlarged fatty liver.     Signed by Anurag Sainz MD     ECG 12 lead     Normal sinus rhythm     Cannot rule out Anterior infarct , age undetermined     Abnormal ECG     When compared with ECG of 17-APR-2024 20:09,     Vent. rate has increased BY  33 BPM     Nonspecific T wave abnormality has replaced inverted T waves in Inferior leads     Vascular US Lower Extremity Venous Duplex Bilateral     Narrative: STUDY:     Bilateral Lower Extremity Venous Doppler Ultrasound; 2/1/2025 6:12 PM.     INDICATION:     Elevated D-dimer. Shortness of breath x2 days.     COMPARISON:     US Right LE Venous 10/17/2023.     ACCESSION NUMBER(S):     [id number]     ORDERING CLINICIAN:     ИВАН GOFF     TECHNIQUE:       Real-time grayscale imaging, color Doppler flow imaging, and spectral     Doppler imaging of the bilateral lower extremity veins was performed.     FINDINGS:      The bilateral common femoral, profunda, femoral, and popliteal veins     demonstrated normal compressibility, normal phasic venous flow and     normal response to augmentation. There is no evidence for echogenic     thrombi. The visualized deep calf veins are patent.       Impression: No evidence for DVT within the bilateral lower extremities.     No significant change compared to prior ultrasound.     Signed by Kiet Wray MD     XR chest 1 view     Narrative: STUDY:     Chest Radiograph;  2/01/2025 5:23 PM     INDICATION:     Chest pain.     COMPARISON:     CT CAP 4/17/2024.     ACCESSION NUMBER(S):     [id number]     ORDERING CLINICIAN:     ИВАН GOFF     TECHNIQUE:  Frontal chest was obtained at 17:22 hours.     FINDINGS:     CARDIOMEDIASTINAL SILHOUETTE:     Cardiomediastinal silhouette is prominent in size. Aortic knob     calcifications.           LUNGS:      Hazy bibasilar atelectasis and/or pneumonia. No pleural effusion or     pneumothorax.           ABDOMEN:     No remarkable upper abdominal findings.           BONES:     No acute osseous changes.     Impression: Hazy bibasilar atelectasis and/or pneumonia.     Signed by Yoandy Cunningham DO                 Physical Exam           General: Well-developed morbidly obese female, in no acute distress, on oxygen via nasal cannula     HEENT: AT, NC, no JVD, no lymphadenopathy, neck supple     Lungs: Bilateral wheezing improving     Cardiac: Normal S1-S2, no murmur, no gallop     Abdomen: Soft, nontender, no distention, positive bowel sound     Extremities: No deformity, bilateral lower extremity +1 edema noted, pulses intact, ROM intact     Neurological: Alert awake oriented x3, sensation intact, clear speech                       Assessment & Plan     Pneumonia due to influenza A virus           Mild intermittent asthma with acute exacerbation ([facility])           Benign essential hypertension           Mixed hyperlipidemia           Posttraumatic stress disorder           Type 2 diabetes mellitus with hyperglycemia, without long-term current use of insulin           Acute hypoxic respiratory failure (Multi)           Hypomagnesemia           Lethargy                       Vivi Hughes is a 59 y.o. female with a history of mild intermittent asthma, type 2 diabetes, essential hypertension, mixed hyperlipidemia, PTSD who was admitted for management of following issues:      #.  Acute hypoxic respiratory failure 2/2 influenza A pneumonia     #.  Mild intermittent asthma with acute exacerbation     Influenza A positive, requiring supplemental oxygen (not on home O2)     CT angio chest reviewed and negative for PE, bilateral lower lobe infiltrates more consistent with viral pneumonia     Asthma exacerbation with wheezing            -Continue Tamiflu twice daily, prednisone, nebulizer, albuterol inhaler, Mucinex, Tessalon,  Singulair     -Pro-Sree 0.09, oxygen therapy as needed           #.  Type 2 diabetes with hyperglycemia     -Well controlled, last A1c 6.6%     -SSI, Accu-Cheks, hypoglycemia protocol, DM diet           #.  Hypomagnesemia resolved           #.  Lethargy resolved     -Likely secondary to multiple doses of fentanyl in the ER           #.  Benign essential hypertension     #.  Mixed hyperlipidemia     #.  PTSD     -Continue home meds       VTE PPX: Lovenox/SCDs     Disposition: I consulted PT/OT eval to figure out if the pt needs placement due to desaturation    I spent 25 minutes in the professional and overall care of this patient.      West Ramírez MD

## 2025-02-05 NOTE — PROGRESS NOTES
Occupational Therapy    Evaluation    Patient Name: Vivi Hughes  MRN: [id number]  Department: [facility]  Room: [address] [place] [address]  Today's Date: 2/5/2025  Time Calculation  Start Time: 1037  Stop Time: 1049  Time Calculation (min): 12 min      Assessment:  End of Session Communication: Bedside nurse  End of Session Patient Position: Bed, 2 rail up, Alarm off, not on at start of session (Call light within reach.)     Plan:  No Skilled OT: Independent with ADLs  OT Frequency: OT eval only  OT Discharge Recommendations: No further acute OT  OT - OK to Discharge: Yes (Once medically appropriate.)       Subjective     General:  General  Reason for Referral: ADLs  Referred By: Dr. Armstrong  Past Medical History Relevant to Rehab: Asthma, HLD, HTN, Neuropathy, Depression, Anemia, Dm, GERD, Personality disorder, PTSD, Fibromyalgia  Co-Treatment: PT  Co-Treatment Reason: To maximize functional outcomes and patient safety.  Prior to Session Communication: Bedside nurse (Cleared for therapy evaluation by RN.)  General Comment: Patient presented with c/o SOB. + acute hypoxic respiratory failure secondary influenza A pneumonia. + Influenza. CXR: Hazy bibasilar atelectasis and/or pneumonia. B/L LE US: (-) DVT. CT head: (-) acute. CT angio: (-) PE. mild cardiomegaly.  bilateral lower lobe infiltrates, likely acute pneumonitis.  Precautions:  Precautions Comment: Droplet precautions.          Pain:  Pain Assessment  Pain Assessment: 0-10  0-10 (Numeric) Pain Score: 0 - No pain    Objective   Cognition:  Overall Cognitive Status: Within Functional Limits (Tearful at times.)  Orientation Level: Oriented X4           Home Living:  Home Living Comments: Patient lives with sister in a 1 story house with 4 ROBERT with a HR. Walk in shower with a shower seat and grab bars.  Prior Function:  Prior Function Comments: Patient ambulates independently without a device, does not own any DME. Independent with ADLs and IADLs. Denies falls in the past 3 months.  Patient drives.     ADL:  Eating Assistance: Independent  Grooming Assistance: Independent  Bathing Assistance: Independent  UE Dressing Assistance: Independent  LE Dressing Assistance: Independent  Toileting Assistance with Device: Independent     Bed Mobility/Transfers: Bed Mobility  Bed Mobility: Yes  Bed Mobility 1  Bed Mobility 1: Supine to sitting, Sitting to supine  Bed Mobility Comments 1: HOB elevated. Mod I level.     Functional Mobility:  Functional Mobility  Functional Mobility Performed: Yes  Functional Mobility 1  Comments 1: Patient completed household distance functional mobility throughout the room without a device independently.     Standing Balance:  Dynamic Standing Balance  Dynamic Standing-Comments: Good      Strength:  Strength Comments: B/L UE MMT WFL throughout.     Extremities: RUE   RUE : Within Functional Limits and LUE   LUE: Within Functional Limits    Outcome Measures:[facility] Daily Activity  Putting on and taking off regular lower body clothing: None  Bathing (including washing, rinsing, drying): None  Putting on and taking off regular upper body clothing: None  Toileting, which includes using toilet, bedpan or urinal: None  Taking care of personal grooming such as brushing teeth: None  Eating Meals: None  Daily Activity - Total Score: 24    EDUCATION:  Education  Individual(s) Educated: Patient  Education Provided: POC discussed and agreed upon, Risk and benefits of OT discussed with patient or other, Fall precautons  Patient Response to Education: Patient/Caregiver Verbalized Understanding of Information

## 2025-02-05 NOTE — PROGRESS NOTES
Physical Therapy    Physical Therapy Evaluation    Patient Name: Vivi Hughes  MRN: [id number]  Today's Date: 2/5/2025   Time Calculation  Start Time: 1038  Stop Time: 1049  Time Calculation (min): 11 min  815/815-A    Assessment/Plan   PT Assessment  End of Session Communication: Bedside nurse  Assessment Comment: PT eval only pt at baseline mobility no current PT needs.  End of Session Patient Position: Bed, 2 rail up, Alarm off, not on at start of session  IP OR SWING BED PT PLAN  Inpatient or Swing Bed: Inpatient  PT Plan  PT Plan: PT Eval only  PT Eval Only Reason: At baseline function  PT Frequency: PT eval only  PT Discharge Recommendations: No further acute PT  PT Recommended Transfer Status: Independent  PT - OK to Discharge: Yes (once medically ready for discharge to next level of care.)    Subjective     Current Problem:  1. Pneumonia due to influenza A virus  benzonatate (Tessalon) 100 mg capsule    guaiFENesin (Mucinex) 600 mg 12 hr tablet    oseltamivir (Tamiflu) 75 mg capsule    predniSONE (Deltasone) 10 mg tablet    Referral to Healthy at Home Program    DISCONTINUED: benzonatate (Tessalon) 100 mg capsule    DISCONTINUED: guaiFENesin (Mucinex) 600 mg 12 hr tablet    DISCONTINUED: oseltamivir (Tamiflu) 75 mg capsule    DISCONTINUED: predniSONE (Deltasone) 20 mg tablet      2. Influenza A        3. Exacerbation of intermittent asthma, unspecified asthma severity ([facility]-HCC)        4. Acute respiratory failure with hypoxia (Multi)        5. Hypomagnesemia          General Visit Information:  General  Reason for Referral: impaired mobility  Referred By: Dr. Armstrong  Past Medical History Relevant to Rehab: Asthma, HLD, HTN, Neuropathy, Depression, Anemia, Dm, GERD, Personality disorder, PTSD, Fibromyalgia  Co-Treatment: OT  Co-Treatment Reason: To maximize functional outcomes and patient safety.  Prior to Session Communication: Bedside nurse (cleared to see for therapy eval)  Patient Position Received: Alarm  off, not on at start of session, Bed, 2 rail up  General Comment: Patient presented with c/o SOB. + acute hypoxic respiratory failure secondary influenza A pneumonia. + Influenza. CXR: Hazy bibasilar atelectasis and/or pneumonia. B/L LE US: (-) DVT. CT head: (-) acute. CT angio: (-) PE. mild cardiomegaly.  bilateral lower lobe infiltrates, likely acute pneumonitis.  Home Living:  Home Living  Home Living Comments: Patient lives with sister in a 1 story house with 4 ROBERT with a HR. Walk in shower with a shower seat and grab bars.  Prior Level of Function:  Prior Function Per Pt/Caregiver Report  Prior Function Comments: Patient ambulates independently without a device, does not own any DME. Independent with ADLs and IADLs. Denies falls in the past 3 months. Patient drives.  Precautions:  Precautions  Precautions Comment: Droplet precautions. (FLU A +)  Objective   Pain:  Pain Assessment  Pain Assessment: 0-10  0-10 (Numeric) Pain Score: 0 - No pain  Cognition:  Cognition  Overall Cognitive Status: Within Functional Limits (tearful)  Orientation Level: Oriented X4  General Assessments:  Sensation  Sensation Comment: intact  Strength  Strength Comments: BLE 5/5  Coordination  Movements are Fluid and Coordinated: Yes  Static Sitting Balance  Static Sitting-Comment/Number of Minutes: good  Dynamic Sitting Balance  Dynamic Sitting-Comments: good  Static Standing Balance  Static Standing-Comment/Number of Minutes: good  Dynamic Standing Balance  Dynamic Standing-Comments: good  Functional Assessments:  Bed Mobility  Bed Mobility: Yes  Bed Mobility 1  Bed Mobility 1: Supine to sitting, Sitting to supine  Level of Assistance 1: Modified independent  Bed Mobility Comments 1: mod I to EOB rail  Transfers  Transfer: Yes  Transfer 1  Technique 1: Sit to stand, Stand to sit  Transfer Level of Assistance 1: Independent  Trials/Comments 1: IND no A/D  Ambulation/Gait Training  Ambulation/Gait Training Performed: Yes  Ambulation/Gait  Training 1  Surface 1: Level tile  Device 1: No device  Assistance 1: Independent  Quality of Gait 1:  (slow gait speed)  Comments/Distance (ft) 1: 30ft x 2 with IND no A/D  Extremity/Trunk Assessments:  RLE   RLE : Within Functional Limits  LLE   LLE : Within Functional Limits  Outcome Measures:  [facility] Basic Mobility  Turning from your back to your side while in a flat bed without using bedrails: None  Moving from lying on your back to sitting on the side of a flat bed without using bedrails: None  Moving to and from bed to chair (including a wheelchair): None  Standing up from a chair using your arms (e.g. wheelchair or bedside chair): None  To walk in hospital room: None  Climbing 3-5 steps with railing: None  Basic Mobility - Total Score: 24

## 2025-02-05 NOTE — CARE PLAN
The patient's goals for the shift include      The clinical goals for the shift include Patient will not required increased O2 suppliment      Problem: Safety - Adult  Goal: Free from fall injury  Outcome: Progressing     Problem: Respiratory  Goal: Minimize anxiety/maximize coping throughout shift  Outcome: Progressing     Problem: Respiratory  Goal: Minimal/no exertional discomfort or dyspnea this shift  Outcome: Progressing     Problem: Respiratory  Goal: No signs of respiratory distress (eg. Use of accessory muscles. Peds grunting)  Outcome: Progressing     Problem: Respiratory  Goal: Verbalize decreased shortness of breath this shift  Outcome: Progressing     Problem: Pain  Goal: Turns in bed with improved pain control throughout the shift  Outcome: Progressing     Problem: Pain  Goal: Walks with improved pain control throughout the shift  Outcome: Progressing     Problem: Diabetes  Goal: Maintain glucose levels >70mg/dl to <250mg/dl throughout shift  Outcome: Progressing

## 2025-02-06 LAB
BACTERIA BLD CULT: NORMAL
BACTERIA BLD CULT: NORMAL

## 2025-02-07 ENCOUNTER — PATIENT OUTREACH (OUTPATIENT)
Dept: CARE COORDINATION | Age: 60
End: 2025-02-07
Payer: MEDICARE

## 2025-02-07 NOTE — PROGRESS NOTES
Outreach to patient to attempt enrollment, discussed Healthy at Home program, patient declines program at this time.

## 2025-02-11 ENCOUNTER — OFFICE VISIT (OUTPATIENT)
Age: 60
End: 2025-02-11

## 2025-02-11 VITALS
WEIGHT: 258 LBS | RESPIRATION RATE: 16 BRPM | HEIGHT: 62 IN | HEART RATE: 89 BPM | SYSTOLIC BLOOD PRESSURE: 110 MMHG | TEMPERATURE: 96.9 F | DIASTOLIC BLOOD PRESSURE: 60 MMHG | BODY MASS INDEX: 47.48 KG/M2 | OXYGEN SATURATION: 96 %

## 2025-02-11 DIAGNOSIS — J45.20 MILD INTERMITTENT ASTHMA WITHOUT COMPLICATION: Primary | ICD-10-CM

## 2025-02-11 DIAGNOSIS — F60.3 BORDERLINE PERSONALITY DISORDER (HCC): ICD-10-CM

## 2025-02-11 DIAGNOSIS — R19.7 DIARRHEA OF PRESUMED INFECTIOUS ORIGIN: ICD-10-CM

## 2025-02-11 DIAGNOSIS — Z09 HOSPITAL DISCHARGE FOLLOW-UP: ICD-10-CM

## 2025-02-11 DIAGNOSIS — E11.42 TYPE 2 DIABETES MELLITUS WITH DIABETIC POLYNEUROPATHY, WITHOUT LONG-TERM CURRENT USE OF INSULIN (HCC): ICD-10-CM

## 2025-02-11 DIAGNOSIS — K50.919 CROHN'S DISEASE WITH COMPLICATION, UNSPECIFIED GASTROINTESTINAL TRACT LOCATION (HCC): ICD-10-CM

## 2025-02-11 RX ORDER — PREDNISONE 20 MG/1
40 TABLET ORAL DAILY
Qty: 10 TABLET | Refills: 0 | Status: SHIPPED | OUTPATIENT
Start: 2025-02-11 | End: 2025-02-16

## 2025-02-11 RX ORDER — ONDANSETRON 4 MG/1
4 TABLET, ORALLY DISINTEGRATING ORAL 3 TIMES DAILY PRN
Qty: 21 TABLET | Refills: 0 | Status: SHIPPED | OUTPATIENT
Start: 2025-02-11

## 2025-02-11 SDOH — ECONOMIC STABILITY: FOOD INSECURITY: WITHIN THE PAST 12 MONTHS, YOU WORRIED THAT YOUR FOOD WOULD RUN OUT BEFORE YOU GOT MONEY TO BUY MORE.: NEVER TRUE

## 2025-02-11 SDOH — ECONOMIC STABILITY: FOOD INSECURITY: WITHIN THE PAST 12 MONTHS, THE FOOD YOU BOUGHT JUST DIDN'T LAST AND YOU DIDN'T HAVE MONEY TO GET MORE.: NEVER TRUE

## 2025-02-11 ASSESSMENT — ENCOUNTER SYMPTOMS
SHORTNESS OF BREATH: 1
WHEEZING: 0
BLOOD IN STOOL: 0
COUGH: 1

## 2025-02-11 ASSESSMENT — PATIENT HEALTH QUESTIONNAIRE - PHQ9
4. FEELING TIRED OR HAVING LITTLE ENERGY: MORE THAN HALF THE DAYS
SUM OF ALL RESPONSES TO PHQ QUESTIONS 1-9: 10
3. TROUBLE FALLING OR STAYING ASLEEP: SEVERAL DAYS
SUM OF ALL RESPONSES TO PHQ QUESTIONS 1-9: 10
6. FEELING BAD ABOUT YOURSELF - OR THAT YOU ARE A FAILURE OR HAVE LET YOURSELF OR YOUR FAMILY DOWN: NOT AT ALL
5. POOR APPETITE OR OVEREATING: NEARLY EVERY DAY
2. FEELING DOWN, DEPRESSED OR HOPELESS: MORE THAN HALF THE DAYS
10. IF YOU CHECKED OFF ANY PROBLEMS, HOW DIFFICULT HAVE THESE PROBLEMS MADE IT FOR YOU TO DO YOUR WORK, TAKE CARE OF THINGS AT HOME, OR GET ALONG WITH OTHER PEOPLE: SOMEWHAT DIFFICULT
1. LITTLE INTEREST OR PLEASURE IN DOING THINGS: MORE THAN HALF THE DAYS
9. THOUGHTS THAT YOU WOULD BE BETTER OFF DEAD, OR OF HURTING YOURSELF: NOT AT ALL
8. MOVING OR SPEAKING SO SLOWLY THAT OTHER PEOPLE COULD HAVE NOTICED. OR THE OPPOSITE, BEING SO FIGETY OR RESTLESS THAT YOU HAVE BEEN MOVING AROUND A LOT MORE THAN USUAL: NOT AT ALL
SUM OF ALL RESPONSES TO PHQ QUESTIONS 1-9: 10
7. TROUBLE CONCENTRATING ON THINGS, SUCH AS READING THE NEWSPAPER OR WATCHING TELEVISION: NOT AT ALL
SUM OF ALL RESPONSES TO PHQ9 QUESTIONS 1 & 2: 4
SUM OF ALL RESPONSES TO PHQ QUESTIONS 1-9: 10

## 2025-02-11 NOTE — PROGRESS NOTES
[facility]  [facility]  [address]  [facility] [address]  Dept: [phone]  Dept Fax: [phone]  Loc: [phone]       Yanci Cunningham   YOB: 1965    Date of Office Visit:  2/11/2025  Follow up from:       Patient Active Problem List   Diagnosis    Benign essential hypertension    Asthma    Polyneuropathy due to type 2 diabetes mellitus (HCC)    Crohn's disease (HCC)    Gastroesophageal reflux disease    Headache    Severe episode of recurrent major depressive disorder, without psychotic features (HCC)    Borderline personality disorder (HCC)    Pain in right knee    Hip pain, right    Obesity with body mass index 30 or greater    Muscle pain    Mixed hyperlipidemia    Tear of meniscus of knee    Bilateral edema of lower extremity    Varicose veins of lower extremity    History of Crohn's disease    Carpal tunnel syndrome of right wrist    Posttraumatic stress disorder    Fibromyalgia    Carpal tunnel syndrome of left wrist    Intermittent tremor    Anemia    Iron deficiency anemia    Tremor    Meralgia paresthetica of left side    Intractable migraine without aura and without status migrainosus    Morbid obesity    Edema of lower extremity    Gastroenteritis    Injury of left foot    Leukocytosis    Nausea and vomiting    Shoulder pain    Pain of right hip joint    Acute bacterial sinusitis    Bronchitis    Right upper quadrant abdominal pain    Diarrhea    Fever    Ventral hernia    Syncope and collapse    Migraine aura occurring with and without headache    Confusion    History of syncope       Allergies   Allergen Reactions    Latex Hives    Penicillins Swelling and Rash    Shellfish-Derived Products Anaphylaxis    Abilify [Aripiprazole]      shaking    Adhesive Tape Swelling     If left on too long    Topamax

## 2025-02-12 LAB
ADV 40+41 DNA STL QL NAA+NON-PROBE: NOT DETECTED
C CAYETANENSIS DNA STL QL NAA+NON-PROBE: NOT DETECTED
C COLI+JEJ+UPSA DNA STL QL NAA+NON-PROBE: NOT DETECTED
C DIFF TOX A+B STL QL IA: NORMAL
CRYPTOSP DNA STL QL NAA+NON-PROBE: NOT DETECTED
E HISTOLYT DNA STL QL NAA+NON-PROBE: NOT DETECTED
EAEC PAA PLAS AGGR+AATA ST NAA+NON-PRB: NOT DETECTED
EC STX1+STX2 GENES STL QL NAA+NON-PROBE: NOT DETECTED
EPEC EAE GENE STL QL NAA+NON-PROBE: NOT DETECTED
ETEC LTA+ST1A+ST1B TOX ST NAA+NON-PROBE: NOT DETECTED
G LAMBLIA DNA STL QL NAA+NON-PROBE: NOT DETECTED
GI PATH DNA+RNA PNL STL NAA+NON-PROBE: NOT DETECTED
HEMOCCULT STL QL IA: ABNORMAL
NOROVIRUS GI+II RNA STL QL NAA+NON-PROBE: NOT DETECTED
P SHIGELLOIDES DNA STL QL NAA+NON-PROBE: NOT DETECTED
RVA RNA STL QL NAA+NON-PROBE: NOT DETECTED
S ENT+BONG DNA STL QL NAA+NON-PROBE: NOT DETECTED
SAPO I+II+IV+V RNA STL QL NAA+NON-PROBE: NOT DETECTED
SHIGELLA SP+EIEC IPAH ST NAA+NON-PROBE: NOT DETECTED
V CHOL+PARA+VUL DNA STL QL NAA+NON-PROBE: NOT DETECTED
V CHOLERAE DNA STL QL NAA+NON-PROBE: NOT DETECTED
Y ENTEROCOL DNA STL QL NAA+NON-PROBE: NOT DETECTED

## 2025-02-25 LAB
ATRIAL RATE: 89 BPM
P AXIS: 18 DEGREES
P OFFSET: 192 MS
P ONSET: 139 MS
PR INTERVAL: 160 MS
Q ONSET: 219 MS
QRS COUNT: 15 BEATS
QRS DURATION: 76 MS
QT INTERVAL: 358 MS
QTC CALCULATION(BAZETT): 435 MS
QTC FREDERICIA: 408 MS
R AXIS: 16 DEGREES
T AXIS: 15 DEGREES
T OFFSET: 398 MS
VENTRICULAR RATE: 89 BPM

## 2025-05-16 ENCOUNTER — OFFICE VISIT (OUTPATIENT)
Age: 60
End: 2025-05-16
Payer: MEDICARE

## 2025-05-16 VITALS
BODY MASS INDEX: 50.24 KG/M2 | OXYGEN SATURATION: 92 % | SYSTOLIC BLOOD PRESSURE: 122 MMHG | HEIGHT: 62 IN | WEIGHT: 273 LBS | TEMPERATURE: 97.7 F | HEART RATE: 78 BPM | DIASTOLIC BLOOD PRESSURE: 74 MMHG

## 2025-05-16 VITALS — HEIGHT: 62 IN | WEIGHT: 273 LBS | BODY MASS INDEX: 50.24 KG/M2

## 2025-05-16 DIAGNOSIS — E78.2 MIXED HYPERLIPIDEMIA: ICD-10-CM

## 2025-05-16 DIAGNOSIS — M54.9 CHRONIC BACK PAIN GREATER THAN 3 MONTHS DURATION: ICD-10-CM

## 2025-05-16 DIAGNOSIS — I10 BENIGN ESSENTIAL HTN: ICD-10-CM

## 2025-05-16 DIAGNOSIS — M47.9 SPONDYLOSIS: ICD-10-CM

## 2025-05-16 DIAGNOSIS — E11.42 TYPE 2 DIABETES MELLITUS WITH DIABETIC POLYNEUROPATHY, WITHOUT LONG-TERM CURRENT USE OF INSULIN (HCC): Primary | ICD-10-CM

## 2025-05-16 DIAGNOSIS — G89.29 CHRONIC BACK PAIN GREATER THAN 3 MONTHS DURATION: ICD-10-CM

## 2025-05-16 DIAGNOSIS — Z00.00 MEDICARE ANNUAL WELLNESS VISIT, SUBSEQUENT: Primary | ICD-10-CM

## 2025-05-16 DIAGNOSIS — M79.7 FIBROMYALGIA: ICD-10-CM

## 2025-05-16 DIAGNOSIS — K50.919 CROHN'S DISEASE WITH COMPLICATION, UNSPECIFIED GASTROINTESTINAL TRACT LOCATION (HCC): ICD-10-CM

## 2025-05-16 LAB
CREAT UR-MCNC: 78.1 MG/DL
HBA1C MFR BLD: 6.8 %
MICROALBUMIN UR-MCNC: 1.9 MG/DL
MICROALBUMIN/CREAT UR-RTO: 24.3 MG/G (ref 0–30)

## 2025-05-16 PROCEDURE — 3078F DIAST BP <80 MM HG: CPT | Performed by: FAMILY MEDICINE

## 2025-05-16 PROCEDURE — 3074F SYST BP LT 130 MM HG: CPT | Performed by: FAMILY MEDICINE

## 2025-05-16 PROCEDURE — G0439 PPPS, SUBSEQ VISIT: HCPCS | Performed by: NURSE PRACTITIONER

## 2025-05-16 PROCEDURE — 83036 HEMOGLOBIN GLYCOSYLATED A1C: CPT | Performed by: FAMILY MEDICINE

## 2025-05-16 PROCEDURE — 99214 OFFICE O/P EST MOD 30 MIN: CPT | Performed by: FAMILY MEDICINE

## 2025-05-16 RX ORDER — ESCITALOPRAM OXALATE 10 MG/1
10 TABLET ORAL DAILY
COMMUNITY
Start: 2025-04-08 | End: 2025-05-16

## 2025-05-16 RX ORDER — MIRTAZAPINE 15 MG/1
15 TABLET, FILM COATED ORAL NIGHTLY
COMMUNITY
Start: 2025-04-02

## 2025-05-16 RX ORDER — PREGABALIN 150 MG/1
CAPSULE ORAL
Qty: 90 CAPSULE | Refills: 3 | Status: SHIPPED | OUTPATIENT
Start: 2025-05-16 | End: 2025-08-13

## 2025-05-16 RX ORDER — DICYCLOMINE HYDROCHLORIDE 10 MG/1
CAPSULE ORAL
Qty: 120 CAPSULE | Refills: 3 | Status: SHIPPED | OUTPATIENT
Start: 2025-05-16

## 2025-05-16 RX ORDER — MOMETASONE FUROATE 1 MG/G
CREAM TOPICAL
Qty: 45 G | Refills: 3 | Status: SHIPPED | OUTPATIENT
Start: 2025-05-16

## 2025-05-16 ASSESSMENT — PATIENT HEALTH QUESTIONNAIRE - PHQ9
9. THOUGHTS THAT YOU WOULD BE BETTER OFF DEAD, OR OF HURTING YOURSELF: NOT AT ALL
6. FEELING BAD ABOUT YOURSELF - OR THAT YOU ARE A FAILURE OR HAVE LET YOURSELF OR YOUR FAMILY DOWN: SEVERAL DAYS
4. FEELING TIRED OR HAVING LITTLE ENERGY: SEVERAL DAYS
1. LITTLE INTEREST OR PLEASURE IN DOING THINGS: SEVERAL DAYS
SUM OF ALL RESPONSES TO PHQ QUESTIONS 1-9: 6
5. POOR APPETITE OR OVEREATING: SEVERAL DAYS
10. IF YOU CHECKED OFF ANY PROBLEMS, HOW DIFFICULT HAVE THESE PROBLEMS MADE IT FOR YOU TO DO YOUR WORK, TAKE CARE OF THINGS AT HOME, OR GET ALONG WITH OTHER PEOPLE: SOMEWHAT DIFFICULT
SUM OF ALL RESPONSES TO PHQ QUESTIONS 1-9: 6
3. TROUBLE FALLING OR STAYING ASLEEP: SEVERAL DAYS
7. TROUBLE CONCENTRATING ON THINGS, SUCH AS READING THE NEWSPAPER OR WATCHING TELEVISION: NOT AT ALL
8. MOVING OR SPEAKING SO SLOWLY THAT OTHER PEOPLE COULD HAVE NOTICED. OR THE OPPOSITE, BEING SO FIGETY OR RESTLESS THAT YOU HAVE BEEN MOVING AROUND A LOT MORE THAN USUAL: NOT AT ALL
SUM OF ALL RESPONSES TO PHQ QUESTIONS 1-9: 6
2. FEELING DOWN, DEPRESSED OR HOPELESS: SEVERAL DAYS
SUM OF ALL RESPONSES TO PHQ QUESTIONS 1-9: 6

## 2025-05-16 NOTE — PROGRESS NOTES
Medicare Annual Wellness Visit    Yanci Cunningham is here for Medicare AWV    Assessment & Plan   Medicare annual wellness visit, subsequent       No follow-ups on file.     Subjective       Patient's complete Health Risk Assessment and screening values have been reviewed and are found in Flowsheets. The following problems were reviewed today and where indicated follow up appointments were made and/or referrals ordered.    Positive Risk Factor Screenings with Interventions:    Fall Risk:  Do you feel unsteady or are you worried about falling? : (!) yes  2 or more falls in past year?: (!) yes  Fall with injury in past year?: no     Interventions:    Reviewed medications, home hazards, visual acuity, and co-morbidities that can increase risk for falls  See AVS for additional education material     Depression:  PHQ-2 Score: 2  PHQ-9 Total Score: 6  Total Score Interpretation: 5-9 = mild depression  Interventions:  Patient declines any further evaluation or treatment  Currently working with [facility]             Abnormal BMI (obese):  Body mass index is 49.93 kg/m². (!) Abnormal  Interventions:  See AVS for additional education material          Vision Screen:  Do you have difficulty driving, watching TV, or doing any of your daily activities because of your eyesight?: No  Have you had an eye exam within the past year?: (!) No  Interventions:   Patient encouraged to make appointment with their eye specialist      Advanced Directives:  Do you have a Living Will?: (!) No    Intervention:  has NO advanced directive - not interested in additional information                     Objective   Vitals:    05/16/25 1154   Weight: 123.8 kg (273 lb)   Height: 1.575 m (5' 2\")      Body mass index is 49.93 kg/m².                    Allergies   Allergen Reactions    Latex Hives    Penicillins Swelling and Rash    Shellfish-Derived Products Anaphylaxis    Abilify [Aripiprazole]      shaking    Adhesive Tape Swelling     If left on too

## 2025-05-16 NOTE — PROGRESS NOTES
[facility]  [facility]  [address] [address]  Dept: [phone]  Dept Fax: [phone]  Loc: [phone]     Subjective  Yanci Cunningham, 60 y.o. female Established patient presents today with:  Chief Complaint   Patient presents with    Diabetes     3 month follow up  Last A1c on 12.17.24 was 6.6  Patient needing diabetic foot exam and diabetic shoe order       History of Present Illness  The patient presents for medication management.    She has been attempting to contact the nurse via phone but has not received a response. She consumed a small breakfast prior to her visit, which included cottage cheese and an Eggo waffle without syrup. She is seeking refills for her Bentyl, Elocon cream, and pregabalin prescriptions.      Past Medical History:   Diagnosis Date    Anemia     Asthma     Benign essential HTN     meds > 5 yrs / denies TIA or stroke    Borderline personality disorder (HCC)     2016 ?suggested by me-meets many criteria    Carpal tunnel syndrome of right wrist     Crohn's disease (HCC)     Depression     Fibromyalgia 02/13/2018    Fibromyalgia     GERD (gastroesophageal reflux disease)     Gout     Headache     migraines    History of syncope 5/16/2023    Irritable bowel syndrome     Mixed hyperlipidemia 05/10/2017    Neuropathy     Obesity (BMI 35.0-39.9 without comorbidity) 03/30/2017    PONV (postoperative nausea and vomiting)     nausea    PTSD (post-traumatic stress disorder)     Tremor of unknown origin     Type 2 diabetes mellitus (HCC)     meds > 5yrs     Ulcerative colitis (HCC) 11/2017    Vaginitis      Past Surgical History:   Procedure Laterality Date    BREAST CYST EXCISION Right 1994    exc mass benign    CHOLECYSTECTOMY  2009    COLONOSCOPY  2015    negative    ENDOSCOPY, COLON, DIAGNOSTIC      HERNIA REPAIR  2015    ventral / mesh    LEG TENDON SURGERY Right 2007

## 2025-05-19 ENCOUNTER — RESULTS FOLLOW-UP (OUTPATIENT)
Age: 60
End: 2025-05-19

## 2025-05-22 ENCOUNTER — OFFICE VISIT (OUTPATIENT)
Dept: CARDIOLOGY CLINIC | Age: 60
End: 2025-05-22
Payer: MEDICARE

## 2025-05-22 VITALS
OXYGEN SATURATION: 93 % | HEART RATE: 66 BPM | RESPIRATION RATE: 16 BRPM | SYSTOLIC BLOOD PRESSURE: 112 MMHG | DIASTOLIC BLOOD PRESSURE: 58 MMHG | BODY MASS INDEX: 49.97 KG/M2 | WEIGHT: 273.2 LBS

## 2025-05-22 DIAGNOSIS — Z87.898 HISTORY OF SYNCOPE: ICD-10-CM

## 2025-05-22 DIAGNOSIS — E78.2 MIXED HYPERLIPIDEMIA: ICD-10-CM

## 2025-05-22 DIAGNOSIS — E66.01 MORBID OBESITY (HCC): ICD-10-CM

## 2025-05-22 DIAGNOSIS — I10 BENIGN ESSENTIAL HTN: Primary | ICD-10-CM

## 2025-05-22 DIAGNOSIS — R55 SYNCOPE, UNSPECIFIED SYNCOPE TYPE: ICD-10-CM

## 2025-05-22 PROCEDURE — 3074F SYST BP LT 130 MM HG: CPT | Performed by: INTERNAL MEDICINE

## 2025-05-22 PROCEDURE — 3078F DIAST BP <80 MM HG: CPT | Performed by: INTERNAL MEDICINE

## 2025-05-22 PROCEDURE — 99214 OFFICE O/P EST MOD 30 MIN: CPT | Performed by: INTERNAL MEDICINE

## 2025-05-22 PROCEDURE — 93000 ELECTROCARDIOGRAM COMPLETE: CPT | Performed by: INTERNAL MEDICINE

## 2025-05-22 NOTE — PROGRESS NOTES
Chief Complaint   Patient presents with    Follow-up     1 yr follow up    Hypertension    Hyperlipidemia       5-24-18:Patient presents for initial medical evaluation. Patient is followed on a regular basis by Samir Cerda MD. Having b/l LE edema and was seen by dr. Chavarria and states her veins were ok.No hx of cardiac disease. No hx of DVT/PE. Worse over the past few months. Does have SOB on exertion.  Has been gaining weight rapidly due to LE edema. Not on any CCB or steroids. Pt denies chest pain, dyspnea,  change in exercise capacity, fatigue,  nausea, vomiting, diarrhea, constipation, motor weakness, insomnia, weight loss, syncope, dizziness, lightheadedness, palpitations, PND, orthopnea, or claudication.  BP and hr are good. No LE discoloration or ulcers. No LE edema.  Lipid profile is normal. No recent hospitalization. No change in meds.     Hx of DM, HTN, HLP.  No smoking.     6-28-18: LE edema improved with lasix 40mg BID. Has lost 23 pounds. S/p EHCO with EF of 65%, no valve abn, RVSP of 32mmHg, normal diastolic function. Pt denies chest pain, dyspnea, dyspnea on exertion, change in exercise capacity, fatigue,  nausea, vomiting, diarrhea, constipation, motor weakness, insomnia, weight loss, syncope, dizziness, lightheadedness,  PND, orthopnea, or claudication. No nitro use. BP and hr are good. CAD is stable. No LE discoloration or ulcers. +  LE edema. No CHF type symptoms. Lipid profile is normal. No recent hospitalization. No change in meds. Does have palpitations at times. BMP with CR of 1.09, GFR of 52.       12-27-18: + dyspnea, dyspnea on exertion, change in exercise capacity, fatigue.  Pt denies chest pain,  nausea, vomiting, diarrhea, constipation, motor weakness, insomnia, weight loss, syncope, dizziness, lightheadedness, palpitations, PND, orthopnea, or claudication. No nitro use. BP and hr are good. CAD is stable. No LE discoloration or ulcers.  No CHF type symptoms. Lipid profile is

## 2025-06-26 ENCOUNTER — OFFICE VISIT (OUTPATIENT)
Age: 60
End: 2025-06-26
Payer: MEDICARE

## 2025-06-26 VITALS
BODY MASS INDEX: 50.68 KG/M2 | DIASTOLIC BLOOD PRESSURE: 62 MMHG | HEIGHT: 62 IN | TEMPERATURE: 97.6 F | OXYGEN SATURATION: 93 % | WEIGHT: 275.4 LBS | HEART RATE: 69 BPM | SYSTOLIC BLOOD PRESSURE: 124 MMHG

## 2025-06-26 DIAGNOSIS — E78.2 MIXED HYPERLIPIDEMIA: ICD-10-CM

## 2025-06-26 DIAGNOSIS — R09.A2 GLOBUS SENSATION: Primary | ICD-10-CM

## 2025-06-26 DIAGNOSIS — J32.0 CHRONIC MAXILLARY SINUSITIS: ICD-10-CM

## 2025-06-26 LAB
CHOLEST SERPL-MCNC: 148 MG/DL (ref 0–199)
HDLC SERPL-MCNC: 57 MG/DL (ref 40–59)
LDL CHOLESTEROL: 70 MG/DL (ref 0–129)
TRIGLYCERIDE, FASTING: 104 MG/DL (ref 0–150)

## 2025-06-26 PROCEDURE — 99213 OFFICE O/P EST LOW 20 MIN: CPT | Performed by: FAMILY MEDICINE

## 2025-06-26 PROCEDURE — 3074F SYST BP LT 130 MM HG: CPT | Performed by: FAMILY MEDICINE

## 2025-06-26 PROCEDURE — 3078F DIAST BP <80 MM HG: CPT | Performed by: FAMILY MEDICINE

## 2025-06-26 RX ORDER — CEFDINIR 300 MG/1
300 CAPSULE ORAL 2 TIMES DAILY
Qty: 20 CAPSULE | Refills: 0 | Status: SHIPPED | OUTPATIENT
Start: 2025-06-26 | End: 2025-07-06

## 2025-06-26 NOTE — PROGRESS NOTES
[facility]  [facility]  [address] [address]  Dept: [phone]  Dept Fax: [phone]  Loc: [phone]     Subjective  Yanci Cunningham, 60 y.o. female Established patient presents today with:  Chief Complaint   Patient presents with    Hemoptysis       History of Present Illness  Feels like throat is tight and sob and left maxillary sinus is blocked. Producing phlem      Past Medical History:   Diagnosis Date    Anemia     Asthma     Benign essential HTN     meds > 5 yrs / denies TIA or stroke    Borderline personality disorder (HCC)     2016 ?suggested by me-meets many criteria    Carpal tunnel syndrome of right wrist     Crohn's disease (HCC)     Depression     Fibromyalgia 02/13/2018    Fibromyalgia     GERD (gastroesophageal reflux disease)     Gout     Headache     migraines    History of syncope 5/16/2023    Irritable bowel syndrome     Mixed hyperlipidemia 05/10/2017    Neuropathy     Obesity (BMI 35.0-39.9 without comorbidity) 03/30/2017    PONV (postoperative nausea and vomiting)     nausea    PTSD (post-traumatic stress disorder)     Tremor of unknown origin     Type 2 diabetes mellitus (HCC)     meds > 5yrs     Ulcerative colitis (HCC) 11/2017    Vaginitis      Past Surgical History:   Procedure Laterality Date    BREAST CYST EXCISION Right 1994    exc mass benign    CHOLECYSTECTOMY  2009    COLONOSCOPY  2015    negative    ENDOSCOPY, COLON, DIAGNOSTIC      HERNIA REPAIR  2015    ventral / mesh    LEG TENDON SURGERY Right 2007    leg. ankle and foot    OVARIAN CYST REMOVAL Left 1994    with mass and both fallopian tube    OVARY REMOVAL  12/2015    [facility] / mass left ovary & bilateral  tubes    PARTIAL HYSTERECTOMY (CERVIX NOT REMOVED)      NV ARTHROSCOPY KNEE DIAGNOSTIC W/WO SYNOVIAL BX SPX Right 08/03/2017    RIGHT KNEE ARTHROSCOPIC PARTIAL MEDIAL MENISCECTOMY KNEE performed by

## 2025-07-08 ENCOUNTER — HOSPITAL ENCOUNTER (OUTPATIENT)
Age: 60
Setting detail: SPECIMEN
Discharge: HOME | End: 2025-07-08

## 2025-07-08 DIAGNOSIS — J32.0 CHRONIC MAXILLARY SINUSITIS: ICD-10-CM

## 2025-07-08 DIAGNOSIS — R09.A2 GLOBUS SENSATION: Primary | ICD-10-CM

## 2025-07-09 ENCOUNTER — HOSPITAL ENCOUNTER (OUTPATIENT)
Dept: CT IMAGING | Age: 60
Discharge: HOME | End: 2025-07-11
Attending: FAMILY MEDICINE
Payer: MEDICARE

## 2025-07-09 ENCOUNTER — HOSPITAL ENCOUNTER (OUTPATIENT)
Dept: LAB | Age: 60
Discharge: HOME | End: 2025-07-09
Attending: FAMILY MEDICINE
Payer: MEDICARE

## 2025-07-09 DIAGNOSIS — R09.A2 GLOBUS SENSATION: ICD-10-CM

## 2025-07-09 DIAGNOSIS — J32.0 CHRONIC MAXILLARY SINUSITIS: ICD-10-CM

## 2025-07-09 DIAGNOSIS — R09.A2 GLOBUS SENSATION: Primary | ICD-10-CM

## 2025-07-09 LAB — CREAT SERPL-MCNC: 1.4 MG/DL (ref 0.5–0.9)

## 2025-07-09 PROCEDURE — 6360000004 HC RX CONTRAST MEDICATION: Performed by: FAMILY MEDICINE

## 2025-07-09 PROCEDURE — 70491 CT SOFT TISSUE NECK W/DYE: CPT

## 2025-07-09 PROCEDURE — 70486 CT MAXILLOFACIAL W/O DYE: CPT

## 2025-07-09 PROCEDURE — 82565 ASSAY OF CREATININE: CPT

## 2025-07-09 PROCEDURE — 36415 COLL VENOUS BLD VENIPUNCTURE: CPT

## 2025-07-09 RX ORDER — IOPAMIDOL 755 MG/ML
75 INJECTION, SOLUTION INTRAVASCULAR
Status: COMPLETED | OUTPATIENT
Start: 2025-07-09 | End: 2025-07-09

## 2025-07-09 RX ADMIN — IOPAMIDOL 75 ML: 755 INJECTION, SOLUTION INTRAVENOUS at 10:56

## 2025-07-18 ENCOUNTER — HOSPITAL ENCOUNTER (OUTPATIENT)
Dept: ULTRASOUND IMAGING | Age: 60
Discharge: HOME | End: 2025-07-20
Attending: FAMILY MEDICINE
Payer: MEDICARE

## 2025-07-18 DIAGNOSIS — E04.1 THYROID NODULE: ICD-10-CM

## 2025-07-18 PROCEDURE — 76536 US EXAM OF HEAD AND NECK: CPT

## 2025-07-27 ENCOUNTER — HOSPITAL ENCOUNTER (EMERGENCY)
Age: 60
Discharge: HOME | End: 2025-07-27
Payer: MEDICARE

## 2025-07-27 VITALS
HEART RATE: 74 BPM | DIASTOLIC BLOOD PRESSURE: 49 MMHG | HEIGHT: 62 IN | WEIGHT: 269.2 LBS | TEMPERATURE: 98.4 F | RESPIRATION RATE: 16 BRPM | BODY MASS INDEX: 49.54 KG/M2 | SYSTOLIC BLOOD PRESSURE: 110 MMHG | OXYGEN SATURATION: 95 %

## 2025-07-27 DIAGNOSIS — L08.9 LOCAL INFECTION OF SKIN AND SUBCUTANEOUS TISSUE: Primary | ICD-10-CM

## 2025-07-27 PROCEDURE — 6360000002 HC RX W HCPCS

## 2025-07-27 PROCEDURE — 90471 IMMUNIZATION ADMIN: CPT

## 2025-07-27 PROCEDURE — 99283 EMERGENCY DEPT VISIT LOW MDM: CPT

## 2025-07-27 PROCEDURE — 90714 TD VACC NO PRESV 7 YRS+ IM: CPT

## 2025-07-27 RX ORDER — MUPIROCIN 2 %
OINTMENT (GRAM) TOPICAL
Qty: 1 EACH | Refills: 0 | Status: SHIPPED | OUTPATIENT
Start: 2025-07-27 | End: 2025-08-03

## 2025-07-27 RX ORDER — CLINDAMYCIN HYDROCHLORIDE 300 MG/1
300 CAPSULE ORAL 4 TIMES DAILY
Qty: 40 CAPSULE | Refills: 0 | Status: SHIPPED | OUTPATIENT
Start: 2025-07-27 | End: 2025-08-06

## 2025-07-27 RX ADMIN — CLOSTRIDIUM TETANI TOXOID ANTIGEN (FORMALDEHYDE INACTIVATED) AND CORYNEBACTERIUM DIPHTHERIAE TOXOID ANTIGEN (FORMALDEHYDE INACTIVATED) 0.5 ML: 5; 2 INJECTION, SUSPENSION INTRAMUSCULAR at 17:04

## 2025-07-27 ASSESSMENT — PAIN - FUNCTIONAL ASSESSMENT: PAIN_FUNCTIONAL_ASSESSMENT: 0-10

## 2025-07-27 ASSESSMENT — PAIN DESCRIPTION - ORIENTATION: ORIENTATION: RIGHT

## 2025-07-27 ASSESSMENT — PAIN SCALES - GENERAL: PAINLEVEL_OUTOF10: 5

## 2025-07-27 ASSESSMENT — PAIN DESCRIPTION - FREQUENCY: FREQUENCY: CONTINUOUS

## 2025-07-27 ASSESSMENT — PAIN DESCRIPTION - PAIN TYPE: TYPE: ACUTE PAIN

## 2025-07-27 ASSESSMENT — PAIN DESCRIPTION - LOCATION: LOCATION: ARM

## 2025-07-27 NOTE — ED TRIAGE NOTES
Patient arrived via private car due to having a cut on her lower R arm from a dirty nail on Wednesday. Per patient it is painful and has drainage that is yellow, and some bleeding. Patient is A/O 4, cool and dry, ambulates by self.   Patient is unsure when her last tetanus was.   No pain medication today

## 2025-07-27 NOTE — ED PROVIDER NOTES
refill is good   Sensation intact   Compartments are soft   Neurological:      General: No focal deficit present.      Mental Status: She is alert and oriented to person, place, and time.   Psychiatric:         Mood and Affect: Mood normal.         Behavior: Behavior normal.           LABS:  Labs Reviewed - No data to display      MDM:   Vitals:    Vitals:    07/27/25 1613   BP: (!) 110/49   Pulse: 74   Resp: 16   Temp: 98.4 °F (36.9 °C)   TempSrc: Oral   SpO2: 95%   Weight: 122.1 kg (269 lb 3.2 oz)   Height: 1.575 m (5' 2\")       PROCEDURES:  Unlessotherwise noted below, none     Procedures    This is a 59 y/o female per chart review has ah/o asthma, depression, fibromyalgia, neuropathy, GERD here today for acute right forearm laceration that happened 4 days ago while she was working outside. She reports she cut her right arm on a pete nail. She did not come in or seek medical care the day it happened. She just washed it with water and put a band aid on it. She does not know if she is up to date with her vaccinations. She reports she also has a scratch on her back she wants us to look at. She denies fever, chills, drainage, decreased sensation to the RUE, foreign body.    She declines imaging at today's visit and reports there was no foreign body.     Reports she can take clindamycin.      I cleaned the wound with Hibiclens and applied proper dressing.       FINAL IMPRESSION      1. Local infection of skin and subcutaneous tissue          DISPOSITION/PLAN   DISPOSITION       I discussed with patient the need to follow up for wound check in 2-3 days with her PCP.     I also encouraged patient to return to the emergency department should she develop a fever, drainage, numbness, tingling or decreased sensation, body aches. She verbalizes understanding.     Nursing notes, medical records and triage notes reviewed. Vital signs reviewed.      Patient is afebrile, nontoxic, and hemodynamically stable at time of

## 2025-07-27 NOTE — ED NOTES
Wound care completed and steri  strips placed at this time and covered with clean dry dressing   Pt states understanding of discharge instructions

## 2025-07-31 ENCOUNTER — OFFICE VISIT (OUTPATIENT)
Age: 60
End: 2025-07-31
Payer: MEDICARE

## 2025-07-31 VITALS
OXYGEN SATURATION: 90 % | WEIGHT: 271.8 LBS | SYSTOLIC BLOOD PRESSURE: 128 MMHG | TEMPERATURE: 97.4 F | DIASTOLIC BLOOD PRESSURE: 74 MMHG | HEIGHT: 62 IN | BODY MASS INDEX: 50.02 KG/M2 | HEART RATE: 81 BPM

## 2025-07-31 DIAGNOSIS — S41.111A LACERATION OF ARM, RIGHT, INITIAL ENCOUNTER: Primary | ICD-10-CM

## 2025-07-31 PROCEDURE — 3074F SYST BP LT 130 MM HG: CPT | Performed by: FAMILY MEDICINE

## 2025-07-31 PROCEDURE — 99213 OFFICE O/P EST LOW 20 MIN: CPT | Performed by: FAMILY MEDICINE

## 2025-07-31 PROCEDURE — 3078F DIAST BP <80 MM HG: CPT | Performed by: FAMILY MEDICINE

## 2025-07-31 RX ORDER — QUETIAPINE FUMARATE 50 MG/1
50 TABLET, FILM COATED ORAL NIGHTLY
COMMUNITY
Start: 2025-07-02

## 2025-07-31 NOTE — PROGRESS NOTES
[facility]  [facility]  [address] [address]  Dept: [phone]  Dept Fax: [phone]  Loc: [phone]     Subjective  Yanci Cunningham, 60 y.o. female Established patient presents today with:  Chief Complaint   Patient presents with    Follow-Up from Hospital     Patient was seen for a deep cut on right arm and scratch on back. Patient states pain level is 4/10. Patient states she has swelling, yellow drainage. Patient states she was advised to get stitches but has only been using Band-Aids and steri-strips.        History of Present Illness        Past Medical History:   Diagnosis Date    Anemia     Asthma     Benign essential HTN     meds > 5 yrs / denies TIA or stroke    Borderline personality disorder (HCC)     2016 ?suggested by me-meets many criteria    Carpal tunnel syndrome of right wrist     Crohn's disease (HCC)     Depression     Fibromyalgia 02/13/2018    Fibromyalgia     GERD (gastroesophageal reflux disease)     Gout     Headache     migraines    History of syncope 5/16/2023    Irritable bowel syndrome     Mixed hyperlipidemia 05/10/2017    Neuropathy     Obesity (BMI 35.0-39.9 without comorbidity) 03/30/2017    PONV (postoperative nausea and vomiting)     nausea    PTSD (post-traumatic stress disorder)     Tremor of unknown origin     Type 2 diabetes mellitus (HCC)     meds > 5yrs     Ulcerative colitis (HCC) 11/2017    Vaginitis      Past Surgical History:   Procedure Laterality Date    BREAST CYST EXCISION Right 1994    exc mass benign    CHOLECYSTECTOMY  2009    COLONOSCOPY  2015    negative    ENDOSCOPY, COLON, DIAGNOSTIC      HERNIA REPAIR  2015    ventral / mesh    LEG TENDON SURGERY Right 2007    leg. ankle and foot    OVARIAN CYST REMOVAL Left 1994    with mass and both fallopian tube    OVARY REMOVAL  12/2015    [facility] / mass left ovary & bilateral  tubes    PARTIAL

## (undated) DEVICE — SMARTGOWN BREATHABLE SPECIALTY GOWN: Brand: CONVERTORS

## (undated) DEVICE — DRESSING PETRO W3XL8IN N ADH KNIT CELOS ACETT ADPTC

## (undated) DEVICE — 1842 FOAM BLOCK NEEDLE COUNTER: Brand: DEVON

## (undated) DEVICE — PAD,ABDOMINAL,8"X10",ST,LF: Brand: MEDLINE

## (undated) DEVICE — ALCON SURGICAL BLADE 64: Brand: ALCON

## (undated) DEVICE — 1200CC COLLECTION UNIT 6MM X 6' PATIENT: Brand: MEDI-VAC

## (undated) DEVICE — LUBRICANT SURG JELLY ST BACTER TUBE 4.25OZ

## (undated) DEVICE — NEEDLE SPNL L3 1/2IN OD18GA DMND PT PLAS GRN HUB SENSTCH

## (undated) DEVICE — CHLORAPREP 26ML ORANGE

## (undated) DEVICE — SUPER SPONGES,MEDIUM: Brand: KERLIX

## (undated) DEVICE — GLOVE ORANGE PI 8   MSG9080

## (undated) DEVICE — 3M™ COBAN™ STERILE SELF-ADHERENT WRAP, 1584S, 4 IN X 5 YD (10 CM X 4,5 M), 18 ROLLS/CASE: Brand: 3M™ COBAN™

## (undated) DEVICE — LABEL MED MINI W/ MARKER

## (undated) DEVICE — GOWN,PREVENTION PLUS,XLN/XL,ST,24/CS: Brand: MEDLINE

## (undated) DEVICE — CONMED SCOPE SAVER BITE BLOCK, 20X27 MM: Brand: SCOPE SAVER

## (undated) DEVICE — SUTURE ETHLN SZ 5-0 L18IN NONABSORBABLE BLK L13MM P-3 3/8 698H

## (undated) DEVICE — SONY PRINTER PAPER

## (undated) DEVICE — HAND II: Brand: MEDLINE INDUSTRIES, INC.

## (undated) DEVICE — 1010 S-DRAPE TOWEL DRAPE 10/BX: Brand: STERI-DRAPE™

## (undated) DEVICE — NEEDLE HYPO 18GA L1.5IN THN WALL PIVOTING SHLD BVL ORIENTED

## (undated) DEVICE — PACK ARTHRO III ST SIRUS

## (undated) DEVICE — DISCONTINUED USE 215159 SLING ORTHO MED COTTON ARM ENVELOPE

## (undated) DEVICE — PADDING CAST N ADH 4 YDX2 IN HIGHLY ABSORBENT EZ APPL SOFROL

## (undated) DEVICE — GAUZE SPONGES,12 PLY: Brand: CURITY

## (undated) DEVICE — NEEDLE HYPO 22GA L1 1/2IN PIVOTING SHLD FOR LUERLOCK SYR

## (undated) DEVICE — SET KNF FOR MINI CRPL TUNN REL SYS SFGRD 5PK

## (undated) DEVICE — GLOVE ORANGE PI 7 1/2   MSG9075

## (undated) DEVICE — TUBING SUCT L20FT DIA025IN W FEM MOLD CONN NONCONDUCTIVE

## (undated) DEVICE — ENDO CARRY-ON PROCEDURE KIT: Brand: ENDO CARRY-ON PROCEDURE KIT

## (undated) DEVICE — CURITY STRETCH BANDAGE: Brand: CURITY

## (undated) DEVICE — ADAPTER FLSH PMP FLD MGMT GI IRRIG OFP 2 DISPOSABLE

## (undated) DEVICE — [TOMCAT CUTTER, ARTHROSCOPIC SHAVER BLADE,  DO NOT RESTERILIZE,  DO NOT USE IF PACKAGE IS DAMAGED,  KEEP DRY,  KEEP AWAY FROM SUNLIGHT]: Brand: FORMULA

## (undated) DEVICE — INTENDED FOR TISSUE SEPARATION, AND OTHER PROCEDURES THAT REQUIRE A SHARP SURGICAL BLADE TO PUNCTURE OR CUT.: Brand: BARD-PARKER ® CARBON RIB-BACK BLADES

## (undated) DEVICE — CONVERTED USE 291618 SPONGE LAPAROTOMY POCKET POUCH RING 18

## (undated) DEVICE — MEDI-VAC TUBING CONNECT "T" POLYPROPYLENE: Brand: CARDINAL HEALTH

## (undated) DEVICE — SYRINGE IRRIG 60ML SFT PLIABLE BLB EZ TO GRP 1 HND USE W/

## (undated) DEVICE — PADDING CAST W4INXL4YD HIGHLY ABSRB THAN COT EZ APPL

## (undated) DEVICE — CONVERTED USE 248063 TOWELS OR BL ST

## (undated) DEVICE — BRUSH CLEANING ENDOSCOPE CHAN DISP

## (undated) DEVICE — SKIN MARKER,REGULAR TIP WITH RULER: Brand: DEVON

## (undated) DEVICE — ELECTRODE PT RET AD L9FT HI MOIST COND ADH HYDRGEL CORDED

## (undated) DEVICE — GOWN,AURORA,NONREINFORCED,LARGE: Brand: MEDLINE

## (undated) DEVICE — FORCEPS ENDOSCP BX OVL CUP SERR W/NEEDLE 2.3MM DIA 160CML

## (undated) DEVICE — BANDAGE ELASTIC COMPRSS REINF 2INX4.5YD

## (undated) DEVICE — SUTURE ETHLN SZ 4-0 L18IN NONABSORBABLE BLK L19MM PS-2 3/8 1667H

## (undated) DEVICE — MAT FLR SURG QUICKWICK 28X54 IN DISP

## (undated) DEVICE — PAD SHLDR BLUE WHITE FELT FOR USE W ARM SLNG PROCARE

## (undated) DEVICE — 3000CC GUARDIAN II: Brand: GUARDIAN

## (undated) DEVICE — WAND ABLAT DIA3.5MM PRB ENERGY SUCT 50-S SERFAS

## (undated) DEVICE — SYRINGE MED 30ML STD CLR PLAS LUERLOCK TIP N CTRL DISP

## (undated) DEVICE — Device: Brand: ENDO SMARTCAP

## (undated) DEVICE — SYRINGE BLB 50CC IRRIG PLIABLE FNGR FLNG GRAD FLSK DISP

## (undated) DEVICE — TUBE SET 96 MM 64 MM H2O PERISTALTIC STD AUX CHANNEL

## (undated) DEVICE — UNDERCAST PADDING: Brand: DEROYAL

## (undated) DEVICE — 4.5 MM CANNULA AND OBTURATOR,                                    CONICAL TIP, DISTAL HOLES

## (undated) DEVICE — ZIMMER® STERILE DISPOSABLE TOURNIQUET CUFF, DUAL PORT, SINGLE BLADDER, 18 IN. (46 CM)